# Patient Record
Sex: FEMALE | Race: WHITE | NOT HISPANIC OR LATINO | Employment: OTHER | URBAN - METROPOLITAN AREA
[De-identification: names, ages, dates, MRNs, and addresses within clinical notes are randomized per-mention and may not be internally consistent; named-entity substitution may affect disease eponyms.]

---

## 2017-01-04 ENCOUNTER — ALLSCRIPTS OFFICE VISIT (OUTPATIENT)
Dept: OTHER | Facility: OTHER | Age: 72
End: 2017-01-04

## 2017-01-04 DIAGNOSIS — E78.00 PURE HYPERCHOLESTEROLEMIA: ICD-10-CM

## 2017-02-14 ENCOUNTER — GENERIC CONVERSION - ENCOUNTER (OUTPATIENT)
Dept: OTHER | Facility: OTHER | Age: 72
End: 2017-02-14

## 2017-03-28 ENCOUNTER — TRANSCRIBE ORDERS (OUTPATIENT)
Dept: LAB | Facility: CLINIC | Age: 72
End: 2017-03-28

## 2017-03-28 ENCOUNTER — APPOINTMENT (OUTPATIENT)
Dept: LAB | Facility: CLINIC | Age: 72
End: 2017-03-28
Payer: MEDICARE

## 2017-03-28 DIAGNOSIS — E03.4 IDIOPATHIC ATROPHIC HYPOTHYROIDISM: ICD-10-CM

## 2017-03-28 DIAGNOSIS — E11.9 TYPE 2 DIABETES MELLITUS WITHOUT COMPLICATION, UNSPECIFIED LONG TERM INSULIN USE STATUS: Primary | ICD-10-CM

## 2017-03-28 LAB
ANION GAP SERPL CALCULATED.3IONS-SCNC: 7 MMOL/L (ref 4–13)
BUN SERPL-MCNC: 10 MG/DL (ref 5–25)
CALCIUM SERPL-MCNC: 8.8 MG/DL (ref 8.3–10.1)
CHLORIDE SERPL-SCNC: 102 MMOL/L (ref 100–108)
CO2 SERPL-SCNC: 28 MMOL/L (ref 21–32)
CREAT SERPL-MCNC: 0.61 MG/DL (ref 0.6–1.3)
EST. AVERAGE GLUCOSE BLD GHB EST-MCNC: 171 MG/DL
GFR SERPL CREATININE-BSD FRML MDRD: >60 ML/MIN/1.73SQ M
GLUCOSE P FAST SERPL-MCNC: 166 MG/DL (ref 65–99)
HBA1C MFR BLD: 7.6 % (ref 4.2–6.3)
POTASSIUM SERPL-SCNC: 4.6 MMOL/L (ref 3.5–5.3)
SODIUM SERPL-SCNC: 137 MMOL/L (ref 136–145)
TSH SERPL DL<=0.05 MIU/L-ACNC: 0.7 UIU/ML (ref 0.36–3.74)

## 2017-03-28 PROCEDURE — 83036 HEMOGLOBIN GLYCOSYLATED A1C: CPT

## 2017-03-28 PROCEDURE — 80048 BASIC METABOLIC PNL TOTAL CA: CPT

## 2017-03-28 PROCEDURE — 36415 COLL VENOUS BLD VENIPUNCTURE: CPT

## 2017-03-28 PROCEDURE — 84443 ASSAY THYROID STIM HORMONE: CPT

## 2017-07-07 ENCOUNTER — APPOINTMENT (OUTPATIENT)
Dept: LAB | Facility: CLINIC | Age: 72
End: 2017-07-07
Payer: MEDICARE

## 2017-07-07 ENCOUNTER — TRANSCRIBE ORDERS (OUTPATIENT)
Dept: LAB | Facility: CLINIC | Age: 72
End: 2017-07-07

## 2017-07-07 DIAGNOSIS — E78.00 PURE HYPERCHOLESTEROLEMIA: ICD-10-CM

## 2017-07-07 LAB
ALBUMIN SERPL BCP-MCNC: 3.7 G/DL (ref 3.5–5)
ALP SERPL-CCNC: 43 U/L (ref 46–116)
ALT SERPL W P-5'-P-CCNC: 35 U/L (ref 12–78)
AST SERPL W P-5'-P-CCNC: 15 U/L (ref 5–45)
BILIRUB DIRECT SERPL-MCNC: 0.27 MG/DL (ref 0–0.2)
BILIRUB SERPL-MCNC: 0.91 MG/DL (ref 0.2–1)
CHOLEST SERPL-MCNC: 94 MG/DL (ref 50–200)
CK SERPL-CCNC: 60 U/L (ref 26–192)
HDLC SERPL-MCNC: 44 MG/DL (ref 40–60)
LDLC SERPL CALC-MCNC: 21 MG/DL (ref 0–100)
PROT SERPL-MCNC: 7 G/DL (ref 6.4–8.2)
TRIGL SERPL-MCNC: 143 MG/DL

## 2017-07-07 PROCEDURE — 80061 LIPID PANEL: CPT

## 2017-07-07 PROCEDURE — 36415 COLL VENOUS BLD VENIPUNCTURE: CPT

## 2017-07-07 PROCEDURE — 80076 HEPATIC FUNCTION PANEL: CPT

## 2017-07-07 PROCEDURE — 82550 ASSAY OF CK (CPK): CPT

## 2017-07-12 ENCOUNTER — ALLSCRIPTS OFFICE VISIT (OUTPATIENT)
Dept: OTHER | Facility: OTHER | Age: 72
End: 2017-07-12

## 2017-07-28 ENCOUNTER — TRANSCRIBE ORDERS (OUTPATIENT)
Dept: LAB | Facility: CLINIC | Age: 72
End: 2017-07-28

## 2017-07-28 ENCOUNTER — APPOINTMENT (OUTPATIENT)
Dept: LAB | Facility: CLINIC | Age: 72
End: 2017-07-28
Payer: MEDICARE

## 2017-07-28 DIAGNOSIS — E11.9 DIABETES MELLITUS WITHOUT COMPLICATION (HCC): Primary | ICD-10-CM

## 2017-07-28 LAB
ANION GAP SERPL CALCULATED.3IONS-SCNC: 10 MMOL/L (ref 4–13)
BUN SERPL-MCNC: 14 MG/DL (ref 5–25)
CALCIUM SERPL-MCNC: 9.1 MG/DL (ref 8.3–10.1)
CHLORIDE SERPL-SCNC: 101 MMOL/L (ref 100–108)
CO2 SERPL-SCNC: 23 MMOL/L (ref 21–32)
CREAT SERPL-MCNC: 0.65 MG/DL (ref 0.6–1.3)
EST. AVERAGE GLUCOSE BLD GHB EST-MCNC: 212 MG/DL
GFR SERPL CREATININE-BSD FRML MDRD: 90 ML/MIN/1.73SQ M
GLUCOSE P FAST SERPL-MCNC: 148 MG/DL (ref 65–99)
HBA1C MFR BLD: 9 % (ref 4.2–6.3)
POTASSIUM SERPL-SCNC: 4.4 MMOL/L (ref 3.5–5.3)
SODIUM SERPL-SCNC: 134 MMOL/L (ref 136–145)

## 2017-07-28 PROCEDURE — 80048 BASIC METABOLIC PNL TOTAL CA: CPT

## 2017-07-28 PROCEDURE — 36415 COLL VENOUS BLD VENIPUNCTURE: CPT

## 2017-07-28 PROCEDURE — 83036 HEMOGLOBIN GLYCOSYLATED A1C: CPT

## 2017-11-03 ENCOUNTER — TRANSCRIBE ORDERS (OUTPATIENT)
Dept: ADMINISTRATIVE | Facility: HOSPITAL | Age: 72
End: 2017-11-03

## 2017-11-03 DIAGNOSIS — Z12.31 VISIT FOR SCREENING MAMMOGRAM: Primary | ICD-10-CM

## 2017-11-09 ENCOUNTER — HOSPITAL ENCOUNTER (OUTPATIENT)
Dept: RADIOLOGY | Facility: HOSPITAL | Age: 72
Discharge: HOME | End: 2017-11-09
Payer: MEDICARE

## 2017-11-09 DIAGNOSIS — Z12.31 VISIT FOR SCREENING MAMMOGRAM: ICD-10-CM

## 2017-11-09 PROCEDURE — G0202 SCR MAMMO BI INCL CAD: HCPCS

## 2017-11-09 PROCEDURE — 77063 BREAST TOMOSYNTHESIS BI: CPT

## 2017-12-06 ENCOUNTER — TRANSCRIBE ORDERS (OUTPATIENT)
Dept: LAB | Facility: CLINIC | Age: 72
End: 2017-12-06

## 2017-12-06 ENCOUNTER — APPOINTMENT (OUTPATIENT)
Dept: LAB | Facility: CLINIC | Age: 72
End: 2017-12-06
Payer: MEDICARE

## 2017-12-06 DIAGNOSIS — E03.9 HYPOTHYROIDISM, UNSPECIFIED TYPE: ICD-10-CM

## 2017-12-06 DIAGNOSIS — I10 ESSENTIAL HYPERTENSION, BENIGN: Primary | ICD-10-CM

## 2017-12-06 DIAGNOSIS — E11.9 DIABETES MELLITUS WITHOUT COMPLICATION (HCC): ICD-10-CM

## 2017-12-06 LAB
ANION GAP SERPL CALCULATED.3IONS-SCNC: 4 MMOL/L (ref 4–13)
BUN SERPL-MCNC: 13 MG/DL (ref 5–25)
CALCIUM SERPL-MCNC: 9.5 MG/DL (ref 8.3–10.1)
CHLORIDE SERPL-SCNC: 105 MMOL/L (ref 100–108)
CO2 SERPL-SCNC: 28 MMOL/L (ref 21–32)
CREAT SERPL-MCNC: 0.61 MG/DL (ref 0.6–1.3)
EST. AVERAGE GLUCOSE BLD GHB EST-MCNC: 163 MG/DL
GFR SERPL CREATININE-BSD FRML MDRD: 92 ML/MIN/1.73SQ M
GLUCOSE P FAST SERPL-MCNC: 152 MG/DL (ref 65–99)
HBA1C MFR BLD: 7.3 % (ref 4.2–6.3)
POTASSIUM SERPL-SCNC: 4.4 MMOL/L (ref 3.5–5.3)
SODIUM SERPL-SCNC: 137 MMOL/L (ref 136–145)
T4 FREE SERPL-MCNC: 1.05 NG/DL (ref 0.76–1.46)
TSH SERPL DL<=0.05 MIU/L-ACNC: 0.41 UIU/ML (ref 0.36–3.74)

## 2017-12-06 PROCEDURE — 84443 ASSAY THYROID STIM HORMONE: CPT

## 2017-12-06 PROCEDURE — 80048 BASIC METABOLIC PNL TOTAL CA: CPT

## 2017-12-06 PROCEDURE — 83036 HEMOGLOBIN GLYCOSYLATED A1C: CPT

## 2017-12-06 PROCEDURE — 36415 COLL VENOUS BLD VENIPUNCTURE: CPT

## 2017-12-06 PROCEDURE — 84439 ASSAY OF FREE THYROXINE: CPT

## 2018-01-12 VITALS
HEART RATE: 76 BPM | HEIGHT: 61 IN | BODY MASS INDEX: 40.31 KG/M2 | SYSTOLIC BLOOD PRESSURE: 136 MMHG | WEIGHT: 213.5 LBS | DIASTOLIC BLOOD PRESSURE: 50 MMHG

## 2018-01-14 VITALS
HEART RATE: 68 BPM | HEIGHT: 61 IN | WEIGHT: 205.25 LBS | BODY MASS INDEX: 38.75 KG/M2 | SYSTOLIC BLOOD PRESSURE: 116 MMHG | DIASTOLIC BLOOD PRESSURE: 54 MMHG

## 2018-02-16 RX ORDER — LEVOTHYROXINE SODIUM 175 UG/1
100 TABLET ORAL
COMMUNITY
End: 2020-06-14 | Stop reason: CLARIF

## 2018-02-16 RX ORDER — LISINOPRIL 40 MG/1
20 TABLET ORAL DAILY
COMMUNITY
End: 2018-08-31 | Stop reason: SDUPTHER

## 2018-02-16 RX ORDER — CLOTRIMAZOLE AND BETAMETHASONE DIPROPIONATE 10; .64 MG/G; MG/G
CREAM TOPICAL
Refills: 1 | COMMUNITY
Start: 2017-12-17 | End: 2019-06-15

## 2018-02-16 RX ORDER — ATORVASTATIN CALCIUM 40 MG/1
1 TABLET, FILM COATED ORAL
COMMUNITY
Start: 2017-01-04 | End: 2019-02-22 | Stop reason: SDUPTHER

## 2018-02-16 RX ORDER — ASPIRIN 81 MG/1
2 TABLET ORAL DAILY
COMMUNITY
End: 2019-06-15

## 2018-02-16 RX ORDER — GLIPIZIDE 10 MG/1
2 TABLET, FILM COATED, EXTENDED RELEASE ORAL DAILY
COMMUNITY
End: 2020-07-14 | Stop reason: SDUPTHER

## 2018-02-16 RX ORDER — GABAPENTIN 600 MG/1
1 TABLET ORAL 3 TIMES DAILY
COMMUNITY
End: 2020-06-01 | Stop reason: ALTCHOICE

## 2018-02-22 PROBLEM — Z01.810 PREOP CARDIOVASCULAR EXAM: Status: ACTIVE | Noted: 2018-02-22

## 2018-02-22 NOTE — PROGRESS NOTES
Cardiology Follow up Note    Treasure Blanca 71 y o  female MRN: [id number]    02/23/18          Assessment/Plan:    1  CAD s/p SAMMIE to proximal LAD 12/15  She denies any chest pain, no current shortness of breath  She is on Metoprolol, aspirin 81 mg, and Atorvastatin 40  LV function was normal  She discontinued Plavix 1/17  2  HTN  BP is controlled on Lisinopril and Metoprolol  3  HL  Lipid panel 12/15 showed total cholesterol 140, , HDL 41, LDL 68  She was started on Atorvastatin 40  Lipid panel 2/16 showed total cholesterol 96, HDL 39, LDL 33,   She asked to be switched to Simvastatin 40 as it was cheaper for her in 7/16  Lipid panel 9/16 showed total cholesterol 100, LDL 28, , HDL 38  CK and AST/ALT WNL  I switched her back to Atorvastatin 40 in 1/17 when drug formulary changed  Lipid panel 7/17 showed total cholesterol 94, LDL 21, , HDL 44     4  DM  Hgb A1c 9/16 6 5%  She is on Metformin, Glipizide, and Victoza  Hgb A1c was 7 3% in 12/17  5  Abnormal carotid ultrasound  She reports in the past she was told she had a 40% stenosis in the left carotid  No issues with carotid bruits or symptoms currently  On aspirin, statin  6  Preop CV  She does not need any further cardiac testing prior to the planned procedure  She is at low to moderate risk given her comorbidities  She can stop aspirin for 5-7 days prior to procedure if needed  Continue beta blocker in perioperative period  HPI: 71year old woman with a history of HTN, HL, DM, morbid obesity, who is here for follow up of CAD  She was admitted to [facility] 12/15 with chest pain associated with nausea/diaphoresis  She had been having some exertional chest pain and shortness of breath for several months, but was prompted to come to hospital by an episode of chest pain that woke her from sleep  Troponins were mildly abnormal  CT chest was negative for PE       Cardiac catheterization was done 12/22/15, which showed 90% stenosis of proximal LAD, which was treated with Xience Alpine drug eluting stent  Echo 12/15 showed normal LV size and function, EF 55%, grade I diastolic dysfunction, normal RV size and function, mildly dilated LA, moderate to marked MAC, no significant AV stenosis with Vmax 1 8 m/s  She was discharged home on aspirin, Plavix, statin, beta blocker  She completed cardiac rehab at [place]  She will be having lumbar spinal surgery with fusion (L3-L5 decompression) on 3/30/18 with Dr Noberto Peabody at [facility] (phone [phone], fax [phone])  She is not currently exercising  She is limited by back pain  No dizziness, very mild lightheadedness occasionally when she first gets up out of bed, occasional balance problems, no syncope/presyncope  No orthopnea, uses 1 pillow to sleep, no PND  She sleeps in a Craftmatic bed with her head slightly up  She does get some right ankle swelling occasionally, currently no problems with LE edema  No bruising, no bleeding       Patient Active Problem List   Diagnosis    Coronary artery disease    [facility]    Hypercholesterolemia    Hypertension    Abnormal carotid ultrasound    Preop cardiovascular exam       Allergies   Allergen Reactions    Duloxetine     Sulfa Antibiotics          Current Outpatient Prescriptions:     ascorbic acid (VITAMIN C) 500 mg tablet, Take 500 mg by mouth daily, Disp: , Rfl:     aspirin (ASPIRIN LOW DOSE) 81 mg EC tablet, Take 2 tablets by mouth daily, Disp: , Rfl:     atorvastatin (LIPITOR) 40 mg tablet, Take 1 tablet by mouth, Disp: , Rfl:     b complex vitamins tablet, Take 1 tablet by mouth daily, Disp: , Rfl:     calcium-vitamin D 250-100 MG-UNIT per tablet, Take 1 tablet by mouth 2 (two) times a day, Disp: , Rfl:     clotrimazole-betamethasone (LOTRISONE) 1-0 05 % cream, NIMA EXT TO THE AFFECTED AND SURROUNDING AREAS BID IN THE MORNING AND IN THE ANY FOR 2 WKS, Disp: , Rfl: 1   gabapentin (NEURONTIN) 600 MG tablet, Take 1 tablet by mouth 3 (three) times a day, Disp: , Rfl:     glipiZIDE (GLUCOTROL XL) 10 mg 24 hr tablet, Take 2 tablets by mouth daily, Disp: , Rfl:     levothyroxine 175 mcg tablet, Take 100 mcg by mouth  , Disp: , Rfl:     Liraglutide (VICTOZA) 18 MG/3ML SOPN, Inject under the skin daily, Disp: , Rfl:     lisinopril (ZESTRIL) 40 mg tablet, Take 20 mg by mouth daily  , Disp: , Rfl:     metFORMIN (GLUCOPHAGE) 1000 MG tablet, Take 1 tablet by mouth every 12 (twelve) hours, Disp: , Rfl:     metoprolol tartrate (LOPRESSOR) 25 mg tablet, Take 1 tablet by mouth 2 (two) times a day, Disp: , Rfl:     multivitamin (THERAGRAN) TABS, Take 1 tablet by mouth daily, Disp: , Rfl:     Omega-3 1000 MG CAPS, Take 1,000 mg by mouth, Disp: , Rfl:     ONE TOUCH ULTRA TEST test strip, TEST QD UTD, Disp: , Rfl: 5    vitamin E, tocopherol, 400 units capsule, Take 400 Units by mouth daily, Disp: , Rfl:     Past Medical History:   Diagnosis Date    Arthritis     Coronary artery disease     [facility]     Hypercholesterolemia     Hypertension     Osteoporosis     [facility] ca [facility]     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[facility] )        Family History   Problem Relation Age of Onset    Diabetes Family     Cancer Family     Arthritis Family     Heart disease Family        Past Surgical History:   Procedure Laterality Date    CARPAL TUNNEL RELEASE      CHOLECYSTECTOMY      CORONARY STENT PLACEMENT         Social History     Social History    Marital status: /Civil Union     Spouse name: N/A    Number of children: N/A    Years of education: N/A     Occupational History    Not on file       Social History Main Topics    Smoking status: Never Smoker    Smokeless tobacco: Never Used    Alcohol use Not on file    Drug use: Unknown    Sexual activity: Not on file     Other Topics Concern    Not on file     Social History Narrative    No narrative on file Review of Systems   Constitution: Negative for chills, decreased appetite, diaphoresis, fever, weakness, malaise/fatigue, night sweats, weight gain and weight loss  HENT: Negative for ear pain, hearing loss, hoarse voice, nosebleeds, sore throat and tinnitus  Eyes: Negative for blurred vision and pain  Cardiovascular: Positive for leg swelling  Negative for chest pain, claudication, cyanosis, dyspnea on exertion, irregular heartbeat, near-syncope, orthopnea, palpitations, paroxysmal nocturnal dyspnea and syncope  Respiratory: Negative for cough, hemoptysis, shortness of breath, sleep disturbances due to breathing, snoring, sputum production and wheezing  Hematologic/Lymphatic: Negative for adenopathy and bleeding problem  Does not bruise/bleed easily  Skin: Negative for color change, dry skin, flushing, itching, poor wound healing and rash  Musculoskeletal: Positive for back pain  Negative for arthritis, falls, joint pain, muscle cramps, muscle weakness, myalgias and neck pain  Gastrointestinal: Negative for abdominal pain, constipation, diarrhea, dysphagia, heartburn, hematemesis, hematochezia, melena, nausea and vomiting  Genitourinary: Negative for dysuria, frequency, hematuria, hesitancy, non-menstrual bleeding and urgency  Neurological: Negative for excessive daytime sleepiness, dizziness, focal weakness, headaches, light-headedness, loss of balance, numbness, paresthesias, tremors and vertigo  Psychiatric/Behavioral: Negative for altered mental status, depression and memory loss  The patient does not have insomnia and is not nervous/anxious  Allergic/Immunologic: Negative for environmental allergies and persistent infections  Vitals: /58 (BP Location: Right arm, Patient Position: Sitting, Cuff Size: Large)   Pulse 66   Ht 5' 1" (1 549 m)   Wt 92 9 kg (204 lb 14 4 oz)   BMI 38 72 kg/m²       Physical Exam:     GEN: Alert and oriented x 3, in no acute distress  Well appearing and well nourished  HEENT: Sclera anicteric, conjunctivae pink, mucous membranes moist  Oropharynx clear  NECK: Supple, no carotid bruits, no significant JVD  Trachea midline, no thyromegaly  HEART: Regular rhythm, normal S1 and S2, II/VI systolic murmur, no clicks, gallops or rubs  PMI nondisplaced, no thrills  LUNGS: Clear to auscultation bilaterally; no wheezes, rales, or rhonchi  No increased work of breathing or signs of respiratory distress  ABDOMEN: Obese, soft, nontender, nondistended, normoactive bowel sounds  EXTREMITIES: Skin warm and well perfused, no clubbing, cyanosis, or edema  NEURO: No focal findings  Normal gait  Normal speech  Mood and affect normal    SKIN: Normal without suspicious lesions on exposed skin        Lab Results:       Lab Results   Component Value Date    HGBA1C 7 3 (H) 12/06/2017    HGBA1C 9 0 (H) 07/28/2017    HGBA1C 7 6 (H) 03/28/2017     Lab Results   Component Value Date    CHOL 94 07/07/2017    CHOL 100 09/15/2016    CHOL 99 07/26/2016     Lab Results   Component Value Date    HDL 44 07/07/2017    HDL 38 (L) 09/15/2016    HDL 36 (L) 07/26/2016     Lab Results   Component Value Date    LDLCALC 21 07/07/2017    LDLCALC 28 09/15/2016    LDLCALC 33 07/26/2016     Lab Results   Component Value Date    TRIG 143 07/07/2017    TRIG 172 (H) 09/15/2016    TRIG 149 07/26/2016     No components found for: CHOLHDL

## 2018-02-23 ENCOUNTER — OFFICE VISIT (OUTPATIENT)
Dept: CARDIOLOGY CLINIC | Facility: CLINIC | Age: 73
End: 2018-02-23
Payer: MEDICARE

## 2018-02-23 VITALS
DIASTOLIC BLOOD PRESSURE: 58 MMHG | BODY MASS INDEX: 38.68 KG/M2 | WEIGHT: 204.9 LBS | HEIGHT: 61 IN | HEART RATE: 66 BPM | SYSTOLIC BLOOD PRESSURE: 124 MMHG

## 2018-02-23 DIAGNOSIS — R93.89 ABNORMAL CAROTID ULTRASOUND: ICD-10-CM

## 2018-02-23 DIAGNOSIS — I10 ESSENTIAL HYPERTENSION: ICD-10-CM

## 2018-02-23 DIAGNOSIS — E78.00 HYPERCHOLESTEROLEMIA: ICD-10-CM

## 2018-02-23 DIAGNOSIS — E11.9 TYPE 2 DIABETES MELLITUS WITHOUT COMPLICATION, WITHOUT LONG-TERM CURRENT USE OF INSULIN (HCC): Primary | ICD-10-CM

## 2018-02-23 DIAGNOSIS — Z01.810 PREOP CARDIOVASCULAR EXAM: ICD-10-CM

## 2018-02-23 DIAGNOSIS — I25.10 CORONARY ARTERY DISEASE INVOLVING NATIVE HEART WITHOUT ANGINA PECTORIS, UNSPECIFIED VESSEL OR LESION TYPE: ICD-10-CM

## 2018-02-23 PROCEDURE — 93000 ELECTROCARDIOGRAM COMPLETE: CPT | Performed by: INTERNAL MEDICINE

## 2018-02-23 PROCEDURE — 99214 OFFICE O/P EST MOD 30 MIN: CPT | Performed by: INTERNAL MEDICINE

## 2018-02-23 RX ORDER — DIPHENOXYLATE HYDROCHLORIDE AND ATROPINE SULFATE 2.5; .025 MG/1; MG/1
1 TABLET ORAL DAILY
COMMUNITY
End: 2019-06-15

## 2018-02-23 RX ORDER — VITAMIN E 268 MG
400 CAPSULE ORAL DAILY
COMMUNITY
End: 2019-06-15

## 2018-02-23 RX ORDER — ASCORBIC ACID 500 MG
500 TABLET ORAL DAILY
COMMUNITY
End: 2019-06-15

## 2018-02-23 RX ORDER — CHLORAL HYDRATE 500 MG
1000 CAPSULE ORAL
COMMUNITY
End: 2019-06-15

## 2018-04-10 ENCOUNTER — TRANSCRIBE ORDERS (OUTPATIENT)
Dept: LAB | Facility: CLINIC | Age: 73
End: 2018-04-10

## 2018-04-10 ENCOUNTER — APPOINTMENT (OUTPATIENT)
Dept: LAB | Facility: CLINIC | Age: 73
End: 2018-04-10
Payer: MEDICARE

## 2018-04-10 DIAGNOSIS — E13.8 DIABETES MELLITUS OF OTHER TYPE WITH COMPLICATION, UNSPECIFIED WHETHER LONG TERM INSULIN USE: ICD-10-CM

## 2018-04-10 DIAGNOSIS — I51.9 MYXEDEMA HEART DISEASE: Primary | ICD-10-CM

## 2018-04-10 DIAGNOSIS — E78.00 PURE HYPERCHOLESTEROLEMIA: ICD-10-CM

## 2018-04-10 DIAGNOSIS — E03.9 MYXEDEMA HEART DISEASE: Primary | ICD-10-CM

## 2018-04-10 LAB
ALBUMIN SERPL BCP-MCNC: 3.8 G/DL (ref 3.5–5)
ALP SERPL-CCNC: 45 U/L (ref 46–116)
ALT SERPL W P-5'-P-CCNC: 29 U/L (ref 12–78)
ANION GAP SERPL CALCULATED.3IONS-SCNC: 5 MMOL/L (ref 4–13)
AST SERPL W P-5'-P-CCNC: 19 U/L (ref 5–45)
BILIRUB DIRECT SERPL-MCNC: 0.17 MG/DL (ref 0–0.2)
BILIRUB SERPL-MCNC: 0.81 MG/DL (ref 0.2–1)
BUN SERPL-MCNC: 12 MG/DL (ref 5–25)
CALCIUM SERPL-MCNC: 8.9 MG/DL
CHLORIDE SERPL-SCNC: 103 MMOL/L (ref 100–108)
CHOLEST SERPL-MCNC: 94 MG/DL (ref 50–200)
CO2 SERPL-SCNC: 29 MMOL/L (ref 21–32)
CREAT SERPL-MCNC: 0.62 MG/DL (ref 0.6–1.3)
EST. AVERAGE GLUCOSE BLD GHB EST-MCNC: 169 MG/DL
GFR SERPL CREATININE-BSD FRML MDRD: 90 ML/MIN/1.73SQ M
GLUCOSE P FAST SERPL-MCNC: 152 MG/DL (ref 65–99)
HBA1C MFR BLD: 7.5 % (ref 4.2–6.3)
HDLC SERPL-MCNC: 36 MG/DL (ref 40–60)
LDLC SERPL CALC-MCNC: 19 MG/DL (ref 0–100)
NONHDLC SERPL-MCNC: 58 MG/DL
POTASSIUM SERPL-SCNC: 4.2 MMOL/L (ref 3.5–5.3)
PROT SERPL-MCNC: 7 G/DL (ref 6.4–8.2)
SODIUM SERPL-SCNC: 137 MMOL/L (ref 136–145)
T4 FREE SERPL-MCNC: 0.98 NG/DL (ref 0.76–1.46)
TRIGL SERPL-MCNC: 195 MG/DL
TSH SERPL DL<=0.05 MIU/L-ACNC: 0.66 UIU/ML (ref 0.36–3.74)

## 2018-04-10 PROCEDURE — 84443 ASSAY THYROID STIM HORMONE: CPT

## 2018-04-10 PROCEDURE — 80076 HEPATIC FUNCTION PANEL: CPT

## 2018-04-10 PROCEDURE — 36415 COLL VENOUS BLD VENIPUNCTURE: CPT

## 2018-04-10 PROCEDURE — 84439 ASSAY OF FREE THYROXINE: CPT

## 2018-04-10 PROCEDURE — 80048 BASIC METABOLIC PNL TOTAL CA: CPT

## 2018-04-10 PROCEDURE — 80061 LIPID PANEL: CPT

## 2018-04-10 PROCEDURE — 83036 HEMOGLOBIN GLYCOSYLATED A1C: CPT

## 2018-06-20 ENCOUNTER — TRANSCRIBE ORDERS (OUTPATIENT)
Dept: LAB | Facility: CLINIC | Age: 73
End: 2018-06-20

## 2018-06-20 ENCOUNTER — APPOINTMENT (OUTPATIENT)
Dept: LAB | Facility: CLINIC | Age: 73
End: 2018-06-20
Payer: MEDICARE

## 2018-06-20 DIAGNOSIS — I10 ESSENTIAL HYPERTENSION, MALIGNANT: ICD-10-CM

## 2018-06-20 DIAGNOSIS — I51.9 MYXEDEMA HEART DISEASE: ICD-10-CM

## 2018-06-20 DIAGNOSIS — N39.0 URINARY TRACT INFECTION WITHOUT HEMATURIA, SITE UNSPECIFIED: Primary | ICD-10-CM

## 2018-06-20 DIAGNOSIS — E03.9 MYXEDEMA HEART DISEASE: ICD-10-CM

## 2018-06-20 DIAGNOSIS — E87.1 HYPOSMOLALITY SYNDROME: ICD-10-CM

## 2018-06-20 LAB
ANION GAP SERPL CALCULATED.3IONS-SCNC: 8 MMOL/L (ref 4–13)
BASOPHILS # BLD AUTO: 0.04 THOUSANDS/ΜL (ref 0–0.1)
BASOPHILS NFR BLD AUTO: 1 % (ref 0–1)
BUN SERPL-MCNC: 6 MG/DL (ref 5–25)
CALCIUM SERPL-MCNC: 8.8 MG/DL (ref 8.3–10.1)
CHLORIDE SERPL-SCNC: 102 MMOL/L (ref 100–108)
CO2 SERPL-SCNC: 28 MMOL/L (ref 21–32)
CREAT SERPL-MCNC: 0.52 MG/DL (ref 0.6–1.3)
EOSINOPHIL # BLD AUTO: 0.04 THOUSAND/ΜL (ref 0–0.61)
EOSINOPHIL NFR BLD AUTO: 1 % (ref 0–6)
ERYTHROCYTE [DISTWIDTH] IN BLOOD BY AUTOMATED COUNT: 13 % (ref 11.6–15.1)
GFR SERPL CREATININE-BSD FRML MDRD: 96 ML/MIN/1.73SQ M
GLUCOSE P FAST SERPL-MCNC: 166 MG/DL (ref 65–99)
HCT VFR BLD AUTO: 37.9 % (ref 34.8–46.1)
HGB BLD-MCNC: 12.3 G/DL (ref 11.5–15.4)
IMM GRANULOCYTES # BLD AUTO: 0.03 THOUSAND/UL (ref 0–0.2)
IMM GRANULOCYTES NFR BLD AUTO: 1 % (ref 0–2)
LYMPHOCYTES # BLD AUTO: 1.47 THOUSANDS/ΜL (ref 0.6–4.47)
LYMPHOCYTES NFR BLD AUTO: 26 % (ref 14–44)
MCH RBC QN AUTO: 30.4 PG (ref 26.8–34.3)
MCHC RBC AUTO-ENTMCNC: 32.5 G/DL (ref 31.4–37.4)
MCV RBC AUTO: 94 FL (ref 82–98)
MONOCYTES # BLD AUTO: 0.52 THOUSAND/ΜL (ref 0.17–1.22)
MONOCYTES NFR BLD AUTO: 9 % (ref 4–12)
NEUTROPHILS # BLD AUTO: 3.66 THOUSANDS/ΜL (ref 1.85–7.62)
NEUTS SEG NFR BLD AUTO: 62 % (ref 43–75)
NRBC BLD AUTO-RTO: 0 /100 WBCS
PLATELET # BLD AUTO: 202 THOUSANDS/UL (ref 149–390)
PMV BLD AUTO: 11 FL (ref 8.9–12.7)
POTASSIUM SERPL-SCNC: 3.7 MMOL/L (ref 3.5–5.3)
RBC # BLD AUTO: 4.04 MILLION/UL (ref 3.81–5.12)
SODIUM SERPL-SCNC: 138 MMOL/L (ref 136–145)
T4 FREE SERPL-MCNC: 1.28 NG/DL (ref 0.76–1.46)
TSH SERPL DL<=0.05 MIU/L-ACNC: 0.81 UIU/ML (ref 0.36–3.74)
WBC # BLD AUTO: 5.76 THOUSAND/UL (ref 4.31–10.16)

## 2018-06-20 PROCEDURE — 85025 COMPLETE CBC W/AUTO DIFF WBC: CPT

## 2018-06-20 PROCEDURE — 84439 ASSAY OF FREE THYROXINE: CPT

## 2018-06-20 PROCEDURE — 36415 COLL VENOUS BLD VENIPUNCTURE: CPT

## 2018-06-20 PROCEDURE — 80048 BASIC METABOLIC PNL TOTAL CA: CPT

## 2018-06-20 PROCEDURE — 84443 ASSAY THYROID STIM HORMONE: CPT

## 2018-06-21 ENCOUNTER — APPOINTMENT (OUTPATIENT)
Dept: LAB | Facility: CLINIC | Age: 73
End: 2018-06-21
Payer: MEDICARE

## 2018-06-21 ENCOUNTER — TRANSCRIBE ORDERS (OUTPATIENT)
Dept: LAB | Facility: CLINIC | Age: 73
End: 2018-06-21

## 2018-06-21 LAB
BACTERIA UR QL AUTO: ABNORMAL /HPF
BILIRUB UR QL STRIP: NEGATIVE
CLARITY UR: ABNORMAL
COLOR UR: YELLOW
GLUCOSE UR STRIP-MCNC: NEGATIVE MG/DL
HGB UR QL STRIP.AUTO: NEGATIVE
KETONES UR STRIP-MCNC: NEGATIVE MG/DL
LEUKOCYTE ESTERASE UR QL STRIP: ABNORMAL
NITRITE UR QL STRIP: POSITIVE
NON-SQ EPI CELLS URNS QL MICRO: ABNORMAL /HPF
PH UR STRIP.AUTO: 6.5 [PH] (ref 4.5–8)
PROT UR STRIP-MCNC: NEGATIVE MG/DL
RBC #/AREA URNS AUTO: ABNORMAL /HPF
SP GR UR STRIP.AUTO: 1.01 (ref 1–1.03)
UROBILINOGEN UR QL STRIP.AUTO: 0.2 E.U./DL
WBC #/AREA URNS AUTO: ABNORMAL /HPF

## 2018-06-21 PROCEDURE — 81001 URINALYSIS AUTO W/SCOPE: CPT

## 2018-06-22 ENCOUNTER — APPOINTMENT (EMERGENCY)
Dept: CT IMAGING | Facility: HOSPITAL | Age: 73
End: 2018-06-22
Payer: MEDICARE

## 2018-06-22 ENCOUNTER — HOSPITAL ENCOUNTER (EMERGENCY)
Facility: HOSPITAL | Age: 73
Discharge: HOME | End: 2018-06-22
Payer: MEDICARE

## 2018-06-22 VITALS
OXYGEN SATURATION: 98 % | DIASTOLIC BLOOD PRESSURE: 70 MMHG | HEART RATE: 75 BPM | RESPIRATION RATE: 20 BRPM | BODY MASS INDEX: 36.44 KG/M2 | WEIGHT: 193 LBS | TEMPERATURE: 98.1 F | HEIGHT: 61 IN | SYSTOLIC BLOOD PRESSURE: 165 MMHG

## 2018-06-22 DIAGNOSIS — R10.9 ABDOMINAL PAIN: ICD-10-CM

## 2018-06-22 DIAGNOSIS — R19.7 DIARRHEA: ICD-10-CM

## 2018-06-22 DIAGNOSIS — N39.0 UTI (URINARY TRACT INFECTION): Primary | ICD-10-CM

## 2018-06-22 LAB
ALBUMIN SERPL BCP-MCNC: 3.6 G/DL (ref 3.5–5)
ALP SERPL-CCNC: 74 U/L (ref 46–116)
ALT SERPL W P-5'-P-CCNC: 24 U/L (ref 12–78)
ANION GAP SERPL CALCULATED.3IONS-SCNC: 12 MMOL/L (ref 4–13)
AST SERPL W P-5'-P-CCNC: 16 U/L (ref 5–45)
BACTERIA UR QL AUTO: ABNORMAL /HPF
BASOPHILS # BLD AUTO: 0.02 THOUSANDS/ΜL (ref 0–0.1)
BASOPHILS NFR BLD AUTO: 0 % (ref 0–1)
BILIRUB SERPL-MCNC: 0.6 MG/DL (ref 0.2–1)
BILIRUB UR QL STRIP: NEGATIVE
BUN SERPL-MCNC: 3 MG/DL (ref 5–25)
CALCIUM SERPL-MCNC: 9.4 MG/DL (ref 8.3–10.1)
CHLORIDE SERPL-SCNC: 101 MMOL/L (ref 100–108)
CLARITY UR: CLEAR
CO2 SERPL-SCNC: 26 MMOL/L (ref 21–32)
COLOR UR: YELLOW
CREAT SERPL-MCNC: 0.58 MG/DL (ref 0.6–1.3)
EOSINOPHIL # BLD AUTO: 0.01 THOUSAND/ΜL (ref 0–0.61)
EOSINOPHIL NFR BLD AUTO: 0 % (ref 0–6)
ERYTHROCYTE [DISTWIDTH] IN BLOOD BY AUTOMATED COUNT: 12.7 % (ref 11.6–15.1)
GFR SERPL CREATININE-BSD FRML MDRD: 92 ML/MIN/1.73SQ M
GLUCOSE SERPL-MCNC: 174 MG/DL (ref 65–140)
GLUCOSE UR STRIP-MCNC: NEGATIVE MG/DL
HCT VFR BLD AUTO: 37.5 % (ref 34.8–46.1)
HGB BLD-MCNC: 13.2 G/DL (ref 11.5–15.4)
HGB UR QL STRIP.AUTO: ABNORMAL
KETONES UR STRIP-MCNC: ABNORMAL MG/DL
LEUKOCYTE ESTERASE UR QL STRIP: ABNORMAL
LIPASE SERPL-CCNC: 176 U/L (ref 73–393)
LYMPHOCYTES # BLD AUTO: 1.48 THOUSANDS/ΜL (ref 0.6–4.47)
LYMPHOCYTES NFR BLD AUTO: 24 % (ref 14–44)
MCH RBC QN AUTO: 31.2 PG (ref 26.8–34.3)
MCHC RBC AUTO-ENTMCNC: 35.2 G/DL (ref 31.4–37.4)
MCV RBC AUTO: 89 FL (ref 82–98)
MONOCYTES # BLD AUTO: 0.54 THOUSAND/ΜL (ref 0.17–1.22)
MONOCYTES NFR BLD AUTO: 9 % (ref 4–12)
NEUTROPHILS # BLD AUTO: 4.05 THOUSANDS/ΜL (ref 1.85–7.62)
NEUTS SEG NFR BLD AUTO: 66 % (ref 43–75)
NITRITE UR QL STRIP: POSITIVE
NON-SQ EPI CELLS URNS QL MICRO: ABNORMAL /HPF
PH UR STRIP.AUTO: 8.5 [PH] (ref 4.5–8)
PLATELET # BLD AUTO: 204 THOUSANDS/UL (ref 149–390)
PMV BLD AUTO: 9.8 FL (ref 8.9–12.7)
POTASSIUM SERPL-SCNC: 3.4 MMOL/L (ref 3.5–5.3)
PROT SERPL-MCNC: 7.1 G/DL (ref 6.4–8.2)
PROT UR STRIP-MCNC: NEGATIVE MG/DL
RBC # BLD AUTO: 4.23 MILLION/UL (ref 3.81–5.12)
RBC #/AREA URNS AUTO: ABNORMAL /HPF
SODIUM SERPL-SCNC: 139 MMOL/L (ref 136–145)
SP GR UR STRIP.AUTO: 1.01 (ref 1–1.03)
UROBILINOGEN UR QL STRIP.AUTO: 0.2 E.U./DL
WBC # BLD AUTO: 6.1 THOUSAND/UL (ref 4.31–10.16)
WBC #/AREA URNS AUTO: ABNORMAL /HPF

## 2018-06-22 PROCEDURE — 96361 HYDRATE IV INFUSION ADD-ON: CPT

## 2018-06-22 PROCEDURE — 36415 COLL VENOUS BLD VENIPUNCTURE: CPT | Performed by: PHYSICIAN ASSISTANT

## 2018-06-22 PROCEDURE — 85025 COMPLETE CBC W/AUTO DIFF WBC: CPT | Performed by: PHYSICIAN ASSISTANT

## 2018-06-22 PROCEDURE — 96374 THER/PROPH/DIAG INJ IV PUSH: CPT

## 2018-06-22 PROCEDURE — 96376 TX/PRO/DX INJ SAME DRUG ADON: CPT

## 2018-06-22 PROCEDURE — 96375 TX/PRO/DX INJ NEW DRUG ADDON: CPT

## 2018-06-22 PROCEDURE — 99284 EMERGENCY DEPT VISIT MOD MDM: CPT

## 2018-06-22 PROCEDURE — 83690 ASSAY OF LIPASE: CPT | Performed by: PHYSICIAN ASSISTANT

## 2018-06-22 PROCEDURE — 80053 COMPREHEN METABOLIC PANEL: CPT | Performed by: PHYSICIAN ASSISTANT

## 2018-06-22 PROCEDURE — 81001 URINALYSIS AUTO W/SCOPE: CPT

## 2018-06-22 PROCEDURE — 74177 CT ABD & PELVIS W/CONTRAST: CPT

## 2018-06-22 RX ORDER — MORPHINE SULFATE 2 MG/ML
2 INJECTION, SOLUTION INTRAMUSCULAR; INTRAVENOUS ONCE
Status: COMPLETED | OUTPATIENT
Start: 2018-06-22 | End: 2018-06-22

## 2018-06-22 RX ORDER — ONDANSETRON 4 MG/1
4 TABLET, FILM COATED ORAL EVERY 8 HOURS PRN
Qty: 10 TABLET | Refills: 0 | Status: SHIPPED | OUTPATIENT
Start: 2018-06-22 | End: 2019-06-15

## 2018-06-22 RX ORDER — ONDANSETRON 2 MG/ML
4 INJECTION INTRAMUSCULAR; INTRAVENOUS ONCE
Status: COMPLETED | OUTPATIENT
Start: 2018-06-22 | End: 2018-06-22

## 2018-06-22 RX ORDER — CEPHALEXIN 500 MG/1
500 CAPSULE ORAL 4 TIMES DAILY
Qty: 28 CAPSULE | Refills: 0 | Status: SHIPPED | OUTPATIENT
Start: 2018-06-22 | End: 2018-06-29

## 2018-06-22 RX ADMIN — ONDANSETRON 4 MG: 2 INJECTION INTRAMUSCULAR; INTRAVENOUS at 15:42

## 2018-06-22 RX ADMIN — MORPHINE SULFATE 2 MG: 2 INJECTION, SOLUTION INTRAMUSCULAR; INTRAVENOUS at 12:52

## 2018-06-22 RX ADMIN — MORPHINE SULFATE 2 MG: 2 INJECTION, SOLUTION INTRAMUSCULAR; INTRAVENOUS at 15:42

## 2018-06-22 RX ADMIN — SODIUM CHLORIDE 1000 ML: 0.9 INJECTION, SOLUTION INTRAVENOUS at 12:52

## 2018-06-22 RX ADMIN — IOHEXOL 100 ML: 350 INJECTION, SOLUTION INTRAVENOUS at 14:28

## 2018-06-22 RX ADMIN — ONDANSETRON 4 MG: 2 INJECTION INTRAMUSCULAR; INTRAVENOUS at 12:52

## 2018-06-22 NOTE — DISCHARGE INSTRUCTIONS
Abdominal Pain   WHAT YOU NEED TO KNOW:   Abdominal pain can be dull, achy, or sharp  You may have pain in one area of your abdomen, or in your entire abdomen  Your pain may be caused by a condition such as constipation, food sensitivity or poisoning, infection, or a blockage  Abdominal pain can also be from a hernia, appendicitis, or an ulcer  Liver, gallbladder, or kidney conditions can also cause abdominal pain  The cause of your abdominal pain may be unknown  DISCHARGE INSTRUCTIONS:   Return to the emergency department if:   · You have new chest pain or shortness of breath  · You have pulsing pain in your upper abdomen or lower back that suddenly becomes constant  · Your pain is in the right lower abdominal area and worsens with movement  · You have a fever over 100 4°F (38°C) or shaking chills  · You are vomiting and cannot keep food or liquids down  · Your pain does not improve or gets worse over the next 8 to 12 hours  · You see blood in your vomit or bowel movements, or they look black and tarry  · Your skin or the whites of your eyes turn yellow  · You are a woman and have a large amount of vaginal bleeding that is not your monthly period  Contact your healthcare provider if:   · You have pain in your lower back  · You are a man and have pain in your testicles  · You have pain when you urinate  · You have questions or concerns about your condition or care  Follow up with your healthcare provider within 24 hours or as directed:  Write down your questions so you remember to ask them during your visits  Medicines:   · Medicines  may be given to calm your stomach and prevent vomiting or to decrease pain  Ask how to take pain medicine safely  · Take your medicine as directed  Contact your healthcare provider if you think your medicine is not helping or if you have side effects  Tell him of her if you are allergic to any medicine   Keep a list of the medicines, vitamins, and herbs you take  Include the amounts, and when and why you take them  Bring the list or the pill bottles to follow-up visits  Carry your medicine list with you in case of an emergency  © 2017 [employer] Information is for End User's use only and may not be sold, redistributed or otherwise used for commercial purposes  All illustrations and images included in CareNotes® are the copyrighted property of [employer]  or [employer]  The above information is an  only  It is not intended as medical advice for individual conditions or treatments  Talk to your doctor, nurse or pharmacist before following any medical regimen to see if it is safe and effective for you  Urinary Traction Infection in Older Adults   WHAT YOU NEED TO KNOW:   A urinary tract infection (UTI) is caused by bacteria that get inside your urinary tract  Your urinary tract includes your kidneys, ureters, bladder, and urethra  Urine is made in your kidneys, and it flows from the ureters to the bladder  Urine leaves the bladder through the urethra  A UTI is more common in your lower urinary tract, which includes your bladder and urethra  DISCHARGE INSTRUCTIONS:   Return to the emergency department if:   · You are urinating very little or not at all  · You are vomiting  · You have a high fever with shaking chills  · You have side or back pain that gets worse  Contact your healthcare provider if:   · You have a fever  · You are a woman and you have increased white or yellow discharge from your vagina  · You do not feel better after 2 days of taking antibiotics  · You have questions or concerns about your condition or care  Medicines:   · Medicines  help treat the bacterial infection or decrease pain and burning when you urinate  You may also need medicines to decrease the urge to urinate often   Your healthcare provider may recommend cranberry juice or cranberry supplements to help decrease your symptoms  · Take your medicine as directed  Contact your healthcare provider if you think your medicine is not helping or if you have side effects  Tell him or her if you are allergic to any medicine  Keep a list of the medicines, vitamins, and herbs you take  Include the amounts, and when and why you take them  Bring the list or the pill bottles to follow-up visits  Carry your medicine list with you in case of an emergency  Self-care:   · Urinate when you feel the urge  Do not hold your urine because bacteria can grow in the bladder if urine stays in the bladder too long  It may be helpful to urinate at least every 3 to 4 hours  · Drink liquids as directed  Liquids can help flush bacteria from your urinary tract  Ask how much liquid to drink each day and which liquids are best for you  You may need to drink more liquids than usual to help flush out the bacteria  Do not drink alcohol, caffeine, and citrus juices  These can irritate your bladder and increase your symptoms  · Apply heat  on your abdomen for 20 to 30 minutes every 2 hours for as many days as directed  Heat helps decrease discomfort and pressure in your bladder  Prevent a UTI:   · Women should wipe front to back  after urinating or having a bowel movement  This may prevent germs from getting into the urinary tract  · Urinate after you have sex  to flush away bacteria that can enter your urinary tract during sex  · Wear cotton underwear and clothes that fit loose  Tight pants and nylon underwear can trap moisture and cause bacteria to grow  Follow up with your healthcare provider as directed:  Write down your questions so you remember to ask them during your visits  © 2017 [employer] Information is for End User's use only and may not be sold, redistributed or otherwise used for commercial purposes   All illustrations and images included in CareNotes® are the copyrighted property of [employer]  or [employer]  The above information is an  only  It is not intended as medical advice for individual conditions or treatments  Talk to your doctor, nurse or pharmacist before following any medical regimen to see if it is safe and effective for you

## 2018-06-22 NOTE — ED PROVIDER NOTES
History  Chief Complaint   Patient presents with    Diarrhea     Patient states diarrhea since yesterday  took immodium today x 2  lower abdominal pain  restarted alot of her medications prescribed by pcp on wednesday  This is a 71-year-old female patient who underwent a lumbar fusion any in April then she was sent to rehab and recently got out of rehabilitation of her back  She was restarted on all her medications on Thursday including metformin  Yesterday she started with 3 bouts of copious amounts of watery diarrhea without blood  She took 2 Imodium woke up this morning had 2 bouts of diarrhea that was yellow took 2 more Imodium and now has not gone  She is not eating since yesterday she has some nausea  She has got diffuse abdominal pain lower abdomen she finds it hard to describe  She has chills  She denies any back pain no blood in her stool no numbness or paresthesia or legs no urgency frequency or dysuria  No headache blurred vision double vision no cough congestion sore throat  Positive nausea no vomiting no upper abdominal pain no chest pain or shortness of breath nothing makes it better or worse  Differential diagnosis includes not limited to viral diarrhea, C difficile from being institutionalized, diverticulitis, colitis, obstruction less likely I do not feel that this is lumbar etiology because she is able to walk and had minimal discomfort  Of her lumbar spine        Diarrhea       Prior to Admission Medications   Prescriptions Last Dose Informant Patient Reported? Taking?    Liraglutide (VICTOZA) 18 MG/3ML SOPN  Self Yes No   Sig: Inject under the skin daily   ONE TOUCH ULTRA TEST test strip  Self Yes No   Sig: TEST QD UTD   Omega-3 1000 MG CAPS  Self Yes No   Sig: Take 1,000 mg by mouth   ascorbic acid (VITAMIN C) 500 mg tablet  Self Yes No   Sig: Take 500 mg by mouth daily   aspirin (ASPIRIN LOW DOSE) 81 mg EC tablet  Self Yes No   Sig: Take 2 tablets by mouth daily   atorvastatin (LIPITOR) 40 mg tablet  Self Yes No   Sig: Take 1 tablet by mouth   b complex vitamins tablet  Self Yes No   Sig: Take 1 tablet by mouth daily   calcium-vitamin D 250-100 MG-UNIT per tablet  Self Yes No   Sig: Take 1 tablet by mouth 2 (two) times a day   clotrimazole-betamethasone (LOTRISONE) 1-0 05 % cream  Self Yes No   Sig: NIMA EXT TO THE AFFECTED AND SURROUNDING AREAS BID IN THE MORNING AND IN THE ANY FOR 2 WKS   gabapentin (NEURONTIN) 600 MG tablet  Self Yes No   Sig: Take 1 tablet by mouth 3 (three) times a day   glipiZIDE (GLUCOTROL XL) 10 mg 24 hr tablet  Self Yes No   Sig: Take 2 tablets by mouth daily   levothyroxine 175 mcg tablet  Self Yes No   Sig: Take 100 mcg by mouth     lisinopril (ZESTRIL) 40 mg tablet  Self Yes No   Sig: Take 20 mg by mouth daily     metFORMIN (GLUCOPHAGE) 1000 MG tablet  Self Yes No   Sig: Take 1 tablet by mouth every 12 (twelve) hours   metoprolol tartrate (LOPRESSOR) 25 mg tablet  Self Yes No   Sig: Take 1 tablet by mouth 2 (two) times a day   multivitamin (THERAGRAN) TABS  Self Yes No   Sig: Take 1 tablet by mouth daily   vitamin E, tocopherol, 400 units capsule  Self Yes No   Sig: Take 400 Units by mouth daily      Facility-Administered Medications: None       Past Medical History:   Diagnosis Date    Arthritis     Coronary artery disease     Diabetes mellitus (HCC)     Hypercholesterolemia     Hypertension     Osteoporosis     Ovarian ca (HCC)     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[address] )        Past Surgical History:   Procedure Laterality Date    BACK SURGERY      CARPAL TUNNEL RELEASE      CHOLECYSTECTOMY      CORONARY STENT PLACEMENT         Family History   Problem Relation Age of Onset    Diabetes Family     Cancer Family     Arthritis Family     Heart disease Family      I have reviewed and agree with the history as documented      Social History   Substance Use Topics    Smoking status: Never Smoker    Smokeless tobacco: Never Used    Alcohol use No Review of Systems   Gastrointestinal: Positive for diarrhea  All other systems reviewed and are negative  Physical Exam  Physical Exam   Abdominal: Soft  Bowel sounds are normal        Not distended no pain to palpation of the upper abdomen patient has pain to palpation across her lower abdomen mild guarding no rebound  Musculoskeletal:        Back:        Vital Signs  ED Triage Vitals   Temperature Pulse Respirations Blood Pressure SpO2   06/22/18 1157 06/22/18 1152 06/22/18 1152 06/22/18 1152 06/22/18 1152   98 1 °F (36 7 °C) 95 18 (!) 198/122 99 %      Temp Source Heart Rate Source Patient Position - Orthostatic VS BP Location FiO2 (%)   06/22/18 1157 06/22/18 1152 06/22/18 1152 06/22/18 1152 --   Oral Monitor Sitting Right arm       Pain Score       06/22/18 1152       6           Vitals:    06/22/18 1152 06/22/18 1610   BP: (!) 198/122 165/70   Pulse: 95 75   Patient Position - Orthostatic VS: Sitting Lying       Visual Acuity  Visual Acuity      Most Recent Value   L Pupil Size (mm)  3   R Pupil Size (mm)  3          ED Medications  Medications   sodium chloride 0 9 % bolus 1,000 mL (0 mL Intravenous Stopped 6/22/18 1558)   ondansetron (ZOFRAN) injection 4 mg (4 mg Intravenous Given 6/22/18 1252)   morphine injection 2 mg (2 mg Intravenous Given 6/22/18 1252)   iohexol (OMNIPAQUE) 350 MG/ML injection (MULTI-DOSE) 100 mL (100 mL Intravenous Given 6/22/18 1428)   morphine injection 2 mg (2 mg Intravenous Given 6/22/18 1542)   ondansetron (ZOFRAN) injection 4 mg (4 mg Intravenous Given 6/22/18 1542)       Diagnostic Studies  Results Reviewed     Procedure Component Value Units Date/Time    Urine Microscopic [[id number]] Collected:  06/22/18 1619    Lab Status:   In process Specimen:  Urine from Urine, Clean Catch Updated:  06/22/18 1618    ED Urine Macroscopic [[id number]]  (Abnormal) Collected:  06/22/18 1619    Lab Status:  Final result Specimen:  Urine Updated:  06/22/18 1614     Color, UA Yellow Clarity, UA Clear     pH, UA 8 5 (H)     Leukocytes, UA Trace (A)     Nitrite, UA Positive (A)     Protein, UA Negative mg/dl      Glucose, UA Negative mg/dl      Ketones, UA 15 (1+) (A) mg/dl      Urobilinogen, UA 0 2 E U /dl      Bilirubin, UA Negative     Blood, UA Trace (A)     Specific Flanagan, UA 1 010    Narrative:       CLINITEK RESULT    Comprehensive metabolic panel [[id number]]  (Abnormal) Collected:  06/22/18 1250    Lab Status:  Final result Specimen:  Blood from Arm, Left Updated:  06/22/18 1316     Sodium 139 mmol/L      Potassium 3 4 (L) mmol/L      Chloride 101 mmol/L      CO2 26 mmol/L      Anion Gap 12 mmol/L      BUN 3 (L) mg/dL      Creatinine 0 58 (L) mg/dL      Glucose 174 (H) mg/dL      Calcium 9 4 mg/dL      AST 16 U/L      ALT 24 U/L      Alkaline Phosphatase 74 U/L      Total Protein 7 1 g/dL      Albumin 3 6 g/dL      Total Bilirubin 0 60 mg/dL      eGFR 92 ml/min/1 73sq m     Narrative:         National Kidney Disease Education Program recommendations are as follows:  GFR calculation is accurate only with a steady state creatinine  Chronic Kidney disease less than 60 ml/min/1 73 sq  meters  Kidney failure less than 15 ml/min/1 73 sq  meters      Lipase [[id number]]  (Normal) Collected:  06/22/18 1250    Lab Status:  Final result Specimen:  Blood from Arm, Left Updated:  06/22/18 1316     Lipase 176 u/L     CBC and differential [[id number]]  (Normal) Collected:  06/22/18 1250    Lab Status:  Final result Specimen:  Blood from Arm, Left Updated:  06/22/18 1301     WBC 6 10 Thousand/uL      RBC 4 23 Million/uL      Hemoglobin 13 2 g/dL      Hematocrit 37 5 %      MCV 89 fL      MCH 31 2 pg      MCHC 35 2 g/dL      RDW 12 7 %      MPV 9 8 fL      Platelets 368 Thousands/uL      Neutrophils Relative 66 %      Lymphocytes Relative 24 %      Monocytes Relative 9 %      Eosinophils Relative 0 %      Basophils Relative 0 %      Neutrophils Absolute 4 05 Thousands/µL      Lymphocytes Absolute 1 48 Thousands/µL      Monocytes Absolute 0 54 Thousand/µL      Eosinophils Absolute 0 01 Thousand/µL      Basophils Absolute 0 02 Thousands/µL     Clostridium difficile toxin by PCR [[id number]]     Lab Status:  No result Specimen:  Stool from Rectum                  CT abdomen pelvis with contrast   Final Result by Hipolito Stone MD (06/22 1459)      No acute abdominopelvic pathology identified  Cholecystectomy and hysterectomy  Renal cysts  Workstation performed: [id number]                    Procedures  Procedures       Phone Contacts  ED Phone Contact    ED Course                               MDM  Number of Diagnoses or Management Options  Diagnosis management comments: This is a 71-year-old female patient presents with diarrhea and nausea  She is given fluids Zofran and some analgesia she has no pain whatsoever and nausea is improved he is tolerating food and water CT scan negative blood work basically normal  She does have a nitrite positive UTI however is not septic  She does not want to be admitted to the hospital   She is tolerating fluids she will be given oral antibiotics Zofran and told to return with any worsening symptoms she understands and agrees to treatment plan  CritCare Time    Disposition  Final diagnoses:   UTI (urinary tract infection)   Diarrhea   Abdominal pain     Time reflects when diagnosis was documented in both MDM as applicable and the Disposition within this note     Time User Action Codes Description Comment    6/22/2018  4:29 PM Luis Haley Add [N39 0] UTI (urinary tract infection)     6/22/2018  4:30 PM [address] [R19 7] Diarrhea     6/22/2018  4:30 PM [address] [R10 9] Abdominal pain       ED Disposition     ED Disposition Condition Comment    Discharge  Hieu Villa discharge to home/self care      Condition at discharge: Good        Follow-up Information     Follow up With Specialties Details Why Contact Jonathon Caruso Afia Jay MD Family Medicine Schedule an appointment as soon as possible for a visit  [address]  [place] [address]  [phone]            Patient's Medications   Discharge Prescriptions    CEPHALEXIN (KEFLEX) 500 MG CAPSULE    Take 1 capsule (500 mg total) by mouth 4 (four) times a day for 7 days       Start Date: 6/22/2018 End Date: 6/29/2018       Order Dose: 500 mg       Quantity: 28 capsule    Refills: 0    ONDANSETRON (ZOFRAN) 4 MG TABLET    Take 1 tablet (4 mg total) by mouth every 8 (eight) hours as needed for nausea or vomiting for up to 3 days       Start Date: 6/22/2018 End Date: 6/25/2018       Order Dose: 4 mg       Quantity: 10 tablet    Refills: 0     No discharge procedures on file      ED Provider  Electronically Signed by           Paddy Hogue PA-C  06/22/18 1640

## 2018-08-29 NOTE — PROGRESS NOTES
Cardiology Follow up Note    Elder Smith 68 y o  female MRN: [id number]    08/31/18          Assessment/Plan:    1  CAD s/p SAMMIE to proximal LAD 12/15  She denies any chest pain, no current shortness of breath  She is on Metoprolol, aspirin 81 mg, and Atorvastatin 40  LV function was normal  She discontinued Plavix 1/17  2  HTN  BP is controlled on Lisinopril and Metoprolol  3  HL  Lipid panel 12/15 showed total cholesterol 140, , HDL 41, LDL 68  She was started on Atorvastatin 40  Lipid panel 2/16 showed total cholesterol 96, HDL 39, LDL 33,   She asked to be switched to Simvastatin 40 as it was cheaper for her in 7/16  Lipid panel 9/16 showed total cholesterol 100, LDL 28, , HDL 38  CK and AST/ALT WNL  I switched her back to Atorvastatin 40 in 1/17 when drug formulary changed  Lipid panel 7/17 showed total cholesterol 94, LDL 21, , HDL 44  Lipid panel 4/18 showed total cholesterol 94, , HDL 36, LDL 19      4  DM  Hgb A1c 9/16 6 5%  She is on Metformin, Glipizide, and Victoza  Hgb A1c was 7 3% in 12/17  Hgb A1c 7 5% 4/18      5  Abnormal carotid ultrasound  She reports in the past she was told she had a 40% stenosis in the left carotid  No issues with carotid bruits or symptoms currently  On aspirin, statin  1  Coronary artery disease involving native coronary artery of native heart without angina pectoris     2  Essential hypertension     3  Dyslipidemia     4  Type 2 diabetes mellitus without complication, without long-term current use of insulin ([address] )     5  Abnormal carotid ultrasound         HPI: 68 y o  woman with a history of HTN, HL, DM, morbid obesity, who is here for follow up of CAD  She was admitted to [place] [address] 12/15 with chest pain associated with nausea/diaphoresis  She had been having some exertional chest pain and shortness of breath for several months, but was prompted to come to hospital by an episode of chest pain that woke her from sleep  Troponins were mildly abnormal  CT chest was negative for PE  Cardiac catheterization was done 12/22/15, which showed 90% stenosis of proximal LAD, which was treated with Xience Alpine drug eluting stent  Echo 12/15 showed normal LV size and function, EF 30%, grade I diastolic dysfunction, normal RV size and function, mildly dilated LA, moderate to marked MAC, no significant AV stenosis with Vmax 1 8 m/s  She was discharged home on aspirin, Plavix, statin, beta blocker  She completed cardiac rehab at [facility]  She had lumbar spinal surgery 3/18, she had UTI causing altered MS  Incision became infected and required IV antibiotics through PICC line  She is walking for exercise, no chest pain or SOB  No dizziness or lightheadedness, no syncope/presyncope  No orthopnea, uses 1 pillow to sleep, no PND  She sleeps in a Craftmatic bed with her head slightly up  She does get some right ankle/leg swelling occasionally (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), currently no problems with LE edema  No bruising, no bleeding       Patient Active Problem List   Diagnosis    Coronary artery disease involving native coronary artery of native heart without angina pectoris    Type 2 diabetes mellitus without complication, without long-term current use of insulin ([facility])    Dyslipidemia    Essential hypertension    Abnormal carotid ultrasound       Allergies   Allergen Reactions    Duloxetine      cymbalta    Sulfa Antibiotics          Current Outpatient Prescriptions:     ascorbic acid (VITAMIN C) 500 mg tablet, Take 500 mg by mouth daily, Disp: , Rfl:     aspirin (ASPIRIN LOW DOSE) 81 mg EC tablet, Take 2 tablets by mouth daily, Disp: , Rfl:     atorvastatin (LIPITOR) 40 mg tablet, Take 1 tablet by mouth, Disp: , Rfl:     b complex vitamins tablet, Take 1 tablet by mouth daily, Disp: , Rfl:     calcium-vitamin D 250-100 MG-UNIT per tablet, Take 1 tablet by mouth 2 (two) times a day, Disp: , Rfl:     clotrimazole-betamethasone (LOTRISONE) 1-0 05 % cream, NIMA EXT TO THE AFFECTED AND SURROUNDING AREAS BID IN THE MORNING AND IN THE ANY FOR 2 WKS, Disp: , Rfl: 1    gabapentin (NEURONTIN) 600 MG tablet, Take 1 tablet by mouth 3 (three) times a day, Disp: , Rfl:     glipiZIDE (GLUCOTROL XL) 10 mg 24 hr tablet, Take 2 tablets by mouth daily, Disp: , Rfl:     levothyroxine 175 mcg tablet, Take 100 mcg by mouth  , Disp: , Rfl:     Liraglutide (VICTOZA) 18 MG/3ML SOPN, Inject under the skin daily, Disp: , Rfl:     lisinopril (ZESTRIL) 20 mg tablet, Take 20 mg by mouth daily, Disp: , Rfl: 2    metFORMIN (GLUCOPHAGE) 1000 MG tablet, Take 1 tablet by mouth every 12 (twelve) hours, Disp: , Rfl:     metoprolol tartrate (LOPRESSOR) 25 mg tablet, Take 1 tablet by mouth 2 (two) times a day, Disp: , Rfl:     multivitamin (THERAGRAN) TABS, Take 1 tablet by mouth daily, Disp: , Rfl:     Omega-3 1000 MG CAPS, Take 1,000 mg by mouth, Disp: , Rfl:     ONE TOUCH ULTRA TEST test strip, TEST QD UTD, Disp: , Rfl: 5    vitamin E, tocopherol, 400 units capsule, Take 400 Units by mouth daily, Disp: , Rfl:     ondansetron (ZOFRAN) 4 mg tablet, Take 1 tablet (4 mg total) by mouth every 8 (eight) hours as needed for nausea or vomiting for up to 3 days (Patient not taking: Reported on 8/31/2018 ), Disp: 10 tablet, Rfl: 0    Past Medical History:   Diagnosis Date    Arthritis     Coronary artery disease     [facility] mellitus [facility] )     Hypercholesterolemia     Hypertension     Osteoporosis     Ovarian ca ([facility] )     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[facility] )        Family History   Problem Relation Age of Onset    Diabetes Family     Cancer Family     Arthritis Family     Heart disease Family        Past Surgical History:   Procedure Laterality Date    BACK SURGERY      CARPAL TUNNEL RELEASE      CHOLECYSTECTOMY      CORONARY STENT PLACEMENT         Social History     Social History    Marital status: /Civil [employer]     Spouse name: N/A    Number of children: N/A    Years of education: N/A     Occupational History    Not on file  Social History Main Topics    Smoking status: Never Smoker    Smokeless tobacco: Never Used    Alcohol use No    Drug use: No    Sexual activity: Not on file     Other Topics Concern    Not on file     Social History Narrative    No narrative on file       Review of Systems   Constitution: Positive for weight loss  Negative for chills, decreased appetite, diaphoresis, fever, weakness, malaise/fatigue, night sweats and weight gain  HENT: Negative for ear pain, hearing loss, hoarse voice, nosebleeds, sore throat and tinnitus  Eyes: Negative for blurred vision and pain  Cardiovascular: Positive for leg swelling  Negative for chest pain, claudication, cyanosis, dyspnea on exertion, irregular heartbeat, near-syncope, orthopnea, palpitations, paroxysmal nocturnal dyspnea and syncope  Respiratory: Negative for cough, hemoptysis, shortness of breath, sleep disturbances due to breathing, snoring, sputum production and wheezing  Hematologic/Lymphatic: Negative for adenopathy and bleeding problem  Does not bruise/bleed easily  Skin: Negative for color change, dry skin, flushing, itching, poor wound healing and rash  Musculoskeletal: Positive for back pain  Negative for arthritis, falls, joint pain, muscle cramps, muscle weakness, myalgias and neck pain  Gastrointestinal: Negative for abdominal pain, constipation, diarrhea, dysphagia, heartburn, hematemesis, hematochezia, melena, nausea and vomiting  Genitourinary: Negative for dysuria, frequency, hematuria, hesitancy, non-menstrual bleeding and urgency  Neurological: Negative for excessive daytime sleepiness, dizziness, focal weakness, headaches, light-headedness, loss of balance, numbness, paresthesias, tremors and vertigo     Psychiatric/Behavioral: Negative for altered mental status, depression and memory loss  The patient does not have insomnia and is not nervous/anxious  Allergic/Immunologic: Negative for environmental allergies and persistent infections  Vitals: BP (!) 120/48 (BP Location: Left arm, Patient Position: Sitting, Cuff Size: Adult)   Pulse 82   Ht 5' 1" (1 549 m)   Wt 87 5 kg (193 lb)   SpO2 98%   BMI 36 47 kg/m²       Physical Exam:     GEN: Alert and oriented x 3, in no acute distress  Well appearing and well nourished  HEENT: Sclera anicteric, conjunctivae pink, mucous membranes moist  Oropharynx clear  NECK: Supple, no carotid bruits, no significant JVD  Trachea midline, no thyromegaly  HEART: Regular rhythm, normal S1 and S2, II/VI systolic murmur, no clicks, gallops or rubs  PMI nondisplaced, no thrills  LUNGS: Clear to auscultation bilaterally; no wheezes, rales, or rhonchi  No increased work of breathing or signs of respiratory distress  ABDOMEN: Obese, soft, nontender, nondistended, normoactive bowel sounds  EXTREMITIES: Skin warm and well perfused, no clubbing, cyanosis, or edema  NEURO: No focal findings  Normal gait  Normal speech  Mood and affect normal    SKIN: Normal without suspicious lesions on exposed skin        Lab Results:       Lab Results   Component Value Date    HGBA1C 7 5 (H) 04/10/2018    HGBA1C 7 3 (H) 12/06/2017    HGBA1C 9 0 (H) 07/28/2017     Lab Results   Component Value Date    CHOL 140 12/22/2015    CHOL 178 11/12/2015    CHOL 171 10/13/2015     Lab Results   Component Value Date    HDL 36 (L) 04/10/2018    HDL 44 07/07/2017    HDL 38 (L) 09/15/2016     Lab Results   Component Value Date    LDLCALC 19 04/10/2018    LDLCALC 21 07/07/2017    LDLCALC 28 09/15/2016     Lab Results   Component Value Date    TRIG 195 (H) 04/10/2018    TRIG 143 07/07/2017    TRIG 172 (H) 09/15/2016     No components found for: CHOLHDL

## 2018-08-31 ENCOUNTER — OFFICE VISIT (OUTPATIENT)
Dept: CARDIOLOGY CLINIC | Facility: CLINIC | Age: 73
End: 2018-08-31
Payer: MEDICARE

## 2018-08-31 VITALS
DIASTOLIC BLOOD PRESSURE: 48 MMHG | HEIGHT: 61 IN | OXYGEN SATURATION: 98 % | HEART RATE: 82 BPM | WEIGHT: 193 LBS | BODY MASS INDEX: 36.44 KG/M2 | SYSTOLIC BLOOD PRESSURE: 120 MMHG

## 2018-08-31 DIAGNOSIS — E78.5 DYSLIPIDEMIA: ICD-10-CM

## 2018-08-31 DIAGNOSIS — I25.10 CORONARY ARTERY DISEASE INVOLVING NATIVE CORONARY ARTERY OF NATIVE HEART WITHOUT ANGINA PECTORIS: Primary | ICD-10-CM

## 2018-08-31 DIAGNOSIS — E11.9 TYPE 2 DIABETES MELLITUS WITHOUT COMPLICATION, WITHOUT LONG-TERM CURRENT USE OF INSULIN (HCC): ICD-10-CM

## 2018-08-31 DIAGNOSIS — R93.89 ABNORMAL CAROTID ULTRASOUND: ICD-10-CM

## 2018-08-31 DIAGNOSIS — I10 ESSENTIAL HYPERTENSION: ICD-10-CM

## 2018-08-31 PROCEDURE — 99214 OFFICE O/P EST MOD 30 MIN: CPT | Performed by: INTERNAL MEDICINE

## 2018-08-31 RX ORDER — LISINOPRIL 20 MG/1
20 TABLET ORAL DAILY
Refills: 2 | COMMUNITY
Start: 2018-06-13 | End: 2020-05-13 | Stop reason: SDUPTHER

## 2018-09-27 ENCOUNTER — APPOINTMENT (OUTPATIENT)
Dept: LAB | Facility: CLINIC | Age: 73
End: 2018-09-27
Payer: MEDICARE

## 2018-09-27 ENCOUNTER — TRANSCRIBE ORDERS (OUTPATIENT)
Dept: LAB | Facility: CLINIC | Age: 73
End: 2018-09-27

## 2018-09-27 DIAGNOSIS — C56.9: Primary | ICD-10-CM

## 2018-09-27 LAB — CANCER AG125 SERPL-ACNC: 5.3 U/ML (ref 0–30)

## 2018-09-27 PROCEDURE — 86304 IMMUNOASSAY TUMOR CA 125: CPT

## 2018-09-27 PROCEDURE — 36415 COLL VENOUS BLD VENIPUNCTURE: CPT

## 2018-11-06 ENCOUNTER — TRANSCRIBE ORDERS (OUTPATIENT)
Dept: ADMINISTRATIVE | Facility: HOSPITAL | Age: 73
End: 2018-11-06

## 2018-11-06 DIAGNOSIS — Z12.39 SCREENING BREAST EXAMINATION: Primary | ICD-10-CM

## 2018-12-04 ENCOUNTER — HOSPITAL ENCOUNTER (OUTPATIENT)
Dept: RADIOLOGY | Facility: HOSPITAL | Age: 73
Discharge: HOME | End: 2018-12-04
Payer: MEDICARE

## 2018-12-04 VITALS — BODY MASS INDEX: 36.06 KG/M2 | WEIGHT: 191 LBS | HEIGHT: 61 IN

## 2018-12-04 DIAGNOSIS — Z12.39 SCREENING BREAST EXAMINATION: ICD-10-CM

## 2018-12-04 PROCEDURE — 77067 SCR MAMMO BI INCL CAD: CPT

## 2018-12-05 ENCOUNTER — TELEPHONE (OUTPATIENT)
Dept: RADIOLOGY | Facility: HOSPITAL | Age: 73
End: 2018-12-05

## 2018-12-07 ENCOUNTER — APPOINTMENT (OUTPATIENT)
Dept: LAB | Facility: CLINIC | Age: 73
End: 2018-12-07
Payer: MEDICARE

## 2018-12-07 ENCOUNTER — TRANSCRIBE ORDERS (OUTPATIENT)
Dept: LAB | Facility: CLINIC | Age: 73
End: 2018-12-07

## 2018-12-07 DIAGNOSIS — E13.8 DIABETES MELLITUS OF OTHER TYPE WITH COMPLICATION, UNSPECIFIED WHETHER LONG TERM INSULIN USE: Primary | ICD-10-CM

## 2018-12-07 DIAGNOSIS — E78.00 HYPERCHOLESTEROLEMIA: ICD-10-CM

## 2018-12-07 LAB
ALBUMIN SERPL BCP-MCNC: 3.8 G/DL (ref 3.5–5)
ALP SERPL-CCNC: 53 U/L (ref 46–116)
ALT SERPL W P-5'-P-CCNC: 27 U/L (ref 12–78)
ANION GAP SERPL CALCULATED.3IONS-SCNC: 6 MMOL/L (ref 4–13)
AST SERPL W P-5'-P-CCNC: 15 U/L (ref 5–45)
BASOPHILS # BLD AUTO: 0.06 THOUSANDS/ΜL (ref 0–0.1)
BASOPHILS NFR BLD AUTO: 1 % (ref 0–1)
BILIRUB DIRECT SERPL-MCNC: 0.17 MG/DL (ref 0–0.2)
BILIRUB SERPL-MCNC: 0.57 MG/DL (ref 0.2–1)
BUN SERPL-MCNC: 10 MG/DL (ref 5–25)
CALCIUM SERPL-MCNC: 8.6 MG/DL (ref 8.3–10.1)
CHLORIDE SERPL-SCNC: 103 MMOL/L (ref 100–108)
CHOLEST SERPL-MCNC: 98 MG/DL (ref 50–200)
CK SERPL-CCNC: 101 U/L (ref 26–192)
CO2 SERPL-SCNC: 30 MMOL/L (ref 21–32)
CREAT SERPL-MCNC: 0.61 MG/DL (ref 0.6–1.3)
EOSINOPHIL # BLD AUTO: 0.07 THOUSAND/ΜL (ref 0–0.61)
EOSINOPHIL NFR BLD AUTO: 1 % (ref 0–6)
ERYTHROCYTE [DISTWIDTH] IN BLOOD BY AUTOMATED COUNT: 11.9 % (ref 11.6–15.1)
EST. AVERAGE GLUCOSE BLD GHB EST-MCNC: 146 MG/DL
GFR SERPL CREATININE-BSD FRML MDRD: 91 ML/MIN/1.73SQ M
GLUCOSE P FAST SERPL-MCNC: 122 MG/DL (ref 65–99)
HBA1C MFR BLD: 6.7 % (ref 4.2–6.3)
HCT VFR BLD AUTO: 37.6 % (ref 34.8–46.1)
HDLC SERPL-MCNC: 38 MG/DL (ref 40–60)
HGB BLD-MCNC: 12.6 G/DL (ref 11.5–15.4)
IMM GRANULOCYTES # BLD AUTO: 0.02 THOUSAND/UL (ref 0–0.2)
IMM GRANULOCYTES NFR BLD AUTO: 0 % (ref 0–2)
LDLC SERPL CALC-MCNC: 23 MG/DL (ref 0–100)
LYMPHOCYTES # BLD AUTO: 2.04 THOUSANDS/ΜL (ref 0.6–4.47)
LYMPHOCYTES NFR BLD AUTO: 27 % (ref 14–44)
MCH RBC QN AUTO: 31 PG (ref 26.8–34.3)
MCHC RBC AUTO-ENTMCNC: 33.5 G/DL (ref 31.4–37.4)
MCV RBC AUTO: 93 FL (ref 82–98)
MONOCYTES # BLD AUTO: 0.66 THOUSAND/ΜL (ref 0.17–1.22)
MONOCYTES NFR BLD AUTO: 9 % (ref 4–12)
NEUTROPHILS # BLD AUTO: 4.63 THOUSANDS/ΜL (ref 1.85–7.62)
NEUTS SEG NFR BLD AUTO: 62 % (ref 43–75)
NRBC BLD AUTO-RTO: 0 /100 WBCS
PLATELET # BLD AUTO: 173 THOUSANDS/UL (ref 149–390)
PMV BLD AUTO: 11.3 FL (ref 8.9–12.7)
POTASSIUM SERPL-SCNC: 4.3 MMOL/L (ref 3.5–5.3)
PROT SERPL-MCNC: 7.1 G/DL (ref 6.4–8.2)
RBC # BLD AUTO: 4.06 MILLION/UL (ref 3.81–5.12)
SODIUM SERPL-SCNC: 139 MMOL/L (ref 136–145)
T4 FREE SERPL-MCNC: 1.03 NG/DL (ref 0.76–1.46)
TRIGL SERPL-MCNC: 186 MG/DL
TSH SERPL DL<=0.05 MIU/L-ACNC: 1.81 UIU/ML (ref 0.36–3.74)
WBC # BLD AUTO: 7.48 THOUSAND/UL (ref 4.31–10.16)

## 2018-12-07 PROCEDURE — 85025 COMPLETE CBC W/AUTO DIFF WBC: CPT

## 2018-12-07 PROCEDURE — 80076 HEPATIC FUNCTION PANEL: CPT

## 2018-12-07 PROCEDURE — 83036 HEMOGLOBIN GLYCOSYLATED A1C: CPT

## 2018-12-07 PROCEDURE — 84443 ASSAY THYROID STIM HORMONE: CPT

## 2018-12-07 PROCEDURE — 80061 LIPID PANEL: CPT

## 2018-12-07 PROCEDURE — 84439 ASSAY OF FREE THYROXINE: CPT

## 2018-12-07 PROCEDURE — 80048 BASIC METABOLIC PNL TOTAL CA: CPT

## 2018-12-07 PROCEDURE — 82550 ASSAY OF CK (CPK): CPT

## 2018-12-07 PROCEDURE — 36415 COLL VENOUS BLD VENIPUNCTURE: CPT

## 2019-01-02 ENCOUNTER — HOSPITAL ENCOUNTER (OUTPATIENT)
Dept: RADIOLOGY | Facility: HOSPITAL | Age: 74
Discharge: HOME | End: 2019-01-02
Payer: MEDICARE

## 2019-01-02 DIAGNOSIS — R92.8 ABNORMAL MAMMOGRAM: ICD-10-CM

## 2019-01-02 PROCEDURE — 76642 ULTRASOUND BREAST LIMITED: CPT

## 2019-01-02 PROCEDURE — 77065 DX MAMMO INCL CAD UNI: CPT

## 2019-01-02 PROCEDURE — G0279 TOMOSYNTHESIS, MAMMO: HCPCS

## 2019-02-22 DIAGNOSIS — E78.00 PURE HYPERCHOLESTEROLEMIA: Primary | ICD-10-CM

## 2019-02-22 RX ORDER — ATORVASTATIN CALCIUM 40 MG/1
40 TABLET, FILM COATED ORAL DAILY
Qty: 90 TABLET | Refills: 3 | Status: SHIPPED | OUTPATIENT
Start: 2019-02-22 | End: 2019-07-15

## 2019-06-15 ENCOUNTER — HOSPITAL ENCOUNTER (EMERGENCY)
Facility: HOSPITAL | Age: 74
Discharge: HOME | End: 2019-06-15
Attending: EMERGENCY MEDICINE
Payer: MEDICARE

## 2019-06-15 VITALS
HEIGHT: 61 IN | WEIGHT: 196 LBS | DIASTOLIC BLOOD PRESSURE: 98 MMHG | RESPIRATION RATE: 16 BRPM | TEMPERATURE: 97.5 F | SYSTOLIC BLOOD PRESSURE: 158 MMHG | BODY MASS INDEX: 37 KG/M2 | OXYGEN SATURATION: 99 % | HEART RATE: 82 BPM

## 2019-06-15 DIAGNOSIS — R19.7 DIARRHEA: Primary | ICD-10-CM

## 2019-06-15 LAB
ANION GAP SERPL CALCULATED.3IONS-SCNC: 9 MMOL/L (ref 4–13)
BASOPHILS # BLD AUTO: 0.03 THOUSANDS/ΜL (ref 0–0.1)
BASOPHILS NFR BLD AUTO: 1 % (ref 0–1)
BUN SERPL-MCNC: 12 MG/DL (ref 5–25)
CALCIUM SERPL-MCNC: 8.9 MG/DL (ref 8.3–10.1)
CHLORIDE SERPL-SCNC: 101 MMOL/L (ref 100–108)
CO2 SERPL-SCNC: 26 MMOL/L (ref 21–32)
CREAT SERPL-MCNC: 0.58 MG/DL (ref 0.6–1.3)
EOSINOPHIL # BLD AUTO: 0.06 THOUSAND/ΜL (ref 0–0.61)
EOSINOPHIL NFR BLD AUTO: 1 % (ref 0–6)
ERYTHROCYTE [DISTWIDTH] IN BLOOD BY AUTOMATED COUNT: 12.1 % (ref 11.6–15.1)
GFR SERPL CREATININE-BSD FRML MDRD: 92 ML/MIN/1.73SQ M
GLUCOSE SERPL-MCNC: 137 MG/DL (ref 65–140)
HCT VFR BLD AUTO: 35.2 % (ref 34.8–46.1)
HGB BLD-MCNC: 12.1 G/DL (ref 11.5–15.4)
IMM GRANULOCYTES # BLD AUTO: 0.01 THOUSAND/UL (ref 0–0.2)
IMM GRANULOCYTES NFR BLD AUTO: 0 % (ref 0–2)
LYMPHOCYTES # BLD AUTO: 1.37 THOUSANDS/ΜL (ref 0.6–4.47)
LYMPHOCYTES NFR BLD AUTO: 26 % (ref 14–44)
MAGNESIUM SERPL-MCNC: 1.4 MG/DL (ref 1.6–2.6)
MCH RBC QN AUTO: 31.3 PG (ref 26.8–34.3)
MCHC RBC AUTO-ENTMCNC: 34.4 G/DL (ref 31.4–37.4)
MCV RBC AUTO: 91 FL (ref 82–98)
MONOCYTES # BLD AUTO: 0.54 THOUSAND/ΜL (ref 0.17–1.22)
MONOCYTES NFR BLD AUTO: 10 % (ref 4–12)
NEUTROPHILS # BLD AUTO: 3.32 THOUSANDS/ΜL (ref 1.85–7.62)
NEUTS SEG NFR BLD AUTO: 62 % (ref 43–75)
NRBC BLD AUTO-RTO: 0 /100 WBCS
PLATELET # BLD AUTO: 190 THOUSANDS/UL (ref 149–390)
PMV BLD AUTO: 10 FL (ref 8.9–12.7)
POTASSIUM SERPL-SCNC: 4.3 MMOL/L (ref 3.5–5.3)
RBC # BLD AUTO: 3.87 MILLION/UL (ref 3.81–5.12)
SODIUM SERPL-SCNC: 136 MMOL/L (ref 136–145)
WBC # BLD AUTO: 5.33 THOUSAND/UL (ref 4.31–10.16)

## 2019-06-15 PROCEDURE — 96360 HYDRATION IV INFUSION INIT: CPT

## 2019-06-15 PROCEDURE — 85025 COMPLETE CBC W/AUTO DIFF WBC: CPT | Performed by: PHYSICIAN ASSISTANT

## 2019-06-15 PROCEDURE — 99284 EMERGENCY DEPT VISIT MOD MDM: CPT

## 2019-06-15 PROCEDURE — 36415 COLL VENOUS BLD VENIPUNCTURE: CPT | Performed by: PHYSICIAN ASSISTANT

## 2019-06-15 PROCEDURE — 80048 BASIC METABOLIC PNL TOTAL CA: CPT | Performed by: PHYSICIAN ASSISTANT

## 2019-06-15 PROCEDURE — 83735 ASSAY OF MAGNESIUM: CPT | Performed by: PHYSICIAN ASSISTANT

## 2019-06-15 RX ORDER — DICYCLOMINE HCL 20 MG
20 TABLET ORAL 2 TIMES DAILY
Qty: 8 TABLET | Refills: 0 | Status: SHIPPED | OUTPATIENT
Start: 2019-06-15 | End: 2019-07-15

## 2019-06-15 RX ADMIN — SODIUM CHLORIDE 1000 ML: 0.9 INJECTION, SOLUTION INTRAVENOUS at 10:31

## 2019-06-17 ENCOUNTER — TRANSCRIBE ORDERS (OUTPATIENT)
Dept: ADMINISTRATIVE | Facility: HOSPITAL | Age: 74
End: 2019-06-17

## 2019-06-17 DIAGNOSIS — R92.8 ABNORMAL MAMMOGRAM: Primary | ICD-10-CM

## 2019-06-26 ENCOUNTER — APPOINTMENT (OUTPATIENT)
Dept: LAB | Facility: CLINIC | Age: 74
End: 2019-06-26
Payer: MEDICARE

## 2019-06-26 ENCOUNTER — TRANSCRIBE ORDERS (OUTPATIENT)
Dept: LAB | Facility: CLINIC | Age: 74
End: 2019-06-26

## 2019-06-26 DIAGNOSIS — R19.7 DIARRHEA OF PRESUMED INFECTIOUS ORIGIN: ICD-10-CM

## 2019-06-26 DIAGNOSIS — R19.4 FREQUENT BOWEL MOVEMENTS: Primary | ICD-10-CM

## 2019-06-26 PROCEDURE — 87505 NFCT AGENT DETECTION GI: CPT

## 2019-06-27 LAB
CAMPYLOBACTER DNA SPEC NAA+PROBE: NORMAL
SALMONELLA DNA SPEC QL NAA+PROBE: NORMAL
SHIGA TOXIN STX GENE SPEC NAA+PROBE: NORMAL
SHIGELLA DNA SPEC QL NAA+PROBE: NORMAL

## 2019-06-30 LAB — G LAMBLIA AG STL QL IA: NEGATIVE

## 2019-07-10 ENCOUNTER — HOSPITAL ENCOUNTER (OUTPATIENT)
Dept: RADIOLOGY | Facility: HOSPITAL | Age: 74
Discharge: HOME | End: 2019-07-10
Payer: MEDICARE

## 2019-07-10 VITALS — BODY MASS INDEX: 37 KG/M2 | WEIGHT: 196 LBS | HEIGHT: 61 IN

## 2019-07-10 DIAGNOSIS — R92.8 ABNORMAL MAMMOGRAM: ICD-10-CM

## 2019-07-10 PROCEDURE — 77065 DX MAMMO INCL CAD UNI: CPT

## 2019-07-10 PROCEDURE — 76642 ULTRASOUND BREAST LIMITED: CPT

## 2019-07-10 PROCEDURE — G0279 TOMOSYNTHESIS, MAMMO: HCPCS

## 2019-07-15 ENCOUNTER — OFFICE VISIT (OUTPATIENT)
Dept: OBGYN CLINIC | Facility: MEDICAL CENTER | Age: 74
End: 2019-07-15
Payer: MEDICARE

## 2019-07-15 VITALS
DIASTOLIC BLOOD PRESSURE: 75 MMHG | HEIGHT: 61 IN | SYSTOLIC BLOOD PRESSURE: 129 MMHG | HEART RATE: 73 BPM | BODY MASS INDEX: 37.61 KG/M2 | WEIGHT: 199.2 LBS

## 2019-07-15 DIAGNOSIS — M65.312 TRIGGER FINGER OF LEFT THUMB: Primary | ICD-10-CM

## 2019-07-15 PROCEDURE — 20550 NJX 1 TENDON SHEATH/LIGAMENT: CPT | Performed by: ORTHOPAEDIC SURGERY

## 2019-07-15 PROCEDURE — 99203 OFFICE O/P NEW LOW 30 MIN: CPT | Performed by: ORTHOPAEDIC SURGERY

## 2019-07-15 RX ADMIN — LIDOCAINE HYDROCHLORIDE 0.5 ML: 10 INJECTION, SOLUTION INFILTRATION; PERINEURAL at 14:23

## 2019-07-15 RX ADMIN — TRIAMCINOLONE ACETONIDE 20 MG: 40 INJECTION, SUSPENSION INTRA-ARTICULAR; INTRAMUSCULAR at 14:23

## 2019-07-15 NOTE — PROGRESS NOTES
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       PAST SURGICAL HISTORY:  Past Surgical History:   Procedure Laterality Date    BACK SURGERY      CARPAL TUNNEL RELEASE      CHOLECYSTECTOMY      CORONARY STENT PLACEMENT      HYSTERECTOMY  1989    OOPHORECTOMY Bilateral 1997       FAMILY HISTORY:  Family History   Problem Relation Age of Onset    Diabetes Family     Cancer Family     Arthritis Family     Heart disease Family     Endometrial cancer Sister     Esophageal cancer Father     Stomach cancer Brother     No Known Problems Mother     No Known Problems Daughter     No Known Problems Maternal Grandmother     Prostate cancer Maternal Grandfather     No Known Problems Paternal Grandmother     Prostate cancer Paternal Grandfather     Breast cancer Maternal Aunt     Breast cancer Other     Breast cancer Other        SOCIAL HISTORY:  Social History     Tobacco Use    Smoking status: Never Smoker    Smokeless tobacco: Never Used   Substance Use Topics    Alcohol use: No    Drug use: No       MEDICATIONS:    Current Outpatient Medications:     gabapentin (NEURONTIN) 600 MG tablet, Take 1 tablet by mouth 3 (three) times a day, Disp: , Rfl:     glipiZIDE (GLUCOTROL XL) 10 mg 24 hr tablet, Take 2 tablets by mouth daily, Disp: , Rfl:     levothyroxine 175 mcg tablet, Take 100 mcg by mouth  , Disp: , Rfl:     Liraglutide (VICTOZA) 18 MG/3ML SOPN, Inject under the skin daily, Disp: , Rfl:     lisinopril (ZESTRIL) 20 mg tablet, Take 20 mg by mouth daily, Disp: , Rfl: 2    metFORMIN (GLUCOPHAGE) 1000 MG tablet, Take 1 tablet by mouth every 12 (twelve) hours, Disp: , Rfl:     ALLERGIES:  Allergies   Allergen Reactions    Duloxetine      cymbalta    Sulfa Antibiotics        REVIEW OF SYSTEMS:  Review of Systems    VITALS:  Vitals:    07/15/19 1331   BP: 129/75   Pulse: 73       LABS:  HgA1c:   Lab Results   Component Value Date    HGBA1C 6 7 (H) 12/07/2018     BMP:   Lab Results   Component Value Date    GLUCOSE 129 (H) 12/30/2015    CALCIUM 8 9 06/15/2019     12/30/2015    K 4 3 06/15/2019    CO2 26 06/15/2019     06/15/2019    BUN 12 06/15/2019    CREATININE 0 58 (L) 06/15/2019       _____________________________________________________  PHYSICAL EXAMINATION:  General: {1234:91966::"well developed and well nourished","alert","oriented times 3","appears comfortable"}  Psychiatric: {Psych:60030::"Normal"}  HEENT: Trachea Midline, No torticollis  Pulmonary: No audible wheezing or respiratory distress   Skin: {Skin exam:39397}  Neurovascular: {Nerve Exam:02537::"Sensation Intact to the Median, Ulnar, Radial Nerve","Motor Intact to the Median, Ulnar, Radial Nerve","Pulses Intact"}    MUSCULOSKELETAL EXAMINATION:  ***    ___________________________________________________  STUDIES REVIEWED:  {[facility]udies[facility]:94068::"Images obtained today of the *** *** views demonstrate ***"}      PROCEDURES PERFORMED:  Procedures  {Was Johnathan done:96991::"No Procedures performed today"}    _____________________________________________________  ASSESSMENT/PLAN:    ***  *   *    There are no diagnoses linked to this encounter  Follow Up:  Return in about 2 weeks (around 7/29/2019)        To Do Next Visit:  {To do next visit:19484::"Re-evaluation of current issue"}    General Discussions:  {Jeronimo Non-Operative Discussions:64057}    Operative Discussions:  {Jeronimo Surgical Discussions:49151}    Scribe Attestation    I,:   Sandy Saldana am acting as a scribe while in the presence of the attending physician :        I,:   Perla Coreas MD personally performed the services described in this documentation    as scribed in my presence :

## 2019-07-15 NOTE — PROGRESS NOTES
Angelic TEAGUE  Attending, Orthopaedic Surgery  Hand, Wrist, and Elbow Surgery  [facility]      ORTHOPAEDIC HAND, WRIST, AND ELBOW OFFICE  VISIT       ASSESSMENT/PLAN:      ***    The patient verbalized understanding of exam findings and treatment plan  We engaged in the shared decision-making process and treatment options were discussed at length with the patient  Surgical and conservative management discussed today along with risks and benefits  There are no diagnoses linked to this encounter  Follow Up:  No follow-ups on file      To Do Next Visit:  {To do next visit:98790::"Re-evaluation of current issue"}      General Discussions:  {Jeronimo Non-Operative Discussions:57699}    Operative Discussions:  {Jeronimo Surgical Discussions:74740}      ____________________________________________________________________________________________________________________________________________      CHIEF COMPLAINT:  Chief Complaint   Patient presents with    Left Hand - Pain       SUBJECTIVE:  Tori Kilgore is a 69y o  year old ***HD female who presents ***       Pain/symptom timing:  Worse during the day when active  Pain/symptom context:  Worse with activites and work  Pain/symptom modifying factors:  Rest makes better, activities make worse  Pain/symptom associated signs/symptoms: none    Prior treatment   · NSAIDs{ :22943::"Yes"}   · Injections { :10207::"Yes"}   · Bracing/Orthotics { :85680::"Yes"}    Physical Therapy { :07653::"Yes"}     I have personally reviewed all the relevant PMH, PSH, SH, FH, Medications and allergies      PAST MEDICAL HISTORY:  Past Medical History:   Diagnosis Date    Arthritis     Coronary artery disease     [facility]     Hypercholesterolemia     Hypertension     Osteoporosis     Ovarian ca ([facility] [facility] ) 1997    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 [facility]     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       PAST SURGICAL HISTORY:  Past Surgical History:   Procedure Laterality Date    BACK SURGERY      CARPAL TUNNEL RELEASE      CHOLECYSTECTOMY      CORONARY STENT PLACEMENT      HYSTERECTOMY  1989    OOPHORECTOMY Bilateral 1997       FAMILY HISTORY:  Family History   Problem Relation Age of Onset    Diabetes Family     Cancer Family     Arthritis Family     Heart disease Family     Endometrial cancer Sister     Esophageal cancer Father     Stomach cancer Brother     No Known Problems Mother     No Known Problems Daughter     No Known Problems Maternal Grandmother     Prostate cancer Maternal Grandfather     No Known Problems Paternal Grandmother     Prostate cancer Paternal Grandfather     Breast cancer Maternal Aunt     Breast cancer Other     Breast cancer Other        SOCIAL HISTORY:  Social History     Tobacco Use    Smoking status: Never Smoker    Smokeless tobacco: Never Used   Substance Use Topics    Alcohol use: No    Drug use: No       MEDICATIONS:    Current Outpatient Medications:     gabapentin (NEURONTIN) 600 MG tablet, Take 1 tablet by mouth 3 (three) times a day, Disp: , Rfl:     glipiZIDE (GLUCOTROL XL) 10 mg 24 hr tablet, Take 2 tablets by mouth daily, Disp: , Rfl:     levothyroxine 175 mcg tablet, Take 100 mcg by mouth  , Disp: , Rfl:     Liraglutide (VICTOZA) 18 MG/3ML SOPN, Inject under the skin daily, Disp: , Rfl:     lisinopril (ZESTRIL) 20 mg tablet, Take 20 mg by mouth daily, Disp: , Rfl: 2    metFORMIN (GLUCOPHAGE) 1000 MG tablet, Take 1 tablet by mouth every 12 (twelve) hours, Disp: , Rfl:     ALLERGIES:  Allergies   Allergen Reactions    Duloxetine      cymbalta    Sulfa Antibiotics            REVIEW OF SYSTEMS:  Review of Systems    VITALS:  Vitals:    07/15/19 1331   BP: 129/75   Pulse: 73       LABS:  HgA1c:   Lab Results   Component Value Date    HGBA1C 6 7 (H) 12/07/2018     BMP:   Lab Results   Component Value Date    GLUCOSE 129 (H) 12/30/2015    CALCIUM 8 9 06/15/2019     12/30/2015 K 4 3 06/15/2019    CO2 26 06/15/2019     06/15/2019    BUN 12 06/15/2019    CREATININE 0 58 (L) 06/15/2019       _____________________________________________________  PHYSICAL EXAMINATION:  General: {1234:99612::"well developed and well nourished","alert","oriented times 3","appears comfortable"}  Psychiatric: {Psych:09992::"Normal"}  HEENT: Normocephalic, Atraumatic Trachea Midline, No torticollis  Pulmonary: No audible wheezing or respiratory distress   Cardiovascular: No pitting edema, 2+ radial pulse   Skin: {Skin exam:20836}  Neurovascular: {Nerve Exam:92280::"Sensation Intact to the Median, Ulnar, Radial Nerve","Motor Intact to the Median, Ulnar, Radial Nerve","Pulses Intact"}  Musculoskeletal: Normal, except as noted in detailed exam and in HPI        MUSCULOSKELETAL EXAMINATION:      ___________________________________________________  STUDIES REVIEWED:  I have personally reviewed AP lateral and oblique radiographs of ***   which demonstrate ***           PROCEDURES PERFORMED:  Procedures  {Was Procdoc done:65967::"No Procedures performed today"}    _____________________________________________________      Talha Jointer    I,:    am acting as a scribe while in the presence of the attending physician :        I,:    personally performed the services described in this documentation    as scribed in my presence :

## 2019-07-15 NOTE — PROGRESS NOTES
CHIEF COMPLAINT:  Chief Complaint   Patient presents with    Left Hand - Pain       SUBJECTIVE:  Riley Jorgensen is a 69y o  year old RHD female who presents to the office today for left thumb pain  Carol Ann Coleman states that her left thumb has been clicking, catching and locking for a few months  She denies any injury or trama  She does note that she broke her left thumb a few years ago, that healed on its own  She states that she is experiencing pain over her left thumb A1 pulley  She states that she has to use her right hand to unlock her left thumb  She is not taking anything for pain control  She is a diabetic         PAST MEDICAL HISTORY:  Past Medical History:   Diagnosis Date    Arthritis     Coronary artery disease     [facility]     Hypercholesterolemia     Hypertension     Osteoporosis     Ovarian ca ([facility] [facility] ) 1997    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[facility] [facility]     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       PAST SURGICAL HISTORY:  Past Surgical History:   Procedure Laterality Date    BACK SURGERY      CARPAL TUNNEL RELEASE      CHOLECYSTECTOMY      CORONARY STENT PLACEMENT      HYSTERECTOMY  1989    OOPHORECTOMY Bilateral 1997       FAMILY HISTORY:  Family History   Problem Relation Age of Onset    Diabetes Family     Cancer Family     Arthritis Family     Heart disease Family     Endometrial cancer Sister     Esophageal cancer Father     Stomach cancer Brother     No Known Problems Mother     No Known Problems Daughter     No Known Problems Maternal Grandmother     Prostate cancer Maternal Grandfather     No Known Problems Paternal Grandmother     Prostate cancer Paternal Grandfather     Breast cancer Maternal Aunt     Breast cancer Other     Breast cancer Other        SOCIAL HISTORY:  Social History     Tobacco Use    Smoking status: Never Smoker    Smokeless tobacco: Never Used   Substance Use Topics    Alcohol use: No    Drug use: No       MEDICATIONS:    Current Outpatient Medications:     gabapentin (NEURONTIN) 600 MG tablet, Take 1 tablet by mouth 3 (three) times a day, Disp: , Rfl:     glipiZIDE (GLUCOTROL XL) 10 mg 24 hr tablet, Take 2 tablets by mouth daily, Disp: , Rfl:     levothyroxine 175 mcg tablet, Take 100 mcg by mouth  , Disp: , Rfl:     Liraglutide (VICTOZA) 18 MG/3ML SOPN, Inject under the skin daily, Disp: , Rfl:     lisinopril (ZESTRIL) 20 mg tablet, Take 20 mg by mouth daily, Disp: , Rfl: 2    metFORMIN (GLUCOPHAGE) 1000 MG tablet, Take 1 tablet by mouth every 12 (twelve) hours, Disp: , Rfl:     ALLERGIES:  Allergies   Allergen Reactions    Duloxetine      cymbalta    Sulfa Antibiotics        REVIEW OF SYSTEMS:  Review of Systems   Constitutional: Negative for chills, fever and unexpected weight change  HENT: Negative for hearing loss, nosebleeds and sore throat  Eyes: Negative for pain, redness and visual disturbance  Respiratory: Negative for cough, shortness of breath and wheezing  Cardiovascular: Negative for chest pain, palpitations and leg swelling  Gastrointestinal: Negative for abdominal pain, nausea and vomiting  Endocrine: Negative for polydipsia and polyuria  Genitourinary: Negative for difficulty urinating and hematuria  Musculoskeletal: Negative for arthralgias, joint swelling and myalgias  Skin: Negative for rash and wound  Neurological: Negative for dizziness, numbness and headaches  Psychiatric/Behavioral: Negative for decreased concentration, dysphoric mood and suicidal ideas  The patient is not nervous/anxious          VITALS:  Vitals:    07/15/19 1331   BP: 129/75   Pulse: 73       LABS:  HgA1c:   Lab Results   Component Value Date    HGBA1C 6 7 (H) 12/07/2018     BMP:   Lab Results   Component Value Date    GLUCOSE 129 (H) 12/30/2015    CALCIUM 8 9 06/15/2019     12/30/2015    K 4 3 06/15/2019    CO2 26 06/15/2019     06/15/2019    BUN 12 06/15/2019    CREATININE 0 58 (L) 06/15/2019 _____________________________________________________  PHYSICAL EXAMINATION:  General: well developed and well nourished, alert, oriented times 3 and appears comfortable  Psychiatric: Normal  HEENT: Trachea Midline, No torticollis  Pulmonary: No audible wheezing or respiratory distress   Skin: No masses, erythema, lacerations, fluctation, ulcerations  Neurovascular: Sensation Intact to the Median, Ulnar, Radial Nerve, Motor Intact to the Median, Ulnar, Radial Nerve and Pulses Intact    MUSCULOSKELETAL EXAMINATION:    left thumb:  Positive palpable nodule over the A1 pulley  Positive tenderness to palpation over A1 pulley  Positive catching  Positive clicking       ___________________________________________________  STUDIES REVIEWED:  No studies reviewed         PROCEDURES PERFORMED:  Hand/upper extremity injection: L thumb A1  Date/Time: 7/15/2019 2:23 PM  Consent given by: patient  Site marked: site marked  Timeout: Immediately prior to procedure a time out was called to verify the correct patient, procedure, equipment, support staff and site/side marked as required   Supporting Documentation  Indications: pain   Procedure Details  Condition:trigger finger Location: thumb - L thumb A1   Preparation: Patient was prepped and draped in the usual sterile fashion  Needle size: 25 G  Ultrasound guidance: no  Medications administered: 0 5 mL lidocaine 1 %; 20 mg triamcinolone acetonide 40 mg/mL    Patient tolerance: patient tolerated the procedure well with no immediate complications  Dressing:  Sterile dressing applied            _____________________________________________________  ASSESSMENT/PLAN:    Left thumb trigger finger  * Left thumb trigger finger CSI was offered and accepted today, injection performed in the office without complication   * May use NSAI's or Tylenol as needed for pain control  * Follow up in 2 weeks time, if the CSI was not beneficial a left thumb trigger finger release may be perfromed    Diagnoses and all orders for this visit:    Trigger finger of left thumb  -     Hand/upper extremity injection: L thumb A1        Follow Up:  Return in about 2 weeks (around 7/29/2019)  To Do Next Visit:  Re-evaluation of current issue    General Discussions:  Trigger Finger: The anatomy and physiology of trigger finger was discussed with the patient today in the office  Edema and increased contact pressure within the flexor tendons at the A1 pulley can cause pain, crepitation, and triggering or locking of the digit resulting in limitation of function  Symptoms can occur at anytime but are typically worse in the morning or after a brief rest from repetitive activity  Treatment options include resting/nighttime MP blocking splints to decrease edema, oral anti-inflammatory medications, home or formal therapy exercises, up to 2 steroid injections within the tendon sheath, or surgical release  While majority of patients do respond to conservative treatment, up to 20% may require surgical release         Scribe Attestation    I,:   Gil Coreas am acting as a scribe while in the presence of the attending physician :        I,:   Hafsa Juarez MD personally performed the services described in this documentation    as scribed in my presence :

## 2019-07-16 RX ORDER — LIDOCAINE HYDROCHLORIDE 10 MG/ML
0.5 INJECTION, SOLUTION INFILTRATION; PERINEURAL
Status: COMPLETED | OUTPATIENT
Start: 2019-07-15 | End: 2019-07-15

## 2019-07-16 RX ORDER — TRIAMCINOLONE ACETONIDE 40 MG/ML
20 INJECTION, SUSPENSION INTRA-ARTICULAR; INTRAMUSCULAR
Status: COMPLETED | OUTPATIENT
Start: 2019-07-15 | End: 2019-07-15

## 2019-07-29 ENCOUNTER — OFFICE VISIT (OUTPATIENT)
Dept: OBGYN CLINIC | Facility: MEDICAL CENTER | Age: 74
End: 2019-07-29
Payer: MEDICARE

## 2019-07-29 VITALS
HEIGHT: 61 IN | DIASTOLIC BLOOD PRESSURE: 83 MMHG | HEART RATE: 69 BPM | BODY MASS INDEX: 37.65 KG/M2 | WEIGHT: 199.4 LBS | SYSTOLIC BLOOD PRESSURE: 153 MMHG

## 2019-07-29 DIAGNOSIS — M65.312 TRIGGER FINGER OF LEFT THUMB: Primary | ICD-10-CM

## 2019-07-29 PROCEDURE — 99213 OFFICE O/P EST LOW 20 MIN: CPT | Performed by: ORTHOPAEDIC SURGERY

## 2019-07-29 RX ORDER — METOPROLOL SUCCINATE 25 MG/1
25 TABLET, EXTENDED RELEASE ORAL DAILY
COMMUNITY
End: 2020-09-02 | Stop reason: ALTCHOICE

## 2019-07-29 NOTE — PROGRESS NOTES
ASSESSMENT/PLAN:    Assessment:   Trigger Finger  left  thumb    Plan:   None at this time, watch for return of symptoms    Follow Up:  PRN    General Discussions:     Trigger FInger: The anatomy and physiology of trigger finger was discussed with the patient today in the office  Edema and increased contact pressure within the flexor tendons at the A1 pulley can cause pain, crepitation, and limitation of function  Treatment options include resting MP blocking splints to decrease edema, oral anti-inflammatory medications, home or formal therapy exercises, up to 2 steroid injections within the tendon sheath, or surgical release  While majority of patients do respond to conservative treatment, up to 20% may require surgical release  Operative Discussions:     Trigger Finger Release: The anatomy and physiology of trigger finger was discussed with the patient today in the office  Edema and increased contact pressure within the flexor tendons at the A1 pulley can cause pain, crepitation, and limitation of function  Treatment options include resting MP blocking splints to decrease edema, oral anti-inflammatory medications, home or formal therapy exercises, up to 2 steroid injections or surgical release  While majority of patients do respond to conservative treatment, up to 20% may require surgical release  The patient has elected release of the trigger finger  The patient has elected to undergo a release of the A1 pulley (trigger finger)  A small incision will be made over the palmar aspect of the hand, the tendon sheath holding the flexor tendons will be released  In the postoperative period, light activities are allowed immediately, driving is allowed when narcotic medication has stopped, and the incision may get wet after 2 days  Heavy activities (lifting more than approximately 10 pounds) will be allowed after the follow up appointment in 1-2 weeks    While the pain and discomfort within the wrist typically improves rapidly, some residual discomfort may be present for up to 6 weeks  The nodule that is typically palpable in the palmar aspect of the hand will not be removed, as this would necessitate removal of a portion of the flexor tendon, however the catching, clicking, and locking should resolve  Approximate success rate is 98%  The risks and benefits of the procedure were explained to the patient, which include, but are not limited to: Bleeding, infection, recurrence, pain, scar, damage to tendons, damage to nerves, and damage to blood vessels, need for future surgery and complications related to anesthesia  If bony work is done, risks also include malunion and nonunion  These risks, along with alternative conservative treatment options, and postoperative protocols were voiced back and understood by the patient  All questions were answered to the patient's satisfaction  The patient agrees to comply with a standard postoperative protocol, and is willing to proceed  Education was provided via written and auditory forms  There were no barriers to learning  Written handouts regarding wound care, incision and scar care, and general preoperative information, as well as risks and benefits were provided to the patient  Patient would like to monitor symptoms at this time    _____________________________________________________  CHIEF COMPLAINT:  Chief Complaint   Patient presents with    Left Hand - Follow-up         SUBJECTIVE:  Vincenzo Pelayo is a 69y o  year old female who presents for follow up regarding Trigger Finger  left  thumb  Since last visit, Vincenzo Pelayo has tried steroid injections with only partial relief  Today there is Catching to the left thumb      Radiation: without pain  Associated symptoms: No Complaints    PAST MEDICAL HISTORY:  Past Medical History:   Diagnosis Date    Arthritis     Coronary artery disease     [facility]     Hypercholesterolemia     Hypertension     Osteoporosis     Ovarian ca [facility]) 1997    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[address]     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       PAST SURGICAL HISTORY:  Past Surgical History:   Procedure Laterality Date    BACK SURGERY      CARPAL TUNNEL RELEASE      CHOLECYSTECTOMY      CORONARY STENT PLACEMENT      HYSTERECTOMY  1989    OOPHORECTOMY Bilateral 1997       FAMILY HISTORY:  Family History   Problem Relation Age of Onset    Diabetes Family     Cancer Family     Arthritis Family     Heart disease Family     Endometrial cancer Sister     Esophageal cancer Father     Stomach cancer Brother     No Known Problems Mother     No Known Problems Daughter     No Known Problems Maternal Grandmother     Prostate cancer Maternal Grandfather     No Known Problems Paternal Grandmother     Prostate cancer Paternal Grandfather     Breast cancer Maternal Aunt     Breast cancer Other     Breast cancer Other        SOCIAL HISTORY:  Social History     Tobacco Use    Smoking status: Never Smoker    Smokeless tobacco: Never Used   Substance Use Topics    Alcohol use: No    Drug use: No       MEDICATIONS:    Current Outpatient Medications:     gabapentin (NEURONTIN) 600 MG tablet, Take 1 tablet by mouth 3 (three) times a day, Disp: , Rfl:     glipiZIDE (GLUCOTROL XL) 10 mg 24 hr tablet, Take 2 tablets by mouth daily, Disp: , Rfl:     levothyroxine 175 mcg tablet, Take 100 mcg by mouth  , Disp: , Rfl:     Liraglutide (VICTOZA) 18 MG/3ML SOPN, Inject under the skin daily, Disp: , Rfl:     lisinopril (ZESTRIL) 20 mg tablet, Take 20 mg by mouth daily, Disp: , Rfl: 2    metFORMIN (GLUCOPHAGE) 1000 MG tablet, Take 1 tablet by mouth every 12 (twelve) hours, Disp: , Rfl:     metoprolol succinate (TOPROL-XL) 25 mg 24 hr tablet, Take 25 mg by mouth daily, Disp: , Rfl:     ALLERGIES:  Allergies   Allergen Reactions    Duloxetine      cymbalta    Sulfa Antibiotics        REVIEW OF SYSTEMS:  Pertinent items are noted in HPI  A comprehensive review of systems was negative  LABS:  HgA1c:   Lab Results   Component Value Date    HGBA1C 6 7 (H) 12/07/2018     BMP:   Lab Results   Component Value Date    GLUCOSE 129 (H) 12/30/2015    CALCIUM 8 9 06/15/2019     12/30/2015    K 4 3 06/15/2019    CO2 26 06/15/2019     06/15/2019    BUN 12 06/15/2019    CREATININE 0 58 (L) 06/15/2019           _____________________________________________________  PHYSICAL EXAMINATION:  General: well developed and well nourished, alert, oriented times 3 and appears comfortable  Psychiatric: Normal  HEENT: Trachea Midline, No torticollis  Cardiovascular: No discernable arrhythmia  Pulmonary: No wheezing or stridor  Skin: No masses, erthema, lacerations, fluctation, ulcerations  Neurovascular: Sensation Intact to the Median, Ulnar, Radial Nerve, Motor Intact to the Median, Ulnar, Radial Nerve and Pulses Intact    MUSCULOSKELETAL EXAMINATION:  LEFT SIDE:  Finger:  Triggering  thumb without pain  Minimal tenderness to A1 pulley palpation       _____________________________________________________  STUDIES REVIEWED:  No Studies to review      PROCEDURES PERFORMED:  Procedures  No Procedures performed today

## 2019-07-30 NOTE — PROGRESS NOTES
_____________________________________________________  CHIEF COMPLAINT:  Chief Complaint   Patient presents with    Left Hand - Follow-up         SUBJECTIVE:  Tori Kilgore is a 69y o  year old female who presents for follow up regarding Trigger Finger  left  thumb  Since last visit, Tori Kilgore has tried steroid injections with only partial relief  Today there is Catching to the left thumb      Radiation: without pain  Associated symptoms: No Complaints    PAST MEDICAL HISTORY:  Past Medical History:   Diagnosis Date    Arthritis     Coronary artery disease     [facility] [address] [facility]     Hypercholesterolemia     Hypertension     Osteoporosis     Ovarian ca ([facility] [address] ) 1997    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       PAST SURGICAL HISTORY:  Past Surgical History:   Procedure Laterality Date    BACK SURGERY      CARPAL TUNNEL RELEASE      CHOLECYSTECTOMY      CORONARY STENT PLACEMENT      HYSTERECTOMY  1989    OOPHORECTOMY Bilateral 1997       FAMILY HISTORY:  Family History   Problem Relation Age of Onset    Diabetes Family     Cancer Family     Arthritis Family     Heart disease Family     Endometrial cancer Sister     Esophageal cancer Father     Stomach cancer Brother     No Known Problems Mother     No Known Problems Daughter     No Known Problems Maternal Grandmother     Prostate cancer Maternal Grandfather     No Known Problems Paternal Grandmother     Prostate cancer Paternal Grandfather     Breast cancer Maternal Aunt     Breast cancer Other     Breast cancer Other        SOCIAL HISTORY:  Social History     Tobacco Use    Smoking status: Never Smoker    Smokeless tobacco: Never Used   Substance Use Topics    Alcohol use: No    Drug use: No       MEDICATIONS:    Current Outpatient Medications:     gabapentin (NEURONTIN) 600 MG tablet, Take 1 tablet by mouth 3 (three) times a day, Disp: , Rfl:     glipiZIDE (GLUCOTROL XL) 10 mg 24 hr tablet, Take 2 tablets by mouth daily, Disp: , Rfl:     levothyroxine 175 mcg tablet, Take 100 mcg by mouth  , Disp: , Rfl:     Liraglutide (VICTOZA) 18 MG/3ML SOPN, Inject under the skin daily, Disp: , Rfl:     lisinopril (ZESTRIL) 20 mg tablet, Take 20 mg by mouth daily, Disp: , Rfl: 2    metFORMIN (GLUCOPHAGE) 1000 MG tablet, Take 1 tablet by mouth every 12 (twelve) hours, Disp: , Rfl:     metoprolol succinate (TOPROL-XL) 25 mg 24 hr tablet, Take 25 mg by mouth daily, Disp: , Rfl:     ALLERGIES:  Allergies   Allergen Reactions    Duloxetine      cymbalta    Sulfa Antibiotics        REVIEW OF SYSTEMS:  Pertinent items are noted in HPI  A comprehensive review of systems was negative  LABS:  HgA1c:   Lab Results   Component Value Date    HGBA1C 6 7 (H) 12/07/2018     BMP:   Lab Results   Component Value Date    GLUCOSE 129 (H) 12/30/2015    CALCIUM 8 9 06/15/2019     12/30/2015    K 4 3 06/15/2019    CO2 26 06/15/2019     06/15/2019    BUN 12 06/15/2019    CREATININE 0 58 (L) 06/15/2019           _____________________________________________________  PHYSICAL EXAMINATION:  General: well developed and well nourished, alert, oriented times 3 and appears comfortable  Psychiatric: Normal  HEENT: Trachea Midline, No torticollis  Cardiovascular: No discernable arrhythmia  Pulmonary: No wheezing or stridor  Skin: No masses, erthema, lacerations, fluctation, ulcerations  Neurovascular: Sensation Intact to the Median, Ulnar, Radial Nerve, Motor Intact to the Median, Ulnar, Radial Nerve and Pulses Intact    MUSCULOSKELETAL EXAMINATION:  LEFT SIDE:  Finger:  Triggering  thumb without pain  Minimal tenderness to A1 pulley palpation       _____________________________________________________  STUDIES REVIEWED:  No Studies to review      PROCEDURES PERFORMED:  Procedures  No Procedures performed today    ASSESSMENT/PLAN:    Assessment:   Trigger Finger  left  Thumb  * patient had partial relief of symptoms  She still has some locking but the pain has resolved  *Patient would like to monitor symptoms at this time    Plan:   None at this time, watch for return of symptoms    Follow Up:  PRN    General Discussions:     Trigger FInger: The anatomy and physiology of trigger finger was discussed with the patient today in the office  Edema and increased contact pressure within the flexor tendons at the A1 pulley can cause pain, crepitation, and limitation of function  Treatment options include resting MP blocking splints to decrease edema, oral anti-inflammatory medications, home or formal therapy exercises, up to 2 steroid injections within the tendon sheath, or surgical release  While majority of patients do respond to conservative treatment, up to 20% may require surgical release  Operative Discussions:     Trigger Finger Release: The anatomy and physiology of trigger finger was discussed with the patient today in the office  Edema and increased contact pressure within the flexor tendons at the A1 pulley can cause pain, crepitation, and limitation of function  Treatment options include resting MP blocking splints to decrease edema, oral anti-inflammatory medications, home or formal therapy exercises, up to 2 steroid injections or surgical release  While majority of patients do respond to conservative treatment, up to 20% may require surgical release  The patient has elected release of the trigger finger  The patient has elected to undergo a release of the A1 pulley (trigger finger)  A small incision will be made over the palmar aspect of the hand, the tendon sheath holding the flexor tendons will be released  In the postoperative period, light activities are allowed immediately, driving is allowed when narcotic medication has stopped, and the incision may get wet after 2 days  Heavy activities (lifting more than approximately 10 pounds) will be allowed after the follow up appointment in 1-2 weeks  While the pain and discomfort within the wrist typically improves rapidly, some residual discomfort may be present for up to 6 weeks  The nodule that is typically palpable in the palmar aspect of the hand will not be removed, as this would necessitate removal of a portion of the flexor tendon, however the catching, clicking, and locking should resolve  Approximate success rate is 98%  The risks and benefits of the procedure were explained to the patient, which include, but are not limited to: Bleeding, infection, recurrence, pain, scar, damage to tendons, damage to nerves, and damage to blood vessels, need for future surgery and complications related to anesthesia  If bony work is done, risks also include malunion and nonunion  These risks, along with alternative conservative treatment options, and postoperative protocols were voiced back and understood by the patient  All questions were answered to the patient's satisfaction  The patient agrees to comply with a standard postoperative protocol, and is willing to proceed  Education was provided via written and auditory forms  There were no barriers to learning  Written handouts regarding wound care, incision and scar care, and general preoperative information, as well as risks and benefits were provided to the patient      Scribe Attestation    I,:   Lenny Hernandez PA-C am acting as a scribe while in the presence of the attending physician :        I,:   Neli Orantes MD personally performed the services described in this documentation    as scribed in my presence :

## 2019-08-15 ENCOUNTER — TRANSCRIBE ORDERS (OUTPATIENT)
Dept: LAB | Facility: CLINIC | Age: 74
End: 2019-08-15

## 2019-08-15 ENCOUNTER — APPOINTMENT (OUTPATIENT)
Dept: LAB | Facility: CLINIC | Age: 74
End: 2019-08-15
Payer: MEDICARE

## 2019-08-15 DIAGNOSIS — E13.8 DIABETES MELLITUS OF OTHER TYPE WITH COMPLICATION, UNSPECIFIED WHETHER LONG TERM INSULIN USE: Primary | ICD-10-CM

## 2019-08-15 DIAGNOSIS — E78.00 PURE HYPERCHOLESTEROLEMIA: ICD-10-CM

## 2019-08-15 LAB
ALBUMIN SERPL BCP-MCNC: 3.4 G/DL (ref 3.5–5)
ALP SERPL-CCNC: 46 U/L (ref 46–116)
ALT SERPL W P-5'-P-CCNC: 29 U/L (ref 12–78)
ANION GAP SERPL CALCULATED.3IONS-SCNC: 7 MMOL/L (ref 4–13)
AST SERPL W P-5'-P-CCNC: 17 U/L (ref 5–45)
BILIRUB DIRECT SERPL-MCNC: 0.1 MG/DL (ref 0–0.2)
BILIRUB SERPL-MCNC: 0.53 MG/DL (ref 0.2–1)
BUN SERPL-MCNC: 11 MG/DL (ref 5–25)
CALCIUM SERPL-MCNC: 8.9 MG/DL (ref 8.3–10.1)
CHLORIDE SERPL-SCNC: 104 MMOL/L (ref 100–108)
CHOLEST SERPL-MCNC: 164 MG/DL (ref 50–200)
CO2 SERPL-SCNC: 27 MMOL/L (ref 21–32)
CREAT SERPL-MCNC: 0.59 MG/DL (ref 0.6–1.3)
CREAT UR-MCNC: 94 MG/DL
EST. AVERAGE GLUCOSE BLD GHB EST-MCNC: 117 MG/DL
GFR SERPL CREATININE-BSD FRML MDRD: 91 ML/MIN/1.73SQ M
GLUCOSE P FAST SERPL-MCNC: 117 MG/DL (ref 65–99)
HBA1C MFR BLD: 5.7 % (ref 4.2–6.3)
HDLC SERPL-MCNC: 35 MG/DL (ref 40–60)
LDLC SERPL CALC-MCNC: 68 MG/DL (ref 0–100)
MICROALBUMIN UR-MCNC: 19 MG/L (ref 0–20)
MICROALBUMIN/CREAT 24H UR: 20 MG/G CREATININE (ref 0–30)
NONHDLC SERPL-MCNC: 129 MG/DL
POTASSIUM SERPL-SCNC: 4.2 MMOL/L (ref 3.5–5.3)
PROT SERPL-MCNC: 6.6 G/DL (ref 6.4–8.2)
SODIUM SERPL-SCNC: 138 MMOL/L (ref 136–145)
T4 FREE SERPL-MCNC: 1.23 NG/DL (ref 0.76–1.46)
TRIGL SERPL-MCNC: 305 MG/DL
TSH SERPL DL<=0.05 MIU/L-ACNC: 0.79 UIU/ML (ref 0.36–3.74)

## 2019-08-15 PROCEDURE — 80061 LIPID PANEL: CPT

## 2019-08-15 PROCEDURE — 36415 COLL VENOUS BLD VENIPUNCTURE: CPT

## 2019-08-15 PROCEDURE — 80048 BASIC METABOLIC PNL TOTAL CA: CPT

## 2019-08-15 PROCEDURE — 82570 ASSAY OF URINE CREATININE: CPT

## 2019-08-15 PROCEDURE — 82043 UR ALBUMIN QUANTITATIVE: CPT

## 2019-08-15 PROCEDURE — 84439 ASSAY OF FREE THYROXINE: CPT

## 2019-08-15 PROCEDURE — 80076 HEPATIC FUNCTION PANEL: CPT

## 2019-08-15 PROCEDURE — 84443 ASSAY THYROID STIM HORMONE: CPT

## 2019-08-15 PROCEDURE — 83036 HEMOGLOBIN GLYCOSYLATED A1C: CPT

## 2019-09-03 NOTE — PROGRESS NOTES
Cardiology Follow up Note    Angelique Hanks 69 y o  female MRN: [id number]    09/04/19          Assessment/Plan:    1  CAD s/p SAMMIE to proximal LAD 12/15  She denies any chest pain, no current shortness of breath  She is on Metoprolol, aspirin 81 mg, stopped Atorvastatin 40 around 7/19  LV function was normal  She discontinued Plavix 1/17  2  HTN  BP is controlled on Lisinopril and Metoprolol  3  HL  Lipid panel 12/15 showed total cholesterol 140, , HDL 41, LDL 68  She was started on Atorvastatin 40  Lipid panel 2/16 showed total cholesterol 96, HDL 39, LDL 33,   She asked to be switched to Simvastatin 40 as it was cheaper for her in 7/16  Lipid panel 9/16 showed total cholesterol 100, LDL 28, , HDL 38  CK and AST/ALT WNL  I switched her back to Atorvastatin 40 in 1/17 when drug formulary changed  Lipid panel 7/17 showed total cholesterol 94, LDL 21, , HDL 44  Lipid panel 4/18 showed total cholesterol 94, , HDL 36, LDL 19  She stopped taking Atorvastatin 40 due to reading about it increasing her Hgb A1c around 7/19, and her Hgb A1c did improve from 6 7 to 5 7 off statin  Lipid panel 8/19 showed total cholesterol 164, , HDL 35, LDL 68  Asked her to increase her fish oil to 2 g bid if she does not wish to take a statin  4  DM  Hgb A1c 9/16 6 5%  She is on Metformin, Glipizide, and Victoza  Hgb A1c was 7 3% in 12/17  Hgb A1c 7 5% 4/18  Hgb A1c 5 7% 8/19      5  Abnormal carotid ultrasound  She reports in the past she was told she had a 40% stenosis in the left carotid  No issues with carotid bruits or symptoms currently  On aspirin, statin  1  Type 2 diabetes mellitus without complication, without long-term current use of insulin ([address] )     2  Essential hypertension  POCT ECG   3  Coronary artery disease involving native coronary artery of native heart without angina pectoris  POCT ECG   4  Dyslipidemia  Lipid Panel with Direct LDL reflex   5   Abnormal carotid ultrasound HPI: 69 y o  woman with a history of HTN, HL, DM, morbid obesity, who is here for follow up of CAD  She was admitted to [place] [address] 12/15 with chest pain associated with nausea/diaphoresis  She had been having some exertional chest pain and shortness of breath for several months, but was prompted to come to hospital by an episode of chest pain that woke her from sleep  Troponins were mildly abnormal  CT chest was negative for PE  Cardiac catheterization was done 12/22/15, which showed 90% stenosis of proximal LAD, which was treated with [employer]ience [employer] drug eluting stent  Echo 12/15 showed normal LV size and function, EF 06%, grade I diastolic dysfunction, normal RV size and function, mildly dilated LA, moderate to marked MAC, no significant AV stenosis with Vmax 1 8 m/s  She was discharged home on aspirin, Plavix, statin, beta blocker  She completed cardiac rehab at [facility]  She had lumbar spinal surgery 3/18, she had UTI causing altered MS  Incision became infected and required IV antibiotics through PICC line  She is doing some walking and bowling for exercise, no chest pain or SOB  Occasional dizziness or lightheadedness when she first gets up out of bed in the morning  No orthopnea, uses 1 pillow to sleep, no PND  She sleeps in a [employer] bed with her head slightly up  She does get some right ankle/leg swelling occasionally (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), currently no problems with LE edema  No bruising, no bleeding       Patient Active Problem List   Diagnosis    Coronary artery disease involving native coronary artery of native heart without angina pectoris    Type 2 diabetes mellitus without complication, without long-term current use of insulin (HCC)    Dyslipidemia    Essential hypertension    Abnormal carotid ultrasound       Allergies   Allergen Reactions    Duloxetine      cymbalta    Sulfa Antibiotics          Current Outpatient Medications:     gabapentin (NEURONTIN) 600 MG tablet, Take 1 tablet by mouth 3 (three) times a day, Disp: , Rfl:     glipiZIDE (GLUCOTROL XL) 10 mg 24 hr tablet, Take 2 tablets by mouth daily, Disp: , Rfl:     levothyroxine 175 mcg tablet, Take 100 mcg by mouth  , Disp: , Rfl:     Liraglutide (VICTOZA) 18 MG/3ML SOPN, Inject under the skin daily, Disp: , Rfl:     lisinopril (ZESTRIL) 20 mg tablet, Take 20 mg by mouth daily, Disp: , Rfl: 2    metFORMIN (GLUCOPHAGE) 1000 MG tablet, Take 1 tablet by mouth every 12 (twelve) hours, Disp: , Rfl:     metoprolol succinate (TOPROL-XL) 25 mg 24 hr tablet, Take 25 mg by mouth daily, Disp: , Rfl:     Past Medical History:   Diagnosis Date    Arthritis     Coronary artery disease     [facility] mellitus ([facility] [address] [facility]     Hypercholesterolemia     Hypertension     Osteoporosis     Ovarian ca ([facility] [address] ) 1997    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[facility] [address] )     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       Family History   Problem Relation Age of Onset    Diabetes Family     Cancer Family     Arthritis Family     Heart disease Family     Endometrial cancer Sister     Esophageal cancer Father     Stomach cancer Brother     No Known Problems Mother     No Known Problems Daughter     No Known Problems Maternal Grandmother     Prostate cancer Maternal Grandfather     No Known Problems Paternal Grandmother     Prostate cancer Paternal Grandfather     Breast cancer Maternal Aunt     Breast cancer Other     Breast cancer Other        Past Surgical History:   Procedure Laterality Date    BACK SURGERY      CARPAL TUNNEL RELEASE      CHOLECYSTECTOMY      CORONARY STENT PLACEMENT      HYSTERECTOMY  1989    OOPHORECTOMY Bilateral 1997       Social History     Socioeconomic History    Marital status: /Civil Union     Spouse name: Not on file    Number of children: Not on file    Years of education: Not on file    Highest education level: Not on file   Occupational History    Not on file   Social Needs    Financial resource strain: Not on file    Food insecurity:     Worry: Not on file     Inability: Not on file    Transportation needs:     Medical: Not on file     Non-medical: Not on file   Tobacco Use    Smoking status: Never Smoker    Smokeless tobacco: Never Used   Substance and Sexual Activity    Alcohol use: No    Drug use: No    Sexual activity: Not on file   Lifestyle    Physical activity:     Days per week: Not on file     Minutes per session: Not on file    Stress: Not on file   Relationships    Social connections:     Talks on phone: Not on file     Gets together: Not on file     Attends Holiness service: Not on file     Active member of club or organization: Not on file     Attends meetings of clubs or organizations: Not on file     Relationship status: Not on file    Intimate partner violence:     Fear of current or ex partner: Not on file     Emotionally abused: Not on file     Physically abused: Not on file     Forced sexual activity: Not on file   Other Topics Concern    Not on file   Social History Narrative    Not on file       Review of Systems   Constitution: Negative for chills, decreased appetite, diaphoresis, fever, malaise/fatigue, night sweats, weight gain and weight loss  HENT: Negative for ear pain, hearing loss, hoarse voice, nosebleeds, sore throat and tinnitus  Eyes: Negative for blurred vision and pain  Cardiovascular: Positive for leg swelling  Negative for chest pain, claudication, cyanosis, dyspnea on exertion, irregular heartbeat, near-syncope, orthopnea, palpitations, paroxysmal nocturnal dyspnea and syncope  Respiratory: Negative for cough, hemoptysis, shortness of breath, sleep disturbances due to breathing, snoring, sputum production and wheezing  Hematologic/Lymphatic: Negative for adenopathy and bleeding problem  Does not bruise/bleed easily     Skin: Negative for color change, dry skin, flushing, itching, poor wound healing and rash  Musculoskeletal: Negative for arthritis, back pain, falls, joint pain, muscle cramps, muscle weakness, myalgias and neck pain  Gastrointestinal: Negative for abdominal pain, constipation, diarrhea, dysphagia, heartburn, hematemesis, hematochezia, melena, nausea and vomiting  Genitourinary: Negative for dysuria, frequency, hematuria, hesitancy, non-menstrual bleeding and urgency  Neurological: Negative for excessive daytime sleepiness, dizziness, focal weakness, headaches, light-headedness, loss of balance, numbness, paresthesias, tremors, vertigo and weakness  Psychiatric/Behavioral: Negative for altered mental status, depression and memory loss  The patient does not have insomnia and is not nervous/anxious  Allergic/Immunologic: Negative for environmental allergies and persistent infections  Vitals: /62 (BP Location: Left arm, Patient Position: Sitting, Cuff Size: Large)   Pulse 82   Ht 5' 1" (1 549 m)   Wt 90 4 kg (199 lb 6 4 oz)   SpO2 98%   BMI 37 68 kg/m²       Physical Exam:     GEN: Alert and oriented x 3, in no acute distress  Well appearing and well nourished  HEENT: Sclera anicteric, conjunctivae pink, mucous membranes moist  Oropharynx clear  NECK: Supple, no carotid bruits, no significant JVD  Trachea midline, no thyromegaly  HEART: Regular rhythm, normal S1 and S2, II/VI systolic murmur, no clicks, gallops or rubs  PMI nondisplaced, no thrills  LUNGS: Clear to auscultation bilaterally; no wheezes, rales, or rhonchi  No increased work of breathing or signs of respiratory distress  ABDOMEN: Obese, soft, nontender, nondistended, normoactive bowel sounds  EXTREMITIES: Skin warm and well perfused, no clubbing, cyanosis, or edema  NEURO: No focal findings  Normal gait  Normal speech  Mood and affect normal    SKIN: Normal without suspicious lesions on exposed skin        Lab Results:       Lab Results   Component Value Date    HGBA1C 5 7 08/15/2019    HGBA1C 6 7 (H) 12/07/2018    HGBA1C 7 5 (H) 04/10/2018     Lab Results   Component Value Date    CHOL 140 12/22/2015    CHOL 178 11/12/2015    CHOL 171 10/13/2015     Lab Results   Component Value Date    HDL 35 (L) 08/15/2019    HDL 38 (L) 12/07/2018    HDL 36 (L) 04/10/2018     Lab Results   Component Value Date    LDLCALC 68 08/15/2019    LDLCALC 23 12/07/2018    LDLCALC 19 04/10/2018     Lab Results   Component Value Date    TRIG 305 (H) 08/15/2019    TRIG 186 (H) 12/07/2018    TRIG 195 (H) 04/10/2018     No results found for: CHOLHDL

## 2019-09-04 ENCOUNTER — OFFICE VISIT (OUTPATIENT)
Dept: CARDIOLOGY CLINIC | Facility: CLINIC | Age: 74
End: 2019-09-04
Payer: MEDICARE

## 2019-09-04 VITALS
HEART RATE: 82 BPM | WEIGHT: 199.4 LBS | HEIGHT: 61 IN | BODY MASS INDEX: 37.65 KG/M2 | SYSTOLIC BLOOD PRESSURE: 108 MMHG | DIASTOLIC BLOOD PRESSURE: 62 MMHG | OXYGEN SATURATION: 98 %

## 2019-09-04 DIAGNOSIS — R93.89 ABNORMAL CAROTID ULTRASOUND: ICD-10-CM

## 2019-09-04 DIAGNOSIS — I10 ESSENTIAL HYPERTENSION: ICD-10-CM

## 2019-09-04 DIAGNOSIS — E78.5 DYSLIPIDEMIA: ICD-10-CM

## 2019-09-04 DIAGNOSIS — E11.9 TYPE 2 DIABETES MELLITUS WITHOUT COMPLICATION, WITHOUT LONG-TERM CURRENT USE OF INSULIN (HCC): Primary | ICD-10-CM

## 2019-09-04 DIAGNOSIS — I25.10 CORONARY ARTERY DISEASE INVOLVING NATIVE CORONARY ARTERY OF NATIVE HEART WITHOUT ANGINA PECTORIS: ICD-10-CM

## 2019-09-04 PROCEDURE — 93000 ELECTROCARDIOGRAM COMPLETE: CPT | Performed by: INTERNAL MEDICINE

## 2019-09-04 PROCEDURE — 99214 OFFICE O/P EST MOD 30 MIN: CPT | Performed by: INTERNAL MEDICINE

## 2019-11-01 ENCOUNTER — TRANSCRIBE ORDERS (OUTPATIENT)
Dept: ADMINISTRATIVE | Facility: HOSPITAL | Age: 74
End: 2019-11-01

## 2019-11-01 DIAGNOSIS — Z13.820 SPECIAL SCREENING FOR OSTEOPOROSIS: Primary | ICD-10-CM

## 2019-12-10 ENCOUNTER — HOSPITAL ENCOUNTER (OUTPATIENT)
Dept: RADIOLOGY | Facility: HOSPITAL | Age: 74
Discharge: HOME | End: 2019-12-10
Payer: MEDICARE

## 2019-12-10 DIAGNOSIS — Z13.820 SPECIAL SCREENING FOR OSTEOPOROSIS: ICD-10-CM

## 2019-12-10 PROCEDURE — 77080 DXA BONE DENSITY AXIAL: CPT

## 2019-12-17 ENCOUNTER — LAB (OUTPATIENT)
Dept: LAB | Facility: CLINIC | Age: 74
End: 2019-12-17
Payer: MEDICARE

## 2019-12-17 DIAGNOSIS — E78.5 DYSLIPIDEMIA: ICD-10-CM

## 2019-12-17 LAB
CHOLEST SERPL-MCNC: 161 MG/DL (ref 50–200)
HDLC SERPL-MCNC: 38 MG/DL
LDLC SERPL CALC-MCNC: 74 MG/DL (ref 0–100)
TRIGL SERPL-MCNC: 243 MG/DL

## 2019-12-17 PROCEDURE — 36415 COLL VENOUS BLD VENIPUNCTURE: CPT

## 2019-12-17 PROCEDURE — 80061 LIPID PANEL: CPT

## 2019-12-19 NOTE — RESULT ENCOUNTER NOTE
Reviewed results, TG still high, LDL close to goal  Per last office note, patient had stopped taking statin and did not wish to resume  Advised her to increase fish oil to 2 g bid  Left VM for patient asking to verify if she is taking fish oil 2 g bid, if she is and TG still high could add fenofibrate 150

## 2020-01-16 ENCOUNTER — HOSPITAL ENCOUNTER (OUTPATIENT)
Dept: RADIOLOGY | Facility: HOSPITAL | Age: 75
Discharge: HOME | End: 2020-01-16
Payer: MEDICARE

## 2020-01-16 VITALS — HEIGHT: 61 IN | BODY MASS INDEX: 37 KG/M2 | WEIGHT: 196 LBS

## 2020-01-16 DIAGNOSIS — R92.8 ABNORMAL MAMMOGRAM: ICD-10-CM

## 2020-01-16 PROCEDURE — 77066 DX MAMMO INCL CAD BI: CPT

## 2020-01-16 PROCEDURE — 76642 ULTRASOUND BREAST LIMITED: CPT

## 2020-01-16 PROCEDURE — G0279 TOMOSYNTHESIS, MAMMO: HCPCS

## 2020-01-21 LAB
LEFT EYE DIABETIC RETINOPATHY: NORMAL
RIGHT EYE DIABETIC RETINOPATHY: NORMAL

## 2020-05-07 PROBLEM — Z98.1 S/P LUMBAR FUSION: Status: ACTIVE | Noted: 2018-06-06

## 2020-05-07 PROBLEM — M85.852 OSTEOPENIA OF NECKS OF BOTH FEMURS: Status: ACTIVE | Noted: 2020-05-07

## 2020-05-07 PROBLEM — M85.851 OSTEOPENIA OF NECKS OF BOTH FEMURS: Status: ACTIVE | Noted: 2020-05-07

## 2020-05-07 PROBLEM — M48.061 SPINAL STENOSIS OF LUMBAR REGION: Status: ACTIVE | Noted: 2020-05-07

## 2020-05-07 PROBLEM — E03.9 HYPOTHYROIDISM: Status: ACTIVE | Noted: 2020-05-07

## 2020-05-13 DIAGNOSIS — I10 ESSENTIAL (PRIMARY) HYPERTENSION: Primary | ICD-10-CM

## 2020-05-13 RX ORDER — LISINOPRIL 20 MG/1
20 TABLET ORAL DAILY
COMMUNITY
End: 2020-05-13 | Stop reason: SDUPTHER

## 2020-05-13 RX ORDER — LISINOPRIL 20 MG/1
20 TABLET ORAL DAILY
Qty: 90 TABLET | Refills: 2 | Status: SHIPPED | OUTPATIENT
Start: 2020-05-13 | End: 2021-03-04 | Stop reason: SDUPTHER

## 2020-06-01 ENCOUNTER — OFFICE VISIT (OUTPATIENT)
Dept: FAMILY MEDICINE CLINIC | Facility: CLINIC | Age: 75
End: 2020-06-01
Payer: MEDICARE

## 2020-06-01 VITALS
BODY MASS INDEX: 38.89 KG/M2 | TEMPERATURE: 98.6 F | HEIGHT: 61 IN | WEIGHT: 206 LBS | DIASTOLIC BLOOD PRESSURE: 60 MMHG | SYSTOLIC BLOOD PRESSURE: 120 MMHG | HEART RATE: 60 BPM

## 2020-06-01 DIAGNOSIS — E03.9 ACQUIRED HYPOTHYROIDISM: ICD-10-CM

## 2020-06-01 DIAGNOSIS — E11.9 TYPE 2 DIABETES MELLITUS WITHOUT COMPLICATION, WITHOUT LONG-TERM CURRENT USE OF INSULIN (HCC): Primary | ICD-10-CM

## 2020-06-01 DIAGNOSIS — R60.0 BILATERAL LEG EDEMA: ICD-10-CM

## 2020-06-01 DIAGNOSIS — I10 ESSENTIAL HYPERTENSION: ICD-10-CM

## 2020-06-01 DIAGNOSIS — E78.2 MIXED DYSLIPIDEMIA: ICD-10-CM

## 2020-06-01 DIAGNOSIS — I25.10 CORONARY ARTERY DISEASE INVOLVING NATIVE CORONARY ARTERY OF NATIVE HEART WITHOUT ANGINA PECTORIS: ICD-10-CM

## 2020-06-01 DIAGNOSIS — M85.852 OSTEOPENIA OF NECKS OF BOTH FEMURS: ICD-10-CM

## 2020-06-01 DIAGNOSIS — M85.851 OSTEOPENIA OF NECKS OF BOTH FEMURS: ICD-10-CM

## 2020-06-01 DIAGNOSIS — M48.061 SPINAL STENOSIS OF LUMBAR REGION, UNSPECIFIED WHETHER NEUROGENIC CLAUDICATION PRESENT: ICD-10-CM

## 2020-06-01 PROCEDURE — 1036F TOBACCO NON-USER: CPT | Performed by: FAMILY MEDICINE

## 2020-06-01 PROCEDURE — 3078F DIAST BP <80 MM HG: CPT | Performed by: FAMILY MEDICINE

## 2020-06-01 PROCEDURE — 1160F RVW MEDS BY RX/DR IN RCRD: CPT | Performed by: FAMILY MEDICINE

## 2020-06-01 PROCEDURE — 99214 OFFICE O/P EST MOD 30 MIN: CPT | Performed by: FAMILY MEDICINE

## 2020-06-01 PROCEDURE — 3074F SYST BP LT 130 MM HG: CPT | Performed by: FAMILY MEDICINE

## 2020-06-01 PROCEDURE — 3008F BODY MASS INDEX DOCD: CPT | Performed by: FAMILY MEDICINE

## 2020-06-01 PROCEDURE — 4040F PNEUMOC VAC/ADMIN/RCVD: CPT | Performed by: FAMILY MEDICINE

## 2020-06-01 RX ORDER — PREGABALIN 50 MG/1
50 CAPSULE ORAL 2 TIMES DAILY
COMMUNITY
End: 2020-08-24 | Stop reason: SDUPTHER

## 2020-06-01 RX ORDER — CLOTRIMAZOLE AND BETAMETHASONE DIPROPIONATE 10; .64 MG/G; MG/G
CREAM TOPICAL
COMMUNITY
Start: 2020-03-10 | End: 2021-06-01 | Stop reason: SDUPTHER

## 2020-06-01 RX ORDER — GLIPIZIDE 10 MG/1
TABLET ORAL
COMMUNITY
Start: 2020-04-18 | End: 2020-07-14 | Stop reason: SDUPTHER

## 2020-06-01 RX ORDER — LEVOTHYROXINE SODIUM 0.1 MG/1
100 TABLET ORAL DAILY
COMMUNITY
Start: 2020-03-19 | End: 2020-06-14

## 2020-06-03 ENCOUNTER — APPOINTMENT (OUTPATIENT)
Dept: LAB | Facility: CLINIC | Age: 75
End: 2020-06-03
Payer: MEDICARE

## 2020-06-03 ENCOUNTER — TRANSCRIBE ORDERS (OUTPATIENT)
Dept: LAB | Facility: CLINIC | Age: 75
End: 2020-06-03

## 2020-06-03 DIAGNOSIS — R60.0 BILATERAL LEG EDEMA: ICD-10-CM

## 2020-06-03 DIAGNOSIS — E03.9 ACQUIRED HYPOTHYROIDISM: ICD-10-CM

## 2020-06-03 DIAGNOSIS — E11.9 TYPE 2 DIABETES MELLITUS WITHOUT COMPLICATION, WITHOUT LONG-TERM CURRENT USE OF INSULIN (HCC): ICD-10-CM

## 2020-06-03 LAB
ALBUMIN SERPL BCP-MCNC: 3.5 G/DL (ref 3.5–5)
ALP SERPL-CCNC: 41 U/L (ref 46–116)
ALT SERPL W P-5'-P-CCNC: 31 U/L (ref 12–78)
ANION GAP SERPL CALCULATED.3IONS-SCNC: 7 MMOL/L (ref 4–13)
AST SERPL W P-5'-P-CCNC: 20 U/L (ref 5–45)
BILIRUB SERPL-MCNC: 0.5 MG/DL (ref 0.2–1)
BUN SERPL-MCNC: 8 MG/DL (ref 5–25)
CALCIUM SERPL-MCNC: 9.6 MG/DL (ref 8.3–10.1)
CHLORIDE SERPL-SCNC: 103 MMOL/L (ref 100–108)
CO2 SERPL-SCNC: 26 MMOL/L (ref 21–32)
CREAT SERPL-MCNC: 0.65 MG/DL (ref 0.6–1.3)
EST. AVERAGE GLUCOSE BLD GHB EST-MCNC: 120 MG/DL
GFR SERPL CREATININE-BSD FRML MDRD: 88 ML/MIN/1.73SQ M
GLUCOSE P FAST SERPL-MCNC: 118 MG/DL (ref 65–99)
HBA1C MFR BLD: 5.8 %
NT-PROBNP SERPL-MCNC: 164 PG/ML
POTASSIUM SERPL-SCNC: 4.4 MMOL/L (ref 3.5–5.3)
PROT SERPL-MCNC: 7 G/DL (ref 6.4–8.2)
SODIUM SERPL-SCNC: 136 MMOL/L (ref 136–145)
T4 FREE SERPL-MCNC: 1.22 NG/DL (ref 0.76–1.46)
TSH SERPL DL<=0.05 MIU/L-ACNC: 2.24 UIU/ML (ref 0.36–3.74)

## 2020-06-03 PROCEDURE — 83880 ASSAY OF NATRIURETIC PEPTIDE: CPT

## 2020-06-03 PROCEDURE — 83036 HEMOGLOBIN GLYCOSYLATED A1C: CPT

## 2020-06-03 PROCEDURE — 80053 COMPREHEN METABOLIC PANEL: CPT

## 2020-06-03 PROCEDURE — 84439 ASSAY OF FREE THYROXINE: CPT

## 2020-06-03 PROCEDURE — 84443 ASSAY THYROID STIM HORMONE: CPT

## 2020-06-03 PROCEDURE — 36415 COLL VENOUS BLD VENIPUNCTURE: CPT

## 2020-06-14 DIAGNOSIS — E03.9 ACQUIRED HYPOTHYROIDISM: Primary | ICD-10-CM

## 2020-06-14 RX ORDER — LEVOTHYROXINE SODIUM 0.1 MG/1
TABLET ORAL
Qty: 90 TABLET | Refills: 0 | Status: SHIPPED | OUTPATIENT
Start: 2020-06-14 | End: 2020-10-06 | Stop reason: SDUPTHER

## 2020-07-14 ENCOUNTER — TELEPHONE (OUTPATIENT)
Dept: FAMILY MEDICINE CLINIC | Facility: CLINIC | Age: 75
End: 2020-07-14

## 2020-07-14 DIAGNOSIS — E11.9 DIABETES MELLITUS WITHOUT COMPLICATION (HCC): Primary | ICD-10-CM

## 2020-07-14 RX ORDER — GLIPIZIDE 10 MG/1
10 TABLET ORAL
Qty: 180 TABLET | Refills: 2 | Status: SHIPPED | OUTPATIENT
Start: 2020-07-14 | End: 2021-01-28 | Stop reason: SDUPTHER

## 2020-08-12 ENCOUNTER — APPOINTMENT (OUTPATIENT)
Dept: RADIOLOGY | Facility: AMBULARY SURGERY CENTER | Age: 75
End: 2020-08-12
Attending: SURGERY
Payer: MEDICARE

## 2020-08-12 ENCOUNTER — OFFICE VISIT (OUTPATIENT)
Dept: OBGYN CLINIC | Facility: CLINIC | Age: 75
End: 2020-08-12
Payer: MEDICARE

## 2020-08-12 VITALS
WEIGHT: 206 LBS | DIASTOLIC BLOOD PRESSURE: 84 MMHG | HEIGHT: 61 IN | BODY MASS INDEX: 38.89 KG/M2 | HEART RATE: 80 BPM | SYSTOLIC BLOOD PRESSURE: 155 MMHG

## 2020-08-12 DIAGNOSIS — M79.641 BILATERAL HAND PAIN: ICD-10-CM

## 2020-08-12 DIAGNOSIS — M18.12 ARTHRITIS OF CARPOMETACARPAL (CMC) JOINT OF LEFT THUMB: Primary | ICD-10-CM

## 2020-08-12 DIAGNOSIS — M25.50 POLYARTHRALGIA: ICD-10-CM

## 2020-08-12 DIAGNOSIS — M79.642 BILATERAL HAND PAIN: ICD-10-CM

## 2020-08-12 DIAGNOSIS — M18.11 ARTHRITIS OF CARPOMETACARPAL (CMC) JOINT OF RIGHT THUMB: ICD-10-CM

## 2020-08-12 DIAGNOSIS — M65.312 TRIGGER FINGER OF LEFT THUMB: ICD-10-CM

## 2020-08-12 PROCEDURE — 20600 DRAIN/INJ JOINT/BURSA W/O US: CPT | Performed by: SURGERY

## 2020-08-12 PROCEDURE — 3077F SYST BP >= 140 MM HG: CPT | Performed by: SURGERY

## 2020-08-12 PROCEDURE — 3044F HG A1C LEVEL LT 7.0%: CPT | Performed by: SURGERY

## 2020-08-12 PROCEDURE — 73130 X-RAY EXAM OF HAND: CPT

## 2020-08-12 PROCEDURE — 1036F TOBACCO NON-USER: CPT | Performed by: SURGERY

## 2020-08-12 PROCEDURE — 99215 OFFICE O/P EST HI 40 MIN: CPT | Performed by: SURGERY

## 2020-08-12 PROCEDURE — 1160F RVW MEDS BY RX/DR IN RCRD: CPT | Performed by: SURGERY

## 2020-08-12 PROCEDURE — 3008F BODY MASS INDEX DOCD: CPT | Performed by: SURGERY

## 2020-08-12 PROCEDURE — 3079F DIAST BP 80-89 MM HG: CPT | Performed by: SURGERY

## 2020-08-12 PROCEDURE — 4040F PNEUMOC VAC/ADMIN/RCVD: CPT | Performed by: SURGERY

## 2020-08-12 RX ORDER — BUPIVACAINE HYDROCHLORIDE 2.5 MG/ML
0.5 INJECTION, SOLUTION INFILTRATION; PERINEURAL
Status: COMPLETED | OUTPATIENT
Start: 2020-08-12 | End: 2020-08-12

## 2020-08-12 RX ORDER — LIDOCAINE HYDROCHLORIDE 10 MG/ML
1 INJECTION, SOLUTION INFILTRATION; PERINEURAL
Status: COMPLETED | OUTPATIENT
Start: 2020-08-12 | End: 2020-08-12

## 2020-08-12 RX ORDER — TRIAMCINOLONE ACETONIDE 40 MG/ML
20 INJECTION, SUSPENSION INTRA-ARTICULAR; INTRAMUSCULAR
Status: COMPLETED | OUTPATIENT
Start: 2020-08-12 | End: 2020-08-12

## 2020-08-12 RX ADMIN — LIDOCAINE HYDROCHLORIDE 1 ML: 10 INJECTION, SOLUTION INFILTRATION; PERINEURAL at 15:58

## 2020-08-12 RX ADMIN — BUPIVACAINE HYDROCHLORIDE 0.5 ML: 2.5 INJECTION, SOLUTION INFILTRATION; PERINEURAL at 15:58

## 2020-08-12 RX ADMIN — TRIAMCINOLONE ACETONIDE 20 MG: 40 INJECTION, SUSPENSION INTRA-ARTICULAR; INTRAMUSCULAR at 15:58

## 2020-08-12 NOTE — PROGRESS NOTES
Leia TEAGUE  Attending, Orthopaedic Surgery  Hand, Wrist, and Elbow Surgery  [facility]      ORTHOPAEDIC HAND, WRIST, AND ELBOW OFFICE  VISIT       ASSESSMENT/PLAN:      Trigger finger left thumb  Bilateral CMC joint arthritis  Polyarthralgia the left hand    Discusses surgical vs  Non-surgical intervention of triggering of left thumb, 2nd injection is an option  However, if she continues to be symptomatic, surgery might be the only permanent treatment  Discussed injection of left thumb CMC, and patient was amenable to this plan  CS injection was performed at time of visit without difficulty  We  will see how she does with that and consider injection of the left thumb CMC  She was also provided bilateral Comfort Cool splints  Lastly, she was encouraged to improve morning motion with moist heat losing shower, or bucket of warm water to promote circulation and buoyancy of the water to assist with motion  The patient verbalized understanding of exam findings and treatment plan  We engaged in the shared decision-making process and treatment options were discussed at length with the patient  Surgical and conservative management discussed today along with risks and benefits  Diagnoses and all orders for this visit:    Arthritis of carpometacarpal [place]) joint of left thumb  -     XR hand 3+ vw left; Future  -     XR hand 3+ vw right; Future  -     Small joint arthrocentesis: L thumb CMC    Trigger finger of left thumb    Arthritis of carpometacarpal (CMC) joint of right thumb    Polyarthralgia of left hand/fingers      Follow Up:  Return in 2 weeks (on 8/26/2020) for Re-evaluation, consider right CMC injection vs left tigger finger injections        ____________________________________________________________________________________________________________________________________________      CHIEF COMPLAINT:  Chief Complaint   Patient presents with    Left Hand - Pain SUBJECTIVE:  Nori Pacheco is a 77y o  year old RHD female who presents with chief complaint of painful catching and locking of the left thumb, and pain and stiffness with flexion of the fingers of the left hand, particularly in the morning  She states that she has been experiencing these symptoms for greater than 1 year  She was previously seen for left trigger thumb by Dr Garlan Pallas in July of 2019  At that time she received a corticosteroid injection which she said provided a few months of relief  She does not currently take any medications for pain  I have personally reviewed all the relevant PMH, PSH, SH, FH, Medications and allergies      PAST MEDICAL HISTORY:  Past Medical History:   Diagnosis Date    Arthritis     Coronary artery disease     [facility] mellitus [facility] [address]     Hypercholesterolemia     Hypertension     Osteoporosis     Ovarian ca ([facility] [address] ) 1997    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[facility] [address] [facility]     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       PAST SURGICAL HISTORY:  Past Surgical History:   Procedure Laterality Date    BACK SURGERY      CARPAL TUNNEL RELEASE      CHOLECYSTECTOMY      COLONOSCOPY      pt see Dr Marilee Fairchild  Up to date with her colonoscopy   CORONARY STENT PLACEMENT      EYE SURGERY      cataract surgery    HYSTERECTOMY  1989    MAMMO (HISTORICAL)      up to date   see gyn    OOPHORECTOMY Bilateral 1997       FAMILY HISTORY:  Family History   Problem Relation Age of Onset    Diabetes Family     Cancer Family     Arthritis Family     Heart disease Family     Endometrial cancer Sister     Esophageal cancer Father     Stomach cancer Brother     Diabetes Mother     Heart disease Mother     No Known Problems Daughter     No Known Problems Maternal Grandmother     Prostate cancer Maternal Grandfather     No Known Problems Paternal Grandmother     Prostate cancer Paternal Grandfather     Breast cancer Maternal Aunt     Breast cancer Other     Breast cancer Other        SOCIAL HISTORY:  Social History     Tobacco Use    Smoking status: Never Smoker    Smokeless tobacco: Never Used   Substance Use Topics    Alcohol use: No    Drug use: No       MEDICATIONS:    Current Outpatient Medications:     Aspirin Buf,CaCarb-MgCarb-MgO, 81 MG TABS, aspirin 81 mg tablet, Disp: , Rfl:     clotrimazole-betamethasone (LOTRISONE) 1-0 05 % cream, , Disp: , Rfl:     glipiZIDE (GLUCOTROL) 10 mg tablet, Take 1 tablet (10 mg total) by mouth 2 (two) times a day before meals, Disp: 180 tablet, Rfl: 2    levothyroxine 100 mcg tablet, Take 1 tablet by mouth once daily, Disp: 90 tablet, Rfl: 0    Liraglutide (VICTOZA) 18 MG/3ML SOPN, Inject under the skin daily, Disp: , Rfl:     lisinopril (ZESTRIL) 20 mg tablet, Take 1 tablet (20 mg total) by mouth daily, Disp: 90 tablet, Rfl: 2    metFORMIN (GLUCOPHAGE) 1000 MG tablet, Take 1 tablet by mouth every 12 (twelve) hours, Disp: , Rfl:     metoprolol succinate (TOPROL-XL) 25 mg 24 hr tablet, Take 25 mg by mouth daily, Disp: , Rfl:     metoprolol tartrate (LOPRESSOR) 25 mg tablet, , Disp: , Rfl:     pregabalin (LYRICA) 50 mg capsule, Take 50 mg by mouth 2 (two) times a day, Disp: , Rfl:     ALLERGIES:  Allergies   Allergen Reactions    Duloxetine      cymbalta    Sulfa Antibiotics            REVIEW OF SYSTEMS:  Review of Systems   Constitutional: Negative for chills, fever and unexpected weight change  HENT: Negative for hearing loss, nosebleeds and sore throat  Eyes: Negative for pain, redness and visual disturbance  Respiratory: Negative for cough, shortness of breath and wheezing  Cardiovascular: Negative for chest pain, palpitations and leg swelling  Gastrointestinal: Negative for abdominal pain, nausea and vomiting  Endocrine: Negative for polydipsia and polyuria  Genitourinary: Negative for dysuria and hematuria  Musculoskeletal:        As noted in HPI   Skin: Negative for rash and wound     Neurological: Negative for dizziness, numbness and headaches  Psychiatric/Behavioral: Negative for decreased concentration and suicidal ideas  The patient is not nervous/anxious  VITALS:  Vitals:    08/12/20 1510   BP: 155/84   Pulse: 80       LABS:  HgA1c:   Lab Results   Component Value Date    HGBA1C 5 8 (H) 06/03/2020     BMP:   Lab Results   Component Value Date    GLUCOSE 129 (H) 12/30/2015    CALCIUM 9 6 06/03/2020     12/30/2015    K 4 4 06/03/2020    CO2 26 06/03/2020     06/03/2020    BUN 8 06/03/2020    CREATININE 0 65 06/03/2020       _____________________________________________________  PHYSICAL EXAMINATION:  General: well developed and well nourished, alert, oriented times 3 and appears comfortable  Psychiatric: Normal  HEENT: Normocephalic, Atraumatic Trachea Midline, No torticollis  Pulmonary: No audible wheezing or respiratory distress   Cardiovascular: No pitting edema, 2+ radial pulse   Skin: No masses, erythema, lacerations, fluctation, ulcerations  Neurovascular: Sensation Intact to the Median, Ulnar, Radial Nerve, Motor Intact to the Median, Ulnar, Radial Nerve and Pulses Intact  Musculoskeletal: Normal, except as noted in detailed exam and in HPI        MUSCULOSKELETAL EXAMINATION:    Left hand -   No obvious anatomical deformity  Skin is warm and dry with no erythema, ecchymosis, or infection  Reproducible triggering of left thumb with palpable nodule local A1 pulley  + 1st CMC grind  Composite finger flexion to 5 mm of distal palmar crease  Brisk capillary refill to the fingers  Sensation light touch intact distally    Right hand -   No obvious anatomical deformity  Skin is warm and dry with no erythema, ecchymosis, or infection  + 1st CMC grind  Able to form full closed fist  This capillary refill to the fingers  Sensation light touch intact distally    ___________________________________________________  STUDIES REVIEWED:  Attending Physician has personally reviewed pertinent imaging and/or reports in PACS, impression is as follows:    Review of radiographic series taken 8/12/2020 of the right hand significant for [facility] joint arthritis of thumb      Review of radiographic series taken 8/12/2020 left hand significant for [facility] arthritis of the thumb along with mild to moderate degenerative findings of multiple joints of the fingers      PROCEDURES PERFORMED:  Small joint arthrocentesis: L thumb CMC  Date/Time: 8/12/2020 3:58 PM  Consent given by: patient  Supporting Documentation  Indications: pain and joint swelling   Procedure Details  Location: thumb - L thumb CMC  Needle size: 25 G  Ultrasound guidance: no  Approach: volar  Medications administered: 1 mL lidocaine 1 %; 20 mg triamcinolone acetonide 40 mg/mL; 0 5 mL bupivacaine 0 25 %             _____________________________________________________      Scribe Attestation    I,:   Sapphire Spangler am acting as a scribe while in the presence of the attending physician :        I,:   Molly Phillips MD personally performed the services described in this documentation    as scribed in my presence :

## 2020-08-24 DIAGNOSIS — M48.061 SPINAL STENOSIS OF LUMBAR REGION, UNSPECIFIED WHETHER NEUROGENIC CLAUDICATION PRESENT: Primary | ICD-10-CM

## 2020-08-24 RX ORDER — PREGABALIN 50 MG/1
50 CAPSULE ORAL 2 TIMES DAILY
Qty: 60 CAPSULE | Refills: 0 | Status: SHIPPED | OUTPATIENT
Start: 2020-08-24 | End: 2020-08-28 | Stop reason: SDUPTHER

## 2020-08-24 NOTE — TELEPHONE ENCOUNTER
Rx management called looking for a new script on  [facility]    QU#[phone]   [facility] [address]    [place]-    Fax [phone]

## 2020-08-26 ENCOUNTER — OFFICE VISIT (OUTPATIENT)
Dept: OBGYN CLINIC | Facility: CLINIC | Age: 75
End: 2020-08-26
Payer: MEDICARE

## 2020-08-26 VITALS
HEIGHT: 61 IN | BODY MASS INDEX: 38.71 KG/M2 | HEART RATE: 88 BPM | DIASTOLIC BLOOD PRESSURE: 85 MMHG | WEIGHT: 205 LBS | SYSTOLIC BLOOD PRESSURE: 135 MMHG

## 2020-08-26 DIAGNOSIS — M18.11 ARTHRITIS OF CARPOMETACARPAL (CMC) JOINT OF RIGHT THUMB: ICD-10-CM

## 2020-08-26 DIAGNOSIS — M18.12 ARTHRITIS OF CARPOMETACARPAL (CMC) JOINT OF LEFT THUMB: Primary | ICD-10-CM

## 2020-08-26 DIAGNOSIS — M65.312 TRIGGER FINGER OF LEFT THUMB: ICD-10-CM

## 2020-08-26 PROCEDURE — 1036F TOBACCO NON-USER: CPT | Performed by: SURGERY

## 2020-08-26 PROCEDURE — 3079F DIAST BP 80-89 MM HG: CPT | Performed by: SURGERY

## 2020-08-26 PROCEDURE — 3044F HG A1C LEVEL LT 7.0%: CPT | Performed by: SURGERY

## 2020-08-26 PROCEDURE — 1160F RVW MEDS BY RX/DR IN RCRD: CPT | Performed by: SURGERY

## 2020-08-26 PROCEDURE — 99213 OFFICE O/P EST LOW 20 MIN: CPT | Performed by: SURGERY

## 2020-08-26 PROCEDURE — 3008F BODY MASS INDEX DOCD: CPT | Performed by: SURGERY

## 2020-08-26 PROCEDURE — 3075F SYST BP GE 130 - 139MM HG: CPT | Performed by: SURGERY

## 2020-08-26 PROCEDURE — 4040F PNEUMOC VAC/ADMIN/RCVD: CPT | Performed by: SURGERY

## 2020-08-26 NOTE — PROGRESS NOTES
ASSESSMENT/PLAN:      29-year-old female who presents to the office today for follow up evaluation bilateral thumb CMC arthritis and left thumb trigger finger  Left thumb CMC injection provided at her last visit which provided her with good relief  She deferred a right thumb CMC injection today  She also deferred left trigger thumb injection  She will continue with comfort cool braces  She may follow up with me as needed  The patient verbalized understanding of exam findings and treatment plan  We engaged in the shared decision-making process and treatment options were discussed at length with the patient  Surgical and conservative management discussed today along with risks and benefits  Diagnoses and all orders for this visit:    Arthritis of carpometacarpal [place]) joint of left thumb    Arthritis of carpometacarpal (CMC) joint of right thumb    Trigger finger of left thumb          Follow Up:  Return if symptoms worsen or fail to improve  To Do Next Visit:  Re-evaluation of current issue    ____________________________________________________________________________________________________________________________________________      CHIEF COMPLAINT:  Chief Complaint   Patient presents with    Left Hand - Follow-up       SUBJECTIVE:  Parth Sanford is a 77y o  year old RHD female who presents to the office today for follow up evaluation bilateral thumb CMC arthritis and left  thumb  Patient underwent a left thumb CMC injection at her last visit which provided her with good relief  She has been using thr comfort cool braces  She notes mild pain to the base of her right thumb but states this isn't very bothersome for her  I have personally reviewed all the relevant PMH, PSH, SH, FH, Medications and allergies       PAST MEDICAL HISTORY:  Past Medical History:   Diagnosis Date    Arthritis     Coronary artery disease     Diabetes mellitus ([address]     Hypercholesterolemia     Hypertension  Osteoporosis     Ovarian ca [facility]) 1997    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[address]     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       PAST SURGICAL HISTORY:  Past Surgical History:   Procedure Laterality Date    BACK SURGERY      CARPAL TUNNEL RELEASE      CHOLECYSTECTOMY      COLONOSCOPY      pt see Dr Meme Verma  Up to date with her colonoscopy   CORONARY STENT PLACEMENT      EYE SURGERY      cataract surgery    HYSTERECTOMY  1989    MAMMO (HISTORICAL)      up to date   see gyn    OOPHORECTOMY Bilateral 1997       FAMILY HISTORY:  Family History   Problem Relation Age of Onset    Diabetes Family     Cancer Family     Arthritis Family     Heart disease Family     Endometrial cancer Sister     Esophageal cancer Father     Stomach cancer Brother     Diabetes Mother     Heart disease Mother     No Known Problems Daughter     No Known Problems Maternal Grandmother     Prostate cancer Maternal Grandfather     No Known Problems Paternal Grandmother     Prostate cancer Paternal Grandfather     Breast cancer Maternal Aunt     Breast cancer Other     Breast cancer Other        SOCIAL HISTORY:  Social History     Tobacco Use    Smoking status: Never Smoker    Smokeless tobacco: Never Used   Substance Use Topics    Alcohol use: No    Drug use: No       MEDICATIONS:    Current Outpatient Medications:     Aspirin Buf,CaCarb-MgCarb-MgO, 81 MG TABS, aspirin 81 mg tablet, Disp: , Rfl:     clotrimazole-betamethasone (LOTRISONE) 1-0 05 % cream, , Disp: , Rfl:     glipiZIDE (GLUCOTROL) 10 mg tablet, Take 1 tablet (10 mg total) by mouth 2 (two) times a day before meals, Disp: 180 tablet, Rfl: 2    levothyroxine 100 mcg tablet, Take 1 tablet by mouth once daily, Disp: 90 tablet, Rfl: 0    Liraglutide (VICTOZA) 18 MG/3ML SOPN, Inject under the skin daily, Disp: , Rfl:     lisinopril (ZESTRIL) 20 mg tablet, Take 1 tablet (20 mg total) by mouth daily, Disp: 90 tablet, Rfl: 2    metFORMIN (GLUCOPHAGE) 1000 MG tablet, Take 1 tablet by mouth every 12 (twelve) hours, Disp: , Rfl:     metoprolol succinate (TOPROL-XL) 25 mg 24 hr tablet, Take 25 mg by mouth daily, Disp: , Rfl:     metoprolol tartrate (LOPRESSOR) 25 mg tablet, , Disp: , Rfl:     pregabalin (LYRICA) 50 mg capsule, Take 1 capsule (50 mg total) by mouth 2 (two) times a day, Disp: 60 capsule, Rfl: 0    ALLERGIES:  Allergies   Allergen Reactions    Duloxetine      cymbalta    Sulfa Antibiotics        REVIEW OF SYSTEMS:  Review of Systems   Constitutional: Negative for chills and fever  HENT: Negative for drooling and sneezing  Eyes: Negative for redness  Respiratory: Negative for cough and wheezing  Gastrointestinal: Negative for nausea and vomiting  Musculoskeletal: Negative for arthralgias, joint swelling and myalgias  Neurological: Negative for weakness and numbness  Psychiatric/Behavioral: Negative for behavioral problems  The patient is not nervous/anxious          VITALS:  Vitals:    08/26/20 1106   BP: 135/85   Pulse: 88       LABS:  HgA1c:   Lab Results   Component Value Date    HGBA1C 5 8 (H) 06/03/2020     BMP:   Lab Results   Component Value Date    GLUCOSE 129 (H) 12/30/2015    CALCIUM 9 6 06/03/2020     12/30/2015    K 4 4 06/03/2020    CO2 26 06/03/2020     06/03/2020    BUN 8 06/03/2020    CREATININE 0 65 06/03/2020       _____________________________________________________  PHYSICAL EXAMINATION:  General: well developed and well nourished, alert, oriented times 3 and appears comfortable  Psychiatric: Normal  HEENT: Normocephalic, Atraumatic Trachea Midline, No torticollis  Pulmonary: No audible wheezing or respiratory distress   Cardiovascular: No pitting edema, 2+ radial pulse   Skin: No masses, erythema, lacerations, fluctation, ulcerations  Neurovascular: Sensation Intact to the Median, Ulnar, Radial Nerve, Motor Intact to the Median, Ulnar, Radial Nerve and Pulses Intact  Musculoskeletal: Normal, except as noted in detailed exam and in HPI        MUSCULOSKELETAL EXAMINATION:  Left thumb    NTTP thumb CMC  Mild triggering  Compartments soft  Brisk capillary refill  S/m intact median, radial, and ulnar nerve     ___________________________________________________  STUDIES REVIEWED:  No imaging reviewed           PROCEDURES PERFORMED:  Procedures  No Procedures performed today    _____________________________________________________      Scribe Attestation    I,:   Amelia Saldana MA am acting as a scribe while in the presence of the attending physician :        I,:   Gemma Calderon MD personally performed the services described in this documentation    as scribed in my presence :

## 2020-08-28 ENCOUNTER — TELEPHONE (OUTPATIENT)
Dept: FAMILY MEDICINE CLINIC | Facility: CLINIC | Age: 75
End: 2020-08-28

## 2020-08-28 DIAGNOSIS — M48.061 SPINAL STENOSIS OF LUMBAR REGION, UNSPECIFIED WHETHER NEUROGENIC CLAUDICATION PRESENT: ICD-10-CM

## 2020-08-28 RX ORDER — PREGABALIN 50 MG/1
50 CAPSULE ORAL 2 TIMES DAILY
Qty: 180 CAPSULE | Refills: 0 | Status: SHIPPED | OUTPATIENT
Start: 2020-08-28 | End: 2020-08-31 | Stop reason: SDUPTHER

## 2020-08-28 NOTE — TELEPHONE ENCOUNTER
[facility] a refill for patient of  (LYRICA) 50 mg capsule takes 2 times daily, fax number is [phone] [facility], with a 90 day supply

## 2020-08-31 ENCOUNTER — TELEPHONE (OUTPATIENT)
Dept: FAMILY MEDICINE CLINIC | Facility: CLINIC | Age: 75
End: 2020-08-31

## 2020-08-31 DIAGNOSIS — M48.061 SPINAL STENOSIS OF LUMBAR REGION, UNSPECIFIED WHETHER NEUROGENIC CLAUDICATION PRESENT: ICD-10-CM

## 2020-08-31 RX ORDER — PREGABALIN 50 MG/1
50 CAPSULE ORAL 2 TIMES DAILY
Qty: 180 CAPSULE | Refills: 0 | Status: SHIPPED | OUTPATIENT
Start: 2020-08-31 | End: 2020-10-29 | Stop reason: SDUPTHER

## 2020-08-31 NOTE — TELEPHONE ENCOUNTER
Patient states received on Friday 08/28/ 2020 medication to the incorrect pharmacy needs to be send to [facility]  fax# [phone] nd

## 2020-09-01 ENCOUNTER — OFFICE VISIT (OUTPATIENT)
Dept: URGENT CARE | Facility: CLINIC | Age: 75
End: 2020-09-01
Payer: MEDICARE

## 2020-09-01 VITALS
OXYGEN SATURATION: 100 % | TEMPERATURE: 98 F | BODY MASS INDEX: 39.04 KG/M2 | DIASTOLIC BLOOD PRESSURE: 78 MMHG | WEIGHT: 206.8 LBS | HEART RATE: 80 BPM | RESPIRATION RATE: 18 BRPM | HEIGHT: 61 IN | SYSTOLIC BLOOD PRESSURE: 144 MMHG

## 2020-09-01 DIAGNOSIS — R39.9 UTI SYMPTOMS: Primary | ICD-10-CM

## 2020-09-01 DIAGNOSIS — B37.3 VAGINAL CANDIDIASIS: ICD-10-CM

## 2020-09-01 LAB
SL AMB  POCT GLUCOSE, UA: NEGATIVE
SL AMB LEUKOCYTE ESTERASE,UA: ABNORMAL
SL AMB POCT BILIRUBIN,UA: NEGATIVE
SL AMB POCT BLOOD,UA: ABNORMAL
SL AMB POCT CLARITY,UA: ABNORMAL
SL AMB POCT COLOR,UA: YELLOW
SL AMB POCT KETONES,UA: ABNORMAL
SL AMB POCT NITRITE,UA: NEGATIVE
SL AMB POCT PH,UA: 8
SL AMB POCT SPECIFIC GRAVITY,UA: 1.01
SL AMB POCT URINE PROTEIN: ABNORMAL
SL AMB POCT UROBILINOGEN: 0.2

## 2020-09-01 PROCEDURE — 87086 URINE CULTURE/COLONY COUNT: CPT | Performed by: FAMILY MEDICINE

## 2020-09-01 PROCEDURE — 99213 OFFICE O/P EST LOW 20 MIN: CPT | Performed by: FAMILY MEDICINE

## 2020-09-01 PROCEDURE — 87186 SC STD MICRODIL/AGAR DIL: CPT | Performed by: FAMILY MEDICINE

## 2020-09-01 PROCEDURE — 81002 URINALYSIS NONAUTO W/O SCOPE: CPT | Performed by: FAMILY MEDICINE

## 2020-09-01 PROCEDURE — 87077 CULTURE AEROBIC IDENTIFY: CPT | Performed by: FAMILY MEDICINE

## 2020-09-01 RX ORDER — FLUCONAZOLE 150 MG/1
150 TABLET ORAL
Qty: 2 TABLET | Refills: 0 | Status: SHIPPED | OUTPATIENT
Start: 2020-09-01 | End: 2020-09-05

## 2020-09-01 RX ORDER — LEVOFLOXACIN 500 MG/1
500 TABLET, FILM COATED ORAL EVERY 24 HOURS
Qty: 5 TABLET | Refills: 0 | Status: SHIPPED | OUTPATIENT
Start: 2020-09-01 | End: 2020-09-06

## 2020-09-01 NOTE — PROGRESS NOTES
[facility]        NAME: Jaz William is a 77 y o  female  : 1945    MRN: [id number]  DATE: September 3, 2020  TIME: 9:01 AM    Assessment and Plan   UTI symptoms [R39 9]  1  UTI symptoms  POCT urine dip    levofloxacin (LEVAQUIN) 500 mg tablet    Urine culture   2  Vaginal candidiasis  fluconazole (DIFLUCAN) 150 mg tablet     CVA tenderness and a urine dip positive for leukocyte esterase raises concern for bilateral pyelonephritis  Macerated vulva, foul odor and past history of intertrigo raises concern for vaginal candidiasis  Will empirically treat with Levaquin x5 days and send for urine culture  QTc from 2016 WNL; unable to check more recent ECGs  Will also provide 2 doses of Diflucan to counteract severe vaginal candidiasis  Takes metformin, glipizide and Liraglutide for DM 2  Fluoroquinolones may increase the hypoglycemic effect of glipizide  Therefore, patient instructed to stop the Liraglutide (insulin secretegogue) for the next 5 days while on Levaquin  Recommend checking blood sugar twice daily for the treatment duration and to call her PCP with blood sugars greater than 160 or less then 80  Patient Instructions     Follow up with PCP in 3-5 days  Proceed to  ER if symptoms worsen  Chief Complaint     Chief Complaint   Patient presents with    Possible UTI     Started Thursday with dysuria, urgency and frequency  Perineum is irritated  Today - low abd  pain - no back/flank pain, fever or N/V  History of Present Illness     80-year-old female presents today with about 5 days of UTI symptoms including dysuria, frequency and urgency  Recently, her symptoms have been complicated with macerated, painful tissue in the vaginal region which burns with urination  Of note, has dealt with tinea corporis/ intertrigo for many years  Denies any obvious fevers, chills, flank pain or abdominal pain        Review of Systems   Review of Systems   Constitutional: Negative for chills and fever  Respiratory: Negative for cough and shortness of breath  Cardiovascular: Negative for chest pain  Gastrointestinal: Positive for nausea  Negative for abdominal pain and vomiting  Genitourinary: Positive for dysuria, frequency, pelvic pain and urgency  Negative for hematuria  Skin: Positive for wound (macerated skin in the groin)  Neurological: Negative for dizziness and headaches           Current Medications       Current Outpatient Medications:     Aspirin Buf,CaCarb-MgCarb-MgO, 81 MG TABS, aspirin 81 mg tablet, Disp: , Rfl:     clotrimazole-betamethasone (LOTRISONE) 1-0 05 % cream, , Disp: , Rfl:     glipiZIDE (GLUCOTROL) 10 mg tablet, Take 1 tablet (10 mg total) by mouth 2 (two) times a day before meals, Disp: 180 tablet, Rfl: 2    levothyroxine 100 mcg tablet, Take 1 tablet by mouth once daily, Disp: 90 tablet, Rfl: 0    Liraglutide (VICTOZA) 18 MG/3ML SOPN, Inject under the skin daily, Disp: , Rfl:     lisinopril (ZESTRIL) 20 mg tablet, Take 1 tablet (20 mg total) by mouth daily, Disp: 90 tablet, Rfl: 2    metFORMIN (GLUCOPHAGE) 1000 MG tablet, Take 1 tablet by mouth every 12 (twelve) hours, Disp: , Rfl:     pregabalin (LYRICA) 50 mg capsule, Take 1 capsule (50 mg total) by mouth 2 (two) times a day, Disp: 180 capsule, Rfl: 0    fluconazole (DIFLUCAN) 150 mg tablet, Take 1 tablet (150 mg total) by mouth every 3 (three) days for 2 doses, Disp: 2 tablet, Rfl: 0    levofloxacin (LEVAQUIN) 500 mg tablet, Take 1 tablet (500 mg total) by mouth every 24 hours for 5 days, Disp: 5 tablet, Rfl: 0    metoprolol tartrate (LOPRESSOR) 25 mg tablet, Take 25 mg by mouth every 12 (twelve) hours, Disp: , Rfl:     Current Allergies     Allergies as of 09/01/2020 - Reviewed 09/01/2020   Allergen Reaction Noted    Duloxetine Other (See Comments) 08/27/2013    Sulfa antibiotics Rash 08/27/2013            The following portions of the patient's history were reviewed and updated as appropriate: allergies, current medications, past family history, past medical history, past social history, past surgical history and problem list      Past Medical History:   Diagnosis Date    Arthritis     Coronary artery disease     [facility] [address] [facility]     Hypercholesterolemia     Hypertension     Osteoporosis     Ovarian ca ([facility] [address] 1997    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[facility] [address] [facility]     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       Past Surgical History:   Procedure Laterality Date    BACK SURGERY      CARPAL TUNNEL RELEASE      CHOLECYSTECTOMY      COLONOSCOPY      pt see Dr Riky White  Up to date with her colonoscopy   CORONARY STENT PLACEMENT      EYE SURGERY      cataract surgery    HYSTERECTOMY  1989    MAMMO (HISTORICAL)      up to date  see gyn    OOPHORECTOMY Bilateral 1997       Family History   Problem Relation Age of Onset    Diabetes Family     Cancer Family     Arthritis Family     Heart disease Family     Endometrial cancer Sister     Esophageal cancer Father     Stomach cancer Brother     Diabetes Mother     Heart disease Mother     No Known Problems Daughter     No Known Problems Maternal Grandmother     Prostate cancer Maternal Grandfather     No Known Problems Paternal Grandmother     Prostate cancer Paternal Grandfather     Breast cancer Maternal Aunt     Breast cancer Other     Breast cancer Other          Medications have been verified  Objective   /78   Pulse 80   Temp 98 °F (36 7 °C)   Resp 18   Ht 5' 1" (1 549 m)   Wt 93 8 kg (206 lb 12 8 oz)   SpO2 100%   BMI 39 07 kg/m²        Physical Exam     Physical Exam  Vitals signs and nursing note reviewed  Constitutional:       General: She is in acute distress  Appearance: Normal appearance  She is obese  She is not ill-appearing, toxic-appearing or diaphoretic  HENT:      Head: Normocephalic  Eyes:      General:         Right eye: No discharge  Left eye: No discharge  Conjunctiva/sclera: Conjunctivae normal    Cardiovascular:      Rate and Rhythm: Normal rate  Pulmonary:      Effort: Pulmonary effort is normal  No respiratory distress  Breath sounds: Normal breath sounds  No stridor  No wheezing or rhonchi  Abdominal:      Palpations: Abdomen is soft  Tenderness: There is no abdominal tenderness  Musculoskeletal:         General: Tenderness (Bilateral CVA tenderness) present  Skin:     General: Skin is warm  Neurological:      General: No focal deficit present  Mental Status: She is alert and oriented to person, place, and time  Psychiatric:         Mood and Affect: Mood normal          Behavior: Behavior normal          Thought Content:  Thought content normal          Judgment: Judgment normal

## 2020-09-02 ENCOUNTER — OFFICE VISIT (OUTPATIENT)
Dept: CARDIOLOGY CLINIC | Facility: CLINIC | Age: 75
End: 2020-09-02
Payer: MEDICARE

## 2020-09-02 VITALS
DIASTOLIC BLOOD PRESSURE: 64 MMHG | SYSTOLIC BLOOD PRESSURE: 148 MMHG | HEART RATE: 83 BPM | WEIGHT: 205 LBS | OXYGEN SATURATION: 97 % | HEIGHT: 61 IN | BODY MASS INDEX: 38.71 KG/M2

## 2020-09-02 DIAGNOSIS — I25.10 CORONARY ARTERY DISEASE INVOLVING NATIVE CORONARY ARTERY OF NATIVE HEART WITHOUT ANGINA PECTORIS: Primary | ICD-10-CM

## 2020-09-02 DIAGNOSIS — E78.2 MIXED DYSLIPIDEMIA: ICD-10-CM

## 2020-09-02 DIAGNOSIS — I10 ESSENTIAL HYPERTENSION: ICD-10-CM

## 2020-09-02 PROCEDURE — 99214 OFFICE O/P EST MOD 30 MIN: CPT | Performed by: INTERNAL MEDICINE

## 2020-09-02 PROCEDURE — 93000 ELECTROCARDIOGRAM COMPLETE: CPT | Performed by: INTERNAL MEDICINE

## 2020-09-02 NOTE — PATIENT INSTRUCTIONS
Coronary Artery Disease   AMBULATORY CARE:   Coronary artery disease (CAD)  is narrowing of the arteries to your heart caused by a buildup of plaque  Plaque is made up of cholesterol and other substances  The narrowing in your arteries decreases the amount of blood that can flow to your heart  This causes your heart to get less oxygen  You may not have any symptoms of CAD  It is important for you to manage CAD even if you feel well  CAD can lead to a heart attack if it is not managed  Common symptoms include the following:   · Chest pain (angina), causing burning, squeezing, or crushing tightness in your chest    · Pain that spreads to your neck, jaw, or shoulder blade    · Nausea, vomiting, sweating, fainting, and hands and feet that are cold to the touch  Call 911 for any of the following:   · You have any of the following signs of a heart attack:      ¨ Squeezing, pressure, or pain in your chest that lasts longer than 5 minutes or returns    ¨ Discomfort or pain in your back, neck, jaw, stomach, or arm     ¨ Trouble breathing    ¨ Nausea or vomiting    ¨ Lightheadedness or a sudden cold sweat, especially with chest pain or trouble breathing    Contact your healthcare provider if:   · You have chest pain that is more frequent, or you have chest pain at rest     · You have questions or concerns about your condition or care  Medicines used to treat CAD:   · Blood pressure medicines  are given to lower your blood pressure  ACE inhibitors help keep your blood vessels relaxed and open to help keep blood flowing into your heart  Beta-blockers keep your heart pumping strongly and regularly so it does not work too hard to get oxygen  · Cholesterol medicines  help lower blood cholesterol levels  · Nitrates , such as nitroglycerin, relax the arteries of your heart so it gets more oxygen  They help to relieve your chest pain  · Antiplatelets , such as aspirin, help prevent blood clots   Take your antiplatelet medicine exactly as directed  These medicines make it more likely for you to bleed or bruise  If you are told to take aspirin, do not take acetaminophen or ibuprofen instead  · Blood thinners  keep clots from forming in your blood  Clots may cause heart attacks, strokes, or death  This medicine makes it more likely for you to bleed or bruise  · Do not take certain medicines without asking your healthcare provider first   These include NSAIDs, herbal or vitamin supplements, or hormones (estrogen or progestin)  Procedures used to treat CAD:   · Angioplasty  may be done to open an artery blocked by plaque  A tube with a balloon on the end is threaded into the blocked artery  Once the tube is in the artery, the balloon is inflated  As the balloon inflates, it presses the plaque against the artery wall to open the artery  A stent may be placed in your artery to keep it open  · Coronary artery bypass graft surgery (CABG)  is open heart surgery  Healthcare providers take arteries or veins from other areas in your body and use them to bypass or go around the blocked arteries of your heart  Cardiac rehabilitation:  Your healthcare provider may recommend that you attend cardiac rehabilitation (rehab)  This is a program run by specialists who will help you safely strengthen your heart and reduce the risk for more heart disease  The plan includes exercise, relaxation, stress management, and heart-healthy nutrition  Healthcare providers will also check to make sure any medicines you are taking are working  Manage CAD to prevent a heart attack:   · Do not smoke  Nicotine and other chemicals in cigarettes and cigars can cause heart and lung damage  Ask your healthcare provider for information if you currently smoke and need help to quit  E-cigarettes or smokeless tobacco still contain nicotine  Talk to your healthcare provider before you use these products  · Exercise regularly    Exercise at least 30 minutes each day, on most days of the week  Exercise helps to lower high cholesterol and high blood pressure  It can also help you maintain a healthy weight  Ask your healthcare provider about the kind of exercise you should do and how to get started  · Maintain a healthy weight  If you are overweight, talk to your healthcare provider about how to lose weight  A weight loss of 10% can improve your heart health  · Eat heart-healthy foods  Include fresh fruits and vegetables in your meal plan  Choose low-fat foods, such as skim or 1% fat milk, low-fat cheese and yogurt, fish, chicken (without skin), and lean meats  Eat two 4-ounce servings of fish high in omega-3 fats each week, such as salmon, fresh tuna, and herring  Do not eat foods that are high in sodium, such as canned foods, potato chips, salty snacks, and cold cuts  Put less table salt on your food  · Limit or do not drink alcohol  A drink of alcohol is 12 ounces of beer, 5 ounces of wine, or 1½ ounces of liquor  · Manage other health conditions  Follow your healthcare provider's advice on how to manage other conditions that can affect your heart health  These include diabetes, high blood pressure, and high cholesterol  You may need to take medicines for these conditions and make other lifestyle changes  · Ask if you should have a flu vaccine  The flu can be dangerous for a person who has CAD  The flu vaccine is available every year in the fall  Follow up with your healthcare provider as directed: You may need to return for other tests  You may also be referred to a cardiac surgeon  Write down your questions so you remember to ask them during your visits  © 2017 [employer]  Information is for End User's use only and may not be sold, redistributed or otherwise used for commercial purposes  All illustrations and images included in CareNotes® are the copyrighted property of [employer]  or Rolly Davalos    The above information is an  only  It is not intended as medical advice for individual conditions or treatments  Talk to your doctor, nurse or pharmacist before following any medical regimen to see if it is safe and effective for you

## 2020-09-02 NOTE — PROGRESS NOTES
Cardiology Follow Up    Sailaja Solorzano  1945  [id number]  [facility] [place]  [address]  [address]  [place] [id number] [phone]  [phone]    1  Coronary artery disease involving native coronary artery of native heart without angina pectoris  POCT ECG   2  Essential hypertension     3  Mixed dyslipidemia  Lipid panel     Chief Complaint   Patient presents with    Follow-up     prior patient of Dr Michael Houser cardiac symptoms     Interval History: Patient feels well, without complaints  No reported chest pain, shortness of breath, palpitations, lightheadedness, syncope, LE edema, orthopnea, PND, or significant weight changes  Patient remains active without any increased fatigue out of the ordinary        Patient Active Problem List   Diagnosis    Coronary artery disease involving native coronary artery of native heart without angina pectoris    Type 2 diabetes mellitus without complication, without long-term current use of insulin (HCC)    Mixed dyslipidemia    Essential hypertension    Abnormal carotid ultrasound    Hypothyroidism    Spinal stenosis of lumbar region    Osteopenia of necks of both femurs    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[address] )    S/P lumbar fusion    Bilateral leg edema     Past Medical History:   Diagnosis Date    Arthritis     Coronary artery disease     Diabetes mellitus ([address] )     Hypercholesterolemia     Hypertension     Osteoporosis     Ovarian ca ([address] ) 1997    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[address] )     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     Social History     Socioeconomic History    Marital status: /Civil Union     Spouse name: Not on file    Number of children: Not on file    Years of education: Not on file    Highest education level: Not on file   Occupational History    Not on file   Social Needs    Financial resource strain: Not on file    Food insecurity     Worry: Not on file     Inability: Not on file    Transportation needs     Medical: Not on file     Non-medical: Not on file   Tobacco Use    Smoking status: Never Smoker    Smokeless tobacco: Never Used   Substance and Sexual Activity    Alcohol use: No    Drug use: No    Sexual activity: Not on file   Lifestyle    Physical activity     Days per week: Not on file     Minutes per session: Not on file    Stress: Not on file   Relationships    Social connections     Talks on phone: Not on file     Gets together: Not on file     Attends Mosque service: Not on file     Active member of club or organization: Not on file     Attends meetings of clubs or organizations: Not on file     Relationship status: Not on file    Intimate partner violence     Fear of current or ex partner: Not on file     Emotionally abused: Not on file     Physically abused: Not on file     Forced sexual activity: Not on file   Other Topics Concern    Not on file   Social History Narrative    · Tobacco smoking status:   Never smoker      This question's title has changed from "Smoking status" to "Tobacco smoking status" with the 19 7 update  Please use this field only for documenting tobacco smoking behavior  To accommodate this change, new fields for documenting smokeless tobacco and e-cigarette/vape usage have been added       · Smoking - how much          None  1 PPW  2 PPW  1/4 PPD  1/2 PPD  1 PPD  1 1/2 PPD  2 PPD  3+ PPD          NOTE     · Smokeless tobacco status          Never used smokeless tobacco  Former smokeless tobacco user  Current snuff user  Currently chews tobacco  Currently uses moist powdered tobacco  ----  Not indicated  Not tolerated  Patient refused          NOTE     · Tobacco-years of use               NOTE     · E-cigarette/vape status          Never used electronic cigarettes  Former user of electronic cigarettes  Current user of electronic cigarettes          NOTE     · Most recent tobacco use screening: 12-      · Alcohol intake:   None         Per laine      Family History   Problem Relation Age of Onset    Diabetes Family     Cancer Family     Arthritis Family     Heart disease Family     Endometrial cancer Sister     Esophageal cancer Father     Stomach cancer Brother     Diabetes Mother     Heart disease Mother     No Known Problems Daughter     No Known Problems Maternal Grandmother     Prostate cancer Maternal Grandfather     No Known Problems Paternal Grandmother     Prostate cancer Paternal Grandfather     Breast cancer Maternal Aunt     Breast cancer Other     Breast cancer Other      Past Surgical History:   Procedure Laterality Date    BACK SURGERY      CARPAL TUNNEL RELEASE      CHOLECYSTECTOMY      COLONOSCOPY      pt see Dr Dominic Dacosta  Up to date with her colonoscopy   CORONARY STENT PLACEMENT      EYE SURGERY      cataract surgery    HYSTERECTOMY  1989    MAMMO (HISTORICAL)      up to date   see gyn    OOPHORECTOMY Bilateral 1997       Current Outpatient Medications:     Aspirin Buf,CaCarb-MgCarb-MgO, 81 MG TABS, aspirin 81 mg tablet, Disp: , Rfl:     clotrimazole-betamethasone (LOTRISONE) 1-0 05 % cream, , Disp: , Rfl:     fluconazole (DIFLUCAN) 150 mg tablet, Take 1 tablet (150 mg total) by mouth every 3 (three) days for 2 doses, Disp: 2 tablet, Rfl: 0    glipiZIDE (GLUCOTROL) 10 mg tablet, Take 1 tablet (10 mg total) by mouth 2 (two) times a day before meals, Disp: 180 tablet, Rfl: 2    levofloxacin (LEVAQUIN) 500 mg tablet, Take 1 tablet (500 mg total) by mouth every 24 hours for 5 days, Disp: 5 tablet, Rfl: 0    levothyroxine 100 mcg tablet, Take 1 tablet by mouth once daily, Disp: 90 tablet, Rfl: 0    Liraglutide (VICTOZA) 18 MG/3ML SOPN, Inject under the skin daily, Disp: , Rfl:     lisinopril (ZESTRIL) 20 mg tablet, Take 1 tablet (20 mg total) by mouth daily, Disp: 90 tablet, Rfl: 2    metFORMIN (GLUCOPHAGE) 1000 MG tablet, Take 1 tablet by mouth every 12 (twelve) hours, Disp: , Rfl:     metoprolol tartrate (LOPRESSOR) 25 mg tablet, Take 25 mg by mouth every 12 (twelve) hours, Disp: , Rfl:     pregabalin (LYRICA) 50 mg capsule, Take 1 capsule (50 mg total) by mouth 2 (two) times a day, Disp: 180 capsule, Rfl: 0  Allergies   Allergen Reactions    Duloxetine Other (See Comments)     Extreme somnolence/lethargy    Sulfa Antibiotics Rash       Labs:  Office Visit on 09/01/2020   Component Date Value    LEUKOCYTE ESTERASE,UA 09/01/2020 Moderate     NITRITE,UA 09/01/2020 negative     SL AMB POCT UROBILINOGEN 09/01/2020 0 2     POCT URINE PROTEIN 09/01/2020 trace      PH,UA 09/01/2020 8 0     BLOOD,UA 09/01/2020 trace     SPECIFIC GRAVITY,UA 09/01/2020 1 010     KETONES,UA 09/01/2020 trace     BILIRUBIN,UA 09/01/2020 negative     GLUCOSE, UA 09/01/2020 negative      COLOR,UA 09/01/2020 yellow     CLARITY,UA 09/01/2020 cloudy    Appointment on 06/03/2020   Component Date Value    Hemoglobin A1C 06/03/2020 5 8*    EAG 06/03/2020 120     Sodium 06/03/2020 136     Potassium 06/03/2020 4 4     Chloride 06/03/2020 103     CO2 06/03/2020 26     ANION GAP 06/03/2020 7     BUN 06/03/2020 8     Creatinine 06/03/2020 0 65     Glucose, Fasting 06/03/2020 118*    Calcium 06/03/2020 9 6     AST 06/03/2020 20     ALT 06/03/2020 31     Alkaline Phosphatase 06/03/2020 41*    Total Protein 06/03/2020 7 0     Albumin 06/03/2020 3 5     Total Bilirubin 06/03/2020 0 50     eGFR 06/03/2020 88     Free T4 06/03/2020 1 22     TSH 3RD GENERATON 06/03/2020 2 240     NT-proBNP 06/03/2020 164*     Lab Results   Component Value Date    CHOL 140 12/22/2015    TRIG 243 (H) 12/17/2019    TRIG 153 12/22/2015    HDL 38 (L) 12/17/2019    HDL 41 12/22/2015     Imaging: Xr Hand 3+ Vw Left    Result Date: 8/16/2020  Narrative: LEFT HAND INDICATION: M79 642: Pain in left hand  M79 641: Pain in right hand   COMPARISON: No prior studies, right hand also on this date VIEWS:  XR HAND 3+ VW LEFT IMAGES:  4 For the purposes of institution wide universal language the following terms will apply: (thumb=1st digit/finger, index finger=2nd digit/finger, long finger=3rd digit/finger, ring=4th digit/finger and small finger=5th digit/finger) FINDINGS: There is no acute fracture or dislocation  Mild to moderate osteoarthritis 1st carpometacarpal joint  No focal erosions  No lytic or blastic osseous lesion  There are atherosclerotic calcifications  Soft tissues are otherwise unremarkable  Impression: No acute osseous abnormality  Degenerative changes as described  Workstation performed: [id number]     Xr Hand 3+ Vw Right    Result Date: 8/16/2020  Narrative: RIGHT HAND INDICATION:  M79 641: Pain in right hand  D23 563: Pain in left hand  COMPARISON:  No old studies, left hand also on this date VIEWS:  XR HAND 3+ VW RIGHT Images: 4 For the purposes of institution wide universal language the following terms will apply: (thumb=1st digit/finger, index finger=2nd digit/finger, long finger=3rd digit/finger, ring=4th digit/finger and small finger=5th digit/finger) FINDINGS: There is no acute fracture or dislocation  Mild osteoarthritis 1st carpometacarpal joint  No focal erosions  No lytic or blastic osseous lesion  There are atherosclerotic calcifications  Soft tissues are otherwise unremarkable  Impression: No acute osseous abnormality  Degenerative changes as described  Workstation performed: [id number]       Review of Systems:  Review of Systems   Constitutional: Negative for activity change, appetite change, chills, diaphoresis, fatigue and unexpected weight change  HENT: Negative for hearing loss, nosebleeds and sore throat  Eyes: Negative for photophobia and visual disturbance  Respiratory: Negative for cough, chest tightness, shortness of breath and wheezing  Cardiovascular: Negative for chest pain, palpitations and leg swelling     Gastrointestinal: Negative for abdominal pain, diarrhea, nausea and vomiting  Endocrine: Negative for polyuria  Genitourinary: Negative for dysuria, frequency and hematuria  Musculoskeletal: Negative for arthralgias, back pain, gait problem and neck pain  Skin: Negative for pallor and rash  Neurological: Negative for dizziness, syncope and headaches  Hematological: Does not bruise/bleed easily  Psychiatric/Behavioral: Negative for behavioral problems and confusion  Physical Exam:  Physical Exam  Vitals signs reviewed  Constitutional:       Appearance: She is well-developed  She is not diaphoretic  HENT:      Head: Normocephalic and atraumatic  Nose: Nose normal    Eyes:      General: No scleral icterus  Pupils: Pupils are equal, round, and reactive to light  Neck:      Musculoskeletal: Normal range of motion and neck supple  Vascular: No JVD  Cardiovascular:      Rate and Rhythm: Normal rate and regular rhythm  Heart sounds: Normal heart sounds  No murmur  No friction rub  No gallop  Pulmonary:      Effort: Pulmonary effort is normal  No respiratory distress  Breath sounds: Normal breath sounds  No wheezing or rales  Abdominal:      General: Bowel sounds are normal  There is no distension  Palpations: Abdomen is soft  Tenderness: There is no abdominal tenderness  Musculoskeletal: Normal range of motion  General: No deformity  Skin:     General: Skin is warm and dry  Findings: No rash  Neurological:      Mental Status: She is alert and oriented to person, place, and time  Cranial Nerves: No cranial nerve deficit  Psychiatric:         Behavior: Behavior normal        Blood pressure 148/64, pulse 83, height 5' 1" (1 549 m), weight 93 kg (205 lb), SpO2 97 %  EKG:  Normal sinus rhythm  Nonspecific ST and T wave abnormality  Abnormal ECG    Discussion/Summary:  1  CAD s/p SAMMIE to proximal LAD 12/15  She denies any chest pain, no current shortness of breath   She is on Metoprolol, aspirin 81 mg, stopped Atorvastatin 40 around 7/19  LV function was normal  She discontinued Plavix 1/17  Feels well and asymptomatic, continue current regimen      2  HTN  BP is relatively well controlled on Lisinopril and Metoprolol  3  HL  Lipid panel 12/15 showed total cholesterol 140, , HDL 41, LDL 68  She was started on Atorvastatin 40  Lipid panel 2/16 showed total cholesterol 96, HDL 39, LDL 33,   She asked to be switched to Simvastatin 40 as it was cheaper for her in 7/16  Lipid panel 9/16 showed total cholesterol 100, LDL 28, , HDL 38  CK and AST/ALT WNL  She was switched back to Atorvastatin 40 in 1/17 when drug formulary changed  Lipid panel 7/17 showed total cholesterol 94, LDL 21, , HDL 44  Lipid panel 4/18 showed total cholesterol 94, , HDL 36, LDL 19  She stopped taking Atorvastatin 40 due to reading about it increasing her Hgb A1c around 7/19, and her Hgb A1c did improve from 6 7 to 5 7 off statin  Lipid panel 8/19 showed total cholesterol 164, , HDL 35, LDL 68  Asked her to increase her fish oil to 2 g bid if she does not wish to take a statin  LDL now 74 in Dec 2019  Repeat now      4  DM  Hgb A1c 9/16 6 5%  She is on Metformin, Glipizide, and Victoza  Hgb A1c was 7 3% in 12/17  Hgb A1c 7 5% 4/18  Hgb A1c 5 7% 8/19   5 8% in June 2020      5  Abnormal carotid ultrasound  She reports in the past she was told she had a 40% stenosis in the left carotid  No issues with carotid bruits or symptoms currently  On aspirin, statin  No work-up recommended at this time

## 2020-09-03 LAB — BACTERIA UR CULT: ABNORMAL

## 2020-09-04 DIAGNOSIS — N10 PYELONEPHRITIS, ACUTE: Primary | ICD-10-CM

## 2020-09-04 RX ORDER — NITROFURANTOIN 25; 75 MG/1; MG/1
100 CAPSULE ORAL 2 TIMES DAILY
Qty: 14 CAPSULE | Refills: 0 | Status: SHIPPED | OUTPATIENT
Start: 2020-09-04 | End: 2020-09-11

## 2020-09-04 NOTE — PROGRESS NOTES
Called patient due to Urine Cx result showing Levaquin resistance  Pt confirmed that medication was ineffective  Instructed to stop Levaquin and start the prescribed Macrobid  May restart Victoza to ensure adequate DM2 control

## 2020-09-24 ENCOUNTER — TELEPHONE (OUTPATIENT)
Dept: FAMILY MEDICINE CLINIC | Facility: CLINIC | Age: 75
End: 2020-09-24

## 2020-09-24 DIAGNOSIS — E11.9 TYPE 2 DIABETES MELLITUS WITHOUT COMPLICATION, WITHOUT LONG-TERM CURRENT USE OF INSULIN (HCC): Primary | ICD-10-CM

## 2020-09-24 RX ORDER — PEN NEEDLE, DIABETIC 31 GX3/16"
NEEDLE, DISPOSABLE MISCELLANEOUS DAILY
Qty: 100 EACH | Refills: 3 | Status: SHIPPED | OUTPATIENT
Start: 2020-09-24

## 2020-09-24 NOTE — TELEPHONE ENCOUNTER
Refill:  Sure Comfort Pen Needle 31 Gauge, 3/16  "Qty, Directions, & refills required"  Per [facility]

## 2020-10-06 ENCOUNTER — OFFICE VISIT (OUTPATIENT)
Dept: FAMILY MEDICINE CLINIC | Facility: CLINIC | Age: 75
End: 2020-10-06
Payer: MEDICARE

## 2020-10-06 VITALS
BODY MASS INDEX: 38.89 KG/M2 | WEIGHT: 206 LBS | HEART RATE: 64 BPM | HEIGHT: 61 IN | SYSTOLIC BLOOD PRESSURE: 128 MMHG | TEMPERATURE: 96.6 F | RESPIRATION RATE: 16 BRPM | DIASTOLIC BLOOD PRESSURE: 78 MMHG

## 2020-10-06 DIAGNOSIS — I25.10 CORONARY ARTERY DISEASE INVOLVING NATIVE CORONARY ARTERY OF NATIVE HEART WITHOUT ANGINA PECTORIS: ICD-10-CM

## 2020-10-06 DIAGNOSIS — I10 ESSENTIAL HYPERTENSION: Primary | ICD-10-CM

## 2020-10-06 DIAGNOSIS — M85.851 OSTEOPENIA OF NECKS OF BOTH FEMURS: ICD-10-CM

## 2020-10-06 DIAGNOSIS — Z23 ENCOUNTER FOR IMMUNIZATION: ICD-10-CM

## 2020-10-06 DIAGNOSIS — E03.9 ACQUIRED HYPOTHYROIDISM: ICD-10-CM

## 2020-10-06 DIAGNOSIS — E78.2 MIXED DYSLIPIDEMIA: ICD-10-CM

## 2020-10-06 DIAGNOSIS — M85.852 OSTEOPENIA OF NECKS OF BOTH FEMURS: ICD-10-CM

## 2020-10-06 DIAGNOSIS — E11.9 TYPE 2 DIABETES MELLITUS WITHOUT COMPLICATION, WITHOUT LONG-TERM CURRENT USE OF INSULIN (HCC): ICD-10-CM

## 2020-10-06 PROCEDURE — 90662 IIV NO PRSV INCREASED AG IM: CPT | Performed by: FAMILY MEDICINE

## 2020-10-06 PROCEDURE — G0008 ADMIN INFLUENZA VIRUS VAC: HCPCS | Performed by: FAMILY MEDICINE

## 2020-10-06 PROCEDURE — 99214 OFFICE O/P EST MOD 30 MIN: CPT | Performed by: FAMILY MEDICINE

## 2020-10-06 RX ORDER — LEVOTHYROXINE SODIUM 0.1 MG/1
100 TABLET ORAL DAILY
Qty: 90 TABLET | Refills: 3 | Status: SHIPPED | OUTPATIENT
Start: 2020-10-06 | End: 2021-10-06 | Stop reason: SDUPTHER

## 2020-10-13 ENCOUNTER — ANNUAL EXAM (OUTPATIENT)
Dept: OBGYN CLINIC | Facility: CLINIC | Age: 75
End: 2020-10-13
Payer: MEDICARE

## 2020-10-13 VITALS
HEIGHT: 61 IN | BODY MASS INDEX: 38.89 KG/M2 | SYSTOLIC BLOOD PRESSURE: 134 MMHG | WEIGHT: 206 LBS | DIASTOLIC BLOOD PRESSURE: 80 MMHG

## 2020-10-13 DIAGNOSIS — Z01.419 ENCOUNTER FOR GYNECOLOGICAL EXAMINATION WITHOUT ABNORMAL FINDING: Primary | ICD-10-CM

## 2020-10-13 DIAGNOSIS — Z12.4 SCREENING FOR MALIGNANT NEOPLASM OF THE CERVIX: ICD-10-CM

## 2020-10-13 PROCEDURE — G0145 SCR C/V CYTO,THINLAYER,RESCR: HCPCS | Performed by: OBSTETRICS & GYNECOLOGY

## 2020-10-13 PROCEDURE — G0101 CA SCREEN;PELVIC/BREAST EXAM: HCPCS | Performed by: OBSTETRICS & GYNECOLOGY

## 2020-10-22 LAB
LAB AP GYN PRIMARY INTERPRETATION: NORMAL
Lab: NORMAL

## 2020-10-28 ENCOUNTER — TELEPHONE (OUTPATIENT)
Dept: FAMILY MEDICINE CLINIC | Facility: CLINIC | Age: 75
End: 2020-10-28

## 2020-10-29 DIAGNOSIS — E11.9 TYPE 2 DIABETES MELLITUS WITHOUT COMPLICATION, WITHOUT LONG-TERM CURRENT USE OF INSULIN (HCC): Primary | ICD-10-CM

## 2020-10-29 DIAGNOSIS — M48.061 SPINAL STENOSIS OF LUMBAR REGION, UNSPECIFIED WHETHER NEUROGENIC CLAUDICATION PRESENT: ICD-10-CM

## 2020-10-29 RX ORDER — PREGABALIN 50 MG/1
50 CAPSULE ORAL 2 TIMES DAILY
Qty: 180 CAPSULE | Refills: 0 | Status: SHIPPED | OUTPATIENT
Start: 2020-10-29 | End: 2021-02-24 | Stop reason: SDUPTHER

## 2020-10-30 RX ORDER — BLOOD SUGAR DIAGNOSTIC
1 STRIP MISCELLANEOUS DAILY
Qty: 100 EACH | Refills: 3 | Status: SHIPPED | OUTPATIENT
Start: 2020-10-30 | End: 2021-02-15 | Stop reason: SDUPTHER

## 2020-10-30 RX ORDER — BLOOD SUGAR DIAGNOSTIC
1 STRIP MISCELLANEOUS DAILY PRN
COMMUNITY
End: 2020-10-30 | Stop reason: SDUPTHER

## 2020-11-10 ENCOUNTER — TELEPHONE (OUTPATIENT)
Dept: FAMILY MEDICINE CLINIC | Facility: CLINIC | Age: 75
End: 2020-11-10

## 2020-12-16 ENCOUNTER — TELEPHONE (OUTPATIENT)
Dept: FAMILY MEDICINE CLINIC | Facility: CLINIC | Age: 75
End: 2020-12-16

## 2021-01-26 LAB
LEFT EYE DIABETIC RETINOPATHY: NORMAL
RIGHT EYE DIABETIC RETINOPATHY: NORMAL

## 2021-01-27 ENCOUNTER — TRANSCRIBE ORDERS (OUTPATIENT)
Dept: ADMINISTRATIVE | Facility: HOSPITAL | Age: 76
End: 2021-01-27

## 2021-01-27 DIAGNOSIS — R92.8 ABNORMAL MAMMOGRAM OF LEFT BREAST: Primary | ICD-10-CM

## 2021-01-28 DIAGNOSIS — E11.9 DIABETES MELLITUS WITHOUT COMPLICATION (HCC): ICD-10-CM

## 2021-01-28 RX ORDER — GLIPIZIDE 10 MG/1
10 TABLET ORAL
Qty: 180 TABLET | Refills: 2 | Status: SHIPPED | OUTPATIENT
Start: 2021-01-28 | End: 2021-07-28

## 2021-01-28 NOTE — TELEPHONE ENCOUNTER
Needs a new script/refills for Metformin 1000mg, 2x's a a day (180)  Glipizide 10mg, 2x's a day    [facility], [place]    Patient ph # [phone]

## 2021-02-02 ENCOUNTER — TELEPHONE (OUTPATIENT)
Dept: FAMILY MEDICINE CLINIC | Facility: CLINIC | Age: 76
End: 2021-02-02

## 2021-02-02 NOTE — TELEPHONE ENCOUNTER
Jahaira from [employer] group requesting forms to be re-faxed they received when faxed last incomplete information, fax did not go through clear on their end fax #   [phone]   ND

## 2021-02-03 ENCOUNTER — TELEPHONE (OUTPATIENT)
Dept: FAMILY MEDICINE CLINIC | Facility: CLINIC | Age: 76
End: 2021-02-03

## 2021-02-03 DIAGNOSIS — E11.9 TYPE 2 DIABETES MELLITUS WITHOUT COMPLICATION, WITHOUT LONG-TERM CURRENT USE OF INSULIN (HCC): Primary | ICD-10-CM

## 2021-02-03 RX ORDER — LANCETS
EACH MISCELLANEOUS DAILY
Qty: 100 EACH | Refills: 3 | Status: SHIPPED | OUTPATIENT
Start: 2021-02-03 | End: 2022-03-31

## 2021-02-03 NOTE — TELEPHONE ENCOUNTER
Patient need a script called in for lancets one touch delica plus 33 gauge  and refills to [facility] in [facility]

## 2021-02-04 ENCOUNTER — TELEPHONE (OUTPATIENT)
Dept: FAMILY MEDICINE CLINIC | Facility: CLINIC | Age: 76
End: 2021-02-04

## 2021-02-04 NOTE — TELEPHONE ENCOUNTER
Jahaira from [employer] the order form for the Victoza was received with information cut off is requesting to refax [phone] number to call [phone] opt 2   ND

## 2021-02-05 ENCOUNTER — LAB (OUTPATIENT)
Dept: LAB | Facility: CLINIC | Age: 76
End: 2021-02-05
Payer: MEDICARE

## 2021-02-05 DIAGNOSIS — E03.9 ACQUIRED HYPOTHYROIDISM: ICD-10-CM

## 2021-02-05 DIAGNOSIS — E78.2 MIXED DYSLIPIDEMIA: ICD-10-CM

## 2021-02-05 DIAGNOSIS — E11.9 TYPE 2 DIABETES MELLITUS WITHOUT COMPLICATION, WITHOUT LONG-TERM CURRENT USE OF INSULIN (HCC): ICD-10-CM

## 2021-02-05 LAB
ALBUMIN SERPL BCP-MCNC: 3.6 G/DL (ref 3.5–5)
ALP SERPL-CCNC: 42 U/L (ref 46–116)
ALT SERPL W P-5'-P-CCNC: 33 U/L (ref 12–78)
ANION GAP SERPL CALCULATED.3IONS-SCNC: 3 MMOL/L (ref 4–13)
AST SERPL W P-5'-P-CCNC: 18 U/L (ref 5–45)
BILIRUB DIRECT SERPL-MCNC: 0.11 MG/DL (ref 0–0.2)
BILIRUB SERPL-MCNC: 0.58 MG/DL (ref 0.2–1)
BUN SERPL-MCNC: 11 MG/DL (ref 5–25)
CALCIUM SERPL-MCNC: 9.3 MG/DL (ref 8.3–10.1)
CHLORIDE SERPL-SCNC: 106 MMOL/L (ref 100–108)
CHOLEST SERPL-MCNC: 167 MG/DL (ref 50–200)
CO2 SERPL-SCNC: 29 MMOL/L (ref 21–32)
CREAT SERPL-MCNC: 0.62 MG/DL (ref 0.6–1.3)
CREAT UR-MCNC: 124 MG/DL
EST. AVERAGE GLUCOSE BLD GHB EST-MCNC: 120 MG/DL
GFR SERPL CREATININE-BSD FRML MDRD: 89 ML/MIN/1.73SQ M
GLUCOSE P FAST SERPL-MCNC: 123 MG/DL (ref 65–99)
HBA1C MFR BLD: 5.8 %
HDLC SERPL-MCNC: 37 MG/DL
LDLC SERPL CALC-MCNC: 74 MG/DL (ref 0–100)
MICROALBUMIN UR-MCNC: 39 MG/L (ref 0–20)
MICROALBUMIN/CREAT 24H UR: 31 MG/G CREATININE (ref 0–30)
NONHDLC SERPL-MCNC: 130 MG/DL
POTASSIUM SERPL-SCNC: 4.4 MMOL/L (ref 3.5–5.3)
PROT SERPL-MCNC: 6.8 G/DL (ref 6.4–8.2)
SODIUM SERPL-SCNC: 138 MMOL/L (ref 136–145)
T4 FREE SERPL-MCNC: 1.23 NG/DL (ref 0.76–1.46)
TRIGL SERPL-MCNC: 278 MG/DL
TSH SERPL DL<=0.05 MIU/L-ACNC: 1.19 UIU/ML (ref 0.36–3.74)

## 2021-02-05 PROCEDURE — 84443 ASSAY THYROID STIM HORMONE: CPT

## 2021-02-05 PROCEDURE — 80061 LIPID PANEL: CPT | Performed by: INTERNAL MEDICINE

## 2021-02-05 PROCEDURE — 83036 HEMOGLOBIN GLYCOSYLATED A1C: CPT

## 2021-02-05 PROCEDURE — 84439 ASSAY OF FREE THYROXINE: CPT

## 2021-02-05 PROCEDURE — 82043 UR ALBUMIN QUANTITATIVE: CPT

## 2021-02-05 PROCEDURE — 36415 COLL VENOUS BLD VENIPUNCTURE: CPT

## 2021-02-05 PROCEDURE — 80048 BASIC METABOLIC PNL TOTAL CA: CPT

## 2021-02-05 PROCEDURE — 80076 HEPATIC FUNCTION PANEL: CPT

## 2021-02-05 PROCEDURE — 82570 ASSAY OF URINE CREATININE: CPT

## 2021-02-09 ENCOUNTER — OFFICE VISIT (OUTPATIENT)
Dept: FAMILY MEDICINE CLINIC | Facility: CLINIC | Age: 76
End: 2021-02-09
Payer: MEDICARE

## 2021-02-09 VITALS
DIASTOLIC BLOOD PRESSURE: 80 MMHG | TEMPERATURE: 97.2 F | RESPIRATION RATE: 16 BRPM | OXYGEN SATURATION: 98 % | WEIGHT: 204 LBS | BODY MASS INDEX: 38.51 KG/M2 | SYSTOLIC BLOOD PRESSURE: 110 MMHG | HEIGHT: 61 IN | HEART RATE: 77 BPM

## 2021-02-09 DIAGNOSIS — I25.10 CORONARY ARTERY DISEASE INVOLVING NATIVE CORONARY ARTERY OF NATIVE HEART WITHOUT ANGINA PECTORIS: ICD-10-CM

## 2021-02-09 DIAGNOSIS — E11.9 TYPE 2 DIABETES MELLITUS WITHOUT COMPLICATION, WITHOUT LONG-TERM CURRENT USE OF INSULIN (HCC): ICD-10-CM

## 2021-02-09 DIAGNOSIS — M85.851 OSTEOPENIA OF NECKS OF BOTH FEMURS: ICD-10-CM

## 2021-02-09 DIAGNOSIS — E78.2 MIXED DYSLIPIDEMIA: ICD-10-CM

## 2021-02-09 DIAGNOSIS — Z00.00 MEDICARE ANNUAL WELLNESS VISIT, SUBSEQUENT: Primary | ICD-10-CM

## 2021-02-09 DIAGNOSIS — E03.9 ACQUIRED HYPOTHYROIDISM: ICD-10-CM

## 2021-02-09 DIAGNOSIS — I10 ESSENTIAL HYPERTENSION: ICD-10-CM

## 2021-02-09 DIAGNOSIS — M85.852 OSTEOPENIA OF NECKS OF BOTH FEMURS: ICD-10-CM

## 2021-02-09 PROCEDURE — G0439 PPPS, SUBSEQ VISIT: HCPCS | Performed by: FAMILY MEDICINE

## 2021-02-09 PROCEDURE — 1123F ACP DISCUSS/DSCN MKR DOCD: CPT | Performed by: FAMILY MEDICINE

## 2021-02-09 PROCEDURE — 99214 OFFICE O/P EST MOD 30 MIN: CPT | Performed by: FAMILY MEDICINE

## 2021-02-09 NOTE — PATIENT INSTRUCTIONS
Medicare Preventive Visit Patient Instructions  Thank you for completing your Welcome to Medicare Visit or Medicare Annual Wellness Visit today  Your next wellness visit will be due in one year (2/9/2022)  The screening/preventive services that you may require over the next 5-10 years are detailed below  Some tests may not apply to you based off risk factors and/or age  Screening tests ordered at today's visit but not completed yet may show as past due  Also, please note that scanned in results may not display below  Preventive Screenings:  Service Recommendations Previous Testing/Comments   Colorectal Cancer Screening  * Colonoscopy    * Fecal Occult Blood Test (FOBT)/Fecal Immunochemical Test (FIT)  * Fecal DNA/Cologuard Test  * Flexible Sigmoidoscopy Age: 54-65 years old   Colonoscopy: every 10 years (may be performed more frequently if at higher risk)  OR  FOBT/FIT: every 1 year  OR  Cologuard: every 3 years  OR  Sigmoidoscopy: every 5 years  Screening may be recommended earlier than age 48 if at higher risk for colorectal cancer  Also, an individualized decision between you and your healthcare provider will decide whether screening between the ages of 74-80 would be appropriate  Colonoscopy: 04/30/2015  FOBT/FIT: Not on file  Cologuard: Not on file  Sigmoidoscopy: Not on file    Screening Current     Breast Cancer Screening Age: 36 years old  Frequency: every 1-2 years  Not required if history of left and right mastectomy Mammogram: 01/16/2020    Screening Current   Cervical Cancer Screening Between the ages of 21-29, pap smear recommended once every 3 years  Between the ages of 33-67, can perform pap smear with HPV co-testing every 5 years     Recommendations may differ for women with a history of total hysterectomy, cervical cancer, or abnormal pap smears in past  Pap Smear: 10/13/2020    Screening Not Indicated   Hepatitis C Screening Once for adults born between 1945 and 1965  More frequently in patients at high risk for Hepatitis C Hep C Antibody: Not on file       Diabetes Screening 1-2 times per year if you're at risk for diabetes or have pre-diabetes Fasting glucose: 123 mg/dL   A1C: 5 8 %    Screening Not Indicated  History Diabetes   Cholesterol Screening Once every 5 years if you don't have a lipid disorder  May order more often based on risk factors  Lipid panel: 02/05/2021    Screening Current     Other Preventive Screenings Covered by Medicare:  1  Abdominal Aortic Aneurysm (AAA) Screening: covered once if your at risk  You're considered to be at risk if you have a family history of AAA  2  Lung Cancer Screening: covers low dose CT scan once per year if you meet all of the following conditions: (1) Age 50-69; (2) No signs or symptoms of lung cancer; (3) Current smoker or have quit smoking within the last 15 years; (4) You have a tobacco smoking history of at least 30 pack years (packs per day multiplied by number of years you smoked); (5) You get a written order from a healthcare provider  3  Glaucoma Screening: covered annually if you're considered high risk: (1) You have diabetes OR (2) Family history of glaucoma OR (3)  aged 48 and older OR (3)  American aged 72 and older  3  Osteoporosis Screening: covered every 2 years if you meet one of the following conditions: (1) You're estrogen deficient and at risk for osteoporosis based off medical history and other findings; (2) Have a vertebral abnormality; (3) On glucocorticoid therapy for more than 3 months; (4) Have primary hyperparathyroidism; (5) On osteoporosis medications and need to assess response to drug therapy  · Last bone density test (DXA Scan): 12/10/2019   5  HIV Screening: covered annually if you're between the age of 15-65  Also covered annually if you are younger than 13 and older than 72 with risk factors for HIV infection   For pregnant patients, it is covered up to 3 times per pregnancy  Immunizations:  Immunization Recommendations   Influenza Vaccine Annual influenza vaccination during flu season is recommended for all persons aged >= 6 months who do not have contraindications   Pneumococcal Vaccine (Prevnar and Pneumovax)  * Prevnar = PCV13  * Pneumovax = PPSV23   Adults 25-60 years old: 1-3 doses may be recommended based on certain risk factors  Adults 72 years old: Prevnar (PCV13) vaccine recommended followed by Pneumovax (PPSV23) vaccine  If already received PPSV23 since turning 65, then PCV13 recommended at least one year after PPSV23 dose  Hepatitis B Vaccine 3 dose series if at intermediate or high risk (ex: diabetes, end stage renal disease, liver disease)   Tetanus (Td) Vaccine - COST NOT COVERED BY MEDICARE PART B Following completion of primary series, a booster dose should be given every 10 years to maintain immunity against tetanus  Td may also be given as tetanus wound prophylaxis  Tdap Vaccine - COST NOT COVERED BY MEDICARE PART B Recommended at least once for all adults  For pregnant patients, recommended with each pregnancy  Shingles Vaccine (Shingrix) - COST NOT COVERED BY MEDICARE PART B  2 shot series recommended in those aged 48 and above     Health Maintenance Due:      Topic Date Due    Hepatitis C Screening  1945    MAMMOGRAM  01/16/2021    Colorectal Cancer Screening  04/30/2025     Immunizations Due:  There are no preventive care reminders to display for this patient  Advance Directives   What are advance directives? Advance directives are legal documents that state your wishes and plans for medical care  These plans are made ahead of time in case you lose your ability to make decisions for yourself  Advance directives can apply to any medical decision, such as the treatments you want, and if you want to donate organs  What are the types of advance directives?   There are many types of advance directives, and each state has rules about how to use them  You may choose a combination of any of the following:  · Living will: This is a written record of the treatment you want  You can also choose which treatments you do not want, which to limit, and which to stop at a certain time  This includes surgery, medicine, IV fluid, and tube feedings  · Durable power of  for healthcare [facility]): This is a written record that states who you want to make healthcare choices for you when you are unable to make them for yourself  This person, called a proxy, is usually a family member or a friend  You may choose more than 1 proxy  · Do not resuscitate (DNR) order:  A DNR order is used in case your heart stops beating or you stop breathing  It is a request not to have certain forms of treatment, such as CPR  A DNR order may be included in other types of advance directives  · Medical directive: This covers the care that you want if you are in a coma, near death, or unable to make decisions for yourself  You can list the treatments you want for each condition  Treatment may include pain medicine, surgery, blood transfusions, dialysis, IV or tube feedings, and a ventilator (breathing machine)  · Values history: This document has questions about your views, beliefs, and how you feel and think about life  This information can help others choose the care that you would choose  Why are advance directives important? An advance directive helps you control your care  Although spoken wishes may be used, it is better to have your wishes written down  Spoken wishes can be misunderstood, or not followed  Treatments may be given even if you do not want them  An advance directive may make it easier for your family to make difficult choices about your care  Urinary Incontinence   Urinary incontinence (UI)  is when you lose control of your bladder  UI develops because your bladder cannot store or empty urine properly   The 3 most common types of UI are stress incontinence, urge incontinence, or both  Medicines:   · May be given to help strengthen your bladder control  Report any side effects of medication to your healthcare provider  Do pelvic muscle exercises often:  Your pelvic muscles help you stop urinating  Squeeze these muscles tight for 5 seconds, then relax for 5 seconds  Gradually work up to squeezing for 10 seconds  Do 3 sets of 15 repetitions a day, or as directed  This will help strengthen your pelvic muscles and improve bladder control  Train your bladder:  Go to the bathroom at set times, such as every 2 hours, even if you do not feel the urge to go  You can also try to hold your urine when you feel the urge to go  For example, hold your urine for 5 minutes when you feel the urge to go  As that becomes easier, hold your urine for 10 minutes  Self-care:   · Keep a UI record  Write down how often you leak urine and how much you leak  Make a note of what you were doing when you leaked urine  · Drink liquids as directed  You may need to limit the amount of liquid you drink to help control your urine leakage  Do not drink any liquid right before you go to bed  Limit or do not have drinks that contain caffeine or alcohol  · Prevent constipation  Eat a variety of high-fiber foods  Good examples are high-fiber cereals, beans, vegetables, and whole-grain breads  Walking is the best way to trigger your intestines to have a bowel movement  · Exercise regularly and maintain a healthy weight  Weight loss and exercise will decrease pressure on your bladder and help you control your leakage  · Use a catheter as directed  to help empty your bladder  A catheter is a tiny, plastic tube that is put into your bladder to drain your urine  · Go to behavior therapy as directed  Behavior therapy may be used to help you learn to control your urge to urinate      Weight Management   Why it is important to manage your weight:  Being overweight increases your risk of health conditions such as heart disease, high blood pressure, type 2 diabetes, and certain types of cancer  It can also increase your risk for osteoarthritis, sleep apnea, and other respiratory problems  Aim for a slow, steady weight loss  Even a small amount of weight loss can lower your risk of health problems  How to lose weight safely:  A safe and healthy way to lose weight is to eat fewer calories and get regular exercise  You can lose up about 1 pound a week by decreasing the number of calories you eat by 500 calories each day  Healthy meal plan for weight management:  A healthy meal plan includes a variety of foods, contains fewer calories, and helps you stay healthy  A healthy meal plan includes the following:  · Eat whole-grain foods more often  A healthy meal plan should contain fiber  Fiber is the part of grains, fruits, and vegetables that is not broken down by your body  Whole-grain foods are healthy and provide extra fiber in your diet  Some examples of whole-grain foods are whole-wheat breads and pastas, oatmeal, brown rice, and bulgur  · Eat a variety of vegetables every day  Include dark, leafy greens such as spinach, kale, shannon greens, and mustard greens  Eat yellow and orange vegetables such as carrots, sweet potatoes, and winter squash  · Eat a variety of fruits every day  Choose fresh or canned fruit (canned in its own juice or light syrup) instead of juice  Fruit juice has very little or no fiber  · Eat low-fat dairy foods  Drink fat-free (skim) milk or 1% milk  Eat fat-free yogurt and low-fat cottage cheese  Try low-fat cheeses such as mozzarella and other reduced-fat cheeses  · Choose meat and other protein foods that are low in fat  Choose beans or other legumes such as split peas or lentils  Choose fish, skinless poultry (chicken or turkey), or lean cuts of red meat (beef or pork)  Before you cook meat or poultry, cut off any visible fat  · Use less fat and oil    Try baking foods instead of frying them  Add less fat, such as margarine, sour cream, regular salad dressing and mayonnaise to foods  Eat fewer high-fat foods  Some examples of high-fat foods include french fries, doughnuts, ice cream, and cakes  · Eat fewer sweets  Limit foods and drinks that are high in sugar  This includes candy, cookies, regular soda, and sweetened drinks  Exercise:  Exercise at least 30 minutes per day on most days of the week  Some examples of exercise include walking, biking, dancing, and swimming  You can also fit in more physical activity by taking the stairs instead of the elevator or parking farther away from stores  Ask your healthcare provider about the best exercise plan for you  © Copyright [employer] 2018 Information is for End User's use only and may not be sold, redistributed or otherwise used for commercial purposes   All illustrations and images included in CareNotes® are the copyrighted property of [employer]  or [address] [place] [address]

## 2021-02-09 NOTE — ASSESSMENT & PLAN NOTE
No cp/sob  Cont meds  Saw Dr Antolin Carter in Sept 2020 and had EKG  Her next appointment is in Mar 2021

## 2021-02-09 NOTE — ASSESSMENT & PLAN NOTE
Wellness exam done  Had flu shot in Oct 2020  Had prevnar 13 and pneumovacc 23  Recommend Tdap, Shingrix, and COVID vaccine  Had colo in 2015 and for mammo on 2/24/21  Last Dexa was in Dec 2019  Lipids and FBS are UTD  Pt told to inc exercise and follow healthy diet  5 Wishes Given  Mood good and no falls

## 2021-02-09 NOTE — PROGRESS NOTES
Assessment and Plan:     Problem List Items Addressed This Visit        Other    Medicare annual wellness visit, subsequent - Primary     Wellness exam done  Had flu shot in Oct 2020  Had prevnar 13 and pneumovacc 23  Recommend Tdap, Shingrix, and COVID vaccine  Had colo in 2015 and for mammo on 2/24/21  Last Dexa was in Dec 2019  Lipids and FBS are UTD  Pt told to inc exercise and follow healthy diet  5 Wishes Given  Mood good and no falls  BMI Counseling: Body mass index is 38 55 kg/m²  The BMI is above normal  Nutrition recommendations include decreasing portion sizes, encouraging healthy choices of fruits and vegetables, consuming healthier snacks and moderation in carbohydrate intake  Exercise recommendations include exercising 3-5 times per week  No pharmacotherapy was ordered  Preventive health issues were discussed with patient, and age appropriate screening tests were ordered as noted in patient's After Visit Summary  Personalized health advice and appropriate referrals for health education or preventive services given if needed, as noted in patient's After Visit Summary       History of Present Illness:     Patient presents for Medicare Annual Wellness visit    Patient Care Team:  Marcy Newman MD as PCP - Dunia Villavicencio MD     Problem List:     Patient Active Problem List   Diagnosis    Coronary artery disease involving native coronary artery of native heart without angina pectoris    Type 2 diabetes mellitus without complication, without long-term current use of insulin ([address] )    Mixed dyslipidemia    Essential hypertension    Abnormal carotid ultrasound    Hypothyroidism    Spinal stenosis of lumbar region    Osteopenia of necks of both femurs    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[address] )    S/P lumbar fusion    Bilateral leg edema    Medicare annual wellness visit, subsequent      Past Medical and Surgical History:     Past Medical History:   Diagnosis Date    Arthritis     Coronary artery disease     Diabetes mellitus [facility]     Hypercholesterolemia     Hypertension     Osteoporosis     Ovarian ca ([facility] ) 1997    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[facility] )     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     Past Surgical History:   Procedure Laterality Date    BACK SURGERY      CARPAL TUNNEL RELEASE      CHOLECYSTECTOMY      COLONOSCOPY      pt see Dr Katy Rodriguez  Up to date with her colonoscopy   CORONARY STENT PLACEMENT      EYE SURGERY      cataract surgery    HYSTERECTOMY  1989    MAMMO (HISTORICAL)      up to date  see gyn    OOPHORECTOMY Bilateral 1997      Family History:     Family History   Problem Relation Age of Onset    Diabetes Family     Cancer Family     Arthritis Family     Heart disease Family     Endometrial cancer Sister     Esophageal cancer Father     Stomach cancer Brother     Diabetes Mother     Heart disease Mother     No Known Problems Daughter     No Known Problems Maternal Grandmother     Prostate cancer Maternal Grandfather     No Known Problems Paternal Grandmother     Prostate cancer Paternal Grandfather     Breast cancer Maternal Aunt     Breast cancer Other     Breast cancer Other       Social History:     E-Cigarette/Vaping    E-Cigarette Use Never User      E-Cigarette/Vaping Substances    Nicotine No     THC No     CBD No     Flavoring No     Other No     Unknown No      Social History     Socioeconomic History    Marital status: /Civil Union     Spouse name: None    Number of children: None    Years of education: None    Highest education level: None   Occupational History    None   Social Needs    Financial resource strain: None    Food insecurity     Worry: None     Inability: None    Transportation needs     Medical: None     Non-medical: None   Tobacco Use    Smoking status: Never Smoker    Smokeless tobacco: Never Used   Substance and Sexual Activity    Alcohol use: No    Drug use:  No  Sexual activity: Not Currently   Lifestyle    Physical activity     Days per week: None     Minutes per session: None    Stress: None   Relationships    Social connections     Talks on phone: None     Gets together: None     Attends Buddhist service: None     Active member of club or organization: None     Attends meetings of clubs or organizations: None     Relationship status: None    Intimate partner violence     Fear of current or ex partner: None     Emotionally abused: None     Physically abused: None     Forced sexual activity: None   Other Topics Concern    None   Social History Narrative    · Tobacco smoking status:   Never smoker      This question's title has changed from "Smoking status" to "Tobacco smoking status" with the  update  Please use this field only for documenting tobacco smoking behavior  To accommodate this change, new fields for documenting smokeless tobacco and e-cigarette/vape usage have been added       · Smoking - how much          None  1 PPW  2 PPW  1/ PPD  1/2 PPD  1 PPD  1 1/2 PPD  2 PPD  3+ PPD          NOTE     · Smokeless tobacco status          Never used smokeless tobacco  Former smokeless tobacco user  Current snuff user  Currently chews tobacco  Currently uses moist powdered tobacco  ----  Not indicated  Not tolerated  Patient refused          NOTE     · Tobacco-years of use               NOTE     · E-cigarette/vape status          Never used electronic cigarettes  Former user of electronic cigarettes  Current user of electronic cigarettes          NOTE     · Most recent tobacco use screenin2018      · Alcohol intake:   None         Per laine      Medications and Allergies:     Current Outpatient Medications   Medication Sig Dispense Refill    Aspirin Buf,CaCarb-MgCarb-MgO, 81 MG TABS aspirin 81 mg tablet      clotrimazole-betamethasone (LOTRISONE) 1-0 05 % cream       glipiZIDE (GLUCOTROL) 10 mg tablet Take 1 tablet (10 mg total) by mouth 2 (two) times a day before meals 180 tablet 2    Insulin Pen Needle (Sure Comfort Pen Needles) 31G X 5 MM MISC by Does not apply route daily 100 each 3    Lancets (onetouch ultrasoft) lancets Use daily L99 6 One touch Delica Plus 339 each 3    levothyroxine 100 mcg tablet Take 1 tablet (100 mcg total) by mouth daily 90 tablet 3    Liraglutide (VICTOZA) 18 MG/3ML SOPN Inject under the skin daily      lisinopril (ZESTRIL) 20 mg tablet Take 1 tablet (20 mg total) by mouth daily 90 tablet 2    metFORMIN (GLUCOPHAGE) 1000 MG tablet Take 1 tablet (1,000 mg total) by mouth every 12 (twelve) hours 180 tablet 1    metoprolol tartrate (LOPRESSOR) 25 mg tablet Take 25 mg by mouth every 12 (twelve) hours      OneTouch Ultra test strip 1 each by Other route daily Use as instructed 100 each 3    pregabalin (LYRICA) 50 mg capsule Take 1 capsule (50 mg total) by mouth 2 (two) times a day 180 capsule 0     No current facility-administered medications for this visit  Allergies   Allergen Reactions    Duloxetine Other (See Comments)     Extreme somnolence/lethargy    Sulfa Antibiotics Rash      Immunizations:     Immunization History   Administered Date(s) Administered    INFLUENZA 12/31/2012    Influenza, high dose seasonal 0 7 mL 10/06/2020    Pneumococcal Conjugate 13-Valent 03/19/2015    Pneumococcal Polysaccharide PPV23 04/18/2019    influenza, trivalent, adjuvanted 10/09/2019      Health Maintenance:         Topic Date Due    Hepatitis C Screening  1945    MAMMOGRAM  01/16/2021    Colorectal Cancer Screening  04/30/2025     There are no preventive care reminders to display for this patient  Medicare Health Risk Assessment:     /80 (BP Location: Left arm, Patient Position: Sitting, Cuff Size: Adult)   Pulse 77   Temp (!) 97 2 °F (36 2 °C)   Resp 16   Ht 5' 1" (1 549 m)   Wt 92 5 kg (204 lb)   SpO2 98%   BMI 38 55 kg/m²      Lindsey Fisher is here for her Subsequent Wellness visit       Health Risk Assessment:   Patient rates overall health as very good  Patient feels that their physical health rating is same  Eyesight was rated as same  Hearing was rated as same  Patient feels that their emotional and mental health rating is same  Pain experienced in the last 7 days has been none  Patient states that she has experienced no weight loss or gain in last 6 months  Depression Screening:   PHQ-2 Score: 0      Fall Risk Screening: In the past year, patient has experienced: no history of falling in past year      Urinary Incontinence Screening:   Patient has leaked urine accidently in the last six months  Mostly at night    Home Safety:  Patient does not have trouble with stairs inside or outside of their home  Patient has working smoke alarms and has working carbon monoxide detector  Home safety hazards include: none  Nutrition:   Current diet is Regular  Medications:   Patient is not currently taking any over-the-counter supplements  Patient is able to manage medications  Activities of Daily Living (ADLs)/Instrumental Activities of Daily Living (IADLs):   Walk and transfer into and out of bed and chair?: Yes  Dress and groom yourself?: Yes    Bathe or shower yourself?: Yes    Feed yourself?  Yes  Do your laundry/housekeeping?: Yes  Manage your money, pay your bills and track your expenses?: Yes  Make your own meals?: Yes    Do your own shopping?: Yes    Previous Hospitalizations:   Any hospitalizations or ED visits within the last 12 months?: No      Advance Care Planning:   Living will: No    Durable POA for healthcare: No    Advanced directive: No    Five wishes given: Yes      Cognitive Screening:   Provider or family/friend/caregiver concerned regarding cognition?: No    PREVENTIVE SCREENINGS      Cardiovascular Screening:    General: Screening Current      Diabetes Screening:     General: Screening Not Indicated and History Diabetes      Colorectal Cancer Screening:     General: Screening Current      Breast Cancer Screening:     General: Screening Current      Cervical Cancer Screening:    General: Screening Not Indicated      Osteoporosis Screening:    General: Screening Current      Abdominal Aortic Aneurysm (AAA) Screening:        General: Screening Not Indicated      Lung Cancer Screening:     General: Screening Not Indicated      Hepatitis C Screening:    General: Patient Declines and Risks and Benefits Discussed    Hep C Screening Accepted: No       Yasmeen Dudley MD

## 2021-02-09 NOTE — ASSESSMENT & PLAN NOTE
A1C 5 8 in Feb 2021  Cont current meds and low carb diet  Foot exam done  Had flu shot in Oct 2020  Had eye exam in Jan 2021 by Dr Jose Roberto Noguera       Lab Results   Component Value Date    HGBA1C 5 8 (H) 02/05/2021

## 2021-02-09 NOTE — PROGRESS NOTES
BMI Counseling: Body mass index is 38 55 kg/m²  The BMI is above normal  Nutrition recommendations include decreasing portion sizes, encouraging healthy choices of fruits and vegetables, consuming healthier snacks and moderation in carbohydrate intake  Exercise recommendations include exercising 3-5 times per week  No pharmacotherapy was ordered  Assessment/Plan:         Problem List Items Addressed This Visit        Endocrine    Type 2 diabetes mellitus without complication, without long-term current use of insulin ([address] )     A1C 5 8 in Feb 2021  Cont current meds and low carb diet  Foot exam done  Had flu shot in Oct 2020  Had eye exam in Jan 2021 by Dr Joshua Matthews  Lab Results   Component Value Date    HGBA1C 5 8 (H) 02/05/2021            Hypothyroidism     TSH 1 19 and Free T4 1 23 in Feb 2021  Cont levothyroxine 100 mcg qd  Cardiovascular and Mediastinum    Essential hypertension     BP good  Cont current meds and low Na diet  Inc exercise and wt loss  Coronary artery disease involving native coronary artery of native heart without angina pectoris     No cp/sob  Cont meds  Saw Dr René Barrios in Sept 2020 and had EKG  Her next appointment is in Mar 2021  Musculoskeletal and Integument    Osteopenia of necks of both femurs     Last Dexa was in Dec 2019  Cont Ca/D 2 pills daily  Inc exercise  Other    Mixed dyslipidemia     LDL 74, HDL 37, and Tgs 278 in Feb 2021  Medicare annual wellness visit, subsequent - Primary     Wellness exam done  Had flu shot in Oct 2020  Had prevnar 13 and pneumovacc 23  Recommend Tdap, Shingrix, and COVID vaccine  Had colo in 2015 and for mammo on 2/24/21  Last Dexa was in Dec 2019  Lipids and FBS are UTD  Pt told to inc exercise and follow healthy diet  5 Wishes Given  Mood good and no falls  Subjective:      Patient ID: Jesenia Hill is a 77 y o  female      Pt here for f/u HTN, HL, DM, CAD, Hypothyroidism, Osteopenia  Pt doing well  No cp/sob  No headaches  No abd pain  No regular exercise  Follows low carb diet  Saw Dr Mirian Guzman last mth  Sugars good at home  Has tingling at times in feet  Energy level good  Pt to see Cards in Mar for f/u  Pt had Pap recently and for mammo, Due for wellness exam  Had flu shot in Oct 2020  No new c/o  The following portions of the patient's history were reviewed and updated as appropriate:   Past Medical History:  She has a past medical history of Arthritis, Coronary artery disease, Diabetes mellitus ([facility] ), Hypercholesterolemia, Hypertension, Osteoporosis, Ovarian ca ([facility] ) (1997), 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[facility] ), and 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)  ,  _______________________________________________________________________  Medical Problems:  does not have any pertinent problems on file ,  _______________________________________________________________________  Past Surgical History:   has a past surgical history that includes Coronary stent placement; Carpal tunnel release; Cholecystectomy; Back surgery; Hysterectomy (1989); Oophorectomy (Bilateral, 1997); Mammo (historical); Eye surgery; and Colonoscopy  ,  _______________________________________________________________________  Family History:  family history includes Arthritis in her family; Breast cancer in her maternal aunt, other, and other; Cancer in her family; Diabetes in her family and mother; Endometrial cancer in her sister; Esophageal cancer in her father; Heart disease in her family and mother; No Known Problems in her daughter, maternal grandmother, and paternal grandmother; Prostate cancer in her maternal grandfather and paternal grandfather; Stomach cancer in her brother ,  _______________________________________________________________________  Social History:   reports that she has never smoked   She has never used smokeless tobacco  She reports that she does not drink alcohol or use drugs ,  _______________________________________________________________________  Allergies:  is allergic to duloxetine and sulfa antibiotics     _______________________________________________________________________  Current Outpatient Medications   Medication Sig Dispense Refill    Aspirin Buf,CaCarb-MgCarb-MgO, 81 MG TABS aspirin 81 mg tablet      clotrimazole-betamethasone (LOTRISONE) 1-0 05 % cream       glipiZIDE (GLUCOTROL) 10 mg tablet Take 1 tablet (10 mg total) by mouth 2 (two) times a day before meals 180 tablet 2    Insulin Pen Needle (Sure Comfort Pen Needles) 31G X 5 MM MISC by Does not apply route daily 100 each 3    Lancets (onetouch ultrasoft) lancets Use daily Y89 7 One touch Delica Plus 519 each 3    levothyroxine 100 mcg tablet Take 1 tablet (100 mcg total) by mouth daily 90 tablet 3    Liraglutide (VICTOZA) 18 MG/3ML SOPN Inject under the skin daily      lisinopril (ZESTRIL) 20 mg tablet Take 1 tablet (20 mg total) by mouth daily 90 tablet 2    metFORMIN (GLUCOPHAGE) 1000 MG tablet Take 1 tablet (1,000 mg total) by mouth every 12 (twelve) hours 180 tablet 1    metoprolol tartrate (LOPRESSOR) 25 mg tablet Take 25 mg by mouth every 12 (twelve) hours      OneTouch Ultra test strip 1 each by Other route daily Use as instructed 100 each 3    pregabalin (LYRICA) 50 mg capsule Take 1 capsule (50 mg total) by mouth 2 (two) times a day 180 capsule 0     No current facility-administered medications for this visit       _______________________________________________________________________  Review of Systems   Constitutional: Negative for fatigue  Eyes: Negative for visual disturbance  Respiratory: Negative for cough and shortness of breath  Cardiovascular: Negative for chest pain  Gastrointestinal: Negative for abdominal pain, constipation, diarrhea, nausea and vomiting  Endocrine: Negative for polydipsia, polyphagia and polyuria  Genitourinary: Negative for difficulty urinating  Musculoskeletal: Negative for arthralgias and gait problem  Skin: Negative for wound  Neurological: Negative for dizziness, numbness and headaches  Objective:  Vitals:    02/09/21 1302   BP: 110/80   BP Location: Left arm   Patient Position: Sitting   Cuff Size: Adult   Pulse: 77   Resp: 16   Temp: (!) 97 2 °F (36 2 °C)   SpO2: 98%   Weight: 92 5 kg (204 lb)   Height: 5' 1" (1 549 m)     Body mass index is 38 55 kg/m²  Physical Exam  Vitals signs and nursing note reviewed  Constitutional:       Appearance: Normal appearance  She is well-developed  She is obese  HENT:      Head: Normocephalic and atraumatic  Neck:      Musculoskeletal: Normal range of motion and neck supple  Cardiovascular:      Rate and Rhythm: Normal rate and regular rhythm  Pulses: no weak pulses     Heart sounds: Normal heart sounds  No murmur  Pulmonary:      Effort: Pulmonary effort is normal  No respiratory distress  Breath sounds: Normal breath sounds  No wheezing  Musculoskeletal:      Right lower leg: No edema  Left lower leg: No edema  Feet:      Right foot:      Skin integrity: No ulcer, skin breakdown, erythema, warmth, callus or dry skin  Left foot:      Skin integrity: No ulcer, skin breakdown, erythema, warmth, callus or dry skin  Lymphadenopathy:      Cervical: No cervical adenopathy  Neurological:      Mental Status: She is alert and oriented to person, place, and time  Psychiatric:         Mood and Affect: Mood normal          Behavior: Behavior normal          Thought Content: Thought content normal          Judgment: Judgment normal        Patient's shoes and socks removed  Right Foot/Ankle   Right Foot Inspection  Skin Exam: skin normal and skin intact no dry skin, no warmth, no callus, no erythema, no maceration, no abnormal color, no pre-ulcer, no ulcer and no callus                            Sensory     Proprioception: intact     Vascular  Capillary refills: < 3 seconds      Left Foot/Ankle  Left Foot Inspection  Skin Exam: skin normal and skin intactno dry skin, no warmth, no erythema, no maceration, normal color, no pre-ulcer, no ulcer and no callus                                         Sensory     Proprioception: intact    Vascular  Capillary refills: < 3 seconds    Assign Risk Category:  No deformity present; No loss of protective sensation;  No weak pulses       Risk: 0

## 2021-02-10 ENCOUNTER — TELEPHONE (OUTPATIENT)
Dept: ADMINISTRATIVE | Facility: OTHER | Age: 76
End: 2021-02-10

## 2021-02-10 NOTE — LETTER
Diabetic Eye Exam Form    Date Requested: 21  Patient: Brandon Doe  Patient : 1945   Referring Provider: Kimberly Duran MD    Dilated Retinal Exam, Optomap-Iris Exam, or Fundus Photography Done         Yes (Rampart one above)         No     Date of Diabetic Eye Exam ______________________________  Left Eye      Exam did show retinopathy    Exam did not show retinopathy         Mild       Moderate       None       Proliferative       Severe     Right Eye     Exam did show retinopathy    Exam did not show retinopathy         Mild       Moderate       None       Proliferative       Severe     Comments __________________________________________________________    Practice Providing Exam ______________________________________________    Exam Performed By (print name) _______________________________________      Provider Signature ___________________________________________________      These reports are needed for  compliance    Please fax this completed form and a copy of the Diabetic Eye Exam report to our office located at [place] [address] as soon as possible to [phone] attention Margarita: Phone [phone]    We thank you for your assistance in treating our mutual patient     (sent to Dr Tianna Diaz)

## 2021-02-10 NOTE — TELEPHONE ENCOUNTER
----- Message from Pari Manning sent at 2/9/2021  3:27 PM EST -----  Regarding: care gap request  02/09/21 3:27 PM    Hello, our patient attached above has had Diabetic Eye Exam completed/performed  Please assist in updating the patient chart by making an External outreach to Anaid Pérez, DO facility located in [place]  The date of service is about 6 months ago?  Address for Dr keny Weaver [address] [place] [address] [address] [facility]    Thank you,  Ena Pierce  [facility]

## 2021-02-11 NOTE — TELEPHONE ENCOUNTER
Upon review of the In Basket request and the patient's chart, initial outreach has been made via fax, please see Contacts section for details       Thank you  Claus Johnson MA

## 2021-02-15 DIAGNOSIS — E11.9 TYPE 2 DIABETES MELLITUS WITHOUT COMPLICATION, WITHOUT LONG-TERM CURRENT USE OF INSULIN (HCC): ICD-10-CM

## 2021-02-15 RX ORDER — BLOOD SUGAR DIAGNOSTIC
1 STRIP MISCELLANEOUS DAILY
Qty: 100 EACH | Refills: 3 | Status: SHIPPED | OUTPATIENT
Start: 2021-02-15 | End: 2022-01-05 | Stop reason: SDUPTHER

## 2021-02-15 NOTE — TELEPHONE ENCOUNTER
Left vm that she needs refills of her one touch urtra test strips sent to [facility] in [facility]   TO

## 2021-02-16 NOTE — TELEPHONE ENCOUNTER
Upon review of the In Basket request we were able to locate, review, and update the patient chart as requested for Diabetic Eye Exam     Any additional questions or concerns should be emailed to the Practice Liaisons via [email or web address] email, please do not reply via In Basket      Thank you  Evelyn Navas MA

## 2021-02-16 NOTE — TELEPHONE ENCOUNTER
As a follow-up, a second attempt has been made for outreach via telephone call, please see Contacts section for details      Thank you  Chris Pruett MA

## 2021-02-17 ENCOUNTER — OFFICE VISIT (OUTPATIENT)
Dept: GASTROENTEROLOGY | Facility: CLINIC | Age: 76
End: 2021-02-17
Payer: MEDICARE

## 2021-02-17 VITALS
DIASTOLIC BLOOD PRESSURE: 124 MMHG | SYSTOLIC BLOOD PRESSURE: 173 MMHG | HEART RATE: 85 BPM | WEIGHT: 205 LBS | HEIGHT: 61 IN | BODY MASS INDEX: 38.71 KG/M2

## 2021-02-17 DIAGNOSIS — K64.2 THIRD DEGREE HEMORRHOIDS: ICD-10-CM

## 2021-02-17 DIAGNOSIS — K62.5 RECTAL BLEEDING: Primary | ICD-10-CM

## 2021-02-17 PROCEDURE — 99213 OFFICE O/P EST LOW 20 MIN: CPT | Performed by: INTERNAL MEDICINE

## 2021-02-17 PROCEDURE — 46221 LIGATION OF HEMORRHOID(S): CPT | Performed by: INTERNAL MEDICINE

## 2021-02-17 NOTE — PROGRESS NOTES
Anal Excision Procedures  Performed by: Sami Holley MD  Authorized by: Sami Holley MD     Universal Protocol:     Risks and benefits: Risks, benefits and alternatives were discussed      Consent given by:  Patient    Patient states understanding of procedure being performed: Yes      Patient's understanding of procedure matches consent: Yes      Procedure consent matches procedure scheduled: Yes      Relevant documents present and verified: Yes      Test results available and properly labeled: Yes      Site marked: Yes      Radiology Images displayed and confirmed  If images not available, report reviewed:  Yes    A time out verifies correct patient, procedure, equipment, support staff and site/side marked as required:   Procedure Type: hemorrhoidectomy    Hemorrhoidectomy Details:   Hemorrhoid type: internal    Internal position:  Ten o'clock  Number of columns/groups removed:  1  Single rubber band ligation    Patient tolerance:  Patient tolerated the procedure well with no immediate complications

## 2021-02-24 ENCOUNTER — HOSPITAL ENCOUNTER (OUTPATIENT)
Dept: RADIOLOGY | Facility: HOSPITAL | Age: 76
Discharge: HOME | End: 2021-02-24
Payer: MEDICARE

## 2021-02-24 VITALS — BODY MASS INDEX: 38.71 KG/M2 | WEIGHT: 205 LBS | HEIGHT: 61 IN

## 2021-02-24 DIAGNOSIS — R92.8 ABNORMAL MAMMOGRAM OF LEFT BREAST: ICD-10-CM

## 2021-02-24 DIAGNOSIS — M48.061 SPINAL STENOSIS OF LUMBAR REGION, UNSPECIFIED WHETHER NEUROGENIC CLAUDICATION PRESENT: ICD-10-CM

## 2021-02-24 PROCEDURE — G0279 TOMOSYNTHESIS, MAMMO: HCPCS

## 2021-02-24 PROCEDURE — 76642 ULTRASOUND BREAST LIMITED: CPT

## 2021-02-24 PROCEDURE — 77066 DX MAMMO INCL CAD BI: CPT

## 2021-02-24 RX ORDER — PREGABALIN 50 MG/1
50 CAPSULE ORAL 2 TIMES DAILY
Qty: 180 CAPSULE | Refills: 0 | Status: SHIPPED | OUTPATIENT
Start: 2021-02-24 | End: 2021-03-24

## 2021-02-24 NOTE — PROGRESS NOTES
Met with patient and Dr Geni Jain regarding recommendation for:     _____ Right  ___x___Left      __x__Ultrasound guided breast biopsy  _____Stereotactic breast biopsy  __x___Verbalized understanding  Blood thinners:  _____ yes ___x___ no   *Patient is not on anti-coagulants, but does take low dose aspirin 81mg BID  I did relay to her that we do not require her to stop this prior to her biopsy, but if she feels more comfortable not taking the aspirin the day of, and the day following her biopsy she may discuss this with her physician  Date stopped: ___N/A____    Biopsy teaching sheet given:  ___x__yes ____no    Tentative biopsy appointment: Monday 03/15 @ 11:30AM [facility]    Patient was also counseled that the [address] requires us to have all of our outpatient breast biopsy patients screened for COVID-19 within 6 days prior to their biopsy date  Patient could have COVID screening swab collected on Tuesday 03/09 or Wednesday 03/10 to allow adequate time for her swab results to be processed prior to her biopsy appointment date  Informed patient that the closest testing facility is the [employer] [facility] located at [address] [place] [address] in [place], [place]  Patient aware of [facility] at [facility] [address] hours of operation: Monday- Friday from 8am to 8pm, and Saturday and Sunday from 8am to 4pm  The [facility] asks that patients please call extension [phone] from the parking lot when they arrive, and a staff member will come directly to their vehicle to collect the screening swab; and that scheduling an appointment is not required per current policy

## 2021-02-24 NOTE — TELEPHONE ENCOUNTER
Patient called would like her Lyrica refilled      [address]    Patient ph # [phone]

## 2021-02-25 DIAGNOSIS — Z01.818 ENCOUNTER FOR PREADMISSION TESTING: Primary | ICD-10-CM

## 2021-03-04 ENCOUNTER — TELEPHONE (OUTPATIENT)
Dept: FAMILY MEDICINE CLINIC | Facility: CLINIC | Age: 76
End: 2021-03-04

## 2021-03-04 DIAGNOSIS — I10 ESSENTIAL (PRIMARY) HYPERTENSION: ICD-10-CM

## 2021-03-04 RX ORDER — LISINOPRIL 20 MG/1
20 TABLET ORAL DAILY
Qty: 90 TABLET | Refills: 2 | Status: SHIPPED | OUTPATIENT
Start: 2021-03-04 | End: 2021-03-08 | Stop reason: SDUPTHER

## 2021-03-08 ENCOUNTER — TELEPHONE (OUTPATIENT)
Dept: FAMILY MEDICINE CLINIC | Facility: CLINIC | Age: 76
End: 2021-03-08

## 2021-03-08 DIAGNOSIS — I10 ESSENTIAL (PRIMARY) HYPERTENSION: ICD-10-CM

## 2021-03-08 RX ORDER — LISINOPRIL 20 MG/1
20 TABLET ORAL DAILY
Qty: 90 TABLET | Refills: 2 | Status: SHIPPED | OUTPATIENT
Start: 2021-03-08 | End: 2021-03-10

## 2021-03-09 ENCOUNTER — TELEPHONE (OUTPATIENT)
Dept: RADIOLOGY | Facility: HOSPITAL | Age: 76
End: 2021-03-09

## 2021-03-09 PROCEDURE — U0005 INFEC AGEN DETEC AMPLI PROBE: HCPCS | Performed by: OBSTETRICS & GYNECOLOGY

## 2021-03-09 PROCEDURE — U0003 INFECTIOUS AGENT DETECTION BY NUCLEIC ACID (DNA OR RNA); SEVERE ACUTE RESPIRATORY SYNDROME CORONAVIRUS 2 (SARS-COV-2) (CORONAVIRUS DISEASE [COVID-19]), AMPLIFIED PROBE TECHNIQUE, MAKING USE OF HIGH THROUGHPUT TECHNOLOGIES AS DESCRIBED BY CMS-2020-01-R: HCPCS | Performed by: OBSTETRICS & GYNECOLOGY

## 2021-03-10 ENCOUNTER — TELEPHONE (OUTPATIENT)
Dept: RADIOLOGY | Facility: HOSPITAL | Age: 76
End: 2021-03-10

## 2021-03-10 ENCOUNTER — TELEPHONE (OUTPATIENT)
Dept: FAMILY MEDICINE CLINIC | Facility: CLINIC | Age: 76
End: 2021-03-10

## 2021-03-10 ENCOUNTER — OFFICE VISIT (OUTPATIENT)
Dept: CARDIOLOGY CLINIC | Facility: CLINIC | Age: 76
End: 2021-03-10
Payer: MEDICARE

## 2021-03-10 VITALS
SYSTOLIC BLOOD PRESSURE: 136 MMHG | BODY MASS INDEX: 39.27 KG/M2 | HEART RATE: 85 BPM | OXYGEN SATURATION: 98 % | HEIGHT: 61 IN | DIASTOLIC BLOOD PRESSURE: 62 MMHG | WEIGHT: 208 LBS

## 2021-03-10 DIAGNOSIS — I10 ESSENTIAL (PRIMARY) HYPERTENSION: ICD-10-CM

## 2021-03-10 DIAGNOSIS — I10 ESSENTIAL HYPERTENSION: Primary | ICD-10-CM

## 2021-03-10 DIAGNOSIS — R60.0 LOCALIZED EDEMA: ICD-10-CM

## 2021-03-10 DIAGNOSIS — E78.2 MIXED DYSLIPIDEMIA: ICD-10-CM

## 2021-03-10 DIAGNOSIS — I25.10 CORONARY ARTERY DISEASE INVOLVING NATIVE CORONARY ARTERY OF NATIVE HEART WITHOUT ANGINA PECTORIS: ICD-10-CM

## 2021-03-10 LAB — SARS-COV-2 RNA RESP QL NAA+PROBE: NEGATIVE

## 2021-03-10 PROCEDURE — 99214 OFFICE O/P EST MOD 30 MIN: CPT | Performed by: INTERNAL MEDICINE

## 2021-03-10 RX ORDER — LISINOPRIL 20 MG/1
20 TABLET ORAL DAILY
Qty: 90 TABLET | Refills: 1 | Status: SHIPPED | OUTPATIENT
Start: 2021-03-10 | End: 2021-10-06 | Stop reason: SDUPTHER

## 2021-03-10 NOTE — TELEPHONE ENCOUNTER
Let pt know that it was sent electronically on 3/8/21 and we have confirmation in our system that they received it on 3/8/21 at 11:58 AM

## 2021-03-10 NOTE — PATIENT INSTRUCTIONS
Coronary Artery Disease   AMBULATORY CARE:   Coronary artery disease (CAD)  is narrowing of the arteries to your heart caused by a buildup of plaque  Plaque is made up of cholesterol and other substances  The narrowing in your arteries decreases the amount of blood that can flow to your heart  This causes your heart to get less oxygen  You may not have any symptoms of CAD  It is important for you to manage CAD even if you feel well  CAD can lead to a heart attack if it is not managed  Common symptoms include the following:   · Chest pain (angina), causing burning, squeezing, or crushing tightness in your chest    · Pain that spreads to your neck, jaw, or shoulder blade    · Nausea, vomiting, sweating, fainting, and hands and feet that are cold to the touch    Call 911 for any of the following:   · You have any of the following signs of a heart attack:      ? Squeezing, pressure, or pain in your chest    ? You may  also have any of the following:     § Discomfort or pain in your back, neck, jaw, stomach, or arm    § Shortness of breath    § Nausea or vomiting    § Lightheadedness or a sudden cold sweat      Contact your healthcare provider if:   · You have chest pain that is more frequent, or you have chest pain at rest     · You have questions or concerns about your condition or care  Medicines used to treat CAD:   · Blood pressure medicines  are given to lower your blood pressure  ACE inhibitors help keep your blood vessels relaxed and open to help keep blood flowing into your heart  Beta-blockers keep your heart pumping strongly and regularly so it does not work too hard to get oxygen  · Cholesterol medicines  help lower blood cholesterol levels  · Nitrates , such as nitroglycerin, relax the arteries of your heart so it gets more oxygen  They help to relieve your chest pain  · Antiplatelets , such as aspirin, help prevent blood clots  Take your antiplatelet medicine exactly as directed   These medicines make it more likely for you to bleed or bruise  If you are told to take aspirin, do not take acetaminophen or ibuprofen instead  · Blood thinners  keep clots from forming in your blood  Clots may cause heart attacks, strokes, or death  This medicine makes it more likely for you to bleed or bruise  · Do not take certain medicines without asking your healthcare provider first   These include NSAIDs, herbal or vitamin supplements, or hormones (estrogen or progestin)  Procedures used to treat CAD:   · Angioplasty  may be done to open an artery blocked by plaque  A tube with a balloon on the end is threaded into the blocked artery  Once the tube is in the artery, the balloon is inflated  As the balloon inflates, it presses the plaque against the artery wall to open the artery  A stent may be placed in your artery to keep it open  · Coronary artery bypass graft surgery (CABG)  is open heart surgery  Healthcare providers take arteries or veins from other areas in your body and use them to bypass or go around the blocked arteries of your heart  Cardiac rehabilitation:  Your healthcare provider may recommend that you attend cardiac rehabilitation (rehab)  This is a program run by specialists who will help you safely strengthen your heart and reduce the risk for more heart disease  The plan includes exercise, relaxation, stress management, and heart-healthy nutrition  Healthcare providers will also check to make sure any medicines you are taking are working  Manage CAD to prevent a heart attack:   · Do not smoke  Nicotine and other chemicals in cigarettes and cigars can cause heart and lung damage  Ask your healthcare provider for information if you currently smoke and need help to quit  E-cigarettes or smokeless tobacco still contain nicotine  Talk to your healthcare provider before you use these products  · Exercise regularly    Exercise at least 30 minutes each day, on most days of the week  Exercise helps to lower high cholesterol and high blood pressure  It can also help you maintain a healthy weight  Ask your healthcare provider about the kind of exercise you should do and how to get started  · Maintain a healthy weight  If you are overweight, talk to your healthcare provider about how to lose weight  A weight loss of 10% can improve your heart health  · Eat heart-healthy foods  Include fresh fruits and vegetables in your meal plan  Choose low-fat foods, such as skim or 1% fat milk, low-fat cheese and yogurt, fish, chicken (without skin), and lean meats  Eat two 4-ounce servings of fish high in omega-3 fats each week, such as salmon, fresh tuna, and herring  Do not eat foods that are high in sodium, such as canned foods, potato chips, salty snacks, and cold cuts  Put less table salt on your food  · Limit or do not drink alcohol  A drink of alcohol is 12 ounces of beer, 5 ounces of wine, or 1½ ounces of liquor  · Manage other health conditions  Follow your healthcare provider's advice on how to manage other conditions that can affect your heart health  These include diabetes, high blood pressure, and high cholesterol  You may need to take medicines for these conditions and make other lifestyle changes  · Ask if you should have a flu vaccine  The flu can be dangerous for a person who has CAD  The flu vaccine is available every year in the fall  Follow up with your healthcare provider as directed: You may need to return for other tests  You may also be referred to a cardiac surgeon  Write down your questions so you remember to ask them during your visits  © Copyright [address] Information is for End User's use only and may not be sold, redistributed or otherwise used for commercial purposes   All illustrations and images included in CareNotes® are the copyrighted property of [employer]  or [facility]   The above information is an educational aid only  It is not intended as medical advice for individual conditions or treatments  Talk to your doctor, nurse or pharmacist before following any medical regimen to see if it is safe and effective for you

## 2021-03-10 NOTE — TELEPHONE ENCOUNTER
RE: [facility] never got request for this & she said they faxed us twice

## 2021-03-10 NOTE — TELEPHONE ENCOUNTER
Patient is going to call [facility] again and given them our 484 fax number   I will be looking for the fax today and  Get this med filled

## 2021-03-10 NOTE — PROGRESS NOTES
Cardiology Follow Up    Kendall Flood  1945  [id number]  [facility] [place]  [address]  [address]  [place] [address] [phone]    1  Essential hypertension     2  Coronary artery disease involving native coronary artery of native heart without angina pectoris     3  Mixed dyslipidemia       Chief Complaint   Patient presents with    Follow-up     6 month     Ankle Swelling     both end of the day      Interval History: Patient feels well, but has noted some increased LE edema bilaterally  This has been ongoing for the past few days, and she notes that it is also there sometimes in the mornings as well  No reported chest pain, shortness of breath, palpitations, lightheadedness, syncope, orthopnea, PND, or significant weight changes  Patient remains active without any increased fatigue out of the ordinary        Patient Active Problem List   Diagnosis    Coronary artery disease involving native coronary artery of native heart without angina pectoris    Type 2 diabetes mellitus without complication, without long-term current use of insulin (HCC)    Mixed dyslipidemia    Essential hypertension    Abnormal carotid ultrasound    Hypothyroidism    Spinal stenosis of lumbar region    Osteopenia of necks of both femurs    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[address] )    S/P lumbar fusion    Bilateral leg edema    Medicare annual wellness visit, subsequent     Past Medical History:   Diagnosis Date    Arthritis     Coronary artery disease     Diabetes mellitus ([address] )     Hypercholesterolemia     Hypertension     Osteoporosis     Ovarian ca ([address] ) 1997    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[address] )     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     Social History     Socioeconomic History    Marital status: /Civil Union     Spouse name: Not on file    Number of children: Not on file    Years of education: Not on file    Highest education level: Not on file   Occupational History    Not on file   Social Needs    Financial resource strain: Not on file    Food insecurity     Worry: Not on file     Inability: Not on file    Transportation needs     Medical: Not on file     Non-medical: Not on file   Tobacco Use    Smoking status: Never Smoker    Smokeless tobacco: Never Used   Substance and Sexual Activity    Alcohol use: No    Drug use: No    Sexual activity: Not Currently   Lifestyle    Physical activity     Days per week: Not on file     Minutes per session: Not on file    Stress: Not on file   Relationships    Social connections     Talks on phone: Not on file     Gets together: Not on file     Attends Jain service: Not on file     Active member of club or organization: Not on file     Attends meetings of clubs or organizations: Not on file     Relationship status: Not on file    Intimate partner violence     Fear of current or ex partner: Not on file     Emotionally abused: Not on file     Physically abused: Not on file     Forced sexual activity: Not on file   Other Topics Concern    Not on file   Social History Narrative    · Tobacco smoking status:   Never smoker      This question's title has changed from "Smoking status" to "Tobacco smoking status" with the 19 7 update  Please use this field only for documenting tobacco smoking behavior  To accommodate this change, new fields for documenting smokeless tobacco and e-cigarette/vape usage have been added       · Smoking - how much          None  1 PPW  2 PPW  1/4 PPD  1/2 PPD  1 PPD  1 1/2 PPD  2 PPD  3+ PPD          NOTE     · Smokeless tobacco status          Never used smokeless tobacco  Former smokeless tobacco user  Current snuff user  Currently chews tobacco  Currently uses moist powdered tobacco  ----  Not indicated  Not tolerated  Patient refused          NOTE     · Tobacco-years of use               NOTE     · E-cigarette/vape status Never used electronic cigarettes  Former user of electronic cigarettes  Current user of electronic cigarettes          NOTE     · Most recent tobacco use screenin2018      · Alcohol intake:   None         Per laine      Family History   Problem Relation Age of Onset    Diabetes Family     Cancer Family     Arthritis Family     Heart disease Family     Endometrial cancer Sister     Esophageal cancer Father     Stomach cancer Brother     Diabetes Mother     Heart disease Mother     No Known Problems Daughter     No Known Problems Maternal Grandmother     Prostate cancer Maternal Grandfather     No Known Problems Paternal Grandmother     Prostate cancer Paternal Grandfather     Breast cancer Maternal Aunt 48    Breast cancer Other 39    Breast cancer Other 39    No Known Problems Sister     No Known Problems Sister     No Known Problems Sister     No Known Problems Paternal Aunt     No Known Problems Paternal Aunt      Past Surgical History:   Procedure Laterality Date    BACK SURGERY      CARPAL TUNNEL RELEASE      CHOLECYSTECTOMY      COLONOSCOPY      pt see Dr Nevaeh Sen  Up to date with her colonoscopy   CORONARY STENT PLACEMENT      EYE SURGERY      cataract surgery    HYSTERECTOMY      MAMMO (HISTORICAL)      up to date   see gyn    OOPHORECTOMY Bilateral        Current Outpatient Medications:     Aspirin Buf,CaCarb-MgCarb-MgO, 81 MG TABS, aspirin 81 mg tablet, Disp: , Rfl:     clotrimazole-betamethasone (LOTRISONE) 1-0 05 % cream, , Disp: , Rfl:     glipiZIDE (GLUCOTROL) 10 mg tablet, Take 1 tablet (10 mg total) by mouth 2 (two) times a day before meals, Disp: 180 tablet, Rfl: 2    Insulin Pen Needle (Sure Comfort Pen Needles) 31G X 5 MM MISC, by Does not apply route daily, Disp: 100 each, Rfl: 3    Lancets (onetouch ultrasoft) lancets, Use daily G34 2 One touch Delica Plus, Disp: 376 each, Rfl: 3    levothyroxine 100 mcg tablet, Take 1 tablet (100 mcg total) by mouth daily, Disp: 90 tablet, Rfl: 3    Liraglutide (VICTOZA) 18 MG/3ML SOPN, Inject under the skin daily, Disp: , Rfl:     lisinopril (ZESTRIL) 20 mg tablet, Take 1 tablet (20 mg total) by mouth daily, Disp: 90 tablet, Rfl: 1    metFORMIN (GLUCOPHAGE) 1000 MG tablet, Take 1 tablet (1,000 mg total) by mouth every 12 (twelve) hours, Disp: 180 tablet, Rfl: 1    metoprolol tartrate (LOPRESSOR) 25 mg tablet, Take 25 mg by mouth every 12 (twelve) hours, Disp: , Rfl:     OneTouch Ultra test strip, Use 1 each daily Use as instructed, Disp: 100 each, Rfl: 3    pregabalin (LYRICA) 50 mg capsule, Take 1 capsule (50 mg total) by mouth 2 (two) times a day, Disp: 180 capsule, Rfl: 0  Allergies   Allergen Reactions    Duloxetine Other (See Comments)     Extreme somnolence/lethargy    Sulfa Antibiotics Rash       Labs:  Orders Only on 02/25/2021   Component Date Value    SARS-CoV-2 03/09/2021 Negative    Telephone on 02/10/2021   Component Date Value    Right Eye Diabetic Retin* 01/26/2021 Mild     Left Eye Diabetic Retino* 01/26/2021 Mild    Lab on 02/05/2021   Component Date Value    Hemoglobin A1C 02/05/2021 5 8*    EAG 02/05/2021 120     Sodium 02/05/2021 138     Potassium 02/05/2021 4 4     Chloride 02/05/2021 106     CO2 02/05/2021 29     ANION GAP 02/05/2021 3*    BUN 02/05/2021 11     Creatinine 02/05/2021 0 62     Glucose, Fasting 02/05/2021 123*    Calcium 02/05/2021 9 3     eGFR 02/05/2021 89     TSH 3RD GENERATON 02/05/2021 1 190     Free T4 02/05/2021 1 23     Total Bilirubin 02/05/2021 0 58     Bilirubin, Direct 02/05/2021 0 11     Alkaline Phosphatase 02/05/2021 42*    AST 02/05/2021 18     ALT 02/05/2021 33     Total Protein 02/05/2021 6 8     Albumin 02/05/2021 3 6     Creatinine, Ur 02/05/2021 124 0     Microalbum  ,U,Random 02/05/2021 39 0*    Microalb Creat Ratio 02/05/2021 31*   Annual Exam on 10/13/2020   Component Date Value    Case Report 10/13/2020 Value:Gynecologic Cytology Report                       Case: [id number]                                  Authorizing Provider:  Thomasina Saint, MD        Collected:           10/13/2020 1539              Ordering Location:     [facility]    Received:            10/13/2020 1539                                     Area                                                                         First Screen:          Estephania Aw, CT                                                       Specimen:    LIQUID-BASED PAP, SCREENING, Cervix                                                        Primary Interpretation 10/13/2020 Negative for intraepithelial lesion or malignancy     Specimen Adequacy 10/13/2020 Satisfactory for evaluation  (See note)     Note 10/13/2020                      Value: This result contains rich text formatting which cannot be displayed here   Additional Information 10/13/2020                      Value: This result contains rich text formatting which cannot be displayed here  Lab Results   Component Value Date    CHOL 140 12/22/2015    TRIG 278 (H) 02/05/2021    TRIG 153 12/22/2015    HDL 37 (L) 02/05/2021    HDL 41 12/22/2015     Imaging: Xr Hand 3+ Vw Left    Result Date: 8/16/2020  Narrative: LEFT HAND INDICATION: M79 642: Pain in left hand  M79 641: Pain in right hand  COMPARISON: No prior studies, right hand also on this date VIEWS:  XR HAND 3+ VW LEFT IMAGES:  4 For the purposes of institution wide universal language the following terms will apply: (thumb=1st digit/finger, index finger=2nd digit/finger, long finger=3rd digit/finger, ring=4th digit/finger and small finger=5th digit/finger) FINDINGS: There is no acute fracture or dislocation  Mild to moderate osteoarthritis 1st carpometacarpal joint  No focal erosions  No lytic or blastic osseous lesion  There are atherosclerotic calcifications  Soft tissues are otherwise unremarkable       Impression: No acute osseous abnormality  Degenerative changes as described  Workstation performed: [id number]     Xr Hand 3+ Vw Right    Result Date: 8/16/2020  Narrative: RIGHT HAND INDICATION:  M79 641: Pain in right hand  Q45 519: Pain in left hand  COMPARISON:  No old studies, left hand also on this date VIEWS:  XR HAND 3+ VW RIGHT Images: 4 For the purposes of institution wide universal language the following terms will apply: (thumb=1st digit/finger, index finger=2nd digit/finger, long finger=3rd digit/finger, ring=4th digit/finger and small finger=5th digit/finger) FINDINGS: There is no acute fracture or dislocation  Mild osteoarthritis 1st carpometacarpal joint  No focal erosions  No lytic or blastic osseous lesion  There are atherosclerotic calcifications  Soft tissues are otherwise unremarkable  Impression: No acute osseous abnormality  Degenerative changes as described  Workstation performed: [id number]       Review of Systems:  Review of Systems   Constitutional: Negative for activity change, appetite change, chills, diaphoresis, fatigue and unexpected weight change  HENT: Negative for hearing loss, nosebleeds and sore throat  Eyes: Negative for photophobia and visual disturbance  Respiratory: Negative for cough, chest tightness, shortness of breath and wheezing  Cardiovascular: Positive for leg swelling  Negative for chest pain and palpitations  Gastrointestinal: Negative for abdominal pain, diarrhea, nausea and vomiting  Endocrine: Negative for polyuria  Genitourinary: Negative for dysuria, frequency and hematuria  Musculoskeletal: Negative for arthralgias, back pain, gait problem and neck pain  Skin: Negative for pallor and rash  Neurological: Negative for dizziness, syncope and headaches  Hematological: Does not bruise/bleed easily  Psychiatric/Behavioral: Negative for behavioral problems and confusion  Physical Exam:  Physical Exam  Vitals signs reviewed     Constitutional: Appearance: She is well-developed  She is not diaphoretic  HENT:      Head: Normocephalic and atraumatic  Nose: Nose normal    Eyes:      General: No scleral icterus  Pupils: Pupils are equal, round, and reactive to light  Neck:      Musculoskeletal: Normal range of motion and neck supple  Vascular: No JVD  Cardiovascular:      Rate and Rhythm: Normal rate and regular rhythm  Heart sounds: Normal heart sounds  No murmur  No friction rub  No gallop  Pulmonary:      Effort: Pulmonary effort is normal  No respiratory distress  Breath sounds: Normal breath sounds  No wheezing or rales  Abdominal:      General: Bowel sounds are normal  There is no distension  Palpations: Abdomen is soft  Tenderness: There is no abdominal tenderness  Musculoskeletal: Normal range of motion  General: No deformity  Skin:     General: Skin is warm and dry  Findings: No rash  Neurological:      Mental Status: She is alert and oriented to person, place, and time  Cranial Nerves: No cranial nerve deficit  Psychiatric:         Behavior: Behavior normal        Blood pressure 136/62, pulse 85, height 5' 1" (1 549 m), weight 94 3 kg (208 lb), SpO2 98 %, currently breastfeeding  EKG:  Normal sinus rhythm  Nonspecific ST and T wave abnormality  Abnormal ECG    Discussion/Summary:  1  CAD s/p SAMMIE to proximal LAD 12/15  She denies any chest pain, no current shortness of breath  She is on Metoprolol, aspirin 81 mg, stopped Atorvastatin 40 around 7/19  LV function was normal  She discontinued Plavix 1/17  Feels well and asymptomatic, continue current regimen  No changes made today      2  HTN  BP is relatively well controlled on Lisinopril and Metoprolol  Continue current regimen  With new LE edema, will check echo to assess cardiac structure  3  HL  Lipid panel 12/15 showed total cholesterol 140, , HDL 41, LDL 68  She was started on Atorvastatin 40   Lipid panel 2/16 showed total cholesterol 96, HDL 39, LDL 33,   She asked to be switched to Simvastatin 40 as it was cheaper for her in 7/16  Lipid panel 9/16 showed total cholesterol 100, LDL 28, , HDL 38  CK and AST/ALT WNL  She was switched back to Atorvastatin 40 in 1/17 when drug formulary changed  Lipid panel 7/17 showed total cholesterol 94, LDL 21, , HDL 44  Lipid panel 4/18 showed total cholesterol 94, , HDL 36, LDL 19  She stopped taking Atorvastatin 40 due to reading about it increasing her Hgb A1c around 7/19, and her Hgb A1c did improve from 6 7 to 5 7 off statin  Lipid panel 8/19 showed total cholesterol 164, , HDL 35, LDL 68  Asked her to increase her fish oil to 2 g bid if she does not wish to take a statin  LDL now 74 in Dec 2019  Repeat revealed LDL of 74 in Feb 2021, well controlled and at goal      4  Abnormal carotid ultrasound  She reports in the past she was told she had a 40% stenosis in the left carotid  No issues with carotid bruits or symptoms currently  On aspirin, statin  No work-up recommended at this time

## 2021-03-11 ENCOUNTER — HOSPITAL ENCOUNTER (OUTPATIENT)
Dept: NON INVASIVE DIAGNOSTICS | Facility: HOSPITAL | Age: 76
Discharge: HOME | End: 2021-03-11
Attending: INTERNAL MEDICINE
Payer: MEDICARE

## 2021-03-11 DIAGNOSIS — R60.0 LOCALIZED EDEMA: ICD-10-CM

## 2021-03-11 DIAGNOSIS — I25.10 CORONARY ARTERY DISEASE INVOLVING NATIVE CORONARY ARTERY OF NATIVE HEART WITHOUT ANGINA PECTORIS: ICD-10-CM

## 2021-03-11 PROCEDURE — 93306 TTE W/DOPPLER COMPLETE: CPT | Performed by: INTERNAL MEDICINE

## 2021-03-11 PROCEDURE — 93306 TTE W/DOPPLER COMPLETE: CPT

## 2021-03-15 ENCOUNTER — HOSPITAL ENCOUNTER (OUTPATIENT)
Dept: RADIOLOGY | Facility: HOSPITAL | Age: 76
Discharge: HOME | End: 2021-03-15
Payer: MEDICARE

## 2021-03-15 VITALS — SYSTOLIC BLOOD PRESSURE: 124 MMHG | DIASTOLIC BLOOD PRESSURE: 64 MMHG

## 2021-03-15 DIAGNOSIS — R92.8 ABNORMAL MAMMOGRAM: ICD-10-CM

## 2021-03-15 PROCEDURE — 88342 IMHCHEM/IMCYTCHM 1ST ANTB: CPT | Performed by: PATHOLOGY

## 2021-03-15 PROCEDURE — 19083 BX BREAST 1ST LESION US IMAG: CPT

## 2021-03-15 PROCEDURE — 88341 IMHCHEM/IMCYTCHM EA ADD ANTB: CPT | Performed by: PATHOLOGY

## 2021-03-15 PROCEDURE — 88305 TISSUE EXAM BY PATHOLOGIST: CPT | Performed by: PATHOLOGY

## 2021-03-15 PROCEDURE — A4648 IMPLANTABLE TISSUE MARKER: HCPCS

## 2021-03-15 PROCEDURE — 88361 TUMOR IMMUNOHISTOCHEM/COMPUT: CPT | Performed by: PATHOLOGY

## 2021-03-15 RX ORDER — LIDOCAINE HYDROCHLORIDE 10 MG/ML
5 INJECTION, SOLUTION EPIDURAL; INFILTRATION; INTRACAUDAL; PERINEURAL ONCE
Status: COMPLETED | OUTPATIENT
Start: 2021-03-15 | End: 2021-03-15

## 2021-03-15 RX ADMIN — LIDOCAINE HYDROCHLORIDE 10 ML: 10 INJECTION, SOLUTION EPIDURAL; INFILTRATION; INTRACAUDAL; PERINEURAL at 12:15

## 2021-03-15 NOTE — PROGRESS NOTES
Ice pack given:    __x___yes _____no    Discharge instructions signed by patient:    __x___yes _____no    Discharge instructions given to patient:    __x___yes _____no    Discharged via:    __x___ambulatory    _____wheelchair    _____stretcher    Stable on discharge:    __x___yes ____no

## 2021-03-15 NOTE — DISCHARGE INSTR - OTHER ORDERS
POST LARGE CORE BREAST BIOPSY PATIENT INFORMATION      1  Place an ice pack inside your bra over the top of the dressing every hour for 20 minutes (20 minutes on, 60 minutes off)  Do this until bedtime  2  Do not shower or bathe until the following morning  3  You may bathe your breast carefully with the steri-strips in place  Be careful    Not to loosen them  The steri-strips will fall off in 3-5 days  4  You may have mild discomfort, and you may have some bruising where the   Needle entered the skin  This should clear within 5-7 days  5  If you need medicine for discomfort, take acetaminophen products such as   Tylenol  You may also take Advil or Motrin products  6  Do not participate in strenuous activities such as-tennis, aerobics, skiing,  Weight lifting, etc  for 24 hours  Refrain from swimming/soaking for 72 hours  7  Wearing a bra for sleeping may be more comfortable for the first 24-48 hours  8  Watch for continued bleeding, pain or fever over 101; please call with any questions or concerns  For procedures done at the [facility] "[facility] call:  [facility] at [phone]                    *After 4 PM call the Interventional Radiology Department                    [phone] and ask to speak with the nurse on call  For procedures done at the [facility] or [address] [place] [address] call:         Daren Mandel RN at [phone]  *After 4 PM call your physician, or go to the Emergency Department  For procedures done at [address] [place] [address] call:  Sharon Simpson RN at [phone]  *After 4 PM call your physician, or go to the Emergency Department  9          The final results of your biopsy are usually available within one week

## 2021-03-15 NOTE — PROGRESS NOTES
Procedure type:    __x___ultrasound guided _____stereotactic    Breast:    __x___Left _____Right    Location: Left Breast 12 o'clock 5cm FN    Needle: 12G josue    # of passes: 3    Clip: Wing clip    Performed by: Lam Bruno MD    Pressure held for 5 minutes by: Hilton White RN    Steri Strips:    ___x__yes _____no    Band aid:    _____yes  ___x__no Gauze and paper tape in place    Tolerated procedure:    __x___yes _____no

## 2021-03-16 ENCOUNTER — TELEPHONE (OUTPATIENT)
Dept: RADIOLOGY | Facility: HOSPITAL | Age: 76
End: 2021-03-16

## 2021-03-16 NOTE — PROGRESS NOTES
Post procedure call completed: Tuesday 03/16/2021    Bleeding: __x___yes _____no     Patient reports one of the steri-strips did come slightly loose yesterday evening 03/15 & she did end up removing the gauze & steri-strips, & replacing them with extras I sent home with her yesterday 03/15 after her biopsy  After they were changed, Efren Mckeon says the spotting stopped  Pain: _____yes ___x___no      Patient reports minimal to no pain  She reports the ice pack did help with pain control yesterday evening 03/15  She has not required taking any additional oral medications for pain control  Redness/Swelling: ______yes ___x___no    Band aid removed: __x___yes _____no      Patient removed and replaced both the gauze bandage and the steri-strips as they ended up coming loose on the breast biopsy site yesterday evening 03/15  Steri-Strips intact: ___x___yes _____no  Per Surekha Ann are now in tact after replacing them yesterday evening 03/15  Patient with no other questions at this time  I advised her that Dr Vanessa Rucker or myself will call when results are available  I advised her to please call with any other questions or concerns  She has my name & p# for contact

## 2021-03-17 ENCOUNTER — OFFICE VISIT (OUTPATIENT)
Dept: GASTROENTEROLOGY | Facility: CLINIC | Age: 76
End: 2021-03-17
Payer: MEDICARE

## 2021-03-17 VITALS
DIASTOLIC BLOOD PRESSURE: 70 MMHG | SYSTOLIC BLOOD PRESSURE: 162 MMHG | HEART RATE: 89 BPM | BODY MASS INDEX: 38.89 KG/M2 | WEIGHT: 206 LBS | HEIGHT: 61 IN

## 2021-03-17 DIAGNOSIS — K62.5 RECTAL BLEEDING: Primary | ICD-10-CM

## 2021-03-17 DIAGNOSIS — R19.4 CHANGE IN BOWEL HABIT: ICD-10-CM

## 2021-03-17 PROCEDURE — 46221 LIGATION OF HEMORRHOID(S): CPT | Performed by: INTERNAL MEDICINE

## 2021-03-17 PROCEDURE — 99214 OFFICE O/P EST MOD 30 MIN: CPT | Performed by: INTERNAL MEDICINE

## 2021-03-17 NOTE — PROGRESS NOTES
Anal Excision Procedures  Performed by: Severo House, MD  Authorized by: Severo House, MD     Universal Protocol:     Risks and benefits: Risks, benefits and alternatives were discussed      Patient states understanding of procedure being performed: Yes      Patient's understanding of procedure matches consent: Yes      Procedure consent matches procedure scheduled: Yes      Relevant documents present and verified: Yes      Test results available and properly labeled: Yes      Site marked: Yes      Radiology Images displayed and confirmed  If images not available, report reviewed:  Yes    A time out verifies correct patient, procedure, equipment, support staff and site/side marked as required:   Procedure Type: hemorrhoidectomy    Hemorrhoidectomy Details:   Hemorrhoid type: internal    Internal position:  Ten o'clock  Single rubber band ligation    Patient tolerance:  Patient tolerated the procedure well with no immediate complications

## 2021-03-18 ENCOUNTER — TELEPHONE (OUTPATIENT)
Dept: RADIOLOGY | Facility: HOSPITAL | Age: 76
End: 2021-03-18

## 2021-03-18 ENCOUNTER — TELEPHONE (OUTPATIENT)
Dept: SURGICAL ONCOLOGY | Facility: CLINIC | Age: 76
End: 2021-03-18

## 2021-03-18 NOTE — TELEPHONE ENCOUNTER
New Patient Breast Form   Patient Details:     Felicity Ramirez     1945     [id number]     Background Information:   [address] starts by opening a telephone encounter and gathering the following information   Who is calling to schedule and relationship? [facility]   Referring Provider [facility]   To which speciality is the referral?  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 BREAST CANCER   Is there a confimed diagnosis from biopsy/tissue reviewed by Pathology? Yes   Date of Tissue Diagnosis (If done outside of [facility] please obtain report and slides) 3/2021   Is patient aware of diagnosis, and who made them aware? [facility]   If no tissue diagnosis, please stop and discuss with Navigator prior to scheduling   When was the diagnosis made? 3/2021   Were outside slides requested  No   If biopsy done externally, obtain reports and slides for internal review   Have you had any imaging or labs done? Yes   If YES, when and  where was the blood work done? [facility]   If outside of [facility] obtain records   Was the patient told to bring a disk? No   Are records in EPIC? Yes   Is there a personal history of cancer? YES   If YES please list type and YR diagnosed 2013 THYROID   If patient has a prior history of breast cancer were old records obtained? Have you ever had genetic testing done for breast cancer? If so, can you please bring a copy to appointment for timely treatment planning No   Is there a family history of cancer? No   If YES please list type    Does the patient smoke or Vape? No   If yes, how many packs or cartridges per day? Scheduling Information:   Preferred [address]   Are there any days the patient cannot be seen? How was New Patient Packet Sent? [employer]   Miscellaneous:   After completing the above information, please route to finance, nurse navigation and clinical trials for review

## 2021-03-24 DIAGNOSIS — M48.061 SPINAL STENOSIS OF LUMBAR REGION, UNSPECIFIED WHETHER NEUROGENIC CLAUDICATION PRESENT: ICD-10-CM

## 2021-03-24 RX ORDER — GABAPENTIN 300 MG/1
600 CAPSULE ORAL 3 TIMES DAILY
Qty: 540 CAPSULE | Refills: 2 | Status: SHIPPED | OUTPATIENT
Start: 2021-03-24 | End: 2021-03-25

## 2021-03-24 NOTE — TELEPHONE ENCOUNTER
Patient called and said she was on Gabapentin 600mg, 3x's a day and has been out of it for a month now and is in a lot of pain  She stated that Dr Forrest Dodge switched her to something new and it did not help??? She would like her gabapentin called into [facility] please for a 90 day supply        Any question call patient at [phone]

## 2021-03-25 ENCOUNTER — TELEPHONE (OUTPATIENT)
Dept: FAMILY MEDICINE CLINIC | Facility: CLINIC | Age: 76
End: 2021-03-25

## 2021-03-25 DIAGNOSIS — M48.061 SPINAL STENOSIS OF LUMBAR REGION, UNSPECIFIED WHETHER NEUROGENIC CLAUDICATION PRESENT: ICD-10-CM

## 2021-03-25 RX ORDER — GABAPENTIN 600 MG/1
600 TABLET ORAL 3 TIMES DAILY
Qty: 270 TABLET | Refills: 2 | Status: SHIPPED | OUTPATIENT
Start: 2021-03-25 | End: 2021-06-30 | Stop reason: ALTCHOICE

## 2021-03-25 RX ORDER — GABAPENTIN 600 MG/1
600 TABLET ORAL 3 TIMES DAILY
COMMUNITY
End: 2021-03-25 | Stop reason: SDUPTHER

## 2021-03-25 NOTE — TELEPHONE ENCOUNTER
Patient is upset  Gabapentin was sent to wrong pharmacy  Does NOT want it going to [facility]  Gabapentin 600mg 3 x day   #90 days (TABLETS, not caps)  [facility]

## 2021-03-29 ENCOUNTER — TELEPHONE (OUTPATIENT)
Dept: FAMILY MEDICINE CLINIC | Facility: CLINIC | Age: 76
End: 2021-03-29

## 2021-03-29 ENCOUNTER — PATIENT OUTREACH (OUTPATIENT)
Dept: SURGICAL ONCOLOGY | Facility: CLINIC | Age: 76
End: 2021-03-29

## 2021-03-29 DIAGNOSIS — I10 ESSENTIAL HYPERTENSION: Primary | ICD-10-CM

## 2021-03-29 NOTE — TELEPHONE ENCOUNTER
[address] [address] for #90 R2
Refill:  Metoprolol Tartrate 25mg 1 daily  [facility]
Sent to provider for approval
no

## 2021-03-30 DIAGNOSIS — Z23 ENCOUNTER FOR IMMUNIZATION: ICD-10-CM

## 2021-04-01 DIAGNOSIS — I10 ESSENTIAL HYPERTENSION: ICD-10-CM

## 2021-04-02 DIAGNOSIS — K62.5 RECTAL BLEEDING: ICD-10-CM

## 2021-04-02 DIAGNOSIS — R19.4 CHANGE IN BOWEL HABIT: ICD-10-CM

## 2021-04-05 RX ORDER — SOD SULF/POT CHLORIDE/MAG SULF 1.479 G
TABLET ORAL
Qty: 24 TABLET | Refills: 0 | Status: SHIPPED | OUTPATIENT
Start: 2021-04-05 | End: 2021-07-06

## 2021-04-12 ENCOUNTER — TELEPHONE (OUTPATIENT)
Dept: FAMILY MEDICINE CLINIC | Facility: CLINIC | Age: 76
End: 2021-04-12

## 2021-04-13 ENCOUNTER — TELEPHONE (OUTPATIENT)
Dept: SURGICAL ONCOLOGY | Facility: CLINIC | Age: 76
End: 2021-04-13

## 2021-04-16 PROBLEM — D05.12 DUCTAL CARCINOMA IN SITU (DCIS) OF LEFT BREAST: Status: ACTIVE | Noted: 2021-04-16

## 2021-04-19 ENCOUNTER — OFFICE VISIT (OUTPATIENT)
Dept: LAB | Facility: CLINIC | Age: 76
End: 2021-04-19
Payer: MEDICARE

## 2021-04-19 ENCOUNTER — APPOINTMENT (OUTPATIENT)
Dept: LAB | Facility: CLINIC | Age: 76
End: 2021-04-19
Payer: MEDICARE

## 2021-04-19 ENCOUNTER — CONSULT (OUTPATIENT)
Dept: SURGICAL ONCOLOGY | Facility: CLINIC | Age: 76
End: 2021-04-19
Payer: MEDICARE

## 2021-04-19 ENCOUNTER — HOSPITAL ENCOUNTER (OUTPATIENT)
Dept: RADIOLOGY | Facility: HOSPITAL | Age: 76
Discharge: HOME | End: 2021-04-19
Attending: SURGERY
Payer: MEDICARE

## 2021-04-19 ENCOUNTER — TRANSCRIBE ORDERS (OUTPATIENT)
Dept: LAB | Facility: CLINIC | Age: 76
End: 2021-04-19

## 2021-04-19 VITALS
TEMPERATURE: 98.7 F | BODY MASS INDEX: 38.33 KG/M2 | RESPIRATION RATE: 16 BRPM | DIASTOLIC BLOOD PRESSURE: 80 MMHG | SYSTOLIC BLOOD PRESSURE: 168 MMHG | HEART RATE: 90 BPM | WEIGHT: 203 LBS | HEIGHT: 61 IN

## 2021-04-19 DIAGNOSIS — D05.12 INTRADUCTAL CARCINOMA IN SITU OF LEFT BREAST: ICD-10-CM

## 2021-04-19 DIAGNOSIS — Z80.3 FAMILY HISTORY OF BREAST CANCER: ICD-10-CM

## 2021-04-19 DIAGNOSIS — D05.12 DUCTAL CARCINOMA IN SITU (DCIS) OF LEFT BREAST: ICD-10-CM

## 2021-04-19 DIAGNOSIS — Z01.818 PREOPERATIVE TESTING: ICD-10-CM

## 2021-04-19 DIAGNOSIS — D05.12 INTRADUCTAL CARCINOMA IN SITU OF LEFT BREAST: Primary | ICD-10-CM

## 2021-04-19 DIAGNOSIS — Z01.818 PREOP EXAMINATION: Primary | ICD-10-CM

## 2021-04-19 LAB
ALBUMIN SERPL BCP-MCNC: 3.6 G/DL (ref 3.5–5)
ALP SERPL-CCNC: 46 U/L (ref 46–116)
ALT SERPL W P-5'-P-CCNC: 28 U/L (ref 12–78)
ANION GAP SERPL CALCULATED.3IONS-SCNC: 9 MMOL/L (ref 4–13)
AST SERPL W P-5'-P-CCNC: 20 U/L (ref 5–45)
ATRIAL RATE: 74 BPM
BASOPHILS # BLD AUTO: 0.06 THOUSANDS/ΜL (ref 0–0.1)
BASOPHILS NFR BLD AUTO: 1 % (ref 0–1)
BILIRUB SERPL-MCNC: 0.42 MG/DL (ref 0.2–1)
BUN SERPL-MCNC: 10 MG/DL (ref 5–25)
CALCIUM SERPL-MCNC: 8.6 MG/DL (ref 8.3–10.1)
CHLORIDE SERPL-SCNC: 101 MMOL/L (ref 100–108)
CO2 SERPL-SCNC: 24 MMOL/L (ref 21–32)
CREAT SERPL-MCNC: 0.63 MG/DL (ref 0.6–1.3)
EOSINOPHIL # BLD AUTO: 0.05 THOUSAND/ΜL (ref 0–0.61)
EOSINOPHIL NFR BLD AUTO: 1 % (ref 0–6)
ERYTHROCYTE [DISTWIDTH] IN BLOOD BY AUTOMATED COUNT: 12.2 % (ref 11.6–15.1)
GFR SERPL CREATININE-BSD FRML MDRD: 88 ML/MIN/1.73SQ M
GLUCOSE SERPL-MCNC: 100 MG/DL (ref 65–140)
HCT VFR BLD AUTO: 35.9 % (ref 34.8–46.1)
HGB BLD-MCNC: 13 G/DL (ref 11.5–15.4)
IMM GRANULOCYTES # BLD AUTO: 0.04 THOUSAND/UL (ref 0–0.2)
IMM GRANULOCYTES NFR BLD AUTO: 1 % (ref 0–2)
LYMPHOCYTES # BLD AUTO: 1.86 THOUSANDS/ΜL (ref 0.6–4.47)
LYMPHOCYTES NFR BLD AUTO: 27 % (ref 14–44)
MCH RBC QN AUTO: 31.9 PG (ref 26.8–34.3)
MCHC RBC AUTO-ENTMCNC: 36.2 G/DL (ref 31.4–37.4)
MCV RBC AUTO: 88 FL (ref 82–98)
MISCELLANEOUS LAB TEST RESULT: NORMAL
MONOCYTES # BLD AUTO: 0.6 THOUSAND/ΜL (ref 0.17–1.22)
MONOCYTES NFR BLD AUTO: 9 % (ref 4–12)
NEUTROPHILS # BLD AUTO: 4.26 THOUSANDS/ΜL (ref 1.85–7.62)
NEUTS SEG NFR BLD AUTO: 61 % (ref 43–75)
NRBC BLD AUTO-RTO: 0 /100 WBCS
P AXIS: 55 DEGREES
PLATELET # BLD AUTO: 201 THOUSANDS/UL (ref 149–390)
PMV BLD AUTO: 9.6 FL (ref 8.9–12.7)
POTASSIUM SERPL-SCNC: 4.5 MMOL/L (ref 3.5–5.3)
PR INTERVAL: 168 MS
PROT SERPL-MCNC: 7 G/DL (ref 6.4–8.2)
QRS AXIS: -1 DEGREES
QRSD INTERVAL: 82 MS
QT INTERVAL: 390 MS
QTC INTERVAL: 432 MS
RBC # BLD AUTO: 4.07 MILLION/UL (ref 3.81–5.12)
SODIUM SERPL-SCNC: 134 MMOL/L (ref 136–145)
T WAVE AXIS: 98 DEGREES
VENTRICULAR RATE: 74 BPM
WBC # BLD AUTO: 6.87 THOUSAND/UL (ref 4.31–10.16)

## 2021-04-19 PROCEDURE — 99205 OFFICE O/P NEW HI 60 MIN: CPT | Performed by: SURGERY

## 2021-04-19 PROCEDURE — 93005 ELECTROCARDIOGRAM TRACING: CPT

## 2021-04-19 PROCEDURE — 71046 X-RAY EXAM CHEST 2 VIEWS: CPT

## 2021-04-19 PROCEDURE — 80053 COMPREHEN METABOLIC PANEL: CPT

## 2021-04-19 PROCEDURE — 36415 COLL VENOUS BLD VENIPUNCTURE: CPT

## 2021-04-19 PROCEDURE — 93010 ELECTROCARDIOGRAM REPORT: CPT | Performed by: INTERNAL MEDICINE

## 2021-04-19 PROCEDURE — 85025 COMPLETE CBC W/AUTO DIFF WBC: CPT

## 2021-04-19 RX ORDER — OXYCODONE HYDROCHLORIDE AND ACETAMINOPHEN 5; 325 MG/1; MG/1
1 TABLET ORAL EVERY 6 HOURS PRN
Qty: 6 TABLET | Refills: 0 | Status: SHIPPED | OUTPATIENT
Start: 2021-04-19 | End: 2021-06-30 | Stop reason: ALTCHOICE

## 2021-04-19 NOTE — PROGRESS NOTES
Call placed to patient regarding recommendation for;    ____ RIGHT ___X___LEFT      __X___SAVI  placement  Procedure explained to patient, additional questions answered at this time    __X___Verbalized understanding        Blood thinners:  __X___yes _____no (ASA 162mg)    Date stopped: ___N/A____    All teaching points discussed during call, pt with no questions at this time, pt adv to arrive at 1330 for 1400 insertion    Pt given name/# for any further questions/needs

## 2021-04-19 NOTE — PROGRESS NOTES
Surgical Oncology Consult Note       [address] [facility]  [facility]  [address]  [facility] [address]    Jaz William  1945  [id number]  [address]  [facility]  [address] [address]      Chief Complaint:   No chief complaint on file  Assessment and Plan:   Assessment/Plan   The patient presents with a new diagnosis of ductal carcinoma in situ  The patient has multiple family members with cancers  The patient informs me that she had ovarian cancer though the reports of possible endometrial cancer  She did have chemotherapy  We will try to get the records for this  She did have genetic testing was BRCA negative but this was over 10 years ago  I have recommended repeat genetic testing  We went through the process of informed consent for left breast ROSLYN  directed lumpectomy for her ductal carcinoma in situ  Oncology History:     Oncology History   Ductal carcinoma in situ (DCIS) of left breast   3/15/2021 Biopsy    US guided left breast biopsy:  12 o'clock 5 cm from the nipple  DCIS with areas of papillary architecture  Grade 2        Concordant  Malignancy appears unifocal  Mass covers an area of 2 6 x 1 2 cm  US left axilla negative  Right breast clear  History of Present Illness: This is a 80-year-old woman who was being followed for microcalcifications in the left breast   This was going to be her last six-month follow-up mammogram however at that time they recommended a biopsy which demonstrated a grade 2 ER positive AK positive ductal carcinoma in situ  The area covers approximately 2 6 cm x 1 2 cm  This is reported to be at the 12 o'clock position 5 cm from the nipple however the clip appears to be much closer to the nipple-areolar complex  She presents now for an opinion regarding further management      Review of Systems: Review of Systems   Constitutional: Negative for activity change, appetite change and fatigue  HENT: Negative  Eyes: Negative  Respiratory: Negative for cough, shortness of breath and wheezing  Cardiovascular: Negative for chest pain and leg swelling  Gastrointestinal: Negative  Endocrine: Negative  Genitourinary: Negative  Musculoskeletal:        No new changes or complaints of bone pain   Skin: Negative  Allergic/Immunologic: Negative  Neurological: Negative  Hematological: Negative  Psychiatric/Behavioral: Negative  Past Medical History:      Patient Active Problem List   Diagnosis    Coronary artery disease involving native coronary artery of native heart without angina pectoris    Type 2 diabetes mellitus without complication, without long-term current use of insulin (HCC)    Mixed dyslipidemia    Essential hypertension    Abnormal carotid ultrasound    Hypothyroidism    Spinal stenosis of lumbar region    Osteopenia of necks of both femurs    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[address] )    S/P lumbar fusion    Bilateral leg edema    Medicare annual wellness visit, subsequent    Ductal carcinoma in situ (DCIS) of left breast        Past Medical History:   Diagnosis Date    Arthritis     Coronary artery disease     Diabetes mellitus ([address] )     Hypercholesterolemia     Hypertension     Osteoporosis     Ovarian ca ([address] ) 1997    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[address] )     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        Past Surgical History:   Procedure Laterality Date    BACK SURGERY      CARPAL TUNNEL RELEASE      CHOLECYSTECTOMY      COLONOSCOPY      pt see Dr Danny Moe  Up to date with her colonoscopy   CORONARY STENT PLACEMENT      EYE SURGERY      cataract surgery    HYSTERECTOMY  1989    MAMMO (HISTORICAL)      up to date   see gyn    OOPHORECTOMY Bilateral 1997    US GUIDED BREAST BIOPSY LEFT COMPLETE Left 3/15/2021        Family History Problem Relation Age of Onset    Diabetes Family     Cancer Family     Arthritis Family     Heart disease Family     Endometrial cancer Sister 76    Esophageal cancer Father 61    Stomach cancer Brother 70    Diabetes Mother     Heart disease Mother     No Known Problems Daughter     No Known Problems Maternal Grandmother     Prostate cancer Maternal Grandfather     No Known Problems Paternal Grandmother     Prostate cancer Paternal Grandfather     Breast cancer Maternal Aunt 48    Breast cancer Other 39    Breast cancer Other 39    No Known Problems Sister     No Known Problems Sister     No Known Problems Sister     No Known Problems Paternal Aunt     No Known Problems Paternal Aunt     Multiple myeloma Brother 67        Social History     Socioeconomic History    Marital status: /Civil Union     Spouse name: Not on file    Number of children: Not on file    Years of education: Not on file    Highest education level: Not on file   Occupational History    Not on file   Social Needs    Financial resource strain: Not on file    Food insecurity     Worry: Not on file     Inability: Not on file    Transportation needs     Medical: Not on file     Non-medical: Not on file   Tobacco Use    Smoking status: Never Smoker    Smokeless tobacco: Never Used   Substance and Sexual Activity    Alcohol use: No    Drug use: No    Sexual activity: Not Currently   Lifestyle    Physical activity     Days per week: Not on file     Minutes per session: Not on file    Stress: Not on file   Relationships    Social connections     Talks on phone: Not on file     Gets together: Not on file     Attends [employer] service: Not on file     Active member of club or organization: Not on file     Attends meetings of clubs or organizations: Not on file     Relationship status: Not on file    Intimate partner violence     Fear of current or ex partner: Not on file     Emotionally abused: Not on file Physically abused: Not on file     Forced sexual activity: Not on file   Other Topics Concern    Not on file   Social History Narrative    · Tobacco smoking status:   Never smoker      This question's title has changed from "Smoking status" to "Tobacco smoking status" with the  update  Please use this field only for documenting tobacco smoking behavior  To accommodate this change, new fields for documenting smokeless tobacco and e-cigarette/vape usage have been added       · Smoking - how much          None  1 PPW  2 PPW  1/4 PPD  1/2 PPD  1 PPD  1 1/2 PPD  2 PPD  3+ PPD          NOTE     · Smokeless tobacco status          Never used smokeless tobacco  Former smokeless tobacco user  Current snuff user  Currently chews tobacco  Currently uses moist powdered tobacco  ----  Not indicated  Not tolerated  Patient refused          NOTE     · Tobacco-years of use               NOTE     · E-cigarette/vape status          Never used electronic cigarettes  Former user of electronic cigarettes  Current user of electronic cigarettes          NOTE     · Most recent tobacco use screenin2018      · Alcohol intake:   None         Per laine        Current Outpatient Medications:     Aspirin Buf,CaCarb-MgCarb-MgO, 81 MG TABS, aspirin 81 mg tablet, Disp: , Rfl:     clotrimazole-betamethasone (LOTRISONE) 1-0 05 % cream, , Disp: , Rfl:     gabapentin (NEURONTIN) 600 MG tablet, Take 1 tablet (600 mg total) by mouth 3 (three) times a day, Disp: 270 tablet, Rfl: 2    glipiZIDE (GLUCOTROL) 10 mg tablet, Take 1 tablet (10 mg total) by mouth 2 (two) times a day before meals, Disp: 180 tablet, Rfl: 2    levothyroxine 100 mcg tablet, Take 1 tablet (100 mcg total) by mouth daily, Disp: 90 tablet, Rfl: 3    Liraglutide (VICTOZA) 18 MG/3ML SOPN, Inject under the skin daily, Disp: , Rfl:     lisinopril (ZESTRIL) 20 mg tablet, Take 1 tablet (20 mg total) by mouth daily, Disp: 90 tablet, Rfl: 1    metFORMIN (GLUCOPHAGE) 1000 MG tablet, Take 1 tablet (1,000 mg total) by mouth every 12 (twelve) hours, Disp: 180 tablet, Rfl: 1    metoprolol tartrate (LOPRESSOR) 25 mg tablet, Take 1 tablet (25 mg total) by mouth every 12 (twelve) hours, Disp: 180 tablet, Rfl: 2    Sutab 9007-718-524 MG TABS, AS DIRECTED, Disp: 24 tablet, Rfl: 0    Insulin Pen Needle (Sure Comfort Pen Needles) 31G X 5 MM MISC, by Does not apply route daily, Disp: 100 each, Rfl: 3    Lancets (onetouch ultrasoft) lancets, Use daily N85 4 One touch Delica Plus, Disp: 536 each, Rfl: 3    OneTouch Ultra test strip, Use 1 each daily Use as instructed, Disp: 100 each, Rfl: 3     Allergies   Allergen Reactions    Duloxetine Other (See Comments)     Extreme somnolence/lethargy    Sulfa Antibiotics Rash       Physical Exam:     Vitals:    04/19/21 0757   BP: 168/80   Pulse: 90   Resp: 16   Temp: 98 7 °F (37 1 °C)     Physical Exam  Vitals signs reviewed  Constitutional:       Appearance: She is well-developed  HENT:      Head: Normocephalic and atraumatic  Eyes:      Pupils: Pupils are equal, round, and reactive to light  Neck:      Musculoskeletal: Normal range of motion and neck supple  Thyroid: No thyromegaly  Vascular: No JVD  Trachea: No tracheal deviation  Cardiovascular:      Rate and Rhythm: Normal rate and regular rhythm  Heart sounds: Normal heart sounds  No murmur  No friction rub  No gallop  Pulmonary:      Effort: Pulmonary effort is normal  No respiratory distress  Breath sounds: Normal breath sounds  No wheezing or rales  Chest:      Comments: The right breast was examined in the sitting and supine position  There are no worrisome skin changes, tenderness, inverted nipple, nipple discharge, swelling, bleeding or evidence of a mass in any quadrant  Adrianna survey demonstrated no evidence of any clinically suspicious axillary, pectoral or paraclavicular lymph nodes      The left breast was examined in the sitting and supine position  There are no worrisome skin changes, tenderness, inverted nipple, nipple discharge, swelling, bleeding or evidence of a mass in any quadrant  Adrianna survey demonstrated no evidence of any clinically suspicious axillary, pectoral or paraclavicular lymph nodes  Abdominal:      General: There is no distension  Palpations: Abdomen is soft  There is no hepatomegaly or mass  Tenderness: There is no abdominal tenderness  There is no guarding or rebound  Musculoskeletal: Normal range of motion  General: No tenderness  Lymphadenopathy:      Cervical: No cervical adenopathy  Skin:     General: Skin is warm and dry  Findings: No erythema or rash  Neurological:      Mental Status: She is alert and oriented to person, place, and time  Cranial Nerves: No cranial nerve deficit  Psychiatric:         Behavior: Behavior normal          Results:   I reviewed her mammograms  The clip appears to be quite close to the nipple-areolar complex  I explained she may need a nipple resection based on her films however her poor suggest that it is further away  I will re-evaluate this when I see her ROSLYN placement  Discussion/Summary:     Patient has ductal carcinoma in situ  We talked about mastectomy versus lumpectomy  She is agreeable to breast conservation therapy  We talked about possibly needing radiation therapy  We reviewed the prelude test as well  Even though the patient was tested for the BRCA gene and is negative this was at least a decade ago I have recommended she be retested we will try to coordinate that for her  We went through the process of informed consent for a left ROSLYN  directed lumpectomy  I would also mention the possibility of removing the nipple areolar complex  The patient and I underwent the process of consent for   Left breast ROSLYN  directed lumpectomy    The complications outlined on the consent including relatively minor problems (wound infection, wound healing problems, hematomas, scarring, chronic discomfort/pain), moderate problems (injury to nerves or blood vessels, allergic reactions) and major complications (extensive blood loss requiring transfusions or possible addtional surgeries, cardiac arrest, stroke and possible death)  We also reviewed specific complications as outlined on the consent form including  Possible need for additional therapies such as radiation therapy or the need to excise additional margins  All questions were answered to the patient's satisfaction  We will coordinate the surgery at our next [place] convience  Advance Care Planning/Advance Directives:  I discussed the disease status, treatment plans and follow-up with the patient

## 2021-04-19 NOTE — PATIENT INSTRUCTIONS
Pre-Surgery Instructions:   Medication Instructions    Aspirin Buf,CaCarb-MgCarb-MgO, 81 MG TABS per anesthesia guidelines     clotrimazole-betamethasone (LOTRISONE) 1-0 05 % cream Stop taking 1 days prior to surgery    gabapentin (NEURONTIN) 600 MG tablet Stop taking 1 days prior to surgery    glipiZIDE (GLUCOTROL) 10 mg tablet Stop taking 1 days prior to surgery    levothyroxine 100 mcg tablet Take morning of surgery    Liraglutide (VICTOZA) 18 MG/3ML SOPN Stop taking 1 days prior to surgery    lisinopril (ZESTRIL) 20 mg tablet Stop taking 1 days prior to surgery    metFORMIN (GLUCOPHAGE) 1000 MG tablet Stop taking 1 days prior to surgery    metoprolol tartrate (LOPRESSOR) 25 mg tablet Take morning of surgery    Sutab 7136-976-506 MG TABS Stop taking 1 days prior to surgery             Breast Lumpectomy   AMBULATORY CARE:   What you need to know about a lumpectomy:  A lumpectomy is surgery to remove a mass in your breast  Breast tissue that surrounds the mass may also be taken  A lumpectomy is also known as breast-conserving surgery, a partial mastectomy, or a segmental mastectomy  How to prepare for a lumpectomy:   · You may need a mammogram or ultrasound before surgery  These tests may be done the same day as your surgery or at an earlier time  Your healthcare provider may use pictures from these tests to alena the location of the mass  The marker will show him where to make your incision  · Your healthcare provider will talk to you about how to prepare for surgery  He may tell you not to eat or drink anything after midnight on the day of your surgery  He will tell you what medicines to take or not take on the day of your surgery  You may need to stop taking blood thinners or aspirin several days before your surgery  Arrange for someone to drive you home and stay with you for 24 hours after surgery  This person can help care for you, and monitor for any problems      What will happen during a lumpectomy:  You will be given general anesthesia to keep you asleep and free from pain during surgery  You may be given an antibiotic through your IV to help prevent a bacterial infection  Your healthcare provider will make an incision in your breast and remove the mass  He may also remove breast tissue or lymph nodes that are close to the mass  A drain may be inserted near your incision to remove extra fluid  This will decrease swelling and help your incision heal  Your healthcare provider will close your incision with stitches or strips of medical tape and cover it with a bandage  He may also wrap a tight-fitting bandage around both of your breasts  This may decrease swelling, bleeding, and pain  What will happen after a lumpectomy:  Healthcare providers will monitor you until you are awake  You may able to go home when you are awake and your pain is controlled  Instead you may need to spend the night in the hospital    Risks of a lumpectomy:  You may bleed more than expected or get an infection  Nerves, blood vessels, and muscles may be damaged during your surgery  You may have swelling in your arm closest to the lumpectomy or where lymph nodes were removed  This swelling is called lymphedema  Lymphedema may cause tingling, numbness, stiffness, and weakness in your arm  This may be permanent  You may get a blood clot in your arm or leg  The blood clot may travel to your heart lungs, or brain  This may become life-threatening  Call 911 for any of the following:   · You feel lightheaded, short of breath, and have chest pain  · You cough up blood  · You have trouble breathing  Seek care immediately if:   · Blood soaks through your bandage  · Your stitches come apart  · Your bruise suddenly gets bigger  · Your leg or arm is larger than normal and painful  Contact your healthcare provider if:   · You have a fever or chills  · Your wound is red, swollen, or draining pus      · You have nausea or are vomiting  · Your skin is itchy, swollen, or you have a rash  · Your pain does not get better after you take pain medicine  · Your drain falls out or stops draining fluid  · Your drain has pus or foul-smelling fluid coming out of it  · You have questions or concerns about your condition or care  Medicines: You may need any of the following:  · Antibiotics  help prevent a bacterial infection  · Prescription pain medicine  may be given  Ask your healthcare provider how to take this medicine safely  Some prescription pain medicines contain acetaminophen  Do not take other medicines that contain acetaminophen without talking to your healthcare provider  Too much acetaminophen may cause liver damage  Prescription pain medicine may cause constipation  Ask your healthcare provider how to prevent or treat constipation  · NSAIDs , such as ibuprofen, help decrease swelling, pain, and fever  NSAIDs can cause stomach bleeding or kidney problems in certain people  If you take blood thinner medicine, always ask your healthcare provider if NSAIDs are safe for you  Always read the medicine label and follow directions  · Take your medicine as directed  Contact your healthcare provider if you think your medicine is not helping or if you have side effects  Tell him or her if you are allergic to any medicine  Keep a list of the medicines, vitamins, and herbs you take  Include the amounts, and when and why you take them  Bring the list or the pill bottles to follow-up visits  Carry your medicine list with you in case of an emergency  Care for your wound as directed: If you have a tight-fitting bandage, you can remove it in 24 to 48 hours, or as directed  Ask your healthcare provider when your incision can get wet  You may need to take a sponge bath until your drain is removed  Carefully wash around the incision with soap and water   It is okay to allow the soap and water to gently run over your incision  Gently pat dry the area and put on new, clean bandages as directed  Change your bandages when they get wet or dirty  Check your incision every day for redness, pus, or swelling  Self-care:   · Apply ice  on your breast for 15 to 20 minutes every hour or as directed  Use an ice pack, or put crushed ice in a plastic bag  Cover it with a towel  Ice helps prevent tissue damage and decreases swelling and pain  · Rest  as directed  Do not lift anything heavy  Do not push or pull with your arms  Take short walks around the house  Gradually walk further as you feel better  Ask your healthcare provider when you can return to your normal activities  · Empty your drain  as directed  You may need to write down how much you empty from your drain each day  Ask your healthcare provider for more information about how to empty your drain  · Wear a supportive bra  as directed  Wait until you remove the tight-fitting bandage to wear a bra  You may be given a surgical bra or told to wear a sports bra  A supportive bra may help hold your bandages in place  It may also help with swelling and pain  Do not  wear bras with lace or underwire  They may rub against your incision and cause discomfort  Arm stretches: Your healthcare provider may show you how to do arm stretches  Arm stretches may prevent stiff arms or shoulders  You may need to wait until after your drains are removed to begin stretching  Do not do arm stretches until your healthcare provider says it is okay  Ask your healthcare provider for more information about arm stretches  Follow up with your healthcare provider as directed:  Write down your questions so you remember to ask them during your visits  © Copyright [address] Information is for End User's use only and may not be sold, redistributed or otherwise used for commercial purposes   All illustrations and images included in CareNotes® are the copyrighted property of [employer]  or [facility]  The above information is an  only  It is not intended as medical advice for individual conditions or treatments  Talk to your doctor, nurse or pharmacist before following any medical regimen to see if it is safe and effective for you

## 2021-04-21 ENCOUNTER — TELEPHONE (OUTPATIENT)
Dept: CARDIOLOGY CLINIC | Facility: CLINIC | Age: 76
End: 2021-04-21

## 2021-04-21 NOTE — TELEPHONE ENCOUNTER
Patient is scheduled for a lumpectomy on 5/14/21 with Dr Andrea Cortes  A cardiac clearance is required, please review and sign letter in chart if cleared thanks

## 2021-04-21 NOTE — LETTER
Cardiology Pre Operative Clearance      PRE OPERATIVE CARDIAC RISK ASSESSMENT    04/21/21    Marnatalia Junes  1945  [id number]    Date of Surgery: 5/14/2021    Type of Surgery: BREAST ROSLYN  DIRECTED LUMPECTOMY (Left Breast)    Surgeon: Dr Maylin Del Valle MD    No Cardiac Contraindication for Planned Surgical Procedures    Anticoagulation: no    Physician Comment: Proceed with planned intermediate cardiac risk surgery without further cardiac work-up  Electronically Signed: Abel Castellanos MD, [facility]

## 2021-04-22 ENCOUNTER — TELEPHONE (OUTPATIENT)
Dept: SURGICAL ONCOLOGY | Facility: CLINIC | Age: 76
End: 2021-04-22

## 2021-04-22 NOTE — TELEPHONE ENCOUNTER
Spoke with patient, she will be scheduled for May 27th, awaiting Dr Yasmin Perkins schedule to be available for the [facility] [place] location on that date  Will call patient and leave a message when appointment scheduled  Patient verbalized understanding and appreciative of the phone call

## 2021-04-22 NOTE — TELEPHONE ENCOUNTER
----- Message from Javi Thomas MA sent at 4/21/2021  1:54 PM EDT -----  Regarding: FW: Appointment    ----- Message -----  From: Alvina Cho  Sent: 4/21/2021   1:38 PM EDT  To: Javi Thomas MA  Subject: Appointment                                      Patient called in because she has a post-op scheduled for 5/26, but is unable to make that because her  is having a stress test done  I don't want to put her too soon or too late from her surgery date, would you look and tell me when is good or have someone call the patient?      Thank you!!

## 2021-04-22 NOTE — TELEPHONE ENCOUNTER
----- Message from Ada Segovia MA sent at 4/21/2021  1:54 PM EDT -----  Regarding: FW: Appointment    ----- Message -----  From: Lauren Mix  Sent: 4/21/2021   1:38 PM EDT  To: Ada Segovia MA  Subject: Appointment                                      Patient called in because she has a post-op scheduled for 5/26, but is unable to make that because her  is having a stress test done  I don't want to put her too soon or too late from her surgery date, would you look and tell me when is good or have someone call the patient?      Thank you!!

## 2021-04-30 ENCOUNTER — TELEPHONE (OUTPATIENT)
Dept: SURGICAL ONCOLOGY | Facility: CLINIC | Age: 76
End: 2021-04-30

## 2021-04-30 ENCOUNTER — CLINICAL SUPPORT (OUTPATIENT)
Dept: GENETICS | Facility: CLINIC | Age: 76
End: 2021-04-30

## 2021-04-30 DIAGNOSIS — C73 PAPILLARY ADENOCARCINOMA OF THYROID (HCC): ICD-10-CM

## 2021-04-30 DIAGNOSIS — D05.12 DUCTAL CARCINOMA IN SITU (DCIS) OF LEFT BREAST: ICD-10-CM

## 2021-04-30 DIAGNOSIS — Z15.09 GENETIC SUSCEPTIBILITY TO OTHER MALIGNANT NEOPLASM: Primary | ICD-10-CM

## 2021-04-30 DIAGNOSIS — Z80.3 FAMILY HISTORY OF BREAST CANCER: ICD-10-CM

## 2021-04-30 NOTE — TELEPHONE ENCOUNTER
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

## 2021-05-02 ENCOUNTER — APPOINTMENT (EMERGENCY)
Dept: RADIOLOGY | Facility: HOSPITAL | Age: 76
End: 2021-05-02
Payer: MEDICARE

## 2021-05-02 ENCOUNTER — HOSPITAL ENCOUNTER (EMERGENCY)
Facility: HOSPITAL | Age: 76
Discharge: HOME | End: 2021-05-02
Attending: EMERGENCY MEDICINE | Admitting: EMERGENCY MEDICINE
Payer: MEDICARE

## 2021-05-02 VITALS
HEIGHT: 61 IN | WEIGHT: 206 LBS | BODY MASS INDEX: 38.89 KG/M2 | HEART RATE: 68 BPM | OXYGEN SATURATION: 98 % | SYSTOLIC BLOOD PRESSURE: 190 MMHG | RESPIRATION RATE: 18 BRPM | TEMPERATURE: 97.5 F | DIASTOLIC BLOOD PRESSURE: 84 MMHG

## 2021-05-02 DIAGNOSIS — K52.9 ENTERITIS: Primary | ICD-10-CM

## 2021-05-02 DIAGNOSIS — R10.9 ABDOMINAL PAIN: ICD-10-CM

## 2021-05-02 LAB
ALBUMIN SERPL BCP-MCNC: 3.6 G/DL (ref 3.5–5)
ALP SERPL-CCNC: 45 U/L (ref 46–116)
ALT SERPL W P-5'-P-CCNC: 32 U/L (ref 12–78)
AMORPH PHOS CRY URNS QL MICRO: ABNORMAL /HPF
ANION GAP SERPL CALCULATED.3IONS-SCNC: 10 MMOL/L (ref 4–13)
AST SERPL W P-5'-P-CCNC: 17 U/L (ref 5–45)
BACTERIA UR QL AUTO: ABNORMAL /HPF
BASOPHILS # BLD AUTO: 0.06 THOUSANDS/ΜL (ref 0–0.1)
BASOPHILS NFR BLD AUTO: 1 % (ref 0–1)
BILIRUB SERPL-MCNC: 0.42 MG/DL (ref 0.2–1)
BILIRUB UR QL STRIP: NEGATIVE
BUN SERPL-MCNC: 15 MG/DL (ref 5–25)
CALCIUM SERPL-MCNC: 8.9 MG/DL (ref 8.3–10.1)
CHLORIDE SERPL-SCNC: 99 MMOL/L (ref 100–108)
CLARITY UR: CLEAR
CO2 SERPL-SCNC: 29 MMOL/L (ref 21–32)
COLOR UR: YELLOW
CREAT SERPL-MCNC: 0.75 MG/DL (ref 0.6–1.3)
EOSINOPHIL # BLD AUTO: 0.08 THOUSAND/ΜL (ref 0–0.61)
EOSINOPHIL NFR BLD AUTO: 1 % (ref 0–6)
ERYTHROCYTE [DISTWIDTH] IN BLOOD BY AUTOMATED COUNT: 12 % (ref 11.6–15.1)
GFR SERPL CREATININE-BSD FRML MDRD: 78 ML/MIN/1.73SQ M
GLUCOSE SERPL-MCNC: 162 MG/DL (ref 65–140)
GLUCOSE UR STRIP-MCNC: NEGATIVE MG/DL
HCT VFR BLD AUTO: 37.2 % (ref 34.8–46.1)
HGB BLD-MCNC: 12.8 G/DL (ref 11.5–15.4)
HGB UR QL STRIP.AUTO: NEGATIVE
IMM GRANULOCYTES # BLD AUTO: 0.04 THOUSAND/UL (ref 0–0.2)
IMM GRANULOCYTES NFR BLD AUTO: 0 % (ref 0–2)
KETONES UR STRIP-MCNC: NEGATIVE MG/DL
LACTATE SERPL-SCNC: 1.7 MMOL/L (ref 0.5–2)
LEUKOCYTE ESTERASE UR QL STRIP: ABNORMAL
LIPASE SERPL-CCNC: 221 U/L (ref 73–393)
LYMPHOCYTES # BLD AUTO: 2.5 THOUSANDS/ΜL (ref 0.6–4.47)
LYMPHOCYTES NFR BLD AUTO: 22 % (ref 14–44)
MAGNESIUM SERPL-MCNC: 1.8 MG/DL (ref 1.6–2.6)
MCH RBC QN AUTO: 31.1 PG (ref 26.8–34.3)
MCHC RBC AUTO-ENTMCNC: 34.4 G/DL (ref 31.4–37.4)
MCV RBC AUTO: 91 FL (ref 82–98)
MONOCYTES # BLD AUTO: 0.89 THOUSAND/ΜL (ref 0.17–1.22)
MONOCYTES NFR BLD AUTO: 8 % (ref 4–12)
NEUTROPHILS # BLD AUTO: 7.63 THOUSANDS/ΜL (ref 1.85–7.62)
NEUTS SEG NFR BLD AUTO: 68 % (ref 43–75)
NITRITE UR QL STRIP: NEGATIVE
NON-SQ EPI CELLS URNS QL MICRO: ABNORMAL /HPF
NRBC BLD AUTO-RTO: 0 /100 WBCS
PH UR STRIP.AUTO: 8.5 [PH]
PLATELET # BLD AUTO: 210 THOUSANDS/UL (ref 149–390)
PMV BLD AUTO: 9.6 FL (ref 8.9–12.7)
POTASSIUM SERPL-SCNC: 4.2 MMOL/L (ref 3.5–5.3)
PROT SERPL-MCNC: 6.8 G/DL (ref 6.4–8.2)
PROT UR STRIP-MCNC: NEGATIVE MG/DL
RBC # BLD AUTO: 4.11 MILLION/UL (ref 3.81–5.12)
RBC #/AREA URNS AUTO: ABNORMAL /HPF
SODIUM SERPL-SCNC: 138 MMOL/L (ref 136–145)
SP GR UR STRIP.AUTO: 1.01 (ref 1–1.03)
UROBILINOGEN UR QL STRIP.AUTO: 0.2 E.U./DL
WBC # BLD AUTO: 11.2 THOUSAND/UL (ref 4.31–10.16)
WBC #/AREA URNS AUTO: ABNORMAL /HPF

## 2021-05-02 PROCEDURE — G1004 CDSM NDSC: HCPCS

## 2021-05-02 PROCEDURE — 99285 EMERGENCY DEPT VISIT HI MDM: CPT | Performed by: EMERGENCY MEDICINE

## 2021-05-02 PROCEDURE — 99285 EMERGENCY DEPT VISIT HI MDM: CPT

## 2021-05-02 PROCEDURE — 83605 ASSAY OF LACTIC ACID: CPT | Performed by: EMERGENCY MEDICINE

## 2021-05-02 PROCEDURE — 81001 URINALYSIS AUTO W/SCOPE: CPT | Performed by: EMERGENCY MEDICINE

## 2021-05-02 PROCEDURE — 93005 ELECTROCARDIOGRAM TRACING: CPT

## 2021-05-02 PROCEDURE — 80053 COMPREHEN METABOLIC PANEL: CPT | Performed by: EMERGENCY MEDICINE

## 2021-05-02 PROCEDURE — 83690 ASSAY OF LIPASE: CPT | Performed by: EMERGENCY MEDICINE

## 2021-05-02 PROCEDURE — 74177 CT ABD & PELVIS W/CONTRAST: CPT

## 2021-05-02 PROCEDURE — 36415 COLL VENOUS BLD VENIPUNCTURE: CPT | Performed by: EMERGENCY MEDICINE

## 2021-05-02 PROCEDURE — 96374 THER/PROPH/DIAG INJ IV PUSH: CPT

## 2021-05-02 PROCEDURE — 85025 COMPLETE CBC W/AUTO DIFF WBC: CPT | Performed by: EMERGENCY MEDICINE

## 2021-05-02 PROCEDURE — 83735 ASSAY OF MAGNESIUM: CPT | Performed by: EMERGENCY MEDICINE

## 2021-05-02 RX ORDER — DICYCLOMINE HCL 20 MG
20 TABLET ORAL 2 TIMES DAILY
Qty: 10 TABLET | Refills: 0 | Status: SHIPPED | OUTPATIENT
Start: 2021-05-02 | End: 2021-07-06

## 2021-05-02 RX ORDER — MORPHINE SULFATE 4 MG/ML
4 INJECTION, SOLUTION INTRAMUSCULAR; INTRAVENOUS ONCE
Status: COMPLETED | OUTPATIENT
Start: 2021-05-02 | End: 2021-05-02

## 2021-05-02 RX ADMIN — IOHEXOL 100 ML: 350 INJECTION, SOLUTION INTRAVENOUS at 21:57

## 2021-05-02 RX ADMIN — MORPHINE SULFATE 4 MG: 4 INJECTION INTRAVENOUS at 21:21

## 2021-05-03 LAB
ATRIAL RATE: 70 BPM
P AXIS: 45 DEGREES
PR INTERVAL: 174 MS
QRS AXIS: 9 DEGREES
QRSD INTERVAL: 84 MS
QT INTERVAL: 414 MS
QTC INTERVAL: 447 MS
T WAVE AXIS: 76 DEGREES
VENTRICULAR RATE: 70 BPM

## 2021-05-03 PROCEDURE — 93010 ELECTROCARDIOGRAM REPORT: CPT | Performed by: INTERNAL MEDICINE

## 2021-05-03 NOTE — ED PROVIDER NOTES
History  Chief Complaint   Patient presents with    Abdominal Pain     Patient states she started having right mid quadrant pain around 4:30pm today,has no gallbladder or appendix,pain is intermittent and is sharp     67-year-old female presents with the right-sided mid to lower abdominal pain that started today acutely sharp continuous currently 8/10 nothing makes it better worse  No associated nausea vomiting fevers chills abdominal pain dysuria urgency frequency no diarrhea constipation no other symptoms  History of cholecystectomy and appendectomy noted  No cough congestion chest pain shortness of breath no other symptoms  History provided by:  Patient   used: No        Prior to Admission Medications   Prescriptions Last Dose Informant Patient Reported? Taking?    Aspirin Buf,CaCarb-MgCarb-MgO, 81 MG TABS  Self Yes Yes   Sig: aspirin 81 mg tablet   Insulin Pen Needle (Sure Comfort Pen Needles) 31G X 5 MM MISC  Self No Yes   Sig: by Does not apply route daily   Lancets (onetouch ultrasoft) lancets  Self No Yes   Sig: Use daily J37 5 One touch Delica Plus   Liraglutide (VICTOZA) 18 MG/3ML SOPN  Self Yes Yes   Sig: Inject under the skin daily   OneTouch Ultra test strip  Self No Yes   Sig: Use 1 each daily Use as instructed   Sutab 3015-053-020 MG TABS   No Yes   Sig: AS DIRECTED   clotrimazole-betamethasone (LOTRISONE) 1-0 05 % cream  Self Yes Yes   gabapentin (NEURONTIN) 600 MG tablet Not Taking at Unknown time  No No   Sig: Take 1 tablet (600 mg total) by mouth 3 (three) times a day   Patient not taking: Reported on 5/2/2021   glipiZIDE (GLUCOTROL) 10 mg tablet  Self No Yes   Sig: Take 1 tablet (10 mg total) by mouth 2 (two) times a day before meals   levothyroxine 100 mcg tablet  Self No Yes   Sig: Take 1 tablet (100 mcg total) by mouth daily   lisinopril (ZESTRIL) 20 mg tablet  Self No Yes   Sig: Take 1 tablet (20 mg total) by mouth daily   metoprolol tartrate (LOPRESSOR) 25 mg tablet   No Yes   Sig: Take 1 tablet (25 mg total) by mouth every 12 (twelve) hours   oxyCODONE-acetaminophen (PERCOCET) 5-325 mg per tablet Not Taking at Unknown time  No No   Sig: Take 1 tablet by mouth every 6 (six) hours as needed for moderate painMax Daily Amount: 4 tablets   Patient not taking: Reported on 5/2/2021      Facility-Administered Medications: None       Past Medical History:   Diagnosis Date    Arthritis     Coronary artery disease     Diabetes mellitus ([facility] [address] )     Hypercholesterolemia     Hypertension     Osteoporosis     Ovarian ca ([facility] [address] ) 1997    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[facility] [address] )     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       Past Surgical History:   Procedure Laterality Date    APPENDECTOMY      BACK SURGERY      CARPAL TUNNEL RELEASE      CHOLECYSTECTOMY      COLONOSCOPY      pt see Dr Veronica Hdez  Up to date with her colonoscopy   CORONARY STENT PLACEMENT      EYE SURGERY      cataract surgery    GALLBLADDER SURGERY      HYSTERECTOMY  1989    MAMMO (HISTORICAL)      up to date   see gyn    OOPHORECTOMY Bilateral 1997    US GUIDED BREAST BIOPSY LEFT COMPLETE Left 3/15/2021       Family History   Problem Relation Age of Onset    Diabetes Family     Cancer Family     Arthritis Family     Heart disease Family     Endometrial cancer Sister 76    Esophageal cancer Father 61    Stomach cancer Brother 70    Diabetes Mother     Heart disease Mother     No Known Problems Daughter     No Known Problems Maternal Grandmother     Prostate cancer Maternal Grandfather     No Known Problems Paternal Grandmother     Prostate cancer Paternal Grandfather     Breast cancer Maternal Aunt 48    Breast cancer Other 39    Breast cancer Other 39    No Known Problems Sister     No Known Problems Sister     No Known Problems Sister     No Known Problems Paternal Aunt     No Known Problems Paternal Aunt     Multiple myeloma Brother 67     I have reviewed and agree with the history as documented  E-Cigarette/Vaping    E-Cigarette Use Never User      E-Cigarette/Vaping Substances    Nicotine No     THC No     CBD No     Flavoring No     Other No     Unknown No      Social History     Tobacco Use    Smoking status: Never Smoker    Smokeless tobacco: Never Used   Substance Use Topics    Alcohol use: No    Drug use: No       Review of Systems   Constitutional: Negative  HENT: Negative  Eyes: Negative  Respiratory: Negative  Cardiovascular: Negative  Gastrointestinal: Positive for abdominal pain  Endocrine: Negative  Genitourinary: Negative  Musculoskeletal: Negative  Skin: Negative  Allergic/Immunologic: Negative  Neurological: Negative  Hematological: Negative  Psychiatric/Behavioral: Negative  All other systems reviewed and are negative  Physical Exam  Physical Exam  Vitals signs and nursing note reviewed  Constitutional:       Appearance: She is well-developed  HENT:      Head: Normocephalic and atraumatic  Eyes:      Pupils: Pupils are equal, round, and reactive to light  Neck:      Musculoskeletal: Normal range of motion and neck supple  Cardiovascular:      Rate and Rhythm: Normal rate and regular rhythm  Pulmonary:      Effort: Pulmonary effort is normal       Breath sounds: Normal breath sounds  Abdominal:      General: Bowel sounds are normal       Palpations: Abdomen is soft  Tenderness: There is abdominal tenderness in the right upper quadrant and right lower quadrant  There is no right CVA tenderness, left CVA tenderness, guarding or rebound  Negative signs include Fofana's sign, Rovsing's sign, McBurney's sign, psoas sign and obturator sign  Musculoskeletal: Normal range of motion  Skin:     General: Skin is warm and dry  Neurological:      Mental Status: She is alert and oriented to person, place, and time           Vital Signs  ED Triage Vitals   Temperature Pulse Respirations Blood Pressure SpO2   05/02/21 2047 05/02/21 2047 05/02/21 2047 05/02/21 2047 05/02/21 2047   97 5 °F (36 4 °C) 80 18 (!) 222/119 98 %      Temp Source Heart Rate Source Patient Position - Orthostatic VS BP Location FiO2 (%)   05/02/21 2047 05/02/21 2047 05/02/21 2047 05/02/21 2047 --   Tympanic Monitor Lying Right arm       Pain Score       05/02/21 2121       7           Vitals:    05/02/21 2230 05/02/21 2300 05/02/21 2315 05/02/21 2330   BP: (!) 190/84      Pulse: 78 72 72 68   Patient Position - Orthostatic VS: Sitting            Visual Acuity      ED Medications  Medications   morphine (PF) 4 mg/mL injection 4 mg (4 mg Intravenous Given 5/2/21 2121)   iohexol (OMNIPAQUE) 350 MG/ML injection (SINGLE-DOSE) 100 mL (100 mL Intravenous Given 5/2/21 2157)       Diagnostic Studies  Results Reviewed     Procedure Component Value Units Date/Time    Urine Microscopic [[id number]]  (Abnormal) Collected: 05/02/21 2221    Lab Status: Final result Specimen: Urine, Clean Catch Updated: 05/02/21 2237     RBC, UA 0-1 /hpf      WBC, UA 4-10 /hpf      Epithelial Cells None Seen /hpf      Bacteria, UA Moderate /hpf      AMORPH PHOSPATES Occasional /hpf     UA (URINE) with reflex to Scope [[id number]]  (Abnormal) Collected: 05/02/21 2221    Lab Status: Final result Specimen: Urine, Clean Catch Updated: 05/02/21 2228     Color, UA Yellow     Clarity, UA Clear     Specific Gravity, UA 1 010     pH, UA 8 5     Leukocytes, UA Trace     Nitrite, UA Negative     Protein, UA Negative mg/dl      Glucose, UA Negative mg/dl      Ketones, UA Negative mg/dl      Urobilinogen, UA 0 2 E U /dl      Bilirubin, UA Negative     Blood, UA Negative    Lactic acid [[id number]]  (Normal) Collected: 05/02/21 2059    Lab Status: Final result Specimen: Blood from Arm, Right Updated: 05/02/21 2127     LACTIC ACID 1 7 mmol/L     Narrative:      Result may be elevated if tourniquet was used during collection      Comprehensive metabolic panel [[id number]]  (Abnormal) Collected: 05/02/21 2059    Lab Status: Final result Specimen: Blood from Arm, Right Updated: 05/02/21 2122     Sodium 138 mmol/L      Potassium 4 2 mmol/L      Chloride 99 mmol/L      CO2 29 mmol/L      ANION GAP 10 mmol/L      BUN 15 mg/dL      Creatinine 0 75 mg/dL      Glucose 162 mg/dL      Calcium 8 9 mg/dL      AST 17 U/L      ALT 32 U/L      Alkaline Phosphatase 45 U/L      Total Protein 6 8 g/dL      Albumin 3 6 g/dL      Total Bilirubin 0 42 mg/dL      eGFR 78 ml/min/1 73sq m     Narrative:      [place] guidelines for Chronic Kidney Disease (CKD):     Stage 1 with normal or high GFR (GFR > 90 mL/min/1 73 square meters)    Stage 2 Mild CKD (GFR = 60-89 mL/min/1 73 square meters)    Stage 3A Moderate CKD (GFR = 45-59 mL/min/1 73 square meters)    Stage 3B Moderate CKD (GFR = 30-44 mL/min/1 73 square meters)    Stage 4 Severe CKD (GFR = 15-29 mL/min/1 73 square meters)    Stage 5 End Stage CKD (GFR <15 mL/min/1 73 square meters)  Note: GFR calculation is accurate only with a steady state creatinine    Magnesium [[id number]]  (Normal) Collected: 05/02/21 2059    Lab Status: Final result Specimen: Blood from Arm, Right Updated: 05/02/21 2122     Magnesium 1 8 mg/dL     Lipase [[id number]]  (Normal) Collected: 05/02/21 2059    Lab Status: Final result Specimen: Blood from Arm, Right Updated: 05/02/21 2122     Lipase 221 u/L     CBC and differential [[id number]]  (Abnormal) Collected: 05/02/21 2059    Lab Status: Final result Specimen: Blood from Arm, Right Updated: 05/02/21 2105     WBC 11 20 Thousand/uL      RBC 4 11 Million/uL      Hemoglobin 12 8 g/dL      Hematocrit 37 2 %      MCV 91 fL      MCH 31 1 pg      MCHC 34 4 g/dL      RDW 12 0 %      MPV 9 6 fL      Platelets 906 Thousands/uL      nRBC 0 /100 WBCs      Neutrophils Relative 68 %      Immat GRANS % 0 %      Lymphocytes Relative 22 %      Monocytes Relative 8 %      Eosinophils Relative 1 %      Basophils Relative 1 % Neutrophils Absolute 7 63 Thousands/µL      Immature Grans Absolute 0 04 Thousand/uL      Lymphocytes Absolute 2 50 Thousands/µL      Monocytes Absolute 0 89 Thousand/µL      Eosinophils Absolute 0 08 Thousand/µL      Basophils Absolute 0 06 Thousands/µL                  CT abdomen pelvis with contrast   Final Result by Khai Bajwa DO (05/02 8259)      Minimal thickening of loops of small bowel in the mid hemiabdomen which may represent enteritis  Thickening versus underdistention of the urinary bladder which may represent cystitis  Workstation performed: [id number]                    Procedures  ECG 12 Lead Documentation Only    Date/Time: 5/2/2021 9:21 PM  Performed by: Juancarlos Wilkins DO  Authorized by: Juancarlos Wilkins DO     ECG reviewed by me, the ED Provider: yes    Patient location:  ED  Previous ECG:     Previous ECG:  Unavailable    Comparison to cardiac monitor: Yes    Interpretation:     Interpretation: non-specific    Rate:     ECG rate assessment: normal    Rhythm:     Rhythm: sinus rhythm    Ectopy:     Ectopy: none    QRS:     QRS axis:  Normal  Conduction:     Conduction: normal    ST segments:     ST segments:  Non-specific  T waves:     T waves: non-specific               ED Course                             SBIRT 20yo+      Most Recent Value   SBIRT (22 yo +)   In order to provide better care to our patients, we are screening all of our patients for alcohol and drug use  Would it be okay to ask you these screening questions?   No Filed at: 05/02/2021 2100                    MDM  Number of Diagnoses or Management Options  Abdominal pain:   Enteritis:      Amount and/or Complexity of Data Reviewed  Clinical lab tests: ordered and reviewed  Tests in the radiology section of CPT®: ordered and reviewed  Tests in the medicine section of CPT®: ordered and reviewed    Patient Progress  Patient progress: stable      Disposition  Final diagnoses:   Enteritis   Abdominal pain     Time reflects when diagnosis was documented in both MDM as applicable and the Disposition within this note     Time User Action Codes Description Comment    5/2/2021 11:12 PM Anepu, Percececilia Elvira Add [K52 9] Enteritis     5/2/2021 11:13 PM Anepu, Percececilia Elvira Add [R10 9] Abdominal pain       ED Disposition     ED Disposition Condition Date/Time Comment    Discharge Stable Sun May 2, 2021 11:12 PM Damianbrooklyn Venkata discharge to home/self care              Follow-up Information     Follow up With Specialties Details Why Contact Info Additional Information    Anjum Brown MD Family Medicine Schedule an appointment as soon as possible for a visit   [address]  [facility] [address] Emergency Department Emergency Medicine  If symptoms worsen [address] [address] Emergency Department, [place], [place], [address]          Discharge Medication List as of 5/2/2021 11:13 PM      START taking these medications    Details   dicyclomine (BENTYL) 20 mg tablet Take 1 tablet (20 mg total) by mouth 2 (two) times a day for 5 days, Starting Sun 5/2/2021, Until Fri 5/7/2021, Normal         CONTINUE these medications which have NOT CHANGED    Details   Aspirin Buf,CaCarb-MgCarb-MgO, 81 MG TABS aspirin 81 mg tablet, Historical Med      clotrimazole-betamethasone (LOTRISONE) 1-0 05 % cream Starting Tue 3/10/2020, Historical Med      glipiZIDE (GLUCOTROL) 10 mg tablet Take 1 tablet (10 mg total) by mouth 2 (two) times a day before meals, Starting Thu 1/28/2021, Normal      Insulin Pen Needle (Sure Comfort Pen Needles) 31G X 5 MM MISC by Does not apply route daily, Starting Thu 9/24/2020, Normal      Lancets (onetouch ultrasoft) lancets Use daily S94 3 One touch Delica Plus, Starting Wed 2/3/2021, Normal      levothyroxine 100 mcg tablet Take 1 tablet (100 mcg total) by mouth daily, Starting Tue 10/6/2020, Normal Liraglutide (VICTOZA) 18 MG/3ML SOPN Inject under the skin daily, Starting Wed 7/12/2017, Historical Med      lisinopril (ZESTRIL) 20 mg tablet Take 1 tablet (20 mg total) by mouth daily, Starting Wed 3/10/2021, Normal      metoprolol tartrate (LOPRESSOR) 25 mg tablet Take 1 tablet (25 mg total) by mouth every 12 (twelve) hours, Starting Thu 4/1/2021, Normal      OneTouch Ultra test strip Use 1 each daily Use as instructed, Starting Mon 2/15/2021, Normal      Sutab 3307-723-812 MG TABS AS DIRECTED, Normal      gabapentin (NEURONTIN) 600 MG tablet Take 1 tablet (600 mg total) by mouth 3 (three) times a day, Starting Thu 3/25/2021, Until Sat 6/26/2021, Normal      oxyCODONE-acetaminophen (PERCOCET) 5-325 mg per tablet Take 1 tablet by mouth every 6 (six) hours as needed for moderate painMax Daily Amount: 4 tablets, Starting Mon 4/19/2021, Normal           No discharge procedures on file      PDMP Review       Value Time User    PDMP Reviewed  Yes 4/19/2021  8:56 AM Candice Wilson MD          ED Provider  Electronically Signed by           Sara Castillo DO  05/03/21 0104

## 2021-05-06 ENCOUNTER — HOSPITAL ENCOUNTER (OUTPATIENT)
Dept: MAMMOGRAPHY | Facility: CLINIC | Age: 76
Discharge: HOME | End: 2021-05-06
Payer: MEDICARE

## 2021-05-06 ENCOUNTER — HOSPITAL ENCOUNTER (OUTPATIENT)
Dept: ULTRASOUND IMAGING | Facility: CLINIC | Age: 76
Discharge: HOME | End: 2021-05-06
Payer: MEDICARE

## 2021-05-06 ENCOUNTER — DOCUMENTATION (OUTPATIENT)
Dept: SURGICAL ONCOLOGY | Facility: CLINIC | Age: 76
End: 2021-05-06

## 2021-05-06 VITALS — HEART RATE: 91 BPM | DIASTOLIC BLOOD PRESSURE: 84 MMHG | SYSTOLIC BLOOD PRESSURE: 135 MMHG

## 2021-05-06 DIAGNOSIS — R92.8 ABNORMAL ULTRASOUND OF BREAST: ICD-10-CM

## 2021-05-06 DIAGNOSIS — R92.8 ABNORMAL MAMMOGRAM: ICD-10-CM

## 2021-05-06 DIAGNOSIS — D05.12 DUCTAL CARCINOMA IN SITU (DCIS) OF LEFT BREAST: ICD-10-CM

## 2021-05-06 PROCEDURE — 88341 IMHCHEM/IMCYTCHM EA ADD ANTB: CPT | Performed by: PATHOLOGY

## 2021-05-06 PROCEDURE — 88305 TISSUE EXAM BY PATHOLOGIST: CPT | Performed by: PATHOLOGY

## 2021-05-06 PROCEDURE — 19083 BX BREAST 1ST LESION US IMAG: CPT

## 2021-05-06 PROCEDURE — 88361 TUMOR IMMUNOHISTOCHEM/COMPUT: CPT | Performed by: PATHOLOGY

## 2021-05-06 PROCEDURE — 88342 IMHCHEM/IMCYTCHM 1ST ANTB: CPT | Performed by: PATHOLOGY

## 2021-05-06 PROCEDURE — 19285 PERQ DEV BREAST 1ST US IMAG: CPT

## 2021-05-06 PROCEDURE — A4648 IMPLANTABLE TISSUE MARKER: HCPCS

## 2021-05-06 PROCEDURE — 19084 BX BREAST ADD LESION US IMAG: CPT

## 2021-05-06 RX ORDER — LIDOCAINE HYDROCHLORIDE 10 MG/ML
5 INJECTION, SOLUTION EPIDURAL; INFILTRATION; INTRACAUDAL; PERINEURAL ONCE
Status: COMPLETED | OUTPATIENT
Start: 2021-05-06 | End: 2021-05-06

## 2021-05-06 RX ADMIN — LIDOCAINE HYDROCHLORIDE 5 ML: 10 INJECTION, SOLUTION EPIDURAL; INFILTRATION; INTRACAUDAL; PERINEURAL at 14:38

## 2021-05-06 RX ADMIN — LIDOCAINE HYDROCHLORIDE 5 ML: 10 INJECTION, SOLUTION EPIDURAL; INFILTRATION; INTRACAUDAL; PERINEURAL at 14:35

## 2021-05-06 RX ADMIN — LIDOCAINE HYDROCHLORIDE 5 ML: 10 INJECTION, SOLUTION EPIDURAL; INFILTRATION; INTRACAUDAL; PERINEURAL at 14:27

## 2021-05-06 NOTE — DISCHARGE INSTR - OTHER ORDERS
POST LARGE CORE BREAST BIOPSY PATIENT INFORMATION      1  Place an ice pack inside your bra over the top of the dressing every hour for 20 minutes (20 minutes on, 60 minutes off)  Do this until bedtime  2  Do not shower or bathe until the following morning  3  You may bathe your breast carefully with the steri-strips in place  Be careful    Not to loosen them  The steri-strips will fall off in 3-5 days  4  You may have mild discomfort, and you may have some bruising where the   Needle entered the skin  This should clear within 5-7 days  5  If you need medicine for discomfort, take acetaminophen products such as   Tylenol  You may also take Advil or Motrin products  6  Do not participate in strenuous activities such as-tennis, aerobics, skiing,  Weight lifting, etc  for 24 hours  Refrain from swimming/soaking for 72 hours  7  Wearing a bra for sleeping may be more comfortable for the first 24-48 hours  8  Watch for continued bleeding, pain or fever over 101; please call with any questions or concerns  For procedures done at the [facility] [address] call:  Alvina Gonsales RN at [facility] RN at [phone]                    *After 4 PM call the Interventional Radiology Department                    [phone] and ask to speak with the nurse on call  For procedures done at [facility] call:         Corinne Briggs RN at   *After 4 PM call the Interventional Radiology Department   [phone] and ask to speak with the nurse on call  For procedures done at [address] [place] [address] call: The Radiology Nurse at [phone]  *After 4 PM call your physician, or go to the Emergency Department  9          The final results of your biopsy are usually available within one week

## 2021-05-06 NOTE — PROGRESS NOTES
Ice pack given:    __x___yes _____no    Discharge instructions signed by patient:    __x___yes _____no    Discharge instructions given to patient:    __x__yes _____no    Discharged via:    __x___ambulatory    _____wheelchair    _____stretcher    Stable on discharge:    ___x__yes ____no

## 2021-05-06 NOTE — PROGRESS NOTES
Procedure type:    __x___ultrasound guided _____stereotactic    Breast:    ___x__Left _____Right    Location: 1 oclock 4 cmfn    Needle: 12g Marquee    # of passes: 2    Clip: Ultraclip Ribbon    Performed by: Dr Angela Menchaca held for 5 minutes by: Ty Bee    Steri Strips:    ___x__yes _____no    Band aid:    ___x__yes_____no    Tape and guaze:    _____yes ___x__no    Tolerated procedure:    ___x__yes _____no          Procedure type:    __x___ultrasound guided rudi  placement_____stereotactic    Breast:    __x___Left _____Right    Location: 12 oclock 5 cmfn    Needle: 16g    # of passes: 1    Clip: 7 5 cm [employer]    Performed by: Dr Angela Menchaca held for 5 minutes by: Ty Bee    Steri Strips:    __x___yes _____no    Prentis Eglin aid:    __x___yes_____no    Tape and guaze:    _____yes __x___no    Tolerated procedure:    ___x__yes _____no                Procedure type:    __x___ultrasound guided _____stereotactic    Breast:    ___x__Left _____Right    Location: 11 oclock 9 cmfn    Needle: 12g Marquee    # of passes: 4    Clip: [address]  [employer]    Performed by: Dr Angela Menchaca held for 5 minutes by: Ty Bee    Steri Strips:    __x___yes _____no    Band aid:    __x___yes_____no    Tape and guaze:    _____yes __x___no    Tolerated procedure:    __x___yes _____no

## 2021-05-07 NOTE — PROGRESS NOTES
Post procedure call completed 5/7/21 at 1140    Bleeding: _____yes __X___no    Pain: _____yes ___X___no    Redness/Swelling: ______yes ___X___no    Band aid removed: __X___yes _____no    Steri-Strips intact: ___X___yes _____no (discussed with patient to remove steri strips on day 5 if they have not come off on their own)    Pt with no questions at this time, adv Dr Shanice Evans will call when results available, adv to call with any questions or concerns, has name/# for contact

## 2021-05-11 ENCOUNTER — TELEPHONE (OUTPATIENT)
Dept: SURGICAL ONCOLOGY | Facility: CLINIC | Age: 76
End: 2021-05-11

## 2021-05-11 DIAGNOSIS — D05.12 DUCTAL CARCINOMA IN SITU (DCIS) OF LEFT BREAST: Primary | ICD-10-CM

## 2021-05-11 NOTE — TELEPHONE ENCOUNTER
I called the patient and explained that her 2 biopsies  Both came back as cancer  This makes that least 3 spots in the breast with cancer  Two of them were and opposite quadrants  One of them was invasive cancer  I explained this was more extensive than a lumpectomy could encompass and I have recommended a mastectomy  The patient is interested in plastic reconstruction  We will try to coordinate appointment with a plastic surgeon for thorough review of the pros and cons of reconstructive surgery  We will contact her to coordinate a follow-up appointment after Dr Colletta Morgans is office

## 2021-05-14 ENCOUNTER — TELEPHONE (OUTPATIENT)
Dept: OTHER | Facility: HOSPITAL | Age: 76
End: 2021-05-14

## 2021-05-17 NOTE — TELEPHONE ENCOUNTER
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

## 2021-05-17 NOTE — PROGRESS NOTES
Post-Test Genetic Counseling Consult Note    Patient Name: Sara Fontana   /Age: /56 y o  Referring Provider: Lonnie Carbajal MD, PhD, FACS    Date of Service: 2021  Genetic Counselor: Lawyer Chester MS, [facility]  Interpretation Services: None  Location: Telephone consult   Length of Visit: 60 minutes      Lenard Michael was referred to the [address] [place] [address] and Genetic Assessment Program to discuss her genetic test result  Genetic Testing History:     Report Date: 2021     Ordering Provider: Dr Lonnie Carbajal      Test: [facility] Cancers Panel (23 genes): NEGIN, BARD1, BRCA1, BRCA2, BRIP1, CDH1, CHEK2, DICER1, EPCAM, MLH1, MSH2, MSH6, NBN, NF1, PALB2, PMS2, PTEN, RAD50, RAD51C, RAD51D, SMARCA4, STK11, TP53     Result: Positive: One Pathogenic (Low Penetrance) variant identified in CHEK2     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); heterozygous; Pathogenic (Low Penetrance)        Cancer History and Treatment:   Personal History: Personal history of ovarian cancer at age 47, thyroid cancer at age 80, a meningioma and left DCIS at age 77  Screening Hx:     Colon:  Colonoscopy: Most recent 2015 and has an active order for a c-scope     Gynecologic:  Ovaries/Uterus TALAT/BSO in [address] and Surgical History  Pertinent surgical history:   Past Surgical History:   Procedure Laterality Date    APPENDECTOMY      BACK SURGERY      CARPAL TUNNEL RELEASE      CHOLECYSTECTOMY      COLONOSCOPY      pt see Dr Bette Nielson  Up to date with her colonoscopy   CORONARY STENT PLACEMENT      EYE SURGERY      cataract surgery    GALLBLADDER SURGERY      HYSTERECTOMY      MAMMO (HISTORICAL)      up to date   see gyn    OOPHORECTOMY Bilateral     US BREAST NEEDLE LOC LEFT Left 2021    US GUIDANCE BREAST BIOPSY LEFT EACH ADDITIONAL Left 2021    US GUIDED BREAST BIOPSY LEFT COMPLETE Left 3/15/2021    US GUIDED BREAST BIOPSY LEFT COMPLETE Left 2021      Pertinent medical history:  Past Medical History:   Diagnosis Date    Arthritis     Coronary artery disease     Diabetes mellitus ([facility] [address] )     Hypercholesterolemia     Hypertension     Osteoporosis     Ovarian ca ([facility] [address] )     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[facility] [address] )     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       Other History:  Height:   Ht Readings from Last 1 Encounters:   21 5' 1" (1 549 m)     Weight:   Wt Readings from Last 1 Encounters:   21 93 4 kg (206 lb)     Relevant Family History   Patient reports no Ashkenazi Manda[place] ancestry  - Sister: Uterine cancer diagnosed 63's  - Brother: Multiple myeloma  - Brother: Stomach cancer   - Niece 1: Breast cancer  - Niece 2: Breast cancer  - Maternal Grandfather: Prostate cancer  - Maternal Half-Aunt (paternal half-sister to mother): Breast    - Father:  throat cancer; history of tobacco use    Please refer to the scanned pedigree in the Media Tab for a complete family history     *All history is reported as provided by the patient; records are not available for review, except where indicated  Assessment:     The c 470T>C (p [id number]) variant in the CHEK2 gene is a  allele present in the [facility] population  It is classified as a pathogenic variant with low penetrance in its association with CHEK2-associated cancers  This variant is described as pathogenic with low penetrance because it does not confer the same level of cancer risk as other CHEK2 pathogenic variants  The lifetime risk of breast cancer in females heterozygous for all pathogenic CHEK2 variants ranges between 25-39% (PMID: 65351923, 08398745)  Individuals who carry the [id number] variant have a slightly increased risk of breast cancer (OR=1 48-1 58) (PMID: 53402366, 83073596)  The lifetime risk for breast cancer is estimated to be 19 5% compared to the population lifetime risk of 12 8%   The risk was found to be more pronounced for lobular type breast tumors (OR=4 17) (PMID: 13451031)  There is also a slightly increased risk for colorectal cancer (OR=1 48-1 67) (PMID: 07310962, 62548725)  Compared to the general population risk to develop colorectal cancer of 4 2%, an individual with this mutation has approximately a 6% lifetime risk  In addition, smaller case-control studies suggest this variant may also lead to increased risk of additional cancers, including kidney, prostate, thyroid, and gastric cancer (PMID: 65781279, 76014800, 72200119)  Risks for Family Members: This test result may help clarify the risk for other family members to develop cancer  All first-degree relatives (parents, siblings and children) have up to a 50% chance of having the pathogenic variant  Other relatives such as aunts, uncles and cousins may also be at risk  Since it is not known with one-hundred percent certainty which side of the family this CHEK2 pathogenic variant came from, we recommend that Pilar Ramirez share this test results with extended family members from both sides of her family  [facility] free [facility] testing for any of Delfina's blood relatives up to 120 days following a positive genetic test result  If any of Delfina's family members have any questions regarding genetic testing would like to pursue genetic testing to understand if they carry the same CHEK2 mutation as Pilar Ramirez they can reach out to the main Genetics number at [phone] for additional information  Additional Information:  A healthy lifestyle will improve overall health and reduce risk for illness  Eating a healthy diet and exercising for 4 hours per week is recommended  Both diet and exercise have been shown to help maintain a healthy weight  Moderate to heavy alcohol use can increase the risk for some cancers  Smoking cigarettes can also increase risk for breast, lung, prostate, pancreatic and other cancers        Management:  Management guidelines for individuals with pathogenic and likely pathogenic CHEK2 variants have been developed by the [employer]  Please refer to the current NCCN guidelines for the most up to date guidelines  The recommendations listed below are specific to Manju Jarrell and are based on recommendations in the V1 2020 The Genetic/Familial High-Risk Assessment: Breast, Ovarian and Pancreatic Guideline and V1 2020 Genetic/Familial High-Risk Assessment: Colorectal Guideline  These recommendations are subject to change over time and the newest guidelines should be referenced for the most up to date recommendations  Women  Breast Cancer Screening:  NCCN Guidelines Version 2 2021 Genetic/Familial High-Risk Assessment: Breast, Ovarian and Pancreatic Cancer Risk Management Based on Genetic Test Results  -Annual mammogram with consideration of tomosynthesis and consider breast MRI with contrast starting at age 36   -There is insufficient evidence to recommend a risk-reducing mastectomy and individuals should be managed based on family history    We recommend that Manju Jarrell follow the medical management and screening recommendations made by her treating health care providers  Colorectal Cancer Screening (Males & Females):   NCCN Guidelines Version 1 2021 for Genetic/Familial High-Risk Assessment Colorectal  Table 4: Recommended Management for Patients with Pathogenic Variants in Genes that May Confer a Risk for Colorectal Cancer    For individuals unaffected by colorectal cancer and no first-degree relatives with colorectal cancer  -Colonoscopy screening every 5 years, beginning at age 36    Other Screening: There are currently no specific recommendations for thyroid, prostate, or kidney cancer screening for individuals who carry a CHEK2 pathogenic variant  Individuals may consider having an annual thyroid exam and urinalysis by their primary care physician      Gynecological Cancer Screening  Manju Chrystal had a TALAT/BSO     Summary:   Positive Result: Manju Jarrell was strongly encouraged to follow up on with our office on an annual basis to review the most up to date guidelines as recommendations are subject to change over time

## 2021-05-25 ENCOUNTER — TELEPHONE (OUTPATIENT)
Dept: GENETICS | Facility: CLINIC | Age: 76
End: 2021-05-25

## 2021-06-01 ENCOUNTER — OFFICE VISIT (OUTPATIENT)
Dept: FAMILY MEDICINE CLINIC | Facility: CLINIC | Age: 76
End: 2021-06-01
Payer: MEDICARE

## 2021-06-01 VITALS
SYSTOLIC BLOOD PRESSURE: 120 MMHG | HEIGHT: 61 IN | HEART RATE: 76 BPM | RESPIRATION RATE: 16 BRPM | WEIGHT: 202 LBS | DIASTOLIC BLOOD PRESSURE: 80 MMHG | OXYGEN SATURATION: 98 % | BODY MASS INDEX: 38.14 KG/M2 | TEMPERATURE: 97.3 F

## 2021-06-01 DIAGNOSIS — E78.2 MIXED DYSLIPIDEMIA: ICD-10-CM

## 2021-06-01 DIAGNOSIS — E11.9 TYPE 2 DIABETES MELLITUS WITHOUT COMPLICATION, WITHOUT LONG-TERM CURRENT USE OF INSULIN (HCC): ICD-10-CM

## 2021-06-01 DIAGNOSIS — D05.12 DUCTAL CARCINOMA IN SITU (DCIS) OF LEFT BREAST: ICD-10-CM

## 2021-06-01 DIAGNOSIS — E03.9 ACQUIRED HYPOTHYROIDISM: ICD-10-CM

## 2021-06-01 DIAGNOSIS — E66.01 OBESITY, MORBID (HCC): ICD-10-CM

## 2021-06-01 DIAGNOSIS — M85.852 OSTEOPENIA OF NECKS OF BOTH FEMURS: ICD-10-CM

## 2021-06-01 DIAGNOSIS — I25.10 CORONARY ARTERY DISEASE INVOLVING NATIVE CORONARY ARTERY OF NATIVE HEART WITHOUT ANGINA PECTORIS: ICD-10-CM

## 2021-06-01 DIAGNOSIS — M85.851 OSTEOPENIA OF NECKS OF BOTH FEMURS: ICD-10-CM

## 2021-06-01 DIAGNOSIS — Z76.0 ENCOUNTER FOR MEDICATION REFILL: ICD-10-CM

## 2021-06-01 DIAGNOSIS — I10 ESSENTIAL HYPERTENSION: Primary | ICD-10-CM

## 2021-06-01 PROCEDURE — 99214 OFFICE O/P EST MOD 30 MIN: CPT | Performed by: FAMILY MEDICINE

## 2021-06-01 RX ORDER — CLOTRIMAZOLE AND BETAMETHASONE DIPROPIONATE 10; .64 MG/G; MG/G
CREAM TOPICAL 2 TIMES DAILY
Qty: 45 G | Refills: 3 | Status: SHIPPED | OUTPATIENT
Start: 2021-06-01

## 2021-06-01 NOTE — ASSESSMENT & PLAN NOTE
BP good  Continue lisinopril 20 mg qd and metoprolol 25 mg bid  Pt advised to continue low Na diet and to exercise on a regular basis

## 2021-06-01 NOTE — ASSESSMENT & PLAN NOTE
Last dexascan was in Dec 2019  Pt advised to take calcium 1200 mg qd and Vit D 1000 U qd  Pt also advised to perform weight-bearing exercise on a regular basis  Recheck dexascan in Dec 2021

## 2021-06-01 NOTE — ASSESSMENT & PLAN NOTE
LDL 74, HDL 37, and Tgs 278 in Feb 2021  Advised pt to follow a low cholesterol diet and to exercise on a regular basis  Recheck lipids and LFTs with next labs

## 2021-06-01 NOTE — ASSESSMENT & PLAN NOTE
A1C was 5 8 in Feb 2021  Continue glipizide 10 mg bid and victoza  Advised pt to follow a low carb diet and to exercise on a regular basis  Foot exam done in Feb 2021  Had eye exam in Jan 2021 by Dr Real Pennington  Had flu shot in Oct 2020 and had COVID vaccines       Lab Results   Component Value Date    HGBA1C 5 8 (H) 02/05/2021

## 2021-06-01 NOTE — ASSESSMENT & PLAN NOTE
No chest pain or sob  Continue current meds  Saw Dr Corey Mariscal in March 2021  She had an EKG in March 2021 and it was stable

## 2021-06-01 NOTE — PROGRESS NOTES
Assessment/Plan:         Problem List Items Addressed This Visit        Endocrine    Type 2 diabetes mellitus without complication, without long-term current use of insulin ([facility] [address] )     A1C was 5 8 in Feb 2021  Continue glipizide 10 mg bid and victoza  Advised pt to follow a low carb diet and to exercise on a regular basis  Foot exam done in Feb 2021  Had eye exam in Jan 2021 by Dr Howie Segundo  Had flu shot in Oct 2020 and had COVID vaccines  Lab Results   Component Value Date    HGBA1C 5 8 (H) 02/05/2021            Hypothyroidism     TSH 1 19 and Free T4 1 23 in Feb 2021  Cont levothyroxine 100 mcg qd  Recheck with next labs  Cardiovascular and Mediastinum    Essential hypertension - Primary     BP good  Continue lisinopril 20 mg qd and metoprolol 25 mg bid  Pt advised to continue low Na diet and to exercise on a regular basis  Coronary artery disease involving native coronary artery of native heart without angina pectoris     No chest pain or sob  Continue current meds  Saw Dr Keysha Esquivel in March 2021  She had an EKG in March 2021 and it was stable  Musculoskeletal and Integument    Osteopenia of necks of both femurs     Last dexascan was in Dec 2019  Pt advised to take calcium 1200 mg qd and Vit D 1000 U qd  Pt also advised to perform weight-bearing exercise on a regular basis  Recheck dexascan in Dec 2021  Other    Obesity, morbid ([facility] [address] )     Discussed smaller portions, healthy snacks, and regular exercise  Mixed dyslipidemia     LDL 74, HDL 37, and Tgs 278 in Feb 2021  Advised pt to follow a low cholesterol diet and to exercise on a regular basis  Recheck lipids and LFTs with next labs  Ductal carcinoma in situ (DCIS) of left breast     Pt recently diagnosed and going to have L mastectomy done by Dr Genesis Webster in July 2021              Other Visit Diagnoses     Encounter for medication refill        Relevant Medications    clotrimazole-betamethasone (LOTRISONE) 1-0 05 % cream            Subjective:      Patient ID: Amanda Almazan is a 77 y o  female  Pt here for f/u HTN, HL, CAD, DM, Hypothyroidism, Osteopenia  Recently found out that she has breast CA and going to have L mastectomy by Dr Raiford Shone on 7/13/21  No cp/sob  No headaches  No abd pain  BP good  Saw Cardiology Dr Neo Lozoya in March and no change in meds  Pt had COVID vaccines  Sugars have been good  No other c/o  The following portions of the patient's history were reviewed and updated as appropriate:   Past Medical History:  She has a past medical history of Arthritis, Cancer ([facility] [address] ), Coronary artery disease, Diabetes mellitus ([facility] [address] ), Hypercholesterolemia, Hypertension, Obesity, Osteoporosis, Otitis media, Ovarian ca ([facility] [address] ) (1997), 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[facility] [address] ), Shingles, 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), and Urinary tract infection  ,  _______________________________________________________________________  Medical Problems:  does not have any pertinent problems on file ,  _______________________________________________________________________  Past Surgical History:   has a past surgical history that includes Coronary stent placement; Carpal tunnel release; Cholecystectomy; Back surgery; Hysterectomy (1989); Oophorectomy (Bilateral, 1997); Mammo (historical); Eye surgery; Colonoscopy; US guided breast biopsy left complete (Left, 3/15/2021); Appendectomy; Gallbladder surgery; US breast needle loc left (Left, 5/6/2021); US guided breast biopsy left complete (Left, 5/6/2021); US guidance breast biopsy left each additional (Left, 5/6/2021); Brain surgery; and Spine surgery  ,  _______________________________________________________________________  Family History:  family history includes Arthritis in her family; Asthma in her daughter, sister, and son; Breast cancer (age of onset: 39) in her other and other; Breast cancer (age of onset: 48) in her maternal aunt;  Cancer in her brother, brother, family, father, and sister; Dementia in her mother; Depression in her son; Diabetes in her family and mother; Endometrial cancer (age of onset: 76) in her sister; Esophageal cancer (age of onset: 61) in her father; Heart disease in her family and mother; Multiple myeloma (age of onset: 67) in her brother; No Known Problems in her maternal grandmother, paternal aunt, paternal aunt, paternal grandmother, sister, sister, and sister; Prostate cancer in her maternal grandfather and paternal grandfather; Stomach cancer (age of onset: 70) in her brother ,  _______________________________________________________________________  Social History:   reports that she has never smoked  She has never used smokeless tobacco  She reports that she does not drink alcohol or use drugs  ,  _______________________________________________________________________  Allergies:  is allergic to duloxetine and sulfa antibiotics     _______________________________________________________________________  Current Outpatient Medications   Medication Sig Dispense Refill    Aspirin Buf,CaCarb-MgCarb-MgO, 81 MG TABS aspirin 81 mg tablet      clotrimazole-betamethasone (LOTRISONE) 1-0 05 % cream Apply topically 2 (two) times a day 45 g 3    glipiZIDE (GLUCOTROL) 10 mg tablet Take 1 tablet (10 mg total) by mouth 2 (two) times a day before meals 180 tablet 2    Insulin Pen Needle (Sure Comfort Pen Needles) 31G X 5 MM MISC by Does not apply route daily 100 each 3    Lancets (onetouch ultrasoft) lancets Use daily D23 9 One touch Delica Plus 753 each 3    levothyroxine 100 mcg tablet Take 1 tablet (100 mcg total) by mouth daily 90 tablet 3    Liraglutide (VICTOZA) 18 MG/3ML SOPN Inject under the skin daily      lisinopril (ZESTRIL) 20 mg tablet Take 1 tablet (20 mg total) by mouth daily 90 tablet 1    metoprolol tartrate (LOPRESSOR) 25 mg tablet Take 1 tablet (25 mg total) by mouth every 12 (twelve) hours 180 tablet 2    OneTouch Ultra test strip Use 1 each daily Use as instructed 100 each 3    oxyCODONE-acetaminophen (PERCOCET) 5-325 mg per tablet Take 1 tablet by mouth every 6 (six) hours as needed for moderate painMax Daily Amount: 4 tablets 6 tablet 0    dicyclomine (BENTYL) 20 mg tablet Take 1 tablet (20 mg total) by mouth 2 (two) times a day for 5 days 10 tablet 0    gabapentin (NEURONTIN) 600 MG tablet Take 1 tablet (600 mg total) by mouth 3 (three) times a day (Patient not taking: Reported on 5/2/2021) 270 tablet 2    Sutab 3206-442-090 MG TABS AS DIRECTED (Patient not taking: Reported on 6/1/2021) 24 tablet 0     No current facility-administered medications for this visit       _______________________________________________________________________  Review of Systems   Constitutional: Negative for activity change, appetite change, fatigue and unexpected weight change  Eyes: Negative for visual disturbance  Respiratory: Negative for cough, chest tightness and shortness of breath  Cardiovascular: Negative for chest pain and palpitations  Gastrointestinal: Negative for abdominal pain, constipation, diarrhea, nausea and vomiting  Endocrine: Negative for polydipsia, polyphagia and polyuria  Genitourinary: Negative for difficulty urinating  Musculoskeletal: Negative for arthralgias and gait problem  Skin: Negative for wound  Neurological: Negative for dizziness, numbness and headaches  Psychiatric/Behavioral: Negative for decreased concentration, dysphoric mood, sleep disturbance and suicidal ideas  The patient is nervous/anxious  Objective:  Vitals:    06/01/21 1310   BP: 120/80   BP Location: Left arm   Patient Position: Sitting   Cuff Size: Adult   Pulse: 76   Resp: 16   Temp: (!) 97 3 °F (36 3 °C)   TempSrc: Temporal   SpO2: 98%   Weight: 91 6 kg (202 lb)   Height: 5' 1" (1 549 m)     Body mass index is 38 17 kg/m²  Physical Exam  Vitals signs and nursing note reviewed     Constitutional: Appearance: Normal appearance  She is well-developed  She is obese  HENT:      Head: Normocephalic and atraumatic  Neck:      Musculoskeletal: Normal range of motion and neck supple  Cardiovascular:      Rate and Rhythm: Normal rate and regular rhythm  Heart sounds: Normal heart sounds  No murmur  Pulmonary:      Effort: Pulmonary effort is normal  No respiratory distress  Breath sounds: Normal breath sounds  No wheezing  Musculoskeletal:      Right lower leg: No edema  Left lower leg: No edema  Lymphadenopathy:      Cervical: No cervical adenopathy  Neurological:      Mental Status: She is alert and oriented to person, place, and time  Psychiatric:         Mood and Affect: Mood normal          Behavior: Behavior normal          Thought Content:  Thought content normal          Judgment: Judgment normal

## 2021-06-11 PROBLEM — Z00.00 MEDICARE ANNUAL WELLNESS VISIT, SUBSEQUENT: Status: RESOLVED | Noted: 2021-02-09 | Resolved: 2021-06-11

## 2021-06-11 PROBLEM — C50.812 MALIGNANT NEOPLASM OF OVERLAPPING SITES OF LEFT BREAST IN FEMALE, ESTROGEN RECEPTOR POSITIVE (HCC): Status: ACTIVE | Noted: 2021-04-16

## 2021-06-11 PROBLEM — Z15.01 MONOALLELIC MUTATION OF CHEK2 GENE IN FEMALE PATIENT: Status: ACTIVE | Noted: 2021-06-11

## 2021-06-11 PROBLEM — Z17.0 MALIGNANT NEOPLASM OF OVERLAPPING SITES OF LEFT BREAST IN FEMALE, ESTROGEN RECEPTOR POSITIVE (HCC): Status: ACTIVE | Noted: 2021-04-16

## 2021-06-11 PROBLEM — Z15.02 MONOALLELIC MUTATION OF CHEK2 GENE IN FEMALE PATIENT: Status: ACTIVE | Noted: 2021-06-11

## 2021-06-11 PROBLEM — Z15.89 MONOALLELIC MUTATION OF CHEK2 GENE IN FEMALE PATIENT: Status: ACTIVE | Noted: 2021-06-11

## 2021-06-11 PROBLEM — Z15.09 MONOALLELIC MUTATION OF CHEK2 GENE IN FEMALE PATIENT: Status: ACTIVE | Noted: 2021-06-11

## 2021-06-16 ENCOUNTER — APPOINTMENT (OUTPATIENT)
Dept: LAB | Facility: CLINIC | Age: 76
End: 2021-06-16
Payer: MEDICARE

## 2021-06-16 ENCOUNTER — OFFICE VISIT (OUTPATIENT)
Dept: SURGICAL ONCOLOGY | Facility: CLINIC | Age: 76
End: 2021-06-16
Payer: MEDICARE

## 2021-06-16 VITALS
TEMPERATURE: 98 F | HEART RATE: 86 BPM | RESPIRATION RATE: 16 BRPM | SYSTOLIC BLOOD PRESSURE: 144 MMHG | WEIGHT: 203 LBS | DIASTOLIC BLOOD PRESSURE: 78 MMHG | HEIGHT: 61 IN | BODY MASS INDEX: 38.33 KG/M2 | OXYGEN SATURATION: 98 %

## 2021-06-16 DIAGNOSIS — Z15.89 MONOALLELIC MUTATION OF CHEK2 GENE IN FEMALE PATIENT: ICD-10-CM

## 2021-06-16 DIAGNOSIS — Z15.01 MONOALLELIC MUTATION OF CHEK2 GENE IN FEMALE PATIENT: ICD-10-CM

## 2021-06-16 DIAGNOSIS — Z15.02 MONOALLELIC MUTATION OF CHEK2 GENE IN FEMALE PATIENT: ICD-10-CM

## 2021-06-16 DIAGNOSIS — E11.9 TYPE 2 DIABETES MELLITUS WITHOUT COMPLICATION, WITHOUT LONG-TERM CURRENT USE OF INSULIN (HCC): ICD-10-CM

## 2021-06-16 DIAGNOSIS — Z01.818 PREOPERATIVE TESTING: ICD-10-CM

## 2021-06-16 DIAGNOSIS — C50.812 MALIGNANT NEOPLASM OF OVERLAPPING SITES OF LEFT BREAST IN FEMALE, ESTROGEN RECEPTOR POSITIVE (HCC): ICD-10-CM

## 2021-06-16 DIAGNOSIS — Z17.0 MALIGNANT NEOPLASM OF OVERLAPPING SITES OF LEFT BREAST IN FEMALE, ESTROGEN RECEPTOR POSITIVE (HCC): ICD-10-CM

## 2021-06-16 DIAGNOSIS — Z15.09 MONOALLELIC MUTATION OF CHEK2 GENE IN FEMALE PATIENT: ICD-10-CM

## 2021-06-16 DIAGNOSIS — Z01.818 PREOP EXAMINATION: Primary | ICD-10-CM

## 2021-06-16 LAB
ALBUMIN SERPL BCP-MCNC: 3.5 G/DL (ref 3.5–5)
ALP SERPL-CCNC: 43 U/L (ref 46–116)
ALT SERPL W P-5'-P-CCNC: 32 U/L (ref 12–78)
ANION GAP SERPL CALCULATED.3IONS-SCNC: 8 MMOL/L (ref 4–13)
AST SERPL W P-5'-P-CCNC: 20 U/L (ref 5–45)
BASOPHILS # BLD AUTO: 0.04 THOUSANDS/ΜL (ref 0–0.1)
BASOPHILS NFR BLD AUTO: 1 % (ref 0–1)
BILIRUB SERPL-MCNC: 0.48 MG/DL (ref 0.2–1)
BUN SERPL-MCNC: 10 MG/DL (ref 5–25)
CALCIUM SERPL-MCNC: 9.1 MG/DL (ref 8.3–10.1)
CHLORIDE SERPL-SCNC: 102 MMOL/L (ref 100–108)
CO2 SERPL-SCNC: 28 MMOL/L (ref 21–32)
CREAT SERPL-MCNC: 0.57 MG/DL (ref 0.6–1.3)
EOSINOPHIL # BLD AUTO: 0.04 THOUSAND/ΜL (ref 0–0.61)
EOSINOPHIL NFR BLD AUTO: 1 % (ref 0–6)
ERYTHROCYTE [DISTWIDTH] IN BLOOD BY AUTOMATED COUNT: 12.1 % (ref 11.6–15.1)
EST. AVERAGE GLUCOSE BLD GHB EST-MCNC: 117 MG/DL
GFR SERPL CREATININE-BSD FRML MDRD: 91 ML/MIN/1.73SQ M
GLUCOSE SERPL-MCNC: 77 MG/DL (ref 65–140)
HBA1C MFR BLD: 5.7 %
HCT VFR BLD AUTO: 35.7 % (ref 34.8–46.1)
HGB BLD-MCNC: 12.6 G/DL (ref 11.5–15.4)
IMM GRANULOCYTES # BLD AUTO: 0.05 THOUSAND/UL (ref 0–0.2)
IMM GRANULOCYTES NFR BLD AUTO: 1 % (ref 0–2)
LYMPHOCYTES # BLD AUTO: 2.24 THOUSANDS/ΜL (ref 0.6–4.47)
LYMPHOCYTES NFR BLD AUTO: 31 % (ref 14–44)
MCH RBC QN AUTO: 31.3 PG (ref 26.8–34.3)
MCHC RBC AUTO-ENTMCNC: 35.3 G/DL (ref 31.4–37.4)
MCV RBC AUTO: 89 FL (ref 82–98)
MONOCYTES # BLD AUTO: 0.59 THOUSAND/ΜL (ref 0.17–1.22)
MONOCYTES NFR BLD AUTO: 8 % (ref 4–12)
NEUTROPHILS # BLD AUTO: 4.35 THOUSANDS/ΜL (ref 1.85–7.62)
NEUTS SEG NFR BLD AUTO: 58 % (ref 43–75)
NRBC BLD AUTO-RTO: 0 /100 WBCS
PLATELET # BLD AUTO: 189 THOUSANDS/UL (ref 149–390)
PMV BLD AUTO: 9.7 FL (ref 8.9–12.7)
POTASSIUM SERPL-SCNC: 4.4 MMOL/L (ref 3.5–5.3)
PROT SERPL-MCNC: 6.4 G/DL (ref 6.4–8.2)
RBC # BLD AUTO: 4.03 MILLION/UL (ref 3.81–5.12)
SODIUM SERPL-SCNC: 138 MMOL/L (ref 136–145)
WBC # BLD AUTO: 7.31 THOUSAND/UL (ref 4.31–10.16)

## 2021-06-16 PROCEDURE — 83036 HEMOGLOBIN GLYCOSYLATED A1C: CPT

## 2021-06-16 PROCEDURE — 36415 COLL VENOUS BLD VENIPUNCTURE: CPT

## 2021-06-16 PROCEDURE — 99214 OFFICE O/P EST MOD 30 MIN: CPT | Performed by: SURGERY

## 2021-06-16 PROCEDURE — 85025 COMPLETE CBC W/AUTO DIFF WBC: CPT

## 2021-06-16 PROCEDURE — 80053 COMPREHEN METABOLIC PANEL: CPT

## 2021-06-16 NOTE — PROGRESS NOTES
Surgical Oncology Follow Up       [address]  [facility] [place]  [address] [facility] [address]  [place] [address]    Lendia Breeding  1945  [id number]  [address] [facility]  [facility] [place] [address] [id number]    Chief Complaint   Patient presents with    Pre-op Exam          Assessment & Plan:     Patient presents the undergo the process of informed consent  She had previously been scheduled for lumpectomy however multifocal disease was encountered at the time of her ROSLYN  placement in the left breast   We went through that process  She has seen Lubna Jones   She prefers not to have reconstruction  Her surgery is currently scheduled for July 13th  Cancer History:     Oncology History   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[facility] [address] )   8/27/2013 Initial Diagnosis    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[facility] [address] )     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, estrogen receptor positive ([facility] [address] )   3/15/2021 Biopsy    Left breast ultrasound-guided biopsy  12 o'clock, 5 cm from nipple  Ductal carcinoma in situ  Grade 2        Concordant  Malignancy appears unifocal; masses cover 2 6 cm  US left axilla negative  Right breast clear  4/19/2021 Genetic Testing    One pathogenic (low penetrance) variant identified in CHEK2  Total of 23 genes evaluated  [employer]     5/6/2021 Observation    Imaging was reviewed prior to ROSLYN clip insertion  Additional findings in the left breast on ultrasound; 2 additional biopsies were recommended     5/6/2021 Biopsy    Left breast ultrasound-guided biopsy  A  1 o'clock, 4 cm from nipple  Ductal carcinoma in situ  Grade 2  ,     B  11 o'clock, 9 cm from nipple  Invasive carcinoma of no special type (ductal)  Grade 1  ER 90, DC 90, HER2 1+  Lymphovascular invasion not identified    Concordant   Malignancy is multifocal and spans at least 8 5 cm in 2 directions  ROSLYN  clip placed at 12:00, 5cmfn biopsy site  Interval History:     See above, patient has no complaints referable to her breast   She did state that she might consider having Dr Jonh Villareal revise her lateral mastectomy skin but she did not want to change her surgery date  Consequently we elected to have her see Dr Jonh Villareal post surgery if she had issue with this area  Review of Systems:   Review of Systems   All other systems reviewed and are negative  Past Medical History     Patient Active Problem List   Diagnosis    Coronary artery disease involving native coronary artery of native heart without angina pectoris    Type 2 diabetes mellitus without complication, without long-term current use of insulin (HCC)    Mixed dyslipidemia    Essential hypertension    Abnormal carotid ultrasound    Hypothyroidism    Spinal stenosis of lumbar region    Osteopenia of necks of both femurs    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[address]    S/P lumbar fusion    Bilateral leg edema    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, estrogen receptor positive ([address]    Obesity, morbid ([address]    Monoallelic mutation of CHEK2 gene in female patient     Past Medical History:   Diagnosis Date    Arthritis     Cancer ([address]     Coronary artery disease     Diabetes mellitus ([address]     Hypercholesterolemia     Hypertension     Obesity     Osteoporosis     Otitis media     Ovarian ca ([address] ) 1997    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[address]     Shingles     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    Urinary tract infection      Past Surgical History:   Procedure Laterality Date    APPENDECTOMY      BACK SURGERY      BRAIN SURGERY      CARPAL TUNNEL RELEASE      CHOLECYSTECTOMY      COLONOSCOPY      pt see Dr Aureliano Sims  Up to date with her colonoscopy      CORONARY STENT PLACEMENT      EYE SURGERY      cataract surgery   Timo    MAMMO (HISTORICAL)      up to date   see gyn    OOPHORECTOMY Bilateral 1997    SPINE SURGERY      US BREAST NEEDLE LOC LEFT Left 5/6/2021    US GUIDANCE BREAST BIOPSY LEFT EACH ADDITIONAL Left 5/6/2021    US GUIDED BREAST BIOPSY LEFT COMPLETE Left 3/15/2021    US GUIDED BREAST BIOPSY LEFT COMPLETE Left 5/6/2021     Family History   Problem Relation Age of Onset    Diabetes Family     Cancer Family     Arthritis Family     Heart disease Family     Endometrial cancer Sister 76    Asthma Sister     Cancer Sister     Esophageal cancer Father 61    Cancer Father     Stomach cancer Brother 70    Cancer Brother     Diabetes Mother     Heart disease Mother     Dementia Mother     Asthma Daughter     No Known Problems Maternal Grandmother     Prostate cancer Maternal Grandfather     No Known Problems Paternal Grandmother     Prostate cancer Paternal Grandfather     Breast cancer Maternal Aunt 48    Breast cancer Other 39    Breast cancer Other 39    No Known Problems Sister     No Known Problems Sister     No Known Problems Sister     No Known Problems Paternal Aunt     No Known Problems Paternal Aunt     Multiple myeloma Brother 67    Asthma Son     Cancer Brother     Depression Son      Social History     Socioeconomic History    Marital status: /Civil Union     Spouse name: Not on file    Number of children: Not on file    Years of education: Not on file    Highest education level: Not on file   Occupational History    Not on file   Tobacco Use    Smoking status: Never Smoker    Smokeless tobacco: Never Used   Vaping Use    Vaping Use: Never used   Substance and Sexual Activity    Alcohol use: No    Drug use: Never    Sexual activity: Not Currently     Partners: Male     Birth control/protection: Post-menopausal, None   Other Topics Concern    Not on file   Social History Narrative    · Tobacco smoking status:   Never smoker      This question's title has changed from "Smoking status" to "Tobacco smoking status" with the  update  Please use this field only for documenting tobacco smoking behavior  To accommodate this change, new fields for documenting smokeless tobacco and e-cigarette/vape usage have been added  · Smoking - how much          None  1 PPW  2 PPW  1/4 PPD  1/2 PPD  1 PPD  1 1/2 PPD  2 PPD  3+ PPD          NOTE     · Smokeless tobacco status          Never used smokeless tobacco  Former smokeless tobacco user  Current snuff user  Currently chews tobacco  Currently uses moist powdered tobacco  ----  Not indicated  Not tolerated  Patient refused          NOTE     · Tobacco-years of use               NOTE     · E-cigarette/vape status          Never used electronic cigarettes  Former user of electronic cigarettes  Current user of electronic cigarettes          NOTE     · Most recent tobacco use screenin2018      · Alcohol intake:   None         Per laine     Social Determinants of Health     Financial Resource Strain:     Difficulty of Paying Living Expenses:    Food Insecurity:     Worried About Running Out of Food in the Last Year:     Ran Out of Food in the Last Year:    Transportation Needs:     Lack of Transportation (Medical):      Lack of Transportation (Non-Medical):    Physical Activity:     Days of Exercise per Week:     Minutes of Exercise per Session:    Stress:     Feeling of Stress :    Social Connections:     Frequency of Communication with Friends and Family:     Frequency of Social Gatherings with Friends and Family:     Attends [employer] Services:     Active Member of Clubs or Organizations:     Attends Club or Organization Meetings:     Marital Status:    Intimate Partner Violence:     Fear of Current or Ex-Partner:     Emotionally Abused:     Physically Abused:     Sexually Abused:        Current Outpatient Medications:     Aspirin Buf,CaCarb-MgCarb-MgO, 81 MG TABS, aspirin 81 mg tablet, Disp: , Rfl:    clotrimazole-betamethasone (LOTRISONE) 1-0 05 % cream, Apply topically 2 (two) times a day, Disp: 45 g, Rfl: 3    gabapentin (NEURONTIN) 600 MG tablet, Take 1 tablet (600 mg total) by mouth 3 (three) times a day, Disp: 270 tablet, Rfl: 2    glipiZIDE (GLUCOTROL) 10 mg tablet, Take 1 tablet (10 mg total) by mouth 2 (two) times a day before meals, Disp: 180 tablet, Rfl: 2    Insulin Pen Needle (Sure Comfort Pen Needles) 31G X 5 MM MISC, by Does not apply route daily, Disp: 100 each, Rfl: 3    Lancets (onetouch ultrasoft) lancets, Use daily P95 2 One touch Delica Plus, Disp: 217 each, Rfl: 3    levothyroxine 100 mcg tablet, Take 1 tablet (100 mcg total) by mouth daily, Disp: 90 tablet, Rfl: 3    Liraglutide (VICTOZA) 18 MG/3ML SOPN, Inject under the skin daily, Disp: , Rfl:     lisinopril (ZESTRIL) 20 mg tablet, Take 1 tablet (20 mg total) by mouth daily, Disp: 90 tablet, Rfl: 1    metoprolol tartrate (LOPRESSOR) 25 mg tablet, Take 1 tablet (25 mg total) by mouth every 12 (twelve) hours, Disp: 180 tablet, Rfl: 2    OneTouch Ultra test strip, Use 1 each daily Use as instructed, Disp: 100 each, Rfl: 3    Sutab 2170-440-516 MG TABS, AS DIRECTED, Disp: 24 tablet, Rfl: 0    dicyclomine (BENTYL) 20 mg tablet, Take 1 tablet (20 mg total) by mouth 2 (two) times a day for 5 days, Disp: 10 tablet, Rfl: 0    oxyCODONE-acetaminophen (PERCOCET) 5-325 mg per tablet, Take 1 tablet by mouth every 6 (six) hours as needed for moderate painMax Daily Amount: 4 tablets (Patient not taking: Reported on 6/16/2021), Disp: 6 tablet, Rfl: 0  Allergies   Allergen Reactions    Duloxetine Other (See Comments)     Extreme somnolence/lethargy    Sulfa Antibiotics Rash       Physical Exam:     Vitals:    06/16/21 1120   BP: 144/78   Pulse: 86   Resp: 16   Temp: 98 °F (36 7 °C)   SpO2: 98%     Physical Exam  Vitals reviewed  Constitutional:       Appearance: She is well-developed  HENT:      Head: Normocephalic and atraumatic  Eyes:      Pupils: Pupils are equal, round, and reactive to light  Neck:      Thyroid: No thyromegaly  Vascular: No JVD  Trachea: No tracheal deviation  Cardiovascular:      Rate and Rhythm: Normal rate and regular rhythm  Heart sounds: Normal heart sounds  No murmur heard  No friction rub  No gallop  Pulmonary:      Effort: Pulmonary effort is normal  No respiratory distress  Breath sounds: Normal breath sounds  No wheezing or rales  Chest:      Comments: Both breasts were examined in the sitting and supine position  There are no worrisome skin lesions, no nipple retraction and no nipple discharge  There are no dominant masses, axillary adenopathy or supraclavicular adenopathy on either side  Abdominal:      General: There is no distension  Palpations: Abdomen is soft  There is no hepatomegaly or mass  Tenderness: There is no abdominal tenderness  There is no guarding or rebound  Musculoskeletal:         General: No tenderness  Normal range of motion  Cervical back: Normal range of motion and neck supple  Lymphadenopathy:      Cervical: No cervical adenopathy  Skin:     General: Skin is warm and dry  Findings: No erythema or rash  Neurological:      Mental Status: She is alert and oriented to person, place, and time  Cranial Nerves: No cranial nerve deficit  Psychiatric:         Behavior: Behavior normal            Results & Discussion:   The patient has a small invasive cancer in addition to multifocal DCIS  We have recommended a mastectomy  Her tumors grade 1 ER 90% NV 90% and HER2 negative  We have previously discussed the requirement for mastectomy given her multifocal disease covering approximately 8 x 5 cm of breast tissue  Patient does not want reconstruction        The patient and I underwent the process of consent for   Left breast ROSLYN  directed mastectomy ( ROSLYN clip is present underneath the nipple when we are planning for lumpectomy)  , left lymphatic mapping with blue and radioactive dye in sentinel lymph node biopsy possible axillary dissection  The complications outlined on the consent including relatively minor problems (wound infection, wound healing problems, hematomas, scarring, chronic discomfort/pain), moderate problems (injury to nerves or blood vessels, allergic reactions) and major complications (extensive blood loss requiring transfusions or possible addtional surgeries, cardiac arrest, stroke and possible death)  We also reviewed specific complications as outlined on the consent form including  Possible cancer recurrence, arm swelling, additional /adjuvant treatments  All questions were answered to the patient's satisfaction  We will coordinate the surgery at our next mutual convience  Advance Care Planning/Advance Directives:  I discussed the disease status, treatment plans and follow-up with the patient

## 2021-06-16 NOTE — H&P (VIEW-ONLY)
Surgical Oncology Follow Up       [address]  [facility] [place]  [address] [facility] [address]  [place] [address]    Dilma Members  1945  [id number]  [address] [facility]  [facility] [place]  [address] [address]    Chief Complaint   Patient presents with    Pre-op Exam          Assessment & Plan:     Patient presents the undergo the process of informed consent  She had previously been scheduled for lumpectomy however multifocal disease was encountered at the time of her ROSLYN  placement in the left breast   We went through that process  She has seen William Jones   She prefers not to have reconstruction  Her surgery is currently scheduled for July 13th  Cancer History:     Oncology History   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[facility] [address] )   8/27/2013 Initial Diagnosis    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[address] )     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, estrogen receptor positive ([facility] [address] )   3/15/2021 Biopsy    Left breast ultrasound-guided biopsy  12 o'clock, 5 cm from nipple  Ductal carcinoma in situ  Grade 2        Concordant  Malignancy appears unifocal; masses cover 2 6 cm  US left axilla negative  Right breast clear  4/19/2021 Genetic Testing    One pathogenic (low penetrance) variant identified in CHEK2  Total of 23 genes evaluated  [employer]     5/6/2021 Observation    Imaging was reviewed prior to ROSLYN clip insertion  Additional findings in the left breast on ultrasound; 2 additional biopsies were recommended     5/6/2021 Biopsy    Left breast ultrasound-guided biopsy  A  1 o'clock, 4 cm from nipple  Ductal carcinoma in situ  Grade 2  ,     B  11 o'clock, 9 cm from nipple  Invasive carcinoma of no special type (ductal)  Grade 1  ER 90, ND 90, HER2 1+  Lymphovascular invasion not identified    Concordant   Malignancy is multifocal and spans at least 8 5 cm in 2 directions  ROSLYN  clip placed at 12:00, 5cmfn biopsy site  Interval History:     See above, patient has no complaints referable to her breast   She did state that she might consider having Dr Rhonda Galan revise her lateral mastectomy skin but she did not want to change her surgery date  Consequently we elected to have her see Dr Rhonda Galan post surgery if she had issue with this area  Review of Systems:   Review of Systems   All other systems reviewed and are negative  Past Medical History     Patient Active Problem List   Diagnosis    Coronary artery disease involving native coronary artery of native heart without angina pectoris    Type 2 diabetes mellitus without complication, without long-term current use of insulin (HCC)    Mixed dyslipidemia    Essential hypertension    Abnormal carotid ultrasound    Hypothyroidism    Spinal stenosis of lumbar region    Osteopenia of necks of both femurs    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[address]    S/P lumbar fusion    Bilateral leg edema    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, estrogen receptor positive ([address]    Obesity, morbid ([address] )    Monoallelic mutation of CHEK2 gene in female patient     Past Medical History:   Diagnosis Date    Arthritis     Cancer ([address]     Coronary artery disease     Diabetes mellitus ([address]     Hypercholesterolemia     Hypertension     Obesity     Osteoporosis     Otitis media     Ovarian ca ([address] ) 1997    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[address]     Shingles     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    Urinary tract infection      Past Surgical History:   Procedure Laterality Date    APPENDECTOMY      BACK SURGERY      BRAIN SURGERY      CARPAL TUNNEL RELEASE      CHOLECYSTECTOMY      COLONOSCOPY      pt see Dr Claudell Lima  Up to date with her colonoscopy      CORONARY STENT PLACEMENT      EYE SURGERY      cataract surgery   [facility] [place]    MAMMO (HISTORICAL)      up to date   see gyn    OOPHORECTOMY Bilateral 1997    SPINE SURGERY      US BREAST NEEDLE LOC LEFT Left 5/6/2021    US GUIDANCE BREAST BIOPSY LEFT EACH ADDITIONAL Left 5/6/2021    US GUIDED BREAST BIOPSY LEFT COMPLETE Left 3/15/2021    US GUIDED BREAST BIOPSY LEFT COMPLETE Left 5/6/2021     Family History   Problem Relation Age of Onset    Diabetes Family     Cancer Family     Arthritis Family     Heart disease Family     Endometrial cancer Sister 76    Asthma Sister     Cancer Sister     Esophageal cancer Father 61    Cancer Father     Stomach cancer Brother 70    Cancer Brother     Diabetes Mother     Heart disease Mother     Dementia Mother     Asthma Daughter     No Known Problems Maternal Grandmother     Prostate cancer Maternal Grandfather     No Known Problems Paternal Grandmother     Prostate cancer Paternal Grandfather     Breast cancer Maternal Aunt 48    Breast cancer Other 39    Breast cancer Other 39    No Known Problems Sister     No Known Problems Sister     No Known Problems Sister     No Known Problems Paternal Aunt     No Known Problems Paternal Aunt     Multiple myeloma Brother 67    Asthma Son     Cancer Brother     Depression Son      Social History     Socioeconomic History    Marital status: /Civil Union     Spouse name: Not on file    Number of children: Not on file    Years of education: Not on file    Highest education level: Not on file   Occupational History    Not on file   Tobacco Use    Smoking status: Never Smoker    Smokeless tobacco: Never Used   Vaping Use    Vaping Use: Never used   Substance and Sexual Activity    Alcohol use: No    Drug use: Never    Sexual activity: Not Currently     Partners: Male     Birth control/protection: Post-menopausal, None   Other Topics Concern    Not on file   Social History Narrative    · Tobacco smoking status:   Never smoker      This question's title has changed from "Smoking status" to "Tobacco smoking status" with the  update  Please use this field only for documenting tobacco smoking behavior  To accommodate this change, new fields for documenting smokeless tobacco and e-cigarette/vape usage have been added  · Smoking - how much          None  1 PPW  2 PPW  1/4 PPD  1/2 PPD  1 PPD  1 1/2 PPD  2 PPD  3+ PPD          NOTE     · Smokeless tobacco status          Never used smokeless tobacco  Former smokeless tobacco user  Current snuff user  Currently chews tobacco  Currently uses moist powdered tobacco  ----  Not indicated  Not tolerated  Patient refused          NOTE     · Tobacco-years of use               NOTE     · E-cigarette/vape status          Never used electronic cigarettes  Former user of electronic cigarettes  Current user of electronic cigarettes          NOTE     · Most recent tobacco use screenin2018      · Alcohol intake:   None         Per laine     Social Determinants of Health     Financial Resource Strain:     Difficulty of Paying Living Expenses:    Food Insecurity:     Worried About Running Out of Food in the Last Year:     Ran Out of Food in the Last Year:    Transportation Needs:     Lack of Transportation (Medical):      Lack of Transportation (Non-Medical):    Physical Activity:     Days of Exercise per Week:     Minutes of Exercise per Session:    Stress:     Feeling of Stress :    Social Connections:     Frequency of Communication with Friends and Family:     Frequency of Social Gatherings with Friends and Family:     Attends Jewish Services:     Active Member of Clubs or Organizations:     Attends Club or Organization Meetings:     Marital Status:    Intimate Partner Violence:     Fear of Current or Ex-Partner:     Emotionally Abused:     Physically Abused:     Sexually Abused:        Current Outpatient Medications:     Aspirin Buf,CaCarb-MgCarb-MgO, 81 MG TABS, aspirin 81 mg tablet, Disp: , Rfl:    clotrimazole-betamethasone (LOTRISONE) 1-0 05 % cream, Apply topically 2 (two) times a day, Disp: 45 g, Rfl: 3    gabapentin (NEURONTIN) 600 MG tablet, Take 1 tablet (600 mg total) by mouth 3 (three) times a day, Disp: 270 tablet, Rfl: 2    glipiZIDE (GLUCOTROL) 10 mg tablet, Take 1 tablet (10 mg total) by mouth 2 (two) times a day before meals, Disp: 180 tablet, Rfl: 2    Insulin Pen Needle (Sure Comfort Pen Needles) 31G X 5 MM MISC, by Does not apply route daily, Disp: 100 each, Rfl: 3    Lancets (onetouch ultrasoft) lancets, Use daily F05 7 One touch Delica Plus, Disp: 337 each, Rfl: 3    levothyroxine 100 mcg tablet, Take 1 tablet (100 mcg total) by mouth daily, Disp: 90 tablet, Rfl: 3    Liraglutide (VICTOZA) 18 MG/3ML SOPN, Inject under the skin daily, Disp: , Rfl:     lisinopril (ZESTRIL) 20 mg tablet, Take 1 tablet (20 mg total) by mouth daily, Disp: 90 tablet, Rfl: 1    metoprolol tartrate (LOPRESSOR) 25 mg tablet, Take 1 tablet (25 mg total) by mouth every 12 (twelve) hours, Disp: 180 tablet, Rfl: 2    OneTouch Ultra test strip, Use 1 each daily Use as instructed, Disp: 100 each, Rfl: 3    Sutab 1052-323-475 MG TABS, AS DIRECTED, Disp: 24 tablet, Rfl: 0    dicyclomine (BENTYL) 20 mg tablet, Take 1 tablet (20 mg total) by mouth 2 (two) times a day for 5 days, Disp: 10 tablet, Rfl: 0    oxyCODONE-acetaminophen (PERCOCET) 5-325 mg per tablet, Take 1 tablet by mouth every 6 (six) hours as needed for moderate painMax Daily Amount: 4 tablets (Patient not taking: Reported on 6/16/2021), Disp: 6 tablet, Rfl: 0  Allergies   Allergen Reactions    Duloxetine Other (See Comments)     Extreme somnolence/lethargy    Sulfa Antibiotics Rash       Physical Exam:     Vitals:    06/16/21 1120   BP: 144/78   Pulse: 86   Resp: 16   Temp: 98 °F (36 7 °C)   SpO2: 98%     Physical Exam  Vitals reviewed  Constitutional:       Appearance: She is well-developed  HENT:      Head: Normocephalic and atraumatic  Eyes:      Pupils: Pupils are equal, round, and reactive to light  Neck:      Thyroid: No thyromegaly  Vascular: No JVD  Trachea: No tracheal deviation  Cardiovascular:      Rate and Rhythm: Normal rate and regular rhythm  Heart sounds: Normal heart sounds  No murmur heard  No friction rub  No gallop  Pulmonary:      Effort: Pulmonary effort is normal  No respiratory distress  Breath sounds: Normal breath sounds  No wheezing or rales  Chest:      Comments: Both breasts were examined in the sitting and supine position  There are no worrisome skin lesions, no nipple retraction and no nipple discharge  There are no dominant masses, axillary adenopathy or supraclavicular adenopathy on either side  Abdominal:      General: There is no distension  Palpations: Abdomen is soft  There is no hepatomegaly or mass  Tenderness: There is no abdominal tenderness  There is no guarding or rebound  Musculoskeletal:         General: No tenderness  Normal range of motion  Cervical back: Normal range of motion and neck supple  Lymphadenopathy:      Cervical: No cervical adenopathy  Skin:     General: Skin is warm and dry  Findings: No erythema or rash  Neurological:      Mental Status: She is alert and oriented to person, place, and time  Cranial Nerves: No cranial nerve deficit  Psychiatric:         Behavior: Behavior normal            Results & Discussion:   The patient has a small invasive cancer in addition to multifocal DCIS  We have recommended a mastectomy  Her tumors grade 1 ER 90% RI 90% and HER2 negative  We have previously discussed the requirement for mastectomy given her multifocal disease covering approximately 8 x 5 cm of breast tissue  Patient does not want reconstruction        The patient and I underwent the process of consent for   Left breast ROSLYN  directed mastectomy ( ROSLYN clip is present underneath the nipple when we are planning for lumpectomy)  , left lymphatic mapping with blue and radioactive dye in sentinel lymph node biopsy possible axillary dissection  The complications outlined on the consent including relatively minor problems (wound infection, wound healing problems, hematomas, scarring, chronic discomfort/pain), moderate problems (injury to nerves or blood vessels, allergic reactions) and major complications (extensive blood loss requiring transfusions or possible addtional surgeries, cardiac arrest, stroke and possible death)  We also reviewed specific complications as outlined on the consent form including  Possible cancer recurrence, arm swelling, additional /adjuvant treatments  All questions were answered to the patient's satisfaction  We will coordinate the surgery at our next mutual convience  Advance Care Planning/Advance Directives:  I discussed the disease status, treatment plans and follow-up with the patient

## 2021-06-17 ENCOUNTER — TELEPHONE (OUTPATIENT)
Dept: FAMILY MEDICINE CLINIC | Facility: CLINIC | Age: 76
End: 2021-06-17

## 2021-06-17 NOTE — TELEPHONE ENCOUNTER
Patient does need medical clearance for her breast surgery- they didn't give her any type of forms to be filled out  Surgeon is asking when she should stop the aspirin?

## 2021-06-21 ENCOUNTER — TELEPHONE (OUTPATIENT)
Dept: CARDIAC SURGERY | Facility: CLINIC | Age: 76
End: 2021-06-21

## 2021-06-21 NOTE — TELEPHONE ENCOUNTER
Home health called in stating they do not service this patients area and recommended [address] or [facility] in [address]

## 2021-06-22 NOTE — TELEPHONE ENCOUNTER
[address] [facility] and spoke with [address], the   They are able to see the patient following her surgery  All requested information faxed to the number provided

## 2021-06-24 ENCOUNTER — TELEPHONE (OUTPATIENT)
Dept: FAMILY MEDICINE CLINIC | Facility: CLINIC | Age: 76
End: 2021-06-24

## 2021-06-29 ENCOUNTER — TELEPHONE (OUTPATIENT)
Dept: FAMILY MEDICINE CLINIC | Facility: CLINIC | Age: 76
End: 2021-06-29

## 2021-06-30 ENCOUNTER — TELEPHONE (OUTPATIENT)
Dept: FAMILY MEDICINE CLINIC | Facility: CLINIC | Age: 76
End: 2021-06-30

## 2021-06-30 ENCOUNTER — ANESTHESIA EVENT (OUTPATIENT)
Dept: PERIOP | Facility: HOSPITAL | Age: 76
End: 2021-06-30
Payer: MEDICARE

## 2021-06-30 RX ORDER — ACETAMINOPHEN AND CODEINE PHOSPHATE 300; 30 MG/1; MG/1
TABLET ORAL
COMMUNITY
End: 2022-06-07 | Stop reason: ALTCHOICE

## 2021-06-30 NOTE — TELEPHONE ENCOUNTER
Spoke to patient and she is taking Lyrica    We had form to be faxed back and that was completed and faxed

## 2021-07-06 ENCOUNTER — TELEPHONE (OUTPATIENT)
Dept: FAMILY MEDICINE CLINIC | Facility: CLINIC | Age: 76
End: 2021-07-06

## 2021-07-06 RX ORDER — ASPIRIN 81 MG/1
162 TABLET ORAL DAILY
COMMUNITY

## 2021-07-06 NOTE — PRE-PROCEDURE INSTRUCTIONS
Pre-Surgery Instructions:   Medication Instructions    aspirin (ECOTRIN LOW STRENGTH) 81 mg EC tablet Patient was instructed by Physician and understands   clotrimazole-betamethasone (LOTRISONE) 1-0 05 % cream Instructed patient per Anesthesia Guidelines   glipiZIDE (GLUCOTROL) 10 mg tablet Patient was instructed by Physician and understands   levothyroxine 100 mcg tablet Instructed patient per Anesthesia Guidelines   Liraglutide (VICTOZA) 18 MG/3ML SOPN Patient was instructed by Physician and understands   lisinopril (ZESTRIL) 20 mg tablet Instructed patient per Anesthesia Guidelines   Lyrica 50 MG capsule Instructed patient per Anesthesia Guidelines   metFORMIN (GLUCOPHAGE) 1000 MG tablet Patient was instructed by Physician and understands   metoprolol tartrate (LOPRESSOR) 25 mg tablet Instructed patient per Anesthesia Guidelines  Pre op and bathing instructions reviewed  Pt has hibiclens  Pt  Verbalized understanding of current visitor restrictions  Pt  Verbalized an understanding of all instructions reviewed and offers no concerns at this time  Instructed to avoid all NSAIDs and OTC Vit/Supp from now until after surgery   Tylenol ok prn  Instructed to take per normal schedule except DOS  Last dose Lisinopril 7-11-21  ASA on hold 7-8-21 as per MD  DOS instructed to take Levothyroxine,Metoprolol,Lyrica with a few sips of H2O

## 2021-07-06 NOTE — TELEPHONE ENCOUNTER
Dusty Alex with pre admission testing is looking for a clearance form for patient who is having surgery on 7/13  FAX:  [phone]

## 2021-07-13 ENCOUNTER — HOSPITAL ENCOUNTER (OUTPATIENT)
Facility: HOSPITAL | Age: 76
Setting detail: OUTPATIENT SURGERY
Discharge: HOME | End: 2021-07-14
Attending: SURGERY | Admitting: SURGERY
Payer: MEDICARE

## 2021-07-13 ENCOUNTER — APPOINTMENT (OUTPATIENT)
Dept: RADIOLOGY | Facility: HOSPITAL | Age: 76
End: 2021-07-13
Payer: MEDICARE

## 2021-07-13 ENCOUNTER — APPOINTMENT (OUTPATIENT)
Dept: MAMMOGRAPHY | Facility: HOSPITAL | Age: 76
End: 2021-07-13
Attending: SURGERY
Payer: MEDICARE

## 2021-07-13 ENCOUNTER — HOSPITAL ENCOUNTER (OUTPATIENT)
Dept: NUCLEAR MEDICINE | Facility: HOSPITAL | Age: 76
Discharge: HOME | End: 2021-07-13
Attending: SURGERY
Payer: MEDICARE

## 2021-07-13 ENCOUNTER — ANESTHESIA (OUTPATIENT)
Dept: PERIOP | Facility: HOSPITAL | Age: 76
End: 2021-07-13
Payer: MEDICARE

## 2021-07-13 DIAGNOSIS — Z17.0 MALIGNANT NEOPLASM OF OVERLAPPING SITES OF LEFT BREAST IN FEMALE, ESTROGEN RECEPTOR POSITIVE (HCC): ICD-10-CM

## 2021-07-13 DIAGNOSIS — C50.812 MALIGNANT NEOPLASM OF OVERLAPPING SITES OF LEFT BREAST IN FEMALE, ESTROGEN RECEPTOR POSITIVE (HCC): ICD-10-CM

## 2021-07-13 LAB
GLUCOSE SERPL-MCNC: 104 MG/DL (ref 65–140)
GLUCOSE SERPL-MCNC: 124 MG/DL (ref 65–140)
GLUCOSE SERPL-MCNC: 196 MG/DL (ref 65–140)

## 2021-07-13 PROCEDURE — 88333 PATH CONSLTJ SURG CYTO XM 1: CPT | Performed by: PATHOLOGY

## 2021-07-13 PROCEDURE — 88341 IMHCHEM/IMCYTCHM EA ADD ANTB: CPT | Performed by: PATHOLOGY

## 2021-07-13 PROCEDURE — 38900 IO MAP OF SENT LYMPH NODE: CPT | Performed by: SURGERY

## 2021-07-13 PROCEDURE — 88342 IMHCHEM/IMCYTCHM 1ST ANTB: CPT | Performed by: PATHOLOGY

## 2021-07-13 PROCEDURE — 88307 TISSUE EXAM BY PATHOLOGIST: CPT | Performed by: PATHOLOGY

## 2021-07-13 PROCEDURE — 19307 MAST MOD RAD: CPT | Performed by: SURGERY

## 2021-07-13 PROCEDURE — 38792 RA TRACER ID OF SENTINL NODE: CPT | Performed by: SURGERY

## 2021-07-13 PROCEDURE — 82948 REAGENT STRIP/BLOOD GLUCOSE: CPT

## 2021-07-13 PROCEDURE — A9541 TC99M SULFUR COLLOID: HCPCS

## 2021-07-13 PROCEDURE — 38792 RA TRACER ID OF SENTINL NODE: CPT

## 2021-07-13 PROCEDURE — G9197 ORDER FOR CEPH: HCPCS | Performed by: SURGERY

## 2021-07-13 RX ORDER — OXYCODONE HYDROCHLORIDE 5 MG/1
5 TABLET ORAL EVERY 4 HOURS PRN
Status: DISCONTINUED | OUTPATIENT
Start: 2021-07-13 | End: 2021-07-14 | Stop reason: HOSPADM

## 2021-07-13 RX ORDER — HYDROMORPHONE HCL/PF 1 MG/ML
0.5 SYRINGE (ML) INJECTION EVERY 4 HOURS PRN
Status: DISCONTINUED | OUTPATIENT
Start: 2021-07-13 | End: 2021-07-14 | Stop reason: HOSPADM

## 2021-07-13 RX ORDER — OXYCODONE HYDROCHLORIDE 5 MG/1
2.5 TABLET ORAL EVERY 4 HOURS PRN
Status: DISCONTINUED | OUTPATIENT
Start: 2021-07-13 | End: 2021-07-14 | Stop reason: HOSPADM

## 2021-07-13 RX ORDER — ONDANSETRON 2 MG/ML
4 INJECTION INTRAMUSCULAR; INTRAVENOUS ONCE AS NEEDED
Status: DISCONTINUED | OUTPATIENT
Start: 2021-07-13 | End: 2021-07-13 | Stop reason: HOSPADM

## 2021-07-13 RX ORDER — FENTANYL CITRATE/PF 50 MCG/ML
25 SYRINGE (ML) INJECTION
Status: DISCONTINUED | OUTPATIENT
Start: 2021-07-13 | End: 2021-07-13 | Stop reason: HOSPADM

## 2021-07-13 RX ORDER — ONDANSETRON 2 MG/ML
4 INJECTION INTRAMUSCULAR; INTRAVENOUS EVERY 6 HOURS PRN
Status: DISCONTINUED | OUTPATIENT
Start: 2021-07-13 | End: 2021-07-14 | Stop reason: HOSPADM

## 2021-07-13 RX ORDER — SUCCINYLCHOLINE/SOD CL,ISO/PF 100 MG/5ML
SYRINGE (ML) INTRAVENOUS AS NEEDED
Status: DISCONTINUED | OUTPATIENT
Start: 2021-07-13 | End: 2021-07-13

## 2021-07-13 RX ORDER — DEXAMETHASONE SODIUM PHOSPHATE 10 MG/ML
INJECTION, SOLUTION INTRAMUSCULAR; INTRAVENOUS AS NEEDED
Status: DISCONTINUED | OUTPATIENT
Start: 2021-07-13 | End: 2021-07-13

## 2021-07-13 RX ORDER — DOCUSATE SODIUM 100 MG/1
100 CAPSULE, LIQUID FILLED ORAL 2 TIMES DAILY
Status: DISCONTINUED | OUTPATIENT
Start: 2021-07-13 | End: 2021-07-14 | Stop reason: HOSPADM

## 2021-07-13 RX ORDER — LIDOCAINE HYDROCHLORIDE 10 MG/ML
INJECTION, SOLUTION EPIDURAL; INFILTRATION; INTRACAUDAL; PERINEURAL AS NEEDED
Status: DISCONTINUED | OUTPATIENT
Start: 2021-07-13 | End: 2021-07-13

## 2021-07-13 RX ORDER — LEVOTHYROXINE SODIUM 0.1 MG/1
100 TABLET ORAL
Status: DISCONTINUED | OUTPATIENT
Start: 2021-07-14 | End: 2021-07-14 | Stop reason: HOSPADM

## 2021-07-13 RX ORDER — LABETALOL 20 MG/4 ML (5 MG/ML) INTRAVENOUS SYRINGE
5
Status: DISCONTINUED | OUTPATIENT
Start: 2021-07-13 | End: 2021-07-13 | Stop reason: HOSPADM

## 2021-07-13 RX ORDER — BUPIVACAINE HYDROCHLORIDE 2.5 MG/ML
INJECTION, SOLUTION EPIDURAL; INFILTRATION; INTRACAUDAL AS NEEDED
Status: DISCONTINUED | OUTPATIENT
Start: 2021-07-13 | End: 2021-07-13 | Stop reason: HOSPADM

## 2021-07-13 RX ORDER — SODIUM CHLORIDE, SODIUM LACTATE, POTASSIUM CHLORIDE, CALCIUM CHLORIDE 600; 310; 30; 20 MG/100ML; MG/100ML; MG/100ML; MG/100ML
125 INJECTION, SOLUTION INTRAVENOUS CONTINUOUS
Status: DISCONTINUED | OUTPATIENT
Start: 2021-07-13 | End: 2021-07-14 | Stop reason: HOSPADM

## 2021-07-13 RX ORDER — MAGNESIUM HYDROXIDE 1200 MG/15ML
LIQUID ORAL AS NEEDED
Status: DISCONTINUED | OUTPATIENT
Start: 2021-07-13 | End: 2021-07-13 | Stop reason: HOSPADM

## 2021-07-13 RX ORDER — BUPIVACAINE HYDROCHLORIDE 2.5 MG/ML
INJECTION, SOLUTION EPIDURAL; INFILTRATION; INTRACAUDAL
Status: DISCONTINUED | OUTPATIENT
Start: 2021-07-13 | End: 2021-07-13

## 2021-07-13 RX ORDER — HYDROMORPHONE HCL IN WATER/PF 6 MG/30 ML
0.2 PATIENT CONTROLLED ANALGESIA SYRINGE INTRAVENOUS
Status: DISCONTINUED | OUTPATIENT
Start: 2021-07-13 | End: 2021-07-13 | Stop reason: HOSPADM

## 2021-07-13 RX ORDER — PROPOFOL 10 MG/ML
INJECTION, EMULSION INTRAVENOUS AS NEEDED
Status: DISCONTINUED | OUTPATIENT
Start: 2021-07-13 | End: 2021-07-13

## 2021-07-13 RX ORDER — ONDANSETRON 2 MG/ML
INJECTION INTRAMUSCULAR; INTRAVENOUS AS NEEDED
Status: DISCONTINUED | OUTPATIENT
Start: 2021-07-13 | End: 2021-07-13

## 2021-07-13 RX ORDER — HYDROMORPHONE HCL/PF 1 MG/ML
SYRINGE (ML) INJECTION AS NEEDED
Status: DISCONTINUED | OUTPATIENT
Start: 2021-07-13 | End: 2021-07-13

## 2021-07-13 RX ORDER — LIDOCAINE HYDROCHLORIDE 10 MG/ML
0.5 INJECTION, SOLUTION EPIDURAL; INFILTRATION; INTRACAUDAL; PERINEURAL ONCE AS NEEDED
Status: DISCONTINUED | OUTPATIENT
Start: 2021-07-13 | End: 2021-07-14 | Stop reason: HOSPADM

## 2021-07-13 RX ORDER — CEFAZOLIN SODIUM 2 G/50ML
2000 SOLUTION INTRAVENOUS ONCE
Status: COMPLETED | OUTPATIENT
Start: 2021-07-13 | End: 2021-07-13

## 2021-07-13 RX ORDER — FENTANYL CITRATE 50 UG/ML
INJECTION, SOLUTION INTRAMUSCULAR; INTRAVENOUS AS NEEDED
Status: DISCONTINUED | OUTPATIENT
Start: 2021-07-13 | End: 2021-07-13

## 2021-07-13 RX ORDER — ACETAMINOPHEN 325 MG/1
650 TABLET ORAL EVERY 6 HOURS PRN
Status: DISCONTINUED | OUTPATIENT
Start: 2021-07-13 | End: 2021-07-14 | Stop reason: HOSPADM

## 2021-07-13 RX ADMIN — BUPIVACAINE HYDROCHLORIDE 30 ML: 2.5 INJECTION, SOLUTION EPIDURAL; INFILTRATION; INTRACAUDAL at 16:17

## 2021-07-13 RX ADMIN — LABETALOL 20 MG/4 ML (5 MG/ML) INTRAVENOUS SYRINGE 5 MG: at 18:39

## 2021-07-13 RX ADMIN — ONDANSETRON 4 MG: 2 INJECTION INTRAMUSCULAR; INTRAVENOUS at 17:31

## 2021-07-13 RX ADMIN — HYDROMORPHONE HYDROCHLORIDE 0.5 MG: 1 INJECTION, SOLUTION INTRAMUSCULAR; INTRAVENOUS; SUBCUTANEOUS at 17:36

## 2021-07-13 RX ADMIN — Medication 100 MG: at 16:10

## 2021-07-13 RX ADMIN — INSULIN LISPRO 1 UNITS: 100 INJECTION, SOLUTION INTRAVENOUS; SUBCUTANEOUS at 20:00

## 2021-07-13 RX ADMIN — FENTANYL CITRATE 50 MCG: 50 INJECTION, SOLUTION INTRAMUSCULAR; INTRAVENOUS at 16:36

## 2021-07-13 RX ADMIN — LIDOCAINE HYDROCHLORIDE 50 MG: 10 INJECTION, SOLUTION EPIDURAL; INFILTRATION; INTRACAUDAL at 16:10

## 2021-07-13 RX ADMIN — LABETALOL 20 MG/4 ML (5 MG/ML) INTRAVENOUS SYRINGE 5 MG: at 18:52

## 2021-07-13 RX ADMIN — SODIUM CHLORIDE, SODIUM LACTATE, POTASSIUM CHLORIDE, AND CALCIUM CHLORIDE: .6; .31; .03; .02 INJECTION, SOLUTION INTRAVENOUS at 16:02

## 2021-07-13 RX ADMIN — DEXAMETHASONE SODIUM PHOSPHATE 4 MG: 10 INJECTION, SOLUTION INTRAMUSCULAR; INTRAVENOUS at 16:20

## 2021-07-13 RX ADMIN — METOPROLOL TARTRATE 25 MG: 25 TABLET, FILM COATED ORAL at 22:03

## 2021-07-13 RX ADMIN — PROPOFOL 150 MG: 10 INJECTION, EMULSION INTRAVENOUS at 16:10

## 2021-07-13 RX ADMIN — FENTANYL CITRATE 50 MCG: 50 INJECTION, SOLUTION INTRAMUSCULAR; INTRAVENOUS at 16:10

## 2021-07-13 RX ADMIN — CEFAZOLIN SODIUM 2000 MG: 2 SOLUTION INTRAVENOUS at 16:05

## 2021-07-13 NOTE — ANESTHESIA PREPROCEDURE EVALUATION
Procedure:  BREAST ROSLYN  DIRECTED MASTECTOMY, SENTINEL LYMPH NODE BIOPSY, LYMPHATIC MAPPING WITH BLUE DYE AND RADIOACTIVE DYE (INJECT AT 1500 BY DR ANDREWS IN THE OR) (Left Breast)    Relevant Problems   CARDIO   (+) Coronary artery disease involving native coronary artery of native heart without angina pectoris   (+) Essential hypertension      ENDO   (+) Hypothyroidism   (+) Type 2 diabetes mellitus without complication, without long-term current use of insulin (HCC)      GYN   (+) 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, estrogen receptor positive (HCC)      Morbid obesity (BMI 38 with >3 obesity related comorbid conditions)    Lab Results   Component Value Date    WBC 7 31 06/16/2021    HGB 12 6 06/16/2021    HCT 35 7 06/16/2021    MCV 89 06/16/2021     06/16/2021     Lab Results   Component Value Date     12/30/2015    K 4 4 06/16/2021    CO2 28 06/16/2021     06/16/2021    BUN 10 06/16/2021    CREATININE 0 57 (L) 06/16/2021     Lab Results   Component Value Date    INR 1 08 12/21/2015    INR 1 00 10/13/2015    PROTIME 13 3 12/21/2015    PROTIME 10 4 10/13/2015     Lab Results   Component Value Date    PTT 28 12/21/2015     Lab Results   Component Value Date    HGBA1C 5 7 (H) 06/16/2021     Physical Exam    Airway    Mallampati score: III  TM Distance: >3 FB  Neck ROM: full     Dental   upper dentures and lower dentures,     Cardiovascular      Pulmonary      Other Findings        Anesthesia Plan  ASA Score- 3     Anesthesia Type- general and regional with ASA Monitors  Additional Monitors:   Airway Plan: ETT  Comment: Discussed PECSII nerve block for postoperative pain control with R/B/A  Plan Factors-Exercise tolerance (METS): >4 METS  Chart reviewed  Existing labs reviewed  Patient summary reviewed  Induction- intravenous  Postoperative Plan- Plan for postoperative opioid use   Planned trial extubation    Informed Consent- Anesthetic plan and risks discussed with patient  I personally reviewed this patient with the CRNA  Discussed and agreed on the Anesthesia Plan with the CRNA  Tana Kent

## 2021-07-13 NOTE — ANESTHESIA POSTPROCEDURE EVALUATION
Post-Op Assessment Note    CV Status:  Stable  Pain Score: 0    Pain management: adequate     Mental Status:  Alert and awake   Hydration Status:  Euvolemic   PONV Controlled:  Controlled   Airway Patency:  Patent      Post Op Vitals Reviewed: Yes      Staff: CRNA   Comments: arousable, following commands and verbally responsive  Denies any discomfort at this time  RRR, Nonlabored, VSS  No complications documented      BP  177/78   Temp      Pulse  87   Resp   16   SpO2   98

## 2021-07-13 NOTE — OP NOTE
OPERATIVE REPORT  PATIENT NAME: Mauri Hancock    :  1945  MRN: [id number]  Pt Location: AN OR ROOM 03    SURGERY DATE: 2021    Surgeon(s) and Role:     * James Araya MD - Primary     * Jovana Lora DO - Assisting    Preop Diagnosis:  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, estrogen receptor positive ([facility] [address] ) [C50 812, Z17 0]    Post-Op Diagnosis Codes:     * 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, estrogen receptor positive ([facility] [address] ) [C50 812, Z17 0]    Procedure(s) (LRB):  BREAST ROSLYN  DIRECTED MASTECTOMY, SENTINEL LYMPH NODE BIOPSY, LYMPHATIC MAPPING WITH BLUE DYE AND RADIOACTIVE DYE (INJECT AT 1500 BY DR ANDREWS IN THE OR) (Left)    Specimen(s):  ID Type Source Tests Collected by Time Destination   1 : LEFT BREAST MASTECTOMY  SHORT SUTURE BROWN SUPERIOR  MEDIUM SUTURE BROWN MEDIAL  LONG SUTURE MARKS LATERAL  Tissue Breast, Left TISSUE EXAM James Araya MD 2021 1638    2 : LEFT AXILLARY SENTINEL LYMPH NODE Tissue Lymph Node, [place] TISSUE EXAM James Araya MD 2021 1639    3 : LEFT BREAST, UPPER OUTER QUADRANT MARGIN Tissue Breast, Left TISSUE EXAM James Araya MD 2021 1651        Estimated Blood Loss:   Minimal    Drains:  Closed/Suction Drain Inferior; Left Breast Bulb 19 Fr  (Active)   Number of days: 0       Closed/Suction Drain Left;Superior Breast Bulb 19 Fr   (Active)   Number of days: 0       Anesthesia Type:   General    Operative Indications:  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, estrogen receptor positive ([facility] [address] ) [C50 812, Z17 0]      Operative Findings:  Good specimen radiograph, sln blue, hot and negative on touch prep, grossly normal    Complications:   None    Procedure and Technique:  The patient was brought to the operation room and placed under general anesthesia   Attention was paid to ensure appropriate padding and correct positioning   The left breast was injected intradermally with 0 3cc of methylene blue and technetium and then prepped and draped in a sterile fashion   I initiated a time-out, identifying the patient, the correct side and the above procedure   All parties agreed with the time out      The planned incision was then injected with 0 25% Marcaine and epinephrine for local anesthesia   The incision was sharply incised   Thin flaps were then elevated using electrocautery starting superiorly and then continuing medially, inferiorly and finally laterally   The tail of Truman Paget was then dissected away from the axilla proper leaving the breast tethered to the underlying musculature       The sentinel lymph node was identified and removed  The node was blue, hot and grossly normal   There were no other radioactive, blue or enlarged lymph nodes in the axilla  The sentinel lymph node was sent to pathology and there was no microscopic evidence of metastasis  The breast was then removed from the muscles in a sub-facial plane   The breast was oriented with sutures (short=superior; medium=medial; long=lateral)  Specimen radiograph demonstrated all clips well within the specimen  There was an additional area of tissue that was removed to make the flaps even, this was sent as a separate upper outer quadrant margin  The breast was sent to pathology in formalin for permanent pathologic evaluation  Meticulous hemostasis was maintained with electrocautery   The wound was extensively irrigated and two 19mm, slotted, round JARED drains were placed and brought out through separate incisions and secured with nylon sutures   The wound was then closed in two layers - an inner layer of 2-0 vicryl and a subcuticular closure with 4-0 Monocryl  Steri-strips were applied  The counts were correct x2       I was present for the entire procedure    Patient Disposition:  PACU     SIGNATURE: Shanel Silva MD  DATE: July 13, 2021  TIME: 5:30 PM

## 2021-07-13 NOTE — INTERVAL H&P NOTE
H&P reviewed  After examining the patient I find no changes in the patients condition since the H&P had been written      Vitals:    07/13/21 1421   BP: (!) 234/99   Pulse: 86   Resp: 20   Temp: 97 6 °F (36 4 °C)   SpO2: 98%

## 2021-07-13 NOTE — QUICK NOTE
Postoperative evaluation:    Pt is 78 yo female with Left breast cancer, now s/p L  Jeni  directed mastectomy and SLBx  Doing well postoperatively, pain is controlled  Denies n/v  Incisions appears, dry and intact with some serosanguinous output on the dressing  JARED x2 draining serosanguinous output      Plan:  Diet as tolerated  Pain control and antiemetics  Am cbc  Dc likely tomorrow    Buffy Justin MD

## 2021-07-14 VITALS
HEART RATE: 75 BPM | DIASTOLIC BLOOD PRESSURE: 55 MMHG | SYSTOLIC BLOOD PRESSURE: 132 MMHG | TEMPERATURE: 98 F | WEIGHT: 203 LBS | BODY MASS INDEX: 38.33 KG/M2 | RESPIRATION RATE: 16 BRPM | HEIGHT: 61 IN | OXYGEN SATURATION: 98 %

## 2021-07-14 LAB
ERYTHROCYTE [DISTWIDTH] IN BLOOD BY AUTOMATED COUNT: 12 % (ref 11.6–15.1)
GLUCOSE SERPL-MCNC: 136 MG/DL (ref 65–140)
GLUCOSE SERPL-MCNC: 146 MG/DL (ref 65–140)
HCT VFR BLD AUTO: 35.8 % (ref 34.8–46.1)
HGB BLD-MCNC: 12.4 G/DL (ref 11.5–15.4)
MCH RBC QN AUTO: 30.8 PG (ref 26.8–34.3)
MCHC RBC AUTO-ENTMCNC: 34.6 G/DL (ref 31.4–37.4)
MCV RBC AUTO: 89 FL (ref 82–98)
PLATELET # BLD AUTO: 186 THOUSANDS/UL (ref 149–390)
PMV BLD AUTO: 9.5 FL (ref 8.9–12.7)
RBC # BLD AUTO: 4.03 MILLION/UL (ref 3.81–5.12)
WBC # BLD AUTO: 10.16 THOUSAND/UL (ref 4.31–10.16)

## 2021-07-14 PROCEDURE — NC001 PR NO CHARGE: Performed by: SURGERY

## 2021-07-14 PROCEDURE — 85027 COMPLETE CBC AUTOMATED: CPT | Performed by: SURGERY

## 2021-07-14 PROCEDURE — 82948 REAGENT STRIP/BLOOD GLUCOSE: CPT

## 2021-07-14 PROCEDURE — 99024 POSTOP FOLLOW-UP VISIT: CPT | Performed by: SURGERY

## 2021-07-14 RX ADMIN — SODIUM CHLORIDE, SODIUM LACTATE, POTASSIUM CHLORIDE, AND CALCIUM CHLORIDE 125 ML/HR: .6; .31; .03; .02 INJECTION, SOLUTION INTRAVENOUS at 00:16

## 2021-07-14 RX ADMIN — METOPROLOL TARTRATE 25 MG: 25 TABLET, FILM COATED ORAL at 08:46

## 2021-07-14 RX ADMIN — LEVOTHYROXINE SODIUM 100 MCG: 100 TABLET ORAL at 05:08

## 2021-07-14 RX ADMIN — ACETAMINOPHEN 650 MG: 325 TABLET ORAL at 09:35

## 2021-07-14 RX ADMIN — SODIUM CHLORIDE, SODIUM LACTATE, POTASSIUM CHLORIDE, AND CALCIUM CHLORIDE 125 ML/HR: .6; .31; .03; .02 INJECTION, SOLUTION INTRAVENOUS at 08:43

## 2021-07-14 NOTE — PLAN OF CARE
Problem: NEUROSENSORY - ADULT  Goal: Achieves stable or improved neurological status  Description: INTERVENTIONS  - Monitor and report changes in neurological status  - Monitor vital signs such as temperature, blood pressure, glucose, and any other labs ordered   - Initiate measures to prevent increased intracranial pressure  - Monitor for seizure activity and implement precautions if appropriate      Outcome: Progressing

## 2021-07-14 NOTE — CASE MANAGEMENT
Cm met with pt to review DCP and introduce self/role  Pt has had [facility] arranged as out pt prior to her surgery with [facility]  She states she has already received a call from them and they will be coming out to her home tomorrow  Pt has ride home with her  at NH

## 2021-07-14 NOTE — PLAN OF CARE
Problem: NEUROSENSORY - ADULT  Goal: Achieves stable or improved neurological status  Description: INTERVENTIONS  - Monitor and report changes in neurological status  - Monitor vital signs such as temperature, blood pressure, glucose, and any other labs ordered   - Initiate measures to prevent increased intracranial pressure  - Monitor for seizure activity and implement precautions if appropriate      Outcome: Progressing     Problem: GENITOURINARY - ADULT  Goal: Maintains or returns to baseline urinary function  Description: INTERVENTIONS:  - Assess urinary function  - Encourage oral fluids to ensure adequate hydration if ordered  - Administer IV fluids as ordered to ensure adequate hydration  - Administer ordered medications as needed  - Offer frequent toileting  - Follow urinary retention protocol if ordered  Outcome: Progressing     Problem: METABOLIC, FLUID AND ELECTROLYTES - ADULT  Goal: Electrolytes maintained within normal limits  Description: INTERVENTIONS:  - Monitor labs and assess patient for signs and symptoms of electrolyte imbalances  - Administer electrolyte replacement as ordered  - Monitor response to electrolyte replacements, including repeat lab results as appropriate  - Instruct patient on fluid and nutrition as appropriate  Outcome: Progressing     Problem: SKIN/TISSUE INTEGRITY - ADULT  Goal: Skin Integrity remains intact(Skin Breakdown Prevention)  Description: Assess:  -Perform Bashir assessment   -Clean and moisturize skin   -Inspect skin when repositioning, toileting, and assisting with ADLS  -Assess under medical devices   -Assess extremities for adequate circulation and sensation     Bed Management:  -Have minimal linens on bed & keep smooth, unwrinkled  -Change linens as needed when moist or perspiring  -Avoid sitting or lying in one position     Toileting:  -Offer bedside commode  -Assess for incontinence   -Use incontinent care products after each incontinent episode Activity:  -Mobilize patient   -Encourage activity and walks on unit  -Encourage or provide ROM exercises   -Turn and reposition patient   -Use appropriate equipment to lift or move patient in bed  -Instruct/ Assist with weight shifting   -Consider limitation of chair time     Skin Care:  -Avoid use of baby powder, tape, friction and shearing, hot water or constrictive clothing  -Relieve pressure over bony prominences   -Do not massage red bony areas    Next Steps:  -Teach patient strategies to minimize risks  -Consider consults to  interdisciplinary teams  Outcome: Progressing  Goal: Incision(s), wounds(s) or drain site(s) healing without S/S of infection  Description: INTERVENTIONS  - Assess and document dressing, incision, wound bed, drain sites and surrounding tissue  - Provide patient and family education  - Perform skin care/dressing changes   Outcome: Progressing     Problem: MUSCULOSKELETAL - ADULT  Goal: Maintain or return mobility to safest level of function  Description: INTERVENTIONS:  - Assess patient's ability to carry out ADLs; assess patient's baseline for ADL function and identify physical deficits which impact ability to perform ADLs (bathing, care of mouth/teeth, toileting, grooming, dressing, etc )  - Assess/evaluate cause of self-care deficits   - Assess range of motion  - Assess patient's mobility  - Assess patient's need for assistive devices and provide as appropriate  - Encourage maximum independence but intervene and supervise when necessary  - Involve family in performance of ADLs  - Assess for home care needs following discharge   - Consider OT consult to assist with ADL evaluation and planning for discharge  - Provide patient education as appropriate  Outcome: Progressing     Problem: PAIN - ADULT  Goal: Verbalizes/displays adequate comfort level or baseline comfort level  Description: Interventions:  - Encourage patient to monitor pain and request assistance  - Assess pain using appropriate pain scale  - Administer analgesics based on type and severity of pain and evaluate response  - Implement non-pharmacological measures as appropriate and evaluate response  - Consider cultural and social influences on pain and pain management  - Notify physician/advanced practitioner if interventions unsuccessful or patient reports new pain  Outcome: Progressing     Problem: INFECTION - ADULT  Goal: Absence or prevention of progression during hospitalization  Description: INTERVENTIONS:  - Assess and monitor for signs and symptoms of infection  - Monitor lab/diagnostic results  - Monitor all insertion sites, i e  indwelling lines, tubes, and drains  - Monitor endotracheal if appropriate and nasal secretions for changes in amount and color  - Pace appropriate cooling/warming therapies per order  - Administer medications as ordered  - Instruct and encourage patient and family to use good hand hygiene technique  - Identify and instruct in appropriate isolation precautions for identified infection/condition  Outcome: Progressing     Problem: SAFETY ADULT  Goal: Patient will remain free of falls  Description: INTERVENTIONS:  - Educate patient/family on patient safety including physical limitations  - Instruct patient to call for assistance with activity   - Consult OT/PT to assist with strengthening/mobility   - Keep Call bell within reach  - Keep bed low and locked with side rails adjusted as appropriate  - Keep care items and personal belongings within reach  - Initiate and maintain comfort rounds  - Make Fall Risk Sign visible to staff  - Offer Toileting   - Obtain necessary fall risk management equipment  - Apply yellow socks and bracelet for high fall risk patients  - Consider moving patient to room near nurses station  Outcome: Progressing

## 2021-07-14 NOTE — DISCHARGE INSTRUCTIONS
POST-OPERATIVE BREAST CARE INSTRUCTIONS    Wound Closure/Dressing: Your wound is closed with:   Steri strips-white pieces of tape that hold your incision together along with surgical glue           Dissolvable sutures-underneath the skin    Wound care:     If you have gauze over your wound you may remove it the day after surgery  Leave the Steri-strips on, they will fall off on their own in 1-2 weeks   You may shower using soap and water to clean your wound  Gently pat dry  Drain Care: If you do not have a drain, disregard this section   Visiting Nursing should have been arranged upon discharge from hospital   A nurse will call your home to schedule an initial visit  Please call the number below if you have not received a call within 48 hours of hospital discharge   You may shower with the JARED drains  Wash area around the drains with soap and water and pat dry   Record drain outputs on chart given to you at pre op visit and bring that chart to office at post op appt   JARED drains can be removed in the office by a nurse either at post op visit OR when drain output is 30 ccs or less in a 24 hour period for three days in a row   If you had plastic surgery or reconstructive surgery, the plastic surgeon will manage the drains  Other:       You can begin wearing your own bra when it feels comfortable   You may resume using deodorant the day after surgery                 Post-op visit:  your post-op visit is scheduled for:  2021 at 9:30 AM    Call our office at [phone] if you have any  of the followin  Redness, swelling, heat, unusual drainage or heavy bleeding from wound  2  Fever greater than 101 °  3  Pain not relieved by medication

## 2021-07-14 NOTE — PROGRESS NOTES
Progress Note - Oncologic Surgery   Jonh Lagunas 77 y o  female MRN: [id number]  Unit/Bed#: S -01 Encounter: [id number]    Assessment:  Pt is 78 yo female with Left breast cancer, now s/p L  Jeni  directed mastectomy and SLBx  POD1  Afebrile, VSS  JP1/inf: 60  JP2/superior: 100 both serosang    Hb 4 stable from 12 6    Plan:  Diet as tolerated  Pain control  CM for VNA  DC today    Subjective/Objective     Subjective:   Events as noted above  Objective:    Blood pressure 142/72, pulse 72, temperature 98 2 °F (36 8 °C), temperature source Oral, resp  rate 16, height 5' 1" (1 549 m), weight 92 1 kg (203 lb), SpO2 95 %, currently breastfeeding  ,Body mass index is 38 36 kg/m²  Intake/Output Summary (Last 24 hours) at 2021 0107  Last data filed at 2021 0016  Gross per 24 hour   Intake 2000 ml   Output 330 ml   Net 1670 ml       Invasive Devices     Peripheral Intravenous Line            Peripheral IV 21 Dorsal (posterior); Right Wrist <1 day          Drain            Closed/Suction Drain Inferior; Left Breast Bulb 19 Fr  <1 day    Closed/Suction Drain Left;Superior Breast Bulb 19 Fr  <1 day                Physical Exam  Vitals reviewed  Constitutional:       General: She is not in acute distress  Appearance: Normal appearance  She is not ill-appearing or diaphoretic  HENT:      Head: Normocephalic and atraumatic  Mouth/Throat:      Mouth: Mucous membranes are moist    Eyes:      Extraocular Movements: Extraocular movements intact  Cardiovascular:      Rate and Rhythm: Normal rate  Pulmonary:      Effort: Pulmonary effort is normal  No respiratory distress  Chest:       Abdominal:      Palpations: Abdomen is soft  Skin:     General: Skin is warm and dry  Neurological:      Mental Status: She is alert and oriented to person, place, and time     Psychiatric:         Mood and Affect: Mood normal          Behavior: Behavior normal                        Invalid input(s): RIA

## 2021-07-14 NOTE — ANESTHESIA PROCEDURE NOTES
Peripheral Block    Patient location during procedure: OR  Start time: 2021 4:17 PM  Reason for block: at surgeon's request and post-op pain management  Staffing  Performed: anesthesiologist   Anesthesiologist: Sissy Muñoz MD  Preanesthetic Checklist  Completed: patient identified, IV checked, risks and benefits discussed, surgical consent, monitors and equipment checked, pre-op evaluation and timeout performed  Peripheral Block  Patient position: supine  Prep: ChloraPrep  Patient monitoring: continuous pulse ox, frequent blood pressure checks and heart rate  Anesthesia block type: PECSII  Laterality: left  Injection technique: single-shot  Procedures: ultrasound guided, Ultrasound guidance required for the procedure to increase accuracy and safety of medication placement and decrease risk of complications  Ultrasound permanent image savedbupivacaine (MARCAINE) 0 25 % perineural infiltration, 30 mL  Needle  Needle type: StimupSentiOne   Needle gauge: 20g  Needle length: 4in    Needle localization: ultrasound guidance  Test dose: negative  Assessment  Injection assessment: incremental injection, no paresthesia on injection and negative aspiration for heme  Paresthesia pain: none  Heart rate change: no  Slow fractionated injection: yes  Post-procedure:  site cleaned  patient tolerated the procedure well with no immediate complications  Additional Notes  10 mL to PECS1 plane, 20 mL to PECSII plane

## 2021-07-14 NOTE — DISCHARGE SUMMARY
Discharge Summary - Surgical Oncology   Yuli García 77 y o  female MRN: [id number]  Unit/Bed#: S -01 Encounter: [id number]    Admission Date: 7/13/2021     Admitting Diagnosis: 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, estrogen receptor positive ([address] ) [C50 812, Z17 0]    Procedures Performed: No orders of the defined types were placed in this encounter  Hospital Course: Patient [facility] to [facility] on 07/13[facility]21 for a scheduled left mastectomy, sentinel lymph node biopsy with Dr Jorje Cortez  Patient did well postoperatively and was kept overnight for observation of drains and surgical site  CBC this AM was within normal limits  Superior drain with 100cc out, inferior drain with 60cc out, both with serosanguinous fluid  Patients pain is well controlled, she is tolerating diet without nausea or vomiting, and has adequate urine output  She is stable for discharge to home with VNA for drain care and wound management  She has no questions or complaints at this time  She will follow up as an outpatient with Dr Jorje Cortez on 07/28  She was given instructions on how to care for her wounds  No questions or concerns at this time  Significant Findings, Care, Treatment and Services Provided:   07/13/21 rudi  directed left mastectomy, sentinel lymph node biopsy - Dr Jorje Cortez     Lab Results: I have personally reviewed pertinent lab results:   Results from last 7 days   Lab Units 07/14/21  0500   WBC Thousand/uL 10 16   HEMOGLOBIN g/dL 12 4   HEMATOCRIT % 45 5     Complications: none    Discharge Diagnosis: 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, estrogen receptor positive s/p mastectomy, sentinel lymph node biopsy     Discharge Date:07/14/21    Condition at Discharge: good     Discharge instructions/Information to patient and family:   See after visit summary for information provided to patient and family        Provisions for Follow-Up Care:  See after visit summary for information related to follow-up care and any pertinent home health orders  Disposition: home with VNA for wound/ drain care    Planned Readmission: none    Discharge Statement   I spent 20 minutes discharging the patient  This time was spent on the day of discharge  A portion of this time was spent arranging for VNA upon discharge  Discharge Medications:  See after visit summary for reconciled discharge medications provided to patient and family        Nicho Armando PA-C  07/14/2021

## 2021-07-23 ENCOUNTER — TELEPHONE (OUTPATIENT)
Dept: SURGICAL ONCOLOGY | Facility: CLINIC | Age: 76
End: 2021-07-23

## 2021-07-23 NOTE — TELEPHONE ENCOUNTER
Called patient back and requested that she send a photo of the area through her MyChart account  Patient stated that she would try but if she could not, she would call the office back

## 2021-07-23 NOTE — TELEPHONE ENCOUNTER
Spoke with patient regarding her concerns and symptoms  She states she has soreness at the drain site and it is "pink"  She states her  said it looks infected, he noticed some white drainage around the drain site  She denies any swelling or fever  Drain output is between 30-40 cc's in drain #1 and drain output os between 40-45 cc's in drain #2 and drain output is bright red in color  I advised patient that her symptoms and concerns will be reviewed with Dr Ally Colón and someone from the office will call her back  Patient verbalized understanding  Also confirmed upcoming post-op appointment with the patient

## 2021-07-23 NOTE — TELEPHONE ENCOUNTER
Val Gamez called because she is having discomfort with her drain, it is very sore and she believes that it is infected   [facility] is not in until 10:30 may one of our nurses please contact Val Gamez as soon as you have time

## 2021-07-26 ENCOUNTER — OFFICE VISIT (OUTPATIENT)
Dept: SURGICAL ONCOLOGY | Facility: CLINIC | Age: 76
End: 2021-07-26

## 2021-07-26 ENCOUNTER — TELEPHONE (OUTPATIENT)
Dept: SURGICAL ONCOLOGY | Facility: CLINIC | Age: 76
End: 2021-07-26

## 2021-07-26 ENCOUNTER — TELEPHONE (OUTPATIENT)
Dept: HEMATOLOGY ONCOLOGY | Facility: CLINIC | Age: 76
End: 2021-07-26

## 2021-07-26 VITALS
DIASTOLIC BLOOD PRESSURE: 84 MMHG | HEART RATE: 77 BPM | BODY MASS INDEX: 37.19 KG/M2 | WEIGHT: 197 LBS | HEIGHT: 61 IN | RESPIRATION RATE: 17 BRPM | SYSTOLIC BLOOD PRESSURE: 128 MMHG | TEMPERATURE: 98 F | OXYGEN SATURATION: 97 %

## 2021-07-26 DIAGNOSIS — Z17.0 MALIGNANT NEOPLASM OF OVERLAPPING SITES OF LEFT BREAST IN FEMALE, ESTROGEN RECEPTOR POSITIVE (HCC): ICD-10-CM

## 2021-07-26 DIAGNOSIS — Z71.89 OTHER SPECIFIED COUNSELING: Primary | ICD-10-CM

## 2021-07-26 DIAGNOSIS — Z15.01 MONOALLELIC MUTATION OF CHEK2 GENE IN FEMALE PATIENT: ICD-10-CM

## 2021-07-26 DIAGNOSIS — C50.812 MALIGNANT NEOPLASM OF OVERLAPPING SITES OF LEFT BREAST IN FEMALE, ESTROGEN RECEPTOR POSITIVE (HCC): ICD-10-CM

## 2021-07-26 DIAGNOSIS — Z15.02 MONOALLELIC MUTATION OF CHEK2 GENE IN FEMALE PATIENT: ICD-10-CM

## 2021-07-26 DIAGNOSIS — Z15.89 MONOALLELIC MUTATION OF CHEK2 GENE IN FEMALE PATIENT: ICD-10-CM

## 2021-07-26 DIAGNOSIS — Z15.09 MONOALLELIC MUTATION OF CHEK2 GENE IN FEMALE PATIENT: ICD-10-CM

## 2021-07-26 DIAGNOSIS — Z90.12 STATUS POST LEFT MASTECTOMY: ICD-10-CM

## 2021-07-26 PROCEDURE — 99024 POSTOP FOLLOW-UP VISIT: CPT | Performed by: SURGERY

## 2021-07-26 NOTE — TELEPHONE ENCOUNTER
New Patient Request   Patient Details:     Shira Flick      1945      [id number]      Reason for Appointment   Who is calling to schedule? Physician Office    If not Patient, what is their name? Sandra Tsang   What is the diagnosis? 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, estrogen receptor positive   Who is the referring doctor? Brenna Browne    Scheduling Information   Which department are you scheduling with ? [address] Number to call back on? If calling from the [facility], use the Nurse number   [phone]   Miscellaneous Information: Jocelyn Campbell     Please advise the patient, a new patient  will be calling them back within 1 business day

## 2021-07-26 NOTE — PROGRESS NOTES
Surgical Oncology Breast Post-Op       [address] [facility]  [facility]GICAL ONCOLOGY [place]  [address]  [facility] [address]    Ellene Shoulder  1945  [id number]  [address]  [facility] SURGICAL ONCOLOGY [place]  [address] [facility] [address]    Chief Complaint:   Migue Dutton is seen for a post-operative visit of her recent breast surgery  Oncology History:     Oncology History   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[facility] [address] )   8/27/2013 Initial Diagnosis    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[facility] [address] )     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, estrogen receptor positive ([facility] [address] )   3/15/2021 Biopsy    Left breast ultrasound-guided biopsy  12 o'clock, 5 cm from nipple  Ductal carcinoma in situ  Grade 2        Concordant  Malignancy appears unifocal; masses cover 2 6 cm  US left axilla negative  Right breast clear  4/19/2021 Genetic Testing    One pathogenic (low penetrance) variant identified in CHEK2  Total of 23 genes evaluated  [employer]     5/6/2021 Observation    Imaging was reviewed prior to ROSLYN clip insertion  Additional findings in the left breast on ultrasound; 2 additional biopsies were recommended     5/6/2021 Biopsy    Left breast ultrasound-guided biopsy  A  1 o'clock, 4 cm from nipple  Ductal carcinoma in situ  Grade 2  ,     B  11 o'clock, 9 cm from nipple  Invasive carcinoma of no special type (ductal)  Grade 1  ER 90, CT 90, HER2 1+  Lymphovascular invasion not identified    Concordant  Malignancy is multifocal and spans at least 8 5 cm in 2 directions  ROSLYN  clip placed at 12:00, 5cmfn biopsy site       7/13/2021 Surgery    Left breast ROSLYN  directed mastectomy with sentinel lymph node biopsy  Invasive mammary carcinoma of no special type (ductal)  Grade 1  8 mm  Extensive DCIS (size cannot be determined, throughout all quadrants)  Margins negative  0/1 Lymph node  Anatomic/Prognostic Stage IA         Assessment & Plan:   Assessment/Plan      Patient presents for postoperative visit  Her drains were putting out 20 cc in 1  And less than 10 cc and 2  They were removed without difficulty  I reviewed her pathology with her and I have recommended she see Dr Irasema Isaac for opinion regarding adjuvant therapies  I did not think she needed radiation therapy  I will see her back in 6 months  History of Present Illness:   See Oncology History    Interval History:   See Assessment & Plan    Review of Systems:   Review of Systems   All other systems reviewed and are negative        Past Medical History:     Patient Active Problem List   Diagnosis    Coronary artery disease involving native coronary artery of native heart without angina pectoris    Type 2 diabetes mellitus without complication, without long-term current use of insulin (HCC)    Mixed dyslipidemia    Essential hypertension    Abnormal carotid ultrasound    Hypothyroidism    Spinal stenosis of lumbar region    Osteopenia of necks of both femurs    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[address]    S/P lumbar fusion    Bilateral leg edema    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, estrogen receptor positive ([address]    Obesity, morbid ([address] )    Monoallelic mutation of CHEK2 gene in female patient     Past Medical History:   Diagnosis Date    Angina pectoris ([address]     Arthritis     Cancer ([address]     Coronary artery disease     Diabetes mellitus ([address]     Disease of thyroid gland     Hypercholesterolemia     Hypertension     Obesity     Osteoporosis     Otitis media     Ovarian ca ([address] ) 1997    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[address] )     Shingles     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    Urinary tract infection      Past Surgical History:   Procedure Laterality Date    APPENDECTOMY      BACK SURGERY      BRAIN SURGERY  1993    meningioma    CARPAL TUNNEL RELEASE      CHOLECYSTECTOMY      COLONOSCOPY      pt see Dr Tsaha Bilyl  Up to date with her colonoscopy   CORONARY STENT PLACEMENT      Dec 2015    EYE SURGERY      cataract surgery    GALLBLADDER SURGERY      HYSTERECTOMY  1989    MAMMO (HISTORICAL)      up to date  see gyn    MASTECTOMY W/ SENTINEL NODE BIOPSY Left 7/13/2021    Procedure: BREAST ROSLYN  DIRECTED MASTECTOMY, SENTINEL LYMPH NODE BIOPSY, LYMPHATIC MAPPING WITH BLUE DYE AND RADIOACTIVE DYE (INJECT AT 1500 BY DR ANDREWS IN THE OR);   Surgeon: Derrick Bella MD;  [facility]: [facility];  Service: Surgical Oncology    OOPHORECTOMY Bilateral 1997    SPINE SURGERY      THYROIDECTOMY      US BREAST NEEDLE LOC LEFT Left 5/6/2021    US GUIDANCE BREAST BIOPSY LEFT EACH ADDITIONAL Left 5/6/2021    US GUIDED BREAST BIOPSY LEFT COMPLETE Left 3/15/2021    US GUIDED BREAST BIOPSY LEFT COMPLETE Left 5/6/2021     Family History   Problem Relation Age of Onset    Diabetes Family     Cancer Family     Arthritis Family     Heart disease Family     Endometrial cancer Sister 76    Asthma Sister     Cancer Sister     Esophageal cancer Father 61    Cancer Father     Stomach cancer Brother 70    Cancer Brother     Diabetes Mother     Heart disease Mother     Dementia Mother     Asthma Daughter     No Known Problems Maternal Grandmother     Prostate cancer Maternal Grandfather     No Known Problems Paternal Grandmother     Prostate cancer Paternal Grandfather     Breast cancer Maternal Aunt 48    Breast cancer Other 39    Breast cancer Other 39    No Known Problems Sister     No Known Problems Sister     No Known Problems Sister     No Known Problems Paternal Aunt     No Known Problems Paternal Aunt     Multiple myeloma Brother 67    Asthma Son     Cancer Brother     Depression Son      Social History     Socioeconomic History    Marital status: /Civil Union     Spouse name: Not on file    Number of children: Not on file    Years of education: Not on file    Highest education level: Not on file   Occupational History    Not on file   Tobacco Use    Smoking status: Never Smoker    Smokeless tobacco: Never Used   Vaping Use    Vaping Use: Never used   Substance and Sexual Activity    Alcohol use: No    Drug use: Never    Sexual activity: Not Currently     Partners: Male     Birth control/protection: Post-menopausal, None   Other Topics Concern    Not on file   Social History Narrative    · Tobacco smoking status:   Never smoker      This question's title has changed from "Smoking status" to "Tobacco smoking status" with the  update  Please use this field only for documenting tobacco smoking behavior  To accommodate this change, new fields for documenting smokeless tobacco and e-cigarette/vape usage have been added  · Smoking - how much          None  1 PPW  2 PPW  1/ PPD  1/2 PPD  1 PPD  1 1/2 PPD  2 PPD  3+ PPD          NOTE     · Smokeless tobacco status          Never used smokeless tobacco  Former smokeless tobacco user  Current snuff user  Currently chews tobacco  Currently uses moist powdered tobacco  ----  Not indicated  Not tolerated  Patient refused          NOTE     · Tobacco-years of use               NOTE     · E-cigarette/vape status          Never used electronic cigarettes  Former user of electronic cigarettes  Current user of electronic cigarettes          NOTE     · Most recent tobacco use screenin2018      · Alcohol intake:   None         Per laine     Social Determinants of Health     Financial Resource Strain:     Difficulty of Paying Living Expenses:    Food Insecurity:     Worried About Running Out of Food in the Last Year:     Ran Out of Food in the Last Year:    Transportation Needs:     Lack of Transportation (Medical):      Lack of Transportation (Non-Medical):    Physical Activity:     Days of Exercise per Week:     Minutes of Exercise per Session:    Stress:     Feeling of Stress : Social Connections:     Frequency of Communication with Friends and Family:     Frequency of Social Gatherings with Friends and Family:     Attends Yarsanism Services:     Active Member of Clubs or Organizations:     Attends Club or Organization Meetings:     Marital Status:    Intimate Partner Violence:     Fear of Current or Ex-Partner:     Emotionally Abused:     Physically Abused:     Sexually Abused:        Current Outpatient Medications:     acetaminophen-codeine (TYLENOL with CODEINE #3) 300-30 MG per tablet, acetaminophen 300 mg-codeine 30 mg tablet, Disp: , Rfl:     aspirin (ECOTRIN LOW STRENGTH) 81 mg EC tablet, Take 162 mg by mouth daily, Disp: , Rfl:     clotrimazole-betamethasone (LOTRISONE) 1-0 05 % cream, Apply topically 2 (two) times a day, Disp: 45 g, Rfl: 3    glipiZIDE (GLUCOTROL) 10 mg tablet, Take 1 tablet (10 mg total) by mouth 2 (two) times a day before meals, Disp: 180 tablet, Rfl: 2    Insulin Pen Needle (Sure Comfort Pen Needles) 31G X 5 MM MISC, by Does not apply route daily, Disp: 100 each, Rfl: 3    Lancets (onetouch ultrasoft) lancets, Use daily Y48 4 One touch Delica Plus, Disp: 389 each, Rfl: 3    levothyroxine 100 mcg tablet, Take 1 tablet (100 mcg total) by mouth daily, Disp: 90 tablet, Rfl: 3    Liraglutide (VICTOZA) 18 MG/3ML SOPN, Inject 1 2 mg under the skin daily at bedtime , Disp: , Rfl:     lisinopril (ZESTRIL) 20 mg tablet, Take 1 tablet (20 mg total) by mouth daily (Patient taking differently: Take 20 mg by mouth daily at bedtime ), Disp: 90 tablet, Rfl: 1    Lyrica 50 MG capsule, Take 50 mg by mouth 2 (two) times a day, Disp: , Rfl:     metFORMIN (GLUCOPHAGE) 1000 MG tablet, Take 1,000 mg by mouth 2 (two) times a day with meals, Disp: , Rfl:     metoprolol tartrate (LOPRESSOR) 25 mg tablet, Take 1 tablet (25 mg total) by mouth every 12 (twelve) hours, Disp: 180 tablet, Rfl: 2    OneTouch Ultra test strip, Use 1 each daily Use as instructed, Disp: 100 each, Rfl: 3  Allergies   Allergen Reactions    Duloxetine Other (See Comments)     Extreme somnolence/lethargy    Sulfa Antibiotics Rash       Physical Exams:     Vitals:    07/26/21 0848   BP: 128/84   Pulse: 77   Resp: 17   Temp: 98 °F (36 7 °C)   SpO2: 97%     Physical Exam  Chest:      Comments:   Examination left mastectomy site demonstrates some erythema/ rash in the inframammary fold which she has had previously  I do not think this is an active infection  She also has some increased tissue in the middle aspect of her incision which may be a small hematoma or possibly early fat necrosis  We will re-evaluate this at our next visit  Results & Discussion:     I reviewed her pathology and provided her a copy of her report  I have recommended she see Medical Oncology will coordinate that appointment  I have reviewed exercises for physical therapy  I have put in an appointment for physical therapy as a consult however she has got full range of motion in the next week or 2 she may cancel that consult  We will see her back in 6 months  I reviewed radiation oncology with her  I did not think she needed it given her mastectomy negative margins

## 2021-07-28 DIAGNOSIS — E11.9 TYPE 2 DIABETES MELLITUS WITHOUT COMPLICATION, WITHOUT LONG-TERM CURRENT USE OF INSULIN (HCC): Primary | ICD-10-CM

## 2021-07-28 DIAGNOSIS — E11.9 DIABETES MELLITUS WITHOUT COMPLICATION (HCC): ICD-10-CM

## 2021-07-28 RX ORDER — GLIPIZIDE 10 MG/1
TABLET ORAL
Qty: 180 TABLET | Refills: 1 | Status: SHIPPED | OUTPATIENT
Start: 2021-07-28 | End: 2021-11-08 | Stop reason: SDUPTHER

## 2021-08-09 ENCOUNTER — EVALUATION (OUTPATIENT)
Dept: PHYSICAL THERAPY | Facility: CLINIC | Age: 76
End: 2021-08-09
Payer: MEDICARE

## 2021-08-09 DIAGNOSIS — Z90.12 STATUS POST LEFT MASTECTOMY: ICD-10-CM

## 2021-08-09 PROCEDURE — 97161 PT EVAL LOW COMPLEX 20 MIN: CPT | Performed by: PHYSICAL THERAPIST

## 2021-08-09 PROCEDURE — 97110 THERAPEUTIC EXERCISES: CPT | Performed by: PHYSICAL THERAPIST

## 2021-08-09 NOTE — PROGRESS NOTES
PT Evaluation  and PT Discharge    Today's date: 2021  Patient name: Chayo Salas  : 1945  MRN: [id number]  Referring provider: Padma Guerra MD  Dx:   Encounter Diagnosis     ICD-10-CM    1  Status post left mastectomy  Z90 12 Ambulatory referral to Physical Therapy                  Assessment  Assessment details: Chayo Salas is a 77 y o  female with Status post left mastectomy  She presents with  decreased ROM  and postural  dysfunction  Due to these impairments, Patient has difficulty performing a/iadls, such as reaching and lifting    Patient's clinical presentation is consistent with their referring diagnosis  Patient would benefit from home exercise program and preventive measurements for lymphedema  She has been educated in lymphedema prevention and given an hep  Thank you for your referral     Impairments: lacks appropriate home exercise program    Symptom irritability: lowUnderstanding of Dx/Px/POC: excellent  Goals  Patient to be independent with hep  Patient to return as needed for re-assessment of UE girth measurements    Plan  Planned therapy interventions: patient education, postural training and home exercise program  Frequency: 1x week  Duration in visits: 5  Duration in weeks: 5  Plan of Care beginning date: 2021  Plan of Care expiration date: 2021  Treatment plan discussed with: patient        Subjective Evaluation    History of Present Illness  Mechanism of injury: Patient reports that she had a R mastectomy 2021  She is not having chemotherapy or radiation therapy      CC:  Patient reports that she has some tenderness of the axilla                                                                                                                                                                                                                                                                                                                    Recurrent probem    Quality of life: excellent          Objective     Active Range of Motion   Left Shoulder   Flexion: 115 degrees   Abduction: 98 degrees   External rotation BTH: WFL  Internal rotation BTB: WFL    Right Shoulder   Flexion: 107 degrees   Abduction: 117 degrees   External rotation BTH: WFL  Internal rotation BTB: WFL    Swelling   Left shoulder swelling details: Lymphedema MMTs                                                 LEFT    palm                             18  wrist                             18  Wrist + 10 cm                  23  Wrist + 16 cm                  24 5  Elbow                               25  Wrist + 32                             36   Wrist + 40                  39 5      Right shoulder swelling details: Lymphedema MMTs                                       RIGHT    palm              18 8  wrist              16  Wrist + 10 cm              22 5  Wrist + 16 cm   25  Elbow               25 5  Wrist + 32              37   Wrist + 40   42                   Precautions: BCA      Manuals 8/9                                                                Neuro Re-Ed                                                                                                        Ther Ex                          Table slides flex/abd 5 x :20            Table slides ER 5 x :20                                                                             Ther Activity                                       Gait Training                                       Modalities

## 2021-08-11 ENCOUNTER — CONSULT (OUTPATIENT)
Dept: HEMATOLOGY ONCOLOGY | Facility: CLINIC | Age: 76
End: 2021-08-11
Payer: MEDICARE

## 2021-08-11 VITALS
RESPIRATION RATE: 18 BRPM | OXYGEN SATURATION: 98 % | HEART RATE: 76 BPM | SYSTOLIC BLOOD PRESSURE: 132 MMHG | HEIGHT: 61 IN | WEIGHT: 197.5 LBS | BODY MASS INDEX: 37.29 KG/M2 | DIASTOLIC BLOOD PRESSURE: 80 MMHG | TEMPERATURE: 98.8 F

## 2021-08-11 DIAGNOSIS — Z17.0 MALIGNANT NEOPLASM OF OVERLAPPING SITES OF LEFT BREAST IN FEMALE, ESTROGEN RECEPTOR POSITIVE (HCC): ICD-10-CM

## 2021-08-11 DIAGNOSIS — C50.812 MALIGNANT NEOPLASM OF OVERLAPPING SITES OF LEFT BREAST IN FEMALE, ESTROGEN RECEPTOR POSITIVE (HCC): ICD-10-CM

## 2021-08-11 PROCEDURE — 99205 OFFICE O/P NEW HI 60 MIN: CPT | Performed by: INTERNAL MEDICINE

## 2021-08-11 RX ORDER — ANASTROZOLE 1 MG/1
1 TABLET ORAL DAILY
Qty: 90 TABLET | Refills: 1 | Status: SHIPPED | OUTPATIENT
Start: 2021-08-11 | End: 2021-11-01 | Stop reason: SDUPTHER

## 2021-08-11 NOTE — PROGRESS NOTES
Hematology / Oncology Outpatient Consult Note    Storm Dey 77 y o  female [id number]         Date:  8/11/2021    Assessment / Plan:    A 28-year-old postmenopausal woman who has CHEK2 mutation  She has history of stage III ovarian cancer, diagnosed 0 which was treated with oophorectomy followed by adjuvant chemotherapy with carboplatin and paclitaxel, resulting in cure  She has newly diagnosed stage I A left breast cancer, grade 1, ER 90% positive, AL 90% positive, HER2 negative disease  She underwent mastectomy and sentinel lymph node biopsy, resulting in PIERRE  She presents today with her  to discuss adjuvant treatment options  We had extensive discussion regarding the diagnosis, staging information, tumor phenotype, good prognosis and treatment options  Based on her age, low-grade disease and phenotype, adjuvant chemotherapy is not indicated  I recommended her to have adjuvant hormonal therapy with anastrozole for 5 years  Side effects of anastrozole was thoroughly discussed, including but not limited to hot flashes, musculoskeletal symptom and bone mineral density loss  I recommended her to continue with calcium vitamin-D on a regular basis  She is in agreement with my recommendation  I will see her again in 6 months for routine follow-up  Subjective:     HPI:    A 28-year-old postmenopausal woman who was found to have abnormality in her left breast based on a screening mammography  Therefore, she underwent left breast biopsy in May 6, 2021 which showed invasive ductal carcinoma, grade 1, ER 90% positive, AL 90% positive, HER2 negative disease  She also had lesion, characterized as a 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, % positive, % positive  Because of the multifocal disease, she underwent mastectomy and sentinel lymph node biopsy by Dr Sancho Sanford in July 13, 2021  She had 8 mm of invasive ductal carcinoma, grade 1   There was no evidence of lymphovascular invasion  1 sentinel lymph node was negative for metastatic disease  She did not have reconstruction  She presents today with her  to discuss adjuvant treatment options  She has history of stage III ovarian cancer, diagnosed in 1997  She underwent surgical resection followed by adjuvant chemotherapy with carboplatin and paclitaxel, resulting in cure  She has heterozygote CHEK2 mutation  She feels well with no complaint of pain  Her weight is stable  She has no respiratory symptoms  She has diabetes as well as coronary artery disease for which she underwent coronary artery stent placement  She has hypertension  She is a lifetime never smoker  Her performance status is normal         Interval History:          Objective:     Primary Diagnosis:      1  Left breast cancer, stage I A ( pT1b, pN0, M0 ) grade 1, ER 90% positive, MO 90% positive, HER2 negative disease  Diagnosed in July 2021     2    History of stage III ovarian cancer, diagnosed in 1997     3   Check 2 mutation  Cancer Staging:  Cancer Staging  No matching staging information was found for the patient  Previous Hematologic/ Oncologic Treatment:       Adjuvant chemotherapy with carboplatin and paclitaxel for ovarian cancer in 1997  Current Hematologic/ Oncologic Treatment:       adjuvant hormonal therapy with anastrozole to be started in August 2021  Disease Status:       PIERRE status post mastectomy and sentinel lymph node biopsy  Test Results:    Pathology:      8 mm of invasive ductal carcinoma, grade 1  No evidence of lymphovascular invasion  1 sentinel lymph node was negative for metastatic disease  ER 90% positive, MO 90% positive, HER2 negative disease  Stage I A ( pT1b, pN0, M0)  Radiology:      Chest x-ray was negative for pulmonary disease  Laboratory:      See below      Physical Exam:      General Appearance:    Alert, oriented        Eyes:    PERRL   Ears:    Normal external ear canals, both ears   Nose:   Nares normal, septum midline   Throat:   Mucosa moist  Pharynx without injection  Neck:   Supple       Lungs:     Clear to auscultation bilaterally   Chest Wall:    No tenderness or deformity    Heart:    Regular rate and rhythm       Abdomen:     Soft, non-tender, bowel sounds +, no organomegaly           Extremities:   Extremities no cyanosis or edema       Skin:   no rash or icterus  Lymph nodes:   Cervical, supraclavicular, and axillary nodes normal   Neurologic:   CNII-XII intact, normal strength, sensation and reflexes     Throughout          Breast exam:    Status post left mastectomy without reconstruction  No palpable abnormality in her left chest wall  Right breast exam is negative  ROS: Review of Systems   All other systems reviewed and are negative  Imaging: XR follow up    Result Date: 7/20/2021  Narrative: Single breast specimen radiograph obtained demonstrate rudi  reflector within the specimen  There appear to be 2 or possibly 3 additional small radiopaque marker devices in the breast tissue  Workstation performed: [id number]     NM sentinal node inj    Result Date: 7/13/2021  Narrative: SENTINEL NODE INJECTION INDICATION:  C50 812: 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 female breast Z17 0: Estrogen receptor positive status (ER+)  FINDINGS: 0 38 mCi Tc-99m sulfur colloid (0 6 cc volume) was administered intradermally into the left breast by PANFILO ANDREWS in the OR  No imaging was performed  Impression: Injection of  0 38 mCi Tc-99m sulfur colloid into the left breast in the OR   Workstation performed: [id number]         Labs:   Lab Results   Component Value Date    WBC 10 16 07/14/2021    HGB 12 4 07/14/2021    HCT 35 8 07/14/2021    MCV 89 07/14/2021     07/14/2021     Lab Results   Component Value Date     12/30/2015    K 4 4 06/16/2021     06/16/2021    CO2 28 06/16/2021    ANIONGAP 12 4 12/30/2015    BUN 10 06/16/2021    CREATININE 0 57 (L) 06/16/2021    GLUCOSE 129 (H) 12/30/2015    GLUF 123 (H) 02/05/2021    CALCIUM 9 1 06/16/2021    AST 20 06/16/2021    ALT 32 06/16/2021    ALKPHOS 43 (L) 06/16/2021    PROT 7 1 12/21/2015    BILITOT 0 42 12/21/2015    EGFR 91 06/16/2021         Lab Results   Component Value Date     5 3 09/27/2018           Vital Sign:    Body surface area is 1 88 meters squared      Wt Readings from Last 3 Encounters:   08/11/21 89 6 kg (197 lb 8 oz)   07/26/21 89 4 kg (197 lb)   07/06/21 92 1 kg (203 lb)        Temp Readings from Last 3 Encounters:   08/11/21 98 8 °F (37 1 °C) (Tympanic Core)   07/26/21 98 °F (36 7 °C) (Temporal)   07/14/21 98 °F (36 7 °C) (Oral)        BP Readings from Last 3 Encounters:   08/11/21 132/80   07/26/21 128/84   07/14/21 132/55         Pulse Readings from Last 3 Encounters:   08/11/21 76   07/26/21 77   07/14/21 75     @LASTSAO2(3)@    Active Problems:   Patient Active Problem List   Diagnosis    Coronary artery disease involving native coronary artery of native heart without angina pectoris    Type 2 diabetes mellitus without complication, without long-term current use of insulin (HCC)    Mixed dyslipidemia    Essential hypertension    Abnormal carotid ultrasound    Hypothyroidism    Spinal stenosis of lumbar region    Osteopenia of necks of both femurs    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[address]    S/P lumbar fusion    Bilateral leg edema    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, estrogen receptor positive ([address] )    Obesity, morbid ([address] )    Monoallelic mutation of CHEK2 gene in female patient       Past Medical History:   Past Medical History:   Diagnosis Date    Angina pectoris ([address] )     Arthritis     Cancer ([address]     Coronary artery disease     Diabetes mellitus ([address] )     Disease of thyroid gland     Hypercholesterolemia     Hypertension     Obesity     Osteoporosis     Otitis media     Ovarian ca ([address] ) 1997    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     Shingles     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    Urinary tract infection        Surgical History:   Past Surgical History:   Procedure Laterality Date    APPENDECTOMY      BACK SURGERY      BRAIN SURGERY  1993    meningioma    CARPAL TUNNEL RELEASE      CHOLECYSTECTOMY      COLONOSCOPY      pt see Dr Mary Grace Wild  Up to date with her colonoscopy   CORONARY STENT PLACEMENT      Dec 2015    EYE SURGERY      cataract surgery    GALLBLADDER SURGERY      HYSTERECTOMY  1989    MAMMO (HISTORICAL)      up to date  see gyn    MASTECTOMY W/ SENTINEL NODE BIOPSY Left 7/13/2021    Procedure: BREAST ROSLYN  DIRECTED MASTECTOMY, SENTINEL LYMPH NODE BIOPSY, LYMPHATIC MAPPING WITH BLUE DYE AND RADIOACTIVE DYE (INJECT AT 1500 BY DR ANDREWS IN THE OR);   Surgeon: Iman Ramires MD;  Location: [facility];  Service: Surgical Oncology    OOPHORECTOMY Bilateral 1997    SPINE SURGERY      THYROIDECTOMY      US BREAST NEEDLE LOC LEFT Left 5/6/2021    US GUIDANCE BREAST BIOPSY LEFT EACH ADDITIONAL Left 5/6/2021    US GUIDED BREAST BIOPSY LEFT COMPLETE Left 3/15/2021    US GUIDED BREAST BIOPSY LEFT COMPLETE Left 5/6/2021       Family History:    Family History   Problem Relation Age of Onset    Diabetes Family     Cancer Family     Arthritis Family     Heart disease Family     Endometrial cancer Sister 76    Asthma Sister     Cancer Sister     Esophageal cancer Father 61    Cancer Father     Stomach cancer Brother 70    Cancer Brother     Diabetes Mother     Heart disease Mother     Dementia Mother     Asthma Daughter     No Known Problems Maternal Grandmother     Prostate cancer Maternal Grandfather     No Known Problems Paternal Grandmother     Prostate cancer Paternal Grandfather     Breast cancer Maternal Aunt 48    Breast cancer Other 39    Breast cancer Other 39    No Known Problems Sister     No Known Problems Sister     No Known Problems Sister     No Known Problems Paternal Aunt     No Known Problems Paternal Aunt     Multiple myeloma Brother 67    Asthma Son     Cancer Brother     Depression Son        Cancer-related family history includes Breast cancer (age of onset: 39) in her other and other; Breast cancer (age of onset: 48) in her maternal aunt; Cancer in her brother, brother, family, father, and sister; Endometrial cancer (age of onset: 76) in her sister; Esophageal cancer (age of onset: 61) in her father; Prostate cancer in her maternal grandfather and paternal grandfather; Stomach cancer (age of onset: 70) in her brother  Social History:   Social History     Socioeconomic History    Marital status: /Civil Union     Spouse name: Not on file    Number of children: Not on file    Years of education: Not on file    Highest education level: Not on file   Occupational History    Not on file   Tobacco Use    Smoking status: Never Smoker    Smokeless tobacco: Never Used   Vaping Use    Vaping Use: Never used   Substance and Sexual Activity    Alcohol use: No    Drug use: Never    Sexual activity: Not Currently     Partners: Male     Birth control/protection: Post-menopausal, None   Other Topics Concern    Not on file   Social History Narrative    · Tobacco smoking status:   Never smoker      This question's title has changed from "Smoking status" to "Tobacco smoking status" with the 19 7 update  Please use this field only for documenting tobacco smoking behavior  To accommodate this change, new fields for documenting smokeless tobacco and e-cigarette/vape usage have been added       · Smoking - how much          None  1 PPW  2 PPW  1/4 PPD  1/2 PPD  1 PPD  1 1/2 PPD  2 PPD  3+ PPD          NOTE     · Smokeless tobacco status          Never used smokeless tobacco  Former smokeless tobacco user  Current snuff user  Currently chews tobacco  Currently uses moist powdered tobacco  ----  Not indicated  Not tolerated  Patient refused          NOTE     · Tobacco-years of use               NOTE     · E-cigarette/vape status          Never used electronic cigarettes  Former user of electronic cigarettes  Current user of electronic cigarettes          NOTE     · Most recent tobacco use screenin2018      · Alcohol intake:   None         Per laine     Social Determinants of Health     Financial Resource Strain:     Difficulty of Paying Living Expenses:    Food Insecurity:     Worried About Running Out of Food in the Last Year:     Ran Out of Food in the Last Year:    Transportation Needs:     Lack of Transportation (Medical):      Lack of Transportation (Non-Medical):    Physical Activity:     Days of Exercise per Week:     Minutes of Exercise per Session:    Stress:     Feeling of Stress :    Social Connections:     Frequency of Communication with Friends and Family:     Frequency of Social Gatherings with Friends and Family:     Attends Restoration Services:     Active Member of Clubs or Organizations:     Attends Club or Organization Meetings:     Marital Status:    Intimate Partner Violence:     Fear of Current or Ex-Partner:     Emotionally Abused:     Physically Abused:     Sexually Abused:        Current Medications:   Current Outpatient Medications   Medication Sig Dispense Refill    acetaminophen-codeine (TYLENOL with CODEINE #3) 300-30 MG per tablet acetaminophen 300 mg-codeine 30 mg tablet      aspirin (ECOTRIN LOW STRENGTH) 81 mg EC tablet Take 162 mg by mouth daily      clotrimazole-betamethasone (LOTRISONE) 1-0 05 % cream Apply topically 2 (two) times a day 45 g 3    glipiZIDE (GLUCOTROL) 10 mg tablet TAKE ONE TABLET BY MOUTH TWICE A DAY BEFORE MEALS 180 tablet 1    Insulin Pen Needle (Sure Comfort Pen Needles) 31G X 5 MM MISC by Does not apply route daily 100 each 3    Lancets (onetouch ultrasoft) lancets Use daily L38 6 One touch Delica Plus 178 each 3    levothyroxine 100 mcg tablet Take 1 tablet (100 mcg total) by mouth daily 90 tablet 3    Liraglutide (VICTOZA) 18 MG/3ML SOPN Inject 1 2 mg under the skin daily at bedtime       lisinopril (ZESTRIL) 20 mg tablet Take 1 tablet (20 mg total) by mouth daily (Patient taking differently: Take 20 mg by mouth daily at bedtime ) 90 tablet 1    Lyrica 50 MG capsule Take 50 mg by mouth 2 (two) times a day      metFORMIN (GLUCOPHAGE) 1000 MG tablet Take 1 tablet (1,000 mg total) by mouth 2 (two) times a day with meals 180 tablet 3    metoprolol tartrate (LOPRESSOR) 25 mg tablet Take 1 tablet (25 mg total) by mouth every 12 (twelve) hours 180 tablet 2    OneTouch Ultra test strip Use 1 each daily Use as instructed 100 each 3     No current facility-administered medications for this visit  Allergies:    Allergies   Allergen Reactions    Duloxetine Other (See Comments)     Extreme somnolence/lethargy    Sulfa Antibiotics Rash

## 2021-08-11 NOTE — LETTER
August 11, 2021     Arabella Levine [address] [address]    Patient: Yuli García   YOB: 1945   Date of Visit: 8/11/2021       Dear Dr Bc Crow:    Thank you for referring Mannie Orourke to me for evaluation  Below are my notes for this consultation  If you have questions, please do not hesitate to call me  I look forward to following your patient along with you  Sincerely,        Imtiaz Buckley MD        CC: MD Imtiaz Hankins MD  8/11/2021  3:09 PM  Sign when Signing Visit  Hematology / Oncology Outpatient Consult Note    Yuli García 77 y o  female [id number]         Date:  8/11/2021    Assessment / Plan:    A 78-year-old postmenopausal woman who has CHEK2 mutation  She has history of stage III ovarian cancer, diagnosed 0 which was treated with oophorectomy followed by adjuvant chemotherapy with carboplatin and paclitaxel, resulting in cure  She has newly diagnosed stage I A left breast cancer, grade 1, ER 90% positive, ND 90% positive, HER2 negative disease  She underwent mastectomy and sentinel lymph node biopsy, resulting in PIERRE  She presents today with her  to discuss adjuvant treatment options  We had extensive discussion regarding the diagnosis, staging information, tumor phenotype, good prognosis and treatment options  Based on her age, low-grade disease and phenotype, adjuvant chemotherapy is not indicated  I recommended her to have adjuvant hormonal therapy with anastrozole for 5 years  Side effects of anastrozole was thoroughly discussed, including but not limited to hot flashes, musculoskeletal symptom and bone mineral density loss  I recommended her to continue with calcium vitamin-D on a regular basis  She is in agreement with my recommendation  I will see her again in 6 months for routine follow-up            Subjective:     HPI:    A 78-year-old postmenopausal woman who was found to have abnormality in her left breast based on a screening mammography  Therefore, she underwent left breast biopsy in May 6, 2021 which showed invasive ductal carcinoma, grade 1, ER 90% positive, AR 90% positive, HER2 negative disease  She also had lesion, characterized as a 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, % positive, % positive  Because of the multifocal disease, she underwent mastectomy and sentinel lymph node biopsy by Dr Margarita Ocasio in July 13, 2021  She had 8 mm of invasive ductal carcinoma, grade 1  There was no evidence of lymphovascular invasion  1 sentinel lymph node was negative for metastatic disease  She did not have reconstruction  She presents today with her  to discuss adjuvant treatment options  She has history of stage III ovarian cancer, diagnosed in 1997  She underwent surgical resection followed by adjuvant chemotherapy with carboplatin and paclitaxel, resulting in cure  She has heterozygote CHEK2 mutation  She feels well with no complaint of pain  Her weight is stable  She has no respiratory symptoms  She has diabetes as well as coronary artery disease for which she underwent coronary artery stent placement  She has hypertension  She is a lifetime never smoker  Her performance status is normal         Interval History:          Objective:     Primary Diagnosis:      1  Left breast cancer, stage I A ( pT1b, pN0, M0 ) grade 1, ER 90% positive, AR 90% positive, HER2 negative disease  Diagnosed in July 2021     2    History of stage III ovarian cancer, diagnosed in 1997     3   Check 2 mutation  Cancer Staging:  Cancer Staging  No matching staging information was found for the patient  Previous Hematologic/ Oncologic Treatment:       Adjuvant chemotherapy with carboplatin and paclitaxel for ovarian cancer in 1997      Current Hematologic/ Oncologic Treatment:       adjuvant hormonal therapy with anastrozole to be started in August 2021     Disease Status:       PIERRE status post mastectomy and sentinel lymph node biopsy  Test Results:    Pathology:      8 mm of invasive ductal carcinoma, grade 1  No evidence of lymphovascular invasion  1 sentinel lymph node was negative for metastatic disease  ER 90% positive, KY 90% positive, HER2 negative disease  Stage I A ( pT1b, pN0, M0)  Radiology:      Chest x-ray was negative for pulmonary disease  Laboratory:      See below  Physical Exam:      General Appearance:    Alert, oriented        Eyes:    PERRL   Ears:    Normal external ear canals, both ears   Nose:   Nares normal, septum midline   Throat:   Mucosa moist  Pharynx without injection  Neck:   Supple       Lungs:     Clear to auscultation bilaterally   Chest Wall:    No tenderness or deformity    Heart:    Regular rate and rhythm       Abdomen:     Soft, non-tender, bowel sounds +, no organomegaly           Extremities:   Extremities no cyanosis or edema       Skin:   no rash or icterus  Lymph nodes:   Cervical, supraclavicular, and axillary nodes normal   Neurologic:   CNII-XII intact, normal strength, sensation and reflexes     Throughout          Breast exam:    Status post left mastectomy without reconstruction  No palpable abnormality in her left chest wall  Right breast exam is negative  ROS: Review of Systems   All other systems reviewed and are negative  Imaging: XR follow up    Result Date: 7/20/2021  Narrative: Single breast specimen radiograph obtained demonstrate rudi  reflector within the specimen  There appear to be 2 or possibly 3 additional small radiopaque marker devices in the breast tissue   Workstation performed: [id number]     NM sentinal node inj    Result Date: 7/13/2021  Narrative: SENTINEL NODE INJECTION INDICATION:  C50 812: 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 female breast Z17 0: Estrogen receptor positive status (ER+)  FINDINGS: 0 38 mCi Tc-99m sulfur colloid (0 6 cc volume) was administered intradermally into the left breast by PANFILO ANDREWS in the OR  No imaging was performed  Impression: Injection of  0 38 mCi Tc-99m sulfur colloid into the left breast in the OR  Workstation performed: [id number]         Labs:   Lab Results   Component Value Date    WBC 10 16 07/14/2021    HGB 12 4 07/14/2021    HCT 35 8 07/14/2021    MCV 89 07/14/2021     07/14/2021     Lab Results   Component Value Date     12/30/2015    K 4 4 06/16/2021     06/16/2021    CO2 28 06/16/2021    ANIONGAP 12 4 12/30/2015    BUN 10 06/16/2021    CREATININE 0 57 (L) 06/16/2021    GLUCOSE 129 (H) 12/30/2015    GLUF 123 (H) 02/05/2021    CALCIUM 9 1 06/16/2021    AST 20 06/16/2021    ALT 32 06/16/2021    ALKPHOS 43 (L) 06/16/2021    PROT 7 1 12/21/2015    BILITOT 0 42 12/21/2015    EGFR 91 06/16/2021         Lab Results   Component Value Date     5 3 09/27/2018           Vital Sign:    Body surface area is 1 88 meters squared      Wt Readings from Last 3 Encounters:   08/11/21 89 6 kg (197 lb 8 oz)   07/26/21 89 4 kg (197 lb)   07/06/21 92 1 kg (203 lb)        Temp Readings from Last 3 Encounters:   08/11/21 98 8 °F (37 1 °C) (Tympanic Core)   07/26/21 98 °F (36 7 °C) (Temporal)   07/14/21 98 °F (36 7 °C) (Oral)        BP Readings from Last 3 Encounters:   08/11/21 132/80   07/26/21 128/84   07/14/21 132/55         Pulse Readings from Last 3 Encounters:   08/11/21 76   07/26/21 77   07/14/21 75     @LASTSAO2(3)@    Active Problems:   Patient Active Problem List   Diagnosis    Coronary artery disease involving native coronary artery of native heart without angina pectoris    Type 2 diabetes mellitus without complication, without long-term current use of insulin (HCC)    Mixed dyslipidemia    Essential hypertension    Abnormal carotid ultrasound    Hypothyroidism    Spinal stenosis of lumbar region    Osteopenia of necks of both femurs    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    S/P lumbar fusion    Bilateral leg edema    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, estrogen receptor positive ([facility]    Obesity, morbid ([facility]    Monoallelic mutation of CHEK2 gene in female patient       Past Medical History:   Past Medical History:   Diagnosis Date    Angina pectoris ([facility]     Arthritis     Cancer ([facility]     Coronary artery disease     [facility]     Disease of thyroid gland     Hypercholesterolemia     Hypertension     Obesity     Osteoporosis     Otitis media     Ovarian ca ([facility] ) 1997    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[facility]     Shingles     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    Urinary tract infection        Surgical History:   Past Surgical History:   Procedure Laterality Date    APPENDECTOMY      BACK SURGERY      BRAIN SURGERY  1993    meningioma    CARPAL TUNNEL RELEASE      CHOLECYSTECTOMY      COLONOSCOPY      pt see Dr Courtney Carlos  Up to date with her colonoscopy   CORONARY STENT PLACEMENT      Dec 2015    EYE SURGERY      cataract surgery    GALLBLADDER SURGERY      HYSTERECTOMY  1989    MAMMO (HISTORICAL)      up to date  see gyn    MASTECTOMY W/ SENTINEL NODE BIOPSY Left 7/13/2021    Procedure: BREAST ROSLYN  DIRECTED MASTECTOMY, SENTINEL LYMPH NODE BIOPSY, LYMPHATIC MAPPING WITH BLUE DYE AND RADIOACTIVE DYE (INJECT AT 1500 BY DR ANDREWS IN THE OR);   Surgeon: Aaliyah Bautista MD;  [facility];  Service: Surgical Oncology    OOPHORECTOMY Bilateral 1997    SPINE SURGERY      THYROIDECTOMY      US BREAST NEEDLE LOC LEFT Left 5/6/2021    US GUIDANCE BREAST BIOPSY LEFT EACH ADDITIONAL Left 5/6/2021    US GUIDED BREAST BIOPSY LEFT COMPLETE Left 3/15/2021    US GUIDED BREAST BIOPSY LEFT COMPLETE Left 5/6/2021       Family History:    Family History   Problem Relation Age of Onset    Diabetes Family     Cancer Family     Arthritis Family     Heart disease Family     Endometrial cancer Sister 76  Asthma Sister     Cancer Sister     Esophageal cancer Father 61    Cancer Father     Stomach cancer Brother 70    Cancer Brother     Diabetes Mother     Heart disease Mother     Dementia Mother     Asthma Daughter     No Known Problems Maternal Grandmother     Prostate cancer Maternal Grandfather     No Known Problems Paternal Grandmother     Prostate cancer Paternal Grandfather     Breast cancer Maternal Aunt 48    Breast cancer Other 39    Breast cancer Other 39    No Known Problems Sister     No Known Problems Sister     No Known Problems Sister     No Known Problems Paternal Aunt     No Known Problems Paternal Aunt     Multiple myeloma Brother 67    Asthma Son    Mayme [place] Cancer Brother     Depression Son        Cancer-related family history includes Breast cancer (age of onset: 39) in her other and other; Breast cancer (age of onset: 48) in her maternal aunt; Cancer in her brother, brother, family, father, and sister; Endometrial cancer (age of onset: 76) in her sister; Esophageal cancer (age of onset: 61) in her father; Prostate cancer in her maternal grandfather and paternal grandfather; Stomach cancer (age of onset: 70) in her brother      Social History:   Social History     Socioeconomic History    Marital status: /Civil Union     Spouse name: Not on file    Number of children: Not on file    Years of education: Not on file    Highest education level: Not on file   Occupational History    Not on file   Tobacco Use    Smoking status: Never Smoker    Smokeless tobacco: Never Used   Vaping Use    Vaping Use: Never used   Substance and Sexual Activity    Alcohol use: No    Drug use: Never    Sexual activity: Not Currently     Partners: Male     Birth control/protection: Post-menopausal, None   Other Topics Concern    Not on file   Social History Narrative    · Tobacco smoking status:   Never smoker      This question's title has changed from "Smoking status" to "Tobacco smoking status" with the  update  Please use this field only for documenting tobacco smoking behavior  To accommodate this change, new fields for documenting smokeless tobacco and e-cigarette/vape usage have been added  · Smoking - how much          None  1 PPW  2 PPW  1/4 PPD  1/2 PPD  1 PPD  1 1/2 PPD  2 PPD  3+ PPD          NOTE     · Smokeless tobacco status          Never used smokeless tobacco  Former smokeless tobacco user  Current snuff user  Currently chews tobacco  Currently uses moist powdered tobacco  ----  Not indicated  Not tolerated  Patient refused          NOTE     · Tobacco-years of use               NOTE     · E-cigarette/vape status          Never used electronic cigarettes  Former user of electronic cigarettes  Current user of electronic cigarettes          NOTE     · Most recent tobacco use screenin2018      · Alcohol intake:   None         Per laine     Social Determinants of Health     Financial Resource Strain:     Difficulty of Paying Living Expenses:    Food Insecurity:     Worried About Running Out of Food in the Last Year:     Ran Out of Food in the Last Year:    Transportation Needs:     Lack of Transportation (Medical):      Lack of Transportation (Non-Medical):    Physical Activity:     Days of Exercise per Week:     Minutes of Exercise per Session:    Stress:     Feeling of Stress :    Social Connections:     Frequency of Communication with Friends and Family:     Frequency of Social Gatherings with Friends and Family:     Attends Jain Services:     Active Member of Clubs or Organizations:     Attends Club or Organization Meetings:     Marital Status:    Intimate Partner Violence:     Fear of Current or Ex-Partner:     Emotionally Abused:     Physically Abused:     Sexually Abused:        Current Medications:   Current Outpatient Medications   Medication Sig Dispense Refill    acetaminophen-codeine (TYLENOL with CODEINE #3) 300-30 MG per tablet acetaminophen 300 mg-codeine 30 mg tablet      aspirin (ECOTRIN LOW STRENGTH) 81 mg EC tablet Take 162 mg by mouth daily      clotrimazole-betamethasone (LOTRISONE) 1-0 05 % cream Apply topically 2 (two) times a day 45 g 3    glipiZIDE (GLUCOTROL) 10 mg tablet TAKE ONE TABLET BY MOUTH TWICE A DAY BEFORE MEALS 180 tablet 1    Insulin Pen Needle (Sure Comfort Pen Needles) 31G X 5 MM MISC by Does not apply route daily 100 each 3    Lancets (onetouch ultrasoft) lancets Use daily E61 7 One touch Delica Plus 037 each 3    levothyroxine 100 mcg tablet Take 1 tablet (100 mcg total) by mouth daily 90 tablet 3    Liraglutide (VICTOZA) 18 MG/3ML SOPN Inject 1 2 mg under the skin daily at bedtime       lisinopril (ZESTRIL) 20 mg tablet Take 1 tablet (20 mg total) by mouth daily (Patient taking differently: Take 20 mg by mouth daily at bedtime ) 90 tablet 1    Lyrica 50 MG capsule Take 50 mg by mouth 2 (two) times a day      metFORMIN (GLUCOPHAGE) 1000 MG tablet Take 1 tablet (1,000 mg total) by mouth 2 (two) times a day with meals 180 tablet 3    metoprolol tartrate (LOPRESSOR) 25 mg tablet Take 1 tablet (25 mg total) by mouth every 12 (twelve) hours 180 tablet 2    OneTouch Ultra test strip Use 1 each daily Use as instructed 100 each 3     No current facility-administered medications for this visit  Allergies:    Allergies   Allergen Reactions    Duloxetine Other (See Comments)     Extreme somnolence/lethargy    Sulfa Antibiotics Rash

## 2021-10-06 DIAGNOSIS — I10 ESSENTIAL (PRIMARY) HYPERTENSION: ICD-10-CM

## 2021-10-06 DIAGNOSIS — E03.9 ACQUIRED HYPOTHYROIDISM: ICD-10-CM

## 2021-10-06 RX ORDER — LEVOTHYROXINE SODIUM 0.1 MG/1
100 TABLET ORAL DAILY
Qty: 90 TABLET | Refills: 0 | Status: SHIPPED | OUTPATIENT
Start: 2021-10-06 | End: 2022-01-03 | Stop reason: SDUPTHER

## 2021-10-06 RX ORDER — LISINOPRIL 20 MG/1
20 TABLET ORAL
Qty: 90 TABLET | Refills: 0 | Status: SHIPPED | OUTPATIENT
Start: 2021-10-06 | End: 2022-01-03 | Stop reason: SDUPTHER

## 2021-10-08 ENCOUNTER — TELEPHONE (OUTPATIENT)
Dept: FAMILY MEDICINE CLINIC | Facility: CLINIC | Age: 76
End: 2021-10-08

## 2021-10-19 ENCOUNTER — ANNUAL EXAM (OUTPATIENT)
Dept: OBGYN CLINIC | Facility: CLINIC | Age: 76
End: 2021-10-19
Payer: MEDICARE

## 2021-10-19 VITALS
HEIGHT: 61 IN | WEIGHT: 198 LBS | SYSTOLIC BLOOD PRESSURE: 122 MMHG | DIASTOLIC BLOOD PRESSURE: 80 MMHG | BODY MASS INDEX: 37.38 KG/M2

## 2021-10-19 DIAGNOSIS — Z12.4 SCREENING FOR MALIGNANT NEOPLASM OF THE CERVIX: Primary | ICD-10-CM

## 2021-10-19 PROCEDURE — G0101 CA SCREEN;PELVIC/BREAST EXAM: HCPCS | Performed by: OBSTETRICS & GYNECOLOGY

## 2021-10-22 ENCOUNTER — OFFICE VISIT (OUTPATIENT)
Dept: FAMILY MEDICINE CLINIC | Facility: CLINIC | Age: 76
End: 2021-10-22
Payer: MEDICARE

## 2021-10-22 VITALS
OXYGEN SATURATION: 96 % | WEIGHT: 197 LBS | HEART RATE: 72 BPM | HEIGHT: 61 IN | SYSTOLIC BLOOD PRESSURE: 124 MMHG | BODY MASS INDEX: 37.19 KG/M2 | RESPIRATION RATE: 18 BRPM | TEMPERATURE: 97.2 F | DIASTOLIC BLOOD PRESSURE: 72 MMHG

## 2021-10-22 DIAGNOSIS — E78.2 MIXED DYSLIPIDEMIA: ICD-10-CM

## 2021-10-22 DIAGNOSIS — I25.10 CORONARY ARTERY DISEASE INVOLVING NATIVE CORONARY ARTERY OF NATIVE HEART WITHOUT ANGINA PECTORIS: ICD-10-CM

## 2021-10-22 DIAGNOSIS — E11.9 TYPE 2 DIABETES MELLITUS WITHOUT COMPLICATION, WITHOUT LONG-TERM CURRENT USE OF INSULIN (HCC): ICD-10-CM

## 2021-10-22 DIAGNOSIS — Z23 NEED FOR VACCINATION: ICD-10-CM

## 2021-10-22 DIAGNOSIS — M85.851 OSTEOPENIA OF NECKS OF BOTH FEMURS: ICD-10-CM

## 2021-10-22 DIAGNOSIS — C50.812 MALIGNANT NEOPLASM OF OVERLAPPING SITES OF LEFT BREAST IN FEMALE, ESTROGEN RECEPTOR POSITIVE (HCC): ICD-10-CM

## 2021-10-22 DIAGNOSIS — M85.852 OSTEOPENIA OF NECKS OF BOTH FEMURS: ICD-10-CM

## 2021-10-22 DIAGNOSIS — E03.9 ACQUIRED HYPOTHYROIDISM: ICD-10-CM

## 2021-10-22 DIAGNOSIS — I10 ESSENTIAL HYPERTENSION: Primary | ICD-10-CM

## 2021-10-22 DIAGNOSIS — Z17.0 MALIGNANT NEOPLASM OF OVERLAPPING SITES OF LEFT BREAST IN FEMALE, ESTROGEN RECEPTOR POSITIVE (HCC): ICD-10-CM

## 2021-10-22 PROCEDURE — 99214 OFFICE O/P EST MOD 30 MIN: CPT | Performed by: FAMILY MEDICINE

## 2021-10-22 PROCEDURE — 90662 IIV NO PRSV INCREASED AG IM: CPT | Performed by: FAMILY MEDICINE

## 2021-10-22 PROCEDURE — G0008 ADMIN INFLUENZA VIRUS VAC: HCPCS | Performed by: FAMILY MEDICINE

## 2021-11-01 DIAGNOSIS — Z17.0 MALIGNANT NEOPLASM OF OVERLAPPING SITES OF LEFT BREAST IN FEMALE, ESTROGEN RECEPTOR POSITIVE (HCC): ICD-10-CM

## 2021-11-01 DIAGNOSIS — C50.812 MALIGNANT NEOPLASM OF OVERLAPPING SITES OF LEFT BREAST IN FEMALE, ESTROGEN RECEPTOR POSITIVE (HCC): ICD-10-CM

## 2021-11-01 RX ORDER — ANASTROZOLE 1 MG/1
1 TABLET ORAL DAILY
Qty: 90 TABLET | Refills: 0 | Status: SHIPPED | OUTPATIENT
Start: 2021-11-01 | End: 2022-01-31 | Stop reason: SDUPTHER

## 2021-11-02 ENCOUNTER — OFFICE VISIT (OUTPATIENT)
Dept: CARDIOLOGY CLINIC | Facility: CLINIC | Age: 76
End: 2021-11-02
Payer: MEDICARE

## 2021-11-02 VITALS
HEART RATE: 78 BPM | WEIGHT: 199 LBS | OXYGEN SATURATION: 97 % | DIASTOLIC BLOOD PRESSURE: 60 MMHG | BODY MASS INDEX: 37.57 KG/M2 | HEIGHT: 61 IN | SYSTOLIC BLOOD PRESSURE: 152 MMHG

## 2021-11-02 DIAGNOSIS — I10 ESSENTIAL HYPERTENSION: Primary | ICD-10-CM

## 2021-11-02 DIAGNOSIS — R93.89 ABNORMAL CAROTID ULTRASOUND: ICD-10-CM

## 2021-11-02 DIAGNOSIS — I25.10 CORONARY ARTERY DISEASE INVOLVING NATIVE CORONARY ARTERY OF NATIVE HEART WITHOUT ANGINA PECTORIS: ICD-10-CM

## 2021-11-02 DIAGNOSIS — E78.2 MIXED DYSLIPIDEMIA: ICD-10-CM

## 2021-11-02 PROCEDURE — 99214 OFFICE O/P EST MOD 30 MIN: CPT | Performed by: INTERNAL MEDICINE

## 2021-11-08 ENCOUNTER — TELEPHONE (OUTPATIENT)
Dept: FAMILY MEDICINE CLINIC | Facility: CLINIC | Age: 76
End: 2021-11-08

## 2021-11-08 DIAGNOSIS — E11.9 TYPE 2 DIABETES MELLITUS WITHOUT COMPLICATION, WITHOUT LONG-TERM CURRENT USE OF INSULIN (HCC): ICD-10-CM

## 2021-11-08 DIAGNOSIS — E11.9 DIABETES MELLITUS WITHOUT COMPLICATION (HCC): ICD-10-CM

## 2021-11-08 RX ORDER — GLIPIZIDE 10 MG/1
10 TABLET ORAL
Qty: 180 TABLET | Refills: 3 | Status: SHIPPED | OUTPATIENT
Start: 2021-11-08

## 2021-11-12 ENCOUNTER — OFFICE VISIT (OUTPATIENT)
Dept: GASTROENTEROLOGY | Facility: CLINIC | Age: 76
End: 2021-11-12
Payer: MEDICARE

## 2021-11-12 VITALS
SYSTOLIC BLOOD PRESSURE: 145 MMHG | HEART RATE: 74 BPM | BODY MASS INDEX: 37.76 KG/M2 | HEIGHT: 61 IN | WEIGHT: 200 LBS | DIASTOLIC BLOOD PRESSURE: 52 MMHG

## 2021-11-12 DIAGNOSIS — K62.5 RECTAL BLEEDING: Primary | ICD-10-CM

## 2021-11-12 PROCEDURE — 99213 OFFICE O/P EST LOW 20 MIN: CPT | Performed by: INTERNAL MEDICINE

## 2021-11-12 PROCEDURE — 46221 LIGATION OF HEMORRHOID(S): CPT | Performed by: INTERNAL MEDICINE

## 2021-12-07 ENCOUNTER — TELEPHONE (OUTPATIENT)
Dept: FAMILY MEDICINE CLINIC | Facility: CLINIC | Age: 76
End: 2021-12-07

## 2021-12-07 DIAGNOSIS — Z11.9 ENCOUNTER FOR SCREENING FOR INFECTIOUS AND PARASITIC DISEASES, UNSPECIFIED: Primary | ICD-10-CM

## 2021-12-07 PROCEDURE — U0003 INFECTIOUS AGENT DETECTION BY NUCLEIC ACID (DNA OR RNA); SEVERE ACUTE RESPIRATORY SYNDROME CORONAVIRUS 2 (SARS-COV-2) (CORONAVIRUS DISEASE [COVID-19]), AMPLIFIED PROBE TECHNIQUE, MAKING USE OF HIGH THROUGHPUT TECHNOLOGIES AS DESCRIBED BY CMS-2020-01-R: HCPCS | Performed by: FAMILY MEDICINE

## 2021-12-07 PROCEDURE — U0005 INFEC AGEN DETEC AMPLI PROBE: HCPCS | Performed by: FAMILY MEDICINE

## 2021-12-09 ENCOUNTER — OFFICE VISIT (OUTPATIENT)
Dept: FAMILY MEDICINE CLINIC | Facility: CLINIC | Age: 76
End: 2021-12-09
Payer: MEDICARE

## 2021-12-09 VITALS
HEART RATE: 82 BPM | HEIGHT: 61 IN | TEMPERATURE: 97.4 F | DIASTOLIC BLOOD PRESSURE: 84 MMHG | OXYGEN SATURATION: 98 % | RESPIRATION RATE: 16 BRPM | SYSTOLIC BLOOD PRESSURE: 138 MMHG | BODY MASS INDEX: 37.57 KG/M2 | WEIGHT: 199 LBS

## 2021-12-09 DIAGNOSIS — J20.9 ACUTE BRONCHITIS, UNSPECIFIED ORGANISM: Primary | ICD-10-CM

## 2021-12-09 PROCEDURE — 99213 OFFICE O/P EST LOW 20 MIN: CPT | Performed by: FAMILY MEDICINE

## 2021-12-09 RX ORDER — BENZONATATE 200 MG/1
200 CAPSULE ORAL 3 TIMES DAILY PRN
Qty: 30 CAPSULE | Refills: 0 | Status: SHIPPED | OUTPATIENT
Start: 2021-12-09 | End: 2022-06-07

## 2021-12-09 RX ORDER — AZITHROMYCIN 250 MG/1
TABLET, FILM COATED ORAL
Qty: 6 TABLET | Refills: 0 | Status: SHIPPED | OUTPATIENT
Start: 2021-12-09 | End: 2021-12-14

## 2021-12-15 ENCOUNTER — TELEPHONE (OUTPATIENT)
Dept: OBGYN CLINIC | Facility: CLINIC | Age: 76
End: 2021-12-15

## 2022-01-03 ENCOUNTER — TELEPHONE (OUTPATIENT)
Dept: FAMILY MEDICINE CLINIC | Facility: CLINIC | Age: 77
End: 2022-01-03

## 2022-01-03 DIAGNOSIS — E03.9 ACQUIRED HYPOTHYROIDISM: ICD-10-CM

## 2022-01-03 DIAGNOSIS — I10 ESSENTIAL (PRIMARY) HYPERTENSION: ICD-10-CM

## 2022-01-03 RX ORDER — LISINOPRIL 20 MG/1
20 TABLET ORAL
Qty: 90 TABLET | Refills: 1 | Status: SHIPPED | OUTPATIENT
Start: 2022-01-03 | End: 2022-06-27

## 2022-01-03 RX ORDER — LEVOTHYROXINE SODIUM 0.1 MG/1
100 TABLET ORAL DAILY
Qty: 90 TABLET | Refills: 1 | Status: SHIPPED | OUTPATIENT
Start: 2022-01-03 | End: 2022-02-24 | Stop reason: SDUPTHER

## 2022-01-05 DIAGNOSIS — M48.061 SPINAL STENOSIS OF LUMBAR REGION, UNSPECIFIED WHETHER NEUROGENIC CLAUDICATION PRESENT: Primary | ICD-10-CM

## 2022-01-05 DIAGNOSIS — E11.9 TYPE 2 DIABETES MELLITUS WITHOUT COMPLICATION, WITHOUT LONG-TERM CURRENT USE OF INSULIN (HCC): ICD-10-CM

## 2022-01-05 RX ORDER — BLOOD SUGAR DIAGNOSTIC
1 STRIP MISCELLANEOUS DAILY
Qty: 100 EACH | Refills: 3 | Status: SHIPPED | OUTPATIENT
Start: 2022-01-05 | End: 2022-04-11 | Stop reason: SDUPTHER

## 2022-01-05 NOTE — TELEPHONE ENCOUNTER
Patient can no longer get lyrica  They suggested her to go on the generic (preglabin?) will need this med as well    [facility] on [address]    Refill on one touch ultra test strips  To [address]

## 2022-01-11 DIAGNOSIS — M48.061 SPINAL STENOSIS OF LUMBAR REGION, UNSPECIFIED WHETHER NEUROGENIC CLAUDICATION PRESENT: ICD-10-CM

## 2022-01-11 DIAGNOSIS — E11.9 TYPE 2 DIABETES MELLITUS WITHOUT COMPLICATION, WITHOUT LONG-TERM CURRENT USE OF INSULIN (HCC): ICD-10-CM

## 2022-01-11 PROBLEM — J20.9 ACUTE BRONCHITIS: Status: RESOLVED | Noted: 2021-12-09 | Resolved: 2022-01-11

## 2022-01-11 RX ORDER — PREGABALIN 50 MG/1
50 CAPSULE ORAL 2 TIMES DAILY
Qty: 60 CAPSULE | Refills: 3 | Status: SHIPPED | OUTPATIENT
Start: 2022-01-11 | End: 2022-03-17 | Stop reason: SDUPTHER

## 2022-01-11 RX ORDER — PREGABALIN 50 MG/1
50 CAPSULE ORAL 2 TIMES DAILY
Qty: 60 CAPSULE | Refills: 3 | Status: SHIPPED | OUTPATIENT
Start: 2022-01-11 | End: 2022-01-11 | Stop reason: SDUPTHER

## 2022-01-21 PROBLEM — Z79.811 USE OF ANASTROZOLE (ARIMIDEX): Status: ACTIVE | Noted: 2022-01-21

## 2022-01-26 ENCOUNTER — OFFICE VISIT (OUTPATIENT)
Dept: SURGICAL ONCOLOGY | Facility: CLINIC | Age: 77
End: 2022-01-26
Payer: MEDICARE

## 2022-01-26 VITALS
WEIGHT: 195 LBS | OXYGEN SATURATION: 98 % | BODY MASS INDEX: 36.82 KG/M2 | HEIGHT: 61 IN | HEART RATE: 74 BPM | RESPIRATION RATE: 16 BRPM | DIASTOLIC BLOOD PRESSURE: 76 MMHG | SYSTOLIC BLOOD PRESSURE: 134 MMHG | TEMPERATURE: 97 F

## 2022-01-26 DIAGNOSIS — Z17.0 MALIGNANT NEOPLASM OF OVERLAPPING SITES OF LEFT BREAST IN FEMALE, ESTROGEN RECEPTOR POSITIVE (HCC): Primary | ICD-10-CM

## 2022-01-26 DIAGNOSIS — Z15.89 MONOALLELIC MUTATION OF CHEK2 GENE IN FEMALE PATIENT: ICD-10-CM

## 2022-01-26 DIAGNOSIS — Z15.09 MONOALLELIC MUTATION OF CHEK2 GENE IN FEMALE PATIENT: ICD-10-CM

## 2022-01-26 DIAGNOSIS — C50.812 MALIGNANT NEOPLASM OF OVERLAPPING SITES OF LEFT BREAST IN FEMALE, ESTROGEN RECEPTOR POSITIVE (HCC): Primary | ICD-10-CM

## 2022-01-26 DIAGNOSIS — Z15.01 MONOALLELIC MUTATION OF CHEK2 GENE IN FEMALE PATIENT: ICD-10-CM

## 2022-01-26 DIAGNOSIS — Z79.811 USE OF ANASTROZOLE (ARIMIDEX): ICD-10-CM

## 2022-01-26 DIAGNOSIS — Z15.02 MONOALLELIC MUTATION OF CHEK2 GENE IN FEMALE PATIENT: ICD-10-CM

## 2022-01-26 PROCEDURE — 99214 OFFICE O/P EST MOD 30 MIN: CPT | Performed by: SURGERY

## 2022-01-26 NOTE — PROGRESS NOTES
Surgical Oncology Follow Up       [address]  [facility] [place]  [address]  [place] [address]    Donna Casas  1945  [id number]  [address]  [facility] [place]  [address] [address]    Chief Complaint   Patient presents with    Follow-up          Assessment & Plan:   Patient presents today for six-month follow-up for her left invasive ductal carcinoma diagnosed in July of last year  She also has a CHEK2 mutation  She remains on anastrozole  She is due for mammogram in February will coordinate that for her  She has no evidence of local regional distant recurrence disease on clinical exam     Cancer History:     Oncology History   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[facility] [address] )   8/27/2013 Initial Diagnosis    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[facility] [address] )     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, estrogen receptor positive ([facility] [address] )   3/15/2021 Biopsy    Left breast ultrasound-guided biopsy  12 o'clock, 5 cm from nipple  Ductal carcinoma in situ  Grade 2        Concordant  Malignancy appears unifocal; masses cover 2 6 cm  US left axilla negative  Right breast clear  4/19/2021 Genetic Testing    One pathogenic (low penetrance) variant identified in CHEK2  Total of 23 genes evaluated  [employer]     5/6/2021 Observation    Imaging was reviewed prior to ROSLYN clip insertion  Additional findings in the left breast on ultrasound; 2 additional biopsies were recommended     5/6/2021 Biopsy    Left breast ultrasound-guided biopsy  A  1 o'clock, 4 cm from nipple  Ductal carcinoma in situ  Grade 2  ,     B  11 o'clock, 9 cm from nipple  Invasive carcinoma of no special type (ductal)  Grade 1  ER 90, OR 90, HER2 1+  Lymphovascular invasion not identified    Concordant  Malignancy is multifocal and spans at least 8 5 cm in 2 directions   ROSLYN  clip placed at 12:00, 5cmfn biopsy site  7/13/2021 Surgery    Left breast ROSLYN  directed mastectomy with sentinel lymph node biopsy  Invasive mammary carcinoma of no special type (ductal)  Grade 1  8 mm  Extensive DCIS (size cannot be determined, throughout all quadrants)  Margins negative  0/1 Lymph node  Anatomic/Prognostic Stage IA     8/11/2021 -  Hormone Therapy    Anastrozole 1 mg daily  Dr Valentin Pedroza           Interval History:   Patient has no complaints referable to her breast   Her 's recently had cardiac issues and she is stressed about that  She remains on her anastrozole without difficulty  Review of Systems:   Review of Systems   Constitutional: Negative for activity change, appetite change and fatigue  HENT: Negative  Eyes: Negative  Respiratory: Negative for cough, shortness of breath and wheezing  Cardiovascular: Negative for chest pain and leg swelling  Gastrointestinal: Negative  Endocrine: Negative  Genitourinary: Negative  Musculoskeletal:        No new changes or complaints of bone pain   Skin: Negative  Allergic/Immunologic: Negative  Neurological: Negative  Hematological: Negative  Psychiatric/Behavioral: Negative          Past Medical History     Patient Active Problem List   Diagnosis    Coronary artery disease involving native coronary artery of native heart without angina pectoris    Type 2 diabetes mellitus without complication, without long-term current use of insulin (HCC)    Mixed dyslipidemia    Essential hypertension    Abnormal carotid ultrasound    Hypothyroidism    Spinal stenosis of lumbar region    Osteopenia of necks of both femurs    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[address]    S/P lumbar fusion    Bilateral leg edema    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, estrogen receptor positive ([address]    Obesity, morbid ([address]    Monoallelic mutation of CHEK2 gene in female patient    Use of anastrozole (Arimidex)     Past Medical History:   Diagnosis Date    Angina pectoris ([address]     Arthritis     Breast cancer ([address]     Cancer ([address]     Coronary artery disease     Diabetes mellitus ([address]     Disease of thyroid gland     Hypercholesterolemia     Hypertension     Obesity     Osteoporosis     Otitis media     Ovarian ca ([address] ) 1997    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[address]     Shingles     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    Urinary tract infection      Past Surgical History:   Procedure Laterality Date    APPENDECTOMY      BACK SURGERY      BRAIN SURGERY  1993    meningioma    CARPAL TUNNEL RELEASE      CHOLECYSTECTOMY      COLONOSCOPY      pt see Dr Adama Stroud  Up to date with her colonoscopy   CORONARY STENT PLACEMENT      Dec 2015    EYE SURGERY      cataract surgery    GALLBLADDER SURGERY      HYSTERECTOMY  1989    MAMMO (HISTORICAL)      up to date  see gyn    MASTECTOMY W/ SENTINEL NODE BIOPSY Left 7/13/2021    Procedure: BREAST ROSLYN  DIRECTED MASTECTOMY, SENTINEL LYMPH NODE BIOPSY, LYMPHATIC MAPPING WITH BLUE DYE AND RADIOACTIVE DYE (INJECT AT 1500 BY DR ANDREWS IN THE OR);   Surgeon: Miguelina Hampton MD;  Location: [facility];  Service: Surgical Oncology    OOPHORECTOMY Bilateral 1997    SPINE SURGERY      THYROIDECTOMY      US BREAST NEEDLE LOC LEFT Left 5/6/2021    US GUIDANCE BREAST BIOPSY LEFT EACH ADDITIONAL Left 5/6/2021    US GUIDED BREAST BIOPSY LEFT COMPLETE Left 3/15/2021    US GUIDED BREAST BIOPSY LEFT COMPLETE Left 5/6/2021     Family History   Problem Relation Age of Onset    Diabetes Family     Cancer Family     Arthritis Family     Heart disease Family     Endometrial cancer Sister 76    Asthma Sister     Cancer Sister     Esophageal cancer Father 61    Cancer Father     Stomach cancer Brother 70    Cancer Brother     Diabetes Mother     Heart disease Mother     Dementia Mother     Asthma Daughter     No Known Problems Maternal Grandmother  Prostate cancer Maternal Grandfather     No Known Problems Paternal Grandmother     Prostate cancer Paternal Grandfather     Breast cancer Maternal Aunt 48    Breast cancer Other 39    Breast cancer Other 39    No Known Problems Sister     No Known Problems Sister     No Known Problems Sister     No Known Problems Paternal Aunt     No Known Problems Paternal Aunt     Multiple myeloma Brother 67    Asthma Son     Cancer Brother     Depression Son      Social History     Socioeconomic History    Marital status: /Civil Union     Spouse name: Not on file    Number of children: Not on file    Years of education: Not on file    Highest education level: Not on file   Occupational History    Not on file   Tobacco Use    Smoking status: Never Smoker    Smokeless tobacco: Never Used   Vaping Use    Vaping Use: Never used   Substance and Sexual Activity    Alcohol use: No    Drug use: Never    Sexual activity: Not Currently     Partners: Male     Birth control/protection: Post-menopausal, None   Other Topics Concern    Not on file   Social History Narrative    · Tobacco smoking status:   Never smoker      This question's title has changed from "Smoking status" to "Tobacco smoking status" with the 19 7 update  Please use this field only for documenting tobacco smoking behavior  To accommodate this change, new fields for documenting smokeless tobacco and e-cigarette/vape usage have been added       · Smoking - how much          None  1 PPW  2 PPW  1/4 PPD  1/2 PPD  1 PPD  1 1/2 PPD  2 PPD  3+ PPD          NOTE     · Smokeless tobacco status          Never used smokeless tobacco  Former smokeless tobacco user  Current snuff user  Currently chews tobacco  Currently uses moist powdered tobacco  ----  Not indicated  Not tolerated  Patient refused          NOTE     · Tobacco-years of use               NOTE     · E-cigarette/vape status          Never used electronic cigarettes  Former user of electronic cigarettes  Current user of electronic cigarettes          NOTE     · Most recent tobacco use screenin2018      · Alcohol intake:   None         Per laine     Social Determinants of Health     Financial Resource Strain: Not on file   Food Insecurity: Not on file   Transportation Needs: Not on file   Physical Activity: Not on file   Stress: Not on file   Social Connections: Not on file   Intimate Partner Violence: Not on file   Housing Stability: Not on file       Current Outpatient Medications:     acetaminophen-codeine (TYLENOL with CODEINE #3) 300-30 MG per tablet, acetaminophen 300 mg-codeine 30 mg tablet, Disp: , Rfl:     anastrozole (ARIMIDEX) 1 mg tablet, Take 1 tablet (1 mg total) by mouth daily, Disp: 90 tablet, Rfl: 0    aspirin (ECOTRIN LOW STRENGTH) 81 mg EC tablet, Take 162 mg by mouth daily, Disp: , Rfl:     benzonatate (TESSALON) 200 MG capsule, Take 1 capsule (200 mg total) by mouth 3 (three) times a day as needed for cough, Disp: 30 capsule, Rfl: 0    clotrimazole-betamethasone (LOTRISONE) 1-0 05 % cream, Apply topically 2 (two) times a day, Disp: 45 g, Rfl: 3    glipiZIDE (GLUCOTROL) 10 mg tablet, Take 1 tablet (10 mg total) by mouth 2 (two) times a day before meals, Disp: 180 tablet, Rfl: 3    Insulin Pen Needle (Sure Comfort Pen Needles) 31G X 5 MM MISC, by Does not apply route daily, Disp: 100 each, Rfl: 3    Lancets (onetouch ultrasoft) lancets, Use daily U88 3 One touch Delica Plus, Disp: 096 each, Rfl: 3    levothyroxine 100 mcg tablet, Take 1 tablet (100 mcg total) by mouth daily, Disp: 90 tablet, Rfl: 1    Liraglutide (VICTOZA) 18 MG/3ML SOPN, Inject 1 2 mg under the skin daily at bedtime , Disp: , Rfl:     lisinopril (ZESTRIL) 20 mg tablet, Take 1 tablet (20 mg total) by mouth daily at bedtime, Disp: 90 tablet, Rfl: 1    Lyrica 50 MG capsule, Take 50 mg by mouth 2 (two) times a day, Disp: , Rfl:     metFORMIN (GLUCOPHAGE) 1000 MG tablet, Take 1 tablet (1,000 mg total) by mouth 2 (two) times a day with meals, Disp: 180 tablet, Rfl: 3    metoprolol tartrate (LOPRESSOR) 25 mg tablet, Take 1 tablet (25 mg total) by mouth every 12 (twelve) hours, Disp: 180 tablet, Rfl: 2    OneTouch Ultra test strip, Use 1 each daily Use as instructed, Disp: 100 each, Rfl: 3    pregabalin (LYRICA) 50 mg capsule, Take 1 capsule (50 mg total) by mouth 2 (two) times a day, Disp: 60 capsule, Rfl: 3  Allergies   Allergen Reactions    Duloxetine Other (See Comments)     Extreme somnolence/lethargy    Sulfa Antibiotics Rash       Physical Exam:     Vitals:    01/26/22 1532   BP: 134/76   Pulse: 74   Resp: 16   Temp: (!) 97 °F (36 1 °C)   SpO2: 98%     Physical Exam  Vitals reviewed  Constitutional:       Appearance: She is well-developed  HENT:      Head: Normocephalic and atraumatic  Eyes:      Pupils: Pupils are equal, round, and reactive to light  Neck:      Thyroid: No thyromegaly  Vascular: No JVD  Trachea: No tracheal deviation  Cardiovascular:      Rate and Rhythm: Normal rate and regular rhythm  Heart sounds: Normal heart sounds  No murmur heard  No friction rub  No gallop  Pulmonary:      Effort: Pulmonary effort is normal  No respiratory distress  Breath sounds: Normal breath sounds  No wheezing or rales  Chest:      Comments: The right breast was examined in the sitting and supine position  There are no worrisome skin changes, tenderness, inverted nipple, nipple discharge, swelling, bleeding or evidence of a mass in any quadrant  Adrianna survey demonstrated no evidence of any clinically suspicious axillary, pectoral or paraclavicular lymph nodes  Examination left mastectomy site demonstrates no worrisome skin findings dominant masses or axillary adenopathy  Abdominal:      General: There is no distension  Palpations: Abdomen is soft  There is no hepatomegaly or mass  Tenderness: There is no abdominal tenderness   There is no guarding or rebound  Musculoskeletal:         General: No tenderness  Normal range of motion  Cervical back: Normal range of motion and neck supple  Lymphadenopathy:      Cervical: No cervical adenopathy  Skin:     General: Skin is warm and dry  Findings: No erythema or rash  Neurological:      Mental Status: She is alert and oriented to person, place, and time  Cranial Nerves: No cranial nerve deficit  Psychiatric:         Behavior: Behavior normal            Results & Discussion: We will coordinate her right mammogram for the patient next month  We will see her back in 6 months  She is free of any evidence of local regional or distant recurrence disease  All questions were answered the patient's satisfaction  I did  her to contact us should she appreciate any changes in her breast prior to her next visit  She is agreeable see our nurse practitioner at the next visit  Advance Care Planning/Advance Directives:  I discussed the disease status, treatment plans and follow-up with the patient

## 2022-01-31 DIAGNOSIS — C50.812 MALIGNANT NEOPLASM OF OVERLAPPING SITES OF LEFT BREAST IN FEMALE, ESTROGEN RECEPTOR POSITIVE (HCC): ICD-10-CM

## 2022-01-31 DIAGNOSIS — Z17.0 MALIGNANT NEOPLASM OF OVERLAPPING SITES OF LEFT BREAST IN FEMALE, ESTROGEN RECEPTOR POSITIVE (HCC): ICD-10-CM

## 2022-01-31 RX ORDER — ANASTROZOLE 1 MG/1
1 TABLET ORAL DAILY
Qty: 90 TABLET | Refills: 0 | Status: SHIPPED | OUTPATIENT
Start: 2022-01-31 | End: 2022-02-22 | Stop reason: SDUPTHER

## 2022-02-03 DIAGNOSIS — I10 ESSENTIAL HYPERTENSION: ICD-10-CM

## 2022-02-15 ENCOUNTER — OFFICE VISIT (OUTPATIENT)
Dept: FAMILY MEDICINE CLINIC | Facility: CLINIC | Age: 77
End: 2022-02-15
Payer: MEDICARE

## 2022-02-15 VITALS
HEART RATE: 70 BPM | TEMPERATURE: 97.5 F | BODY MASS INDEX: 36.25 KG/M2 | SYSTOLIC BLOOD PRESSURE: 130 MMHG | WEIGHT: 192 LBS | DIASTOLIC BLOOD PRESSURE: 78 MMHG | RESPIRATION RATE: 16 BRPM | HEIGHT: 61 IN | OXYGEN SATURATION: 98 %

## 2022-02-15 DIAGNOSIS — M85.852 OSTEOPENIA OF NECKS OF BOTH FEMURS: ICD-10-CM

## 2022-02-15 DIAGNOSIS — E78.2 MIXED DYSLIPIDEMIA: ICD-10-CM

## 2022-02-15 DIAGNOSIS — E11.9 TYPE 2 DIABETES MELLITUS WITHOUT COMPLICATION, WITHOUT LONG-TERM CURRENT USE OF INSULIN (HCC): ICD-10-CM

## 2022-02-15 DIAGNOSIS — Z17.0 MALIGNANT NEOPLASM OF OVERLAPPING SITES OF LEFT BREAST IN FEMALE, ESTROGEN RECEPTOR POSITIVE (HCC): ICD-10-CM

## 2022-02-15 DIAGNOSIS — Z78.0 ENCOUNTER FOR OSTEOPOROSIS SCREENING IN ASYMPTOMATIC POSTMENOPAUSAL PATIENT: ICD-10-CM

## 2022-02-15 DIAGNOSIS — Z13.820 ENCOUNTER FOR OSTEOPOROSIS SCREENING IN ASYMPTOMATIC POSTMENOPAUSAL PATIENT: ICD-10-CM

## 2022-02-15 DIAGNOSIS — M85.851 OSTEOPENIA OF NECKS OF BOTH FEMURS: ICD-10-CM

## 2022-02-15 DIAGNOSIS — E66.01 OBESITY, MORBID (HCC): ICD-10-CM

## 2022-02-15 DIAGNOSIS — E03.9 ACQUIRED HYPOTHYROIDISM: ICD-10-CM

## 2022-02-15 DIAGNOSIS — I10 ESSENTIAL HYPERTENSION: ICD-10-CM

## 2022-02-15 DIAGNOSIS — I25.10 CORONARY ARTERY DISEASE INVOLVING NATIVE CORONARY ARTERY OF NATIVE HEART WITHOUT ANGINA PECTORIS: ICD-10-CM

## 2022-02-15 DIAGNOSIS — Z00.00 MEDICARE ANNUAL WELLNESS VISIT, SUBSEQUENT: Primary | ICD-10-CM

## 2022-02-15 DIAGNOSIS — C50.812 MALIGNANT NEOPLASM OF OVERLAPPING SITES OF LEFT BREAST IN FEMALE, ESTROGEN RECEPTOR POSITIVE (HCC): ICD-10-CM

## 2022-02-15 PROCEDURE — 1123F ACP DISCUSS/DSCN MKR DOCD: CPT | Performed by: FAMILY MEDICINE

## 2022-02-15 PROCEDURE — G0439 PPPS, SUBSEQ VISIT: HCPCS | Performed by: FAMILY MEDICINE

## 2022-02-15 PROCEDURE — 99214 OFFICE O/P EST MOD 30 MIN: CPT | Performed by: FAMILY MEDICINE

## 2022-02-15 NOTE — ASSESSMENT & PLAN NOTE
Recheck A1C  Sugars good at home  Continue glipizide 10 mg bid and victoza  Advised pt to follow a low carb diet and to exercise on a regular basis  Foot exam done today  Had eye exam in Jan 2021 by Dr Jolanta Horner  Had COVID booster and flu shot      Lab Results   Component Value Date    HGBA1C 5 7 (H) 06/16/2021

## 2022-02-15 NOTE — ASSESSMENT & PLAN NOTE
Last dexascan was in Dec 2019  Pt advised to take calcium 1200 mg qd and Vit D 1000 U qd  Pt also advised to perform weight-bearing exercise on a regular basis  Recheck dexascan

## 2022-02-15 NOTE — ASSESSMENT & PLAN NOTE
Wellness exam done  Had flu shot in Oct 2021 and COVID booster in Nov 2021  Had prevnar 13 and pneumovacc 23  Recommend Tdap and Shingrix  Had colonoscopy in 2015 and no polyps  Mammogram is UTD  Last Dexa was in Dec 2019  Lipids and FBS are UTD  No recent falls  Mood ok

## 2022-02-15 NOTE — PROGRESS NOTES
Assessment and Plan:     Problem List Items Addressed This Visit        Endocrine    Type 2 diabetes mellitus without complication, without long-term current use of insulin (HCC)     Recheck A1C  Sugars good at home  Continue glipizide 10 mg bid and victoza  Advised pt to follow a low carb diet and to exercise on a regular basis  Foot exam done today  Had eye exam in Jan 2021 by Dr Mouna Spence  Had COVID booster and flu shot  Lab Results   Component Value Date    HGBA1C 5 7 (H) 06/16/2021            Hypothyroidism     Recheck TSH and Free T4  Continue levothyroxine 100 mcg qd  Cardiovascular and Mediastinum    Essential hypertension     BP good  Continue lisinopril 20 mg qd and metoprolol 25 mg bid  Pt advised to continue low Na diet and to exercise on a regular basis  Coronary artery disease involving native coronary artery of native heart without angina pectoris     No recent chest pain or sob  Continue current meds  Saw Cardiology Dr Memo Awad for follow-up in Nov 2021  She had an EKG in March 2021 and it was stable  Musculoskeletal and Integument    Osteopenia of necks of both femurs     Last dexascan was in Dec 2019  Pt advised to take calcium 1200 mg qd and Vit D 1000 U qd  Pt also advised to perform weight-bearing exercise on a regular basis  Recheck dexascan  Other    Mixed dyslipidemia     Advised pt to follow a low cholesterol diet and to exercise on a regular basis  Recheck lipids and LFTs  Medicare annual wellness visit, subsequent - Primary     Wellness exam done  Had flu shot in Oct 2021 and COVID booster in Nov 2021  Had prevnar 13 and pneumovacc 23  Recommend Tdap and Shingrix  Had colonoscopy in 2015 and no polyps  Mammogram is UTD  Last Dexa was in Dec 2019  Lipids and FBS are UTD  No recent falls  Mood ok              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, estrogen receptor positive ([facility] [address] )     Pt had L mastectomy and sees Hematology Dr Romulo Fletcher  Continue anastrazole 1 mg qd  Other Visit Diagnoses     Encounter for osteoporosis screening in asymptomatic postmenopausal patient            BMI Counseling: Body mass index is 36 28 kg/m²  The BMI is above normal  Nutrition recommendations include decreasing portion sizes, encouraging healthy choices of fruits and vegetables, consuming healthier snacks and moderation in carbohydrate intake  Exercise recommendations include exercising 3-5 times per week  No pharmacotherapy was ordered  Rationale for BMI follow-up plan is due to patient being overweight or obese  Depression Screening and Follow-up Plan: Patient was screened for depression during today's encounter  They screened negative with a PHQ-2 score of 0  Preventive health issues were discussed with patient, and age appropriate screening tests were ordered as noted in patient's After Visit Summary  Personalized health advice and appropriate referrals for health education or preventive services given if needed, as noted in patient's After Visit Summary       History of Present Illness:     Patient presents for Medicare Annual Wellness visit    Patient Care Team:  Anjum Brown MD as PCP - MD Irma Hall MD as Surgeon (Surgical Oncology)  Jannis Soulier, MD (Obstetrics and Gynecology)  Herminia Rodriguez MD as Medical Oncologist (Hematology)     Problem List:     Patient Active Problem List   Diagnosis    Coronary artery disease involving native coronary artery of native heart without angina pectoris    Type 2 diabetes mellitus without complication, without long-term current use of insulin ([address] )    Mixed dyslipidemia    Essential hypertension    Abnormal carotid ultrasound    Hypothyroidism    Spinal stenosis of lumbar region    Osteopenia of necks of both femurs    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[address] )    S/P lumbar fusion    Bilateral leg edema    Medicare annual wellness visit, subsequent    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, estrogen receptor positive ([address] )    Obesity, morbid ([facility] [address] )    Monoallelic mutation of CHEK2 gene in female patient    Use of anastrozole (Arimidex)      Past Medical and Surgical History:     Past Medical History:   Diagnosis Date    Angina pectoris ([facility] [address] )     Arthritis     Breast cancer ([facility] [address] )     Cancer ([facility] [address] )     Coronary artery disease     Diabetes mellitus ([facility] [address] )     Disease of thyroid gland     Hypercholesterolemia     Hypertension     Obesity     Osteoporosis     Otitis media     Ovarian ca ([facility] [address] ) 1997    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[facility] [address] )     Shingles     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    Urinary tract infection      Past Surgical History:   Procedure Laterality Date    APPENDECTOMY      BACK SURGERY      BRAIN SURGERY  1993    meningioma    CARPAL TUNNEL RELEASE      CHOLECYSTECTOMY      COLONOSCOPY      pt see Dr Rosana Gee  Up to date with her colonoscopy   CORONARY STENT PLACEMENT      Dec 2015    EYE SURGERY      cataract surgery    GALLBLADDER SURGERY      HYSTERECTOMY  1989    MAMMO (HISTORICAL)      up to date  see gyn    MASTECTOMY W/ SENTINEL NODE BIOPSY Left 7/13/2021    Procedure: BREAST ROSLYN  DIRECTED MASTECTOMY, SENTINEL LYMPH NODE BIOPSY, LYMPHATIC MAPPING WITH BLUE DYE AND RADIOACTIVE DYE (INJECT AT 1500 BY DR ANDREWS IN THE OR);   Surgeon: Deven Daniel MD;  [facility];  Service: Surgical Oncology    OOPHORECTOMY Bilateral 1997    SPINE SURGERY      THYROIDECTOMY      US BREAST NEEDLE LOC LEFT Left 5/6/2021    US GUIDANCE BREAST BIOPSY LEFT EACH ADDITIONAL Left 5/6/2021    US GUIDED BREAST BIOPSY LEFT COMPLETE Left 3/15/2021    US GUIDED BREAST BIOPSY LEFT COMPLETE Left 5/6/2021      Family History:     Family History   Problem Relation Age of Onset    Diabetes Family     Cancer Family     Arthritis Family     Heart disease Family     Endometrial cancer Sister 76    Asthma Sister     Cancer Sister     Esophageal cancer Father 61    Cancer Father     Stomach cancer Brother 70    Cancer Brother     Diabetes Mother     Heart disease Mother     Dementia Mother     Asthma Daughter     No Known Problems Maternal Grandmother     Prostate cancer Maternal Grandfather     No Known Problems Paternal Grandmother     Prostate cancer Paternal Grandfather     Breast cancer Maternal Aunt 48    Breast cancer Other 39    Breast cancer Other 39    No Known Problems Sister     No Known Problems Sister     No Known Problems Sister     No Known Problems Paternal Aunt     No Known Problems Paternal Aunt     Multiple myeloma Brother 67    Asthma Son    [facility] Cancer Brother     Depression Son       Social History:     Social History     Socioeconomic History    Marital status: /Civil Union     Spouse name: None    Number of children: None    Years of education: None    Highest education level: None   Occupational History    None   Tobacco Use    Smoking status: Never Smoker    Smokeless tobacco: Never Used   Vaping Use    Vaping Use: Never used   Substance and Sexual Activity    Alcohol use: No    Drug use: Never    Sexual activity: Not Currently     Partners: Male     Birth control/protection: Post-menopausal, None   Other Topics Concern    None   Social History Narrative    · Tobacco smoking status:   Never smoker      This question's title has changed from "Smoking status" to "Tobacco smoking status" with the 19 7 update  Please use this field only for documenting tobacco smoking behavior  To accommodate this change, new fields for documenting smokeless tobacco and e-cigarette/vape usage have been added       · Smoking - how much          None  1 PPW  2 PPW  1/4 PPD  1/2 PPD  1 PPD  1 1/2 PPD  2 PPD  3+ PPD          NOTE     · Smokeless tobacco status          Never used smokeless tobacco  Former smokeless tobacco user  Current snuff user Currently chews tobacco  Currently uses moist powdered tobacco  ----  Not indicated  Not tolerated  Patient refused          NOTE     · Tobacco-years of use               NOTE     · E-cigarette/vape status          Never used electronic cigarettes  Former user of electronic cigarettes  Current user of electronic cigarettes          NOTE     · Most recent tobacco use screenin2018      · Alcohol intake:   None         Per laine     Social Determinants of Health     Financial Resource Strain: Not on file   Food Insecurity: Not on file   Transportation Needs: Not on file   Physical Activity: Not on file   Stress: Not on file   Social Connections: Not on file   Intimate Partner Violence: Not on file   Housing Stability: Not on file      Medications and Allergies:     Current Outpatient Medications   Medication Sig Dispense Refill    anastrozole (ARIMIDEX) 1 mg tablet Take 1 tablet (1 mg total) by mouth daily 90 tablet 0    aspirin (ECOTRIN LOW STRENGTH) 81 mg EC tablet Take 162 mg by mouth daily      clotrimazole-betamethasone (LOTRISONE) 1-0 05 % cream Apply topically 2 (two) times a day 45 g 3    glipiZIDE (GLUCOTROL) 10 mg tablet Take 1 tablet (10 mg total) by mouth 2 (two) times a day before meals 180 tablet 3    Insulin Pen Needle (Sure Comfort Pen Needles) 31G X 5 MM MISC by Does not apply route daily 100 each 3    Lancets (onetouch ultrasoft) lancets Use daily I20 8 One touch Delica Plus 917 each 3    levothyroxine 100 mcg tablet Take 1 tablet (100 mcg total) by mouth daily 90 tablet 1    Liraglutide (VICTOZA) 18 MG/3ML SOPN Inject 1 2 mg under the skin daily at bedtime       lisinopril (ZESTRIL) 20 mg tablet Take 1 tablet (20 mg total) by mouth daily at bedtime 90 tablet 1    metFORMIN (GLUCOPHAGE) 1000 MG tablet Take 1 tablet (1,000 mg total) by mouth 2 (two) times a day with meals 180 tablet 3    metoprolol tartrate (LOPRESSOR) 25 mg tablet TAKE ONE TABLET BY MOUTH EVERY 12 HOURS 180 tablet 1    OneTouch Ultra test strip Use 1 each daily Use as instructed 100 each 3    pregabalin (LYRICA) 50 mg capsule Take 1 capsule (50 mg total) by mouth 2 (two) times a day 60 capsule 3    acetaminophen-codeine (TYLENOL with CODEINE #3) 300-30 MG per tablet acetaminophen 300 mg-codeine 30 mg tablet (Patient not taking: Reported on 2/15/2022)      benzonatate (TESSALON) 200 MG capsule Take 1 capsule (200 mg total) by mouth 3 (three) times a day as needed for cough (Patient not taking: Reported on 2/15/2022 ) 30 capsule 0    Lyrica 50 MG capsule Take 50 mg by mouth 2 (two) times a day       No current facility-administered medications for this visit  Allergies   Allergen Reactions    Duloxetine Other (See Comments)     Extreme somnolence/lethargy    Sulfa Antibiotics Rash      Immunizations:     Immunization History   Administered Date(s) Administered    COVID-19 PFIZER VACCINE 0 3 ML IM 04/02/2021, 04/23/2021, 11/23/2021    INFLUENZA 12/31/2012    Influenza, high dose seasonal 0 7 mL 10/06/2020, 10/22/2021    Pneumococcal Conjugate 13-Valent 03/19/2015    Pneumococcal Polysaccharide PPV23 04/18/2019    influenza, trivalent, adjuvanted 10/09/2019      Health Maintenance:         Topic Date Due    Hepatitis C Screening  Never done    Colorectal Cancer Screening  04/30/2017    Breast Cancer Screening: Mammogram  02/24/2022     There are no preventive care reminders to display for this patient  Medicare Health Risk Assessment:     /78 (BP Location: Left arm, Patient Position: Sitting, Cuff Size: Standard)   Pulse 70   Temp 97 5 °F (36 4 °C) (Temporal)   Resp 16   Ht 5' 1" (1 549 m)   Wt 87 1 kg (192 lb)   SpO2 98%   BMI 36 28 kg/m²      Rossi Oneil is here for her Subsequent Wellness visit  Health Risk Assessment:   Patient rates overall health as very good  Patient feels that their physical health rating is same  Patient is very satisfied with their life   Eyesight was rated as same  Hearing was rated as same  Patient feels that their emotional and mental health rating is same  Patients states they are sometimes angry  Patient states they are sometimes unusually tired/fatigued  Pain experienced in the last 7 days has been none  Patient states that she has experienced no weight loss or gain in last 6 months  Depression Screening:   PHQ-2 Score: 0      Fall Risk Screening: In the past year, patient has experienced: no history of falling in past year      Urinary Incontinence Screening:   Patient has leaked urine accidently in the last six months  Home Safety:  Patient does not have trouble with stairs inside or outside of their home  Patient has working smoke alarms and has working carbon monoxide detector  Home safety hazards include: none  Nutrition:   Current diet is Regular  Medications:   Patient is not currently taking any over-the-counter supplements  Patient is able to manage medications  Activities of Daily Living (ADLs)/Instrumental Activities of Daily Living (IADLs):   Walk and transfer into and out of bed and chair?: Yes  Dress and groom yourself?: Yes    Bathe or shower yourself?: Yes    Feed yourself?  Yes  Do your laundry/housekeeping?: Yes  Manage your money, pay your bills and track your expenses?: Yes  Make your own meals?: Yes    Do your own shopping?: Yes    Previous Hospitalizations:   Any hospitalizations or ED visits within the last 12 months?: Yes    How many hospitalizations have you had in the last year?: 1-2    Advance Care Planning:   Living will: Yes    Advanced directive: Yes      Cognitive Screening:   Provider or family/friend/caregiver concerned regarding cognition?: No    PREVENTIVE SCREENINGS      Cardiovascular Screening:    General: Screening Current      Diabetes Screening:     General: Screening Not Indicated, History Diabetes and Screening Current      Colorectal Cancer Screening:     General: Screening Current      Breast Cancer Screening:     General: History Breast Cancer      Cervical Cancer Screening:    General: Screening Not Indicated      Osteoporosis Screening:      Due for: DXA Axial      Abdominal Aortic Aneurysm (AAA) Screening:        General: Screening Not Indicated      Lung Cancer Screening:     General: Screening Not Indicated      Hepatitis C Screening:    General: Risks and Benefits Discussed    Hep C Screening Accepted: No     Screening, Brief Intervention, and Referral to Treatment (SBIRT)    Screening  Typical number of drinks in a day: 0  Typical number of drinks in a week: 0  Interpretation: Low risk drinking behavior  AUDIT-C Screenin) How often did you have a drink containing alcohol in the past year? never  2) How many drinks did you have on a typical day when you were drinking in the past year? 0  3) How often did you have 6 or more drinks on one occasion in the past year? never    AUDIT-C Score: 0  Interpretation: Score 0-2 (female): Negative screen for alcohol misuse    Single Item Drug Screening:  How often have you used an illegal drug (including marijuana) or a prescription medication for non-medical reasons in the past year? never    Single Item Drug Screen Score: 0  Interpretation: Negative screen for possible drug use disorder    Brief Intervention  Alcohol & drug use screenings were reviewed  No concerns regarding substance use disorder identified  Assessment/Plan:         Problem List Items Addressed This Visit        Endocrine    Type 2 diabetes mellitus without complication, without long-term current use of insulin (HCC)     Recheck A1C  Sugars good at home  Continue glipizide 10 mg bid and victoza  Advised pt to follow a low carb diet and to exercise on a regular basis  Foot exam done today  Had eye exam in 2021 by Dr Real Pennington  Had COVID booster and flu shot  Lab Results   Component Value Date    HGBA1C 5 7 (H) 2021            Hypothyroidism     Recheck TSH and Free T4  Continue levothyroxine 100 mcg qd  Cardiovascular and Mediastinum    Essential hypertension     BP good  Continue lisinopril 20 mg qd and metoprolol 25 mg bid  Pt advised to continue low Na diet and to exercise on a regular basis  Coronary artery disease involving native coronary artery of native heart without angina pectoris     No recent chest pain or sob  Continue current meds  Saw Cardiology Dr Corey Mariscal for follow-up in Nov 2021  She had an EKG in March 2021 and it was stable  Musculoskeletal and Integument    Osteopenia of necks of both femurs     Last dexascan was in Dec 2019  Pt advised to take calcium 1200 mg qd and Vit D 1000 U qd  Pt also advised to perform weight-bearing exercise on a regular basis  Recheck dexascan  Other    Mixed dyslipidemia     Advised pt to follow a low cholesterol diet and to exercise on a regular basis  Recheck lipids and LFTs  Medicare annual wellness visit, subsequent - Primary     Wellness exam done  Had flu shot in Oct 2021 and COVID booster in Nov 2021  Had prevnar 13 and pneumovacc 23  Recommend Tdap and Shingrix  Had colonoscopy in 2015 and no polyps  Mammogram is UTD  Last Dexa was in Dec 2019  Lipids and FBS are UTD  No recent falls  Mood ok  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, estrogen receptor positive ([address] )     Pt had L mastectomy and sees Hematology Dr Bassem Polanco  Continue anastrazole 1 mg qd  Other Visit Diagnoses     Encounter for osteoporosis screening in asymptomatic postmenopausal patient                Subjective:      Patient ID: Roseann Smiley is a 77 y o  female  Pt here for f/u HTN, HL, DM, CAD, Hypothyroidism, Breast CA, Osteopenia  Pt also due for wellness exam  Doing well  No cp/sob  No headaches  No abd pain  BP has been good  Had flu shot and COVID booster  Saw Cardiology in Nov 2021  Sugars usually in 120s in AM  For mammogram in March 2022   Sees Oncology on 2/22/22 and takes arumidex 1 mg qd  No new c/o  Next eye exam is in April 2022 and sees Dr Niecy Medina  Gets tingling in feet at night  The following portions of the patient's history were reviewed and updated as appropriate:   Past Medical History:  She has a past medical history of Angina pectoris ([facility] [address] ), Arthritis, [facility] ), Cancer ([facility] [address] ), Coronary artery disease, Diabetes mellitus ([facility] ), Disease of thyroid gland, Hypercholesterolemia, Hypertension, Obesity, Osteoporosis, Otitis media, Ovarian ca ([facility] [address] ) (1997), 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), and Urinary tract infection  ,  _______________________________________________________________________  Medical Problems:  does not have any pertinent problems on file ,  _______________________________________________________________________  Past Surgical History:   has a past surgical history that includes Coronary stent placement; Carpal tunnel release; Cholecystectomy; Back surgery; Hysterectomy (1989); Oophorectomy (Bilateral, 1997); Mammo (historical); Eye surgery; Colonoscopy; US guided breast biopsy left complete (Left, 3/15/2021); Appendectomy; Gallbladder surgery; US breast needle loc left (Left, 5/6/2021); US guided breast biopsy left complete (Left, 5/6/2021); US guidance breast biopsy left each additional (Left, 5/6/2021); Spine surgery; Thyroidectomy; Brain surgery (1993); and Mastectomy w/ sentinel node biopsy (Left, 7/13/2021)  ,  _______________________________________________________________________  Family History:  family history includes Arthritis in her family; Asthma in her daughter, sister, and son; Breast cancer (age of onset: 39) in her other and other; Breast cancer (age of onset: 48) in her maternal aunt;  Cancer in her brother, brother, family, father, and sister; Dementia in her mother; Depression in her son; Diabetes in her family and mother; Endometrial cancer (age of onset: 76) in her sister; Esophageal cancer (age of onset: 61) in her father; Heart disease in her family and mother; Multiple myeloma (age of onset: 67) in her brother; No Known Problems in her maternal grandmother, paternal aunt, paternal aunt, paternal grandmother, sister, sister, and sister; Prostate cancer in her maternal grandfather and paternal grandfather; Stomach cancer (age of onset: 70) in her brother ,  _______________________________________________________________________  Social History:   reports that she has never smoked  She has never used smokeless tobacco  She reports that she does not drink alcohol and does not use drugs  ,  _______________________________________________________________________  Allergies:  is allergic to duloxetine and sulfa antibiotics     _______________________________________________________________________  Current Outpatient Medications   Medication Sig Dispense Refill    anastrozole (ARIMIDEX) 1 mg tablet Take 1 tablet (1 mg total) by mouth daily 90 tablet 0    aspirin (ECOTRIN LOW STRENGTH) 81 mg EC tablet Take 162 mg by mouth daily      clotrimazole-betamethasone (LOTRISONE) 1-0 05 % cream Apply topically 2 (two) times a day 45 g 3    glipiZIDE (GLUCOTROL) 10 mg tablet Take 1 tablet (10 mg total) by mouth 2 (two) times a day before meals 180 tablet 3    Insulin Pen Needle (Sure Comfort Pen Needles) 31G X 5 MM MISC by Does not apply route daily 100 each 3    Lancets (onetouch ultrasoft) lancets Use daily I67 0 One touch Delica Plus 801 each 3    levothyroxine 100 mcg tablet Take 1 tablet (100 mcg total) by mouth daily 90 tablet 1    Liraglutide (VICTOZA) 18 MG/3ML SOPN Inject 1 2 mg under the skin daily at bedtime       lisinopril (ZESTRIL) 20 mg tablet Take 1 tablet (20 mg total) by mouth daily at bedtime 90 tablet 1    metFORMIN (GLUCOPHAGE) 1000 MG tablet Take 1 tablet (1,000 mg total) by mouth 2 (two) times a day with meals 180 tablet 3    metoprolol tartrate (LOPRESSOR) 25 mg tablet TAKE ONE TABLET BY MOUTH EVERY 12 HOURS 180 tablet 1    OneTouch Ultra test strip Use 1 each daily Use as instructed 100 each 3    pregabalin (LYRICA) 50 mg capsule Take 1 capsule (50 mg total) by mouth 2 (two) times a day 60 capsule 3    acetaminophen-codeine (TYLENOL with CODEINE #3) 300-30 MG per tablet acetaminophen 300 mg-codeine 30 mg tablet (Patient not taking: Reported on 2/15/2022)      benzonatate (TESSALON) 200 MG capsule Take 1 capsule (200 mg total) by mouth 3 (three) times a day as needed for cough (Patient not taking: Reported on 2/15/2022 ) 30 capsule 0    Lyrica 50 MG capsule Take 50 mg by mouth 2 (two) times a day       No current facility-administered medications for this visit      _______________________________________________________________________  Review of Systems   Constitutional: Negative for fatigue  Eyes: Negative for visual disturbance  Respiratory: Negative for cough and shortness of breath  Cardiovascular: Negative for chest pain  Gastrointestinal: Negative for abdominal pain, constipation, diarrhea, nausea and vomiting  Endocrine: Negative for polydipsia, polyphagia and polyuria  Genitourinary: Negative for difficulty urinating  Musculoskeletal: Negative for arthralgias and gait problem  Skin: Negative for wound  Neurological: Negative for dizziness, numbness and headaches  Objective:  Vitals:    02/15/22 1256   BP: 130/78   BP Location: Left arm   Patient Position: Sitting   Cuff Size: Standard   Pulse: 70   Resp: 16   Temp: 97 5 °F (36 4 °C)   TempSrc: Temporal   SpO2: 98%   Weight: 87 1 kg (192 lb)   Height: 5' 1" (1 549 m)     Body mass index is 36 28 kg/m²  Physical Exam  Vitals and nursing note reviewed  Constitutional:       Appearance: Normal appearance  She is well-developed  She is obese  HENT:      Head: Normocephalic and atraumatic     Cardiovascular:      Rate and Rhythm: Normal rate and regular rhythm  Pulses: no weak pulses     Heart sounds: Normal heart sounds  No murmur heard  Pulmonary:      Effort: Pulmonary effort is normal  No respiratory distress  Breath sounds: Normal breath sounds  No wheezing  Musculoskeletal:      Cervical back: Normal range of motion and neck supple  Right lower leg: No edema  Left lower leg: No edema  Feet:      Right foot:      Skin integrity: No ulcer, skin breakdown, erythema, warmth, callus or dry skin  Left foot:      Skin integrity: No ulcer, skin breakdown, erythema, warmth, callus or dry skin  Lymphadenopathy:      Cervical: No cervical adenopathy  Neurological:      Mental Status: She is alert and oriented to person, place, and time  Psychiatric:         Mood and Affect: Mood normal          Behavior: Behavior normal          Thought Content: Thought content normal          Judgment: Judgment normal        Patient's shoes and socks removed  Right Foot/Ankle   Right Foot Inspection  Skin Exam: skin normal and skin intact  No dry skin, no warmth, no callus, no erythema, no maceration, no abnormal color, no pre-ulcer, no ulcer and no callus  Sensory   Proprioception: intact      Vascular  Capillary refills: < 3 seconds          Left Foot/Ankle  Left Foot Inspection  Skin Exam: skin normal and skin intact  No dry skin, no warmth, no erythema, no maceration, normal color, no pre-ulcer, no ulcer and no callus       Sensory   Proprioception: intact      Vascular  Capillary refills: < 3 seconds            Assign Risk Category  No deformity present  No loss of protective sensation  No weak pulses  Risk: 0        Christine Bonilla MD

## 2022-02-15 NOTE — PATIENT INSTRUCTIONS
Medicare Preventive Visit Patient Instructions  Thank you for completing your Welcome to Medicare Visit or Medicare Annual Wellness Visit today  Your next wellness visit will be due in one year (2/16/2023)  The screening/preventive services that you may require over the next 5-10 years are detailed below  Some tests may not apply to you based off risk factors and/or age  Screening tests ordered at today's visit but not completed yet may show as past due  Also, please note that scanned in results may not display below  Preventive Screenings:  Service Recommendations Previous Testing/Comments   Colorectal Cancer Screening  * Colonoscopy    * Fecal Occult Blood Test (FOBT)/Fecal Immunochemical Test (FIT)  * Fecal DNA/Cologuard Test  * Flexible Sigmoidoscopy Age: 54-65 years old   Colonoscopy: every 10 years (may be performed more frequently if at higher risk)  OR  FOBT/FIT: every 1 year  OR  Cologuard: every 3 years  OR  Sigmoidoscopy: every 5 years  Screening may be recommended earlier than age 48 if at higher risk for colorectal cancer  Also, an individualized decision between you and your healthcare provider will decide whether screening between the ages of 74-80 would be appropriate  Colonoscopy: 04/30/2015  FOBT/FIT: Not on file  Cologuard: Not on file  Sigmoidoscopy: Not on file          Breast Cancer Screening Age: 36 years old  Frequency: every 1-2 years  Not required if history of left and right mastectomy Mammogram: 02/24/2021    History Breast Cancer   Cervical Cancer Screening Between the ages of 21-29, pap smear recommended once every 3 years  Between the ages of 33-67, can perform pap smear with HPV co-testing every 5 years     Recommendations may differ for women with a history of total hysterectomy, cervical cancer, or abnormal pap smears in past  Pap Smear: 10/19/2021    Screening Not Indicated   Hepatitis C Screening Once for adults born between 1945 and 1965  More frequently in patients at high risk for Hepatitis C Hep C Antibody: Not on file        Diabetes Screening 1-2 times per year if you're at risk for diabetes or have pre-diabetes Fasting glucose: 123 mg/dL   A1C: 5 7 %    Screening Not Indicated  History Diabetes   Cholesterol Screening Once every 5 years if you don't have a lipid disorder  May order more often based on risk factors  Lipid panel: 02/05/2021    Screening Current     Other Preventive Screenings Covered by Medicare:  1  Abdominal Aortic Aneurysm (AAA) Screening: covered once if your at risk  You're considered to be at risk if you have a family history of AAA  2  Lung Cancer Screening: covers low dose CT scan once per year if you meet all of the following conditions: (1) Age 50-69; (2) No signs or symptoms of lung cancer; (3) Current smoker or have quit smoking within the last 15 years; (4) You have a tobacco smoking history of at least 30 pack years (packs per day multiplied by number of years you smoked); (5) You get a written order from a healthcare provider  3  Glaucoma Screening: covered annually if you're considered high risk: (1) You have diabetes OR (2) Family history of glaucoma OR (3)  aged 48 and older OR (3)  American aged 72 and older  3  Osteoporosis Screening: covered every 2 years if you meet one of the following conditions: (1) You're estrogen deficient and at risk for osteoporosis based off medical history and other findings; (2) Have a vertebral abnormality; (3) On glucocorticoid therapy for more than 3 months; (4) Have primary hyperparathyroidism; (5) On osteoporosis medications and need to assess response to drug therapy  · Last bone density test (DXA Scan): 12/10/2019   5  HIV Screening: covered annually if you're between the age of 15-65  Also covered annually if you are younger than 13 and older than 72 with risk factors for HIV infection   For pregnant patients, it is covered up to 3 times per pregnancy  Immunizations:  Immunization Recommendations   Influenza Vaccine Annual influenza vaccination during flu season is recommended for all persons aged >= 6 months who do not have contraindications   Pneumococcal Vaccine (Prevnar and Pneumovax)  * Prevnar = PCV13  * Pneumovax = PPSV23   Adults 25-60 years old: 1-3 doses may be recommended based on certain risk factors  Adults 72 years old: Prevnar (PCV13) vaccine recommended followed by Pneumovax (PPSV23) vaccine  If already received PPSV23 since turning 65, then PCV13 recommended at least one year after PPSV23 dose  Hepatitis B Vaccine 3 dose series if at intermediate or high risk (ex: diabetes, end stage renal disease, liver disease)   Tetanus (Td) Vaccine - COST NOT COVERED BY MEDICARE PART B Following completion of primary series, a booster dose should be given every 10 years to maintain immunity against tetanus  Td may also be given as tetanus wound prophylaxis  Tdap Vaccine - COST NOT COVERED BY MEDICARE PART B Recommended at least once for all adults  For pregnant patients, recommended with each pregnancy  Shingles Vaccine (Shingrix) - COST NOT COVERED BY MEDICARE PART B  2 shot series recommended in those aged 48 and above     Health Maintenance Due:      Topic Date Due    Hepatitis C Screening  Never done    Colorectal Cancer Screening  04/30/2017    Breast Cancer Screening: Mammogram  02/24/2022     Immunizations Due:  There are no preventive care reminders to display for this patient  Advance Directives   What are advance directives? Advance directives are legal documents that state your wishes and plans for medical care  These plans are made ahead of time in case you lose your ability to make decisions for yourself  Advance directives can apply to any medical decision, such as the treatments you want, and if you want to donate organs  What are the types of advance directives?   There are many types of advance directives, and each state has rules about how to use them  You may choose a combination of any of the following:  · Living will: This is a written record of the treatment you want  You can also choose which treatments you do not want, which to limit, and which to stop at a certain time  This includes surgery, medicine, IV fluid, and tube feedings  · Durable power of  for healthcare [facility]): This is a written record that states who you want to make healthcare choices for you when you are unable to make them for yourself  This person, called a proxy, is usually a family member or a friend  You may choose more than 1 proxy  · Do not resuscitate (DNR) order:  A DNR order is used in case your heart stops beating or you stop breathing  It is a request not to have certain forms of treatment, such as CPR  A DNR order may be included in other types of advance directives  · Medical directive: This covers the care that you want if you are in a coma, near death, or unable to make decisions for yourself  You can list the treatments you want for each condition  Treatment may include pain medicine, surgery, blood transfusions, dialysis, IV or tube feedings, and a ventilator (breathing machine)  · Values history: This document has questions about your views, beliefs, and how you feel and think about life  This information can help others choose the care that you would choose  Why are advance directives important? An advance directive helps you control your care  Although spoken wishes may be used, it is better to have your wishes written down  Spoken wishes can be misunderstood, or not followed  Treatments may be given even if you do not want them  An advance directive may make it easier for your family to make difficult choices about your care  Urinary Incontinence   Urinary incontinence (UI)  is when you lose control of your bladder  UI develops because your bladder cannot store or empty urine properly   The 3 most common types of UI are stress incontinence, urge incontinence, or both  Medicines:   · May be given to help strengthen your bladder control  Report any side effects of medication to your healthcare provider  Do pelvic muscle exercises often:  Your pelvic muscles help you stop urinating  Squeeze these muscles tight for 5 seconds, then relax for 5 seconds  Gradually work up to squeezing for 10 seconds  Do 3 sets of 15 repetitions a day, or as directed  This will help strengthen your pelvic muscles and improve bladder control  Train your bladder:  Go to the bathroom at set times, such as every 2 hours, even if you do not feel the urge to go  You can also try to hold your urine when you feel the urge to go  For example, hold your urine for 5 minutes when you feel the urge to go  As that becomes easier, hold your urine for 10 minutes  Self-care:   · Keep a UI record  Write down how often you leak urine and how much you leak  Make a note of what you were doing when you leaked urine  · Drink liquids as directed  You may need to limit the amount of liquid you drink to help control your urine leakage  Do not drink any liquid right before you go to bed  Limit or do not have drinks that contain caffeine or alcohol  · Prevent constipation  Eat a variety of high-fiber foods  Good examples are high-fiber cereals, beans, vegetables, and whole-grain breads  Walking is the best way to trigger your intestines to have a bowel movement  · Exercise regularly and maintain a healthy weight  Weight loss and exercise will decrease pressure on your bladder and help you control your leakage  · Use a catheter as directed  to help empty your bladder  A catheter is a tiny, plastic tube that is put into your bladder to drain your urine  · Go to behavior therapy as directed  Behavior therapy may be used to help you learn to control your urge to urinate      Weight Management   Why it is important to manage your weight:  Being overweight increases your risk of health conditions such as heart disease, high blood pressure, type 2 diabetes, and certain types of cancer  It can also increase your risk for osteoarthritis, sleep apnea, and other respiratory problems  Aim for a slow, steady weight loss  Even a small amount of weight loss can lower your risk of health problems  How to lose weight safely:  A safe and healthy way to lose weight is to eat fewer calories and get regular exercise  You can lose up about 1 pound a week by decreasing the number of calories you eat by 500 calories each day  Healthy meal plan for weight management:  A healthy meal plan includes a variety of foods, contains fewer calories, and helps you stay healthy  A healthy meal plan includes the following:  · Eat whole-grain foods more often  A healthy meal plan should contain fiber  Fiber is the part of grains, fruits, and vegetables that is not broken down by your body  Whole-grain foods are healthy and provide extra fiber in your diet  Some examples of whole-grain foods are whole-wheat breads and pastas, oatmeal, brown rice, and bulgur  · Eat a variety of vegetables every day  Include dark, leafy greens such as spinach, kale, shannon greens, and mustard greens  Eat yellow and orange vegetables such as carrots, sweet potatoes, and winter squash  · Eat a variety of fruits every day  Choose fresh or canned fruit (canned in its own juice or light syrup) instead of juice  Fruit juice has very little or no fiber  · Eat low-fat dairy foods  Drink fat-free (skim) milk or 1% milk  Eat fat-free yogurt and low-fat cottage cheese  Try low-fat cheeses such as mozzarella and other reduced-fat cheeses  · Choose meat and other protein foods that are low in fat  Choose beans or other legumes such as split peas or lentils  Choose fish, skinless poultry (chicken or turkey), or lean cuts of red meat (beef or pork)  Before you cook meat or poultry, cut off any visible fat     · Use less fat and oil  Try baking foods instead of frying them  Add less fat, such as margarine, sour cream, regular salad dressing and mayonnaise to foods  Eat fewer high-fat foods  Some examples of high-fat foods include french fries, doughnuts, ice cream, and cakes  · Eat fewer sweets  Limit foods and drinks that are high in sugar  This includes candy, cookies, regular soda, and sweetened drinks  Exercise:  Exercise at least 30 minutes per day on most days of the week  Some examples of exercise include walking, biking, dancing, and swimming  You can also fit in more physical activity by taking the stairs instead of the elevator or parking farther away from stores  Ask your healthcare provider about the best exercise plan for you  © Copyright [employer] 2018 Information is for End User's use only and may not be sold, redistributed or otherwise used for commercial purposes   All illustrations and images included in CareNotes® are the copyrighted property of [employer]  or [address] [place] [address]

## 2022-02-15 NOTE — ASSESSMENT & PLAN NOTE
Advised pt to follow a low cholesterol diet and to exercise on a regular basis  Recheck lipids and LFTs

## 2022-02-15 NOTE — ASSESSMENT & PLAN NOTE
No recent chest pain or sob  Continue current meds  Saw Cardiology Dr Felicita Coleman for follow-up in Nov 2021  She had an EKG in March 2021 and it was stable

## 2022-02-22 ENCOUNTER — OFFICE VISIT (OUTPATIENT)
Dept: HEMATOLOGY ONCOLOGY | Facility: CLINIC | Age: 77
End: 2022-02-22
Payer: MEDICARE

## 2022-02-22 VITALS
WEIGHT: 193 LBS | DIASTOLIC BLOOD PRESSURE: 78 MMHG | OXYGEN SATURATION: 98 % | HEART RATE: 82 BPM | SYSTOLIC BLOOD PRESSURE: 136 MMHG | RESPIRATION RATE: 18 BRPM | BODY MASS INDEX: 36.44 KG/M2 | HEIGHT: 61 IN | TEMPERATURE: 97.9 F

## 2022-02-22 DIAGNOSIS — C50.812 MALIGNANT NEOPLASM OF OVERLAPPING SITES OF LEFT BREAST IN FEMALE, ESTROGEN RECEPTOR POSITIVE (HCC): Primary | ICD-10-CM

## 2022-02-22 DIAGNOSIS — Z17.0 MALIGNANT NEOPLASM OF OVERLAPPING SITES OF LEFT BREAST IN FEMALE, ESTROGEN RECEPTOR POSITIVE (HCC): Primary | ICD-10-CM

## 2022-02-22 PROCEDURE — 99214 OFFICE O/P EST MOD 30 MIN: CPT | Performed by: INTERNAL MEDICINE

## 2022-02-22 RX ORDER — ANASTROZOLE 1 MG/1
1 TABLET ORAL DAILY
Qty: 90 TABLET | Refills: 3 | Status: SHIPPED | OUTPATIENT
Start: 2022-02-22 | End: 2022-04-30 | Stop reason: SDUPTHER

## 2022-02-22 NOTE — PROGRESS NOTES
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  She has history of stage III ovarian cancer, diagnosed 0 which was treated with oophorectomy followed by adjuvant chemotherapy with carboplatin and paclitaxel, resulting in cure  She was diagnosed with stage I A left breast cancer, grade 1, ER 90% positive, MS 90% positive, HER2 negative disease in 2021  She underwent mastectomy and sentinel lymph node biopsy, resulting in PIERRE   She is currently on adjuvant hormonal therapy with anastrozole with excellent tolerance  Clinically, she has no evidence recurrent disease  I recommended her to continue anastrozole 1 mg once a day  She is in agreement with my recommendation  I will see her again in a year for routine follow-up           Subjective:      HPI:    A 52-year-old postmenopausal woman who was found to have abnormality in her left breast based on a screening mammography  Therefore, she underwent left breast biopsy in May 6, 2021 which showed invasive ductal carcinoma, grade 1, ER 90% positive, MS 90% positive, HER2 negative disease  She also had lesion, characterized as a 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, % positive, % positive  Because of the multifocal disease, she underwent mastectomy and sentinel lymph node biopsy by Dr Kimberlee Vance in 2021  She had 8 mm of invasive ductal carcinoma, grade 1  There was no evidence of lymphovascular invasion  1 sentinel lymph node was negative for metastatic disease  She did not have reconstruction  She presents today with her  to discuss adjuvant treatment options  She has history of stage III ovarian cancer, diagnosed in    She underwent surgical resection followed by adjuvant chemotherapy with carboplatin and paclitaxel, resulting in cure   She has heterozygote CHEK2 mutation  She feels well with no complaint of pain  Her weight is stable  She has no respiratory symptoms  She has diabetes as well as coronary artery disease for which she underwent coronary artery stent placement  She has hypertension  She is a lifetime never smoker  Her performance status is normal            Interval History:  A 81-year-old postmenopausal woman who has CHEK2 mutation  She has history of stage III ovarian cancer, diagnosed 0 which was treated with oophorectomy followed by adjuvant chemotherapy with carboplatin and paclitaxel, resulting in cure  She was diagnosed with stage I A left breast cancer, grade 1, ER 90% positive, ME 90% positive, HER2 negative disease in July 2021  She underwent mastectomy and sentinel lymph node biopsy, resulting in PIERRE   Since August 2021, she has been on adjuvant hormonal therapy with anastrozole  She presents today for routine follow-up  She is tolerating anastrozole very well  She has very minimal musculoskeletal symptoms  She has no hot flashes  She denied any bone pain  Her weight is stable  She has no respiratory symptoms  Her performance status is normal              Objective:      Primary Diagnosis:       1  Left breast cancer, stage I A ( pT1b, pN0, M0 ) grade 1, ER 90% positive, ME 90% positive, HER2 negative disease  Diagnosed in July 2021      2  History of stage III ovarian cancer, diagnosed in 0      3    CHEK 2 mutation      Cancer Staging:  Cancer Staging  No matching staging information was found for the patient         Previous Hematologic/ Oncologic Treatment:        Adjuvant chemotherapy with carboplatin and paclitaxel for ovarian cancer in 1997      Current Hematologic/ Oncologic Treatment:        adjuvant hormonal therapy with anastrozole since August 2021      Disease Status:        PIERRE status post mastectomy and sentinel lymph node biopsy      Test Results:     Pathology:       8 mm of invasive ductal carcinoma, grade 1  No evidence of lymphovascular invasion  1 sentinel lymph node was negative for metastatic disease  ER 90% positive, AL 90% positive, HER2 negative disease  Stage I A ( pT1b, pN0, M0)      Radiology:       Chest x-ray was negative for pulmonary disease      Laboratory:       See below      Physical Exam:        General Appearance:    Alert, oriented          Eyes:    PERRL   Ears:    Normal external ear canals, both ears   Nose:   Nares normal, septum midline   Throat:   Mucosa moist  Pharynx without injection  Neck:   Supple         Lungs:     Clear to auscultation bilaterally   Chest Wall:    No tenderness or deformity    Heart:    Regular rate and rhythm         Abdomen:     Soft, non-tender, bowel sounds +, no organomegaly               Extremities:   Extremities no cyanosis or edema         Skin:   no rash or icterus  Lymph nodes:   Cervical, supraclavicular, and axillary nodes normal   Neurologic:   CNII-XII intact, normal strength, sensation and reflexes     Throughout             Breast exam:    Status post left mastectomy without reconstruction  No palpable abnormality in her left chest wall  Right breast exam is negative  ROS: Review of Systems   All other systems reviewed and are negative  Imaging: No results found        Labs:   Lab Results   Component Value Date    WBC 10 16 07/14/2021    HGB 12 4 07/14/2021    HCT 35 8 07/14/2021    MCV 89 07/14/2021     07/14/2021     Lab Results   Component Value Date     12/30/2015    K 4 4 06/16/2021     06/16/2021    CO2 28 06/16/2021    ANIONGAP 12 4 12/30/2015    BUN 10 06/16/2021    CREATININE 0 57 (L) 06/16/2021    GLUCOSE 129 (H) 12/30/2015    GLUF 123 (H) 02/05/2021    CALCIUM 9 1 06/16/2021    AST 20 06/16/2021    ALT 32 06/16/2021    ALKPHOS 43 (L) 06/16/2021    PROT 7 1 12/21/2015    BILITOT 0 42 12/21/2015    EGFR 91 06/16/2021         Lab Results   Component Value Date  5 3 09/27/2018         Current Medications: Reviewed  Allergies: Reviewed  PMH/FH/SH:  Reviewed      Vital Sign:    Body surface area is 1 86 meters squared      Wt Readings from Last 3 Encounters:   02/22/22 87 5 kg (193 lb)   02/15/22 87 1 kg (192 lb)   01/26/22 88 5 kg (195 lb)        Temp Readings from Last 3 Encounters:   02/22/22 97 9 °F (36 6 °C) (Tympanic Core)   02/15/22 97 5 °F (36 4 °C) (Temporal)   01/26/22 (!) 97 °F (36 1 °C)        BP Readings from Last 3 Encounters:   02/22/22 136/78   02/15/22 130/78   01/26/22 134/76         Pulse Readings from Last 3 Encounters:   02/22/22 82   02/15/22 70   01/26/22 74     @LASTSAO2(3)@

## 2022-02-23 ENCOUNTER — APPOINTMENT (OUTPATIENT)
Dept: LAB | Facility: CLINIC | Age: 77
End: 2022-02-23
Payer: MEDICARE

## 2022-02-23 DIAGNOSIS — E78.2 MIXED DYSLIPIDEMIA: ICD-10-CM

## 2022-02-23 DIAGNOSIS — E03.9 ACQUIRED HYPOTHYROIDISM: ICD-10-CM

## 2022-02-23 DIAGNOSIS — I10 ESSENTIAL HYPERTENSION: ICD-10-CM

## 2022-02-23 DIAGNOSIS — E11.9 TYPE 2 DIABETES MELLITUS WITHOUT COMPLICATION, WITHOUT LONG-TERM CURRENT USE OF INSULIN (HCC): ICD-10-CM

## 2022-02-23 LAB
ALBUMIN SERPL BCP-MCNC: 3.6 G/DL (ref 3.5–5)
ALP SERPL-CCNC: 41 U/L (ref 46–116)
ALT SERPL W P-5'-P-CCNC: 31 U/L (ref 12–78)
ANION GAP SERPL CALCULATED.3IONS-SCNC: 7 MMOL/L (ref 4–13)
AST SERPL W P-5'-P-CCNC: 21 U/L (ref 5–45)
BASOPHILS # BLD AUTO: 0.05 THOUSANDS/ΜL (ref 0–0.1)
BASOPHILS NFR BLD AUTO: 1 % (ref 0–1)
BILIRUB SERPL-MCNC: 0.59 MG/DL (ref 0.2–1)
BUN SERPL-MCNC: 13 MG/DL (ref 5–25)
CALCIUM SERPL-MCNC: 9.8 MG/DL (ref 8.3–10.1)
CHLORIDE SERPL-SCNC: 99 MMOL/L (ref 100–108)
CHOLEST SERPL-MCNC: 147 MG/DL
CO2 SERPL-SCNC: 27 MMOL/L (ref 21–32)
CREAT SERPL-MCNC: 0.59 MG/DL (ref 0.6–1.3)
EOSINOPHIL # BLD AUTO: 0.05 THOUSAND/ΜL (ref 0–0.61)
EOSINOPHIL NFR BLD AUTO: 1 % (ref 0–6)
ERYTHROCYTE [DISTWIDTH] IN BLOOD BY AUTOMATED COUNT: 12.3 % (ref 11.6–15.1)
EST. AVERAGE GLUCOSE BLD GHB EST-MCNC: 117 MG/DL
GFR SERPL CREATININE-BSD FRML MDRD: 89 ML/MIN/1.73SQ M
GLUCOSE P FAST SERPL-MCNC: 112 MG/DL (ref 65–99)
HBA1C MFR BLD: 5.7 %
HCT VFR BLD AUTO: 36.4 % (ref 34.8–46.1)
HDLC SERPL-MCNC: 34 MG/DL
HGB BLD-MCNC: 12.8 G/DL (ref 11.5–15.4)
IMM GRANULOCYTES # BLD AUTO: 0.02 THOUSAND/UL (ref 0–0.2)
IMM GRANULOCYTES NFR BLD AUTO: 0 % (ref 0–2)
LDLC SERPL CALC-MCNC: 69 MG/DL (ref 0–100)
LYMPHOCYTES # BLD AUTO: 1.9 THOUSANDS/ΜL (ref 0.6–4.47)
LYMPHOCYTES NFR BLD AUTO: 29 % (ref 14–44)
MCH RBC QN AUTO: 31.1 PG (ref 26.8–34.3)
MCHC RBC AUTO-ENTMCNC: 35.2 G/DL (ref 31.4–37.4)
MCV RBC AUTO: 88 FL (ref 82–98)
MONOCYTES # BLD AUTO: 0.63 THOUSAND/ΜL (ref 0.17–1.22)
MONOCYTES NFR BLD AUTO: 9 % (ref 4–12)
NEUTROPHILS # BLD AUTO: 4.02 THOUSANDS/ΜL (ref 1.85–7.62)
NEUTS SEG NFR BLD AUTO: 60 % (ref 43–75)
NONHDLC SERPL-MCNC: 113 MG/DL
NRBC BLD AUTO-RTO: 0 /100 WBCS
PLATELET # BLD AUTO: 204 THOUSANDS/UL (ref 149–390)
PMV BLD AUTO: 9.9 FL (ref 8.9–12.7)
POTASSIUM SERPL-SCNC: 4.4 MMOL/L (ref 3.5–5.3)
PROT SERPL-MCNC: 6.7 G/DL (ref 6.4–8.2)
RBC # BLD AUTO: 4.12 MILLION/UL (ref 3.81–5.12)
SODIUM SERPL-SCNC: 133 MMOL/L (ref 136–145)
T4 FREE SERPL-MCNC: 1.45 NG/DL (ref 0.76–1.46)
TRIGL SERPL-MCNC: 219 MG/DL
TSH SERPL DL<=0.05 MIU/L-ACNC: 0.25 UIU/ML (ref 0.36–3.74)
WBC # BLD AUTO: 6.67 THOUSAND/UL (ref 4.31–10.16)

## 2022-02-23 PROCEDURE — 80061 LIPID PANEL: CPT

## 2022-02-23 PROCEDURE — 83036 HEMOGLOBIN GLYCOSYLATED A1C: CPT

## 2022-02-23 PROCEDURE — 84443 ASSAY THYROID STIM HORMONE: CPT

## 2022-02-23 PROCEDURE — 80053 COMPREHEN METABOLIC PANEL: CPT

## 2022-02-23 PROCEDURE — 84439 ASSAY OF FREE THYROXINE: CPT

## 2022-02-23 PROCEDURE — 85025 COMPLETE CBC W/AUTO DIFF WBC: CPT

## 2022-02-23 PROCEDURE — 36415 COLL VENOUS BLD VENIPUNCTURE: CPT

## 2022-02-24 DIAGNOSIS — E03.9 ACQUIRED HYPOTHYROIDISM: Primary | ICD-10-CM

## 2022-02-24 DIAGNOSIS — E03.9 ACQUIRED HYPOTHYROIDISM: ICD-10-CM

## 2022-02-24 RX ORDER — LEVOTHYROXINE SODIUM 88 UG/1
88 TABLET ORAL DAILY
Qty: 90 TABLET | Refills: 2 | Status: SHIPPED | OUTPATIENT
Start: 2022-02-24

## 2022-03-02 ENCOUNTER — HOSPITAL ENCOUNTER (OUTPATIENT)
Dept: RADIOLOGY | Facility: HOSPITAL | Age: 77
Discharge: HOME | End: 2022-03-02
Attending: SURGERY
Payer: MEDICARE

## 2022-03-02 DIAGNOSIS — Z17.0 MALIGNANT NEOPLASM OF OVERLAPPING SITES OF LEFT BREAST IN FEMALE, ESTROGEN RECEPTOR POSITIVE (HCC): ICD-10-CM

## 2022-03-02 DIAGNOSIS — C50.812 MALIGNANT NEOPLASM OF OVERLAPPING SITES OF LEFT BREAST IN FEMALE, ESTROGEN RECEPTOR POSITIVE (HCC): ICD-10-CM

## 2022-03-02 PROCEDURE — G0279 TOMOSYNTHESIS, MAMMO: HCPCS

## 2022-03-02 PROCEDURE — 77065 DX MAMMO INCL CAD UNI: CPT

## 2022-03-17 ENCOUNTER — TELEPHONE (OUTPATIENT)
Dept: FAMILY MEDICINE CLINIC | Facility: CLINIC | Age: 77
End: 2022-03-17

## 2022-03-17 DIAGNOSIS — E11.9 TYPE 2 DIABETES MELLITUS WITHOUT COMPLICATION, WITHOUT LONG-TERM CURRENT USE OF INSULIN (HCC): ICD-10-CM

## 2022-03-17 DIAGNOSIS — M48.061 SPINAL STENOSIS OF LUMBAR REGION, UNSPECIFIED WHETHER NEUROGENIC CLAUDICATION PRESENT: ICD-10-CM

## 2022-03-17 RX ORDER — PREGABALIN 50 MG/1
50 CAPSULE ORAL 2 TIMES DAILY
Qty: 180 CAPSULE | Refills: 3 | Status: SHIPPED | OUTPATIENT
Start: 2022-03-17 | End: 2022-06-07

## 2022-03-17 NOTE — TELEPHONE ENCOUNTER
PT needs a 3 month supply for Pregabalin  Called into the pharmacy  She states its cheaper then a 30 day supply

## 2022-03-31 ENCOUNTER — OFFICE VISIT (OUTPATIENT)
Dept: CARDIOLOGY CLINIC | Facility: CLINIC | Age: 77
End: 2022-03-31
Payer: MEDICARE

## 2022-03-31 ENCOUNTER — HOSPITAL ENCOUNTER (OUTPATIENT)
Dept: RADIOLOGY | Facility: HOSPITAL | Age: 77
Discharge: HOME | End: 2022-03-31
Payer: MEDICARE

## 2022-03-31 VITALS
BODY MASS INDEX: 37.19 KG/M2 | HEART RATE: 81 BPM | OXYGEN SATURATION: 98 % | SYSTOLIC BLOOD PRESSURE: 120 MMHG | WEIGHT: 197 LBS | TEMPERATURE: 98.6 F | DIASTOLIC BLOOD PRESSURE: 60 MMHG | HEIGHT: 61 IN

## 2022-03-31 DIAGNOSIS — Z78.0 ENCOUNTER FOR OSTEOPOROSIS SCREENING IN ASYMPTOMATIC POSTMENOPAUSAL PATIENT: ICD-10-CM

## 2022-03-31 DIAGNOSIS — E78.2 MIXED DYSLIPIDEMIA: ICD-10-CM

## 2022-03-31 DIAGNOSIS — Z13.820 ENCOUNTER FOR OSTEOPOROSIS SCREENING IN ASYMPTOMATIC POSTMENOPAUSAL PATIENT: ICD-10-CM

## 2022-03-31 DIAGNOSIS — I25.10 CORONARY ARTERY DISEASE INVOLVING NATIVE CORONARY ARTERY OF NATIVE HEART WITHOUT ANGINA PECTORIS: Primary | ICD-10-CM

## 2022-03-31 DIAGNOSIS — I10 ESSENTIAL HYPERTENSION: ICD-10-CM

## 2022-03-31 DIAGNOSIS — E11.9 TYPE 2 DIABETES MELLITUS WITHOUT COMPLICATION, WITHOUT LONG-TERM CURRENT USE OF INSULIN (HCC): ICD-10-CM

## 2022-03-31 PROCEDURE — 93000 ELECTROCARDIOGRAM COMPLETE: CPT | Performed by: INTERNAL MEDICINE

## 2022-03-31 PROCEDURE — 99214 OFFICE O/P EST MOD 30 MIN: CPT | Performed by: INTERNAL MEDICINE

## 2022-03-31 PROCEDURE — 77080 DXA BONE DENSITY AXIAL: CPT

## 2022-03-31 RX ORDER — LANCETS 33 GAUGE
EACH MISCELLANEOUS
Qty: 100 EACH | Refills: 3 | Status: SHIPPED | OUTPATIENT
Start: 2022-03-31

## 2022-03-31 RX ORDER — ATORVASTATIN CALCIUM 10 MG/1
10 TABLET, FILM COATED ORAL DAILY
Qty: 30 TABLET | Refills: 1 | Status: SHIPPED | OUTPATIENT
Start: 2022-03-31 | End: 2022-04-26

## 2022-03-31 NOTE — PROGRESS NOTES
Cardiology   Live Oliver DO, Raffy Ayala MD, Anton Irving MD, Kaden Mayen MD, [facility] - [place]  -------------------------------------------------------------------  [facility]  [address], [facility], [address] [place] [address], [facility] [address]  [phone]    Cardiology Follow Up  Lev Bojorquez  1945  [id number]          Assessment/Plan:    1  Coronary artery disease involving native coronary artery of native heart without angina pectoris    2  Essential hypertension    3  Mixed dyslipidemia      - Discussed risk factor reduction including refraining from smoking, eating a diet high in fruits and vegetables, maintaining a healthy weight, limiting screen time along with controlling BP and cholesterol  Encouraged to exercise 150 minutes a week at a moderate level such as a fast walk or 75 minutes of high intensity  - patient with high residual risk for cardiovascular disease  She does have a history of LAD PCI  Although her LDL cholesterol is 70, she has elevated triglycerides and low HDL  Currently, she is using over-the-counter fish oil 2 g b i d  She will check with her current medication plan to see if Sable Ouch is covered  If it is, she will call and Vascepa will be substituted for her supplement  - Will restart statin at a lower dose of atorvastatin 10 mg daily  If she cannot tolerate it, will use pravastatin  - Lipid panel in 3 months  - BP is at goal   - Follow up in 6 months  Interval History:     Lev Bojorquez is 69 y o  female here for followup of CAD  She was previously following in [facility] but is switching to closer to home  She has a history of CAD with SAMMIE to proximal LAD in 2015  Prior to stent placement, she was feeling episode of left arm pain with exertion  She has not had any further episodes since stent was placed  She denies any exertional dyspnea, LE edema, orthopnea      Patient was previously on atorvastatin 40 mg daily but stopped it due to myalgias  She has been on 2 g of fish oil twice a day  Most recent blood work showed LDL of 69, HDL of 34 and triglycerides of 219  She uses metoprolol 25 mg and lisinopril 20 mg daily  She had an echocardiogram in March 2021 which showed normal ejection fraction with mild valve disease      The following portions of the patient's history were reviewed and updated as appropriate: allergies, current medications, past family history, past medical history, past social history, past surgical history, and problem list        Current Outpatient Medications:     anastrozole (ARIMIDEX) 1 mg tablet, Take 1 tablet (1 mg total) by mouth daily, Disp: 90 tablet, Rfl: 3    aspirin (ECOTRIN LOW STRENGTH) 81 mg EC tablet, Take 162 mg by mouth daily, Disp: , Rfl:     clotrimazole-betamethasone (LOTRISONE) 1-0 05 % cream, Apply topically 2 (two) times a day, Disp: 45 g, Rfl: 3    glipiZIDE (GLUCOTROL) 10 mg tablet, Take 1 tablet (10 mg total) by mouth 2 (two) times a day before meals, Disp: 180 tablet, Rfl: 3    Insulin Pen Needle (Sure Comfort Pen Needles) 31G X 5 MM MISC, by Does not apply route daily, Disp: 100 each, Rfl: 3    Lancets (onetouch ultrasoft) lancets, Use daily H02 7 One touch Delica Plus, Disp: 716 each, Rfl: 3    levothyroxine 88 mcg tablet, Take 1 tablet (88 mcg total) by mouth daily, Disp: 90 tablet, Rfl: 2    Liraglutide (VICTOZA) 18 MG/3ML SOPN, Inject 1 2 mg under the skin daily at bedtime , Disp: , Rfl:     lisinopril (ZESTRIL) 20 mg tablet, Take 1 tablet (20 mg total) by mouth daily at bedtime, Disp: 90 tablet, Rfl: 1    metFORMIN (GLUCOPHAGE) 1000 MG tablet, Take 1 tablet (1,000 mg total) by mouth 2 (two) times a day with meals, Disp: 180 tablet, Rfl: 3    metoprolol tartrate (LOPRESSOR) 25 mg tablet, TAKE ONE TABLET BY MOUTH EVERY 12 HOURS, Disp: 180 tablet, Rfl: 1    OneTouch Ultra test strip, Use 1 each daily Use as instructed, Disp: 100 each, Rfl: 3    pregabalin (LYRICA) 50 mg capsule, Take 1 capsule (50 mg total) by mouth 2 (two) times a day, Disp: 180 capsule, Rfl: 3    acetaminophen-codeine (TYLENOL with CODEINE #3) 300-30 MG per tablet, acetaminophen 300 mg-codeine 30 mg tablet (Patient not taking: Reported on 3/31/2022), Disp: , Rfl:     benzonatate (TESSALON) 200 MG capsule, Take 1 capsule (200 mg total) by mouth 3 (three) times a day as needed for cough (Patient not taking: Reported on 3/31/2022 ), Disp: 30 capsule, Rfl: 0    Lyrica 50 MG capsule, Take 50 mg by mouth 2 (two) times a day, Disp: , Rfl:         Review of Systems:  Review of Systems   Respiratory: Negative for shortness of breath  Cardiovascular: Negative for chest pain, palpitations and leg swelling  Musculoskeletal: Positive for arthralgias, back pain and myalgias  All other systems reviewed and are negative  Physical Exam:  Vitals:  Vitals:    03/31/22 0902   BP: 120/60   BP Location: Right arm   Patient Position: Sitting   Cuff Size: Large   Pulse: 81   Temp: 98 6 °F (37 °C)   SpO2: 98%   Weight: 89 4 kg (197 lb)   Height: 5' 1" (1 549 m)     Physical Exam   Constitutional: She appears healthy  No distress  Eyes: Pupils are equal, round, and reactive to light  Conjunctivae are normal    Neck: No JVD present  Cardiovascular: Normal rate, regular rhythm and normal heart sounds  Exam reveals no gallop and no friction rub  No murmur heard  Pulmonary/Chest: Effort normal and breath sounds normal  She has no wheezes  She has no rales  Musculoskeletal:         General: No tenderness, deformity or edema  Cervical back: Normal range of motion and neck supple  Neurological: She is alert and oriented to person, place, and time  Skin: Skin is warm and dry  Cardiographics:  EKG: Personally reviewed normal sinus rhythm with rate 80 beats per minute  Last known EF:  60-65%    This note was completed in part utilizing [employer] Software  Grammatical errors, random word insertions, spelling mistakes, and incomplete sentences can be an occasional consequence of this system secondary to software limitations, ambient noise, and hardware issues  If you have any questions or concerns about the content, text, or information contained within the body of this dictation, please contact the provider for clarification

## 2022-04-11 ENCOUNTER — TELEPHONE (OUTPATIENT)
Dept: FAMILY MEDICINE CLINIC | Facility: CLINIC | Age: 77
End: 2022-04-11

## 2022-04-11 DIAGNOSIS — E11.9 TYPE 2 DIABETES MELLITUS WITHOUT COMPLICATION, WITHOUT LONG-TERM CURRENT USE OF INSULIN (HCC): ICD-10-CM

## 2022-04-11 RX ORDER — BLOOD SUGAR DIAGNOSTIC
1 STRIP MISCELLANEOUS DAILY
Qty: 100 EACH | Refills: 3 | Status: SHIPPED | OUTPATIENT
Start: 2022-04-11

## 2022-04-11 NOTE — TELEPHONE ENCOUNTER
Needs a new script w/refills for One Touch Ultra Bule test strips  (90 day)    [address]    Patient ph # [phone]

## 2022-04-14 ENCOUNTER — TELEPHONE (OUTPATIENT)
Dept: CARDIOLOGY CLINIC | Facility: CLINIC | Age: 77
End: 2022-04-14

## 2022-04-14 NOTE — TELEPHONE ENCOUNTER
Since starting atorvastatin she complains of fatigue, and aches all over  Read side effects and states that this is the only change since starting     Patient is going to stop - will update if symptoms improve

## 2022-04-25 NOTE — TELEPHONE ENCOUNTER
Pt called back and left voicemail on our machine to let us know since stopping atorvastatin she is feeling fine and back to normal

## 2022-04-26 DIAGNOSIS — E78.2 MIXED DYSLIPIDEMIA: ICD-10-CM

## 2022-04-26 RX ORDER — PRAVASTATIN SODIUM 10 MG
TABLET ORAL
Qty: 90 TABLET | Refills: 3 | Status: SHIPPED | OUTPATIENT
Start: 2022-04-26 | End: 2022-04-28 | Stop reason: SDUPTHER

## 2022-04-26 NOTE — TELEPHONE ENCOUNTER
Called pt back, she is agreeable to trying pravastatin  pls send to [facility] pharmacy on file   Thank you

## 2022-04-28 RX ORDER — PRAVASTATIN SODIUM 10 MG
10 TABLET ORAL DAILY
Qty: 30 TABLET | Refills: 5 | Status: SHIPPED | OUTPATIENT
Start: 2022-04-28 | End: 2022-06-07

## 2022-04-30 DIAGNOSIS — Z17.0 MALIGNANT NEOPLASM OF OVERLAPPING SITES OF LEFT BREAST IN FEMALE, ESTROGEN RECEPTOR POSITIVE (HCC): ICD-10-CM

## 2022-04-30 DIAGNOSIS — C50.812 MALIGNANT NEOPLASM OF OVERLAPPING SITES OF LEFT BREAST IN FEMALE, ESTROGEN RECEPTOR POSITIVE (HCC): ICD-10-CM

## 2022-05-02 RX ORDER — ANASTROZOLE 1 MG/1
1 TABLET ORAL DAILY
Qty: 90 TABLET | Refills: 0 | Status: SHIPPED | OUTPATIENT
Start: 2022-05-02 | End: 2022-07-26 | Stop reason: SDUPTHER

## 2022-05-16 ENCOUNTER — TELEPHONE (OUTPATIENT)
Dept: CARDIOLOGY CLINIC | Facility: CLINIC | Age: 77
End: 2022-05-16

## 2022-05-16 NOTE — TELEPHONE ENCOUNTER
Pt switched from Atorvastatin to Pravastatin 4/25 and experiencing the same s/s of fatigue and myalgia and stopped medication  She stated she is up for suggestions, but doesn't want to continue paying for medications she cannot tolerate

## 2022-05-24 DIAGNOSIS — I25.10 CORONARY ARTERY DISEASE INVOLVING NATIVE CORONARY ARTERY OF NATIVE HEART WITHOUT ANGINA PECTORIS: Primary | ICD-10-CM

## 2022-05-24 DIAGNOSIS — E78.2 MIXED DYSLIPIDEMIA: ICD-10-CM

## 2022-05-24 RX ORDER — ATORVASTATIN CALCIUM 10 MG/1
TABLET, FILM COATED ORAL
Qty: 30 TABLET | Refills: 5 | Status: SHIPPED | OUTPATIENT
Start: 2022-05-24 | End: 2022-06-07

## 2022-06-02 ENCOUNTER — APPOINTMENT (OUTPATIENT)
Dept: LAB | Facility: CLINIC | Age: 77
End: 2022-06-02
Payer: MEDICARE

## 2022-06-02 DIAGNOSIS — E78.2 MIXED DYSLIPIDEMIA: ICD-10-CM

## 2022-06-02 DIAGNOSIS — I25.10 CORONARY ARTERY DISEASE INVOLVING NATIVE CORONARY ARTERY OF NATIVE HEART WITHOUT ANGINA PECTORIS: ICD-10-CM

## 2022-06-02 DIAGNOSIS — E03.9 ACQUIRED HYPOTHYROIDISM: ICD-10-CM

## 2022-06-02 LAB
ALBUMIN SERPL BCP-MCNC: 3.4 G/DL (ref 3.5–5)
ALP SERPL-CCNC: 40 U/L (ref 46–116)
ALT SERPL W P-5'-P-CCNC: 30 U/L (ref 12–78)
ANION GAP SERPL CALCULATED.3IONS-SCNC: 5 MMOL/L (ref 4–13)
AST SERPL W P-5'-P-CCNC: 23 U/L (ref 5–45)
BILIRUB SERPL-MCNC: 0.69 MG/DL (ref 0.2–1)
BUN SERPL-MCNC: 9 MG/DL (ref 5–25)
CALCIUM ALBUM COR SERPL-MCNC: 9.8 MG/DL (ref 8.3–10.1)
CALCIUM SERPL-MCNC: 9.3 MG/DL (ref 8.3–10.1)
CHLORIDE SERPL-SCNC: 105 MMOL/L (ref 100–108)
CHOLEST SERPL-MCNC: 157 MG/DL
CO2 SERPL-SCNC: 28 MMOL/L (ref 21–32)
CREAT SERPL-MCNC: 0.63 MG/DL (ref 0.6–1.3)
GFR SERPL CREATININE-BSD FRML MDRD: 87 ML/MIN/1.73SQ M
GLUCOSE P FAST SERPL-MCNC: 136 MG/DL (ref 65–99)
HDLC SERPL-MCNC: 35 MG/DL
LDLC SERPL CALC-MCNC: 77 MG/DL (ref 0–100)
NONHDLC SERPL-MCNC: 122 MG/DL
POTASSIUM SERPL-SCNC: 4.7 MMOL/L (ref 3.5–5.3)
PROT SERPL-MCNC: 6.6 G/DL (ref 6.4–8.2)
SODIUM SERPL-SCNC: 138 MMOL/L (ref 136–145)
T4 FREE SERPL-MCNC: 1.08 NG/DL (ref 0.76–1.46)
TRIGL SERPL-MCNC: 224 MG/DL
TSH SERPL DL<=0.05 MIU/L-ACNC: 2.1 UIU/ML (ref 0.45–4.5)

## 2022-06-02 PROCEDURE — 36415 COLL VENOUS BLD VENIPUNCTURE: CPT

## 2022-06-02 PROCEDURE — 80061 LIPID PANEL: CPT

## 2022-06-02 PROCEDURE — 84443 ASSAY THYROID STIM HORMONE: CPT

## 2022-06-02 PROCEDURE — 84439 ASSAY OF FREE THYROXINE: CPT

## 2022-06-02 PROCEDURE — 80053 COMPREHEN METABOLIC PANEL: CPT

## 2022-06-07 ENCOUNTER — OFFICE VISIT (OUTPATIENT)
Dept: FAMILY MEDICINE CLINIC | Facility: CLINIC | Age: 77
End: 2022-06-07
Payer: MEDICARE

## 2022-06-07 VITALS
DIASTOLIC BLOOD PRESSURE: 46 MMHG | SYSTOLIC BLOOD PRESSURE: 120 MMHG | HEIGHT: 61 IN | TEMPERATURE: 97.5 F | BODY MASS INDEX: 37 KG/M2 | RESPIRATION RATE: 16 BRPM | OXYGEN SATURATION: 97 % | HEART RATE: 85 BPM | WEIGHT: 196 LBS

## 2022-06-07 DIAGNOSIS — I10 ESSENTIAL HYPERTENSION: Primary | ICD-10-CM

## 2022-06-07 DIAGNOSIS — E11.9 TYPE 2 DIABETES MELLITUS WITHOUT COMPLICATION, WITHOUT LONG-TERM CURRENT USE OF INSULIN (HCC): ICD-10-CM

## 2022-06-07 DIAGNOSIS — E03.9 ACQUIRED HYPOTHYROIDISM: ICD-10-CM

## 2022-06-07 DIAGNOSIS — I25.10 CORONARY ARTERY DISEASE INVOLVING NATIVE CORONARY ARTERY OF NATIVE HEART WITHOUT ANGINA PECTORIS: ICD-10-CM

## 2022-06-07 DIAGNOSIS — M85.851 OSTEOPENIA OF NECKS OF BOTH FEMURS: ICD-10-CM

## 2022-06-07 DIAGNOSIS — M85.852 OSTEOPENIA OF NECKS OF BOTH FEMURS: ICD-10-CM

## 2022-06-07 DIAGNOSIS — E78.2 MIXED DYSLIPIDEMIA: ICD-10-CM

## 2022-06-07 DIAGNOSIS — G62.9 NEUROPATHY: ICD-10-CM

## 2022-06-07 PROCEDURE — 99214 OFFICE O/P EST MOD 30 MIN: CPT | Performed by: FAMILY MEDICINE

## 2022-06-07 RX ORDER — PREGABALIN 75 MG/1
75 CAPSULE ORAL 2 TIMES DAILY
Qty: 60 CAPSULE | Refills: 3 | Status: SHIPPED | OUTPATIENT
Start: 2022-06-07

## 2022-06-07 NOTE — ASSESSMENT & PLAN NOTE
A1C 5 7 in Feb 2022  Will decrease glipizide to 10 mg in AM and 5 mg in PM  Continue victoza 1 2 mg qd and metformin 1000 mg bid    Advised pt to follow a low carb diet and to exercise on a regular basis  Foot exam done in Feb 2022  Had eye exam in April 2022 by Dr Osbaldo Salazar  Had COVID booster and flu shot      Lab Results   Component Value Date    HGBA1C 5 7 (H) 02/23/2022

## 2022-06-07 NOTE — PROGRESS NOTES
Depression Screening and Follow-up Plan: Patient was screened for depression during today's encounter  They screened negative with a PHQ-2 score of 0  Assessment/Plan:         Problem List Items Addressed This Visit        Endocrine    Type 2 diabetes mellitus without complication, without long-term current use of insulin (HCC)     A1C 5 7 in Feb 2022  Will decrease glipizide to 10 mg in AM and 5 mg in PM  Continue victoza 1 2 mg qd and metformin 1000 mg bid    Advised pt to follow a low carb diet and to exercise on a regular basis  Foot exam done in Feb 2022  Had eye exam in April 2022 by Dr Villela Craig  Had COVID booster and flu shot  Lab Results   Component Value Date    HGBA1C 5 7 (H) 02/23/2022              Relevant Orders    Hemoglobin A1C    Hypothyroidism     TSH 2 10 and Free T4 1 08 in June 2022  Continue levothyroxine 88 mcg qd  Relevant Orders    TSH, 3rd generation    T4, free       Cardiovascular and Mediastinum    Essential hypertension - Primary     BP has been good  Continue lisinopril 20 mg qd and metoprolol 25 mg bid  Pt advised to continue low Na diet and to exercise on a regular basis  Coronary artery disease involving native coronary artery of native heart without angina pectoris     No recent chest pain or sob  Continue current meds  Saw Cardiology in March 2022  Nervous and Auditory    Neuropathy     Pt has had increased pain in legs/feet  Will increase pregabalin to 75 mg bid  Relevant Medications    pregabalin (LYRICA) 75 mg capsule       Musculoskeletal and Integument    Osteopenia of necks of both femurs     Last dexascan was in March 2022 and had slight decrease in density  Pt advised to take calcium 1200 mg qd and Vit D 1000 U qd  Pt also advised to perform weight-bearing exercise on a regular basis  Other    Mixed dyslipidemia     LDL 77 and LFTs normal in June 2022  Pt takes fish oil 3600 mg qd    Advised pt to follow a low cholesterol diet and to exercise on a regular basis  Relevant Orders    Comprehensive metabolic panel    Lipid panel            Subjective:      Patient ID: Rosa Leyva is a 69 y o  female  Pt here for f/u HTN, HL, DM, Hypothyroidism, CAD, Osteopenia  Doing ok  No cp/sob  No headaches  No abd pain  Walking more  Sugars have been in low 100s  BP has been good  Saw Cardiology in March 2022  Pt had COVID booster  Has had increased neuropathy pain in legs  The following portions of the patient's history were reviewed and updated as appropriate:   Past Medical History:  She has a past medical history of Angina pectoris ([facility] [address] ), Arthritis, Breast cancer ([facility] [address] ), Cancer ([facility] [address] ), Coronary artery disease, Diabetes mellitus ([facility] [address] ), Disease of thyroid gland, Hypercholesterolemia, Hypertension, Obesity, Osteoporosis, Otitis media, Ovarian ca ([facility] [address] ) (1997), 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[facility] [address] ), Shingles, 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), and Urinary tract infection  ,  _______________________________________________________________________  Medical Problems:  does not have any pertinent problems on file ,  _______________________________________________________________________  Past Surgical History:   has a past surgical history that includes Coronary stent placement; Carpal tunnel release; Cholecystectomy; Back surgery; Hysterectomy (1989); Oophorectomy (Bilateral, 1997); Mammo (historical); Eye surgery; Colonoscopy; US guided breast biopsy left complete (Left, 3/15/2021); Appendectomy; Gallbladder surgery; US breast needle loc left (Left, 5/6/2021); US guided breast biopsy left complete (Left, 5/6/2021); US guidance breast biopsy left each additional (Left, 5/6/2021); Spine surgery; Thyroidectomy; Brain surgery (1993); Mastectomy w/ sentinel node biopsy (Left, 7/13/2021); Mastectomy (Left, 07/13/2021); and Breast biopsy  ,  _______________________________________________________________________  [facility] History:  family history includes Arthritis in her family; Asthma in her daughter, sister, and son; Breast cancer (age of onset: 39) in her other and other; Breast cancer (age of onset: 48) in her maternal aunt; Cancer in her brother, brother, family, father, and sister; Dementia in her mother; Depression in her son; Diabetes in her family and mother; Endometrial cancer (age of onset: 76) in her sister; Esophageal cancer (age of onset: 61) in her father; Heart disease in her family and mother; Multiple myeloma (age of onset: 67) in her brother; No Known Problems in her maternal grandmother, paternal aunt, paternal aunt, paternal grandmother, sister, sister, and sister; Prostate cancer in her maternal grandfather and paternal grandfather; Stomach cancer (age of onset: 70) in her brother ,  _______________________________________________________________________  Social History:   reports that she has never smoked  She has never used smokeless tobacco  She reports that she does not drink alcohol and does not use drugs  ,  _______________________________________________________________________  Allergies:  is allergic to duloxetine and sulfa antibiotics     _______________________________________________________________________  Current Outpatient Medications   Medication Sig Dispense Refill    anastrozole (ARIMIDEX) 1 mg tablet Take 1 tablet (1 mg total) by mouth daily 90 tablet 0    aspirin (ECOTRIN LOW STRENGTH) 81 mg EC tablet Take 162 mg by mouth daily      clotrimazole-betamethasone (LOTRISONE) 1-0 05 % cream Apply topically 2 (two) times a day 45 g 3    glipiZIDE (GLUCOTROL) 10 mg tablet Take 1 tablet (10 mg total) by mouth 2 (two) times a day before meals 180 tablet 3    Insulin Pen Needle (Sure Comfort Pen Needles) 31G X 5 MM MISC by Does not apply route daily 100 each 3    Lancets (OneTouch Delica Plus [id number]) MISC USE DAILY 100 each 3    levothyroxine 88 mcg tablet Take 1 tablet (88 mcg total) by mouth daily 90 tablet 2    liraglutide (VICTOZA) injection Inject 1 2 mg under the skin daily at bedtime       lisinopril (ZESTRIL) 20 mg tablet Take 1 tablet (20 mg total) by mouth daily at bedtime 90 tablet 1    metFORMIN (GLUCOPHAGE) 1000 MG tablet Take 1 tablet (1,000 mg total) by mouth 2 (two) times a day with meals 180 tablet 3    metoprolol tartrate (LOPRESSOR) 25 mg tablet TAKE ONE TABLET BY MOUTH EVERY 12 HOURS 180 tablet 1    OneTouch Ultra test strip Use 1 each daily Use as instructed 100 each 3    pregabalin (LYRICA) 75 mg capsule Take 1 capsule (75 mg total) by mouth 2 (two) times a day 60 capsule 3     No current facility-administered medications for this visit      _______________________________________________________________________  Review of Systems   Constitutional: Negative for fatigue  Eyes: Negative for visual disturbance  Respiratory: Negative for cough and shortness of breath  Cardiovascular: Negative for chest pain  Gastrointestinal: Negative for abdominal pain, constipation, diarrhea, nausea and vomiting  Endocrine: Negative for polydipsia, polyphagia and polyuria  Genitourinary: Negative for difficulty urinating  Musculoskeletal: Negative for arthralgias and gait problem  Leg pain   Skin: Negative for wound  Neurological: Negative for dizziness, numbness and headaches  Objective:  Vitals:    06/07/22 1402   BP: (!) 120/46   BP Location: Left arm   Patient Position: Sitting   Cuff Size: Large   Pulse: 85   Resp: 16   Temp: 97 5 °F (36 4 °C)   TempSrc: Temporal   SpO2: 97%   Weight: 88 9 kg (196 lb)   Height: 5' 1" (1 549 m)     Body mass index is 37 03 kg/m²  Physical Exam  Vitals and nursing note reviewed  Constitutional:       Appearance: Normal appearance  She is well-developed  She is obese  HENT:      Head: Normocephalic and atraumatic  Cardiovascular:      Rate and Rhythm: Normal rate and regular rhythm        Heart sounds: Normal heart sounds  No murmur heard  Pulmonary:      Effort: Pulmonary effort is normal  No respiratory distress  Breath sounds: Normal breath sounds  No wheezing  Musculoskeletal:      Cervical back: Normal range of motion and neck supple  Right lower leg: No edema  Left lower leg: No edema  Lymphadenopathy:      Cervical: No cervical adenopathy  Neurological:      Mental Status: She is alert and oriented to person, place, and time  Psychiatric:         Mood and Affect: Mood normal          Behavior: Behavior normal          Thought Content:  Thought content normal          Judgment: Judgment normal

## 2022-06-07 NOTE — ASSESSMENT & PLAN NOTE
BP has been good  Continue lisinopril 20 mg qd and metoprolol 25 mg bid  Pt advised to continue low Na diet and to exercise on a regular basis

## 2022-06-07 NOTE — ASSESSMENT & PLAN NOTE
LDL 77 and LFTs normal in June 2022  Pt takes fish oil 3600 mg qd  Advised pt to follow a low cholesterol diet and to exercise on a regular basis

## 2022-06-07 NOTE — ASSESSMENT & PLAN NOTE
Last dexascan was in March 2022 and had slight decrease in density  Pt advised to take calcium 1200 mg qd and Vit D 1000 U qd  Pt also advised to perform weight-bearing exercise on a regular basis

## 2022-06-26 DIAGNOSIS — I10 ESSENTIAL (PRIMARY) HYPERTENSION: ICD-10-CM

## 2022-06-27 RX ORDER — LISINOPRIL 20 MG/1
TABLET ORAL
Qty: 90 TABLET | Refills: 1 | Status: SHIPPED | OUTPATIENT
Start: 2022-06-27

## 2022-07-13 ENCOUNTER — TELEPHONE (OUTPATIENT)
Dept: GASTROENTEROLOGY | Facility: CLINIC | Age: 77
End: 2022-07-13

## 2022-07-13 ENCOUNTER — OFFICE VISIT (OUTPATIENT)
Dept: GASTROENTEROLOGY | Facility: CLINIC | Age: 77
End: 2022-07-13
Payer: MEDICARE

## 2022-07-13 VITALS
DIASTOLIC BLOOD PRESSURE: 61 MMHG | BODY MASS INDEX: 37.03 KG/M2 | HEART RATE: 80 BPM | HEIGHT: 61 IN | SYSTOLIC BLOOD PRESSURE: 131 MMHG

## 2022-07-13 DIAGNOSIS — K64.1 GRADE II HEMORRHOIDS: ICD-10-CM

## 2022-07-13 DIAGNOSIS — R10.32 LEFT LOWER QUADRANT ABDOMINAL PAIN: Primary | ICD-10-CM

## 2022-07-13 DIAGNOSIS — R19.4 CHANGE IN BOWEL HABIT: ICD-10-CM

## 2022-07-13 PROCEDURE — 99214 OFFICE O/P EST MOD 30 MIN: CPT | Performed by: INTERNAL MEDICINE

## 2022-07-13 PROCEDURE — 46221 LIGATION OF HEMORRHOID(S): CPT | Performed by: INTERNAL MEDICINE

## 2022-07-13 NOTE — TELEPHONE ENCOUNTER
Procedure Clearance faxed to Dr Jeanmarie Fitzgerald at [phone]  Pt to have a colonoscopy at [facility] on 7/21/22 w/ Dr Earlene Brizuela  Awaiting Response

## 2022-07-13 NOTE — PROGRESS NOTES
Anal Excision Procedures  Performed by: Myrna Lauren MD  Authorized by: Myrna Lauren MD     Universal Protocol:     Risks and benefits: Risks, benefits and alternatives were discussed      Patient states understanding of procedure being performed: Yes      Patient's understanding of procedure matches consent: Yes      Procedure consent matches procedure scheduled: Yes      Relevant documents present and verified: Yes      Test results available and properly labeled: Yes      Site marked: Yes      Radiology Images displayed and confirmed  If images not available, report reviewed: Yes      Time out: Immediately prior to the procedure a time out was called    A time out verifies correct patient, procedure, equipment, support staff and site/side marked as required:   Procedure Type: hemorrhoidectomy    Hemorrhoidectomy Details:   Hemorrhoid type: internal    Internal position: Three o'clock  Number of columns/groups removed:  1  Single rubber band ligation    Patient tolerance:  Patient tolerated the procedure well with no immediate complications  Additiional Procedure Intervention Details:  [de-identified] year female now come for follow-up, she is complaining left lower quadrant abdominal pain which change in bowel habit with alternating diarrhea and constipation, she consume good amount of fiber in the diet, she also has a long history of symptomatic hemorrhoid and she noticed something is popping out from her rectum, she had a hemorrhoidal banding in the past multiple time, she was doing good for a while and now symptoms recur  She denies any nausea, no vomiting, no heartburn or reflux symptoms, her last colonoscopy was more than 7 years ago, on physical examination, there is mild left upper and lower quadrant tenderness, no guarding or peritoneal sign    Digital rectal examination was done, diagnostic anoscopy examination was done which confirm grade 2 internal hemorrhoid at right posterior location, with the use of hemorrhoidal banding device, 1 band was applied, patient tolerated procedure very well    Plan-will order CT scan of abdomen and pelvis to evaluate for left lower quadrant abdominal pain, will also schedule for colonoscopy  Advised to continue with high-fiber diet, Metamucil 1 tsp q h s

## 2022-07-21 ENCOUNTER — ANESTHESIA (OUTPATIENT)
Dept: GASTROENTEROLOGY | Facility: AMBULATORY SURGERY CENTER | Age: 77
End: 2022-07-21

## 2022-07-21 ENCOUNTER — HOSPITAL ENCOUNTER (OUTPATIENT)
Dept: GASTROENTEROLOGY | Facility: AMBULATORY SURGERY CENTER | Age: 77
Discharge: HOME | End: 2022-07-21
Payer: MEDICARE

## 2022-07-21 ENCOUNTER — ANESTHESIA EVENT (OUTPATIENT)
Dept: GASTROENTEROLOGY | Facility: AMBULATORY SURGERY CENTER | Age: 77
End: 2022-07-21

## 2022-07-21 VITALS
DIASTOLIC BLOOD PRESSURE: 66 MMHG | OXYGEN SATURATION: 100 % | HEIGHT: 61 IN | SYSTOLIC BLOOD PRESSURE: 164 MMHG | HEART RATE: 75 BPM | WEIGHT: 190 LBS | BODY MASS INDEX: 35.87 KG/M2 | TEMPERATURE: 97.9 F | RESPIRATION RATE: 18 BRPM

## 2022-07-21 DIAGNOSIS — R19.4 CHANGE IN BOWEL HABIT: ICD-10-CM

## 2022-07-21 DIAGNOSIS — R10.32 LEFT LOWER QUADRANT ABDOMINAL PAIN: ICD-10-CM

## 2022-07-21 PROBLEM — E66.9 CLASS 2 OBESITY IN ADULT: Status: ACTIVE | Noted: 2021-06-01

## 2022-07-21 PROBLEM — Z98.61 HISTORY OF PTCA: Status: ACTIVE | Noted: 2022-07-21

## 2022-07-21 PROBLEM — K63.5 COLON POLYP: Status: ACTIVE | Noted: 2022-07-21

## 2022-07-21 PROBLEM — Z95.5 H/O HEART ARTERY STENT: Status: ACTIVE | Noted: 2022-07-21

## 2022-07-21 PROBLEM — I21.4 NON-ST ELEVATION MYOCARDIAL INFARCTION (NSTEMI) (HCC): Status: ACTIVE | Noted: 2022-07-21

## 2022-07-21 PROBLEM — E66.812 CLASS 2 OBESITY IN ADULT: Status: ACTIVE | Noted: 2021-06-01

## 2022-07-21 PROCEDURE — 45380 COLONOSCOPY AND BIOPSY: CPT | Performed by: INTERNAL MEDICINE

## 2022-07-21 PROCEDURE — 45385 COLONOSCOPY W/LESION REMOVAL: CPT | Performed by: INTERNAL MEDICINE

## 2022-07-21 PROCEDURE — 88305 TISSUE EXAM BY PATHOLOGIST: CPT | Performed by: PATHOLOGY

## 2022-07-21 RX ORDER — LIDOCAINE HYDROCHLORIDE 10 MG/ML
0.5 INJECTION, SOLUTION EPIDURAL; INFILTRATION; INTRACAUDAL; PERINEURAL ONCE AS NEEDED
Status: DISCONTINUED | OUTPATIENT
Start: 2022-07-21 | End: 2022-07-25 | Stop reason: HOSPADM

## 2022-07-21 RX ORDER — SODIUM CHLORIDE, SODIUM LACTATE, POTASSIUM CHLORIDE, CALCIUM CHLORIDE 600; 310; 30; 20 MG/100ML; MG/100ML; MG/100ML; MG/100ML
125 INJECTION, SOLUTION INTRAVENOUS CONTINUOUS
Status: DISCONTINUED | OUTPATIENT
Start: 2022-07-21 | End: 2022-07-21

## 2022-07-21 RX ORDER — SODIUM CHLORIDE, SODIUM LACTATE, POTASSIUM CHLORIDE, CALCIUM CHLORIDE 600; 310; 30; 20 MG/100ML; MG/100ML; MG/100ML; MG/100ML
20 INJECTION, SOLUTION INTRAVENOUS CONTINUOUS
Status: DISCONTINUED | OUTPATIENT
Start: 2022-07-21 | End: 2022-07-25 | Stop reason: HOSPADM

## 2022-07-21 RX ORDER — PROPOFOL 10 MG/ML
INJECTION, EMULSION INTRAVENOUS AS NEEDED
Status: DISCONTINUED | OUTPATIENT
Start: 2022-07-21 | End: 2022-07-21

## 2022-07-21 RX ADMIN — PROPOFOL 50 MG: 10 INJECTION, EMULSION INTRAVENOUS at 08:44

## 2022-07-21 RX ADMIN — PROPOFOL 100 MG: 10 INJECTION, EMULSION INTRAVENOUS at 08:37

## 2022-07-21 RX ADMIN — PROPOFOL 50 MG: 10 INJECTION, EMULSION INTRAVENOUS at 08:48

## 2022-07-21 RX ADMIN — SODIUM CHLORIDE, SODIUM LACTATE, POTASSIUM CHLORIDE, CALCIUM CHLORIDE: 600; 310; 30; 20 INJECTION, SOLUTION INTRAVENOUS at 08:23

## 2022-07-21 RX ADMIN — PROPOFOL 100 MG: 10 INJECTION, EMULSION INTRAVENOUS at 08:31

## 2022-07-21 RX ADMIN — PROPOFOL 100 MG: 10 INJECTION, EMULSION INTRAVENOUS at 08:25

## 2022-07-21 NOTE — ANESTHESIA POSTPROCEDURE EVALUATION
Post-Op Assessment Note    CV Status:  Stable    Pain management: adequate     Mental Status:  Alert and awake   Hydration Status:  Euvolemic   PONV Controlled:  Controlled   Airway Patency:  Patent      Post Op Vitals Reviewed: Yes      Staff: CRNA         No complications documented      BP   109/51   Temp   98 0   Pulse  72   Resp   18   SpO2   100

## 2022-07-21 NOTE — ANESTHESIA PREPROCEDURE EVALUATION
Procedure:  COLONOSCOPY    Relevant Problems   ANESTHESIA (within normal limits)      CARDIO  Echo: good LV fn and valves   (+) Coronary artery disease involving native coronary artery of native heart without angina pectoris   (+) Essential hypertension   (+) History of PTCA   (+) Non-ST elevation myocardial infarction (NSTEMI) (HCC)      ENDO   (+) Hypothyroidism   (+) Type 2 diabetes mellitus without complication, without long-term current use of insulin (HCC)      GYN   (+) 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, estrogen receptor positive (HCC)      Endocrine   (+) 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HCC)      Other   (+) Mixed dyslipidemia   (+) S/P lumbar fusion   (+) Spinal stenosis of lumbar region        Physical Exam    Airway    Mallampati score: II  TM Distance: >3 FB  Neck ROM: full     Dental   upper dentures and lower dentures,     Cardiovascular      Pulmonary      Other Findings        Anesthesia Plan  ASA Score- 2     Anesthesia Type- IV sedation with anesthesia with ASA Monitors  Additional Monitors:   Airway Plan:           Plan Factors-Exercise tolerance (METS): >4 METS  Chart reviewed  EKG reviewed  Existing labs reviewed  Patient summary reviewed  Patient is not a current smoker  Induction-     Postoperative Plan-     Informed Consent- Anesthetic plan and risks discussed with patient  I personally reviewed this patient with the CRNA  Discussed and agreed on the Anesthesia Plan with the CRNA  Alysa Freeman

## 2022-07-21 NOTE — H&P
History and Physical - [facility]  Parth Sanford 69 y o  female MRN: [id number]                  HPI: Parth Sanford is a 69y o  year old female who presents for colonoscopy for left lower quadrant abdominal pain and change in bowel      REVIEW OF SYSTEMS: Per the HPI, and otherwise unremarkable  Historical Information   Past Medical History:   Diagnosis Date    Abdominal pain     LLQ on occasion    Angina pectoris ([address] )     Arthritis     [facility]     Cancer ([address] )     breast left    Colon polyp     Constipation     at times    Coronary artery disease     Diabetes mellitus ([address] )     Diarrhea     at times    Disease of thyroid gland     Hemorrhoids     Hypercholesterolemia     Hypertension     Neuropathy     both legs and feet    Obesity     Osteoporosis     Otitis media     Ovarian ca ([address] ) 1997    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[facility]     Shingles     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    Thyroid cancer ([facility]     Urinary tract infection      Past Surgical History:   Procedure Laterality Date    ANGIOPLASTY      stent x 1    APPENDECTOMY      BACK SURGERY      BAND HEMORRHOIDECTOMY      BRAIN SURGERY  1993    meningioma    BREAST BIOPSY      CARPAL TUNNEL RELEASE      CERVICAL FUSION      CHOLECYSTECTOMY      COLONOSCOPY      pt see Dr Marissa Rodriguez  Up to date with her colonoscopy   CORONARY STENT PLACEMENT      Dec 2015    EYE SURGERY      cataract surgery    GALLBLADDER SURGERY      HYSTERECTOMY  1989    MAMMO (HISTORICAL)      up to date  see gyn    MASTECTOMY Left 07/13/2021    MASTECTOMY W/ SENTINEL NODE BIOPSY Left 07/13/2021    Procedure: BREAST ROSLYN  DIRECTED MASTECTOMY, SENTINEL LYMPH NODE BIOPSY, LYMPHATIC MAPPING WITH BLUE DYE AND RADIOACTIVE DYE (INJECT AT 1500 BY DR ANDREWS IN THE OR);   Surgeon: Ebony Calderon MD;  [facility];  Service: Surgical Oncology    OOPHORECTOMY Bilateral [address] THYROIDECTOMY      US BREAST NEEDLE LOC LEFT Left 05/06/2021    US GUIDANCE BREAST BIOPSY LEFT EACH ADDITIONAL Left 05/06/2021    US GUIDED BREAST BIOPSY LEFT COMPLETE Left 03/15/2021    US GUIDED BREAST BIOPSY LEFT COMPLETE Left 05/06/2021     Social History   Social History     Substance and Sexual Activity   Alcohol Use No     Social History     Substance and Sexual Activity   Drug Use Never     Social History     Tobacco Use   Smoking Status Never Smoker   Smokeless Tobacco Never Used     Family History   Problem Relation Age of Onset    Diabetes Mother     Heart disease Mother     Dementia Mother     Cancer Father     Esophageal cancer Father 61    Endometrial cancer Sister 76    Asthma Sister     Cancer Sister     No Known Problems Sister     No Known Problems Sister     No Known Problems Sister     Stomach cancer Brother 70    Cancer Brother     Multiple myeloma Brother 67    Cancer Brother     Asthma Daughter     Asthma Son     Depression Son     No Known Problems Maternal Grandmother     Prostate cancer Maternal Grandfather     No Known Problems Paternal Grandmother     Prostate cancer Paternal Grandfather     Breast cancer Maternal Aunt 48    No Known Problems Paternal Aunt     No Known Problems Paternal Aunt     Breast cancer Other 39    Breast cancer Other 39    Diabetes Family     Cancer Family     Arthritis Family     Heart disease Family        Meds/Allergies     (Not in a hospital admission)      Allergies   Allergen Reactions    Duloxetine Other (See Comments)     Extreme somnolence/lethargy    Sulfa Antibiotics Rash       Objective     Pulse 84   Temp 97 9 °F (36 6 °C)   Resp 18   Ht 5' 1" (1 549 m)   Wt 86 2 kg (190 lb)   SpO2 97%   BMI 35 90 kg/m²       PHYSICAL EXAM    Gen: NAD  CV: RRR  CHEST: Clear  ABD: soft, NT/ND  EXT: no edema      ASSESSMENT/PLAN:  This is a 69y o  year old female here for colonoscopy, and she is stable and optimized for her procedure

## 2022-07-26 ENCOUNTER — OFFICE VISIT (OUTPATIENT)
Dept: SURGICAL ONCOLOGY | Facility: CLINIC | Age: 77
End: 2022-07-26
Payer: MEDICARE

## 2022-07-26 VITALS
TEMPERATURE: 97.2 F | WEIGHT: 198 LBS | SYSTOLIC BLOOD PRESSURE: 132 MMHG | BODY MASS INDEX: 37.38 KG/M2 | HEIGHT: 61 IN | OXYGEN SATURATION: 98 % | DIASTOLIC BLOOD PRESSURE: 60 MMHG | HEART RATE: 80 BPM | RESPIRATION RATE: 16 BRPM

## 2022-07-26 DIAGNOSIS — Z15.02 MONOALLELIC MUTATION OF CHEK2 GENE IN FEMALE PATIENT: ICD-10-CM

## 2022-07-26 DIAGNOSIS — Z17.0 MALIGNANT NEOPLASM OF OVERLAPPING SITES OF LEFT BREAST IN FEMALE, ESTROGEN RECEPTOR POSITIVE (HCC): Primary | ICD-10-CM

## 2022-07-26 DIAGNOSIS — C50.812 MALIGNANT NEOPLASM OF OVERLAPPING SITES OF LEFT BREAST IN FEMALE, ESTROGEN RECEPTOR POSITIVE (HCC): ICD-10-CM

## 2022-07-26 DIAGNOSIS — Z15.89 MONOALLELIC MUTATION OF CHEK2 GENE IN FEMALE PATIENT: ICD-10-CM

## 2022-07-26 DIAGNOSIS — R59.9 ENLARGED LYMPH NODE: ICD-10-CM

## 2022-07-26 DIAGNOSIS — Z79.811 USE OF ANASTROZOLE (ARIMIDEX): ICD-10-CM

## 2022-07-26 DIAGNOSIS — Z17.0 MALIGNANT NEOPLASM OF OVERLAPPING SITES OF LEFT BREAST IN FEMALE, ESTROGEN RECEPTOR POSITIVE (HCC): ICD-10-CM

## 2022-07-26 DIAGNOSIS — C50.812 MALIGNANT NEOPLASM OF OVERLAPPING SITES OF LEFT BREAST IN FEMALE, ESTROGEN RECEPTOR POSITIVE (HCC): Primary | ICD-10-CM

## 2022-07-26 DIAGNOSIS — Z15.01 MONOALLELIC MUTATION OF CHEK2 GENE IN FEMALE PATIENT: ICD-10-CM

## 2022-07-26 DIAGNOSIS — Z15.09 MONOALLELIC MUTATION OF CHEK2 GENE IN FEMALE PATIENT: ICD-10-CM

## 2022-07-26 PROCEDURE — 99214 OFFICE O/P EST MOD 30 MIN: CPT

## 2022-07-26 NOTE — PROGRESS NOTES
Surgical Oncology Follow Up       [address]  [facility]  [address]  [facility] [address]    Leonor Cr  1945  [id number]  [id number] [facility]  [facility]  [address] [address]    Chief Complaint   Patient presents with    Breast Cancer       Assessment/Plan:  1  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, estrogen receptor positive ([facility] )  - 6 month follow up    2  Use of anastrozole (Arimidex)  - continue use per medical oncology    3  Monoallelic mutation of CHEK2 gene in female patient    4  Enlarged lymph node  - US Breast Axilla Left; Future    Discussion/Summary:  Patient is a 64-year-old female presenting today for six-month follow-up for left breast cancer diagnosed in March 2021  Pathology revealed multifocal DCIS ER/%  She had genetic testing which revealed a CHEK2 mutation  She underwent a left breast ROSLYN  directed mastectomy with sentinel node biopsy with Dr Escobar Every  She is currently on anastrozole  She had a diagnostic mammogram of the right breast on 03/02/2022 which was BI-RADS 2 category 1 density  On her clinical breast exam I have noted some left axillary lymph node enlargement  I have ordered a diagnostic ultrasound at this time to further assess this area  There were no other concerns on her exam  I told the patient I will call her if the ultrasound results are abnormal otherwise I will see her back in 6 months for clinical exam  She was instructed to call with any questions or concerns prior to this time  All questions were answered today        History of Present Illness:     Oncology History   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[facility] )   8/27/2013 Initial Diagnosis    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[facility] )     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, estrogen receptor positive ([facility] [facility] )   3/15/2021 Biopsy    Left breast ultrasound-guided biopsy  12 o'clock, 5 cm from nipple  Ductal carcinoma in situ  Grade 2        Concordant  Malignancy appears unifocal; masses cover 2 6 cm  US left axilla negative  Right breast clear  4/19/2021 Genetic Testing    One pathogenic (low penetrance) variant identified in CHEK2  Total of 23 genes evaluated  [employer]     5/6/2021 Observation    Imaging was reviewed prior to ROSLYN clip insertion  Additional findings in the left breast on ultrasound; 2 additional biopsies were recommended     5/6/2021 Biopsy    Left breast ultrasound-guided biopsy  A  1 o'clock, 4 cm from nipple  Ductal carcinoma in situ  Grade 2  ,     B  11 o'clock, 9 cm from nipple  Invasive carcinoma of no special type (ductal)  Grade 1  ER 90, IL 90, HER2 1+  Lymphovascular invasion not identified    Concordant  Malignancy is multifocal and spans at least 8 5 cm in 2 directions  ROSLYN  clip placed at 12:00, 5cmfn biopsy site  7/13/2021 Surgery    Left breast ROSLYN  directed mastectomy with sentinel lymph node biopsy  Invasive mammary carcinoma of no special type (ductal)  Grade 1  8 mm  Extensive DCIS (size cannot be determined, throughout all quadrants)  Margins negative  0/1 Lymph node  Anatomic/Prognostic Stage IA     8/11/2021 -  Hormone Therapy    Anastrozole 1 mg daily  Dr Pepe Pham          -Interval History: Patient is a 61-year-old female presenting today for six-month follow-up for left breast cancer diagnosed in March 2021  She is currently on anastrozole  She had a diagnostic mammogram of the right breast on 03/02/2022 which was BI-RADS 2 category 1 density  Patient denies changes on her breast exam  She denies persistent headaches, bone pain, back pain, shortness of breath, or abdominal pain  Review of Systems:  Review of Systems   Constitutional: Negative for activity change, appetite change, fatigue and unexpected weight change     Respiratory: Negative for cough and shortness of breath  Cardiovascular: Negative for chest pain  Gastrointestinal: Negative for abdominal pain, diarrhea, nausea and vomiting  Endocrine: Negative for heat intolerance  Musculoskeletal: Negative for arthralgias, back pain and myalgias  Skin: Negative for rash  Neurological: Negative for weakness and headaches  Hematological: Positive for adenopathy         Patient Active Problem List   Diagnosis    Coronary artery disease involving native coronary artery of native heart without angina pectoris    Type 2 diabetes mellitus without complication, without long-term current use of insulin (HCC)    Mixed dyslipidemia    Essential hypertension    Abnormal carotid ultrasound    Hypothyroidism    Spinal stenosis of lumbar region    Osteopenia of necks of both femurs    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[facility] )    S/P lumbar fusion    Bilateral leg edema    Medicare annual wellness visit, subsequent    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, estrogen receptor positive ([facility]    Class 2 obesity in adult    Monoallelic mutation of CHEK2 gene in female patient    Use of anastrozole (Arimidex)    Neuropathy    Non-ST elevation myocardial infarction (NSTEMI) ([facility] )    History of PTCA    H/O heart artery stent    Colon polyp     Past Medical History:   Diagnosis Date    Abdominal pain     LLQ on occasion    Angina pectoris ([facility] )     Arthritis     Breast cancer ([facility] )     Cancer ([facility]     breast left    Colon polyp     Constipation     at times    Coronary artery disease     Diabetes mellitus ([facility]     Diarrhea     at times    Disease of thyroid gland     Hemorrhoids     Hypercholesterolemia     Hypertension     Neuropathy     both legs and feet    Obesity     Osteoporosis     Otitis media     Ovarian ca ([facility] ) 1997    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HCC)     Shingles     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    Thyroid cancer ([address]     Urinary tract infection      Past Surgical History:   Procedure Laterality Date    ANGIOPLASTY      stent x 1    APPENDECTOMY      BACK SURGERY      BAND HEMORRHOIDECTOMY      BRAIN SURGERY  1993    meningioma    BREAST BIOPSY      CARPAL TUNNEL RELEASE      CERVICAL FUSION      CHOLECYSTECTOMY      COLONOSCOPY      pt see Dr Marissa Rodriguez  Up to date with her colonoscopy   CORONARY STENT PLACEMENT      Dec 2015    EYE SURGERY      cataract surgery    GALLBLADDER SURGERY      HYSTERECTOMY  1989    MAMMO (HISTORICAL)      up to date  see gyn    MASTECTOMY Left 07/13/2021    MASTECTOMY W/ SENTINEL NODE BIOPSY Left 07/13/2021    Procedure: BREAST ROSLYN  DIRECTED MASTECTOMY, SENTINEL LYMPH NODE BIOPSY, LYMPHATIC MAPPING WITH BLUE DYE AND RADIOACTIVE DYE (INJECT AT 1500 BY DR ANDREWS IN THE OR);   Surgeon: Ebony Calderon MD;  [facility];  Service: Surgical Oncology    OOPHORECTOMY Bilateral 1997    SPINE SURGERY      THYROIDECTOMY      US BREAST NEEDLE LOC LEFT Left 05/06/2021    US GUIDANCE BREAST BIOPSY LEFT EACH ADDITIONAL Left 05/06/2021    US GUIDED BREAST BIOPSY LEFT COMPLETE Left 03/15/2021    US GUIDED BREAST BIOPSY LEFT COMPLETE Left 05/06/2021     Family History   Problem Relation Age of Onset    Diabetes Mother     Heart disease Mother     Dementia Mother     Cancer Father     Esophageal cancer Father 61    Endometrial cancer Sister 76    Asthma Sister     Cancer Sister     No Known Problems Sister     No Known Problems Sister     No Known Problems Sister     Stomach cancer Brother 70    Cancer Brother     Multiple myeloma Brother 67    Cancer Brother     Asthma Daughter     Asthma Son     Depression Son     No Known Problems Maternal Grandmother     Prostate cancer Maternal Grandfather     No Known Problems Paternal Grandmother     Prostate cancer Paternal Grandfather     Breast cancer Maternal Aunt 48    No Known Problems Paternal Aunt     No Known Problems Paternal Aunt     Breast cancer Other 39    Breast cancer Other 39    Diabetes Family     Cancer Family     Arthritis Family     Heart disease Family      Social History     Socioeconomic History    Marital status: /Civil Union     Spouse name: Not on file    Number of children: Not on file    Years of education: Not on file    Highest education level: Not on file   Occupational History    Not on file   Tobacco Use    Smoking status: Never Smoker    Smokeless tobacco: Never Used   Vaping Use    Vaping Use: Never used   Substance and Sexual Activity    Alcohol use: No    Drug use: Never    Sexual activity: Not Currently     Partners: Male     Birth control/protection: Post-menopausal, None   Other Topics Concern    Not on file   Social History Narrative    · Tobacco smoking status:   Never smoker      This question's title has changed from "Smoking status" to "Tobacco smoking status" with the  update  Please use this field only for documenting tobacco smoking behavior  To accommodate this change, new fields for documenting smokeless tobacco and e-cigarette/vape usage have been added       · Smoking - how much          None  1 PPW  2 PPW  1/4 PPD  1/2 PPD  1 PPD  1 1/2 PPD  2 PPD  3+ PPD          NOTE     · Smokeless tobacco status          Never used smokeless tobacco  Former smokeless tobacco user  Current snuff user  Currently chews tobacco  Currently uses moist powdered tobacco  ----  Not indicated  Not tolerated  Patient refused          NOTE     · Tobacco-years of use               NOTE     · E-cigarette/vape status          Never used electronic cigarettes  Former user of electronic cigarettes  Current user of electronic cigarettes          NOTE     · Most recent tobacco use screenin2018      · Alcohol intake:   None         Per laine     Social Determinants of Health     Financial Resource Strain: Not on file   Food Insecurity: Not on file   Transportation Needs: Not on file   Physical Activity: Not on file   Stress: Not on file   Social Connections: Not on file   Intimate Partner Violence: Not on file   Housing Stability: Not on file       Current Outpatient Medications:     anastrozole (ARIMIDEX) 1 mg tablet, Take 1 tablet (1 mg total) by mouth daily, Disp: 90 tablet, Rfl: 0    aspirin (ECOTRIN LOW STRENGTH) 81 mg EC tablet, Take 162 mg by mouth daily, Disp: , Rfl:     clotrimazole-betamethasone (LOTRISONE) 1-0 05 % cream, Apply topically 2 (two) times a day, Disp: 45 g, Rfl: 3    glipiZIDE (GLUCOTROL) 10 mg tablet, Take 1 tablet (10 mg total) by mouth 2 (two) times a day before meals, Disp: 180 tablet, Rfl: 3    Insulin Pen Needle (Sure Comfort Pen Needles) 31G X 5 MM MISC, by Does not apply route daily, Disp: 100 each, Rfl: 3    Lancets (OneTouch Delica Plus [id number]) MISC, USE DAILY, Disp: 100 each, Rfl: 3    levothyroxine 88 mcg tablet, Take 1 tablet (88 mcg total) by mouth daily, Disp: 90 tablet, Rfl: 2    liraglutide (VICTOZA) injection, Inject 1 2 mg under the skin daily at bedtime , Disp: , Rfl:     lisinopril (ZESTRIL) 20 mg tablet, TAKE 1 TABLET(20 MG) BY MOUTH DAILY AT BEDTIME, Disp: 90 tablet, Rfl: 1    metFORMIN (GLUCOPHAGE) 1000 MG tablet, Take 1 tablet (1,000 mg total) by mouth 2 (two) times a day with meals, Disp: 180 tablet, Rfl: 3    metoprolol tartrate (LOPRESSOR) 25 mg tablet, TAKE ONE TABLET BY MOUTH EVERY 12 HOURS, Disp: 180 tablet, Rfl: 1    OneTouch Ultra test strip, Use 1 each daily Use as instructed, Disp: 100 each, Rfl: 3    pregabalin (LYRICA) 75 mg capsule, Take 1 capsule (75 mg total) by mouth 2 (two) times a day, Disp: 60 capsule, Rfl: 3  Allergies   Allergen Reactions    Duloxetine Other (See Comments)     Extreme somnolence/lethargy    Sulfa Antibiotics Rash     Vitals:    07/26/22 1351   BP: 132/60   Pulse: 80   Resp: 16   Temp: (!) 97 2 °F (36 2 °C)   SpO2: 98%       Physical Exam  Constitutional:       General: She is not in acute distress  Appearance: Normal appearance  Cardiovascular:      Rate and Rhythm: Normal rate and regular rhythm  Pulses: Normal pulses  Heart sounds: Normal heart sounds  Pulmonary:      Effort: Pulmonary effort is normal       Breath sounds: Normal breath sounds  Chest:      Chest wall: No mass  Breasts:      Right: No swelling, bleeding, inverted nipple, mass, nipple discharge, skin change, tenderness, axillary adenopathy or supraclavicular adenopathy  Left: Axillary adenopathy present  No swelling, bleeding, inverted nipple, mass, nipple discharge, skin change, tenderness or supraclavicular adenopathy  Comments: Left breast mastectomy scar  Left axillary enlarged lymph node  No masses, nodularity, skin changes, nipple changes or discharge appreciated on physical exam      Abdominal:      General: Abdomen is flat  Palpations: Abdomen is soft  Lymphadenopathy:      Upper Body:      Right upper body: No supraclavicular, axillary or pectoral adenopathy  Left upper body: Axillary adenopathy present  No supraclavicular or pectoral adenopathy  Skin:     General: Skin is warm  Neurological:      General: No focal deficit present  Mental Status: She is alert and oriented to person, place, and time  Psychiatric:         Mood and Affect: Mood normal          Behavior: Behavior normal            Results:    Imaging  Colonoscopy    Result Date: 7/21/2022  [facility] [id number] [phone] DATE OF SERVICE: 7/21/22 PHYSICIAN(S): Attending: Trip Morris MD Fellow: No Staff Documented Procedure :  Colonoscopy with cold snare polypectomy, cold biopsy and Endoclip placement INDICATION: Change in bowel habit, Left lower quadrant abdominal pain POST-OP DIAGNOSIS: See the impression below  HISTORY: Prior colonoscopy: 7 years ago   BOWEL PREPARATION: Miralax/Magnesium Citrate PREPROCEDURE: Informed consent was obtained for the procedure, including sedation  Risks including but not limited to bleeding, infection, perforation, adverse drug reaction and aspiration were explained in detail  Also explained about less than 100% sensitivity with the exam and other alternatives  The patient was placed in the left lateral decubitus position  DETAILS OF PROCEDURE: Patient was taken to the procedure room where a time out was performed to confirm correct patient and correct procedure  The patient underwent monitored anesthesia care, which was administered by an anesthesia professional  The patient's blood pressure, heart rate, level of consciousness, oxygen and respirations were monitored throughout the procedure  A digital rectal exam was performed  The scope was introduced through the anus and advanced to the cecum  Retroflexion was performed in the rectum  The quality of bowel preparation was evaluated using the Henrik Bowel Preparation Scale with scores of: right colon = 2, transverse colon = 2, left colon = 2  The total BBPS score was 6  Bowel prep was adequate  The patient experienced no blood loss  The procedure was not difficult  The patient tolerated the procedure well  There were no apparent complications  ANESTHESIA INFORMATION: ASA: II Anesthesia Type: IV Sedation with Anesthesia MEDICATIONS: No administrations occurring from 0823 to 0859 on 07/21/22 FINDINGS: One 15 mm sessile, adenomatous-appearing polyp in the proximal ascending colon; completely removed en bloc by cold snare and retrieved specimen; placed 2 clips successfully (clips are MRI conditional); hemostasis achieved Mild, localized erythematous mucosa in the descending colon and sigmoid colon; performed cold forceps biopsy to rule out colitis Single small, superficial, round, benign-appearing ulcer in the distal rectum   Prior hemorrhoidal banding site ulceration in the distal rectum EVENTS: Procedure Events Event Event Time ENDO CECUM REACHED 7/21/2022  8:37 AM ENDO SCOPE OUT TIME 7/21/2022  8:58 AM SPECIMENS: ID Type Source Tests Collected by Time Destination 1 : cold snare ascending polyp Tissue Large Intestine, Right/Ascending Colon TISSUE EXAM Trip Morris MD 7/21/2022  8:43 AM  2 : cold bx left colon erythema eval for colitis Tissue Colon TISSUE EXAM Trip Morris MD 7/21/2022  8:58 AM  EQUIPMENT: Colonoscope -[id number]     Impression: 1  Large ascending colon polyp removed 2  Mild erythema in the left colon 3  History of hemorrhoidal banding, post hemorrhoidal banding site ulceration in the distal rectum RECOMMENDATION: Repeat colonoscopy in 3 years Await pathology results due to a personal history of colon polyps  Dawson Saxena MD       I reviewed the above imaging data  Advance Care Planning/Advance Directives:  Discussed disease status, cancer treatment plans and/or cancer treatment goals with the patient

## 2022-07-27 ENCOUNTER — OFFICE VISIT (OUTPATIENT)
Dept: URGENT CARE | Facility: CLINIC | Age: 77
End: 2022-07-27
Payer: MEDICARE

## 2022-07-27 ENCOUNTER — HOSPITAL ENCOUNTER (OUTPATIENT)
Dept: RADIOLOGY | Facility: HOSPITAL | Age: 77
Discharge: HOME | End: 2022-07-27
Attending: INTERNAL MEDICINE
Payer: MEDICARE

## 2022-07-27 VITALS — OXYGEN SATURATION: 98 % | TEMPERATURE: 98.8 F | HEART RATE: 110 BPM | RESPIRATION RATE: 16 BRPM

## 2022-07-27 DIAGNOSIS — R39.9 UTI SYMPTOMS: Primary | ICD-10-CM

## 2022-07-27 DIAGNOSIS — C50.812 MALIGNANT NEOPLASM OF OVERLAPPING SITES OF LEFT BREAST IN FEMALE, ESTROGEN RECEPTOR POSITIVE (HCC): ICD-10-CM

## 2022-07-27 DIAGNOSIS — R10.32 LEFT LOWER QUADRANT ABDOMINAL PAIN: ICD-10-CM

## 2022-07-27 DIAGNOSIS — Z17.0 MALIGNANT NEOPLASM OF OVERLAPPING SITES OF LEFT BREAST IN FEMALE, ESTROGEN RECEPTOR POSITIVE (HCC): ICD-10-CM

## 2022-07-27 LAB
SL AMB  POCT GLUCOSE, UA: ABNORMAL
SL AMB LEUKOCYTE ESTERASE,UA: ABNORMAL
SL AMB POCT BILIRUBIN,UA: ABNORMAL
SL AMB POCT BLOOD,UA: ABNORMAL
SL AMB POCT CLARITY,UA: ABNORMAL
SL AMB POCT COLOR,UA: YELLOW
SL AMB POCT KETONES,UA: ABNORMAL
SL AMB POCT NITRITE,UA: ABNORMAL
SL AMB POCT PH,UA: 6.5
SL AMB POCT SPECIFIC GRAVITY,UA: 1.01
SL AMB POCT URINE PROTEIN: ABNORMAL
SL AMB POCT UROBILINOGEN: 0.2

## 2022-07-27 PROCEDURE — 87186 SC STD MICRODIL/AGAR DIL: CPT | Performed by: STUDENT IN AN ORGANIZED HEALTH CARE EDUCATION/TRAINING PROGRAM

## 2022-07-27 PROCEDURE — G1004 CDSM NDSC: HCPCS

## 2022-07-27 PROCEDURE — 99213 OFFICE O/P EST LOW 20 MIN: CPT | Performed by: STUDENT IN AN ORGANIZED HEALTH CARE EDUCATION/TRAINING PROGRAM

## 2022-07-27 PROCEDURE — 87086 URINE CULTURE/COLONY COUNT: CPT | Performed by: STUDENT IN AN ORGANIZED HEALTH CARE EDUCATION/TRAINING PROGRAM

## 2022-07-27 PROCEDURE — 74177 CT ABD & PELVIS W/CONTRAST: CPT

## 2022-07-27 PROCEDURE — 81002 URINALYSIS NONAUTO W/O SCOPE: CPT | Performed by: STUDENT IN AN ORGANIZED HEALTH CARE EDUCATION/TRAINING PROGRAM

## 2022-07-27 PROCEDURE — 87077 CULTURE AEROBIC IDENTIFY: CPT | Performed by: STUDENT IN AN ORGANIZED HEALTH CARE EDUCATION/TRAINING PROGRAM

## 2022-07-27 RX ORDER — ANASTROZOLE 1 MG/1
1 TABLET ORAL DAILY
Qty: 90 TABLET | Refills: 0 | Status: SHIPPED | OUTPATIENT
Start: 2022-07-27 | End: 2022-10-22 | Stop reason: SDUPTHER

## 2022-07-27 RX ORDER — ANASTROZOLE 1 MG/1
1 TABLET ORAL DAILY
Qty: 90 TABLET | Refills: 0 | Status: SHIPPED | OUTPATIENT
Start: 2022-07-27 | End: 2022-07-27 | Stop reason: SDUPTHER

## 2022-07-27 RX ORDER — NITROFURANTOIN 25; 75 MG/1; MG/1
100 CAPSULE ORAL 2 TIMES DAILY
Qty: 10 CAPSULE | Refills: 0 | Status: SHIPPED | OUTPATIENT
Start: 2022-07-27 | End: 2022-08-01

## 2022-07-27 RX ADMIN — IOHEXOL 65 ML: 350 INJECTION, SOLUTION INTRAVENOUS at 11:20

## 2022-07-27 NOTE — PROGRESS NOTES
[facility]        NAME: Addy Valdez is a 69 y o  female  : 1945    MRN: [id number]  DATE: 2022  TIME: 9:25 AM    Assessment and Plan   UTI symptoms [R39 9]  1  UTI symptoms  POCT urine dip    Urine culture    nitrofurantoin (MACROBID) 100 mg capsule         Patient Instructions       Follow up with PCP in 3-5 days  Proceed to  ER if symptoms worsen  Chief Complaint     Chief Complaint   Patient presents with    Possible UTI     Pt presents with urinary burning sensation, right sided low back pain; started 3-4 days ago; Hx of UTI's         History of Present Illness       HPI  Patient presents complaining of UTI symptoms including burning and discomfort on urination ongoing for the past few days, she has had UTIs in the past this feels like 1  Patient does not have any fevers or chills, but she does have some right-sided back pain  She was once hospitalized for urinary retention    Review of Systems   Review of Systems  Per hpi     Current Medications       Current Outpatient Medications:     anastrozole (ARIMIDEX) 1 mg tablet, Take 1 tablet (1 mg total) by mouth daily, Disp: 90 tablet, Rfl: 0    aspirin (ECOTRIN LOW STRENGTH) 81 mg EC tablet, Take 162 mg by mouth daily, Disp: , Rfl:     clotrimazole-betamethasone (LOTRISONE) 1-0 05 % cream, Apply topically 2 (two) times a day, Disp: 45 g, Rfl: 3    glipiZIDE (GLUCOTROL) 10 mg tablet, Take 1 tablet (10 mg total) by mouth 2 (two) times a day before meals, Disp: 180 tablet, Rfl: 3    Insulin Pen Needle (Sure Comfort Pen Needles) 31G X 5 MM MISC, by Does not apply route daily, Disp: 100 each, Rfl: 3    Lancets (OneTouch Delica Plus [id number]) MISC, USE DAILY, Disp: 100 each, Rfl: 3    levothyroxine 88 mcg tablet, Take 1 tablet (88 mcg total) by mouth daily, Disp: 90 tablet, Rfl: 2    liraglutide (VICTOZA) injection, Inject 1 2 mg under the skin daily at bedtime , Disp: , Rfl:     lisinopril (ZESTRIL) 20 mg tablet, TAKE 1 TABLET(20 MG) BY MOUTH DAILY AT BEDTIME, Disp: 90 tablet, Rfl: 1    metFORMIN (GLUCOPHAGE) 1000 MG tablet, Take 1 tablet (1,000 mg total) by mouth 2 (two) times a day with meals, Disp: 180 tablet, Rfl: 3    metoprolol tartrate (LOPRESSOR) 25 mg tablet, TAKE ONE TABLET BY MOUTH EVERY 12 HOURS, Disp: 180 tablet, Rfl: 1    nitrofurantoin (MACROBID) 100 mg capsule, Take 1 capsule (100 mg total) by mouth 2 (two) times a day for 5 days, Disp: 10 capsule, Rfl: 0    OneTouch Ultra test strip, Use 1 each daily Use as instructed, Disp: 100 each, Rfl: 3    pregabalin (LYRICA) 75 mg capsule, Take 1 capsule (75 mg total) by mouth 2 (two) times a day, Disp: 60 capsule, Rfl: 3    Current Allergies     Allergies as of 07/27/2022 - Reviewed 07/27/2022   Allergen Reaction Noted    Duloxetine Other (See Comments) 08/27/2013    Sulfa antibiotics Rash 08/27/2013            The following portions of the patient's history were reviewed and updated as appropriate: allergies, current medications, past family history, past medical history, past social history, past surgical history and problem list      Past Medical History:   Diagnosis Date    Abdominal pain     LLQ on occasion    Angina pectoris (HCC)     Arthritis     Breast cancer ([address] )     Cancer ([address] )     breast left    Colon polyp     Constipation     at times    Coronary artery disease     Diabetes mellitus ([address] )     Diarrhea     at times    Disease of thyroid gland     Hemorrhoids     Hypercholesterolemia     Hypertension     Neuropathy     both legs and feet    Obesity     Osteoporosis     Otitis media     Ovarian ca ([address] ) 1997    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[address] )     Shingles     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    Thyroid cancer ([address] )     Urinary tract infection        Past Surgical History:   Procedure Laterality Date    ANGIOPLASTY      stent x 1    APPENDECTOMY      BACK SURGERY      [address] meningioma    BREAST BIOPSY      CARPAL TUNNEL RELEASE      CERVICAL FUSION      CHOLECYSTECTOMY      COLONOSCOPY      pt see Dr Mone Adhikari  Up to date with her colonoscopy   CORONARY STENT PLACEMENT      Dec 2015    EYE SURGERY      cataract surgery    GALLBLADDER SURGERY      HYSTERECTOMY  1989    MAMMO (HISTORICAL)      up to date  see gyn    MASTECTOMY Left 07/13/2021    MASTECTOMY W/ SENTINEL NODE BIOPSY Left 07/13/2021    Procedure: BREAST ROSLYN  DIRECTED MASTECTOMY, SENTINEL LYMPH NODE BIOPSY, LYMPHATIC MAPPING WITH BLUE DYE AND RADIOACTIVE DYE (INJECT AT 1500 BY DR ANDREWS IN THE OR);   Surgeon: Akosua Dodson MD;  [facility];  Service: Surgical Oncology    OOPHORECTOMY Bilateral 1997    SPINE SURGERY      THYROIDECTOMY      US BREAST NEEDLE LOC LEFT Left 05/06/2021    US GUIDANCE BREAST BIOPSY LEFT EACH ADDITIONAL Left 05/06/2021    US GUIDED BREAST BIOPSY LEFT COMPLETE Left 03/15/2021    US GUIDED BREAST BIOPSY LEFT COMPLETE Left 05/06/2021       Family History   Problem Relation Age of Onset    Diabetes Mother     Heart disease Mother     Dementia Mother     Cancer Father     Esophageal cancer Father 61    Endometrial cancer Sister 76    Asthma Sister     Cancer Sister     No Known Problems Sister     No Known Problems Sister     No Known Problems Sister     Stomach cancer Brother 70    Cancer Brother     Multiple myeloma Brother 67    Cancer Brother     Asthma Daughter     Asthma Son     Depression Son     No Known Problems Maternal Grandmother     Prostate cancer Maternal Grandfather     No Known Problems Paternal Grandmother     Prostate cancer Paternal Grandfather     Breast cancer Maternal Aunt 48    No Known Problems Paternal Aunt     No Known Problems Paternal Aunt     Breast cancer Other 39    Breast cancer Other 39    Diabetes Family     Cancer Family     Arthritis Family     Heart disease Family          Medications have been verified  Objective   Pulse (!) 110   Temp 98 8 °F (37 1 °C)   Resp 16   SpO2 98%   No LMP recorded  Patient has had a hysterectomy  Physical Exam     Physical Exam  Constitutional:       General: She is not in acute distress  Appearance: Normal appearance  HENT:      Head: Normocephalic  Nose: No congestion or rhinorrhea  Mouth/Throat:      Mouth: Mucous membranes are moist       Pharynx: No oropharyngeal exudate or posterior oropharyngeal erythema  Eyes:      General:         Right eye: No discharge  Left eye: No discharge  Conjunctiva/sclera: Conjunctivae normal    Cardiovascular:      Rate and Rhythm: Normal rate and regular rhythm  Pulses: Normal pulses  Pulmonary:      Effort: Pulmonary effort is normal  No respiratory distress  Abdominal:      General: Abdomen is flat  There is no distension  Palpations: Abdomen is soft  Tenderness: There is no abdominal tenderness  There is right CVA tenderness  There is no left CVA tenderness  Skin:     General: Skin is warm  Capillary Refill: Capillary refill takes less than 2 seconds  Neurological:      Mental Status: She is alert and oriented to person, place, and time

## 2022-07-29 LAB — BACTERIA UR CULT: ABNORMAL

## 2022-08-12 DIAGNOSIS — E11.9 DIABETES MELLITUS WITHOUT COMPLICATION (HCC): ICD-10-CM

## 2022-08-12 DIAGNOSIS — I10 ESSENTIAL HYPERTENSION: ICD-10-CM

## 2022-08-12 RX ORDER — GLIPIZIDE 10 MG/1
TABLET ORAL
Qty: 180 TABLET | Refills: 1 | Status: SHIPPED | OUTPATIENT
Start: 2022-08-12 | End: 2022-09-11 | Stop reason: SDUPTHER

## 2022-08-15 ENCOUNTER — HOSPITAL ENCOUNTER (OUTPATIENT)
Dept: RADIOLOGY | Facility: HOSPITAL | Age: 77
Discharge: HOME | End: 2022-08-15
Payer: MEDICARE

## 2022-08-15 DIAGNOSIS — R59.9 ENLARGED LYMPH NODE: ICD-10-CM

## 2022-08-15 PROCEDURE — 76642 ULTRASOUND BREAST LIMITED: CPT

## 2022-08-17 ENCOUNTER — HOSPITAL ENCOUNTER (OUTPATIENT)
Dept: RADIOLOGY | Facility: HOSPITAL | Age: 77
Discharge: HOME | End: 2022-08-17

## 2022-09-01 DIAGNOSIS — I10 ESSENTIAL HYPERTENSION: ICD-10-CM

## 2022-09-11 DIAGNOSIS — E11.9 DIABETES MELLITUS WITHOUT COMPLICATION (HCC): ICD-10-CM

## 2022-09-12 ENCOUNTER — OFFICE VISIT (OUTPATIENT)
Dept: CARDIOLOGY CLINIC | Facility: CLINIC | Age: 77
End: 2022-09-12
Payer: MEDICARE

## 2022-09-12 VITALS
HEART RATE: 72 BPM | SYSTOLIC BLOOD PRESSURE: 115 MMHG | DIASTOLIC BLOOD PRESSURE: 70 MMHG | BODY MASS INDEX: 38.02 KG/M2 | WEIGHT: 201.2 LBS

## 2022-09-12 DIAGNOSIS — E78.2 MIXED DYSLIPIDEMIA: ICD-10-CM

## 2022-09-12 DIAGNOSIS — I25.10 CORONARY ARTERY DISEASE INVOLVING NATIVE CORONARY ARTERY OF NATIVE HEART WITHOUT ANGINA PECTORIS: ICD-10-CM

## 2022-09-12 DIAGNOSIS — I10 ESSENTIAL HYPERTENSION: Primary | ICD-10-CM

## 2022-09-12 PROCEDURE — 93000 ELECTROCARDIOGRAM COMPLETE: CPT | Performed by: INTERNAL MEDICINE

## 2022-09-12 PROCEDURE — 99214 OFFICE O/P EST MOD 30 MIN: CPT | Performed by: INTERNAL MEDICINE

## 2022-09-12 RX ORDER — GLIPIZIDE 10 MG/1
10 TABLET ORAL
Qty: 180 TABLET | Refills: 0 | Status: SHIPPED | OUTPATIENT
Start: 2022-09-12

## 2022-09-12 NOTE — PROGRESS NOTES
Cardiology   Kristie Arana DO, Jac Pruett MD, Vivian Alcaraz MD, Isi Rain MD, [facility] [place]  -------------------------------------------------------------------  On license of [facility]  [address], [address] [facility],[address], [address] [place] [address], [address] [place] [address] Avenue  [phone]    Cardiology Follow Up  Marlin Sepulveda  1945  [id number]          Assessment/Plan:    1  Essential hypertension    2  Coronary artery disease involving native coronary artery of native heart without angina pectoris    3  Mixed dyslipidemia      - Discussed risk factor reduction including refraining from smoking, eating a diet high in fruits and vegetables, maintaining a healthy weight, limiting screen time along with controlling BP and cholesterol  Encouraged to exercise 150 minutes a week at a moderate level such as a fast walk or 75 minutes of high intensity  - patient with high residual risk for cardiovascular disease  She does have a history of LAD PCI  Although her LDL cholesterol is near 70, she has elevated triglycerides and low HDL  Currently, she is using over-the-counter fish oil 2 g b i d  Vascepa was not covered by [employer]  She could not tolerate atorvastatin  - Will review next lipid panel  Consider Nexlitol if not at goal    - BP is at goal   - Follow up in 6 months  Interval History:     Marlin Sepulveda is 69 y o  female here for followup of CAD  Since her last visit, she has been feeling well   she denies any palpitations, chest pain, shortness of breath, LE edema, orthopnea or PND  Diet is overall unchanged  There has not been a significant change in weight  She could not tolerate atorvastatin due to myalgias  She has been on 2 g of fish oil twice a day  Most recent blood work showed LDL of 77, HDL of 35 and triglycerides of 224  She has a history of CAD with SAMMIE to proximal LAD in 2015    Prior to stent placement, she was feeling episode of left arm pain with exertion  She has not had any further episodes since stent was placed  She denies any exertional dyspnea, LE edema, orthopnea  Patient was previously on atorvastatin 40 mg daily but stopped it due to myalgias  She uses metoprolol 25 mg and lisinopril 20 mg daily  She had an echocardiogram in March 2021 which showed normal ejection fraction with mild valve disease      The following portions of the patient's history were reviewed and updated as appropriate: allergies, current medications, past family history, past medical history, past social history, past surgical history, and problem list        Current Outpatient Medications:     anastrozole (ARIMIDEX) 1 mg tablet, Take 1 tablet (1 mg total) by mouth daily, Disp: 90 tablet, Rfl: 0    aspirin (ECOTRIN LOW STRENGTH) 81 mg EC tablet, Take 162 mg by mouth daily, Disp: , Rfl:     glipiZIDE (GLUCOTROL) 10 mg tablet, Take 1 tablet (10 mg total) by mouth 2 (two) times a day before meals, Disp: 180 tablet, Rfl: 0    Insulin Pen Needle (Sure Comfort Pen Needles) 31G X 5 MM MISC, by Does not apply route daily, Disp: 100 each, Rfl: 3    Lancets (OneTouch Delica Plus [id number]) MISC, USE DAILY, Disp: 100 each, Rfl: 3    levothyroxine 88 mcg tablet, Take 1 tablet (88 mcg total) by mouth daily, Disp: 90 tablet, Rfl: 2    liraglutide (VICTOZA) injection, Inject 1 2 mg under the skin daily at bedtime , Disp: , Rfl:     lisinopril (ZESTRIL) 20 mg tablet, TAKE 1 TABLET(20 MG) BY MOUTH DAILY AT BEDTIME, Disp: 90 tablet, Rfl: 1    metFORMIN (GLUCOPHAGE) 1000 MG tablet, Take 1 tablet (1,000 mg total) by mouth 2 (two) times a day with meals, Disp: 180 tablet, Rfl: 3    metoprolol tartrate (LOPRESSOR) 25 mg tablet, Take 1 tablet (25 mg total) by mouth every 12 (twelve) hours, Disp: 180 tablet, Rfl: 0    OneTouch Ultra test strip, Use 1 each daily Use as instructed, Disp: 100 each, Rfl: 3    pregabalin (LYRICA) 75 mg capsule, Take 1 capsule (75 mg total) by mouth 2 (two) times a day, Disp: 60 capsule, Rfl: 3    clotrimazole-betamethasone (LOTRISONE) 1-0 05 % cream, Apply topically 2 (two) times a day (Patient not taking: Reported on 9/12/2022), Disp: 45 g, Rfl: 3        Review of Systems:  Review of Systems   Respiratory: Negative for shortness of breath  Cardiovascular: Negative for chest pain, palpitations and leg swelling  Musculoskeletal: Positive for arthralgias and myalgias  All other systems reviewed and are negative  Physical Exam:  Vitals:  Vitals:    09/12/22 1138   BP: 115/70   BP Location: Right arm   Patient Position: Sitting   Cuff Size: Standard   Pulse: 72   Weight: 91 3 kg (201 lb 3 2 oz)     Physical Exam   Constitutional: She appears healthy  No distress  Eyes: Pupils are equal, round, and reactive to light  Conjunctivae are normal    Neck: No JVD present  Cardiovascular: Normal rate, regular rhythm and normal heart sounds  Exam reveals no gallop and no friction rub  No murmur heard  Pulmonary/Chest: Effort normal and breath sounds normal  She has no wheezes  She has no rales  Musculoskeletal:         General: No tenderness, deformity or edema  Cervical back: Normal range of motion and neck supple  Neurological: She is alert and oriented to person, place, and time  Skin: Skin is warm and dry  Cardiographics:  EKG: Personally reviewed normal sinus rhythm with rate 75 beats per minute  Last known EF:  60-65%    This note was completed in part utilizing [employer] Direct Software  Grammatical errors, random word insertions, spelling mistakes, and incomplete sentences can be an occasional consequence of this system secondary to software limitations, ambient noise, and hardware issues  If you have any questions or concerns about the content, text, or information contained within the body of this dictation, please contact the provider for clarification

## 2022-09-16 ENCOUNTER — OFFICE VISIT (OUTPATIENT)
Dept: GASTROENTEROLOGY | Facility: CLINIC | Age: 77
End: 2022-09-16
Payer: MEDICARE

## 2022-09-16 VITALS
WEIGHT: 200 LBS | HEART RATE: 72 BPM | SYSTOLIC BLOOD PRESSURE: 114 MMHG | DIASTOLIC BLOOD PRESSURE: 66 MMHG | HEIGHT: 61 IN | BODY MASS INDEX: 37.76 KG/M2

## 2022-09-16 DIAGNOSIS — K64.1 GRADE II HEMORRHOIDS: ICD-10-CM

## 2022-09-16 DIAGNOSIS — K51.40 PSEUDOPOLYPOSIS OF COLON WITHOUT COMPLICATION, UNSPECIFIED PART OF COLON (HCC): ICD-10-CM

## 2022-09-16 DIAGNOSIS — R10.30 LOWER ABDOMINAL PAIN: Primary | ICD-10-CM

## 2022-09-16 PROCEDURE — 46221 LIGATION OF HEMORRHOID(S): CPT | Performed by: INTERNAL MEDICINE

## 2022-09-16 PROCEDURE — 99213 OFFICE O/P EST LOW 20 MIN: CPT | Performed by: INTERNAL MEDICINE

## 2022-09-16 NOTE — PROGRESS NOTES
Name: Maya Braga      : 1945      MRN: [id number]  Encounter Provider: Sachi Rico MD  Encounter Date: 2022   Encounter department: [facility] GASTROENTEROLOGY [facility] [address]    1  Lower abdominal pain    2  Grade II hemorrhoids    3  Pseudopolyposis of colon without complication, unspecified part of colon ([address] )        [de-identified] year female now come for follow-up after CT scan and colonoscopy, last office visit she was complaining lower abdominal pain, CT scan was done which came back okay, subsequently colonoscopy was done which shows colon polyp with ulceration which was removed, biopsy suggests hyperplastic polyp  Pain could be related to IBS with gas pain, advised her to change her diet and eating more soluble fiber, she is eating lot of insoluble fiber which can cause gas pain  Most of time are pain resolved with a bowel movement  She is here for her last hemorrhoidal banding, she has a long history of symptomatic hemorrhoid, status post multiple hemorrhoidal banding in the past, she did not want to go for hemorrhoidal surgery  With the use of hemorrhoidal banding device, 1 band was placed at left lateral location, patient tolerated procedure very well    I discuss about colonoscopy and polypectomy result, I also discuss about CT scan report and will follow up her in 3-6 months, if symptoms do not improve then schedule for EGD       Objective    /66   Pulse 72   Ht 5' 1" (1 549 m)   Wt 90 7 kg (200 lb)   BMI 37 79 kg/m²      Anal Excision Procedures  Performed by: Sachi Rico MD  Authorized by: Sachi Rico MD     Universal Protocol:     Verbal consent obtained?: Yes      Risks and benefits: Risks, benefits and alternatives were discussed      Consent given by:  Patient    Patient states understanding of procedure being performed: Yes      Patient's understanding of procedure matches consent:  Yes Procedure consent matches procedure scheduled: Yes      Relevant documents present and verified: Yes      Test results available and properly labeled: Yes      Site marked: Yes      Radiology Images displayed and confirmed   If images not available, report reviewed: Yes      Time out: Immediately prior to the procedure a time out was called    A time out verifies correct patient, procedure, equipment, support staff and site/side marked as required:   Procedure Type: hemorrhoidectomy    Hemorrhoidectomy Details:   Hemorrhoid type: internal    Internal position:  Six o'clock  Number of columns/groups removed:  1  Single rubber band ligation    Patient tolerance:  Patient tolerated the procedure well with no immediate complications    Unknown MD [place]

## 2022-10-06 ENCOUNTER — RA CDI HCC (OUTPATIENT)
Dept: OTHER | Facility: HOSPITAL | Age: 77
End: 2022-10-06

## 2022-10-06 NOTE — PROGRESS NOTES
[facility]  coding opportunities          Chart Reviewed number of suggestions sent to Provider: 2     Patients Insurance     Medicare Insurance: Medicare        [id number]  [id number]

## 2022-10-10 ENCOUNTER — OFFICE VISIT (OUTPATIENT)
Dept: FAMILY MEDICINE CLINIC | Facility: CLINIC | Age: 77
End: 2022-10-10
Payer: MEDICARE

## 2022-10-10 VITALS
TEMPERATURE: 97.7 F | OXYGEN SATURATION: 98 % | HEIGHT: 61 IN | WEIGHT: 198 LBS | SYSTOLIC BLOOD PRESSURE: 110 MMHG | HEART RATE: 66 BPM | BODY MASS INDEX: 37.38 KG/M2 | RESPIRATION RATE: 16 BRPM | DIASTOLIC BLOOD PRESSURE: 56 MMHG

## 2022-10-10 DIAGNOSIS — M85.852 OSTEOPENIA OF NECKS OF BOTH FEMURS: ICD-10-CM

## 2022-10-10 DIAGNOSIS — R19.7 DIARRHEA, UNSPECIFIED TYPE: ICD-10-CM

## 2022-10-10 DIAGNOSIS — E11.9 TYPE 2 DIABETES MELLITUS WITHOUT COMPLICATION, WITHOUT LONG-TERM CURRENT USE OF INSULIN (HCC): ICD-10-CM

## 2022-10-10 DIAGNOSIS — M85.851 OSTEOPENIA OF NECKS OF BOTH FEMURS: ICD-10-CM

## 2022-10-10 DIAGNOSIS — I25.10 CORONARY ARTERY DISEASE INVOLVING NATIVE CORONARY ARTERY OF NATIVE HEART WITHOUT ANGINA PECTORIS: ICD-10-CM

## 2022-10-10 DIAGNOSIS — E03.9 ACQUIRED HYPOTHYROIDISM: ICD-10-CM

## 2022-10-10 DIAGNOSIS — E78.2 MIXED DYSLIPIDEMIA: ICD-10-CM

## 2022-10-10 DIAGNOSIS — G62.9 NEUROPATHY: ICD-10-CM

## 2022-10-10 DIAGNOSIS — Z23 ENCOUNTER FOR IMMUNIZATION: ICD-10-CM

## 2022-10-10 DIAGNOSIS — I10 ESSENTIAL HYPERTENSION: Primary | ICD-10-CM

## 2022-10-10 PROCEDURE — 90662 IIV NO PRSV INCREASED AG IM: CPT | Performed by: FAMILY MEDICINE

## 2022-10-10 PROCEDURE — 99214 OFFICE O/P EST MOD 30 MIN: CPT | Performed by: FAMILY MEDICINE

## 2022-10-10 PROCEDURE — G0008 ADMIN INFLUENZA VIRUS VAC: HCPCS | Performed by: FAMILY MEDICINE

## 2022-10-10 RX ORDER — PREGABALIN 75 MG/1
75 CAPSULE ORAL 2 TIMES DAILY
Qty: 180 CAPSULE | Refills: 1 | Status: SHIPPED | OUTPATIENT
Start: 2022-10-10

## 2022-10-10 NOTE — PROGRESS NOTES
Depression Screening and Follow-up Plan: Patient was screened for depression during today's encounter  They screened negative with a PHQ-2 score of 0  Assessment/Plan:         Problem List Items Addressed This Visit        Endocrine    Type 2 diabetes mellitus without complication, without long-term current use of insulin (HCC)     Recheck A1C  Continue glipizide 10 mg in AM and 5 mg in PM  Continue victoza 1 2 mg qd and metformin 1000 mg bid    Advised pt to follow a low carb diet and to exercise on a regular basis  Foot exam done in Feb 2022  Had eye exam in April 2022 by Dr Nelsy Arreguin  Lab Results   Component Value Date    HGBA1C 5 7 (H) 02/23/2022     Lab Results   Component Value Date    HGBA1C 5 7 (H) 02/23/2022            Hypothyroidism     TSH was 2 10 and Free T4 was 1 08 in June 2022  Continue levothyroxine 88 mcg qd  Cardiovascular and Mediastinum    Essential hypertension - Primary     BP has good  Continue lisinopril 20 mg qd and metoprolol 25 mg bid  Pt advised to continue low Na diet and to exercise on a regular basis  Coronary artery disease involving native coronary artery of native heart without angina pectoris     No recent chest pain or sob  Continue current meds  Saw Cardiology in Sept 2022  Nervous and Auditory    Neuropathy     Stable  Continue pregabalin 75 mg bid  Relevant Medications    pregabalin (LYRICA) 75 mg capsule       Musculoskeletal and Integument    Osteopenia of necks of both femurs     Last dexascan was in March 2022 and had slight decrease in density  Pt advised to take calcium 1200 mg qd and Vit D 1000 U qd  Pt also advised to perform weight-bearing exercise on a regular basis  Recheck dexascan in March 2024  Other    Mixed dyslipidemia     LDL was 77 and LFTs were normal in June 2022  Pt takes fish oil 3600 mg qd  Advised pt to follow a low cholesterol diet and to exercise on a regular basis           Diarrhea     Pt had colo and was ok except for 1 large polyp  Still gets diarrhea in AM  Will hold PM metformin for next few weeks to see if it helps  Other Visit Diagnoses     Encounter for immunization        Relevant Orders    influenza vaccine, high-dose, PF 0 7 mL (FLUZONE HIGH-DOSE)            Subjective:      Patient ID: Yesenia Lopez is a 69 y o  female  Pt here for f/u HTN, HL, DM, Hypothyroidism, CAD, Neuropathy  Doing well  No cp/sob  No HA  No abd pain  Gets diarrhea in the AM  Had colo and was ok  Saw Cardiology in Sept and no change in med  BP good  Needs to get labs done  No regular exercise  Neuropathy improved with higher dose of Lyrica 75 bid  Wants flu shot  Diabetes  Pertinent negatives for hypoglycemia include no dizziness or headaches  Pertinent negatives for diabetes include no chest pain and no fatigue  Hypertension  Pertinent negatives include no chest pain, headaches, palpitations or shortness of breath  Coronary Artery Disease  Pertinent negatives include no chest pain, chest tightness, dizziness, palpitations or shortness of breath  The following portions of the patient's history were reviewed and updated as appropriate:   Past Medical History:  She has a past medical history of Abdominal pain, Angina pectoris ([address] ), Arthritis, Breast cancer ([address] ), Cancer ([facility] ), Colon polyp, Constipation, Coronary artery disease, Diabetes mellitus ([facility] ), Diarrhea, Disease of thyroid gland, Hemorrhoids, Hypercholesterolemia, Hypertension, Neuropathy, Obesity, Osteoporosis, Otitis media, Ovarian ca ([address] ) (1997), 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[facility] [address] ), Shingles, 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), Thyroid cancer ([address] ), and Urinary tract infection  ,  _______________________________________________________________________  Medical Problems:  does not have any pertinent problems on file ,  _______________________________________________________________________  Past Surgical History:   has a past surgical history that includes Coronary stent placement; Carpal tunnel release; Cholecystectomy; Back surgery; Hysterectomy (1989); Oophorectomy (Bilateral, 1997); Mammo (historical); Eye surgery; Colonoscopy; US guided breast biopsy left complete (Left, 03/15/2021); Appendectomy; Gallbladder surgery; US breast needle loc left (Left, 05/06/2021); US guided breast biopsy left complete (Left, 05/06/2021); US guidance breast biopsy left each additional (Left, 05/06/2021); Spine surgery; Thyroidectomy; Brain surgery (1993); Mastectomy w/ sentinel node biopsy (Left, 07/13/2021); Mastectomy (Left, 07/13/2021); Breast biopsy; Cervical fusion; Band hemorrhoidectomy; Angioplasty; Colonoscopy; and Upper gastrointestinal endoscopy  ,  _______________________________________________________________________  Family History:  family history includes Arthritis in her family; Asthma in her daughter, sister, and son; Breast cancer (age of onset: 39) in her other and other; Breast cancer (age of onset: 48) in her maternal aunt; Cancer in her brother, family, and sister; Dementia in her mother; Depression in her son; Diabetes in her family and mother; Endometrial cancer (age of onset: 76) in her sister; Esophageal cancer (age of onset: 61) in her father; Heart disease in her family and mother; Multiple myeloma (age of onset: 67) in her brother; No Known Problems in her maternal grandmother, paternal aunt, paternal aunt, paternal grandmother, sister, sister, and sister; Prostate cancer in her maternal grandfather and paternal grandfather; Stomach cancer (age of onset: 70) in her brother ,  _______________________________________________________________________  Social History:   reports that she has never smoked  She has never used smokeless tobacco  She reports that she does not drink alcohol and does not use drugs  ,  _______________________________________________________________________  Allergies:  is allergic to duloxetine and sulfa antibiotics     _______________________________________________________________________  Current Outpatient Medications   Medication Sig Dispense Refill   • anastrozole (ARIMIDEX) 1 mg tablet Take 1 tablet (1 mg total) by mouth daily 90 tablet 0   • aspirin (ECOTRIN LOW STRENGTH) 81 mg EC tablet Take 162 mg by mouth daily     • clotrimazole-betamethasone (LOTRISONE) 1-0 05 % cream Apply topically 2 (two) times a day 45 g 3   • glipiZIDE (GLUCOTROL) 10 mg tablet Take 1 tablet (10 mg total) by mouth 2 (two) times a day before meals 180 tablet 0   • Insulin Pen Needle (Sure Comfort Pen Needles) 31G X 5 MM MISC by Does not apply route daily 100 each 3   • Lancets (OneTouch Delica Plus [id number]) MISC USE DAILY 100 each 3   • levothyroxine 88 mcg tablet Take 1 tablet (88 mcg total) by mouth daily 90 tablet 2   • liraglutide (VICTOZA) injection Inject 1 2 mg under the skin daily at bedtime      • lisinopril (ZESTRIL) 20 mg tablet TAKE 1 TABLET(20 MG) BY MOUTH DAILY AT BEDTIME 90 tablet 1   • metFORMIN (GLUCOPHAGE) 1000 MG tablet Take 1 tablet (1,000 mg total) by mouth 2 (two) times a day with meals 180 tablet 3   • metoprolol tartrate (LOPRESSOR) 25 mg tablet Take 1 tablet (25 mg total) by mouth every 12 (twelve) hours 180 tablet 0   • OneTouch Ultra test strip Use 1 each daily Use as instructed 100 each 3   • pregabalin (LYRICA) 75 mg capsule Take 1 capsule (75 mg total) by mouth 2 (two) times a day 180 capsule 1     No current facility-administered medications for this visit      _______________________________________________________________________  Review of Systems   Constitutional: Negative for fatigue and unexpected weight change  Respiratory: Negative for cough, chest tightness and shortness of breath  Cardiovascular: Negative for chest pain and palpitations  Gastrointestinal: Negative for abdominal pain, constipation, diarrhea, nausea and vomiting  Genitourinary: Negative for difficulty urinating  Musculoskeletal: Positive for back pain  Negative for arthralgias  Skin: Negative for rash  Neurological: Negative for dizziness and headaches  Objective:  Vitals:    10/10/22 1328   BP: 110/56   BP Location: Right arm   Patient Position: Sitting   Cuff Size: Large   Pulse: 66   Resp: 16   Temp: 97 7 °F (36 5 °C)   TempSrc: Temporal   SpO2: 98%   Weight: 89 8 kg (198 lb)   Height: 5' 1" (1 549 m)     Body mass index is 37 41 kg/m²  Physical Exam  Vitals and nursing note reviewed  Constitutional:       Appearance: Normal appearance  She is well-developed  She is obese  HENT:      Head: Normocephalic and atraumatic  Cardiovascular:      Rate and Rhythm: Normal rate and regular rhythm  Heart sounds: Normal heart sounds  No murmur heard  Pulmonary:      Effort: Pulmonary effort is normal  No respiratory distress  Breath sounds: Normal breath sounds  No wheezing  Musculoskeletal:      Cervical back: Normal range of motion and neck supple  Right lower leg: No edema  Left lower leg: No edema  Lymphadenopathy:      Cervical: No cervical adenopathy  Neurological:      Mental Status: She is alert and oriented to person, place, and time  Psychiatric:         Mood and Affect: Mood normal          Behavior: Behavior normal          Thought Content:  Thought content normal          Judgment: Judgment normal

## 2022-10-10 NOTE — ASSESSMENT & PLAN NOTE
BP has good  Continue lisinopril 20 mg qd and metoprolol 25 mg bid  Pt advised to continue low Na diet and to exercise on a regular basis 
LDL was 77 and LFTs were normal in June 2022  Pt takes fish oil 3600 mg qd  Advised pt to follow a low cholesterol diet and to exercise on a regular basis 
Last dexascan was in March 2022 and had slight decrease in density  Pt advised to take calcium 1200 mg qd and Vit D 1000 U qd  Pt also advised to perform weight-bearing exercise on a regular basis  Recheck dexascan in March 2024 
No recent chest pain or sob  Continue current meds  Saw Cardiology in Sept 2022 
Pt had colo and was ok except for 1 large polyp  Still gets diarrhea in AM  Will hold PM metformin for next few weeks to see if it helps 
Recheck A1C  Continue glipizide 10 mg in AM and 5 mg in PM  Continue victoza 1 2 mg qd and metformin 1000 mg bid    Advised pt to follow a low carb diet and to exercise on a regular basis  Foot exam done in Feb 2022  Had eye exam in April 2022 by Dr Parisa Jacobson      Lab Results   Component Value Date    HGBA1C 5 7 (H) 02/23/2022     Lab Results   Component Value Date    HGBA1C 5 7 (H) 02/23/2022
Stable  Continue pregabalin 75 mg bid 
TSH was 2 10 and Free T4 was 1 08 in June 2022  Continue levothyroxine 88 mcg qd 
No, Declined

## 2022-10-11 PROBLEM — Z00.00 MEDICARE ANNUAL WELLNESS VISIT, SUBSEQUENT: Status: RESOLVED | Noted: 2021-02-09 | Resolved: 2022-10-11

## 2022-10-18 ENCOUNTER — APPOINTMENT (OUTPATIENT)
Dept: LAB | Facility: CLINIC | Age: 77
End: 2022-10-18
Payer: MEDICARE

## 2022-10-18 DIAGNOSIS — E78.2 MIXED DYSLIPIDEMIA: ICD-10-CM

## 2022-10-18 DIAGNOSIS — E03.9 ACQUIRED HYPOTHYROIDISM: ICD-10-CM

## 2022-10-18 DIAGNOSIS — E11.9 TYPE 2 DIABETES MELLITUS WITHOUT COMPLICATION, WITHOUT LONG-TERM CURRENT USE OF INSULIN (HCC): ICD-10-CM

## 2022-10-18 LAB
ALBUMIN SERPL BCP-MCNC: 3.6 G/DL (ref 3.5–5)
ALP SERPL-CCNC: 42 U/L (ref 46–116)
ALT SERPL W P-5'-P-CCNC: 27 U/L (ref 12–78)
ANION GAP SERPL CALCULATED.3IONS-SCNC: 4 MMOL/L (ref 4–13)
AST SERPL W P-5'-P-CCNC: 13 U/L (ref 5–45)
BILIRUB SERPL-MCNC: 0.56 MG/DL (ref 0.2–1)
BUN SERPL-MCNC: 11 MG/DL (ref 5–25)
CALCIUM SERPL-MCNC: 9.7 MG/DL (ref 8.3–10.1)
CHLORIDE SERPL-SCNC: 104 MMOL/L (ref 96–108)
CHOLEST SERPL-MCNC: 155 MG/DL
CO2 SERPL-SCNC: 28 MMOL/L (ref 21–32)
CREAT SERPL-MCNC: 0.67 MG/DL (ref 0.6–1.3)
EST. AVERAGE GLUCOSE BLD GHB EST-MCNC: 120 MG/DL
GFR SERPL CREATININE-BSD FRML MDRD: 85 ML/MIN/1.73SQ M
GLUCOSE P FAST SERPL-MCNC: 107 MG/DL (ref 65–99)
HBA1C MFR BLD: 5.8 %
HDLC SERPL-MCNC: 35 MG/DL
LDLC SERPL CALC-MCNC: 83 MG/DL (ref 0–100)
NONHDLC SERPL-MCNC: 120 MG/DL
POTASSIUM SERPL-SCNC: 4.5 MMOL/L (ref 3.5–5.3)
PROT SERPL-MCNC: 6.9 G/DL (ref 6.4–8.4)
SODIUM SERPL-SCNC: 136 MMOL/L (ref 135–147)
T4 FREE SERPL-MCNC: 1.17 NG/DL (ref 0.76–1.46)
TRIGL SERPL-MCNC: 183 MG/DL
TSH SERPL DL<=0.05 MIU/L-ACNC: 2.03 UIU/ML (ref 0.45–4.5)

## 2022-10-18 PROCEDURE — 83036 HEMOGLOBIN GLYCOSYLATED A1C: CPT

## 2022-10-18 PROCEDURE — 80053 COMPREHEN METABOLIC PANEL: CPT

## 2022-10-18 PROCEDURE — 84443 ASSAY THYROID STIM HORMONE: CPT

## 2022-10-18 PROCEDURE — 36415 COLL VENOUS BLD VENIPUNCTURE: CPT

## 2022-10-18 PROCEDURE — 84439 ASSAY OF FREE THYROXINE: CPT

## 2022-10-18 PROCEDURE — 80061 LIPID PANEL: CPT

## 2022-10-22 DIAGNOSIS — Z17.0 MALIGNANT NEOPLASM OF OVERLAPPING SITES OF LEFT BREAST IN FEMALE, ESTROGEN RECEPTOR POSITIVE (HCC): ICD-10-CM

## 2022-10-22 DIAGNOSIS — C50.812 MALIGNANT NEOPLASM OF OVERLAPPING SITES OF LEFT BREAST IN FEMALE, ESTROGEN RECEPTOR POSITIVE (HCC): ICD-10-CM

## 2022-10-24 RX ORDER — ANASTROZOLE 1 MG/1
1 TABLET ORAL DAILY
Qty: 90 TABLET | Refills: 0 | Status: SHIPPED | OUTPATIENT
Start: 2022-10-24

## 2022-11-05 DIAGNOSIS — E11.9 TYPE 2 DIABETES MELLITUS WITHOUT COMPLICATION, WITHOUT LONG-TERM CURRENT USE OF INSULIN (HCC): ICD-10-CM

## 2022-11-05 DIAGNOSIS — E03.9 ACQUIRED HYPOTHYROIDISM: ICD-10-CM

## 2022-11-07 RX ORDER — LEVOTHYROXINE SODIUM 88 UG/1
88 TABLET ORAL DAILY
Qty: 90 TABLET | Refills: 0 | Status: SHIPPED | OUTPATIENT
Start: 2022-11-07

## 2022-11-09 ENCOUNTER — ANNUAL EXAM (OUTPATIENT)
Dept: OBGYN CLINIC | Facility: CLINIC | Age: 77
End: 2022-11-09

## 2022-11-09 VITALS
WEIGHT: 200 LBS | BODY MASS INDEX: 37.76 KG/M2 | SYSTOLIC BLOOD PRESSURE: 112 MMHG | DIASTOLIC BLOOD PRESSURE: 64 MMHG | HEIGHT: 61 IN

## 2022-11-09 DIAGNOSIS — Z01.419 ENCOUNTER FOR GYNECOLOGICAL EXAMINATION WITHOUT ABNORMAL FINDING: Primary | ICD-10-CM

## 2022-11-09 NOTE — PROGRESS NOTES
Assessment/Plan:         Diagnoses and all orders for this visit:    Encounter for gynecological examination without abnormal finding          Subjective:      Patient ID: Tony Hyde is a 69 y o  female  The patient is a 72-year-old  2 para  who presents for annual exam   She has a history of both breast and ovarian cancer  She had a left mastectomy and a TAHBSO  Today she has no complaints related to OBGYN  Her examination is within normal limits  We performed a Pap smear of the cuff  She will schedule a mammogram following her anniversary date  We will see her back in 1 year or as needed  The following portions of the patient's history were reviewed and updated as appropriate: allergies, current medications, past family history, past medical history, past social history, past surgical history and problem list     Review of Systems   Constitutional: Negative for chills, diaphoresis, fatigue, fever and unexpected weight change  HENT: Negative for congestion, sinus pressure, sinus pain, tinnitus and trouble swallowing  Eyes: Negative for visual disturbance  Respiratory: Negative for cough, chest tightness and shortness of breath  Cardiovascular: Negative for chest pain, palpitations and leg swelling  Gastrointestinal: Negative for abdominal distention, abdominal pain, anal bleeding, constipation, diarrhea, nausea, rectal pain and vomiting  Endocrine: Negative for heat intolerance  Genitourinary: Negative for difficulty urinating, dysuria, flank pain, frequency, genital sores, hematuria and urgency  Musculoskeletal: Negative for arthralgias, back pain and joint swelling  Skin: Negative for rash  Allergic/Immunologic: Negative for environmental allergies and food allergies  Neurological: Negative for headaches  Hematological: Negative for adenopathy  Does not bruise/bleed easily  Psychiatric/Behavioral: Negative for decreased concentration and dysphoric mood   The patient is not nervous/anxious  Objective:      /64 (BP Location: Left arm)   Ht 5' 1" (1 549 m)   Wt 90 7 kg (200 lb)   BMI 37 79 kg/m²          Physical Exam  Vitals and nursing note reviewed  Exam conducted with a chaperone present  Constitutional:       General: She is not in acute distress  Appearance: Normal appearance  She is not ill-appearing  HENT:      Head: Normocephalic and atraumatic  Nose: Nose normal       Mouth/Throat:      Mouth: Mucous membranes are moist       Pharynx: Oropharynx is clear  Eyes:      Extraocular Movements: Extraocular movements intact  Cardiovascular:      Rate and Rhythm: Normal rate and regular rhythm  Pulses: Normal pulses  Heart sounds: Normal heart sounds  No murmur heard  No friction rub  Pulmonary:      Effort: Pulmonary effort is normal       Breath sounds: Normal breath sounds  Chest:   Breasts:      Right: Normal  No mass, tenderness or axillary adenopathy  Left: Normal  No mass, tenderness or axillary adenopathy  Abdominal:      General: Abdomen is flat  Bowel sounds are normal  There is no distension  Palpations: Abdomen is soft  There is no mass  Tenderness: There is no abdominal tenderness  There is no guarding  Hernia: No hernia is present  Genitourinary:     General: Normal vulva  Exam position: Lithotomy position  Ney stage (genital): 5  Labia:         Right: No rash, tenderness or lesion  Left: No rash, tenderness or lesion  Vagina: Normal       Uterus: Absent  Adnexa: Right adnexa normal and left adnexa normal         Right: No mass or tenderness  Left: No mass  Musculoskeletal:      Cervical back: Normal range of motion and neck supple  Lymphadenopathy:      Upper Body:      Right upper body: No axillary adenopathy  Left upper body: No axillary adenopathy  Skin:     General: Skin is warm and dry     Neurological: General: No focal deficit present  Mental Status: She is alert and oriented to person, place, and time  Psychiatric:         Mood and Affect: Mood normal          Behavior: Behavior normal          Thought Content:  Thought content normal          Judgment: Judgment normal

## 2022-11-13 LAB
LAB AP GYN PRIMARY INTERPRETATION: NORMAL
Lab: NORMAL

## 2022-12-02 ENCOUNTER — TELEPHONE (OUTPATIENT)
Dept: CARDIOLOGY CLINIC | Facility: CLINIC | Age: 77
End: 2022-12-02

## 2022-12-06 ENCOUNTER — HOSPITAL ENCOUNTER (INPATIENT)
Facility: HOSPITAL | Age: 77
LOS: 1 days | Discharge: HOME | End: 2022-12-07
Attending: EMERGENCY MEDICINE | Admitting: INTERNAL MEDICINE

## 2022-12-06 ENCOUNTER — APPOINTMENT (EMERGENCY)
Dept: NON INVASIVE DIAGNOSTICS | Facility: HOSPITAL | Age: 77
End: 2022-12-06

## 2022-12-06 ENCOUNTER — APPOINTMENT (EMERGENCY)
Dept: RADIOLOGY | Facility: HOSPITAL | Age: 77
End: 2022-12-06

## 2022-12-06 DIAGNOSIS — R94.31 EKG ABNORMALITY: Primary | ICD-10-CM

## 2022-12-06 DIAGNOSIS — R94.31 ABNORMAL ECG: ICD-10-CM

## 2022-12-06 DIAGNOSIS — R06.00 DYSPNEA, UNSPECIFIED TYPE: ICD-10-CM

## 2022-12-06 DIAGNOSIS — I25.10 CORONARY ARTERY DISEASE INVOLVING NATIVE CORONARY ARTERY OF NATIVE HEART WITHOUT ANGINA PECTORIS: ICD-10-CM

## 2022-12-06 PROBLEM — R06.09 DYSPNEA ON EXERTION: Status: ACTIVE | Noted: 2022-12-06

## 2022-12-06 LAB
2HR DELTA HS TROPONIN: 1 NG/L
4HR DELTA HS TROPONIN: 1 NG/L
ANION GAP SERPL CALCULATED.3IONS-SCNC: 9 MMOL/L (ref 4–13)
AORTIC VALVE MEAN VELOCITY: 9.8 M/S
APICAL FOUR CHAMBER EJECTION FRACTION: 48 %
AV LVOT PEAK GRADIENT: 5 MMHG
AV MEAN GRADIENT: 4 MMHG
AV PEAK GRADIENT: 8 MMHG
BASOPHILS # BLD AUTO: 0.05 THOUSANDS/ÂΜL (ref 0–0.1)
BASOPHILS NFR BLD AUTO: 1 % (ref 0–1)
BUN SERPL-MCNC: 15 MG/DL (ref 5–25)
CALCIUM SERPL-MCNC: 9 MG/DL (ref 8.3–10.1)
CARDIAC TROPONIN I PNL SERPL HS: 4 NG/L
CARDIAC TROPONIN I PNL SERPL HS: 5 NG/L
CARDIAC TROPONIN I PNL SERPL HS: 5 NG/L
CHLORIDE SERPL-SCNC: 100 MMOL/L (ref 96–108)
CO2 SERPL-SCNC: 26 MMOL/L (ref 21–32)
CREAT SERPL-MCNC: 0.75 MG/DL (ref 0.6–1.3)
DOP CALC AO PEAK VEL: 1.43 M/S
DOP CALC AO VTI: 31.28 CM
DOP CALC LVOT AREA: 2.83 CM2
DOP CALC LVOT DIAMETER: 1.9 CM
DOP CALC MV VTI: 39.76 CM
E WAVE DECELERATION TIME: 188 MS
EOSINOPHIL # BLD AUTO: 0.04 THOUSAND/ÂΜL (ref 0–0.61)
EOSINOPHIL NFR BLD AUTO: 1 % (ref 0–6)
ERYTHROCYTE [DISTWIDTH] IN BLOOD BY AUTOMATED COUNT: 12.3 % (ref 11.6–15.1)
GFR SERPL CREATININE-BSD FRML MDRD: 77 ML/MIN/1.73SQ M
GLUCOSE SERPL-MCNC: 165 MG/DL (ref 65–140)
GLUCOSE SERPL-MCNC: 181 MG/DL (ref 65–140)
GLUCOSE SERPL-MCNC: 216 MG/DL (ref 65–140)
HCT VFR BLD AUTO: 35.6 % (ref 34.8–46.1)
HGB BLD-MCNC: 12.3 G/DL (ref 11.5–15.4)
IMM GRANULOCYTES # BLD AUTO: 0.02 THOUSAND/UL (ref 0–0.2)
IMM GRANULOCYTES NFR BLD AUTO: 0 % (ref 0–2)
LEFT ATRIUM AREA SYSTOLE SINGLE PLANE A4C: 22.4 CM2
LYMPHOCYTES # BLD AUTO: 1.8 THOUSANDS/ÂΜL (ref 0.6–4.47)
LYMPHOCYTES NFR BLD AUTO: 28 % (ref 14–44)
MAGNESIUM SERPL-MCNC: 1.7 MG/DL (ref 1.6–2.6)
MCH RBC QN AUTO: 30.9 PG (ref 26.8–34.3)
MCHC RBC AUTO-ENTMCNC: 34.6 G/DL (ref 31.4–37.4)
MCV RBC AUTO: 89 FL (ref 82–98)
MONOCYTES # BLD AUTO: 0.56 THOUSAND/ÂΜL (ref 0.17–1.22)
MONOCYTES NFR BLD AUTO: 9 % (ref 4–12)
MV E'TISSUE VEL-LAT: 8 CM/S
MV E'TISSUE VEL-SEP: 9 CM/S
MV MEAN GRADIENT: 3 MMHG
MV PEAK A VEL: 1.2 M/S
MV PEAK E VEL: 118 CM/S
MV PEAK GRADIENT: 5 MMHG
MV STENOSIS PRESSURE HALF TIME: 55 MS
MV VALVE AREA P 1/2 METHOD: 4
NEUTROPHILS # BLD AUTO: 3.86 THOUSANDS/ÂΜL (ref 1.85–7.62)
NEUTS SEG NFR BLD AUTO: 61 % (ref 43–75)
NRBC BLD AUTO-RTO: 0 /100 WBCS
NT-PROBNP SERPL-MCNC: 315 PG/ML
PHOSPHATE SERPL-MCNC: 4.3 MG/DL (ref 2.3–4.1)
PLATELET # BLD AUTO: 191 THOUSANDS/UL (ref 149–390)
PMV BLD AUTO: 9.7 FL (ref 8.9–12.7)
POTASSIUM SERPL-SCNC: 4.3 MMOL/L (ref 3.5–5.3)
RBC # BLD AUTO: 3.98 MILLION/UL (ref 3.81–5.12)
RIGHT ATRIUM AREA SYSTOLE A4C: 15.4 CM2
RIGHT VENTRICLE ID DIMENSION: 2.8 CM
SL CV LV EF: 60
SODIUM SERPL-SCNC: 135 MMOL/L (ref 135–147)
WBC # BLD AUTO: 6.33 THOUSAND/UL (ref 4.31–10.16)

## 2022-12-06 RX ORDER — ASPIRIN 81 MG/1
162 TABLET ORAL DAILY
Status: DISCONTINUED | OUTPATIENT
Start: 2022-12-06 | End: 2022-12-08 | Stop reason: HOSPADM

## 2022-12-06 RX ORDER — ANASTROZOLE 1 MG/1
1 TABLET ORAL DAILY
Status: DISCONTINUED | OUTPATIENT
Start: 2022-12-06 | End: 2022-12-08 | Stop reason: HOSPADM

## 2022-12-06 RX ORDER — ACETAMINOPHEN 325 MG/1
650 TABLET ORAL EVERY 6 HOURS PRN
Status: DISCONTINUED | OUTPATIENT
Start: 2022-12-06 | End: 2022-12-08 | Stop reason: HOSPADM

## 2022-12-06 RX ORDER — POLYETHYLENE GLYCOL 3350 17 G/17G
17 POWDER, FOR SOLUTION ORAL DAILY PRN
Status: DISCONTINUED | OUTPATIENT
Start: 2022-12-06 | End: 2022-12-08 | Stop reason: HOSPADM

## 2022-12-06 RX ORDER — INSULIN GLARGINE 100 [IU]/ML
5 INJECTION, SOLUTION SUBCUTANEOUS
Status: DISCONTINUED | OUTPATIENT
Start: 2022-12-06 | End: 2022-12-08 | Stop reason: HOSPADM

## 2022-12-06 RX ORDER — ENOXAPARIN SODIUM 100 MG/ML
40 INJECTION SUBCUTANEOUS DAILY
Status: DISCONTINUED | OUTPATIENT
Start: 2022-12-06 | End: 2022-12-08 | Stop reason: HOSPADM

## 2022-12-06 RX ORDER — LISINOPRIL 20 MG/1
20 TABLET ORAL
Status: DISCONTINUED | OUTPATIENT
Start: 2022-12-06 | End: 2022-12-08 | Stop reason: HOSPADM

## 2022-12-06 RX ORDER — INSULIN LISPRO 100 [IU]/ML
1-6 INJECTION, SOLUTION INTRAVENOUS; SUBCUTANEOUS
Status: DISCONTINUED | OUTPATIENT
Start: 2022-12-06 | End: 2022-12-08 | Stop reason: HOSPADM

## 2022-12-06 RX ORDER — NITROGLYCERIN 0.4 MG/1
0.4 TABLET SUBLINGUAL
Status: DISCONTINUED | OUTPATIENT
Start: 2022-12-06 | End: 2022-12-08 | Stop reason: HOSPADM

## 2022-12-06 RX ORDER — ONDANSETRON 2 MG/ML
4 INJECTION INTRAMUSCULAR; INTRAVENOUS EVERY 6 HOURS PRN
Status: DISCONTINUED | OUTPATIENT
Start: 2022-12-06 | End: 2022-12-08 | Stop reason: HOSPADM

## 2022-12-06 RX ORDER — CALCIUM CARBONATE 200(500)MG
1000 TABLET,CHEWABLE ORAL DAILY PRN
Status: DISCONTINUED | OUTPATIENT
Start: 2022-12-06 | End: 2022-12-08 | Stop reason: HOSPADM

## 2022-12-06 RX ORDER — PREGABALIN 75 MG/1
75 CAPSULE ORAL 2 TIMES DAILY
Status: DISCONTINUED | OUTPATIENT
Start: 2022-12-06 | End: 2022-12-08 | Stop reason: HOSPADM

## 2022-12-06 RX ORDER — LEVOTHYROXINE SODIUM 88 UG/1
88 TABLET ORAL DAILY
Status: DISCONTINUED | OUTPATIENT
Start: 2022-12-06 | End: 2022-12-08 | Stop reason: HOSPADM

## 2022-12-06 RX ADMIN — ASPIRIN 162 MG: 81 TABLET, COATED ORAL at 17:33

## 2022-12-06 RX ADMIN — INSULIN LISPRO 1 UNITS: 100 INJECTION, SOLUTION INTRAVENOUS; SUBCUTANEOUS at 18:32

## 2022-12-06 RX ADMIN — PREGABALIN 75 MG: 75 CAPSULE ORAL at 17:34

## 2022-12-06 RX ADMIN — LEVOTHYROXINE SODIUM 88 MCG: 88 TABLET ORAL at 14:18

## 2022-12-06 RX ADMIN — INSULIN GLARGINE 5 UNITS: 100 INJECTION, SOLUTION SUBCUTANEOUS at 21:13

## 2022-12-06 RX ADMIN — ENOXAPARIN SODIUM 40 MG: 40 INJECTION SUBCUTANEOUS at 17:33

## 2022-12-06 RX ADMIN — ANASTROZOLE 1 MG: 1 TABLET, COATED ORAL at 18:40

## 2022-12-06 RX ADMIN — METOPROLOL TARTRATE 25 MG: 25 TABLET, FILM COATED ORAL at 17:34

## 2022-12-06 RX ADMIN — INSULIN LISPRO 1 UNITS: 100 INJECTION, SOLUTION INTRAVENOUS; SUBCUTANEOUS at 21:12

## 2022-12-06 NOTE — H&P
[address] [address]  H&P- Tatiana Shabazz 1945, 69 y o  female MRN: [id number]  Unit/Bed#: [address] [place] [address] Encounter: [id number]  Primary Care Provider: Castro Dumont MD   Date and time admitted to hospital: 12/6/2022 10:16 AM    Dyspnea on exertion  Assessment & Plan  Reports shortness of breath with minimal exertion for 1 month with hst of CAD s/p stenting of LAD in 2015  Reports some left-sided chest pain that radiates to her left shoulder/neck   BRENT score of 5  · Troponins negative x3  · EKG with T wave inversion in lateral leads  · Consulted cardiology  · Planning for cardiac catheterization tomorrow  · Check ECHO  · Not on statin due to myalgias  · Update A1c and lipid panel  · Continue BB, ASA, ACEi  · Nitro PRN     T wave inversion in EKG  Assessment & Plan  Noted in lateral leads  New compared to AKG from May 2022  · Appreciate cardiology input  · Plan as above     Coronary artery disease involving native coronary artery of native heart without angina pectoris  Assessment & Plan  CAD with stenting to LAD in 2015  · Continue BB, ASA  · Not on statin due to myalgias    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, estrogen receptor positive ([facility] [address] )  Assessment & Plan  · Continue Arimidex     Hypothyroidism  Assessment & Plan  · Continue synthroid     Essential hypertension  Assessment & Plan  BP stable  · Continue Lisinopril and Lopressor     Mixed dyslipidemia  Assessment & Plan  · Not on statin due to myalgia  · Update lipid panel     Type 2 diabetes mellitus without complication, without long-term current use of insulin (HCC)  Assessment & Plan  Lab Results   Component Value Date    HGBA1C 5 8 (H) 10/18/2022       No results for input(s): POCGLU in the last 72 hours      Blood Sugar Average: Last 72 hrs:  · Hold home regimen, Glipizide and Metformin  · Monitor ACCU checks AC+HS with lispro insulin sliding scale coverage   · Add Lantus 5 units HS     VTE Pharmacologic Prophylaxis: VTE Score: 3 Moderate Risk (Score 3-4) - Pharmacological DVT Prophylaxis Ordered: enoxaparin (Lovenox)  Code Status: Level 1 - Full Code full code, level 1  Discussion with family: Updated  () at bedside  Anticipated Length of Stay: Patient will be admitted on an inpatient basis with an anticipated length of stay of greater than 2 midnights secondary to SOB and EKG changes  Total Time for Visit, including Counseling / Coordination of Care: 45 minutes Greater than 50% of this total time spent on direct patient counseling and coordination of care  Chief Complaint: dyspnea on exertion, chest tightness/discomfort     History of Present Illness:  Yesenia Lopez is a 69 y o  female with a PMH of CAD s/p PCI, HTN, HLD, hypothyroidism, breast cancer who presents with dyspnea on exertion for about 1 month  Patient reports shortness of breath with minimal exertion; unable to walk 1 flight of stairs  Reports sometimes having difficulty ambulating to the bathroom  Her  urged her to seek medical attention in the ED due to persistent symptoms stating this is how she presenting when she had her stent placed  Workup in the ED showed a normal troponin but noted T wave inversions in lateral leads  She reported left-sided chest pain that radiated around her left neck/shoulder  Patient underwent a STAT echocardiogram and was seen by cardiology  She will be admitted for cardiac catheterization tomorrow  Review of Systems:  Review of Systems   Constitutional: Negative for chills and fever  HENT: Negative for ear pain and sore throat  Eyes: Negative for pain and visual disturbance  Respiratory: Positive for shortness of breath  Negative for cough  Cardiovascular: Positive for chest pain  Negative for palpitations  Gastrointestinal: Negative for abdominal pain and vomiting  Genitourinary: Negative for dysuria and hematuria     Musculoskeletal: Negative for arthralgias and back pain  Skin: Negative for color change and rash  Neurological: Negative for dizziness, seizures, syncope and weakness  Psychiatric/Behavioral: The patient is not nervous/anxious  All other systems reviewed and are negative  Past Medical and Surgical History:   Past Medical History:   Diagnosis Date   • Abdominal pain     LLQ on occasion   • Angina pectoris ([facility] [address] )    • Arthritis    • Breast cancer ([facility] )    • Cancer ([facility] )     breast left   • Colon polyp    • Constipation     at times   • Coronary artery disease    • Diabetes mellitus ([facility] )    • Diarrhea     at times   • Disease of thyroid gland    • Hemorrhoids    • Hypercholesterolemia    • Hypertension    • Neuropathy     both legs and feet   • Obesity    • Osteoporosis    • Otitis media    • Ovarian ca [facility]) 1997   • 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[facility] [address] )    • Shingles    • 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   • Thyroid cancer ([facility] )    • Urinary tract infection        Past Surgical History:   Procedure Laterality Date   • ANGIOPLASTY      stent x 1   • APPENDECTOMY     • BACK SURGERY     • BAND HEMORRHOIDECTOMY     • BRAIN SURGERY  1993    meningioma   • BREAST BIOPSY     • CARPAL TUNNEL RELEASE     • CERVICAL FUSION     • CHOLECYSTECTOMY     • COLONOSCOPY      pt see Dr Adama Stroud  Up to date with her colonoscopy  • COLONOSCOPY     • CORONARY STENT PLACEMENT      Dec 2015   • EYE SURGERY      cataract surgery   • GALLBLADDER SURGERY     • HYSTERECTOMY  1989   • MAMMO (HISTORICAL)      up to date  see gyn   • MASTECTOMY Left 07/13/2021   • MASTECTOMY W/ SENTINEL NODE BIOPSY Left 07/13/2021    Procedure: BREAST ROSLYN  DIRECTED MASTECTOMY, SENTINEL LYMPH NODE BIOPSY, LYMPHATIC MAPPING WITH BLUE DYE AND RADIOACTIVE DYE (INJECT AT 1500 BY DR ANDREWS IN THE OR);   Surgeon: Miguelina Hampton MD;  Location: [facility];  Service: Surgical Oncology   • OOPHORECTOMY Bilateral 1997   • SPINE SURGERY     • THYROIDECTOMY     • UPPER GASTROINTESTINAL ENDOSCOPY     • US BREAST NEEDLE LOC LEFT Left 05/06/2021   • US GUIDANCE BREAST BIOPSY LEFT EACH ADDITIONAL Left 05/06/2021   • US GUIDED BREAST BIOPSY LEFT COMPLETE Left 03/15/2021   • US GUIDED BREAST BIOPSY LEFT COMPLETE Left 05/06/2021       Meds/Allergies:  Prior to Admission medications    Medication Sig Start Date End Date Taking? Authorizing Provider   anastrozole (ARIMIDEX) 1 mg tablet Take 1 tablet (1 mg total) by mouth daily 10/24/22   Isa Mcnamara MD   aspirin (ECOTRIN LOW STRENGTH) 81 mg EC tablet Take 162 mg by mouth daily    Historical Provider, MD   clotrimazole-betamethasone (LOTRISONE) 1-0 05 % cream Apply topically 2 (two) times a day 6/1/21   Eran Hayes MD   glipiZIDE (GLUCOTROL) 10 mg tablet Take 1 tablet (10 mg total) by mouth 2 (two) times a day before meals 9/12/22   Eran Hayes MD   Insulin Pen Needle (Sure Comfort Pen Needles) 31G X 5 MM MISC by Does not apply route daily 9/24/20   Eran Hayes MD   Lancets (OneTouch Delica Plus [id number]) [facility] USE DAILY 3/31/22   Eran Hayes MD   levothyroxine 88 mcg tablet Take 1 tablet (88 mcg total) by mouth daily 11/7/22   Eran Hayes MD   liraglutide (VICTOZA) injection Inject 1 2 mg under the skin daily at bedtime  7/12/17   Historical Provider, MD   lisinopril (ZESTRIL) 20 mg tablet TAKE 1 TABLET(20 MG) BY MOUTH DAILY AT BEDTIME 6/27/22   Eran Hayes MD   metFORMIN (GLUCOPHAGE) 1000 MG tablet Take 1 tablet (1,000 mg total) by mouth 2 (two) times a day with meals 11/7/22   Eran Hayes MD   metoprolol tartrate (LOPRESSOR) 25 mg tablet Take 1 tablet (25 mg total) by mouth every 12 (twelve) hours 9/1/22   Eran Hayes MD   OneTouch Ultra test strip Use 1 each daily Use as instructed 4/11/22   Eran Hayes MD   pregabalin (LYRICA) 75 mg capsule Take 1 capsule (75 mg total) by mouth 2 (two) times a day 10/10/22   Eran Hayes MD     I have reviewed home medications with patient personally  Allergies:    Allergies   Allergen Reactions   • Duloxetine Other (See Comments)     Extreme somnolence/lethargy   • Sulfa Antibiotics Rash       Social History:  Marital Status: /Civil Union   Occupation: retired   Patient Pre-hospital Living Situation: Home  Patient Pre-hospital Level of Mobility: walks with cane  Patient Pre-hospital Diet Restrictions: diabetic   Substance Use History:   Social History     Substance and Sexual Activity   Alcohol Use Never     Social History     Tobacco Use   Smoking Status Never   Smokeless Tobacco Never     Social History     Substance and Sexual Activity   Drug Use Never       Family History:  Family History   Problem Relation Age of Onset   • Diabetes Mother    • Heart disease Mother    • Dementia Mother    • Esophageal cancer Father 61   • Endometrial cancer Sister 76   • Asthma Sister    • Cancer Sister    • No Known Problems Sister    • No Known Problems Sister    • No Known Problems Sister    • Stomach cancer Brother 70   • Multiple myeloma Brother 67   • Cancer Brother    • No Known Problems Maternal Grandmother    • Prostate cancer Maternal Grandfather    • No Known Problems Paternal Grandmother    • Prostate cancer Paternal Grandfather    • Breast cancer Maternal Aunt 46   • No Known Problems Paternal Aunt    • No Known Problems Paternal Aunt    • Asthma Daughter    • Asthma Son    • Depression Son    • Breast cancer Other 39   • Breast cancer Other 39   • Diabetes Family    • Cancer Family    • Arthritis Family    • Heart disease Family        Physical Exam:     Vitals:   Blood Pressure: 158/82 (12/06/22 1630)  Pulse: 80 (12/06/22 1630)  Temperature: 98 6 °F (37 °C) (12/06/22 1019)  Respirations: 20 (12/06/22 1415)  Height: 5' 1" (154 9 cm) (12/06/22 1346)  Weight - Scale: 90 7 kg (200 lb) (12/06/22 1346)  SpO2: 99 % (12/06/22 1630)    Physical Exam  Vitals and nursing note reviewed  Constitutional:       General: She is not in acute distress  Appearance: She is not toxic-appearing or diaphoretic     HENT:      Head: Normocephalic  Mouth/Throat:      Mouth: Mucous membranes are moist    Eyes:      Conjunctiva/sclera: Conjunctivae normal    Cardiovascular:      Rate and Rhythm: Normal rate  Pulmonary:      Effort: Pulmonary effort is normal       Breath sounds: Normal breath sounds  No wheezing, rhonchi or rales  Abdominal:      General: Bowel sounds are normal       Palpations: Abdomen is soft  Musculoskeletal:         General: Normal range of motion  Cervical back: Normal range of motion  Right lower leg: No edema  Left lower leg: No edema  Skin:     General: Skin is warm and dry  Capillary Refill: Capillary refill takes less than 2 seconds  Neurological:      Mental Status: She is alert and oriented to person, place, and time  Mental status is at baseline  Psychiatric:         Mood and Affect: Mood normal          Behavior: Behavior normal          Thought Content: Thought content normal           Additional Data:     Lab Results:  Results from last 7 days   Lab Units 12/06/22  1034   WBC Thousand/uL 6 33   HEMOGLOBIN g/dL 12 3   HEMATOCRIT % 35 6   PLATELETS Thousands/uL 191   NEUTROS PCT % 61   LYMPHS PCT % 28   MONOS PCT % 9   EOS PCT % 1     Results from last 7 days   Lab Units 12/06/22  1034   SODIUM mmol/L 135   POTASSIUM mmol/L 4 3   CHLORIDE mmol/L 100   CO2 mmol/L 26   BUN mg/dL 15   CREATININE mg/dL 0 75   ANION GAP mmol/L 9   CALCIUM mg/dL 9 0   GLUCOSE RANDOM mg/dL 216*                       Lines/Drains:  Invasive Devices     Peripheral Intravenous Line  Duration           Peripheral IV 12/06/22 Right Antecubital <1 day          Drain  Duration           Closed/Suction Drain Inferior; Left Breast Bulb 19 Fr  511 days    Closed/Suction Drain Left;Superior Breast Bulb 19 Fr  511 days                    Imaging: Reviewed radiology reports from this admission including: chest xray  XR chest 1 view portable   Final Result by Maria L Mckinney MD (12/06 1221)      No acute cardiopulmonary disease  Workstation performed: [id number]             EKG and Other Studies Reviewed on Admission:   · EKG: NSR  HR 70     ** Please Note: This note has been constructed using a voice recognition system   **

## 2022-12-06 NOTE — ASSESSMENT & PLAN NOTE
Lab Results   Component Value Date    HGBA1C 5 8 (H) 10/18/2022       No results for input(s): POCGLU in the last 72 hours      Blood Sugar Average: Last 72 hrs:  · Hold home regimen, Glipizide and Metformin  · Monitor ACCU checks AC+HS with lispro insulin sliding scale coverage   · Add Lantus 5 units HS

## 2022-12-06 NOTE — ASSESSMENT & PLAN NOTE
Noted in lateral leads  New compared to AKG from May 2022     · Appreciate cardiology input  · Plan as above

## 2022-12-06 NOTE — ASSESSMENT & PLAN NOTE
Reports shortness of breath with minimal exertion for 1 month with hst of CAD s/p stenting of LAD in 2015  Reports some left-sided chest pain that radiates to her left shoulder/neck   BRENT score of 5  · Troponins negative x3  · EKG with T wave inversion in lateral leads  · Consulted cardiology  · Planning for cardiac catheterization tomorrow  · Check ECHO  · Not on statin due to myalgias  · Update A1c and lipid panel  · Continue BB, ASA, ACEi  · Nitro PRN

## 2022-12-06 NOTE — CONSULTS
Consultation - Cardiology   Christin Vizcaino 69 y o  female MRN: [id number]  Unit/Bed#: ED 05 Encounter: [id number]    Assessment/Plan     Assessment:  1  Shortness of breath and activity change  2  Coronary artery disease with history of proximal LAD stent in 2015  3  Dyslipidemia  4  Hypertension      Plan:  Patient has been admitted to the hospitalist service  1  Long discussion with patient and family, she will be n p o  after midnight to undergo diagnostic left heart catheterization  2   Patient not therapy as it has caused myalgias    3  Continue aspirin, beta-blocker and Zestril    4  Will obtain 2D echocardiogram to evaluate cardiac function, structure and wall motion  5   Further orders as patient condition and testing warrant      History of Present Illness   Physician Requesting Consult: Alanis Multani MD  Reason for Consult / Principal Problem: Shortness of breath with change in ability to perform activity and patient with known coronary artery disease and previous LAD stent      HPI: Christin Vizcaino is a 69y o  year old female who presented to the emergency room with complaints of shortness of breath with exertion, change in ability to perform activity causing more shortness of breath, intermittent chest heaviness with activity and lower extremity edema over the past 3 weeks  Her family became more concerned and encouraged her to come to the emergency room for evaluation  Twelve-lead EKG demonstrated ST depression seen in lateral leads  High-sensitivity troponins are 4 and 5 with a NT proBNP of 315  Patient does note difficulty performing activities, and her  also remarked that she comes short of breath climbing a flight of steps and needs to stop which is unusual for her  Patient follows with Dr Cherise Mittal for history of coronary artery disease and dyslipidemia  In 2015 patient underwent cardiac catheterization and stenting of her proximal LAD    She states since that time she been doing well, but is concerned that her current symptoms were similar symptoms she had in 2015  Inpatient consult to Cardiology  Consult performed by: BERNADETTE Phipps  Consult ordered by: Leandra Sever, CRNP          Review of Systems   Constitutional: Positive for activity change and fatigue  Negative for appetite change and fever  HENT: Negative  Negative for congestion, facial swelling, rhinorrhea and sore throat  Eyes: Negative  Negative for photophobia and visual disturbance  Respiratory: Positive for chest tightness and shortness of breath  Cardiovascular: Negative  Negative for chest pain, palpitations and leg swelling  Gastrointestinal: Negative for abdominal distention, constipation, diarrhea, nausea and vomiting  Endocrine: Negative  Negative for polydipsia, polyphagia and polyuria  Genitourinary: Negative  Negative for difficulty urinating  Musculoskeletal: Negative  Skin: Negative  Neurological: Negative  Negative for dizziness, syncope, weakness and light-headedness  Hematological: Negative  Psychiatric/Behavioral: Negative          Historical Information   Past Medical History:   Diagnosis Date   • Abdominal pain     LLQ on occasion   • Angina pectoris ([facility] )    • Arthritis    • Breast cancer ([facility] )    • Cancer ([facility] )     breast left   • Colon polyp    • Constipation     at times   • Coronary artery disease    • Diabetes mellitus ([facility] )    • Diarrhea     at times   • Disease of thyroid gland    • Hemorrhoids    • Hypercholesterolemia    • Hypertension    • Neuropathy     both legs and feet   • Obesity    • Osteoporosis    • Otitis media    • Ovarian ca [facility]) 1997   • 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[facility] )    • Shingles    • 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   • [facility]    • Urinary tract infection      Past Surgical History:   Procedure Laterality Date   • ANGIOPLASTY      stent x 1   • APPENDECTOMY     • BACK SURGERY     • BAND HEMORRHOIDECTOMY     • BRAIN SURGERY  1993    meningioma   • BREAST BIOPSY     • CARPAL TUNNEL RELEASE     • CERVICAL FUSION     • CHOLECYSTECTOMY     • COLONOSCOPY      pt see Dr Zayra Hernandez  Up to date with her colonoscopy  • COLONOSCOPY     • CORONARY STENT PLACEMENT      Dec 2015   • EYE SURGERY      cataract surgery   • GALLBLADDER SURGERY     • HYSTERECTOMY  1989   • MAMMO (HISTORICAL)      up to date  see gyn   • MASTECTOMY Left 07/13/2021   • MASTECTOMY W/ SENTINEL NODE BIOPSY Left 07/13/2021    Procedure: BREAST ROSLYN  DIRECTED MASTECTOMY, SENTINEL LYMPH NODE BIOPSY, LYMPHATIC MAPPING WITH BLUE DYE AND RADIOACTIVE DYE (INJECT AT 1500 BY DR ANDREWS IN THE OR);   Surgeon: Sukhdeep Young MD;  Location: [facility];  Service: Surgical Oncology   • OOPHORECTOMY Bilateral 1997   • SPINE SURGERY     • THYROIDECTOMY     • UPPER GASTROINTESTINAL ENDOSCOPY     • US BREAST NEEDLE LOC LEFT Left 05/06/2021   • US GUIDANCE BREAST BIOPSY LEFT EACH ADDITIONAL Left 05/06/2021   • US GUIDED BREAST BIOPSY LEFT COMPLETE Left 03/15/2021   • US GUIDED BREAST BIOPSY LEFT COMPLETE Left 05/06/2021     Social History     Substance and Sexual Activity   Alcohol Use No     Social History     Substance and Sexual Activity   Drug Use Never     E-Cigarette/Vaping   • E-Cigarette Use Never User      E-Cigarette/Vaping Substances   • Nicotine No    • THC No    • CBD No    • Flavoring No    • Other No    • Unknown No      Social History     Tobacco Use   Smoking Status Never   Smokeless Tobacco Never     Family History:   Family History   Problem Relation Age of Onset   • Diabetes Mother    • Heart disease Mother    • Dementia Mother    • Esophageal cancer Father 61   • Endometrial cancer Sister 76   • Asthma Sister    • Cancer Sister    • No Known Problems Sister    • No Known Problems Sister    • No Known Problems Sister    • Stomach cancer Brother 70   • Multiple myeloma Brother 67   • Cancer Brother    • No Known Problems Maternal Grandmother    • Prostate cancer Maternal Grandfather    • No Known Problems Paternal Grandmother    • Prostate cancer Paternal Grandfather    • Breast cancer Maternal Aunt 46   • No Known Problems Paternal Aunt    • No Known Problems Paternal Aunt    • Asthma Daughter    • Asthma Son    • Depression Son    • Breast cancer Other 39   • Breast cancer Other 39   • Diabetes Family    • Cancer Family    • Arthritis Family    • Heart disease Family        Meds/Allergies   all current active meds have been reviewed, current meds:   Current Facility-Administered Medications   Medication Dose Route Frequency   • acetaminophen (TYLENOL) tablet 650 mg  650 mg Oral Q6H PRN   • anastrozole (ARIMIDEX) tablet 1 mg  1 mg Oral Daily   • aspirin (ECOTRIN LOW STRENGTH) EC tablet 162 mg  162 mg Oral Daily   • calcium carbonate (TUMS) chewable tablet 1,000 mg  1,000 mg Oral Daily PRN   • enoxaparin (LOVENOX) subcutaneous injection 40 mg  40 mg Subcutaneous Daily   • levothyroxine tablet 88 mcg  88 mcg Oral Daily   • lisinopril (ZESTRIL) tablet 20 mg  20 mg Oral HS   • metoprolol tartrate (LOPRESSOR) tablet 25 mg  25 mg Oral Q12H   • nitroglycerin (NITROSTAT) SL tablet 0 4 mg  0 4 mg Sublingual Q5 Min PRN   • ondansetron (ZOFRAN) injection 4 mg  4 mg Intravenous Q6H PRN   • polyethylene glycol (MIRALAX) packet 17 g  17 g Oral Daily PRN   • pregabalin (LYRICA) capsule 75 mg  75 mg Oral BID    and PTA meds:   Prior to Admission Medications   Prescriptions Last Dose Informant Patient Reported? Taking?    Insulin Pen Needle (Sure Comfort Pen Needles) 31G X 5 MM MISC   No No   Sig: by Does not apply route daily   Lancets (OneTouch Delica Plus [id number]) MISC   No No   Sig: USE DAILY   OneTouch Ultra test strip   No No   Sig: Use 1 each daily Use as instructed   anastrozole (ARIMIDEX) 1 mg tablet   No No   Sig: Take 1 tablet (1 mg total) by mouth daily   aspirin (ECOTRIN LOW STRENGTH) 81 mg EC tablet   Yes No   Sig: Take 162 mg by mouth daily clotrimazole-betamethasone (LOTRISONE) 1-0 05 % cream   No No   Sig: Apply topically 2 (two) times a day   glipiZIDE (GLUCOTROL) 10 mg tablet   No No   Sig: Take 1 tablet (10 mg total) by mouth 2 (two) times a day before meals   levothyroxine 88 mcg tablet   No No   Sig: Take 1 tablet (88 mcg total) by mouth daily   liraglutide (VICTOZA) injection   Yes No   Sig: Inject 1 2 mg under the skin daily at bedtime    lisinopril (ZESTRIL) 20 mg tablet   No No   Sig: TAKE 1 TABLET(20 MG) BY MOUTH DAILY AT BEDTIME   metFORMIN (GLUCOPHAGE) 1000 MG tablet   No No   Sig: Take 1 tablet (1,000 mg total) by mouth 2 (two) times a day with meals   metoprolol tartrate (LOPRESSOR) 25 mg tablet   No No   Sig: Take 1 tablet (25 mg total) by mouth every 12 (twelve) hours   pregabalin (LYRICA) 75 mg capsule   No No   Sig: Take 1 capsule (75 mg total) by mouth 2 (two) times a day      Facility-Administered Medications: None     Allergies   Allergen Reactions   • Duloxetine Other (See Comments)     Extreme somnolence/lethargy   • Sulfa Antibiotics Rash       Objective   Vitals: Blood pressure 168/82, pulse 70, temperature 98 6 °F (37 °C), resp  rate 20, height 5' 1" (1 549 m), weight 90 7 kg (200 lb), SpO2 99 %, currently breastfeeding  Orthostatic Blood Pressures    Flowsheet Row Most Recent Value   Blood Pressure 168/82 filed at 12/06/2022 1346          No intake or output data in the 24 hours ending 12/06/22 1440    Invasive Devices     Peripheral Intravenous Line  Duration           Peripheral IV 12/06/22 Right Antecubital <1 day          Drain  Duration           Closed/Suction Drain Inferior; Left Breast Bulb 19 Fr  510 days    Closed/Suction Drain Left;Superior Breast Bulb 19 Fr  510 days                Physical Exam  Vitals and nursing note reviewed  Constitutional:       General: She is not in acute distress  Appearance: Normal appearance  She is morbidly obese     HENT:      Right Ear: External ear normal       Left Ear: External ear normal       Nose: Nose normal    Eyes:      General: No scleral icterus  Right eye: No discharge  Left eye: No discharge  Cardiovascular:      Rate and Rhythm: Normal rate and regular rhythm  Pulses: Normal pulses  Heart sounds: Normal heart sounds  Pulmonary:      Effort: Pulmonary effort is normal  No accessory muscle usage or respiratory distress  Breath sounds: Examination of the right-lower field reveals decreased breath sounds  Examination of the left-lower field reveals decreased breath sounds  Decreased breath sounds present  Abdominal:      General: Bowel sounds are normal  There is no distension  Palpations: Abdomen is soft  Musculoskeletal:      Right lower leg: No edema  Left lower leg: No edema  Skin:     General: Skin is warm and dry  Capillary Refill: Capillary refill takes less than 2 seconds  Neurological:      General: No focal deficit present  Mental Status: She is alert and oriented to person, place, and time  Mental status is at baseline  Psychiatric:         Mood and Affect: Mood normal          Behavior: Behavior is cooperative  Lab Results:   I have personally reviewed pertinent lab results      CBC with diff:   Results from last 7 days   Lab Units 12/06/22  1034   WBC Thousand/uL 6 33   RBC Million/uL 3 98   HEMOGLOBIN g/dL 12 3   HEMATOCRIT % 35 6   MCV fL 89   MCH pg 30 9   MCHC g/dL 34 6   RDW % 12 3   MPV fL 9 7   PLATELETS Thousands/uL 191     CMP:   Results from last 7 days   Lab Units 12/06/22  1034   SODIUM mmol/L 135   CHLORIDE mmol/L 100   CO2 mmol/L 26   BUN mg/dL 15   CREATININE mg/dL 0 75   CALCIUM mg/dL 9 0   EGFR ml/min/1 73sq m 77     HS Troponin:   0   Lab Value Date/Time    HSTNI0 4 12/06/2022 1034    HSTNI2 5 12/06/2022 1237     BNP:   Results from last 7 days   Lab Units 12/06/22  1034   POTASSIUM mmol/L 4 3   CHLORIDE mmol/L 100   CO2 mmol/L 26   BUN mg/dL 15   CREATININE mg/dL 0 75 CALCIUM mg/dL 9 0   EGFR ml/min/1 73sq m 77     Coags:     TSH:     Magnesium:   Results from last 7 days   Lab Units 12/06/22  1034   MAGNESIUM mg/dL 1 7     Lipid Profile:     Imaging: I have personally reviewed pertinent reports      EKG: Sinus rhythm with ST depression seen in lateral leads  VTE Prophylaxis: Sequential compression device David Jarod)     Code Status: Level 1 - Full Code  Advance Directive and Living Will:      Power of :    POLST:      Juan Francisco  Cardiology

## 2022-12-06 NOTE — ED PROVIDER NOTES
History  Chief Complaint   Patient presents with   • Shortness of Breath     States sob with exertion for about 3 weeks and has noticed some edema in legs     60-year-old female, presents with shortness of breath  Patient states for the past few weeks she has been having worsening shortness of breath with exertion  Reports moderate dyspnea that is worse with exertion, improved with rest   Has associated mild burning discomfort in left chest as well as lightheadedness  Denies any fevers  History provided by:  Patient   used: No    Shortness of Breath  Associated symptoms: chest pain    Associated symptoms: no fever        Prior to Admission Medications   Prescriptions Last Dose Informant Patient Reported? Taking?    Insulin Pen Needle (Sure Comfort Pen Needles) 31G X 5 MM MISC   No No   Sig: by Does not apply route daily   Lancets (OneTouch Delica Plus [id number]) MISC   No No   Sig: USE DAILY   OneTouch Ultra test strip   No No   Sig: Use 1 each daily Use as instructed   anastrozole (ARIMIDEX) 1 mg tablet   No No   Sig: Take 1 tablet (1 mg total) by mouth daily   aspirin (ECOTRIN LOW STRENGTH) 81 mg EC tablet   Yes No   Sig: Take 162 mg by mouth daily   clotrimazole-betamethasone (LOTRISONE) 1-0 05 % cream   No No   Sig: Apply topically 2 (two) times a day   glipiZIDE (GLUCOTROL) 10 mg tablet   No No   Sig: Take 1 tablet (10 mg total) by mouth 2 (two) times a day before meals   levothyroxine 88 mcg tablet   No No   Sig: Take 1 tablet (88 mcg total) by mouth daily   liraglutide (VICTOZA) injection   Yes No   Sig: Inject 1 2 mg under the skin daily at bedtime    lisinopril (ZESTRIL) 20 mg tablet   No No   Sig: TAKE 1 TABLET(20 MG) BY MOUTH DAILY AT BEDTIME   metFORMIN (GLUCOPHAGE) 1000 MG tablet   No No   Sig: Take 1 tablet (1,000 mg total) by mouth 2 (two) times a day with meals   metoprolol tartrate (LOPRESSOR) 25 mg tablet   No No   Sig: Take 1 tablet (25 mg total) by mouth every 12 (twelve) hours   pregabalin (LYRICA) 75 mg capsule   No No   Sig: Take 1 capsule (75 mg total) by mouth 2 (two) times a day      Facility-Administered Medications: None       Past Medical History:   Diagnosis Date   • Abdominal pain     LLQ on occasion   • Angina pectoris (HCC)    • Arthritis    • Breast cancer (HCC)    • Cancer (HCC)     breast left   • Colon polyp    • Constipation     at times   • Coronary artery disease    • [facility]    • Diarrhea     at times   • Disease of thyroid gland    • Hemorrhoids    • Hypercholesterolemia    • Hypertension    • Neuropathy     both legs and feet   • Obesity    • Osteoporosis    • Otitis media    • Ovarian ca [facility]) 1997   • 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[facility] )    • Shingles    • 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   • Thyroid cancer ([facility] )    • Urinary tract infection        Past Surgical History:   Procedure Laterality Date   • ANGIOPLASTY      stent x 1   • APPENDECTOMY     • BACK SURGERY     • BAND HEMORRHOIDECTOMY     • BRAIN SURGERY  1993    meningioma   • BREAST BIOPSY     • CARPAL TUNNEL RELEASE     • CERVICAL FUSION     • CHOLECYSTECTOMY     • COLONOSCOPY      pt see Dr Veronica Hdez  Up to date with her colonoscopy  • COLONOSCOPY     • CORONARY STENT PLACEMENT      Dec 2015   • EYE SURGERY      cataract surgery   • GALLBLADDER SURGERY     • HYSTERECTOMY  1989   • MAMMO (HISTORICAL)      up to date  see gyn   • MASTECTOMY Left 07/13/2021   • MASTECTOMY W/ SENTINEL NODE BIOPSY Left 07/13/2021    Procedure: BREAST ROSLYN  DIRECTED MASTECTOMY, SENTINEL LYMPH NODE BIOPSY, LYMPHATIC MAPPING WITH BLUE DYE AND RADIOACTIVE DYE (INJECT AT 1500 BY DR ANDREWS IN THE OR);   Surgeon: Kiran Kwong MD;  Location: [facility];  Service: Surgical Oncology   • OOPHORECTOMY Bilateral 1997   • SPINE SURGERY     • THYROIDECTOMY     • UPPER GASTROINTESTINAL ENDOSCOPY     • US BREAST NEEDLE LOC LEFT Left 05/06/2021   • US GUIDANCE BREAST BIOPSY LEFT EACH ADDITIONAL Left 05/06/2021   • US GUIDED BREAST BIOPSY LEFT COMPLETE Left 03/15/2021   • US GUIDED BREAST BIOPSY LEFT COMPLETE Left 05/06/2021       Family History   Problem Relation Age of Onset   • Diabetes Mother    • Heart disease Mother    • Dementia Mother    • Esophageal cancer Father 61   • Endometrial cancer Sister 76   • Asthma Sister    • Cancer Sister    • No Known Problems Sister    • No Known Problems Sister    • No Known Problems Sister    • Stomach cancer Brother 70   • Multiple myeloma Brother 67   • Cancer Brother    • No Known Problems Maternal Grandmother    • Prostate cancer Maternal Grandfather    • No Known Problems Paternal Grandmother    • Prostate cancer Paternal Grandfather    • Breast cancer Maternal Aunt 46   • No Known Problems Paternal Aunt    • No Known Problems Paternal [de-identified]    • Asthma Daughter    • Asthma Son    • Depression Son    • Breast cancer Other 39   • Breast cancer Other 39   • Diabetes Family    • Cancer Family    • Arthritis Family    • Heart disease Family      I have reviewed and agree with the history as documented  E-Cigarette/Vaping   • E-Cigarette Use Never User      E-Cigarette/Vaping Substances   • Nicotine No    • THC No    • CBD No    • Flavoring No    • Other No    • Unknown No      Social History     Tobacco Use   • Smoking status: Never   • Smokeless tobacco: Never   Vaping Use   • Vaping Use: Never used   Substance Use Topics   • Alcohol use: No   • Drug use: Never       Review of Systems   Constitutional: Negative  Negative for fever  HENT: Negative  Eyes: Negative  Respiratory: Positive for shortness of breath  Cardiovascular: Positive for chest pain  Negative for palpitations  Gastrointestinal: Negative  Genitourinary: Negative  Musculoskeletal: Negative  Skin: Negative  Hematological: Negative  Physical Exam  Physical Exam  Vitals and nursing note reviewed  Constitutional:       General: She is not in acute distress    HENT: Head: Normocephalic and atraumatic  Eyes:      Extraocular Movements: Extraocular movements intact  Pupils: Pupils are equal, round, and reactive to light  Cardiovascular:      Rate and Rhythm: Normal rate and regular rhythm  Pulmonary:      Effort: Pulmonary effort is normal       Breath sounds: Normal breath sounds  No wheezing or rales  Chest:      Chest wall: No deformity or tenderness  Abdominal:      Palpations: Abdomen is soft  Tenderness: There is no abdominal tenderness  Musculoskeletal:         General: Normal range of motion  Skin:     General: Skin is warm and dry  Neurological:      General: No focal deficit present  Mental Status: She is alert and oriented to person, place, and time           Vital Signs  ED Triage Vitals [12/06/22 1019]   Temperature Pulse Respirations Blood Pressure SpO2   98 6 °F (37 °C) 82 20 111/81 96 %      Temp src Heart Rate Source Patient Position - Orthostatic VS BP Location FiO2 (%)   -- -- -- -- --      Pain Score       No Pain           Vitals:    12/06/22 1019 12/06/22 1238   BP: 111/81 168/82   Pulse: 82 64         Visual Acuity      ED Medications  Medications - No data to display    Diagnostic Studies  Results Reviewed     Procedure Component Value Units Date/Time    HS Troponin I 2hr [[id number]]  (Normal) Collected: 12/06/22 1237    Lab Status: Final result Specimen: Blood from Arm, Right Updated: 12/06/22 1312     hs TnI 2hr 5 ng/L      Delta 2hr hsTnI 1 ng/L     HS Troponin I 4hr [[id number]]     Lab Status: No result Specimen: Blood     HS Troponin 0hr (reflex protocol) [[id number]]  (Normal) Collected: 12/06/22 1034    Lab Status: Final result Specimen: Blood from Arm, Right Updated: 12/06/22 1110     hs TnI 0hr 4 ng/L     Basic metabolic panel [[id number]]  (Abnormal) Collected: 12/06/22 1034    Lab Status: Final result Specimen: Blood from Arm, Right Updated: 12/06/22 1102     Sodium 135 mmol/L      Potassium 4 3 mmol/L      Chloride 100 mmol/L      CO2 26 mmol/L      ANION GAP 9 mmol/L      BUN 15 mg/dL      Creatinine 0 75 mg/dL      Glucose 216 mg/dL      Calcium 9 0 mg/dL      eGFR 77 ml/min/1 73sq m     Narrative:      [place] guidelines for Chronic Kidney Disease (CKD):   •  Stage 1 with normal or high GFR (GFR > 90 mL/min/1 73 square meters)  •  Stage 2 Mild CKD (GFR = 60-89 mL/min/1 73 square meters)  •  Stage 3A Moderate CKD (GFR = 45-59 mL/min/1 73 square meters)  •  Stage 3B Moderate CKD (GFR = 30-44 mL/min/1 73 square meters)  •  Stage 4 Severe CKD (GFR = 15-29 mL/min/1 73 square meters)  •  Stage 5 End Stage CKD (GFR <15 mL/min/1 73 square meters)  Note: GFR calculation is accurate only with a steady state creatinine    NT-BNP PRO [[id number]]  (Normal) Collected: 12/06/22 1034    Lab Status: Final result Specimen: Blood from Arm, Right Updated: 12/06/22 1102     NT-proBNP 315 pg/mL     CBC and differential [[id number]] Collected: 12/06/22 1034    Lab Status: Final result Specimen: Blood from Arm, Right Updated: 12/06/22 1039     WBC 6 33 Thousand/uL      RBC 3 98 Million/uL      Hemoglobin 12 3 g/dL      Hematocrit 35 6 %      MCV 89 fL      MCH 30 9 pg      MCHC 34 6 g/dL      RDW 12 3 %      MPV 9 7 fL      Platelets 449 Thousands/uL      nRBC 0 /100 WBCs      Neutrophils Relative 61 %      Immat GRANS % 0 %      Lymphocytes Relative 28 %      Monocytes Relative 9 %      Eosinophils Relative 1 %      Basophils Relative 1 %      Neutrophils Absolute 3 86 Thousands/µL      Immature Grans Absolute 0 02 Thousand/uL      Lymphocytes Absolute 1 80 Thousands/µL      Monocytes Absolute 0 56 Thousand/µL      Eosinophils Absolute 0 04 Thousand/µL      Basophils Absolute 0 05 Thousands/µL                  XR chest 1 view portable   Final Result by Gaurang Sims MD (12/06 1221)      No acute cardiopulmonary disease                    Workstation performed: [id number]                    Procedures  ECG 12 Lead Documentation Only    Date/Time: 12/6/2022 10:45 AM  Performed by: Tricia Brody MD  Authorized by: Tricia Brody MD     ECG reviewed by me, the ED Provider: yes    Patient location:  ED  Previous ECG:     Previous ECG:  Compared to current  Rate:     ECG rate assessment: normal    Rhythm:     Rhythm: sinus rhythm    Ectopy:     Ectopy: none    QRS:     QRS axis:  Normal    QRS intervals:  Normal  Conduction:     Conduction: normal    Comments:      Sinus rhythm at 74, lateral T wave depressions             ED Course  ED Course as of 12/06/22 1348   Tue Dec 06, 2022   1345 Patient with risk factors for ACS including known coronary artery disease, with concerning symptoms of dyspnea and chest discomfort on exertion  EKG with new changes, discussed with cardiology, will admit for further monitoring and evaluation  SBIRT 20yo+    Flowsheet Row Most Recent Value   SBIRT (23 yo +)    In order to provide better care to our patients, we are screening all of our patients for alcohol and drug use  Would it be okay to ask you these screening questions? No Filed at: 12/06/2022 1023                    MDM  Number of Diagnoses or Management Options  Abnormal ECG  Dyspnea, unspecified type  Diagnosis management comments: 81-year-old female, presenting with dyspnea  Differential diagnosis includes ACS, acute heart failure, pneumonia among other diagnoses  EKG, chest x-ray, labs ordered  Will continue to monitor in ED and reevaluate         Amount and/or Complexity of Data Reviewed  Clinical lab tests: ordered and reviewed  Tests in the radiology section of CPT®: ordered and reviewed  Tests in the medicine section of CPT®: ordered and reviewed  Review and summarize past medical records: yes  Discuss the patient with other providers: yes  Independent visualization of images, tracings, or specimens: yes        Disposition  Final diagnoses:   Dyspnea, unspecified type   Abnormal ECG     Time reflects when diagnosis was documented in both MDM as applicable and the Disposition within this note     Time User Action Codes Description Comment    12/6/2022  1:32 PM Tanner Gibbs Add [R94 31] EKG abnormality     12/6/2022  1:45 PM Marly Reid Add [R06 00] Dyspnea, unspecified type     12/6/2022  1:45 PM Marly Reid Add [R94 31] Abnormal ECG       ED Disposition     ED Disposition   Admit    Condition   Stable    Date/Time   Tue Dec 6, 2022  1:45 PM    Comment   Case was discussed with Jeovany and the patient's admission status was agreed to be Admission Status: inpatient status to the service of Dr Miles English   Follow-up Information    None         Patient's Medications   Discharge Prescriptions    No medications on file       No discharge procedures on file      PDMP Review       Value Time User    PDMP Reviewed  Yes 1/11/2022  4:08 PM Betsy Bourgeois MD          ED Provider  Electronically Signed by           Kristi Malave MD  12/06/22 [phone]

## 2022-12-06 NOTE — Clinical Note
Right radial arterial site stable  14mL air instilled into TR Band  Right hand warm to touch  Brisk capillary refill noted  Pt education completed

## 2022-12-06 NOTE — PLAN OF CARE
Problem: PAIN - ADULT  Goal: Verbalizes/displays adequate comfort level or baseline comfort level  Description: Interventions:  - Encourage patient to monitor pain and request assistance  - Assess pain using appropriate pain scale  - Administer analgesics based on type and severity of pain and evaluate response  - Implement non-pharmacological measures as appropriate and evaluate response  - Consider cultural and social influences on pain and pain management  - Notify physician/advanced practitioner if interventions unsuccessful or patient reports new pain  Outcome: Progressing     Problem: INFECTION - ADULT  Goal: Absence or prevention of progression during hospitalization  Description: INTERVENTIONS:  - Assess and monitor for signs and symptoms of infection  - Monitor lab/diagnostic results  - Monitor all insertion sites, i e  indwelling lines, tubes, and drains  - Monitor endotracheal if appropriate and nasal secretions for changes in amount and color  - [place] appropriate cooling/warming therapies per order  - Administer medications as ordered  - Instruct and encourage patient and family to use good hand hygiene technique  - Identify and instruct in appropriate isolation precautions for identified infection/condition  Outcome: Progressing     Problem: SAFETY ADULT  Goal: Patient will remain free of falls  Description: INTERVENTIONS:  - Educate patient/family on patient safety including physical limitations  - Instruct patient to call for assistance with activity   - Consult OT/PT to assist with strengthening/mobility   - Keep Call bell within reach  - Keep bed low and locked with side rails adjusted as appropriate  - Keep care items and personal belongings within reach  - Initiate and maintain comfort rounds  - Make Fall Risk Sign visible to staff  - Offer Toileting every 2 Hours, in advance of need  - Initiate/Maintain bed/chair alarm  - Obtain necessary fall risk management equipment: bed/chair alarm  - Apply yellow socks and bracelet for high fall risk patients  - Consider moving patient to room near nurses station  Outcome: Progressing  Goal: Maintain or return to baseline ADL function  Description: INTERVENTIONS:  -  Assess patient's ability to carry out ADLs; assess patient's baseline for ADL function and identify physical deficits which impact ability to perform ADLs (bathing, care of mouth/teeth, toileting, grooming, dressing, etc )  - Assess/evaluate cause of self-care deficits   - Assess range of motion  - Assess patient's mobility; develop plan if impaired  - Assess patient's need for assistive devices and provide as appropriate  - Encourage maximum independence but intervene and supervise when necessary  - Involve family in performance of ADLs  - Assess for home care needs following discharge   - Consider OT consult to assist with ADL evaluation and planning for discharge  - Provide patient education as appropriate  Outcome: Progressing  Goal: Maintains/Returns to pre admission functional level  Description: INTERVENTIONS:  - Perform BMAT or MOVE assessment daily    - Set and communicate daily mobility goal to care team and patient/family/caregiver  - Collaborate with rehabilitation services on mobility goals if consulted  - Perform Range of Motion 3 times a day  - Reposition patient every 3 hours    - Dangle patient 3 times a day  - Stand patient 3 times a day  - Ambulate patient 3 times a day  - Out of bed to chair 3 times a day   - Out of bed for meals 3 times a day  - Out of bed for toileting  - Record patient progress and toleration of activity level   Outcome: Progressing     Problem: DISCHARGE PLANNING  Goal: Discharge to home or other facility with appropriate resources  Description: INTERVENTIONS:  - Identify barriers to discharge w/patient and caregiver  - Arrange for needed discharge resources and transportation as appropriate  - Identify discharge learning needs (meds, wound care, etc )  - Arrange for interpretive services to assist at discharge as needed  - Refer to Case Management Department for coordinating discharge planning if the patient needs post-hospital services based on physician/advanced practitioner order or complex needs related to functional status, cognitive ability, or social support system  Outcome: Progressing

## 2022-12-07 ENCOUNTER — APPOINTMENT (INPATIENT)
Dept: RADIOLOGY | Facility: HOSPITAL | Age: 77
End: 2022-12-07

## 2022-12-07 ENCOUNTER — TELEPHONE (OUTPATIENT)
Dept: CARDIAC SURGERY | Facility: CLINIC | Age: 77
End: 2022-12-07

## 2022-12-07 VITALS
OXYGEN SATURATION: 99 % | DIASTOLIC BLOOD PRESSURE: 73 MMHG | HEART RATE: 71 BPM | RESPIRATION RATE: 18 BRPM | SYSTOLIC BLOOD PRESSURE: 143 MMHG | TEMPERATURE: 98.1 F | BODY MASS INDEX: 35.95 KG/M2 | HEIGHT: 61 IN | WEIGHT: 190.4 LBS

## 2022-12-07 LAB
ALBUMIN SERPL BCP-MCNC: 3.2 G/DL (ref 3.5–5)
ALP SERPL-CCNC: 35 U/L (ref 46–116)
ALT SERPL W P-5'-P-CCNC: 27 U/L (ref 12–78)
ANION GAP SERPL CALCULATED.3IONS-SCNC: 7 MMOL/L (ref 4–13)
AST SERPL W P-5'-P-CCNC: 21 U/L (ref 5–45)
BASOPHILS # BLD AUTO: 0.04 THOUSANDS/ÂΜL (ref 0–0.1)
BASOPHILS NFR BLD AUTO: 1 % (ref 0–1)
BILIRUB SERPL-MCNC: 0.58 MG/DL (ref 0.2–1)
BUN SERPL-MCNC: 13 MG/DL (ref 5–25)
CALCIUM ALBUM COR SERPL-MCNC: 9.2 MG/DL (ref 8.3–10.1)
CALCIUM SERPL-MCNC: 8.6 MG/DL (ref 8.3–10.1)
CHLORIDE SERPL-SCNC: 104 MMOL/L (ref 96–108)
CO2 SERPL-SCNC: 27 MMOL/L (ref 21–32)
CREAT SERPL-MCNC: 0.66 MG/DL (ref 0.6–1.3)
EOSINOPHIL # BLD AUTO: 0.07 THOUSAND/ÂΜL (ref 0–0.61)
EOSINOPHIL NFR BLD AUTO: 1 % (ref 0–6)
ERYTHROCYTE [DISTWIDTH] IN BLOOD BY AUTOMATED COUNT: 12.3 % (ref 11.6–15.1)
GFR SERPL CREATININE-BSD FRML MDRD: 86 ML/MIN/1.73SQ M
GLUCOSE SERPL-MCNC: 133 MG/DL (ref 65–140)
GLUCOSE SERPL-MCNC: 140 MG/DL (ref 65–140)
GLUCOSE SERPL-MCNC: 145 MG/DL (ref 65–140)
GLUCOSE SERPL-MCNC: 151 MG/DL (ref 65–140)
HCT VFR BLD AUTO: 34.1 % (ref 34.8–46.1)
HGB BLD-MCNC: 11.8 G/DL (ref 11.5–15.4)
IMM GRANULOCYTES # BLD AUTO: 0.01 THOUSAND/UL (ref 0–0.2)
IMM GRANULOCYTES NFR BLD AUTO: 0 % (ref 0–2)
LYMPHOCYTES # BLD AUTO: 2.08 THOUSANDS/ÂΜL (ref 0.6–4.47)
LYMPHOCYTES NFR BLD AUTO: 36 % (ref 14–44)
MAGNESIUM SERPL-MCNC: 1.8 MG/DL (ref 1.6–2.6)
MCH RBC QN AUTO: 31.1 PG (ref 26.8–34.3)
MCHC RBC AUTO-ENTMCNC: 34.6 G/DL (ref 31.4–37.4)
MCV RBC AUTO: 90 FL (ref 82–98)
MONOCYTES # BLD AUTO: 0.63 THOUSAND/ÂΜL (ref 0.17–1.22)
MONOCYTES NFR BLD AUTO: 11 % (ref 4–12)
NEUTROPHILS # BLD AUTO: 2.92 THOUSANDS/ÂΜL (ref 1.85–7.62)
NEUTS SEG NFR BLD AUTO: 51 % (ref 43–75)
NRBC BLD AUTO-RTO: 0 /100 WBCS
PHOSPHATE SERPL-MCNC: 4.2 MG/DL (ref 2.3–4.1)
PLATELET # BLD AUTO: 177 THOUSANDS/UL (ref 149–390)
PMV BLD AUTO: 9.8 FL (ref 8.9–12.7)
POTASSIUM SERPL-SCNC: 4.1 MMOL/L (ref 3.5–5.3)
PROT SERPL-MCNC: 6.3 G/DL (ref 6.4–8.4)
RBC # BLD AUTO: 3.8 MILLION/UL (ref 3.81–5.12)
SODIUM SERPL-SCNC: 138 MMOL/L (ref 135–147)
WBC # BLD AUTO: 5.75 THOUSAND/UL (ref 4.31–10.16)

## 2022-12-07 PROCEDURE — 4A023N8 MEASUREMENT OF CARDIAC SAMPLING AND PRESSURE, BILATERAL, PERCUTANEOUS APPROACH: ICD-10-PCS | Performed by: INTERNAL MEDICINE

## 2022-12-07 PROCEDURE — B2111ZZ FLUOROSCOPY OF MULTIPLE CORONARY ARTERIES USING LOW OSMOLAR CONTRAST: ICD-10-PCS | Performed by: INTERNAL MEDICINE

## 2022-12-07 RX ORDER — NITROGLYCERIN 20 MG/100ML
INJECTION INTRAVENOUS CODE/TRAUMA/SEDATION MEDICATION
Status: DISCONTINUED | OUTPATIENT
Start: 2022-12-07 | End: 2022-12-07 | Stop reason: HOSPADM

## 2022-12-07 RX ORDER — MIDAZOLAM HYDROCHLORIDE 2 MG/2ML
INJECTION, SOLUTION INTRAMUSCULAR; INTRAVENOUS CODE/TRAUMA/SEDATION MEDICATION
Status: DISCONTINUED | OUTPATIENT
Start: 2022-12-07 | End: 2022-12-07 | Stop reason: HOSPADM

## 2022-12-07 RX ORDER — HEPARIN SODIUM 1000 [USP'U]/ML
INJECTION, SOLUTION INTRAVENOUS; SUBCUTANEOUS CODE/TRAUMA/SEDATION MEDICATION
Status: DISCONTINUED | OUTPATIENT
Start: 2022-12-07 | End: 2022-12-07 | Stop reason: HOSPADM

## 2022-12-07 RX ORDER — LIDOCAINE HYDROCHLORIDE 10 MG/ML
INJECTION, SOLUTION EPIDURAL; INFILTRATION; INTRACAUDAL; PERINEURAL CODE/TRAUMA/SEDATION MEDICATION
Status: DISCONTINUED | OUTPATIENT
Start: 2022-12-07 | End: 2022-12-07 | Stop reason: HOSPADM

## 2022-12-07 RX ORDER — FENTANYL CITRATE 50 UG/ML
INJECTION, SOLUTION INTRAMUSCULAR; INTRAVENOUS CODE/TRAUMA/SEDATION MEDICATION
Status: DISCONTINUED | OUTPATIENT
Start: 2022-12-07 | End: 2022-12-07 | Stop reason: HOSPADM

## 2022-12-07 RX ORDER — VERAPAMIL HYDROCHLORIDE 2.5 MG/ML
INJECTION, SOLUTION INTRAVENOUS CODE/TRAUMA/SEDATION MEDICATION
Status: DISCONTINUED | OUTPATIENT
Start: 2022-12-07 | End: 2022-12-07 | Stop reason: HOSPADM

## 2022-12-07 RX ORDER — LABETALOL HYDROCHLORIDE 5 MG/ML
INJECTION, SOLUTION INTRAVENOUS CODE/TRAUMA/SEDATION MEDICATION
Status: DISCONTINUED | OUTPATIENT
Start: 2022-12-07 | End: 2022-12-07 | Stop reason: HOSPADM

## 2022-12-07 RX ORDER — NITROGLYCERIN 0.4 MG/1
0.4 TABLET SUBLINGUAL
Qty: 30 TABLET | Refills: 0 | Status: SHIPPED | OUTPATIENT
Start: 2022-12-07

## 2022-12-07 RX ADMIN — METOPROLOL TARTRATE 25 MG: 25 TABLET, FILM COATED ORAL at 06:03

## 2022-12-07 RX ADMIN — INSULIN LISPRO 1 UNITS: 100 INJECTION, SOLUTION INTRAVENOUS; SUBCUTANEOUS at 17:12

## 2022-12-07 RX ADMIN — ENOXAPARIN SODIUM 40 MG: 40 INJECTION SUBCUTANEOUS at 10:27

## 2022-12-07 RX ADMIN — ANASTROZOLE 1 MG: 1 TABLET, COATED ORAL at 11:15

## 2022-12-07 RX ADMIN — PREGABALIN 75 MG: 75 CAPSULE ORAL at 17:10

## 2022-12-07 RX ADMIN — LEVOTHYROXINE SODIUM 88 MCG: 88 TABLET ORAL at 10:27

## 2022-12-07 RX ADMIN — METOPROLOL TARTRATE 25 MG: 25 TABLET, FILM COATED ORAL at 17:10

## 2022-12-07 RX ADMIN — ASPIRIN 162 MG: 81 TABLET, COATED ORAL at 10:27

## 2022-12-07 RX ADMIN — IOHEXOL 85 ML: 350 INJECTION, SOLUTION INTRAVENOUS at 14:12

## 2022-12-07 RX ADMIN — PREGABALIN 75 MG: 75 CAPSULE ORAL at 10:27

## 2022-12-07 NOTE — PLAN OF CARE
Problem: PAIN - ADULT  Goal: Verbalizes/displays adequate comfort level or baseline comfort level  Description: Interventions:  - Encourage patient to monitor pain and request assistance  - Assess pain using appropriate pain scale  - Administer analgesics based on type and severity of pain and evaluate response  - Implement non-pharmacological measures as appropriate and evaluate response  - Consider cultural and social influences on pain and pain management  - Notify physician/advanced practitioner if interventions unsuccessful or patient reports new pain  Outcome: Progressing     Problem: INFECTION - ADULT  Goal: Absence or prevention of progression during hospitalization  Description: INTERVENTIONS:  - Assess and monitor for signs and symptoms of infection  - Monitor lab/diagnostic results  - Monitor all insertion sites, i e  indwelling lines, tubes, and drains  - Monitor endotracheal if appropriate and nasal secretions for changes in amount and color  - [place] appropriate cooling/warming therapies per order  - Administer medications as ordered  - Instruct and encourage patient and family to use good hand hygiene technique  - Identify and instruct in appropriate isolation precautions for identified infection/condition  Outcome: Progressing     Problem: SAFETY ADULT  Goal: Patient will remain free of falls  Description: INTERVENTIONS:  - Educate patient/family on patient safety including physical limitations  - Instruct patient to call for assistance with activity   - Consult OT/PT to assist with strengthening/mobility   - Keep Call bell within reach  - Keep bed low and locked with side rails adjusted as appropriate  - Keep care items and personal belongings within reach  - Initiate and maintain comfort rounds  - Make Fall Risk Sign visible to staff  - Offer Toileting every 2 Hours, in advance of need  - Initiate/Maintain bed/chair alarm  - Obtain necessary fall risk management equipment: bed/chair alarm  - Apply yellow socks and bracelet for high fall risk patients  - Consider moving patient to room near nurses station  Outcome: Progressing  Goal: Maintain or return to baseline ADL function  Description: INTERVENTIONS:  -  Assess patient's ability to carry out ADLs; assess patient's baseline for ADL function and identify physical deficits which impact ability to perform ADLs (bathing, care of mouth/teeth, toileting, grooming, dressing, etc )  - Assess/evaluate cause of self-care deficits   - Assess range of motion  - Assess patient's mobility; develop plan if impaired  - Assess patient's need for assistive devices and provide as appropriate  - Encourage maximum independence but intervene and supervise when necessary  - Involve family in performance of ADLs  - Assess for home care needs following discharge   - Consider OT consult to assist with ADL evaluation and planning for discharge  - Provide patient education as appropriate  Outcome: Progressing  Goal: Maintains/Returns to pre admission functional level  Description: INTERVENTIONS:  - Perform BMAT or MOVE assessment daily    - Set and communicate daily mobility goal to care team and patient/family/caregiver  - Collaborate with rehabilitation services on mobility goals if consulted  - Perform Range of Motion 3 times a day  - Reposition patient every 3 hours    - Dangle patient 3 times a day  - Stand patient 3 times a day  - Ambulate patient 3 times a day  - Out of bed to chair 3 times a day   - Out of bed for meals 3 times a day  - Out of bed for toileting  - Record patient progress and toleration of activity level   Outcome: Progressing     Problem: DISCHARGE PLANNING  Goal: Discharge to home or other facility with appropriate resources  Description: INTERVENTIONS:  - Identify barriers to discharge w/patient and caregiver  - Arrange for needed discharge resources and transportation as appropriate  - Identify discharge learning needs (meds, wound care, etc )  - Arrange for interpretive services to assist at discharge as needed  - Refer to Case Management Department for coordinating discharge planning if the patient needs post-hospital services based on physician/advanced practitioner order or complex needs related to functional status, cognitive ability, or social support system  Outcome: Progressing     Problem: Knowledge Deficit  Goal: Patient/family/caregiver demonstrates understanding of disease process, treatment plan, medications, and discharge instructions  Description: Complete learning assessment and assess knowledge base  Interventions:  - Provide teaching at level of understanding  - Provide teaching via preferred learning methods  Outcome: Progressing     Problem: MOBILITY - ADULT  Goal: Maintain or return to baseline ADL function  Description: INTERVENTIONS:  -  Assess patient's ability to carry out ADLs; assess patient's baseline for ADL function and identify physical deficits which impact ability to perform ADLs (bathing, care of mouth/teeth, toileting, grooming, dressing, etc )  - Assess/evaluate cause of self-care deficits   - Assess range of motion  - Assess patient's mobility; develop plan if impaired  - Assess patient's need for assistive devices and provide as appropriate  - Encourage maximum independence but intervene and supervise when necessary  - Involve family in performance of ADLs  - Assess for home care needs following discharge   - Consider OT consult to assist with ADL evaluation and planning for discharge  - Provide patient education as appropriate  Outcome: Progressing  Goal: Maintains/Returns to pre admission functional level  Description: INTERVENTIONS:  - Perform BMAT or MOVE assessment daily    - Set and communicate daily mobility goal to care team and patient/family/caregiver  - Collaborate with rehabilitation services on mobility goals if consulted  - Perform Range of Motion 3 times a day  - Reposition patient every 3 hours    - Dangle patient 3 times a day  - Stand patient 3 times a day  - Ambulate patient 3 times a day  - Out of bed to chair 3 times a day   - Out of bed for meals 3 times a day  - Out of bed for toileting  - Record patient progress and toleration of activity level   Outcome: Progressing

## 2022-12-07 NOTE — PHYSICAL THERAPY NOTE
PT cancellation   12/07/22 1422   PT Last Visit   PT Visit Date 12/07/22   Note Type   Note type Cancelled Session   Cancel Reasons Patient off floor/test   Additional Comments Attempted to see pt x 2: in AM pt had cardiac cath and needed 2 - 3 hours of bedrest; returned and pt is off unit for a CT scan  Will follow tomorrow  Licensure   [place] License Number  Theo Singleton Shira PT  [id number]

## 2022-12-07 NOTE — DISCHARGE SUMMARY
Candida U  67   Discharge- [facility] 1945, 69 y o  female MRN: [id number]  Unit/Bed#: [address] [place] [address] Encounter: [id number]  Primary Care Provider: Castro Dumont MD   Date and time admitted to hospital: 12/6/2022 10:16 AM    T wave inversion in EKG  Assessment & Plan  Noted in lateral leads  New compared to AKG from May 2022  · Appreciate cardiology input  · Plan as above     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, estrogen receptor positive ([facility] [address] )  Assessment & Plan  · Continue Arimidex     Hypothyroidism  Assessment & Plan  · Continue synthroid     Essential hypertension  Assessment & Plan  BP stable  · Continue Lisinopril and Lopressor     Mixed dyslipidemia  Assessment & Plan  · Not on statin due to myalgia  · Update lipid panel     Type 2 diabetes mellitus without complication, without long-term current use of insulin (HCC)  Assessment & Plan  Lab Results   Component Value Date    HGBA1C 5 8 (H) 10/18/2022       No results for input(s): POCGLU in the last 72 hours      Blood Sugar Average: Last 72 hrs:  · Hold home regimen, Glipizide and Metformin  · Monitor ACCU checks AC+HS with lispro insulin sliding scale coverage   · Add Lantus 5 units HS     Coronary artery disease involving native coronary artery of native heart without angina pectoris  Assessment & Plan  CAD with stenting to LAD in 2015  · Continue BB, ASA  · Not on statin due to myalgias    * Dyspnea on exertion  Assessment & Plan  Reports shortness of breath with minimal exertion for 1 month with hst of CAD s/p stenting of LAD in 2015  Reports some left-sided chest pain that radiates to her left shoulder/neck   BRENT score of 5  · Troponins negative x3  · EKG with T wave inversion in lateral leads  · Consulted cardiology  · Planning for cardiac catheterization tomorrow  · Check ECHO  · Not on statin due to myalgias  · Update A1c and lipid panel  · Continue BB, ASA, ACEi  · Nitro PRN Discharging Physician / Practitioner: Fausto Ro, [address]  PCP: Kun Bryan MD  Admission Date: 12/6/2022  Discharge Date: 12/07/22     Reason for Admission: Shortness of Breath (States sob with exertion for about 3 weeks and has noticed some edema in legs)               Medical Problems      Resolved Problems  Date Reviewed: 12/7/2022   None            Consultations During Hospital Stay:  [address]           Results from last 7 days   Lab Units 12/07/22  0604 12/06/22  1034   WBC Thousand/uL 5 75 6 33   HEMOGLOBIN g/dL 11 8 12 3   PLATELETS Thousands/uL 177 191            Results from last 7 days   Lab Units 12/07/22  0604 12/06/22  1034   SODIUM mmol/L 138 135   POTASSIUM mmol/L 4 1 4 3   CHLORIDE mmol/L 104 100   CO2 mmol/L 27 26   BUN mg/dL 13 15   CREATININE mg/dL 0 66 0 75   CALCIUM mg/dL 8 6 9 0   TOTAL BILIRUBIN mg/dL 0 58  --    ALK PHOS U/L 35*  --    ALT U/L 27  --    AST U/L 21  --                     Lab Results   Component Value Date/Time     HGBA1C 5 8 (H) 10/18/2022 10:53 AM     HGBA1C 7 3 (H) 04/27/2018 07:02 PM              Results from last 7 days   Lab Units 12/07/22  1137 12/07/22  0721 12/06/22 2009 12/06/22  1731   POC GLUCOSE mg/dl 140 145* 181* 165*          Blood Culture: No results found for: BLOODCX  Urine Culture:         Lab Results   Component Value Date     URINECX >100,000 cfu/ml Escherichia coli (A) 07/27/2022     URINECX >100,000 cfu/ml Escherichia coli (A) 09/01/2020      Sputum Culture: No components found for: SPUTUMCX  Wound Culture: No results found for: WOUNDCULT      XR chest 1 view portable   Final Result by Viri Reynolds MD (12/06 1221)       No acute cardiopulmonary disease                        Workstation performed: [id number]           VAS carotid complete study    (Results Pending)   VAS lower limb vein mapping bypass graft    (Results Pending)   CTA chest wo w contrast    (Results Pending)            •       Outpatient Tests Requested:  • Vein mapping  • CT abdomen and pelvis  • Carotids     Complications:  none     Reason for Admission:        Chief Complaint   Patient presents with   • Shortness of Breath       States sob with exertion for about 3 weeks and has noticed some edema in legs         Hospital Course:      Per HPI: Linda Reyes is a 69 y o  female patient with a PMH of CAD post stent in 2017, diabetes, hypothyroidism, hyperlipidemia, hypertension who originally presented to the hospital on 12/6/2022 due to shortness of breath  Patient endorses a 3-week history of exertional shortness of breath  In the ER she was found to have T wave inversions in her lateral leads  She was admitted to the hospital and had a cardiac catheterization on 12 7 which revealed triple-vessel disease with left main stenosis  Case was reviewed with CT surgery by cardiology and plan is for OHS next week  Cardiology cleared patient for discharge home  She was educated to take nitro should she develop chest pain or shortness of breath and to call [address] Course:    Please see above list of diagnoses and related plan for additional information       Condition at Discharge: good         Discharge Day Visit / Exam:      Subjective:  Patient resting in bed post cath with no complaints, feels well with no chest pain or shortness of breath  Vitals: Blood Pressure: (!) 104/43 (12/07/22 1339)  Pulse: 70 (12/07/22 1339)  Temperature: (!) 97 3 °F (36 3 °C) (12/07/22 1339)  Respirations: 18 (12/07/22 1339)  Height: 5' 1" (154 9 cm) (12/06/22 1346)  Weight - Scale: 86 4 kg (190 lb 6 4 oz) (12/07/22 0600)  SpO2: 96 % (12/07/22 1339)  Exam:   Physical Exam  Vitals and nursing note reviewed  Constitutional:       Appearance: Normal appearance  HENT:      Head: Normocephalic  Nose: Nose normal    Eyes:      Extraocular Movements: Extraocular movements intact  Cardiovascular:      Rate and Rhythm: Normal rate and regular rhythm  Pulmonary:      Effort: Pulmonary effort is normal       Breath sounds: Normal breath sounds  Abdominal:      General: Abdomen is flat  Bowel sounds are normal  There is no distension  Palpations: Abdomen is soft  Musculoskeletal:         General: No swelling  Normal range of motion  Skin:     General: Skin is warm and dry  Neurological:      General: No focal deficit present  Mental Status: She is alert and oriented to person, place, and time  Psychiatric:         Mood and Affect: Mood normal                Discharge instructions/Information to patient and family:   See after visit summary for information provided to patient and family        Provisions for Follow-Up Care:  See after visit summary for information related to follow-up care and any pertinent home health orders        Disposition:      Home     Planned Readmission: admit for CT surgery next week      Discharge Statement:  I spent 15 minutes discharging the patient  This time was spent on the day of discharge  I had direct contact with the patient on the day of discharge  Greater than 50% of the total time was spent examining patient, answering all patient questions, arranging and discussing plan of care with patient as well as directly providing post-discharge instructions    Additional time then spent on discharge activities      Discharge Medications:  See after visit summary for reconciled discharge medications provided to patient and family        ** Please Note: This note has been constructed using a voice recognition system **

## 2022-12-07 NOTE — DISCHARGE INSTR - AVS FIRST PAGE
If you develop chest pain or shortness of breath, take your nitro sublingal and call 911  Follow up with CT surgery tomorrow

## 2022-12-07 NOTE — OCCUPATIONAL THERAPY NOTE
OT EVALUATION       12/07/22 1501   Note Type   Note type Cancelled Session   Cancel Reasons Patient off floor/test   Additional Comments Attempted x 2 today    Patient off floor at cardiac cath lab in am and CT scan in pm    Licensure   [place] License Number  Shauna Santamaria [place] [address] OTR/L [id number]

## 2022-12-07 NOTE — PROGRESS NOTES
Progress Note - Cardiology   [facility]     Víctor Renee 69 y o  female MRN: [id number]  : 1945  Unit/Bed#: [address] [place] [address] Encounter: [id number]    Assessment and Plan:   1  Coronary artery disease with history of proximal LAD stent in : High-sensitivity troponins negative    -  ST depression seen in lateral leads on admission EKG    -  Cardiac catheterization performed on 2022 demonstrated significant stenosis of left main and left circumflex    -  Continue beta-blocker and aspirin therapy    -  Previous lipid panel performed in February demonstrates total cholesterol 147, triglycerides of 219, HDL of 34 and LDL of 69  Patient intolerant to statin therapy    -  Consider using PCSK9 inhibitor or Leqvio    2  Dyslipidemia:  Not on statin therapy as she is intolerant, causes myalgias    3  Hypertension: Blood pressures currently stable    -  Continue Zestril 20 mg once a day    -  Continue Lopressor 25 mg every 12 hours    4  Breast cancer: Left side, continue Arimidex    Subjective / Objective:   Patient seen and examined postcardiac catheterization  Patient found to have significant disease of the left circumflex and left main  Treatment options currently being reviewed, but patient most likely will need coronary artery bypass grafting  Vitals: Blood pressure 126/53, pulse 72, temperature 97 6 °F (36 4 °C), resp  rate 20, height 5' 1" (1 549 m), weight 86 4 kg (190 lb 6 4 oz), SpO2 94 %, currently breastfeeding  Vitals:    22 1346 22 0600   Weight: 90 7 kg (200 lb) 86 4 kg (190 lb 6 4 oz)     Body mass index is 35 98 kg/m²    BP Readings from Last 3 Encounters:   22 126/53   22 112/64   10/10/22 110/56     Orthostatic Blood Pressures    Flowsheet Row Most Recent Value   Blood Pressure 126/53 filed at 2022 1005        I/O        0701      Blood   5    Total Output   5    Net -5               Invasive Devices     Peripheral Intravenous Line  Duration           Peripheral IV 12/06/22 Right Antecubital <1 day          Drain  Duration           Closed/Suction Drain Inferior; Left Breast Bulb 19 Fr  511 days    Closed/Suction Drain Left;Superior Breast Bulb 19 Fr  511 days                  Intake/Output Summary (Last 24 hours) at 12/7/2022 1017  Last data filed at 12/7/2022 0920  Gross per 24 hour   Intake --   Output 5 ml   Net -5 ml         Physical Exam:   Physical Exam  Vitals and nursing note reviewed  Constitutional:       General: She is not in acute distress  Appearance: Normal appearance  She is obese  HENT:      Right Ear: External ear normal       Left Ear: External ear normal       Nose: Nose normal    Eyes:      General: No scleral icterus  Right eye: No discharge  Left eye: No discharge  Cardiovascular:      Rate and Rhythm: Normal rate and regular rhythm  Pulses: Normal pulses  Heart sounds: Normal heart sounds  Pulmonary:      Effort: Pulmonary effort is normal  No respiratory distress  Breath sounds: Normal breath sounds  No wheezing, rhonchi or rales  Abdominal:      General: Bowel sounds are normal  There is no distension  Palpations: Abdomen is soft  Musculoskeletal:      Right lower leg: No edema  Left lower leg: No edema  Skin:     General: Skin is warm and dry  Capillary Refill: Capillary refill takes less than 2 seconds  Neurological:      General: No focal deficit present  Mental Status: She is alert and oriented to person, place, and time  Mental status is at baseline     Psychiatric:         Mood and Affect: Mood normal             Medications/ Allergies:     Current Facility-Administered Medications   Medication Dose Route Frequency Provider Last Rate   • acetaminophen  650 mg Oral Q6H PRN BERNADETTE Norris     • anastrozole  1 mg Oral Daily BERNADETTE Norris     • aspirin 162 mg Oral Daily BERNADETTE Sullivan     • calcium carbonate  1,000 mg Oral Daily PRN BERNADETTE Sullivan     • enoxaparin  40 mg Subcutaneous Daily BERNADETTE Sullivan     • insulin glargine  5 Units Subcutaneous HS BERNADETTE Sullivan     • insulin lispro  1-6 Units Subcutaneous TID AC BERNADETTE Sullivan     • insulin lispro  1-6 Units Subcutaneous HS BERNADETTE Sullivan     • levothyroxine  88 mcg Oral Daily BERNADETTE Sullivan     • lisinopril  20 mg Oral HS BERNADETTE Sullivan     • metoprolol tartrate  25 mg Oral Q12H BERNADETTE Sullivan     • nitroglycerin  0 4 mg Sublingual Q5 Min PRN BERNADETTE Sullivan     • ondansetron  4 mg Intravenous Q6H PRN BERNADETTE Sullivan     • polyethylene glycol  17 g Oral Daily PRN BERNADETTE Sullivan     • pregabalin  75 mg Oral BID BERNADETTE Sullivan       acetaminophen, 650 mg, Q6H PRN  calcium carbonate, 1,000 mg, Daily PRN  nitroglycerin, 0 4 mg, Q5 Min PRN  ondansetron, 4 mg, Q6H PRN  polyethylene glycol, 17 g, Daily PRN      Allergies   Allergen Reactions   • Duloxetine Other (See Comments)     Extreme somnolence/lethargy   • Sulfa Antibiotics Rash       VTE Pharmacologic Prophylaxis:   Sequential compression device (Venodyne)     Labs:   Troponins:  Results from last 7 days   Lab Units 12/06/22  1452 12/06/22  1237   HSTNI D2 ng/L  --  1   HSTNI D4 ng/L 1  --      CBC with diff:  Results from last 7 days   Lab Units 12/07/22  0604 12/06/22  1034   WBC Thousand/uL 5 75 6 33   HEMOGLOBIN g/dL 11 8 12 3   HEMATOCRIT % 34 1* 35 6   MCV fL 90 89   PLATELETS Thousands/uL 177 191   MCH pg 31 1 30 9   MCHC g/dL 34 6 34 6   RDW % 12 3 12 3   MPV fL 9 8 9 7   NRBC AUTO /100 WBCs 0 0     CMP:  Results from last 7 days   Lab Units 12/07/22  0604 12/06/22  1034   SODIUM mmol/L 138 135   POTASSIUM mmol/L 4 1 4 3   CHLORIDE mmol/L 104 100   CO2 mmol/L 27 26   ANION GAP mmol/L 7 9   BUN mg/dL 13 15   CREATININE mg/dL 0 66 0 75   CALCIUM mg/dL 8 6 9 0   AST U/L 21  --    ALT U/L 27  --    ALK PHOS U/L 35*  --    TOTAL PROTEIN g/dL 6 3*  --    ALBUMIN g/dL 3 2*  --    TOTAL BILIRUBIN mg/dL 0 58  --    EGFR ml/min/1 73sq m 86 77     Magnesium:  Results from last 7 days   Lab Units 12/07/22  0604 12/06/22  1034   MAGNESIUM mg/dL 1 8 1 7     NT-proBNP:   Recent Labs     12/06/22  1034   NTBNP 315        Imaging & Testing   I have personally reviewed pertinent reports  XR chest 1 view portable    Result Date: 12/6/2022  Narrative: CHEST INDICATION:   dyspnea  COMPARISON:  4/19/2021 EXAM PERFORMED/VIEWS:  XR CHEST PORTABLE FINDINGS: Cardiomediastinal silhouette appears unremarkable  The lungs are clear  No pneumothorax or pleural effusion  Lower cervical ACDF  Impression: No acute cardiopulmonary disease  Workstation performed: [id number]     Echo complete w/ contrast if indicated    Result Date: 12/6/2022  Narrative: •  Left Ventricle: Left ventricular cavity size is normal  Wall thickness is mildly increased  There is mild concentric hypertrophy  The left ventricular ejection fraction is 60% by visual estimation  Systolic function is normal  Although no diagnostic regional wall motion abnormality was identified, this possibility cannot be completely excluded on the basis of this study  •  Left Atrium: The atrium is mildly dilated  •  Mitral Valve: There is mild diffuse thickening of the anterior leaflet and posterior leaflet  Some thickening of subdural pruritus noted  There is moderate to severe annular calcification  There is mild regurgitation  There is very mild stenosis  Mean gradient 3 to 4 mmHg  EKG / Monitor: Personally reviewed  Sinus rhythm      [facility] [place]  Cardiology      "This note was completed in part utilizing m-OPENLANE fluency direct voice recognition software     Grammatical errors, random word insertion, spelling mistakes, and incomplete sentences may be an occasional consequence of the system secondary to software limitations, ambient noise and hardware issues  Please read the chart carefully and recognize, using context, where substitutions have occurred    If you have any questions or concerns about the context, text or information contained within the body of this dictation, please contact myself, the provider, for further clarification "

## 2022-12-07 NOTE — TELEPHONE ENCOUNTER
Called and left message for patient  She is currently in [facility] having pre-op testing completed   Holding appointment time w/ Dr Gorge Sidhu of Thursday 12/8 @ 2:30pm

## 2022-12-07 NOTE — CASE MANAGEMENT
Case Management Assessment    Patient name Lev Bojorquez  Location [facility] 252-* MRN [id number]  : 1945 Date 2022       Current Admission Date: 2022  Current Admission Diagnosis:Dyspnea on exertion   Patient Active Problem List    Diagnosis Date Noted   • Dyspnea on exertion 2022   • T wave inversion in EKG 2022   • Diarrhea 10/10/2022   • Non-ST elevation myocardial infarction (NSTEMI) ([facility] ) 2022   • History of PTCA 2022   • H/O heart artery stent 2022   • Colon polyp 2022   • Neuropathy 2022   • Use of anastrozole (Arimidex)    • Monoallelic mutation of CHEK2 gene in female patient 2021   • Class 2 obesity in adult 2021   • 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, estrogen receptor positive ([place] [address] ) 2021   • Bilateral leg edema 2020   • Hypothyroidism 2020   • Spinal stenosis of lumbar region 2020   • Osteopenia of necks of both femurs 2020   • S/P lumbar fusion 2018   • Coronary artery disease involving native coronary artery of native heart without angina pectoris 2016   • Abnormal carotid ultrasound 2016   • 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[facility] ) 2013   • Type 2 diabetes mellitus without complication, without long-term current use of insulin ([place] [address] ) 2013   • Mixed dyslipidemia 2013   • Essential hypertension 2013      LOS (days): 1  Geometric Mean LOS (GMLOS) (days): 2 10  Days to GMLOS:1 2     OBJECTIVE:    Risk of Unplanned Readmission Score: 12 82       Current admission status: Inpatient  Referral Reason: Other (Discharge planning)    Preferred Pharmacy:   [facility] [place], [place] - [address] [place] [address],  [address] [id number]  Phone: [phone] Fax: [phone]    [address] [facility] [place] [address]  Phone: [phone] Fax: [phone]    [facility] [address], [address] [facility] [address]  Phone: [phone] Fax: [phone]    Primary Care Provider: Toshia Davis MD    Primary Insurance: MEDICARE  Secondary Insurance: AARP    ASSESSMENT:  Nemo Goodson Proxies     [facility] Representative - Spouse   Primary Phone: [phone] (Mobile)  Home Phone: [phone]                Readmission Root Cause  30 Day Readmission: No    Patient Information  Admitted from[de-identified] Home  Mental Status: Alert  During Assessment patient was accompanied by: Not accompanied during assessment  Assessment information provided by[de-identified] Patient  Primary Caregiver: Self  Support Systems: Spouse/significant other, Daughter, Family members  [place] of Residence: [address] [place] [address] do you live in?: [address] [place] [address] Road entry access options   Select all that apply : Stairs  Number of steps to enter home : 2  Type of Current Residence: [address] [place] home  Upon entering residence, is there a bedroom on the main floor (no further steps)?: No  A bedroom is located on the following floor levels of residence (select all that apply):: 2nd Floor  Upon entering residence, is there a bathroom on the main floor (no further steps)?: No  Indicate which floors of current residence have a bathroom (select all the apply):: 2nd Floor  Number of steps to 2nd floor from main floor: One Flight  In the last 12 months, was there a time when you were not able to pay the mortgage or rent on time?: No  In the last 12 months, how many places have you lived?: 1  In the last 12 months, was there a time when you did not have a steady place to sleep or slept in a shelter (including now)?: No  Homeless/housing insecurity resource given?: N/A  Living Arrangements: Lives w/ Spouse/significant other    Activities of Daily Living Prior to Admission  Functional Status: Independent  Completes ADLs independently?: Yes  Ambulates independently?: Yes  Does patient use assisted devices?: Yes  Assisted Devices (DME) used: Straight Cane, Shower Chair (grab bars in shower)  Does patient currently own DME?: Yes  What DME does the patient currently own?: Straight Cane, Shower Chair (grab bars in shower)  Does patient have a history of Outpatient Therapy (PT/OT)?: Yes  Does the patient have a history of Short-Term Rehab?: No  Does patient have a history of HHC?: Yes ([place] [facility])  Does patient currently have VisiQuate?: No    Patient Information Continued  Income Source: Pension/shelter  Does patient have prescription coverage?: Yes  Within the past 12 months, you worried that your food would run out before you got the money to buy more : Never true  Within the past 12 months, the food you bought just didn't last and you didn't have money to get more : Never true  Food insecurity resource given?: N/A  Does patient receive dialysis treatments?: No  Does patient have a history of substance abuse?: No  Does patient have a history of Mental Health Diagnosis?: No    Means of Transportation  Means of Transport to Appts[de-identified] Drives Self ( does most of the driving)  In the past 12 months, has lack of transportation kept you from medical appointments or from getting medications?: No  In the past 12 months, has lack of transportation kept you from meetings, work, or from getting things needed for daily living?: No  Was application for public transport provided?: N/A    SW spoke with patient at bedside to introduce role of CM and conduct assessment  Patient lives with her  in a 2-story home with BR and BA on the 2nd floor  There are 2-3 AISHA  Patient does drive but shared that her  does most of the driving  She is independent at baseline and uses a cane to ambulate when outside of the house  At this time cardiology is following and recommendation is pending    [facility] will continue to follow for discharge planning needs

## 2022-12-07 NOTE — PROGRESS NOTES
[address]  Progress Note - Jaz William 1945, 69 y o  female MRN: [id number]  Unit/Bed#: [address] [address] Encounter: [id number]  Primary Care Provider: Marilee Sánchez MD   Date and time admitted to hospital: 12/6/2022 10:16 AM    T wave inversion in EKG  Assessment & Plan  Noted in lateral leads  New compared to AKG from May 2022  · Appreciate cardiology input  · Plan as above     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, estrogen receptor positive ([facility] [address] )  Assessment & Plan  · Continue Arimidex     Hypothyroidism  Assessment & Plan  · Continue synthroid     Essential hypertension  Assessment & Plan  BP stable  · Continue Lisinopril and Lopressor     Mixed dyslipidemia  Assessment & Plan  · Not on statin due to myalgia  · Update lipid panel     Type 2 diabetes mellitus without complication, without long-term current use of insulin (HCC)  Assessment & Plan  Lab Results   Component Value Date    HGBA1C 5 8 (H) 10/18/2022       No results for input(s): POCGLU in the last 72 hours      Blood Sugar Average: Last 72 hrs:  · Hold home regimen, Glipizide and Metformin  · Monitor ACCU checks AC+HS with lispro insulin sliding scale coverage   · Add Lantus 5 units HS     Coronary artery disease involving native coronary artery of native heart without angina pectoris  Assessment & Plan  CAD with stenting to LAD in 2015  · Continue BB, ASA  · Not on statin due to myalgias    * Dyspnea on exertion  Assessment & Plan  Reports shortness of breath with minimal exertion for 1 month with hst of CAD s/p stenting of LAD in 2015  Reports some left-sided chest pain that radiates to her left shoulder/neck   BRENT score of 5  · Troponins negative x3  · EKG with T wave inversion in lateral leads  · Consulted cardiology  · Planning for cardiac catheterization tomorrow  · Check ECHO  · Not on statin due to myalgias  · Update A1c and lipid panel  · Continue BB, ASA, ACEi  · Nitro PRN Discharging Physician / Practitioner: Colt Membreno, [address]  PCP: Toshia Davis MD  Admission Date: 12/6/2022  Discharge Date: 12/07/22    Reason for Admission: Shortness of Breath (States sob with exertion for about 3 weeks and has noticed some edema in legs)        Medical Problems     Resolved Problems  Date Reviewed: 12/7/2022   None         Consultations During Hospital Stay:  Kalyani Gutierrez    Results from last 7 days   Lab Units 12/07/22  0604 12/06/22  1034   WBC Thousand/uL 5 75 6 33   HEMOGLOBIN g/dL 11 8 12 3   PLATELETS Thousands/uL 177 191     Results from last 7 days   Lab Units 12/07/22  0604 12/06/22  1034   SODIUM mmol/L 138 135   POTASSIUM mmol/L 4 1 4 3   CHLORIDE mmol/L 104 100   CO2 mmol/L 27 26   BUN mg/dL 13 15   CREATININE mg/dL 0 66 0 75   CALCIUM mg/dL 8 6 9 0   TOTAL BILIRUBIN mg/dL 0 58  --    ALK PHOS U/L 35*  --    ALT U/L 27  --    AST U/L 21  --              Lab Results   Component Value Date/Time    HGBA1C 5 8 (H) 10/18/2022 10:53 AM    HGBA1C 7 3 (H) 04/27/2018 07:02 PM     Results from last 7 days   Lab Units 12/07/22  1137 12/07/22  0721 12/06/22 2009 12/06/22  1731   POC GLUCOSE mg/dl 140 145* 181* 165*         Blood Culture: No results found for: BLOODCX  Urine Culture:   Lab Results   Component Value Date    URINECX >100,000 cfu/ml Escherichia coli (A) 07/27/2022    URINECX >100,000 cfu/ml Escherichia coli (A) 09/01/2020     Sputum Culture: No components found for: SPUTUMCX  Wound Culture: No results found for: WOUNDCULT     XR chest 1 view portable   Final Result by Mitzi Hernandez MD (12/06 1221)      No acute cardiopulmonary disease                    Workstation performed: [id number]         VAS carotid complete study    (Results Pending)   VAS lower limb vein mapping bypass graft    (Results Pending)   CTA chest wo w contrast    (Results Pending)          ·      Outpatient Tests Requested:  · Vein mapping  · CT abdomen and pelvis  · Carotids    Complications:  none    Reason for Admission:   Chief Complaint   Patient presents with   • Shortness of Breath     States sob with exertion for about 3 weeks and has noticed some edema in legs       Hospital Course:     Per HPI: Addy Valdez is a 69 y o  female patient with a PMH of CAD post stent in 2017, diabetes, hypothyroidism, hyperlipidemia, hypertension who originally presented to the hospital on 12/6/2022 due to shortness of breath  Patient endorses a 3-week history of exertional shortness of breath  In the ER she was found to have T wave inversions in her lateral leads  She was admitted to the hospital and had a cardiac catheterization on 12 7 which revealed triple-vessel disease with left main stenosis  Case was reviewed with CT surgery by cardiology and plan is for OHS next week  Cardiology cleared patient for discharge home  She was educated to take nitro should she develop chest pain or shortness of breath and to call 911  Hospital Course:    Please see above list of diagnoses and related plan for additional information  Condition at Discharge: good       Discharge Day Visit / Exam:     Subjective:  Patient resting in bed post cath with no complaints, feels well with no chest pain or shortness of breath  Vitals: Blood Pressure: (!) 104/43 (12/07/22 1339)  Pulse: 70 (12/07/22 1339)  Temperature: (!) 97 3 °F (36 3 °C) (12/07/22 1339)  Respirations: 18 (12/07/22 1339)  Height: 5' 1" (154 9 cm) (12/06/22 1346)  Weight - Scale: 86 4 kg (190 lb 6 4 oz) (12/07/22 0600)  SpO2: 96 % (12/07/22 1339)  Exam:   Physical Exam  Vitals and nursing note reviewed  Constitutional:       Appearance: Normal appearance  HENT:      Head: Normocephalic  Nose: Nose normal    Eyes:      Extraocular Movements: Extraocular movements intact  Cardiovascular:      Rate and Rhythm: Normal rate and regular rhythm     Pulmonary:      Effort: Pulmonary effort is normal       Breath sounds: Normal breath sounds  Abdominal:      General: Abdomen is flat  Bowel sounds are normal  There is no distension  Palpations: Abdomen is soft  Musculoskeletal:         General: No swelling  Normal range of motion  Skin:     General: Skin is warm and dry  Neurological:      General: No focal deficit present  Mental Status: She is alert and oriented to person, place, and time  Psychiatric:         Mood and Affect: Mood normal            Discharge instructions/Information to patient and family:   See after visit summary for information provided to patient and family  Provisions for Follow-Up Care:  See after visit summary for information related to follow-up care and any pertinent home health orders  Disposition:     Home    Planned Readmission: admit for CT surgery next week      Discharge Statement:  I spent 15 minutes discharging the patient  This time was spent on the day of discharge  I had direct contact with the patient on the day of discharge  Greater than 50% of the total time was spent examining patient, answering all patient questions, arranging and discussing plan of care with patient as well as directly providing post-discharge instructions  Additional time then spent on discharge activities  Discharge Medications:  See after visit summary for reconciled discharge medications provided to patient and family        ** Please Note: This note has been constructed using a voice recognition system **

## 2022-12-08 ENCOUNTER — OFFICE VISIT (OUTPATIENT)
Dept: CARDIAC SURGERY | Facility: CLINIC | Age: 77
End: 2022-12-08

## 2022-12-08 VITALS
WEIGHT: 194 LBS | SYSTOLIC BLOOD PRESSURE: 157 MMHG | OXYGEN SATURATION: 98 % | TEMPERATURE: 98.2 F | HEIGHT: 61 IN | DIASTOLIC BLOOD PRESSURE: 69 MMHG | HEART RATE: 77 BPM | BODY MASS INDEX: 36.63 KG/M2

## 2022-12-08 DIAGNOSIS — I25.118 CORONARY ARTERY DISEASE OF NATIVE ARTERY OF NATIVE HEART WITH STABLE ANGINA PECTORIS (HCC): Primary | ICD-10-CM

## 2022-12-08 DIAGNOSIS — Z01.818 PREOP TESTING: ICD-10-CM

## 2022-12-08 DIAGNOSIS — I10 ESSENTIAL HYPERTENSION: ICD-10-CM

## 2022-12-08 RX ORDER — CEFAZOLIN SODIUM 2 G/50ML
2000 SOLUTION INTRAVENOUS ONCE
OUTPATIENT
Start: 2022-12-08 | End: 2022-12-08

## 2022-12-08 RX ORDER — CHLORHEXIDINE GLUCONATE 0.12 MG/ML
15 RINSE ORAL ONCE
OUTPATIENT
Start: 2022-12-08 | End: 2022-12-08

## 2022-12-08 NOTE — PROGRESS NOTES
Consultation - Cardiothoracic Surgery   Lev Bojorquez 69 y o  female MRN: [id number]    Physician Requesting Consult: Dr Cathryn Anthony    Reason for Consult / Principal Problem: Coronary artery disease    History of Present Illness: Lev Bojorquez is a 69y o  year old female with PMH of CAD s/p PCI/stent (LAD 12/2015), HTN, HLD (allergy to statin- myalgias), NIDDM2 (A1c 5 8), MO (BMI 35), stage 1 L breast cancer s/p mastectomy (L, no radiation/chemo required), stage III ovarian cancer (1997 s/p oophorectomy and chemo), thyroid cancer s/p throidectomy, neuropathy, osteopenia who presented to [facility] on 12/6 with worsening SOB/HAMM and exertional chest heaviness with radiation to left shoulder and back x 1 month  Due to symptoms worsening and inability to see her cardiologist (Dr Mainor Nuñez) she went to ED  In the ED troponins neg  X 3, however, EKG with T wave inversions  Cardiac cath performed revealing significant 2V disease  Patient states she can no longer ambulate a flight of stairs without taking a break  Reports conversational dyspnea as well as productive cough, lightheadedness/dizziness, and worsening LE edema for several months  She states her chest heaviness last for a few seconds but no other associated symptoms  Denies CP at rest, palpitations, orthopnea, PND, or syncope  Patient lives with , independent of all ADLs, sedentary lifestyle  She uses a cane to ambulate  Denies FH of blood disorders  Denies alcohol, tobacco, or drug use       Past Medical History:  Past Medical History:   Diagnosis Date   • Abdominal pain     LLQ on occasion   • Angina pectoris ([facility] [address] )    • Arthritis    • Breast cancer ([facility] [address] )    • Cancer ([facility] [address] )     breast left   • Colon polyp    • Constipation     at times   • Coronary artery disease    • Diabetes mellitus (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   • Thyroid cancer ([facility] [address] )    • Urinary tract infection          Past Surgical History:   Past Surgical History:   Procedure Laterality Date   • ANGIOPLASTY      stent x 1   • APPENDECTOMY     • BACK SURGERY     • BAND HEMORRHOIDECTOMY     • BRAIN SURGERY  1993    meningioma   • BREAST BIOPSY     • CARPAL TUNNEL RELEASE     • CERVICAL FUSION     • CHOLECYSTECTOMY     • COLONOSCOPY      pt see Dr Riky White  Up to date with her colonoscopy  • COLONOSCOPY     • CORONARY STENT PLACEMENT      Dec 2015   • EYE SURGERY      cataract surgery   • GALLBLADDER SURGERY     • HYSTERECTOMY  1989   • MAMMO (HISTORICAL)      up to date  see gyn   • MASTECTOMY Left 07/13/2021   • MASTECTOMY W/ SENTINEL NODE BIOPSY Left 07/13/2021    Procedure: BREAST ROSLYN  DIRECTED MASTECTOMY, SENTINEL LYMPH NODE BIOPSY, LYMPHATIC MAPPING WITH BLUE DYE AND RADIOACTIVE DYE (INJECT AT 1500 BY DR ANDREWS IN THE OR);   Surgeon: Cal Bautista MD;  Location: [facility];  Service: Surgical Oncology   • OOPHORECTOMY Bilateral 1997   • SPINE SURGERY     • THYROIDECTOMY     • UPPER GASTROINTESTINAL ENDOSCOPY     • US BREAST NEEDLE LOC LEFT Left 05/06/2021   • US GUIDANCE BREAST BIOPSY LEFT EACH ADDITIONAL Left 05/06/2021   • US GUIDED BREAST BIOPSY LEFT COMPLETE Left 03/15/2021   • US GUIDED BREAST BIOPSY LEFT COMPLETE Left 05/06/2021         Family History:  Family History   Problem Relation Age of Onset   • Diabetes Mother    • Heart disease Mother    • Dementia Mother    • Esophageal cancer Father 61   • Endometrial cancer Sister 76   • Asthma Sister    • Cancer Sister    • No Known Problems Sister    • No Known Problems Sister    • No Known Problems Sister    • Stomach cancer Brother 70   • Multiple myeloma Brother 67   • Cancer Brother    • No Known Problems Maternal Grandmother    • Prostate cancer Maternal Grandfather    • No Known Problems Paternal Grandmother    • Prostate cancer Paternal Grandfather    • Breast cancer Maternal Aunt 46   • No Known Problems Paternal Aunt    • No Known Problems Paternal Aunt    • Asthma Daughter    • Asthma Son    • Depression Son    • Breast cancer Other 39   • Breast cancer Other 39   • Diabetes Family    • Cancer Family    • Arthritis Family    • Heart disease Family          Social History:      Social History     Substance and Sexual Activity   Alcohol Use Never     Social History     Substance and Sexual Activity   Drug Use Never     Social History     Tobacco Use   Smoking Status Never   Smokeless Tobacco Never       Home Medications:   Prior to Admission medications    Medication Sig Start Date End Date Taking?  Authorizing Provider   anastrozole (ARIMIDEX) 1 mg tablet Take 1 tablet (1 mg total) by mouth daily 10/24/22  Yes Eloy Rocha MD   aspirin (ECOTRIN LOW STRENGTH) 81 mg EC tablet Take 162 mg by mouth daily   Yes Historical Provider, MD   glipiZIDE (GLUCOTROL) 10 mg tablet Take 1 tablet (10 mg total) by mouth 2 (two) times a day before meals 9/12/22  Yes Jacy Sanz MD   Lancets (OneTouch Delica Plus [id number]) [facility] USE DAILY 3/31/22  Yes Jacy Sanz MD   levothyroxine 88 mcg tablet Take 1 tablet (88 mcg total) by mouth daily 11/7/22  Yes Jacy Sanz MD   liraglutide (VICTOZA) injection Inject 1 2 mg under the skin daily at bedtime  7/12/17  Yes Historical Provider, MD   lisinopril (ZESTRIL) 20 mg tablet TAKE 1 TABLET(20 MG) BY MOUTH DAILY AT BEDTIME 6/27/22  Yes Jacy Sanz MD   metFORMIN (GLUCOPHAGE) 1000 MG tablet Take 1 tablet (1,000 mg total) by mouth 2 (two) times a day with meals 11/7/22  Yes Jacy Sanz MD   metoprolol tartrate (LOPRESSOR) 25 mg tablet Take 1 tablet (25 mg total) by mouth 3 (three) times a day 12/7/22  Yes BERNADETTE Cabrera   mupirocin (BACTROBAN) 2 % nasal ointment into each nostril 2 (two) times a day Apply 2 times daily for 5 days prior to procedure 12/8/22  Yes Shahrzad Colon PA-C   nitroglycerin (NITROSTAT) 0 4 mg SL tablet Place 1 tablet (0 4 mg total) under the tongue every 5 (five) minutes as needed for chest pain 12/7/22  Yes BERNADETTE Vicente   OneTouch Ultra test strip Use 1 each daily Use as instructed 4/11/22  Yes Soco Alvarez MD   pregabalin (LYRICA) 75 mg capsule Take 1 capsule (75 mg total) by mouth 2 (two) times a day 10/10/22  Yes Soco Alvarez MD   Insulin Pen Needle (Sure Comfort Pen Needles) 31G X 5 MM MISC by Does not apply route daily  Patient not taking: Reported on 12/8/2022 9/24/20   Soco Alvarez MD       Allergies: Allergies   Allergen Reactions   • Duloxetine Other (See Comments)     Extreme somnolence/lethargy   • Sulfa Antibiotics Rash       Review of Systems:     Review of Systems   Constitutional: Positive for activity change  Negative for chills, diaphoresis and fatigue  HENT: Negative for dental problem, nosebleeds and rhinorrhea  Eyes: Negative for pain and visual disturbance  Respiratory: Positive for chest tightness and shortness of breath  Negative for cough  Cardiovascular: Positive for leg swelling  Negative for chest pain and palpitations  Gastrointestinal: Negative for blood in stool, nausea and vomiting  Genitourinary: Negative for dysuria, flank pain and hematuria  Musculoskeletal: Positive for gait problem  Negative for arthralgias and joint swelling  Skin: Negative for color change, pallor and rash  Neurological: Positive for dizziness and light-headedness  Negative for seizures, syncope, weakness and numbness  Psychiatric/Behavioral: Negative for confusion and hallucinations  The patient is not nervous/anxious          Vital Signs:     Vitals:    12/08/22 1415   BP: 157/69   BP Location: Right arm   Patient Position: Sitting   Cuff Size: Standard   Pulse: 77   Temp: 98 2 °F (36 8 °C)   TempSrc: Tympanic   SpO2: 98%   Weight: 88 kg (194 lb)   Height: 5' 1" (1 549 m)       Physical Exam:   Physical Exam  Constitutional:       General: She is not in acute distress  Appearance: Normal appearance  She is not diaphoretic  HENT:      Head: Normocephalic and atraumatic  Right Ear: External ear normal       Left Ear: External ear normal       Nose: Nose normal    Eyes:      General:         Right eye: No discharge  Left eye: No discharge  Neck:      Vascular: No carotid bruit  Cardiovascular:      Rate and Rhythm: Normal rate and regular rhythm  Heart sounds: No murmur heard  No friction rub  Pulmonary:      Effort: Pulmonary effort is normal       Breath sounds: Normal breath sounds  No wheezing, rhonchi or rales  Abdominal:      General: Abdomen is flat  There is no distension  Palpations: Abdomen is soft  Tenderness: There is no abdominal tenderness  Musculoskeletal:         General: No deformity or signs of injury  Cervical back: No rigidity  No muscular tenderness  Right lower leg: Edema present  Left lower leg: Edema present  Skin:     General: Skin is warm and dry  Capillary Refill: Capillary refill takes less than 2 seconds  Findings: No rash  Neurological:      General: No focal deficit present  Mental Status: She is alert and oriented to person, place, and time  Psychiatric:         Mood and Affect: Mood normal          Behavior: Behavior normal          Thought Content:  Thought content normal          Judgment: Judgment normal          Lab Results:     Results from last 7 days   Lab Units 12/07/22  0604 12/06/22  1034   WBC Thousand/uL 5 75 6 33   HEMOGLOBIN g/dL 11 8 12 3   HEMATOCRIT % 34 1* 35 6   PLATELETS Thousands/uL 177 191     Results from last 7 days   Lab Units 12/07/22  0604 12/06/22  1034   POTASSIUM mmol/L 4 1 4 3   CHLORIDE mmol/L 104 100   CO2 mmol/L 27 26   BUN mg/dL 13 15   CREATININE mg/dL 0 66 0 75   CALCIUM mg/dL 8 6 9 0         Lab Results   Component Value Date HGBA1C 5 8 (H) 10/18/2022     Lab Results   Component Value Date    CKTOTAL 101 12/07/2018    TROPONINI 0 03 12/23/2015       Imaging Studies:     Cardiac Catheterization:  • Ost LAD to Prox LAD lesion is 20% stenosed  • Mid LM to Dist LM lesion is 70% stenosed  • Prox Cx lesion is 95% stenosed  • Ost RCA lesion is 40% stenosed  • Mid RCA lesion is 45% stenosed  • The left ventricular systolic function is normal  EF is around 55 to 60% no gradient across aortic valve  • Patient has significant stenosis of distal left main involving ostial LAD as well as proximal circumflex with a nonobstructive disease involving right coronary artery with preserved LV systolic function  • Lateral annulus calcification and calcification of aorta was noted      Echocardiogram:   •  Left Ventricle: Left ventricular cavity size is normal  Wall thickness is mildly increased  There is mild concentric hypertrophy  The left ventricular ejection fraction is 60% by visual estimation  Systolic function is normal  Although no diagnostic regional wall motion abnormality was identified, this possibility cannot be completely excluded on the basis of this study  •  Left Atrium: The atrium is mildly dilated  •  Mitral Valve: There is mild diffuse thickening of the anterior leaflet and posterior leaflet  Some thickening of subdural pruritus noted  There is moderate to severe annular calcification  There is mild regurgitation  There is very mild stenosis  Mean gradient 3 to 4 mmHg  VM:  RIGHT LOWER LIMB:  The great saphenous vein is patent  from the groin to the ankle  The vein measures >2mm in caliber from the groin to the ankle  LEFT LOWER LIMB:  The great saphenous vein is patent  from the groin to the ankle  The vein measures >2mm in caliber from the groin to the knee  Carotids:  RIGHT:  There is <50% stenosis noted in the internal carotid artery  Plaque is  heterogenous and smooth  Vertebral artery flow is antegrade  There is no significant subclavian artery  disease  LEFT:  There is <50% stenosis noted in the internal carotid artery  Plaque is  heterogenous and smooth  Vertebral artery flow is antegrade  There is no significant subclavian artery  Disease  CTA chest:  1  No evidence of acute pulmonary embolus, thoracic aortic aneurysm or dissection  No acute cardiopulmonary process  2   Groundglass 1 3 cm lesion in the left upper lobe, stable since 2015 and therefore likely benign    I have personally reviewed pertinent films in PACS    Assessment:  Patient Active Problem List    Diagnosis Date Noted   • Dyspnea on exertion 12/06/2022   • T wave inversion in EKG 12/06/2022   • Diarrhea 10/10/2022   • Non-ST elevation myocardial infarction (NSTEMI) ([facility] ) 07/21/2022   • History of PTCA 07/21/2022   • H/O heart artery stent 07/21/2022   • Colon polyp 07/21/2022   • Neuropathy 06/07/2022   • Use of anastrozole (Arimidex) 92/94/9424   • Monoallelic mutation of CHEK2 gene in female patient 06/11/2021   • Class 2 obesity in adult 06/01/2021   • 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, estrogen receptor positive ([facility] ) 04/16/2021   • Bilateral leg edema 06/01/2020   • Hypothyroidism 05/07/2020   • Spinal stenosis of lumbar region 05/07/2020   • Osteopenia of necks of both femurs 05/07/2020   • S/P lumbar fusion 06/06/2018   • Coronary artery disease involving native coronary artery of native heart without angina pectoris 01/18/2016   • Abnormal carotid ultrasound 01/18/2016   • 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[facility] ) 08/27/2013   • Type 2 diabetes mellitus without complication, without long-term current use of insulin ([facility] ) 02/27/2013   • Mixed dyslipidemia 02/27/2013   • Essential hypertension 02/27/2013     Severe coronary artery disease; Ongoing CABG workup    Plan:  Risks and benefits of coronary artery bypass grafting were discussed in detail today with the patient    They understand and wish to proceed with further workup and ultimately surgical intervention  We have ordered routine preoperative laboratory and vascular studies  Pending the results of these tests, they will be scheduled for surgery 12/19 with LUCIO Dias was comfortable with our recommendations, and their questions were answered to their satisfaction  Thank you for allowing us to participate in the care of this patient  SIGNATURE: Claudeen Southerly, PA-C  DATE: December 8, 2022  TIME: 2:43 PM    * This note was completed in part utilizing [employer] direct voice recognition software  Grammatical errors, random word insertion, spelling mistakes, and incomplete sentences may be an occasional consequence of the system secondary to software limitations, ambient noise and hardware issues  At the time of dictation, efforts were made to edit, clarify and /or correct errors  Please read the chart carefully and recognize, using context, where substitutions have occurred  If you have any questions or concerns about the context, text or information contained within the body of this dictation, please contact myself, the provider, for further clarification

## 2022-12-08 NOTE — LETTER
December 8, 2022     Giovana Rosas MD  [address]    Patient: Bridget Olson   YOB: 1945   Date of Visit: 12/8/2022       Dear Dr Maite Dale: Thank you for referring Jessi Alston to me for evaluation  Below are my notes for this consultation  If you have questions, please do not hesitate to call me  I look forward to following your patient along with you  Sincerely,        Mian Lowe MD        CC: MD Devora Wisdom PA-C  12/8/2022  3:01 PM  Attested  Consultation - Cardiothoracic Surgery   Bridget Olson 69 y o  female MRN: [id number]    Physician Requesting Consult: Dr Maite Dale    Reason for Consult / Principal Problem: Coronary artery disease    History of Present Illness: Bridget Olson is a 69y o  year old female with PMH of CAD s/p PCI/stent (LAD 12/2015), HTN, HLD (allergy to statin- myalgias), NIDDM2 (A1c 5 8), MO (BMI 35), stage 1 L breast cancer s/p mastectomy (L, no radiation/chemo required), stage III ovarian cancer (1997 s/p oophorectomy and chemo), thyroid cancer s/p throidectomy, neuropathy, osteopenia who [facility] with worsening SOB/HAMM and exertional chest heaviness with radiation to left shoulder and back x 1 month  Due to symptoms worsening and inability to see her cardiologist (Dr Joe Campbell) she went to ED  In the ED troponins neg  X 3, however, EKG with T wave inversions  Cardiac cath performed revealing significant 2V disease  Patient states she can no longer ambulate a flight of stairs without taking a break  Reports conversational dyspnea as well as productive cough, lightheadedness/dizziness, and worsening LE edema for several months  She states her chest heaviness last for a few seconds but no other associated symptoms  Denies CP at rest, palpitations, orthopnea, PND, or syncope  Patient lives with , independent of all ADLs, sedentary lifestyle   She uses a cane to ambulate  Denies FH of blood disorders  Denies alcohol, tobacco, or drug use  Past Medical History:  Past Medical History:   Diagnosis Date   • Abdominal pain     LLQ on occasion   • Angina pectoris ([facility] )    • Arthritis    • Breast cancer ([facility] )    • Cancer ([facility] )     breast left   • Colon polyp    • Constipation     at times   • Coronary artery disease    • Diabetes mellitus ([facility] )    • Diarrhea     at times   • Disease of thyroid gland    • Hemorrhoids    • Hypercholesterolemia    • Hypertension    • Neuropathy     both legs and feet   • Obesity    • Osteoporosis    • Otitis media    • Ovarian ca [facility]) 1997   • 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[facility] )    • Shingles    • 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   • Thyroid cancer ([facility] )    • Urinary tract infection          Past Surgical History:   Past Surgical History:   Procedure Laterality Date   • ANGIOPLASTY      stent x 1   • APPENDECTOMY     • BACK SURGERY     • BAND HEMORRHOIDECTOMY     • BRAIN SURGERY  1993    meningioma   • BREAST BIOPSY     • CARPAL TUNNEL RELEASE     • CERVICAL FUSION     • CHOLECYSTECTOMY     • COLONOSCOPY      pt see Dr Danny Moe  Up to date with her colonoscopy  • COLONOSCOPY     • CORONARY STENT PLACEMENT      Dec 2015   • EYE SURGERY      cataract surgery   • GALLBLADDER SURGERY     • HYSTERECTOMY  1989   • MAMMO (HISTORICAL)      up to date  see gyn   • MASTECTOMY Left 07/13/2021   • MASTECTOMY W/ SENTINEL NODE BIOPSY Left 07/13/2021    Procedure: BREAST ROSLYN  DIRECTED MASTECTOMY, SENTINEL LYMPH NODE BIOPSY, LYMPHATIC MAPPING WITH BLUE DYE AND RADIOACTIVE DYE (INJECT AT 1500 BY DR ANDREWS IN THE OR);   Surgeon: Rod Foster MD;  Location: [facility];  Service: Surgical Oncology   • OOPHORECTOMY Bilateral 1997   • SPINE SURGERY     • THYROIDECTOMY     • UPPER GASTROINTESTINAL ENDOSCOPY     • US BREAST NEEDLE LOC LEFT Left 05/06/2021   • US GUIDANCE BREAST BIOPSY LEFT EACH ADDITIONAL Left 05/06/2021   • US GUIDED BREAST BIOPSY LEFT COMPLETE Left 03/15/2021   • US GUIDED BREAST BIOPSY LEFT COMPLETE Left 05/06/2021         Family History:  Family History   Problem Relation Age of Onset   • Diabetes Mother    • Heart disease Mother    • Dementia Mother    • Esophageal cancer Father 61   • Endometrial cancer Sister 76   • Asthma Sister    • Cancer Sister    • No Known Problems Sister    • No Known Problems Sister    • No Known Problems Sister    • Stomach cancer Brother 70   • Multiple myeloma Brother 67   • Cancer Brother    • No Known Problems Maternal Grandmother    • Prostate cancer Maternal Grandfather    • No Known Problems Paternal Grandmother    • Prostate cancer Paternal Grandfather    • Breast cancer Maternal Aunt 46   • No Known Problems Paternal Aunt    • No Known Problems Paternal Aunt    • Asthma Daughter    • Asthma Son    • Depression Son    • Breast cancer Other 39   • Breast cancer Other 39   • Diabetes Family    • Cancer Family    • Arthritis Family    • Heart disease Family          Social History:      Social History     Substance and Sexual Activity   Alcohol Use Never     Social History     Substance and Sexual Activity   Drug Use Never     Social History     Tobacco Use   Smoking Status Never   Smokeless Tobacco Never       Home Medications:   Prior to Admission medications    Medication Sig Start Date End Date Taking?  Authorizing Provider   anastrozole (ARIMIDEX) 1 mg tablet Take 1 tablet (1 mg total) by mouth daily 10/24/22  Yes Wes Talavera MD   aspirin (ECOTRIN LOW STRENGTH) 81 mg EC tablet Take 162 mg by mouth daily   Yes Historical Provider, MD   glipiZIDE (GLUCOTROL) 10 mg tablet Take 1 tablet (10 mg total) by mouth 2 (two) times a day before meals 9/12/22  Yes Toshia Davis MD   Lancets (OneTouch Delica Plus [id number]) [address] USE DAILY 3/31/22  Yes Toshia Davis MD   levothyroxine 88 mcg tablet Take 1 tablet (88 mcg total) by mouth daily 11/7/22  Yes Toshia Davis MD   liraglutide (VICTOZA) injection Inject 1 2 mg under the skin daily at bedtime  7/12/17  Yes Historical Provider, MD   lisinopril (ZESTRIL) 20 mg tablet TAKE 1 TABLET(20 MG) BY MOUTH DAILY AT BEDTIME 6/27/22  Yes Maggie Sweeney MD   metFORMIN (GLUCOPHAGE) 1000 MG tablet Take 1 tablet (1,000 mg total) by mouth 2 (two) times a day with meals 11/7/22  Yes Maggie Sweeney MD   metoprolol tartrate (LOPRESSOR) 25 mg tablet Take 1 tablet (25 mg total) by mouth 3 (three) times a day 12/7/22  Yes BERNADETTE Pineda   mupirocin (BACTROBAN) 2 % nasal ointment into each nostril 2 (two) times a day Apply 2 times daily for 5 days prior to procedure 12/8/22  Yes Mike Melchor PA-C   nitroglycerin (NITROSTAT) 0 4 mg SL tablet Place 1 tablet (0 4 mg total) under the tongue every 5 (five) minutes as needed for chest pain 12/7/22  Yes BERNADETTE Pineda   OneTouch Ultra test strip Use 1 each daily Use as instructed 4/11/22  Yes Maggie Sweeney MD   pregabalin (LYRICA) 75 mg capsule Take 1 capsule (75 mg total) by mouth 2 (two) times a day 10/10/22  Yes Maggie Sweeney MD   Insulin Pen Needle (Sure Comfort Pen Needles) 31G X 5 MM MISC by Does not apply route daily  Patient not taking: Reported on 12/8/2022 9/24/20   Maggie Sweeney MD       Allergies: Allergies   Allergen Reactions   • Duloxetine Other (See Comments)     Extreme somnolence/lethargy   • Sulfa Antibiotics Rash       Review of Systems:     Review of Systems   Constitutional: Positive for activity change  Negative for chills, diaphoresis and fatigue  HENT: Negative for dental problem, nosebleeds and rhinorrhea  Eyes: Negative for pain and visual disturbance  Respiratory: Positive for chest tightness and shortness of breath  Negative for cough  Cardiovascular: Positive for leg swelling  Negative for chest pain and palpitations  Gastrointestinal: Negative for blood in stool, nausea and vomiting  Genitourinary: Negative for dysuria, flank pain and hematuria  Musculoskeletal: Positive for gait problem   Negative for arthralgias and joint swelling  Skin: Negative for color change, pallor and rash  Neurological: Positive for dizziness and light-headedness  Negative for seizures, syncope, weakness and numbness  Psychiatric/Behavioral: Negative for confusion and hallucinations  The patient is not nervous/anxious  Vital Signs:     Vitals:    12/08/22 1415   BP: 157/69   BP Location: Right arm   Patient Position: Sitting   Cuff Size: Standard   Pulse: 77   Temp: 98 2 °F (36 8 °C)   TempSrc: Tympanic   SpO2: 98%   Weight: 88 kg (194 lb)   Height: 5' 1" (1 549 m)       Physical Exam:   Physical Exam  Constitutional:       General: She is not in acute distress  Appearance: Normal appearance  She is not diaphoretic  HENT:      Head: Normocephalic and atraumatic  Right Ear: External ear normal       Left Ear: External ear normal       Nose: Nose normal    Eyes:      General:         Right eye: No discharge  Left eye: No discharge  Neck:      Vascular: No carotid bruit  Cardiovascular:      Rate and Rhythm: Normal rate and regular rhythm  Heart sounds: No murmur heard  No friction rub  Pulmonary:      Effort: Pulmonary effort is normal       Breath sounds: Normal breath sounds  No wheezing, rhonchi or rales  Abdominal:      General: Abdomen is flat  There is no distension  Palpations: Abdomen is soft  Tenderness: There is no abdominal tenderness  Musculoskeletal:         General: No deformity or signs of injury  Cervical back: No rigidity  No muscular tenderness  Right lower leg: Edema present  Left lower leg: Edema present  Skin:     General: Skin is warm and dry  Capillary Refill: Capillary refill takes less than 2 seconds  Findings: No rash  Neurological:      General: No focal deficit present  Mental Status: She is alert and oriented to person, place, and time     Psychiatric:         Mood and Affect: Mood normal          Behavior: Behavior normal          Thought Content: Thought content normal          Judgment: Judgment normal          Lab Results:     Results from last 7 days   Lab Units 12/07/22  0604 12/06/22  1034   WBC Thousand/uL 5 75 6 33   HEMOGLOBIN g/dL 11 8 12 3   HEMATOCRIT % 34 1* 35 6   PLATELETS Thousands/uL 177 191     Results from last 7 days   Lab Units 12/07/22  0604 12/06/22  1034   POTASSIUM mmol/L 4 1 4 3   CHLORIDE mmol/L 104 100   CO2 mmol/L 27 26   BUN mg/dL 13 15   CREATININE mg/dL 0 66 0 75   CALCIUM mg/dL 8 6 9 0         Lab Results   Component Value Date    HGBA1C 5 8 (H) 10/18/2022     Lab Results   Component Value Date    CKTOTAL 101 12/07/2018    TROPONINI 0 03 12/23/2015       Imaging Studies:     Cardiac Catheterization:  • Ost LAD to Prox LAD lesion is 20% stenosed  • Mid LM to Dist LM lesion is 70% stenosed  • Prox Cx lesion is 95% stenosed  • Ost RCA lesion is 40% stenosed  • Mid RCA lesion is 45% stenosed  • The left ventricular systolic function is normal  EF is around 55 to 60% no gradient across aortic valve  • Patient has significant stenosis of distal left main involving ostial LAD as well as proximal circumflex with a nonobstructive disease involving right coronary artery with preserved LV systolic function  • Lateral annulus calcification and calcification of aorta was noted      Echocardiogram:   •  Left Ventricle: Left ventricular cavity size is normal  Wall thickness is mildly increased  There is mild concentric hypertrophy  The left ventricular ejection fraction is 60% by visual estimation  Systolic function is normal  Although no diagnostic regional wall motion abnormality was identified, this possibility cannot be completely excluded on the basis of this study  •  Left Atrium: The atrium is mildly dilated  •  Mitral Valve: There is mild diffuse thickening of the anterior leaflet and posterior leaflet  Some thickening of subdural pruritus noted   There is moderate to severe annular calcification  There is mild regurgitation  There is very mild stenosis  Mean gradient 3 to 4 mmHg  VM:  RIGHT LOWER LIMB:  The great saphenous vein is patent  from the groin to the ankle  The vein measures >2mm in caliber from the groin to the ankle  LEFT LOWER LIMB:  The great saphenous vein is patent  from the groin to the ankle  The vein measures >2mm in caliber from the groin to the knee  Carotids:  RIGHT:  There is <50% stenosis noted in the internal carotid artery  Plaque is  heterogenous and smooth  Vertebral artery flow is antegrade  There is no significant subclavian artery  disease  LEFT:  There is <50% stenosis noted in the internal carotid artery  Plaque is  heterogenous and smooth  Vertebral artery flow is antegrade  There is no significant subclavian artery  Disease  CTA chest:  1  No evidence of acute pulmonary embolus, thoracic aortic aneurysm or dissection  No acute cardiopulmonary process    2   Groundglass 1 3 cm lesion in the left upper lobe, stable since 2015 and therefore likely benign    I have personally reviewed pertinent films in PACS    Assessment:  Patient Active Problem List    Diagnosis Date Noted   • Dyspnea on exertion 12/06/2022   • T wave inversion in EKG 12/06/2022   • Diarrhea 10/10/2022   • Non-ST elevation myocardial infarction (NSTEMI) ([facility] ) 07/21/2022   • History of PTCA 07/21/2022   • H/O heart artery stent 07/21/2022   • Colon polyp 07/21/2022   • Neuropathy 06/07/2022   • Use of anastrozole (Arimidex) 76/77/7461   • Monoallelic mutation of CHEK2 gene in female patient 06/11/2021   • Class 2 obesity in adult 06/01/2021   • 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, estrogen receptor positive ([facility] ) 04/16/2021   • Bilateral leg edema 06/01/2020   • Hypothyroidism 05/07/2020   • Spinal stenosis of lumbar region 05/07/2020   • Osteopenia of necks of both femurs 05/07/2020   • S/P lumbar fusion 06/06/2018   • Coronary artery disease involving native coronary artery of native heart without angina pectoris 01/18/2016   • Abnormal carotid ultrasound 01/18/2016   • 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   • Type 2 diabetes mellitus without complication, without long-term current use of insulin [facility] ) 02/27/2013   • Mixed dyslipidemia 02/27/2013   • Essential hypertension 02/27/2013     Severe coronary artery disease; Ongoing CABG workup    Plan:  Risks and benefits of coronary artery bypass grafting were discussed in detail today with the patient  They understand and wish to proceed with further workup and ultimately surgical intervention  We have ordered routine preoperative laboratory and vascular studies  Pending the results of these tests, they will be scheduled for surgery 12/19 with LUCIO Morel  Erica Villa was comfortable with our recommendations, and their questions were answered to their satisfaction  Thank you for allowing us to participate in the care of this patient  SIGNATURE: Dru Sharp PA-C  DATE: December 8, 2022  TIME: 2:43 PM    * This note was completed in part utilizing [employer] direct voice recognition software  Grammatical errors, random word insertion, spelling mistakes, and incomplete sentences may be an occasional consequence of the system secondary to software limitations, ambient noise and hardware issues  At the time of dictation, efforts were made to edit, clarify and /or correct errors  Please read the chart carefully and recognize, using context, where substitutions have occurred  If you have any questions or concerns about the context, text or information contained within the body of this dictation, please contact myself, the provider, for further clarification  Attestation signed by Cherelle Desai MD at 12/8/2022  4:35 PM:  Patient seen and evaluated with Zoya Person PA-C  I agree with the above assessment and plan with the following additions      The patient is a 78-year-old female with symptomatic severe coronary artery disease, associated diabetes  Coronary angiography shows left main 70%, circumflex 95%  TTE shows normal EF, mild MR, mild MS  I explained the diagnosis to the patient and the treatment options including medications, PCI and surgery  I recommend coronary artery bypass surgery based on her coronary anatomy and associated diabetes  I explained the procedure, benefits, risk and possible complications  She understands and agrees to proceed with surgery  This note was completed in part utilizing [employer] flu[employer] direct voice recognition software  Grammatical errors, random word insertion, spelling mistakes, and incomplete sentences may be an occasional consequence of the system secondary to software limitations, ambient noise and hardware issues  At the time of dictation, efforts were made to edit, clarify and /or correct errors  Please read the chart carefully and recognize, using context, where substitutions have occurred  If you have any questions or concerns about the context, text or information contained within the body of this dictation, please contact myself, the provider, for further clarification

## 2022-12-08 NOTE — H&P
H&P - Cardiothoracic Surgery   Nori Pacheco 69 y o  female MRN: [id number]    Physician Requesting Consult: Dr Ligia Pollard    Reason for Consult / Principal Problem: Coronary artery disease    History of Present Illness: Nori Pacheco is a 69y o  year old female with PMH of CAD s/p PCI/stent (LAD 12/2015), HTN, HLD (allergy to statin- myalgias), NIDDM2 (A1c 5 8), MO (BMI 35), stage 1 L breast cancer s/p mastectomy (L, no radiation/chemo required), stage III ovarian cancer (1997 s/p oophorectomy and chemo), thyroid cancer s/p throidectomy, neuropathy, osteopenia who presented to [facility] on 12/6 with worsening SOB/HAMM and exertional chest heaviness with radiation to left shoulder and back x 1 month  Due to symptoms worsening and inability to see her cardiologist (Dr Rach Rodriguez) she went to ED  In the ED troponins neg  X 3, however, EKG with T wave inversions  Cardiac cath performed revealing significant 2V disease  Patient states she can no longer ambulate a flight of stairs without taking a break  Reports conversational dyspnea as well as productive cough, lightheadedness/dizziness, and worsening LE edema for several months  She states her chest heaviness last for a few seconds but no other associated symptoms  Denies CP at rest, palpitations, orthopnea, PND, or syncope  Patient lives with , independent of all ADLs, sedentary lifestyle  She uses a cane to ambulate  Denies FH of blood disorders  Denies alcohol, tobacco, or drug use       Past Medical History:  Past Medical History:   Diagnosis Date   • Abdominal pain     LLQ on occasion   • Angina pectoris ([facility] )    • Arthritis    • Breast cancer ([facility] )    • Cancer ([facility] )     breast left   • Colon polyp    • Constipation     at times   • Coronary artery disease    • Diabetes mellitus ([facility] )    • Diarrhea     at times   • Disease of thyroid gland    • Hemorrhoids    • Hypercholesterolemia    • Hypertension    • Neuropathy     both legs and feet   • Obesity    • Osteoporosis • Otitis media    • Ovarian ca [facility]) 1997   • 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[facility] )    • Shingles    • 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   • Thyroid cancer ([facility] )    • Urinary tract infection          Past Surgical History:   Past Surgical History:   Procedure Laterality Date   • ANGIOPLASTY      stent x 1   • APPENDECTOMY     • BACK SURGERY     • BAND HEMORRHOIDECTOMY     • BRAIN SURGERY  1993    meningioma   • BREAST BIOPSY     • CARPAL TUNNEL RELEASE     • CERVICAL FUSION     • CHOLECYSTECTOMY     • COLONOSCOPY      pt see Dr Marilee Fairchild  Up to date with her colonoscopy  • COLONOSCOPY     • CORONARY STENT PLACEMENT      Dec 2015   • EYE SURGERY      cataract surgery   • GALLBLADDER SURGERY     • HYSTERECTOMY  1989   • MAMMO (HISTORICAL)      up to date  see gyn   • MASTECTOMY Left 07/13/2021   • MASTECTOMY W/ SENTINEL NODE BIOPSY Left 07/13/2021    Procedure: BREAST ROSLYN  DIRECTED MASTECTOMY, SENTINEL LYMPH NODE BIOPSY, LYMPHATIC MAPPING WITH BLUE DYE AND RADIOACTIVE DYE (INJECT AT 1500 BY DR ANDREWS IN THE OR);   Surgeon: Duncan Julien MD;  Location: [facility];  Service: Surgical Oncology   • OOPHORECTOMY Bilateral 1997   • SPINE SURGERY     • THYROIDECTOMY     • UPPER GASTROINTESTINAL ENDOSCOPY     • US BREAST NEEDLE LOC LEFT Left 05/06/2021   • US GUIDANCE BREAST BIOPSY LEFT EACH ADDITIONAL Left 05/06/2021   • US GUIDED BREAST BIOPSY LEFT COMPLETE Left 03/15/2021   • US GUIDED BREAST BIOPSY LEFT COMPLETE Left 05/06/2021         Family History:  Family History   Problem Relation Age of Onset   • Diabetes Mother    • Heart disease Mother    • Dementia Mother    • Esophageal cancer Father 61   • Endometrial cancer Sister 76   • Asthma Sister    • Cancer Sister    • No Known Problems Sister    • No Known Problems Sister    • No Known Problems Sister    • Stomach cancer Brother 70   • Multiple myeloma Brother 67   • Cancer Brother    • No Known Problems Maternal Grandmother    • Prostate cancer Maternal Grandfather    • No Known Problems Paternal Grandmother    • Prostate cancer Paternal Grandfather    • Breast cancer Maternal Aunt 46   • No Known Problems Paternal Aunt    • No Known Problems Paternal Aunt    • Asthma Daughter    • Asthma Son    • Depression Son    • Breast cancer Other 39   • Breast cancer Other 39   • Diabetes Family    • Cancer Family    • Arthritis Family    • Heart disease Family          Social History:      Social History     Substance and Sexual Activity   Alcohol Use Never     Social History     Substance and Sexual Activity   Drug Use Never     Social History     Tobacco Use   Smoking Status Never   Smokeless Tobacco Never       Home Medications:   Prior to Admission medications    Medication Sig Start Date End Date Taking?  Authorizing Provider   anastrozole (ARIMIDEX) 1 mg tablet Take 1 tablet (1 mg total) by mouth daily 10/24/22  Yes Serene Briceno MD   aspirin (ECOTRIN LOW STRENGTH) 81 mg EC tablet Take 162 mg by mouth daily   Yes Historical Provider, MD   glipiZIDE (GLUCOTROL) 10 mg tablet Take 1 tablet (10 mg total) by mouth 2 (two) times a day before meals 9/12/22  Yes Devin Norman MD   Lancets (OneTouch Delica Plus [id number]) [address] USE DAILY 3/31/22  Yes Devin Norman MD   levothyroxine 88 mcg tablet Take 1 tablet (88 mcg total) by mouth daily 11/7/22  Yes Devin Norman MD   liraglutide (VICTOZA) injection Inject 1 2 mg under the skin daily at bedtime  7/12/17  Yes Historical Provider, MD   lisinopril (ZESTRIL) 20 mg tablet TAKE 1 TABLET(20 MG) BY MOUTH DAILY AT BEDTIME 6/27/22  Yes Devin Norman MD   metFORMIN (GLUCOPHAGE) 1000 MG tablet Take 1 tablet (1,000 mg total) by mouth 2 (two) times a day with meals 11/7/22  Yes Devin Norman MD   metoprolol tartrate (LOPRESSOR) 25 mg tablet Take 1 tablet (25 mg total) by mouth 3 (three) times a day 12/7/22  Yes BERNADETTE Yuen   mupirocin (BACTROBAN) 2 % nasal ointment into each nostril 2 (two) times a day Apply 2 times daily for 5 days prior to procedure 12/8/22  Yes Carlos Trent PA-C   nitroglycerin (NITROSTAT) 0 4 mg SL tablet Place 1 tablet (0 4 mg total) under the tongue every 5 (five) minutes as needed for chest pain 12/7/22  Yes BERNADETTE Montoya   OneTouch Ultra test strip Use 1 each daily Use as instructed 4/11/22  Yes Arun Hernández MD   pregabalin (LYRICA) 75 mg capsule Take 1 capsule (75 mg total) by mouth 2 (two) times a day 10/10/22  Yes Arun Hernández MD   Insulin Pen Needle (Sure Comfort Pen Needles) 31G X 5 MM MISC by Does not apply route daily  Patient not taking: Reported on 12/8/2022 9/24/20   Arun Hernández MD       Allergies: Allergies   Allergen Reactions   • Duloxetine Other (See Comments)     Extreme somnolence/lethargy   • Sulfa Antibiotics Rash       Review of Systems:     Review of Systems   Constitutional: Positive for activity change  Negative for chills, diaphoresis and fatigue  HENT: Negative for dental problem, nosebleeds and rhinorrhea  Eyes: Negative for pain and visual disturbance  Respiratory: Positive for chest tightness and shortness of breath  Negative for cough  Cardiovascular: Positive for leg swelling  Negative for chest pain and palpitations  Gastrointestinal: Negative for blood in stool, nausea and vomiting  Genitourinary: Negative for dysuria, flank pain and hematuria  Musculoskeletal: Positive for gait problem  Negative for arthralgias and joint swelling  Skin: Negative for color change, pallor and rash  Neurological: Positive for dizziness and light-headedness  Negative for seizures, syncope, weakness and numbness  Psychiatric/Behavioral: Negative for confusion and hallucinations  The patient is not nervous/anxious          Vital Signs:     Vitals:    12/08/22 1415   BP: 157/69   BP Location: Right arm   Patient Position: Sitting   Cuff Size: Standard   Pulse: 77   Temp: 98 2 °F (36 8 °C)   TempSrc: Tympanic   SpO2: 98%   Weight: 88 kg (194 lb)   Height: 5' 1" (1 549 m) Physical Exam:   Physical Exam  Constitutional:       General: She is not in acute distress  Appearance: Normal appearance  She is not diaphoretic  HENT:      Head: Normocephalic and atraumatic  Right Ear: External ear normal       Left Ear: External ear normal       Nose: Nose normal    Eyes:      General:         Right eye: No discharge  Left eye: No discharge  Neck:      Vascular: No carotid bruit  Cardiovascular:      Rate and Rhythm: Normal rate and regular rhythm  Heart sounds: No murmur heard  No friction rub  Pulmonary:      Effort: Pulmonary effort is normal       Breath sounds: Normal breath sounds  No wheezing, rhonchi or rales  Abdominal:      General: Abdomen is flat  There is no distension  Palpations: Abdomen is soft  Tenderness: There is no abdominal tenderness  Musculoskeletal:         General: No deformity or signs of injury  Cervical back: No rigidity  No muscular tenderness  Right lower leg: Edema present  Left lower leg: Edema present  Skin:     General: Skin is warm and dry  Capillary Refill: Capillary refill takes less than 2 seconds  Findings: No rash  Neurological:      General: No focal deficit present  Mental Status: She is alert and oriented to person, place, and time  Psychiatric:         Mood and Affect: Mood normal          Behavior: Behavior normal          Thought Content:  Thought content normal          Judgment: Judgment normal          Lab Results:     Results from last 7 days   Lab Units 12/07/22  0604 12/06/22  1034   WBC Thousand/uL 5 75 6 33   HEMOGLOBIN g/dL 11 8 12 3   HEMATOCRIT % 34 1* 35 6   PLATELETS Thousands/uL 177 191     Results from last 7 days   Lab Units 12/07/22  0604 12/06/22  1034   POTASSIUM mmol/L 4 1 4 3   CHLORIDE mmol/L 104 100   CO2 mmol/L 27 26   BUN mg/dL 13 15   CREATININE mg/dL 0 66 0 75   CALCIUM mg/dL 8 6 9 0         Lab Results   Component Value Date    HGBA1C 5 8 (H) 10/18/2022     Lab Results   Component Value Date    CKTOTAL 101 12/07/2018    TROPONINI 0 03 12/23/2015       Imaging Studies:     Cardiac Catheterization:  • Ost LAD to Prox LAD lesion is 20% stenosed  • Mid LM to Dist LM lesion is 70% stenosed  • Prox Cx lesion is 95% stenosed  • Ost RCA lesion is 40% stenosed  • Mid RCA lesion is 45% stenosed  • The left ventricular systolic function is normal  EF is around 55 to 60% no gradient across aortic valve  • Patient has significant stenosis of distal left main involving ostial LAD as well as proximal circumflex with a nonobstructive disease involving right coronary artery with preserved LV systolic function  • Lateral annulus calcification and calcification of aorta was noted      Echocardiogram:   •  Left Ventricle: Left ventricular cavity size is normal  Wall thickness is mildly increased  There is mild concentric hypertrophy  The left ventricular ejection fraction is 60% by visual estimation  Systolic function is normal  Although no diagnostic regional wall motion abnormality was identified, this possibility cannot be completely excluded on the basis of this study  •  Left Atrium: The atrium is mildly dilated  •  Mitral Valve: There is mild diffuse thickening of the anterior leaflet and posterior leaflet  Some thickening of subdural pruritus noted  There is moderate to severe annular calcification  There is mild regurgitation  There is very mild stenosis  Mean gradient 3 to 4 mmHg  VM:  RIGHT LOWER LIMB:  The great saphenous vein is patent  from the groin to the ankle  The vein measures >2mm in caliber from the groin to the ankle  LEFT LOWER LIMB:  The great saphenous vein is patent  from the groin to the ankle  The vein measures >2mm in caliber from the groin to the knee  Carotids:  RIGHT:  There is <50% stenosis noted in the internal carotid artery  Plaque is  heterogenous and smooth  Vertebral artery flow is antegrade   There is no significant subclavian artery  disease  LEFT:  There is <50% stenosis noted in the internal carotid artery  Plaque is  heterogenous and smooth  Vertebral artery flow is antegrade  There is no significant subclavian artery  Disease  CTA chest:  1  No evidence of acute pulmonary embolus, thoracic aortic aneurysm or dissection  No acute cardiopulmonary process  2   Groundglass 1 3 cm lesion in the left upper lobe, stable since 2015 and therefore likely benign    I have personally reviewed pertinent films in PACS    Assessment:  Patient Active Problem List    Diagnosis Date Noted   • Dyspnea on exertion 12/06/2022   • T wave inversion in EKG 12/06/2022   • Diarrhea 10/10/2022   • Non-ST elevation myocardial infarction (NSTEMI) ([facility] ) 07/21/2022   • History of PTCA 07/21/2022   • H/O heart artery stent 07/21/2022   • Colon polyp 07/21/2022   • Neuropathy 06/07/2022   • Use of anastrozole (Arimidex) 17/63/7012   • Monoallelic mutation of CHEK2 gene in female patient 06/11/2021   • Class 2 obesity in adult 06/01/2021   • 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, estrogen receptor positive ([facility] ) 04/16/2021   • Bilateral leg edema 06/01/2020   • Hypothyroidism 05/07/2020   • Spinal stenosis of lumbar region 05/07/2020   • Osteopenia of necks of both femurs 05/07/2020   • S/P lumbar fusion 06/06/2018   • Coronary artery disease involving native coronary artery of native heart without angina pectoris 01/18/2016   • Abnormal carotid ultrasound 01/18/2016   • 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/2013   • Type 2 diabetes mellitus without complication, without long-term current use of insulin ([facility] ) 02/27/2013   • Mixed dyslipidemia 02/27/2013   • Essential hypertension 02/27/2013     Severe coronary artery disease; Ongoing CABG workup    Plan:  Risks and benefits of coronary artery bypass grafting were discussed in detail today with the patient    They understand and wish to proceed with further workup and ultimately surgical intervention  We have ordered routine preoperative laboratory and vascular studies  Pending the results of these tests, they will be scheduled for surgery 12/19 with LUCIO Dover was comfortable with our recommendations, and their questions were answered to their satisfaction  Thank you for allowing us to participate in the care of this patient  SIGNATURE: Lynn Stiles PA-C  DATE: December 8, 2022  TIME: 2:43 PM    * This note was completed in part utilizing [employer] direct voice recognition software  Grammatical errors, random word insertion, spelling mistakes, and incomplete sentences may be an occasional consequence of the system secondary to software limitations, ambient noise and hardware issues  At the time of dictation, efforts were made to edit, clarify and /or correct errors  Please read the chart carefully and recognize, using context, where substitutions have occurred  If you have any questions or concerns about the context, text or information contained within the body of this dictation, please contact myself, the provider, for further clarification

## 2022-12-09 ENCOUNTER — TRANSITIONAL CARE MANAGEMENT (OUTPATIENT)
Dept: FAMILY MEDICINE CLINIC | Facility: CLINIC | Age: 77
End: 2022-12-09

## 2022-12-09 LAB
ATRIAL RATE: 74 BPM
P AXIS: 59 DEGREES
PR INTERVAL: 180 MS
QRS AXIS: 2 DEGREES
QRSD INTERVAL: 76 MS
QT INTERVAL: 390 MS
QTC INTERVAL: 432 MS
T WAVE AXIS: 130 DEGREES
VENTRICULAR RATE: 74 BPM

## 2022-12-13 ENCOUNTER — TELEPHONE (OUTPATIENT)
Dept: FAMILY MEDICINE CLINIC | Facility: CLINIC | Age: 77
End: 2022-12-13

## 2022-12-13 ENCOUNTER — APPOINTMENT (OUTPATIENT)
Dept: LAB | Facility: HOSPITAL | Age: 77
End: 2022-12-13

## 2022-12-13 DIAGNOSIS — I25.118 CORONARY ARTERY DISEASE OF NATIVE ARTERY OF NATIVE HEART WITH STABLE ANGINA PECTORIS (HCC): ICD-10-CM

## 2022-12-13 DIAGNOSIS — I10 ESSENTIAL HYPERTENSION: ICD-10-CM

## 2022-12-13 DIAGNOSIS — Z01.818 PRE-OP TESTING: ICD-10-CM

## 2022-12-13 DIAGNOSIS — Z01.818 PREOP TESTING: ICD-10-CM

## 2022-12-13 LAB
INR PPP: 0.99 (ref 0.84–1.19)
PROTHROMBIN TIME: 13.2 SECONDS (ref 11.6–14.5)

## 2022-12-13 NOTE — TELEPHONE ENCOUNTER
Fani Marin just wanted you to know that she is having open heart double bypass heart surgery on Monday 12/19/22

## 2022-12-14 ENCOUNTER — LAB REQUISITION (OUTPATIENT)
Dept: LAB | Facility: HOSPITAL | Age: 77
End: 2022-12-14

## 2022-12-14 ENCOUNTER — APPOINTMENT (EMERGENCY)
Dept: RADIOLOGY | Facility: HOSPITAL | Age: 77
End: 2022-12-14

## 2022-12-14 ENCOUNTER — HOSPITAL ENCOUNTER (EMERGENCY)
Facility: HOSPITAL | Age: 77
Discharge: HOME | End: 2022-12-14
Attending: EMERGENCY MEDICINE

## 2022-12-14 VITALS
OXYGEN SATURATION: 96 % | HEIGHT: 61 IN | RESPIRATION RATE: 14 BRPM | WEIGHT: 194 LBS | SYSTOLIC BLOOD PRESSURE: 146 MMHG | BODY MASS INDEX: 36.63 KG/M2 | TEMPERATURE: 98.4 F | HEART RATE: 88 BPM | DIASTOLIC BLOOD PRESSURE: 61 MMHG

## 2022-12-14 DIAGNOSIS — U07.1 COVID-19: Primary | ICD-10-CM

## 2022-12-14 DIAGNOSIS — Z01.818 ENCOUNTER FOR OTHER PREPROCEDURAL EXAMINATION: ICD-10-CM

## 2022-12-14 LAB
2HR DELTA HS TROPONIN: -1 NG/L
ABO GROUP BLD: NORMAL
ABO GROUP BLD: NORMAL
ALBUMIN SERPL BCP-MCNC: 3.4 G/DL (ref 3.5–5)
ALP SERPL-CCNC: 40 U/L (ref 46–116)
ALT SERPL W P-5'-P-CCNC: 28 U/L (ref 12–78)
ANION GAP SERPL CALCULATED.3IONS-SCNC: 12 MMOL/L (ref 4–13)
APTT PPP: 26 SECONDS (ref 23–37)
AST SERPL W P-5'-P-CCNC: 21 U/L (ref 5–45)
BASOPHILS # BLD AUTO: 0.05 THOUSANDS/ÂΜL (ref 0–0.1)
BASOPHILS NFR BLD AUTO: 1 % (ref 0–1)
BILIRUB SERPL-MCNC: 0.73 MG/DL (ref 0.2–1)
BLD GP AB SCN SERPL QL: NEGATIVE
BUN SERPL-MCNC: 11 MG/DL (ref 5–25)
CALCIUM ALBUM COR SERPL-MCNC: 9.2 MG/DL (ref 8.3–10.1)
CALCIUM SERPL-MCNC: 8.7 MG/DL (ref 8.3–10.1)
CARDIAC TROPONIN I PNL SERPL HS: 7 NG/L
CARDIAC TROPONIN I PNL SERPL HS: 8 NG/L
CHLORIDE SERPL-SCNC: 101 MMOL/L (ref 96–108)
CO2 SERPL-SCNC: 24 MMOL/L (ref 21–32)
CREAT SERPL-MCNC: 0.65 MG/DL (ref 0.6–1.3)
EOSINOPHIL # BLD AUTO: 0.05 THOUSAND/ÂΜL (ref 0–0.61)
EOSINOPHIL NFR BLD AUTO: 1 % (ref 0–6)
ERYTHROCYTE [DISTWIDTH] IN BLOOD BY AUTOMATED COUNT: 12.2 % (ref 11.6–15.1)
FLUAV RNA RESP QL NAA+PROBE: NEGATIVE
FLUBV RNA RESP QL NAA+PROBE: NEGATIVE
GFR SERPL CREATININE-BSD FRML MDRD: 86 ML/MIN/1.73SQ M
GLUCOSE SERPL-MCNC: 147 MG/DL (ref 65–140)
HCT VFR BLD AUTO: 34.7 % (ref 34.8–46.1)
HGB BLD-MCNC: 12.3 G/DL (ref 11.5–15.4)
IMM GRANULOCYTES # BLD AUTO: 0.02 THOUSAND/UL (ref 0–0.2)
IMM GRANULOCYTES NFR BLD AUTO: 0 % (ref 0–2)
INR PPP: 1.09 (ref 0.84–1.19)
LYMPHOCYTES # BLD AUTO: 0.76 THOUSANDS/ÂΜL (ref 0.6–4.47)
LYMPHOCYTES NFR BLD AUTO: 13 % (ref 14–44)
MAGNESIUM SERPL-MCNC: 1.7 MG/DL (ref 1.6–2.6)
MCH RBC QN AUTO: 31.1 PG (ref 26.8–34.3)
MCHC RBC AUTO-ENTMCNC: 35.4 G/DL (ref 31.4–37.4)
MCV RBC AUTO: 88 FL (ref 82–98)
MONOCYTES # BLD AUTO: 0.7 THOUSAND/ÂΜL (ref 0.17–1.22)
MONOCYTES NFR BLD AUTO: 12 % (ref 4–12)
MRSA NOSE QL CULT: NORMAL
NEUTROPHILS # BLD AUTO: 4.47 THOUSANDS/ÂΜL (ref 1.85–7.62)
NEUTS SEG NFR BLD AUTO: 73 % (ref 43–75)
NRBC BLD AUTO-RTO: 0 /100 WBCS
NT-PROBNP SERPL-MCNC: 331 PG/ML
PLATELET # BLD AUTO: 173 THOUSANDS/UL (ref 149–390)
PMV BLD AUTO: 10.1 FL (ref 8.9–12.7)
POTASSIUM SERPL-SCNC: 4.2 MMOL/L (ref 3.5–5.3)
PROT SERPL-MCNC: 6.8 G/DL (ref 6.4–8.4)
PROTHROMBIN TIME: 14.2 SECONDS (ref 11.6–14.5)
RBC # BLD AUTO: 3.95 MILLION/UL (ref 3.81–5.12)
RH BLD: POSITIVE
RH BLD: POSITIVE
RSV RNA RESP QL NAA+PROBE: NEGATIVE
SARS-COV-2 RNA RESP QL NAA+PROBE: POSITIVE
SODIUM SERPL-SCNC: 137 MMOL/L (ref 135–147)
SPECIMEN EXPIRATION DATE: NORMAL
WBC # BLD AUTO: 6.05 THOUSAND/UL (ref 4.31–10.16)

## 2022-12-14 RX ORDER — GUAIFENESIN/DEXTROMETHORPHAN 100-10MG/5
10 SYRUP ORAL ONCE
Status: COMPLETED | OUTPATIENT
Start: 2022-12-14 | End: 2022-12-14

## 2022-12-14 RX ORDER — METHYLPREDNISOLONE SODIUM SUCCINATE 125 MG/2ML
125 INJECTION, POWDER, LYOPHILIZED, FOR SOLUTION INTRAMUSCULAR; INTRAVENOUS ONCE
Status: COMPLETED | OUTPATIENT
Start: 2022-12-14 | End: 2022-12-14

## 2022-12-14 RX ORDER — NIRMATRELVIR AND RITONAVIR 300-100 MG
3 KIT ORAL 2 TIMES DAILY
Qty: 30 TABLET | Refills: 0 | Status: SHIPPED | OUTPATIENT
Start: 2022-12-14 | End: 2022-12-19

## 2022-12-14 RX ORDER — ALBUTEROL SULFATE 90 UG/1
2 AEROSOL, METERED RESPIRATORY (INHALATION) EVERY 6 HOURS PRN
Qty: 8.5 G | Refills: 0 | Status: SHIPPED | OUTPATIENT
Start: 2022-12-14

## 2022-12-14 RX ORDER — BENZONATATE 100 MG/1
100 CAPSULE ORAL ONCE
Status: COMPLETED | OUTPATIENT
Start: 2022-12-14 | End: 2022-12-14

## 2022-12-14 RX ORDER — IPRATROPIUM BROMIDE AND ALBUTEROL SULFATE 2.5; .5 MG/3ML; MG/3ML
3 SOLUTION RESPIRATORY (INHALATION) ONCE
Status: COMPLETED | OUTPATIENT
Start: 2022-12-14 | End: 2022-12-14

## 2022-12-14 RX ORDER — GUAIFENESIN 100 MG/5ML
200 SYRUP ORAL 3 TIMES DAILY PRN
Qty: 120 ML | Refills: 0 | Status: SHIPPED | OUTPATIENT
Start: 2022-12-14 | End: 2022-12-21

## 2022-12-14 RX ADMIN — IPRATROPIUM BROMIDE AND ALBUTEROL SULFATE 3 ML: 2.5; .5 SOLUTION RESPIRATORY (INHALATION) at 08:28

## 2022-12-14 RX ADMIN — BENZONATATE 100 MG: 100 CAPSULE ORAL at 08:16

## 2022-12-14 RX ADMIN — METHYLPREDNISOLONE SODIUM SUCCINATE 125 MG: 125 INJECTION, POWDER, FOR SOLUTION INTRAMUSCULAR; INTRAVENOUS at 08:25

## 2022-12-14 RX ADMIN — GUAIFENESIN AND DEXTROMETHORPHAN 10 ML: 100; 10 SYRUP ORAL at 08:16

## 2022-12-14 NOTE — ED PROVIDER NOTES
History  Chief Complaint   Patient presents with   • Breathing Difficulty     Pt c/o of cough and mucus since 12/13/22 evening  Pt reports being scheduled for double coronary bypass this coming monday     81-year-old female presents with cough congestion and generalized weakness for the past couple of days  Patient denies any nausea vomiting abdominal pain diarrhea neck pain neck stiffness rash chest pain or shortness of breath  She is scheduled for a CABG on Monday  Phonic lower leg swelling no dyspnea on exertion or orthopnea  History provided by:  Patient   used: No        Prior to Admission Medications   Prescriptions Last Dose Informant Patient Reported? Taking?    Insulin Pen Needle (Sure Comfort Pen Needles) 31G X 5 MM MISC   No No   Sig: by Does not apply route daily   Patient not taking: Reported on 12/8/2022   Lancets (OneTouch Delica Plus [id number]) MISC 12/13/2022  No Yes   Sig: USE DAILY   OneTouch Ultra test strip 12/13/2022  No Yes   Sig: Use 1 each daily Use as instructed   anastrozole (ARIMIDEX) 1 mg tablet 12/13/2022  No Yes   Sig: Take 1 tablet (1 mg total) by mouth daily   aspirin (ECOTRIN LOW STRENGTH) 81 mg EC tablet 12/13/2022  Yes Yes   Sig: Take 162 mg by mouth daily   glipiZIDE (GLUCOTROL) 10 mg tablet 12/13/2022  No Yes   Sig: Take 1 tablet (10 mg total) by mouth 2 (two) times a day before meals   levothyroxine 88 mcg tablet 12/14/2022  No Yes   Sig: Take 1 tablet (88 mcg total) by mouth daily   liraglutide (VICTOZA) injection Past Week  Yes Yes   Sig: Inject 1 2 mg under the skin daily at bedtime    lisinopril (ZESTRIL) 20 mg tablet 12/13/2022  No Yes   Sig: TAKE 1 TABLET(20 MG) BY MOUTH DAILY AT BEDTIME   metFORMIN (GLUCOPHAGE) 1000 MG tablet 12/13/2022  No Yes   Sig: Take 1 tablet (1,000 mg total) by mouth 2 (two) times a day with meals   metoprolol tartrate (LOPRESSOR) 25 mg tablet 12/13/2022  No Yes   Sig: Take 1 tablet (25 mg total) by mouth 3 (three) times a day   mupirocin (BACTROBAN) 2 % nasal ointment Unknown  No No   Sig: into each nostril 2 (two) times a day Apply 2 times daily for 5 days prior to procedure   nitroglycerin (NITROSTAT) 0 4 mg SL tablet Not Taking  No No   Sig: Place 1 tablet (0 4 mg total) under the tongue every 5 (five) minutes as needed for chest pain   Patient not taking: Reported on 12/14/2022   pregabalin (LYRICA) 75 mg capsule 12/13/2022  No Yes   Sig: Take 1 capsule (75 mg total) by mouth 2 (two) times a day      Facility-Administered Medications: None       Past Medical History:   Diagnosis Date   • Abdominal pain     LLQ on occasion   • Angina pectoris ([facility] [address] )    • Arthritis    • Breast cancer ([facility] [address] )    • Cancer ([facility] [address] )     breast left   • Colon polyp    • Constipation     at times   • Coronary artery disease    • Diabetes mellitus ([facility] [address] )    • Diarrhea     at times   • Disease of thyroid gland    • Hemorrhoids    • Hypercholesterolemia    • Hypertension    • Neuropathy     both legs and feet   • Obesity    • Osteoporosis    • Otitis media    • Ovarian ca [facility]) 1997   • 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[address] )    • Shingles    • 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   • Thyroid cancer ([facility] [address] )    • Urinary tract infection        Past Surgical History:   Procedure Laterality Date   • ANGIOPLASTY      stent x 1   • APPENDECTOMY     • BACK SURGERY     • BAND HEMORRHOIDECTOMY     • BRAIN SURGERY  1993    meningioma   • BREAST BIOPSY     • CARDIAC CATHETERIZATION N/A 12/7/2022    Procedure: Cardiac catheterization;  Surgeon: Kolby Louise MD;  Location: [place] [address] CATH LAB; Service: Cardiology   • CARPAL TUNNEL RELEASE     • CERVICAL FUSION     • CHOLECYSTECTOMY     • COLONOSCOPY      pt see Dr Jose Miguel Moe  Up to date with her colonoscopy  • COLONOSCOPY     • CORONARY STENT PLACEMENT      Dec 2015   • EYE SURGERY      cataract surgery   • GALLBLADDER SURGERY     • HYSTERECTOMY  1989   • MAMMO (HISTORICAL)      up to date   see gyn   • MASTECTOMY Left 07/13/2021   • MASTECTOMY W/ SENTINEL NODE BIOPSY Left 07/13/2021    Procedure: BREAST ROSLYN  DIRECTED MASTECTOMY, SENTINEL LYMPH NODE BIOPSY, LYMPHATIC MAPPING WITH BLUE DYE AND RADIOACTIVE DYE (INJECT AT 1500 BY DR ANDREWS IN THE OR); Surgeon: Rene De Leon MD;  Location: [facility];  Service: Surgical Oncology   • OOPHORECTOMY Bilateral 1997   • SPINE SURGERY     • THYROIDECTOMY     • UPPER GASTROINTESTINAL ENDOSCOPY     • US BREAST NEEDLE LOC LEFT Left 05/06/2021   • US GUIDANCE BREAST BIOPSY LEFT EACH ADDITIONAL Left 05/06/2021   • US GUIDED BREAST BIOPSY LEFT COMPLETE Left 03/15/2021   • US GUIDED BREAST BIOPSY LEFT COMPLETE Left 05/06/2021       Family History   Problem Relation Age of Onset   • Diabetes Mother    • Heart disease Mother    • Dementia Mother    • Esophageal cancer Father 61   • Endometrial cancer Sister 76   • Asthma Sister    • Cancer Sister    • No Known Problems Sister    • No Known Problems Sister    • No Known Problems Sister    • Stomach cancer Brother 70   • Multiple myeloma Brother 67   • Cancer Brother    • No Known Problems Maternal Grandmother    • Prostate cancer Maternal Grandfather    • No Known Problems Paternal Grandmother    • Prostate cancer Paternal Grandfather    • Breast cancer Maternal Aunt 46   • No Known Problems Paternal Aunt    • No Known Problems Paternal [de-identified]    • Asthma Daughter    • Asthma Son    • Depression Son    • Breast cancer Other 39   • Breast cancer Other 39   • Diabetes Family    • Cancer Family    • Arthritis Family    • Heart disease Family      I have reviewed and agree with the history as documented      E-Cigarette/Vaping   • E-Cigarette Use Never User      E-Cigarette/Vaping Substances   • Nicotine No    • THC No    • CBD No    • Flavoring No    • Other No    • Unknown No      Social History     Tobacco Use   • Smoking status: Never   • Smokeless tobacco: Never   Vaping Use   • Vaping Use: Never used   Substance Use Topics   • Alcohol use: Never   • Drug use: Never       Review of Systems   Constitutional: Negative  HENT: Positive for congestion  Eyes: Negative  Respiratory: Positive for cough, shortness of breath and wheezing  Cardiovascular: Negative  Gastrointestinal: Negative  Endocrine: Negative  Genitourinary: Negative  Musculoskeletal: Negative  Skin: Negative  Allergic/Immunologic: Negative  Neurological: Negative  Hematological: Negative  Psychiatric/Behavioral: Negative  All other systems reviewed and are negative  Physical Exam  Physical Exam  Constitutional:       Appearance: Normal appearance  HENT:      Head: Normocephalic and atraumatic  Nose: Nose normal       Mouth/Throat:      Mouth: Mucous membranes are moist    Eyes:      Extraocular Movements: Extraocular movements intact  Pupils: Pupils are equal, round, and reactive to light  Cardiovascular:      Rate and Rhythm: Normal rate and regular rhythm  Pulmonary:      Effort: Pulmonary effort is normal       Breath sounds: Normal breath sounds  Abdominal:      General: Abdomen is flat  Bowel sounds are normal       Palpations: Abdomen is soft  Musculoskeletal:         General: Normal range of motion  Cervical back: Normal range of motion and neck supple  Skin:     General: Skin is warm  Capillary Refill: Capillary refill takes less than 2 seconds  Neurological:      General: No focal deficit present  Mental Status: She is alert and oriented to person, place, and time  Mental status is at baseline  Psychiatric:         Mood and Affect: Mood normal          Thought Content:  Thought content normal          Vital Signs  ED Triage Vitals   Temperature Pulse Respirations Blood Pressure SpO2   12/14/22 0751 12/14/22 0751 12/14/22 0751 12/14/22 0753 12/14/22 0751   98 4 °F (36 9 °C) 78 18 (!) 193/92 100 %      Temp Source Heart Rate Source Patient Position - Orthostatic VS BP Location FiO2 (%)   12/14/22 0751 12/14/22 1641 12/14/22 0751 12/14/22 0751 --   Oral Monitor Lying Right arm       Pain Score       12/14/22 0751       No Pain           Vitals:    12/14/22 0751 12/14/22 0753 12/14/22 0845 12/14/22 1000   BP:  (!) 193/92 123/58 146/61   Pulse: 78  72 84   Patient Position - Orthostatic VS: Lying            Visual Acuity      ED Medications  Medications   benzonatate (TESSALON PERLES) capsule 100 mg (100 mg Oral Given 12/14/22 0816)   dextromethorphan-guaiFENesin (ROBITUSSIN DM) oral syrup 10 mL (10 mL Oral Given 12/14/22 0816)   ipratropium-albuterol (DUO-NEB) 0 5-2 5 mg/3 mL inhalation solution 3 mL (3 mL Nebulization Given 12/14/22 0828)   methylPREDNISolone sodium succinate (Solu-MEDROL) injection 125 mg (125 mg Intravenous Given 12/14/22 0825)       Diagnostic Studies  Results Reviewed     Procedure Component Value Units Date/Time    HS Troponin I 2hr [[id number]]  (Normal) Collected: 12/14/22 1018    Lab Status: Final result Specimen: Blood from Arm, Right Updated: 12/14/22 1050     hs TnI 2hr 7 ng/L      Delta 2hr hsTnI -1 ng/L     FLU/RSV/COVID - if FLU/RSV clinically relevant [[id number]]  (Abnormal) Collected: 12/14/22 0824    Lab Status: Final result Specimen: Nares from Nose Updated: 12/14/22 0927     SARS-CoV-2 Positive     INFLUENZA A PCR Negative     INFLUENZA B PCR Negative     RSV PCR Negative    Narrative:      FOR PEDIATRIC PATIENTS - copy/paste COVID Guidelines URL to browser: [email or web address]    SARS-CoV-2 assay is a Nucleic Acid Amplification assay intended for the  qualitative detection of nucleic acid from SARS-CoV-2 in nasopharyngeal  swabs  Results are for the presumptive identification of SARS-CoV-2 RNA  Positive results are indicative of infection with SARS-CoV-2, the virus  causing COVID-19, but do not rule out bacterial infection or co-infection  with other viruses   Laboratories within the [place] and its  territories are required to report all positive results to the appropriate  public health authorities  Negative results do not preclude SARS-CoV-2  infection and should not be used as the sole basis for treatment or other  patient management decisions  Negative results must be combined with  clinical observations, patient history, and epidemiological information  This test has not been FDA cleared or approved  This test has been authorized by FDA under an Emergency Use Authorization  (EUA)  This test is only authorized for the duration of time the  declaration that circumstances exist justifying the authorization of the  emergency use of an in vitro diagnostic tests for detection of SARS-CoV-2  virus and/or diagnosis of COVID-19 infection under section 564(b)(1) of  the Act, 21 U  S C  201EYY-5(T)(7), unless the authorization is terminated  or revoked sooner  The test has been validated but independent review by FDA  and CLIA is pending  Test performed using [employer] GeneXpert: This RT-PCR assay targets N2,  a region unique to SARS-CoV-2  A conserved region in the E-gene was chosen  for pan-Sarbecovirus detection which includes SARS-CoV-2  According to CMS-2020-01-R, this platform meets the definition of high-throughput technology      HS Troponin 0hr (reflex protocol) [[id number]]  (Normal) Collected: 12/14/22 0824    Lab Status: Final result Specimen: Blood from Arm, Right Updated: 12/14/22 0901     hs TnI 0hr 8 ng/L     Magnesium [[id number]]  (Normal) Collected: 12/14/22 0824    Lab Status: Final result Specimen: Blood from Arm, Right Updated: 12/14/22 0859     Magnesium 1 7 mg/dL     NT-BNP PRO [[id number]]  (Normal) Collected: 12/14/22 0824    Lab Status: Final result Specimen: Blood from Arm, Right Updated: 12/14/22 0859     NT-proBNP 331 pg/mL     Comprehensive metabolic panel [[id number]]  (Abnormal) Collected: 12/14/22 0824    Lab Status: Final result Specimen: Blood from Arm, Right Updated: 12/14/22 0853     Sodium 137 mmol/L Potassium 4 2 mmol/L      Chloride 101 mmol/L      CO2 24 mmol/L      ANION GAP 12 mmol/L      BUN 11 mg/dL      Creatinine 0 65 mg/dL      Glucose 147 mg/dL      Calcium 8 7 mg/dL      Corrected Calcium 9 2 mg/dL      AST 21 U/L      ALT 28 U/L      Alkaline Phosphatase 40 U/L      Total Protein 6 8 g/dL      Albumin 3 4 g/dL      Total Bilirubin 0 73 mg/dL      eGFR 86 ml/min/1 73sq m     Narrative:      National Kidney Disease Foundation guidelines for Chronic Kidney Disease (CKD):   •  Stage 1 with normal or high GFR (GFR > 90 mL/min/1 73 square meters)  •  Stage 2 Mild CKD (GFR = 60-89 mL/min/1 73 square meters)  •  Stage 3A Moderate CKD (GFR = 45-59 mL/min/1 73 square meters)  •  Stage 3B Moderate CKD (GFR = 30-44 mL/min/1 73 square meters)  •  Stage 4 Severe CKD (GFR = 15-29 mL/min/1 73 square meters)  •  Stage 5 End Stage CKD (GFR <15 mL/min/1 73 square meters)  Note: GFR calculation is accurate only with a steady state creatinine    Protime-INR [[id number]]  (Normal) Collected: 12/14/22 0824    Lab Status: Final result Specimen: Blood from Arm, Right Updated: 12/14/22 0848     Protime 14 2 seconds      INR 1 09    APTT [[id number]]  (Normal) Collected: 12/14/22 0824    Lab Status: Final result Specimen: Blood from Arm, Right Updated: 12/14/22 0848     PTT 26 seconds     CBC and differential [[id number]]  (Abnormal) Collected: 12/14/22 0824    Lab Status: Final result Specimen: Blood from Arm, Right Updated: 12/14/22 0839     WBC 6 05 Thousand/uL      RBC 3 95 Million/uL      Hemoglobin 12 3 g/dL      Hematocrit 34 7 %      MCV 88 fL      MCH 31 1 pg      MCHC 35 4 g/dL      RDW 12 2 %      MPV 10 1 fL      Platelets 551 Thousands/uL      nRBC 0 /100 WBCs      Neutrophils Relative 73 %      Immat GRANS % 0 %      Lymphocytes Relative 13 %      Monocytes Relative 12 %      Eosinophils Relative 1 %      Basophils Relative 1 %      Neutrophils Absolute 4 47 Thousands/µL      Immature Grans Absolute 0 02 Thousand/uL      Lymphocytes Absolute 0 76 Thousands/µL      Monocytes Absolute 0 70 Thousand/µL      Eosinophils Absolute 0 05 Thousand/µL      Basophils Absolute 0 05 Thousands/µL                  XR chest 1 view portable   Final Result by Nelly Cabrera MD (12/14 3897)      Left upper lobe groundglass opacity faintly visible but better evident by CTA  Likely unchanged  Workstation performed: [id number]                    Procedures  ECG 12 Lead Documentation Only  Performed by: Amalia Hernandez DO  Authorized by: Amalia Hernandez DO     ECG reviewed by me, the ED Provider: yes    Patient location:  ED  Previous ECG:     Previous ECG:  Compared to current    Similarity:  No change    Comparison to cardiac monitor: Yes    Interpretation:     Interpretation: non-specific    Rate:     ECG rate assessment: normal    Rhythm:     Rhythm: sinus rhythm    Ectopy:     Ectopy: none    QRS:     QRS axis:  Normal  Conduction:     Conduction: normal    ST segments:     ST segments:  Non-specific  T waves:     T waves: non-specific               ED Course                                             MDM  Number of Diagnoses or Management Options     Amount and/or Complexity of Data Reviewed  Clinical lab tests: ordered and reviewed  Tests in the radiology section of CPT®: ordered and reviewed  Tests in the medicine section of CPT®: ordered and reviewed    Patient Progress  Patient progress: stable      Disposition  Final diagnoses:   COVID-19     Time reflects when diagnosis was documented in both MDM as applicable and the Disposition within this note     Time User Action Codes Description Comment    12/14/2022 11:01 AM Ritika De La Fuente Add [U07 1] COVID-19       ED Disposition     ED Disposition   Discharge    Condition   Stable    Date/Time   Wed Dec 14, 2022 11:00 AM    Comment   Diana Villa discharge to home/self care                 Follow-up Information    None         Patient's Medications   Discharge Prescriptions    ALBUTEROL (PROAIR HFA) 90 MCG/ACT INHALER    Inhale 2 puffs every 6 (six) hours as needed for wheezing       Start Date: 12/14/2022End Date: --       Order Dose: 2 puffs       Quantity: 8 5 g    Refills: 0    GUAIFENESIN (ROBITUSSIN) 100 MG/5ML ORAL LIQUID    Take 10 mL (200 mg total) by mouth 3 (three) times a day as needed for cough for up to 7 days       Start Date: 12/14/2022End Date: 12/21/2022       Order Dose: 200 mg       Quantity: 120 mL    Refills: 0    NIRMATRELVIR & RITONAVIR (PAXLOVID, 300/100,) TABLET THERAPY PACK    Take 3 tablets by mouth 2 (two) times a day for 5 days Take 2 nirmatrelvir tablets + 1 ritonavir tablet together per dose       Start Date: 12/14/2022End Date: 12/19/2022       Order Dose: 3 tablets       Quantity: 30 tablet    Refills: 0       No discharge procedures on file      PDMP Review       Value Time User    PDMP Reviewed  Yes 1/11/2022  4:08 PM Devin Norman MD          ED Provider  Electronically Signed by           Anjelica Acuña DO  12/14/22 9017

## 2022-12-14 NOTE — DISCHARGE INSTRUCTIONS
Please obtain a pulse oximeter and if your saturations drop below 90% at rest please return to the ED also return if you have worsening shortness of breath passing out lightheadedness or any other symptoms  CT surgery has been notified about COVID positive they will call you about the surgery for now your surgery was canceled on Monday

## 2022-12-16 LAB
ATRIAL RATE: 74 BPM
P AXIS: 46 DEGREES
PR INTERVAL: 168 MS
QRS AXIS: 4 DEGREES
QRSD INTERVAL: 76 MS
QT INTERVAL: 384 MS
QTC INTERVAL: 426 MS
T WAVE AXIS: 115 DEGREES
VENTRICULAR RATE: 74 BPM

## 2022-12-19 ENCOUNTER — TELEPHONE (OUTPATIENT)
Dept: FAMILY MEDICINE CLINIC | Facility: CLINIC | Age: 77
End: 2022-12-19

## 2022-12-19 NOTE — TELEPHONE ENCOUNTER
Patient states the hospital gave her paxlovid- tested positive on 12/14 and she says shes feeling much better   tested positive on the 17th and hes just fatigued 0 = understands/communicates without difficulty

## 2022-12-19 NOTE — TELEPHONE ENCOUNTER
All Manley and her  both tested positive for covid  Her open heart surgery is not postponed        Patient ph # [phone]

## 2022-12-27 ENCOUNTER — TELEPHONE (OUTPATIENT)
Dept: FAMILY MEDICINE CLINIC | Facility: CLINIC | Age: 77
End: 2022-12-27

## 2022-12-27 DIAGNOSIS — E11.9 TYPE 2 DIABETES MELLITUS WITHOUT COMPLICATION, WITHOUT LONG-TERM CURRENT USE OF INSULIN (HCC): ICD-10-CM

## 2022-12-27 DIAGNOSIS — I10 ESSENTIAL (PRIMARY) HYPERTENSION: ICD-10-CM

## 2022-12-27 DIAGNOSIS — R94.31 EKG ABNORMALITY: ICD-10-CM

## 2022-12-27 RX ORDER — LISINOPRIL 20 MG/1
20 TABLET ORAL
Qty: 90 TABLET | Refills: 1 | Status: SHIPPED | OUTPATIENT
Start: 2022-12-27

## 2022-12-27 NOTE — TELEPHONE ENCOUNTER
Refill:  Lisinopril 20mg 1 daily #90 and Metoprolol Tartrate 25mg (WAS 2xday, but hospital put her on 3xday) #270  [facility] [address]  **ALSO WILI, was suppose to have heart surgery in Dec, but it was postponed due to having Covid 
Sent to pharmacy
[facility] [place]
Charlene Stewart

## 2023-01-03 ENCOUNTER — TELEPHONE (OUTPATIENT)
Dept: FAMILY MEDICINE CLINIC | Facility: CLINIC | Age: 78
End: 2023-01-03

## 2023-01-03 DIAGNOSIS — E11.9 DIABETES MELLITUS WITHOUT COMPLICATION (HCC): ICD-10-CM

## 2023-01-03 RX ORDER — GLIPIZIDE 10 MG/1
10 TABLET ORAL
Qty: 180 TABLET | Refills: 0 | Status: SHIPPED | OUTPATIENT
Start: 2023-01-03

## 2023-01-09 ENCOUNTER — OFFICE VISIT (OUTPATIENT)
Dept: CARDIOLOGY CLINIC | Facility: CLINIC | Age: 78
End: 2023-01-09

## 2023-01-09 VITALS
BODY MASS INDEX: 36.82 KG/M2 | WEIGHT: 195 LBS | OXYGEN SATURATION: 98 % | TEMPERATURE: 98 F | DIASTOLIC BLOOD PRESSURE: 68 MMHG | SYSTOLIC BLOOD PRESSURE: 144 MMHG | HEIGHT: 61 IN

## 2023-01-09 DIAGNOSIS — E11.9 TYPE 2 DIABETES MELLITUS WITHOUT COMPLICATION, WITHOUT LONG-TERM CURRENT USE OF INSULIN (HCC): ICD-10-CM

## 2023-01-09 DIAGNOSIS — I25.10 CORONARY ARTERY DISEASE INVOLVING NATIVE CORONARY ARTERY OF NATIVE HEART WITHOUT ANGINA PECTORIS: Primary | ICD-10-CM

## 2023-01-09 DIAGNOSIS — I10 ESSENTIAL HYPERTENSION: ICD-10-CM

## 2023-01-09 DIAGNOSIS — E66.01 CLASS 2 SEVERE OBESITY WITH SERIOUS COMORBIDITY AND BODY MASS INDEX (BMI) OF 35.0 TO 35.9 IN ADULT, UNSPECIFIED OBESITY TYPE (HCC): ICD-10-CM

## 2023-01-09 DIAGNOSIS — E78.2 MIXED DYSLIPIDEMIA: ICD-10-CM

## 2023-01-09 NOTE — PROGRESS NOTES
Cardiology   Neeta Poster, DO, Shara Vega MD, Sushila Bear MD, Caitlyn Conti MD, [facility] [place]  -------------------------------------------------------------------  [facility]  [address], [address] [facility],[address], [address] [place] [address], [address]  [phone]    Cardiology Follow Up  Bridget Olson  1945  [id number]          Assessment/Plan:    1  Coronary artery disease involving native coronary artery of native heart without angina pectoris    2  Mixed dyslipidemia    3  Essential hypertension    4  Class 2 severe obesity with serious comorbidity and body mass index (BMI) of 35 0 to 35 9 in adult, unspecified obesity type ([address] )    5  Type 2 diabetes mellitus without complication, without long-term current use of insulin ([facility])      - Patient has Left main stenosis and requires CABG postponed due to COVID-19 infection  He has recovered and is awaiting surgery  We will reach out to CT surgery to schedule her for procedure   -In the interim, she should continue aspirin 81 mg daily along with metoprolol and lisinopril  If she developed any chest pain or worsening shortness of breath, he should go to the nearest emergency room  - She has failed 2 separate statins  Postprocedure, will start patient on Leqvio as outpatient  - Discussed with patient and family  Interval History:     Bridget Olson is 69 y o  female here for followup of CAD  Since her last visit, she developed exertional dyspnea and chest tightness and was seen in the emergency room  He subsequently underwent cardiac catheterization which showed left main 70% stenosis and 95% proximal circumflex stenosis  In addition she had 45% RCA stenosis  She was scheduled for CABG on 12/19 but surgery was canceled because patient had COVID infection  She has recovered and awaiting surgery  She has some shortness of breath with exertion  Denies any chest pain    Denies any LE Edema, orthopnea or PND  She could not tolerate pravastatin or atorvastatin due to myalgias  She has been on 2 g of fish oil twice a day  Most recent blood work showed LDL of 77, HDL of 35 and triglycerides of 224  She has a history of CAD with SAMMIE to proximal LAD in 2015  Prior to stent placement, she was feeling episode of left arm pain with exertion  She has not had any further episodes since stent was placed  She denies any exertional dyspnea, LE edema, orthopnea  Patient was previously on atorvastatin 40 mg daily but stopped it due to myalgias  She uses metoprolol 25 mg and lisinopril 20 mg daily  She had an echocardiogram in December which showed normal ejection fraction with mild valve disease      The following portions of the patient's history were reviewed and updated as appropriate: allergies, current medications, past family history, past medical history, past social history, past surgical history, and problem list        Current Outpatient Medications:   •  anastrozole (ARIMIDEX) 1 mg tablet, Take 1 tablet (1 mg total) by mouth daily, Disp: 90 tablet, Rfl: 0  •  aspirin (ECOTRIN LOW STRENGTH) 81 mg EC tablet, Take 162 mg by mouth daily, Disp: , Rfl:   •  glipiZIDE (GLUCOTROL) 10 mg tablet, Take 1 tablet (10 mg total) by mouth 2 (two) times a day before meals, Disp: 180 tablet, Rfl: 0  •  Insulin Pen Needle (Sure Comfort Pen Needles) 31G X 5 MM MISC, by Does not apply route daily, Disp: 100 each, Rfl: 3  •  Lancets (OneTouch Delica Plus [id number]) MISC, USE DAILY, Disp: 100 each, Rfl: 3  •  levothyroxine 88 mcg tablet, Take 1 tablet (88 mcg total) by mouth daily, Disp: 90 tablet, Rfl: 0  •  liraglutide (VICTOZA) injection, Inject 1 2 mg under the skin daily at bedtime , Disp: , Rfl:   •  lisinopril (ZESTRIL) 20 mg tablet, Take 1 tablet (20 mg total) by mouth daily at bedtime, Disp: 90 tablet, Rfl: 1  •  metFORMIN (GLUCOPHAGE) 1000 MG tablet, Take 1 tablet (1,000 mg total) by mouth 2 (two) times a day with meals, Disp: 180 tablet, Rfl: 0  •  metoprolol tartrate (LOPRESSOR) 25 mg tablet, Take 1 tablet (25 mg total) by mouth 3 (three) times a day, Disp: 270 tablet, Rfl: 1  •  mupirocin (BACTROBAN) 2 % nasal ointment, into each nostril 2 (two) times a day Apply 2 times daily for 5 days prior to procedure, Disp: 10 g, Rfl: 0  •  OneTouch Ultra test strip, Use 1 each daily Use as instructed, Disp: 100 each, Rfl: 3  •  pregabalin (LYRICA) 75 mg capsule, Take 1 capsule (75 mg total) by mouth 2 (two) times a day, Disp: 180 capsule, Rfl: 1  •  albuterol (ProAir HFA) 90 mcg/act inhaler, Inhale 2 puffs every 6 (six) hours as needed for wheezing (Patient not taking: Reported on 1/9/2023), Disp: 8 5 g, Rfl: 0  •  nitroglycerin (NITROSTAT) 0 4 mg SL tablet, Place 1 tablet (0 4 mg total) under the tongue every 5 (five) minutes as needed for chest pain (Patient not taking: Reported on 12/14/2022), Disp: 30 tablet, Rfl: 0        Review of Systems:  Review of Systems   Respiratory: Positive for chest tightness and shortness of breath  Cardiovascular: Negative for chest pain, palpitations and leg swelling  Musculoskeletal: Positive for arthralgias and myalgias  All other systems reviewed and are negative  Physical Exam:  Vitals:  Vitals:    01/09/23 1430   BP: 144/68   BP Location: Right arm   Patient Position: Sitting   Cuff Size: Standard   Temp: 98 °F (36 7 °C)   SpO2: 98%   Weight: 88 5 kg (195 lb)   Height: 5' 1" (1 549 m)     Physical Exam   Constitutional: She appears healthy  No distress  Eyes: Pupils are equal, round, and reactive to light  Conjunctivae are normal    Neck: No JVD present  Cardiovascular: Normal rate, regular rhythm and normal heart sounds  Exam reveals no gallop and no friction rub  No murmur heard  Pulmonary/Chest: Effort normal and breath sounds normal  She has no wheezes  She has no rales  Musculoskeletal:         General: No tenderness, deformity or edema  Cervical back: Normal range of motion and neck supple  Neurological: She is alert and oriented to person, place, and time  Skin: Skin is warm and dry  Cardiographics:  EKG: Personally reviewed normal sinus rhythm with rate 75 beats per minute  Last known EF:  60-65%    This note was completed in part utilizing M-Modal Fluency Direct Software  Grammatical errors, random word insertions, spelling mistakes, and incomplete sentences can be an occasional consequence of this system secondary to software limitations, ambient noise, and hardware issues  If you have any questions or concerns about the content, text, or information contained within the body of this dictation, please contact the provider for clarification

## 2023-01-25 ENCOUNTER — TELEPHONE (OUTPATIENT)
Dept: HEMATOLOGY ONCOLOGY | Facility: CLINIC | Age: 78
End: 2023-01-25

## 2023-01-25 DIAGNOSIS — C50.812 MALIGNANT NEOPLASM OF OVERLAPPING SITES OF LEFT BREAST IN FEMALE, ESTROGEN RECEPTOR POSITIVE (HCC): ICD-10-CM

## 2023-01-25 DIAGNOSIS — Z17.0 MALIGNANT NEOPLASM OF OVERLAPPING SITES OF LEFT BREAST IN FEMALE, ESTROGEN RECEPTOR POSITIVE (HCC): ICD-10-CM

## 2023-01-25 RX ORDER — ANASTROZOLE 1 MG/1
1 TABLET ORAL DAILY
Qty: 90 TABLET | Refills: 0 | Status: SHIPPED | OUTPATIENT
Start: 2023-01-25

## 2023-01-25 NOTE — TELEPHONE ENCOUNTER
MEDICATION REFILL ROUTE TO RN    Who is calling? Patient   If someone other than patient is calling, are they listed on the communication consent form?  N/A   Medication (name and dose) anastrozole (ARIMIDEX) 1 mg tablet       How many pills left 4 days   Preferred Pharmacy / Address [id number] [address] Saurav Dumont   Call back number [phone]   Relevant Information  Yes

## 2023-01-25 NOTE — TELEPHONE ENCOUNTER
CALL RETURN FORM   Reason for patient call? Patient would like to discuss whether or not her appointment on 01/30/23 with Lalo Gould is needed at this time  Patient's primary oncologist? Lalo Gould   Name of person the patient was calling for? Lalo Gould   Any additional information to add, if applicable? N/A   Informed patient that the message will be forwarded to the team and someone will get back to them as soon as possible    Did you relay this information to the patient?  Yes

## 2023-01-26 ENCOUNTER — OFFICE VISIT (OUTPATIENT)
Dept: CARDIAC SURGERY | Facility: CLINIC | Age: 78
End: 2023-01-26

## 2023-01-26 VITALS
SYSTOLIC BLOOD PRESSURE: 160 MMHG | HEART RATE: 70 BPM | TEMPERATURE: 98.8 F | HEIGHT: 61 IN | BODY MASS INDEX: 36.5 KG/M2 | OXYGEN SATURATION: 98 % | WEIGHT: 193.3 LBS | DIASTOLIC BLOOD PRESSURE: 71 MMHG

## 2023-01-26 DIAGNOSIS — Z01.812 ENCOUNTER FOR PRE-OPERATIVE LABORATORY TESTING: ICD-10-CM

## 2023-01-26 DIAGNOSIS — Z01.810 PRE-OPERATIVE CARDIOVASCULAR EXAMINATION: ICD-10-CM

## 2023-01-26 DIAGNOSIS — I25.118 CORONARY ARTERY DISEASE OF NATIVE ARTERY OF NATIVE HEART WITH STABLE ANGINA PECTORIS (HCC): Primary | ICD-10-CM

## 2023-01-26 RX ORDER — CEFAZOLIN SODIUM 2 G/50ML
2000 SOLUTION INTRAVENOUS ONCE
Status: CANCELLED | OUTPATIENT
Start: 2023-01-26 | End: 2023-01-26

## 2023-01-26 RX ORDER — CHLORHEXIDINE GLUCONATE 0.12 MG/ML
15 RINSE ORAL ONCE
Status: CANCELLED | OUTPATIENT
Start: 2023-01-26 | End: 2023-01-26

## 2023-01-26 NOTE — LETTER
January 26, 2023     Hannah Ley, DO  [address] [facility]    Patient: Bob Bah   YOB: 1945   Date of Visit: 1/26/2023       Dear Dr Yadiel Echevarria:    Thank you for referring Shabnam Schulte to me for evaluation  Below are my notes for this consultation  If you have questions, please do not hesitate to call me  I look forward to following your patient along with you  Sincerely,        Jeffy Vizcaino MD        CC: Cathryne Muslim, MD Wilma Nageotte, PA-C  1/26/2023  2:54 PM  Attested  Follow up preop visit - Cardiothoracic Surgery   Bob Bah 69 y o  female MRN: [id number]    Physician Requesting Consult: No att  providers found    Reason for Consult / Principal Problem: Coronary artery disease    History of Present Illness: Bob Bah is a 69y o  year old female with PMH CAD (s/p PCI to LAD in December 2015), HTN, HLD, NIDDM2, MO (BMI 36), stage 1 left breast cancer s/p left mastectomy (no chemo or radiation required), stage III ovarian cancer (s/p oophorectomy and chemo in 1997), thyroid cancer s/p thyroidecomy, neuropathy and osteopenia who presented to [facility] on 12/6 with worsening SOB/HAMM as well as exertional chest pain  She was unable to get in to see her cardiologist (Dr Yadiel Echevarria), so she went to the ED  In the ED, troponins were negative x3, however EKG showed T wave inversions  Cardiac catheterization performed revealing severe multivessel CAD  Patient was scheduled to have surgery on 12/19, however on 12/14, she went to the ED with worsening shortness of breath and tested positive for COVID  As a result, her surgery was cancelled  She presents today to discuss surgery and reschedule  Upon interview, patient reports feeling progressive HAMM, especially when going up stairs  She denies chest pain since she was last seen in our office, and states that her LE edema has improved   She states that she has had to adjust her activities to accommodate her symptoms  She recently installed a chair lift in her home, because her  is chronically ill  Patient denies CP at rest, orthopnea, PND, syncope and palpitations  She is independent with her ADL's, uses a cane to ambulate and is mainly sedentary  She has never been a smoker, does not drink, and does not use recreational drugs  Past Medical History:  Past Medical History:   Diagnosis Date   • Abdominal pain     LLQ on occasion   • Angina pectoris ([facility] )    • Arthritis    • Breast cancer ([place] [address] )    • Cancer ([place] [address] )     breast left   • Colon polyp    • Constipation     at times   • Coronary artery disease    • Diabetes mellitus ([place] [address] )    • Diarrhea     at times   • Disease of thyroid gland    • Hemorrhoids    • Hypercholesterolemia    • Hypertension    • Neuropathy     both legs and feet   • Obesity    • Osteoporosis    • Otitis media    • Ovarian ca [facility]) 1997   • 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[facility] )    • Shingles    • 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   • Thyroid cancer ([place] [address] )    • Urinary tract infection          Past Surgical History:   Past Surgical History:   Procedure Laterality Date   • ANGIOPLASTY      stent x 1   • APPENDECTOMY     • BACK SURGERY     • BAND HEMORRHOIDECTOMY     • BRAIN SURGERY  1993    meningioma   • BREAST BIOPSY     • CARDIAC CATHETERIZATION N/A 12/7/2022    Procedure: Cardiac catheterization;  Surgeon: Alessia Ro MD;  Location: [place] [address] CATH LAB; Service: Cardiology   • CARPAL TUNNEL RELEASE     • CERVICAL FUSION     • CHOLECYSTECTOMY     • COLONOSCOPY      pt see Dr Datlon Matthews  Up to date with her colonoscopy  • COLONOSCOPY     • CORONARY STENT PLACEMENT      Dec 2015   • EYE SURGERY      cataract surgery   • GALLBLADDER SURGERY     • HYSTERECTOMY  1989   • MAMMO (HISTORICAL)      up to date   see gyn   • MASTECTOMY Left 07/13/2021   • MASTECTOMY W/ SENTINEL NODE BIOPSY Left 07/13/2021    Procedure: BREAST ROSLYN  DIRECTED MASTECTOMY, SENTINEL LYMPH NODE BIOPSY, LYMPHATIC MAPPING WITH BLUE DYE AND RADIOACTIVE DYE (INJECT AT 1500 BY DR ANDREWS IN THE OR); Surgeon: Vijay Mae MD;  Location: [facility];  Service: Surgical Oncology   • OOPHORECTOMY Bilateral 1997   • SPINE SURGERY     • THYROIDECTOMY     • UPPER GASTROINTESTINAL ENDOSCOPY     • US BREAST NEEDLE LOC LEFT Left 05/06/2021   • US GUIDANCE BREAST BIOPSY LEFT EACH ADDITIONAL Left 05/06/2021   • US GUIDED BREAST BIOPSY LEFT COMPLETE Left 03/15/2021   • US GUIDED BREAST BIOPSY LEFT COMPLETE Left 05/06/2021         Family History:  Family History   Problem Relation Age of Onset   • Diabetes Mother    • Heart disease Mother    • Dementia Mother    • Esophageal cancer Father 61   • Endometrial cancer Sister 76   • Asthma Sister    • Cancer Sister    • No Known Problems Sister    • No Known Problems Sister    • No Known Problems Sister    • Stomach cancer Brother 70   • Multiple myeloma Brother 67   • Cancer Brother    • No Known Problems Maternal Grandmother    • Prostate cancer Maternal Grandfather    • No Known Problems Paternal Grandmother    • Prostate cancer Paternal Grandfather    • Breast cancer Maternal Aunt 46   • No Known Problems Paternal Aunt    • No Known Problems Paternal Aunt    • Asthma Daughter    • Asthma Son    • Depression Son    • Breast cancer Other 39   • Breast cancer Other 39   • Diabetes Family    • Cancer Family    • Arthritis Family    • Heart disease Family          Social History:    Social History     Substance and Sexual Activity   Alcohol Use Never     Social History     Substance and Sexual Activity   Drug Use Never     Social History     Tobacco Use   Smoking Status Never   Smokeless Tobacco Never       Home Medications:   Prior to Admission medications    Medication Sig Start Date End Date Taking?  Authorizing Provider   albuterol (ProAir HFA) 90 mcg/act inhaler Inhale 2 puffs every 6 (six) hours as needed for wheezing  Patient not taking: Reported on 1/9/2023 12/14/22   Keo Warren DO Leisa   anastrozole (ARIMIDEX) 1 mg tablet Take 1 tablet (1 mg total) by mouth daily 1/25/23   Kala Lynn MD   aspirin (ECOTRIN LOW STRENGTH) 81 mg EC tablet Take 162 mg by mouth daily    Historical Provider, MD   glipiZIDE (GLUCOTROL) 10 mg tablet Take 1 tablet (10 mg total) by mouth 2 (two) times a day before meals 1/3/23   Roxanne Hernandez MD   Insulin Pen Needle (Sure Comfort Pen Needles) 31G X 5 MM MISC by Does not apply route daily 9/24/20   Roxanne Hernandez MD   Lancets (OneTouch Delica Plus [id number]) [facility] USE DAILY 3/31/22   Roxanne Hernandez MD   levothyroxine 88 mcg tablet Take 1 tablet (88 mcg total) by mouth daily 11/7/22   Roxanne Hernandez MD   liraglutide (VICTOZA) injection Inject 1 2 mg under the skin daily at bedtime  7/12/17   Historical Provider, MD   lisinopril (ZESTRIL) 20 mg tablet Take 1 tablet (20 mg total) by mouth daily at bedtime 12/27/22   Roxanne Hernandez MD   metFORMIN (GLUCOPHAGE) 1000 MG tablet Take 1 tablet (1,000 mg total) by mouth 2 (two) times a day with meals 11/7/22   Roxanne Hernandez MD   metoprolol tartrate (LOPRESSOR) 25 mg tablet Take 1 tablet (25 mg total) by mouth 3 (three) times a day 12/27/22   Roxanne Hernandez MD   mupirocin (BACTROBAN) 2 % nasal ointment into each nostril 2 (two) times a day Apply 2 times daily for 5 days prior to procedure 12/8/22   [place]wego Flair, PA-C   nitroglycerin (NITROSTAT) 0 4 mg SL tablet Place 1 tablet (0 4 mg total) under the tongue every 5 (five) minutes as needed for chest pain  Patient not taking: Reported on 12/14/2022 12/7/22   BERNADETTE Perez   OneTouch Ultra test strip Use 1 each daily Use as instructed 4/11/22   Roxanne Hernandez MD   pregabalin (LYRICA) 75 mg capsule Take 1 capsule (75 mg total) by mouth 2 (two) times a day 10/10/22   Roxanne Hernandez MD       Allergies:   Allergies   Allergen Reactions   • Duloxetine Other (See Comments)     Extreme somnolence/lethargy   • Sulfa Antibiotics Rash       Review of Systems:  Review of Systems - History obtained from the patient  General ROS: positive for  - activity change  Psychological ROS: patient is nervous/anxious  Ophthalmic ROS: negative  ENT ROS: negative  Allergy and Immunology ROS: negative  Hematological and Lymphatic ROS: negative  Respiratory ROS: positive for - shortness of breath  Cardiovascular ROS: positive for - dyspnea on exertion, rapid heart rate and shortness of breath  Gastrointestinal ROS: no abdominal pain, change in bowel habits, or black or bloody stools  Genito-Urinary ROS: no dysuria, trouble voiding, or hematuria  Musculoskeletal ROS: positive for - gait disturbance  Neurological ROS: no TIA or stroke symptoms  Dermatological ROS: negative    Vital Signs:     Vitals:    01/26/23 1354   BP: 160/71   BP Location: Right arm   Patient Position: Sitting   Cuff Size: Standard   Pulse: 70   Temp: 98 8 °F (37 1 °C)   TempSrc: Tympanic   SpO2: 98%   Weight: 87 7 kg (193 lb 4 8 oz)   Height: 5' 1" (1 549 m)       Physical Exam:    General:  Alert, oriented, NAD   HEENT/NECK:  PERRL  No jugular venous distention  Cardiac:Regular rate and rhythm  No murmur  Carotid arteries: brisk, no bruits  Pulmonary:  Breath sounds clear bilaterally  Abdomen:  Non-tender, Non-distended  Positive bowel sounds  Upper extremities: 2+ radial pulses; brisk capillary refill  Lower extremities: Extremities warm/dry  PT/DP pulses 2+ bilaterally  No edema B/L  Musculoskeletal: TIMMONS   Neuro: Alert and oriented X 3  Sensation is grossly intact  No focal deficits  Skin: Warm/Dry, without rashes or lesions  Lab Results:               Invalid input(s): LABGLOM      Lab Results   Component Value Date    HGBA1C 5 8 (H) 10/18/2022     Lab Results   Component Value Date    CKTOTAL 101 12/07/2018    TROPONINI 0 03 12/23/2015       Imaging Studies:     Cardiac Catheterization: 12/7/22  •  Ost LAD to Prox LAD lesion is 20% stenosed  •  Mid LM to Dist LM lesion is 70% stenosed    •  Prox Cx lesion is 95% stenosed  •  Ost RCA lesion is 40% stenosed  •  Mid RCA lesion is 45% stenosed  •  The left ventricular systolic function is normal   EF is around 55 to 60% no gradient across aortic valve  •  Patient has significant stenosis of distal left main involving ostial LAD as well as proximal circumflex with a nonobstructive disease involving right coronary artery with preserved LV systolic function  •  Lateral annulus calcification and calcification of aorta was noted      Echocardiogram: 12/6/22   Findings    Left Ventricle Left ventricular cavity size is normal  Wall thickness is mildly increased  There is mild concentric hypertrophy  The left ventricular ejection fraction is 60% by visual estimation  Systolic function is normal   Although no diagnostic regional wall motion abnormality was identified, this possibility cannot be completely excluded on the basis of this study  Unable to assess diastolic function due to moderate mitral annulus calcification      Right Ventricle Right ventricular cavity size is normal  Systolic function is normal  Wall thickness is normal    Left Atrium The atrium is mildly dilated  Right Atrium The atrium is normal in size  Aortic Valve The aortic valve is trileaflet  The leaflets are mildly thickened  The leaflets are mildly calcified  The leaflets exhibit normal mobility  There is no evidence of regurgitation  The aortic valve has no significant stenosis  Mitral Valve There is mild diffuse thickening of the anterior leaflet and posterior leaflet  Some thickening of subdural pruritus noted  There is moderate to severe annular calcification  There is mild regurgitation  There is very mild stenosis  Mean gradient 3 to 4 mmHg  Tricuspid Valve There is trace regurgitation  There is no evidence of stenosis  Right ventricular systolic pressure could not be assessed  Pulmonic Valve There is trace regurgitation  There is no evidence of stenosis     Ascending Aorta The aortic root is normal in size  IVC/SVC The inferior vena cava is not well visualized  Pericardium There is no pericardial effusion  The pericardium is normal in appearance  CT Scan: 22  IMPRESSION:  1  No evidence of acute pulmonary embolus, thoracic aortic aneurysm or dissection  No acute cardiopulmonary process  2   Groundglass 1 3 cm lesion in the left upper lobe, stable since 2015 and therefore likely benign    Carotid Duplex: 22  RIGHT:  There is <50% stenosis noted in the internal carotid artery  Plaque is  heterogenous and smooth  Vertebral artery flow is antegrade  There is no significant subclavian artery  disease  LEFT:  There is <50% stenosis noted in the internal carotid artery  Plaque is  heterogenous and smooth  Vertebral artery flow is antegrade  There is no significant subclavian artery  disease  Vein Mappin22  Impression:  RIGHT LOWER LIMB:  The great saphenous vein is patent  from the groin to the ankle  The vein measures >2mm in caliber from the groin to the ankle  LEFT LOWER LIMB:  The great saphenous vein is patent  from the groin to the ankle  The vein measures >2mm in caliber from the groin to the knee  I have personally reviewed pertinent reports     and I have personally reviewed pertinent films in PACS    Assessment:  Patient Active Problem List    Diagnosis Date Noted   • Dyspnea on exertion 2022   • T wave inversion in EKG 2022   • Diarrhea 10/10/2022   • Non-ST elevation myocardial infarction (NSTEMI) ([facility] ) 2022   • History of PTCA 2022   • H/O heart artery stent 2022   • Colon polyp 2022   • Neuropathy 2022   • Use of anastrozole (Arimidex)    • Monoallelic mutation of CHEK2 gene in female patient 2021   • Class 2 obesity in adult 2021   • 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, estrogen receptor positive ([facility] ) 2021   • Bilateral leg edema 2020   • Hypothyroidism 05/07/2020   • Spinal stenosis of lumbar region 05/07/2020   • Osteopenia of necks of both femurs 05/07/2020   • S/P lumbar fusion 06/06/2018   • Coronary artery disease involving native coronary artery of native heart without angina pectoris 01/18/2016   • Abnormal carotid ultrasound 01/18/2016   • 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 ) 08/27/2013   • Type 2 diabetes mellitus without complication, without long-term current use of insulin ([facility] ) 02/27/2013   • Mixed dyslipidemia 02/27/2013   • Essential hypertension 02/27/2013     Severe coronary artery disease; Ongoing CABG workup    Plan:  Risks and benefits of coronary artery bypass grafting were discussed in detail today with the patient  We have reviewed results all preoperative radiographic,  laboratory and vascular studies  They understand and wish to proceed with surgical intervention  She will be scheduled for CORONARY ARTERY BYPASS GRAFTING ON 2/8/23 with LUCIO Everett     1  Hortensia Miller will receive a phone call from the hospital between 2:00 PM and 8:00 PM the day prior to surgery to confirm arrival time and location  For surgery on Mondays, you will receive a call on Friday  2  Do not drink or eat anything after midnight the night before surgery  That includes no water, candy, gum, lozenges, Lifesavers, etc  We recommend you not eat any salty or fatty snack foods, consume alcohol or smoke the night before surgery  3  Continue taking aspirin but only 81 mg daily  4  If you take Coumadin and/or Plavix, discontinue it 5 days before surgery  5  If you are diabetic, do not take any of your diabetic pills the morning of surgery  If you take Lantus insulin, you may take it at your regularly scheduled time the day before surgery  Do not take any other insulins the morning of surgery    6  The 2 nights before surgery, take a shower each night using the special antiseptic soap or soap sponges you received from the office or hospital  Jamel Menchaca your hair with regular shampoo and rinse completely before using the antiseptic sponges  Use the sponge to wash from your neck down, with special attention to the armpits and groin area  Do not use any other soap or cleanser on your skin  Do not use lotions, powder, deodorant or perfume of any kind on your skin after you shower  Use clean bed linens and wear clean pajamas after your shower  7  You will be prescribed Mupirocin nasal ointment  Apply to both nostrils twice a day for 5 days prior to surgery  8  Do not take a shower the morning of surgery; you'll be given a special" bath" at the hospital   9  Notify the CT surgery office if you develop a cold, sore throat, cough, fever or other health issues before your surgery  10  Other medication changes included the following: HOLD LISINOPRIL, GLIPIZIDE AND METFORMIN 3 DAYS BEFORE SURGERY  Dianeveronica Villa was comfortable with our recommendations, and their questions were answered to their satisfaction  Thank you for allowing us to participate in the care of this patient  The patient recently had a screening colonoscopy in July 2022  Therefore GI referral is not indicated at this time  SIGNATURE: Sarmad Sewell PA-C  DATE: January 26, 2023  TIME: 2:35 PM  Attestation signed by Melony Randolph MD at 1/26/2023  3:26 PM:  Patient seen and evaluated with Zoya Person PA-C  I agree with the above assessment and plan with the following additions  The patient is a 81-year-old female with symptomatic severe multivessel coronary artery disease, she has associated HTN, HLD, DM, MO  She was scheduled for surgery but surgery was canceled because she had COVID  It has been 6 weeks since her COVID infection, she has completed fully recovered from it  She returns to schedule surgery  I reviewed the diagnosis once again and the treatment options including medications, PCI and surgery    I recommend that coronary artery bypass surgery based on her coronary anatomy and associated diabetes  I explained the procedure, benefits, risk and possible complications  She understands and agrees to proceed with surgery  She will return for elective CABG  This note was completed in part utilizing m-modal fluency direct voice recognition software  Grammatical errors, random word insertion, spelling mistakes, and incomplete sentences may be an occasional consequence of the system secondary to software limitations, ambient noise and hardware issues  At the time of dictation, efforts were made to edit, clarify and /or correct errors  Please read the chart carefully and recognize, using context, where substitutions have occurred  If you have any questions or concerns about the context, text or information contained within the body of this dictation, please contact myself, the provider, for further clarification

## 2023-01-26 NOTE — H&P
Preop History and Physical - Cardiothoracic Surgery   Arcelia Del Rio 69 y o  female MRN: [id number]    Physician Requesting Consult: No att  providers found    Reason for Consult / Principal Problem: Coronary artery disease    History of Present Illness: Arcelia Del Rio is a 69y o  year old female with PMH CAD (s/p PCI to LAD in December 2015), HTN, HLD, NIDDM2, MO (BMI 36), stage 1 left breast cancer s/p left mastectomy (no chemo or radiation required), stage III ovarian cancer (s/p oophorectomy and chemo in 1997), thyroid cancer s/p thyroidecomy, neuropathy and osteopenia who presented to [facility] on 12/6 with worsening SOB/HAMM as well as exertional chest pain  She was unable to get in to see her cardiologist (Dr Sabiha Muñoz), so she went to the ED  In the ED, troponins were negative x3, however EKG showed T wave inversions  Cardiac catheterization performed revealing severe multivessel CAD  Patient was scheduled to have surgery on 12/19, however on 12/14, she went to the ED with worsening shortness of breath and tested positive for COVID  As a result, her surgery was cancelled  She presents today to discuss surgery and reschedule  Upon interview, patient reports feeling progressive HAMM, especially when going up stairs  She denies chest pain since she was last seen in our office, and states that her LE edema has improved  She states that she has had to adjust her activities to accommodate her symptoms  She recently installed a chair lift in her home, because her  is chronically ill  Patient denies CP at rest, orthopnea, PND, syncope and palpitations  She is independent with her ADL's, uses a cane to ambulate and is mainly sedentary  She has never been a smoker, does not drink, and does not use recreational drugs       Past Medical History:  Past Medical History:   Diagnosis Date   • Abdominal pain     LLQ on occasion   • Angina pectoris [facility])    • Arthritis    • Breast cancer ([facility] )    • Cancer ([facility] )     breast left   • Colon polyp    • Constipation     at times   • Coronary artery disease    • Diabetes mellitus (HCC)    • Diarrhea     at times   • Disease of thyroid gland    • Hemorrhoids    • Hypercholesterolemia    • Hypertension    • Neuropathy     both legs and feet   • Obesity    • Osteoporosis    • Otitis media    • Ovarian ca [facility]) 1997   • 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[facility] )    • Shingles    • 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   • Thyroid cancer ([facility] )    • Urinary tract infection          Past Surgical History:   Past Surgical History:   Procedure Laterality Date   • ANGIOPLASTY      stent x 1   • APPENDECTOMY     • BACK SURGERY     • BAND HEMORRHOIDECTOMY     • BRAIN SURGERY  1993    meningioma   • BREAST BIOPSY     • CARDIAC CATHETERIZATION N/A 12/7/2022    Procedure: Cardiac catheterization;  Surgeon: Cris Soto MD;  Location: [place] [address] CATH LAB; Service: Cardiology   • CARPAL TUNNEL RELEASE     • CERVICAL FUSION     • CHOLECYSTECTOMY     • COLONOSCOPY      pt see Dr Dru Toro  Up to date with her colonoscopy  • COLONOSCOPY     • CORONARY STENT PLACEMENT      Dec 2015   • EYE SURGERY      cataract surgery   • GALLBLADDER SURGERY     • HYSTERECTOMY  1989   • MAMMO (HISTORICAL)      up to date  see gyn   • MASTECTOMY Left 07/13/2021   • MASTECTOMY W/ SENTINEL NODE BIOPSY Left 07/13/2021    Procedure: BREAST ROSLYN  DIRECTED MASTECTOMY, SENTINEL LYMPH NODE BIOPSY, LYMPHATIC MAPPING WITH BLUE DYE AND RADIOACTIVE DYE (INJECT AT 1500 BY DR ANDREWS IN THE OR);   Surgeon: Ton Yeung MD;  Location: [facility];  Service: Surgical Oncology   • OOPHORECTOMY Bilateral 1997   • SPINE SURGERY     • THYROIDECTOMY     • UPPER GASTROINTESTINAL ENDOSCOPY     • US BREAST NEEDLE LOC LEFT Left 05/06/2021   • US GUIDANCE BREAST BIOPSY LEFT EACH ADDITIONAL Left 05/06/2021   • US GUIDED BREAST BIOPSY LEFT COMPLETE Left 03/15/2021   • US GUIDED BREAST BIOPSY LEFT COMPLETE Left 05/06/2021         Family History:  Family History   Problem Relation Age of Onset   • Diabetes Mother    • Heart disease Mother    • Dementia Mother    • Esophageal cancer Father 61   • Endometrial cancer Sister 76   • Asthma Sister    • Cancer Sister    • No Known Problems Sister    • No Known Problems Sister    • No Known Problems Sister    • Stomach cancer Brother 70   • Multiple myeloma Brother 67   • Cancer Brother    • No Known Problems Maternal Grandmother    • Prostate cancer Maternal Grandfather    • No Known Problems Paternal Grandmother    • Prostate cancer Paternal Grandfather    • Breast cancer Maternal Aunt 46   • No Known Problems Paternal Aunt    • No Known Problems Paternal [de-identified]    • Asthma Daughter    • Asthma Son    • Depression Son    • Breast cancer Other 39   • Breast cancer Other 39   • Diabetes Family    • Cancer Family    • Arthritis Family    • Heart disease Family          Social History:    Social History     Substance and Sexual Activity   Alcohol Use Never     Social History     Substance and Sexual Activity   Drug Use Never     Social History     Tobacco Use   Smoking Status Never   Smokeless Tobacco Never       Home Medications:   Prior to Admission medications    Medication Sig Start Date End Date Taking?  Authorizing Provider   albuterol (ProAir HFA) 90 mcg/act inhaler Inhale 2 puffs every 6 (six) hours as needed for wheezing  Patient not taking: Reported on 1/9/2023 12/14/22   Martha De La Fuente,    anastrozole (ARIMIDEX) 1 mg tablet Take 1 tablet (1 mg total) by mouth daily 1/25/23   Aurelia Barba MD   aspirin (ECOTRIN LOW STRENGTH) 81 mg EC tablet Take 162 mg by mouth daily    Historical Provider, MD   glipiZIDE (GLUCOTROL) 10 mg tablet Take 1 tablet (10 mg total) by mouth 2 (two) times a day before meals 1/3/23   Radha Webb MD   Insulin Pen Needle (Sure Comfort Pen Needles) 31G X 5 MM MISC by Does not apply route daily 9/24/20   Radha Webb MD   Lancets (OneTouch Delica Plus [id number]) MISC USE DAILY 3/31/22   Princess Plascencia Nadege Pandey MD   levothyroxine 88 mcg tablet Take 1 tablet (88 mcg total) by mouth daily 11/7/22   Joselo Espinoza MD   liraglutide (VICTOZA) injection Inject 1 2 mg under the skin daily at bedtime  7/12/17   Historical Provider, MD   lisinopril (ZESTRIL) 20 mg tablet Take 1 tablet (20 mg total) by mouth daily at bedtime 12/27/22   Joselo Espinoza MD   metFORMIN (GLUCOPHAGE) 1000 MG tablet Take 1 tablet (1,000 mg total) by mouth 2 (two) times a day with meals 11/7/22   Joselo Espinoza MD   metoprolol tartrate (LOPRESSOR) 25 mg tablet Take 1 tablet (25 mg total) by mouth 3 (three) times a day 12/27/22   Joselo Espinoza MD   [facility]) 2 % nasal ointment into each nostril 2 (two) times a day Apply 2 times daily for 5 days prior to procedure 12/8/22   Jermain Eli PA-C   nitroglycerin (NITROSTAT) 0 4 mg SL tablet Place 1 tablet (0 4 mg total) under the tongue every 5 (five) minutes as needed for chest pain  Patient not taking: Reported on 12/14/2022 12/7/22   BERNADETTE Holloway   OneTouch Ultra test strip Use 1 each daily Use as instructed 4/11/22   Joselo Espinoza MD   pregabalin (LYRICA) 75 mg capsule Take 1 capsule (75 mg total) by mouth 2 (two) times a day 10/10/22   Joselo Espinoza MD       Allergies:   Allergies   Allergen Reactions   • Duloxetine Other (See Comments)     Extreme somnolence/lethargy   • Sulfa Antibiotics Rash       Review of Systems:  Review of Systems - History obtained from the patient  General ROS: positive for  - activity change  Psychological ROS: patient is nervous/anxious  Ophthalmic ROS: negative  ENT ROS: negative  Allergy and Immunology ROS: negative  Hematological and Lymphatic ROS: negative  Respiratory ROS: positive for - shortness of breath  Cardiovascular ROS: positive for - dyspnea on exertion, rapid heart rate and shortness of breath  Gastrointestinal ROS: no abdominal pain, change in bowel habits, or black or bloody stools  Genito-Urinary ROS: no dysuria, trouble voiding, or hematuria  Musculoskeletal ROS: positive for - gait disturbance  Neurological ROS: no TIA or stroke symptoms  Dermatological ROS: negative    Vital Signs:     Vitals:    01/26/23 1354   BP: 160/71   BP Location: Right arm   Patient Position: Sitting   Cuff Size: Standard   Pulse: 70   Temp: 98 8 °F (37 1 °C)   TempSrc: Tympanic   SpO2: 98%   Weight: 87 7 kg (193 lb 4 8 oz)   Height: 5' 1" (1 549 m)       Physical Exam:    General:  Alert, oriented, NAD   HEENT/NECK:  PERRL  No jugular venous distention  Cardiac:Regular rate and rhythm  No murmur  Carotid arteries: brisk, no bruits  Pulmonary:  Breath sounds clear bilaterally  Abdomen:  Non-tender, Non-distended  Positive bowel sounds  Upper extremities: 2+ radial pulses; brisk capillary refill  Lower extremities: Extremities warm/dry  PT/DP pulses 2+ bilaterally  No edema B/L  Musculoskeletal: TIMMONS   Neuro: Alert and oriented X 3  Sensation is grossly intact  No focal deficits  Skin: Warm/Dry, without rashes or lesions  Lab Results:               Invalid input(s): LABGLOM      Lab Results   Component Value Date    HGBA1C 5 8 (H) 10/18/2022     Lab Results   Component Value Date    CKTOTAL 101 12/07/2018    TROPONINI 0 03 12/23/2015       Imaging Studies:     Cardiac Catheterization: 12/7/22  •  Ost LAD to Prox LAD lesion is 20% stenosed  •  Mid LM to Dist LM lesion is 70% stenosed  •  Prox Cx lesion is 95% stenosed  •  Ost RCA lesion is 40% stenosed  •  Mid RCA lesion is 45% stenosed  •  The left ventricular systolic function is normal   EF is around 55 to 60% no gradient across aortic valve  •  Patient has significant stenosis of distal left main involving ostial LAD as well as proximal circumflex with a nonobstructive disease involving right coronary artery with preserved LV systolic function    •  Lateral annulus calcification and calcification of aorta was noted      Echocardiogram: 12/6/22   Findings    Left Ventricle Left ventricular cavity size is normal  Wall thickness is mildly increased  There is mild concentric hypertrophy  The left ventricular ejection fraction is 60% by visual estimation  Systolic function is normal   Although no diagnostic regional wall motion abnormality was identified, this possibility cannot be completely excluded on the basis of this study  Unable to assess diastolic function due to moderate mitral annulus calcification      Right Ventricle Right ventricular cavity size is normal  Systolic function is normal  Wall thickness is normal    Left Atrium The atrium is mildly dilated  Right Atrium The atrium is normal in size  Aortic Valve The aortic valve is trileaflet  The leaflets are mildly thickened  The leaflets are mildly calcified  The leaflets exhibit normal mobility  There is no evidence of regurgitation  The aortic valve has no significant stenosis  Mitral Valve There is mild diffuse thickening of the anterior leaflet and posterior leaflet  Some thickening of subdural pruritus noted  There is moderate to severe annular calcification  There is mild regurgitation  There is very mild stenosis  Mean gradient 3 to 4 mmHg  Tricuspid Valve There is trace regurgitation  There is no evidence of stenosis  Right ventricular systolic pressure could not be assessed  Pulmonic Valve There is trace regurgitation  There is no evidence of stenosis  Ascending Aorta The aortic root is normal in size  IVC/SVC The inferior vena cava is not well visualized  Pericardium There is no pericardial effusion  The pericardium is normal in appearance  CT Scan: 12/7/22  IMPRESSION:  1  No evidence of acute pulmonary embolus, thoracic aortic aneurysm or dissection  No acute cardiopulmonary process  2   Groundglass 1 3 cm lesion in the left upper lobe, stable since 2015 and therefore likely benign    Carotid Duplex: 12/7/22  RIGHT:  There is <50% stenosis noted in the internal carotid artery   Plaque is  heterogenous and smooth  Vertebral artery flow is antegrade  There is no significant subclavian artery  disease  LEFT:  There is <50% stenosis noted in the internal carotid artery  Plaque is  heterogenous and smooth  Vertebral artery flow is antegrade  There is no significant subclavian artery  disease  Vein Mappin22  Impression:  RIGHT LOWER LIMB:  The great saphenous vein is patent  from the groin to the ankle  The vein measures >2mm in caliber from the groin to the ankle  LEFT LOWER LIMB:  The great saphenous vein is patent  from the groin to the ankle  The vein measures >2mm in caliber from the groin to the knee  I have personally reviewed pertinent reports     and I have personally reviewed pertinent films in PACS    Assessment:  Patient Active Problem List    Diagnosis Date Noted   • Dyspnea on exertion 2022   • T wave inversion in EKG 2022   • Diarrhea 10/10/2022   • Non-ST elevation myocardial infarction (NSTEMI) ([facility] ) 2022   • History of PTCA 2022   • H/O heart artery stent 2022   • Colon polyp 2022   • Neuropathy 2022   • Use of anastrozole (Arimidex)    • Monoallelic mutation of CHEK2 gene in female patient 2021   • Class 2 obesity in adult 2021   • 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, estrogen receptor positive ([facility] ) 2021   • Bilateral leg edema 2020   • Hypothyroidism 2020   • Spinal stenosis of lumbar region 2020   • Osteopenia of necks of both femurs 2020   • S/P lumbar fusion 2018   • Coronary artery disease involving native coronary artery of native heart without angina pectoris 2016   • Abnormal carotid ultrasound 2016   • 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[facility] ) 2013   • Type 2 diabetes mellitus without complication, without long-term current use of insulin ([facility] ) 2013   • Mixed dyslipidemia 2013   • Essential hypertension 2013 Severe coronary artery disease; Ongoing CABG workup    Plan:  Risks and benefits of coronary artery bypass grafting were discussed in detail today with the patient  We have reviewed results all preoperative radiographic,  laboratory and vascular studies  They understand and wish to proceed with surgical intervention  She will be scheduled for CORONARY ARTERY BYPASS GRAFTING ON 2/8/23 with [facility]     1  Kimberly Neal will receive a phone call from the hospital between 2:00 PM and 8:00 PM the day prior to surgery to confirm arrival time and location  For surgery on Mondays, you will receive a call on Friday  2  Do not drink or eat anything after midnight the night before surgery  That includes no water, candy, gum, lozenges, Lifesavers, etc  We recommend you not eat any salty or fatty snack foods, consume alcohol or smoke the night before surgery  3  Continue taking aspirin but only 81 mg daily  4  If you take Coumadin and/or Plavix, discontinue it 5 days before surgery  5  If you are diabetic, do not take any of your diabetic pills the morning of surgery  If you take Lantus insulin, you may take it at your regularly scheduled time the day before surgery  Do not take any other insulins the morning of surgery  6  The 2 nights before surgery, take a shower each night using the special antiseptic soap or soap sponges you received from the office or hospitals  Eve Dela Cruzon your hair with regular shampoo and rinse completely before using the antiseptic sponges  Use the sponge to wash from your neck down, with special attention to the armpits and groin area  Do not use any other soap or cleanser on your skin  Do not use lotions, powder, deodorant or perfume of any kind on your skin after you shower  Use clean bed linens and wear clean pajamas after your shower  7  You will be prescribed Mupirocin nasal ointment  Apply to both nostrils twice a day for 5 days prior to surgery    8  Do not take a shower the morning of surgery; you'll be given a special" bath" at the hospital   9  Notify the CT surgery office if you develop a cold, sore throat, cough, fever or other health issues before your surgery  10  Other medication changes included the following: HOLD LISINOPRIL, GLIPIZIDE AND METFORMIN 3 DAYS BEFORE SURGERY  Jennifernaomi Izquierdo Daisy was comfortable with our recommendations, and their questions were answered to their satisfaction  Thank you for allowing us to participate in the care of this patient  The patient recently had a screening colonoscopy in July 2022  Therefore GI referral is not indicated at this time       SIGNATURE: Tereso Baer PA-C  DATE: January 26, 2023  TIME: 2:35 PM

## 2023-01-26 NOTE — PROGRESS NOTES
Follow up preop visit - Cardiothoracic Surgery   Billie Graves 69 y o  female MRN: [id number]    Physician Requesting Consult: No att  providers found    Reason for Consult / Principal Problem: Coronary artery disease    History of Present Illness: Billie Graves is a 69y o  year old female with PMH CAD (s/p PCI to LAD in December 2015), HTN, HLD, NIDDM2, MO (BMI 36), stage 1 left breast cancer s/p left mastectomy (no chemo or radiation required), stage III ovarian cancer (s/p oophorectomy and chemo in 1997), thyroid cancer s/p thyroidecomy, neuropathy and osteopenia who presented to [facility] on 12/6 with worsening SOB/HAMM as well as exertional chest pain  She was unable to get in to see her cardiologist (Dr Meliza Lambert), so she went to the ED  In the ED, troponins were negative x3, however EKG showed T wave inversions  Cardiac catheterization performed revealing severe multivessel CAD  Patient was scheduled to have surgery on 12/19, however on 12/14, she went to the ED with worsening shortness of breath and tested positive for COVID  As a result, her surgery was cancelled  She presents today to discuss surgery and reschedule  Upon interview, patient reports feeling progressive HAMM, especially when going up stairs  She denies chest pain since she was last seen in our office, and states that her LE edema has improved  She states that she has had to adjust her activities to accommodate her symptoms  She recently installed a chair lift in her home, because her  is chronically ill  Patient denies CP at rest, orthopnea, PND, syncope and palpitations  She is independent with her ADL's, uses a cane to ambulate and is mainly sedentary  She has never been a smoker, does not drink, and does not use recreational drugs       Past Medical History:  Past Medical History:   Diagnosis Date   • Abdominal pain     LLQ on occasion   • Angina pectoris [facility])    • Arthritis    • Breast cancer ([facility] )    • Cancer ([facility] )     breast left • Colon polyp    • Constipation     at times   • Coronary artery disease    • Diabetes mellitus ([facility] )    • Diarrhea     at times   • Disease of thyroid gland    • Hemorrhoids    • Hypercholesterolemia    • Hypertension    • Neuropathy     both legs and feet   • Obesity    • Osteoporosis    • Otitis media    • Ovarian ca [facility]) 1997   • 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[facility] )    • Shingles    • 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   • Thyroid cancer ([facility] )    • Urinary tract infection          Past Surgical History:   Past Surgical History:   Procedure Laterality Date   • ANGIOPLASTY      stent x 1   • APPENDECTOMY     • BACK SURGERY     • BAND HEMORRHOIDECTOMY     • BRAIN SURGERY  1993    meningioma   • BREAST BIOPSY     • CARDIAC CATHETERIZATION N/A 12/7/2022    Procedure: Cardiac catheterization;  Surgeon: Raisa Graves MD;  Location: [place] [address] CATH LAB; Service: Cardiology   • CARPAL TUNNEL RELEASE     • CERVICAL FUSION     • CHOLECYSTECTOMY     • COLONOSCOPY      pt see Dr Beach Board  Up to date with her colonoscopy  • COLONOSCOPY     • CORONARY STENT PLACEMENT      Dec 2015   • EYE SURGERY      cataract surgery   • GALLBLADDER SURGERY     • HYSTERECTOMY  1989   • MAMMO (HISTORICAL)      up to date  see gyn   • MASTECTOMY Left 07/13/2021   • MASTECTOMY W/ SENTINEL NODE BIOPSY Left 07/13/2021    Procedure: BREAST ROSLYN  DIRECTED MASTECTOMY, SENTINEL LYMPH NODE BIOPSY, LYMPHATIC MAPPING WITH BLUE DYE AND RADIOACTIVE DYE (INJECT AT 1500 BY DR ANDREWS IN THE OR);   Surgeon: Kaylie Gambino MD;  Location: [facility];  Service: Surgical Oncology   • OOPHORECTOMY Bilateral 1997   • SPINE SURGERY     • THYROIDECTOMY     • UPPER GASTROINTESTINAL ENDOSCOPY     • US BREAST NEEDLE LOC LEFT Left 05/06/2021   • US GUIDANCE BREAST BIOPSY LEFT EACH ADDITIONAL Left 05/06/2021   • US GUIDED BREAST BIOPSY LEFT COMPLETE Left 03/15/2021   • US GUIDED BREAST BIOPSY LEFT COMPLETE Left 05/06/2021         Family History:  Family History Problem Relation Age of Onset   • Diabetes Mother    • Heart disease Mother    • Dementia Mother    • Esophageal cancer Father 61   • Endometrial cancer Sister 76   • Asthma Sister    • Cancer Sister    • No Known Problems Sister    • No Known Problems Sister    • No Known Problems Sister    • Stomach cancer Brother 70   • Multiple myeloma Brother 67   • Cancer Brother    • No Known Problems Maternal Grandmother    • Prostate cancer Maternal Grandfather    • No Known Problems Paternal Grandmother    • Prostate cancer Paternal Grandfather    • Breast cancer Maternal Aunt 46   • No Known Problems Paternal Aunt    • No Known Problems Paternal [de-identified]    • Asthma Daughter    • Asthma Son    • Depression Son    • Breast cancer Other 39   • Breast cancer Other 39   • Diabetes Family    • Cancer Family    • Arthritis Family    • Heart disease Family          Social History:    Social History     Substance and Sexual Activity   Alcohol Use Never     Social History     Substance and Sexual Activity   Drug Use Never     Social History     Tobacco Use   Smoking Status Never   Smokeless Tobacco Never       Home Medications:   Prior to Admission medications    Medication Sig Start Date End Date Taking?  Authorizing Provider   albuterol (ProAir HFA) 90 mcg/act inhaler Inhale 2 puffs every 6 (six) hours as needed for wheezing  Patient not taking: Reported on 1/9/2023 12/14/22   Martha De La Fuente DO   anastrozole (ARIMIDEX) 1 mg tablet Take 1 tablet (1 mg total) by mouth daily 1/25/23   Michelle Jo MD   aspirin (ECOTRIN LOW STRENGTH) 81 mg EC tablet Take 162 mg by mouth daily    Historical Provider, MD   glipiZIDE (GLUCOTROL) 10 mg tablet Take 1 tablet (10 mg total) by mouth 2 (two) times a day before meals 1/3/23   Renae Leyva MD   Insulin Pen Needle (Sure Comfort Pen Needles) 31G X 5 MM MISC by Does not apply route daily 9/24/20   Renae Leyva MD   Lancets ([address]) [address] USE DAILY 3/31/22   Renae Leyva MD levothyroxine 88 mcg tablet Take 1 tablet (88 mcg total) by mouth daily 11/7/22   Yash Baig MD   liraglutide (VICTOZA) injection Inject 1 2 mg under the skin daily at bedtime  7/12/17   Historical Provider, MD   lisinopril (ZESTRIL) 20 mg tablet Take 1 tablet (20 mg total) by mouth daily at bedtime 12/27/22   Yash Baig MD   metFORMIN (GLUCOPHAGE) 1000 MG tablet Take 1 tablet (1,000 mg total) by mouth 2 (two) times a day with meals 11/7/22   Yash Baig MD   metoprolol tartrate (LOPRESSOR) 25 mg tablet Take 1 tablet (25 mg total) by mouth 3 (three) times a day 12/27/22   Yash Baig MD   mupirocin [facility]) 2 % nasal ointment into each nostril 2 (two) times a day Apply 2 times daily for 5 days prior to procedure 12/8/22   Serafin Aviles PA-C   nitroglycerin (NITROSTAT) 0 4 mg SL tablet Place 1 tablet (0 4 mg total) under the tongue every 5 (five) minutes as needed for chest pain  Patient not taking: Reported on 12/14/2022 12/7/22   BERNADETTE Rivas   OneTouch Ultra test strip Use 1 each daily Use as instructed 4/11/22   Yash Baig MD   pregabalin (LYRICA) 75 mg capsule Take 1 capsule (75 mg total) by mouth 2 (two) times a day 10/10/22   Yash Baig MD       Allergies:   Allergies   Allergen Reactions   • Duloxetine Other (See Comments)     Extreme somnolence/lethargy   • Sulfa Antibiotics Rash       Review of Systems:  Review of Systems - History obtained from the patient  General ROS: positive for  - activity change  Psychological ROS: patient is nervous/anxious  Ophthalmic ROS: negative  ENT ROS: negative  Allergy and Immunology ROS: negative  Hematological and Lymphatic ROS: negative  Respiratory ROS: positive for - shortness of breath  Cardiovascular ROS: positive for - dyspnea on exertion, rapid heart rate and shortness of breath  Gastrointestinal ROS: no abdominal pain, change in bowel habits, or black or bloody stools  Genito-Urinary ROS: no dysuria, trouble voiding, or hematuria  Musculoskeletal ROS: positive for - gait disturbance  Neurological ROS: no TIA or stroke symptoms  Dermatological ROS: negative    Vital Signs:     Vitals:    01/26/23 1354   BP: 160/71   BP Location: Right arm   Patient Position: Sitting   Cuff Size: Standard   Pulse: 70   Temp: 98 8 °F (37 1 °C)   TempSrc: Tympanic   SpO2: 98%   Weight: 87 7 kg (193 lb 4 8 oz)   Height: 5' 1" (1 549 m)       Physical Exam:    General:  Alert, oriented, NAD   HEENT/NECK:  PERRL  No jugular venous distention  Cardiac:Regular rate and rhythm  No murmur  Carotid arteries: brisk, no bruits  Pulmonary:  Breath sounds clear bilaterally  Abdomen:  Non-tender, Non-distended  Positive bowel sounds  Upper extremities: 2+ radial pulses; brisk capillary refill  Lower extremities: Extremities warm/dry  PT/DP pulses 2+ bilaterally  No edema B/L  Musculoskeletal: TIMMONS   Neuro: Alert and oriented X 3  Sensation is grossly intact  No focal deficits  Skin: Warm/Dry, without rashes or lesions  Lab Results:               Invalid input(s): LABGLOM      Lab Results   Component Value Date    HGBA1C 5 8 (H) 10/18/2022     Lab Results   Component Value Date    CKTOTAL 101 12/07/2018    TROPONINI 0 03 12/23/2015       Imaging Studies:     Cardiac Catheterization: 12/7/22  •  Ost LAD to Prox LAD lesion is 20% stenosed  •  Mid LM to Dist LM lesion is 70% stenosed  •  Prox Cx lesion is 95% stenosed  •  Ost RCA lesion is 40% stenosed  •  Mid RCA lesion is 45% stenosed  •  The left ventricular systolic function is normal   EF is around 55 to 60% no gradient across aortic valve  •  Patient has significant stenosis of distal left main involving ostial LAD as well as proximal circumflex with a nonobstructive disease involving right coronary artery with preserved LV systolic function    •  Lateral annulus calcification and calcification of aorta was noted      Echocardiogram: 12/6/22   Findings    Left Ventricle Left ventricular cavity size is normal  Wall thickness is mildly increased  There is mild concentric hypertrophy  The left ventricular ejection fraction is 60% by visual estimation  Systolic function is normal   Although no diagnostic regional wall motion abnormality was identified, this possibility cannot be completely excluded on the basis of this study  Unable to assess diastolic function due to moderate mitral annulus calcification      Right Ventricle Right ventricular cavity size is normal  Systolic function is normal  Wall thickness is normal    Left Atrium The atrium is mildly dilated  Right Atrium The atrium is normal in size  Aortic Valve The aortic valve is trileaflet  The leaflets are mildly thickened  The leaflets are mildly calcified  The leaflets exhibit normal mobility  There is no evidence of regurgitation  The aortic valve has no significant stenosis  Mitral Valve There is mild diffuse thickening of the anterior leaflet and posterior leaflet  Some thickening of subdural pruritus noted  There is moderate to severe annular calcification  There is mild regurgitation  There is very mild stenosis  Mean gradient 3 to 4 mmHg  Tricuspid Valve There is trace regurgitation  There is no evidence of stenosis  Right ventricular systolic pressure could not be assessed  Pulmonic Valve There is trace regurgitation  There is no evidence of stenosis  Ascending Aorta The aortic root is normal in size  IVC/SVC The inferior vena cava is not well visualized  Pericardium There is no pericardial effusion  The pericardium is normal in appearance  CT Scan: 12/7/22  IMPRESSION:  1  No evidence of acute pulmonary embolus, thoracic aortic aneurysm or dissection  No acute cardiopulmonary process  2   Groundglass 1 3 cm lesion in the left upper lobe, stable since 2015 and therefore likely benign    Carotid Duplex: 12/7/22  RIGHT:  There is <50% stenosis noted in the internal carotid artery   Plaque is  heterogenous and smooth  Vertebral artery flow is antegrade  There is no significant subclavian artery  disease  LEFT:  There is <50% stenosis noted in the internal carotid artery  Plaque is  heterogenous and smooth  Vertebral artery flow is antegrade  There is no significant subclavian artery  disease  Vein Mappin22  Impression:  RIGHT LOWER LIMB:  The great saphenous vein is patent  from the groin to the ankle  The vein measures >2mm in caliber from the groin to the ankle  LEFT LOWER LIMB:  The great saphenous vein is patent  from the groin to the ankle  The vein measures >2mm in caliber from the groin to the knee  I have personally reviewed pertinent reports     and I have personally reviewed pertinent films in PACS    Assessment:  Patient Active Problem List    Diagnosis Date Noted   • Dyspnea on exertion 2022   • T wave inversion in EKG 2022   • Diarrhea 10/10/2022   • Non-ST elevation myocardial infarction (NSTEMI) ([facility] ) 2022   • History of PTCA 2022   • H/O heart artery stent 2022   • Colon polyp 2022   • Neuropathy 2022   • Use of anastrozole (Arimidex)    • Monoallelic mutation of CHEK2 gene in female patient 2021   • Class 2 obesity in adult 2021   • 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, estrogen receptor positive ([facility] ) 2021   • Bilateral leg edema 2020   • Hypothyroidism 2020   • Spinal stenosis of lumbar region 2020   • Osteopenia of necks of both femurs 2020   • S/P lumbar fusion 2018   • Coronary artery disease involving native coronary artery of native heart without angina pectoris 2016   • Abnormal carotid ultrasound 2016   • 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[facility] ) 2013   • Type 2 diabetes mellitus without complication, without long-term current use of insulin ([facility] ) 2013   • Mixed dyslipidemia 2013   • Essential hypertension 2013 Severe coronary artery disease; Ongoing CABG workup    Plan:  Risks and benefits of coronary artery bypass grafting were discussed in detail today with the patient  We have reviewed results all preoperative radiographic,  laboratory and vascular studies  They understand and wish to proceed with surgical intervention  She will be scheduled for CORONARY ARTERY BYPASS GRAFTING ON 2/8/23 with LUCIO Lentz     1  Dariusz Robles will receive a phone call from the hospital between 2:00 PM and 8:00 PM the day prior to surgery to confirm arrival time and location  For surgery on Mondays, you will receive a call on Friday  2  Do not drink or eat anything after midnight the night before surgery  That includes no water, candy, gum, lozenges, Lifesavers, etc  We recommend you not eat any salty or fatty snack foods, consume alcohol or smoke the night before surgery  3  Continue taking aspirin but only 81 mg daily  4  If you take Coumadin and/or Plavix, discontinue it 5 days before surgery  5  If you are diabetic, do not take any of your diabetic pills the morning of surgery  If you take Lantus insulin, you may take it at your regularly scheduled time the day before surgery  Do not take any other insulins the morning of surgery  6  The 2 nights before surgery, take a shower each night using the special antiseptic soap or soap sponges you received from the office or hospital  Norma Barrs your hair with regular shampoo and rinse completely before using the antiseptic sponges  Use the sponge to wash from your neck down, with special attention to the armpits and groin area  Do not use any other soap or cleanser on your skin  Do not use lotions, powder, deodorant or perfume of any kind on your skin after you shower  Use clean bed linens and wear clean pajamas after your shower  7  You will be prescribed Mupirocin nasal ointment  Apply to both nostrils twice a day for 5 days prior to surgery    8  Do not take a shower the morning of surgery; you'll be given a special" bath" at the hospital   9  Notify the CT surgery office if you develop a cold, sore throat, cough, fever or other health issues before your surgery  10  Other medication changes included the following: HOLD LISINOPRIL, GLIPIZIDE AND METFORMIN 3 DAYS BEFORE SURGERY  Nimo Villa was comfortable with our recommendations, and their questions were answered to their satisfaction  Thank you for allowing us to participate in the care of this patient  The patient recently had a screening colonoscopy in July 2022  Therefore GI referral is not indicated at this time       SIGNATURE: Calvin Porter PA-C  DATE: January 26, 2023  TIME: 2:35 PM

## 2023-01-26 NOTE — H&P (VIEW-ONLY)
Preop History and Physical - Cardiothoracic Surgery   Basilio Kan 69 y o  female MRN: [id number]    Physician Requesting Consult: No att  providers found    Reason for Consult / Principal Problem: Coronary artery disease    History of Present Illness: Basilio Kan is a 69y o  year old female with PMH CAD (s/p PCI to LAD in December 2015), HTN, HLD, NIDDM2, MO (BMI 36), stage 1 left breast cancer s/p left mastectomy (no chemo or radiation required), stage III ovarian cancer (s/p oophorectomy and chemo in 1997), thyroid cancer s/p thyroidecomy, neuropathy and osteopenia who [facility] to [facility] on [facility] with worsening SOB/HAMM as well as exertional chest pain  She was unable to get in to see her cardiologist (Dr Kamala Phillips), so she went to the ED  In the ED, troponins were negative x3, however EKG showed T wave inversions  Cardiac catheterization performed revealing severe multivessel CAD  Patient was scheduled to have surgery on 12/19, however on 12/14, she went to the ED with worsening shortness of breath and tested positive for COVID  As a result, her surgery was cancelled  She presents today to discuss surgery and reschedule  Upon interview, patient reports feeling progressive HAMM, especially when going up stairs  She denies chest pain since she was last seen in our office, and states that her LE edema has improved  She states that she has had to adjust her activities to accommodate her symptoms  She recently installed a chair lift in her home, because her  is chronically ill  Patient denies CP at rest, orthopnea, PND, syncope and palpitations  She is independent with her ADL's, uses a cane to ambulate and is mainly sedentary  She has never been a smoker, does not drink, and does not use recreational drugs       Past Medical History:  Past Medical History:   Diagnosis Date   • Abdominal pain     LLQ on occasion   • Angina pectoris [facility])    • Arthritis    • Breast cancer ([facility] )    • Cancer ([facility] )     breast left   • Colon polyp    • Constipation     at times   • Coronary artery disease    • Diabetes mellitus (HCC)    • Diarrhea     at times   • Disease of thyroid gland    • Hemorrhoids    • Hypercholesterolemia    • Hypertension    • Neuropathy     both legs and feet   • Obesity    • Osteoporosis    • Otitis media    • Ovarian ca [facility]) 1997   • 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[facility] )    • Shingles    • 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   • Thyroid cancer ([facility] )    • Urinary tract infection          Past Surgical History:   Past Surgical History:   Procedure Laterality Date   • ANGIOPLASTY      stent x 1   • APPENDECTOMY     • BACK SURGERY     • BAND HEMORRHOIDECTOMY     • BRAIN SURGERY  1993    meningioma   • BREAST BIOPSY     • CARDIAC CATHETERIZATION N/A 12/7/2022    Procedure: Cardiac catheterization;  Surgeon: Jennifer Hale MD;  Location: [place] [address] CATH LAB; Service: Cardiology   • CARPAL TUNNEL RELEASE     • CERVICAL FUSION     • CHOLECYSTECTOMY     • COLONOSCOPY      pt see Dr Elizabeth Fernández  Up to date with her colonoscopy  • COLONOSCOPY     • CORONARY STENT PLACEMENT      Dec 2015   • EYE SURGERY      cataract surgery   • GALLBLADDER SURGERY     • HYSTERECTOMY  1989   • MAMMO (HISTORICAL)      up to date  see gyn   • MASTECTOMY Left 07/13/2021   • MASTECTOMY W/ SENTINEL NODE BIOPSY Left 07/13/2021    Procedure: BREAST ROSLYN  DIRECTED MASTECTOMY, SENTINEL LYMPH NODE BIOPSY, LYMPHATIC MAPPING WITH BLUE DYE AND RADIOACTIVE DYE (INJECT AT 1500 BY DR ANDREWS IN THE OR);   Surgeon: Marcela Sampson MD;  [facility];  Service: Surgical Oncology   • OOPHORECTOMY Bilateral 1997   • SPINE SURGERY     • THYROIDECTOMY     • UPPER GASTROINTESTINAL ENDOSCOPY     • US BREAST NEEDLE LOC LEFT Left 05/06/2021   • US GUIDANCE BREAST BIOPSY LEFT EACH ADDITIONAL Left 05/06/2021   • US GUIDED BREAST BIOPSY LEFT COMPLETE Left 03/15/2021   • US GUIDED BREAST BIOPSY LEFT COMPLETE Left 05/06/2021         Family History:  Family History   Problem Relation Age of Onset   • Diabetes Mother    • Heart disease Mother    • Dementia Mother    • Esophageal cancer Father 61   • Endometrial cancer Sister 76   • Asthma Sister    • Cancer Sister    • No Known Problems Sister    • No Known Problems Sister    • No Known Problems Sister    • Stomach cancer Brother 70   • Multiple myeloma Brother 67   • Cancer Brother    • No Known Problems Maternal Grandmother    • Prostate cancer Maternal Grandfather    • No Known Problems Paternal Grandmother    • Prostate cancer Paternal Grandfather    • Breast cancer Maternal Aunt 46   • No Known Problems Paternal Aunt    • No Known Problems Paternal [de-identified]    • Asthma Daughter    • Asthma Son    • Depression Son    • Breast cancer Other 39   • Breast cancer Other 39   • Diabetes Family    • Cancer Family    • Arthritis Family    • Heart disease Family          Social History:    Social History     Substance and Sexual Activity   Alcohol Use Never     Social History     Substance and Sexual Activity   Drug Use Never     Social History     Tobacco Use   Smoking Status Never   Smokeless Tobacco Never       Home Medications:   Prior to Admission medications    Medication Sig Start Date End Date Taking?  Authorizing Provider   albuterol (ProAir HFA) 90 mcg/act inhaler Inhale 2 puffs every 6 (six) hours as needed for wheezing  Patient not taking: Reported on 1/9/2023 12/14/22   Martha De La Fuente DO   anastrozole (ARIMIDEX) 1 mg tablet Take 1 tablet (1 mg total) by mouth daily 1/25/23   Olena Tan MD   aspirin (ECOTRIN LOW STRENGTH) 81 mg EC tablet Take 162 mg by mouth daily    Historical Provider, MD   glipiZIDE (GLUCOTROL) 10 mg tablet Take 1 tablet (10 mg total) by mouth 2 (two) times a day before meals 1/3/23   Xavier White MD   Insulin Pen Needle (Sure Comfort Pen Needles) 31G X 5 MM MISC by Does not apply route daily 9/24/20   Xavier White MD   Lancets (OneTouch Delica Plus [id number]) MISC USE DAILY 3/31/22   Steve Bahr Geovanna De La Rosa MD   levothyroxine 88 mcg tablet Take 1 tablet (88 mcg total) by mouth daily 11/7/22   Yolette Diaz MD   liraglutide (VICTOZA) injection Inject 1 2 mg under the skin daily at bedtime  7/12/17   Historical Provider, MD   lisinopril (ZESTRIL) 20 mg tablet Take 1 tablet (20 mg total) by mouth daily at bedtime 12/27/22   Yolette Diaz MD   metFORMIN (GLUCOPHAGE) 1000 MG tablet Take 1 tablet (1,000 mg total) by mouth 2 (two) times a day with meals 11/7/22   Yolette Diaz MD   metoprolol tartrate (LOPRESSOR) 25 mg tablet Take 1 tablet (25 mg total) by mouth 3 (three) times a day 12/27/22   Yolette Diaz MD   [facility]) 2 % nasal ointment into each nostril 2 (two) times a day Apply 2 times daily for 5 days prior to procedure 12/8/22   Mona García PA-C   nitroglycerin (NITROSTAT) 0 4 mg SL tablet Place 1 tablet (0 4 mg total) under the tongue every 5 (five) minutes as needed for chest pain  Patient not taking: Reported on 12/14/2022 12/7/22   BERNADETTE Lind   OneTouch Ultra test strip Use 1 each daily Use as instructed 4/11/22   Yolette iDaz MD   pregabalin (LYRICA) 75 mg capsule Take 1 capsule (75 mg total) by mouth 2 (two) times a day 10/10/22   Yolette Diaz MD       Allergies:   Allergies   Allergen Reactions   • Duloxetine Other (See Comments)     Extreme somnolence/lethargy   • Sulfa Antibiotics Rash       Review of Systems:  Review of Systems - History obtained from the patient  General ROS: positive for  - activity change  Psychological ROS: patient is nervous/anxious  Ophthalmic ROS: negative  ENT ROS: negative  Allergy and Immunology ROS: negative  Hematological and Lymphatic ROS: negative  Respiratory ROS: positive for - shortness of breath  Cardiovascular ROS: positive for - dyspnea on exertion, rapid heart rate and shortness of breath  Gastrointestinal ROS: no abdominal pain, change in bowel habits, or black or bloody stools  Genito-Urinary ROS: no dysuria, trouble voiding, or hematuria  Musculoskeletal ROS: positive for - gait disturbance  Neurological ROS: no TIA or stroke symptoms  Dermatological ROS: negative    Vital Signs:     Vitals:    01/26/23 1354   BP: 160/71   BP Location: Right arm   Patient Position: Sitting   Cuff Size: Standard   Pulse: 70   Temp: 98 8 °F (37 1 °C)   TempSrc: Tympanic   SpO2: 98%   Weight: 87 7 kg (193 lb 4 8 oz)   Height: 5' 1" (1 549 m)       Physical Exam:    General:  Alert, oriented, NAD   HEENT/NECK:  PERRL  No jugular venous distention  Cardiac:Regular rate and rhythm  No murmur  Carotid arteries: brisk, no bruits  Pulmonary:  Breath sounds clear bilaterally  Abdomen:  Non-tender, Non-distended  Positive bowel sounds  Upper extremities: 2+ radial pulses; brisk capillary refill  Lower extremities: Extremities warm/dry  PT/DP pulses 2+ bilaterally  No edema B/L  Musculoskeletal: TIMMONS   Neuro: Alert and oriented X 3  Sensation is grossly intact  No focal deficits  Skin: Warm/Dry, without rashes or lesions  Lab Results:               Invalid input(s): LABGLOM      Lab Results   Component Value Date    HGBA1C 5 8 (H) 10/18/2022     Lab Results   Component Value Date    CKTOTAL 101 12/07/2018    TROPONINI 0 03 12/23/2015       Imaging Studies:     Cardiac Catheterization: 12/7/22  •  Ost LAD to Prox LAD lesion is 20% stenosed  •  Mid LM to Dist LM lesion is 70% stenosed  •  Prox Cx lesion is 95% stenosed  •  Ost RCA lesion is 40% stenosed  •  Mid RCA lesion is 45% stenosed  •  The left ventricular systolic function is normal   EF is around 55 to 60% no gradient across aortic valve  •  Patient has significant stenosis of distal left main involving ostial LAD as well as proximal circumflex with a nonobstructive disease involving right coronary artery with preserved LV systolic function    •  Lateral annulus calcification and calcification of aorta was noted      Echocardiogram: 12/6/22   Findings    Left Ventricle Left ventricular cavity size is normal  Wall thickness is mildly increased  There is mild concentric hypertrophy  The left ventricular ejection fraction is 60% by visual estimation  Systolic function is normal   Although no diagnostic regional wall motion abnormality was identified, this possibility cannot be completely excluded on the basis of this study  Unable to assess diastolic function due to moderate mitral annulus calcification      Right Ventricle Right ventricular cavity size is normal  Systolic function is normal  Wall thickness is normal    Left Atrium The atrium is mildly dilated  Right Atrium The atrium is normal in size  Aortic Valve The aortic valve is trileaflet  The leaflets are mildly thickened  The leaflets are mildly calcified  The leaflets exhibit normal mobility  There is no evidence of regurgitation  The aortic valve has no significant stenosis  Mitral Valve There is mild diffuse thickening of the anterior leaflet and posterior leaflet  Some thickening of subdural pruritus noted  There is moderate to severe annular calcification  There is mild regurgitation  There is very mild stenosis  Mean gradient 3 to 4 mmHg  Tricuspid Valve There is trace regurgitation  There is no evidence of stenosis  Right ventricular systolic pressure could not be assessed  Pulmonic Valve There is trace regurgitation  There is no evidence of stenosis  Ascending Aorta The aortic root is normal in size  IVC/SVC The inferior vena cava is not well visualized  Pericardium There is no pericardial effusion  The pericardium is normal in appearance  CT Scan: 12/7/22  IMPRESSION:  1  No evidence of acute pulmonary embolus, thoracic aortic aneurysm or dissection  No acute cardiopulmonary process  2   Groundglass 1 3 cm lesion in the left upper lobe, stable since 2015 and therefore likely benign    Carotid Duplex: 12/7/22  RIGHT:  There is <50% stenosis noted in the internal carotid artery   Plaque is  heterogenous and smooth  Vertebral artery flow is antegrade  There is no significant subclavian artery  disease  LEFT:  There is <50% stenosis noted in the internal carotid artery  Plaque is  heterogenous and smooth  Vertebral artery flow is antegrade  There is no significant subclavian artery  disease  Vein Mappin22  Impression:  RIGHT LOWER LIMB:  The great saphenous vein is patent  from the groin to the ankle  The vein measures >2mm in caliber from the groin to the ankle  LEFT LOWER LIMB:  The great saphenous vein is patent  from the groin to the ankle  The vein measures >2mm in caliber from the groin to the knee  I have personally reviewed pertinent reports     and I have personally reviewed pertinent films in PACS    Assessment:  Patient Active Problem List    Diagnosis Date Noted   • Dyspnea on exertion 2022   • T wave inversion in EKG 2022   • Diarrhea 10/10/2022   • Non-ST elevation myocardial infarction (NSTEMI) ([facility] ) 2022   • History of PTCA 2022   • H/O heart artery stent 2022   • Colon polyp 2022   • Neuropathy 2022   • Use of anastrozole (Arimidex)    • Monoallelic mutation of CHEK2 gene in female patient 2021   • Class 2 obesity in adult 2021   • 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, estrogen receptor positive ([facility] ) 2021   • Bilateral leg edema 2020   • Hypothyroidism 2020   • Spinal stenosis of lumbar region 2020   • Osteopenia of necks of both femurs 2020   • S/P lumbar fusion 2018   • Coronary artery disease involving native coronary artery of native heart without angina pectoris 2016   • Abnormal carotid ultrasound 2016   • 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[facility] ) 2013   • Type 2 diabetes mellitus without complication, without long-term current use of insulin ([facility] ) 2013   • Mixed dyslipidemia 2013   • Essential hypertension 2013 Severe coronary artery disease; Ongoing CABG workup    Plan:  Risks and benefits of coronary artery bypass grafting were discussed in detail today with the patient  We have reviewed results all preoperative radiographic,  laboratory and vascular studies  They understand and wish to proceed with surgical intervention  She will be scheduled for CORONARY ARTERY BYPASS GRAFTING ON 2/8/23 with LUCIO Medina     1  Pedro Pablo Garza will receive a phone call from the hospital between 2:00 PM and 8:00 PM the day prior to surgery to confirm arrival time and location  For surgery on Mondays, you will receive a call on Friday  2  Do not drink or eat anything after midnight the night before surgery  That includes no water, candy, gum, lozenges, Lifesavers, etc  We recommend you not eat any salty or fatty snack foods, consume alcohol or smoke the night before surgery  3  Continue taking aspirin but only 81 mg daily  4  If you take Coumadin and/or Plavix, discontinue it 5 days before surgery  5  If you are diabetic, do not take any of your diabetic pills the morning of surgery  If you take Lantus insulin, you may take it at your regularly scheduled time the day before surgery  Do not take any other insulins the morning of surgery  6  The 2 nights before surgery, take a shower each night using the special antiseptic soap or soap sponges you received from the office or [facility] your hair with regular shampoo and rinse completely before using the antiseptic sponges  Use the sponge to wash from your neck down, with special attention to the armpits and groin area  Do not use any other soap or cleanser on your skin  Do not use lotions, powder, deodorant or perfume of any kind on your skin after you shower  Use clean bed linens and wear clean pajamas after your shower  7  You will be prescribed Mupirocin nasal ointment  Apply to both nostrils twice a day for 5 days prior to surgery    8  Do not take a shower the morning of surgery; you'll be given a special" bath" at the hospital   9  Notify the CT surgery office if you develop a cold, sore throat, cough, fever or other health issues before your surgery  10  Other medication changes included the following: HOLD LISINOPRIL, GLIPIZIDE AND METFORMIN 3 DAYS BEFORE SURGERY  Maddi Villa was comfortable with our recommendations, and their questions were answered to their satisfaction  Thank you for allowing us to participate in the care of this patient  The patient recently had a screening colonoscopy in July 2022  Therefore GI referral is not indicated at this time       SIGNATURE: Harley Sr PA-C  DATE: January 26, 2023  TIME: 2:35 PM

## 2023-01-26 NOTE — PATIENT INSTRUCTIONS
1  You will receive a phone call from the hospital between 2:00 PM and 8:00 PM the day prior to surgery to confirm arrival time and location  For surgery on Mondays, you will receive a call on Friday  2  Do not drink or eat anything after midnight the night before surgery  That includes no water, candy, gum, lozenges, Lifesavers, etc  We recommend you not eat any salty or fatty snack foods, consume alcohol or smoke the night before surgery  3  Continue taking aspirin but only 81 mg daily  4  If you take Coumadin and/or Plavix, discontinue it 5 days before surgery  5  If you are diabetic, do not take any of your diabetic pills the morning of surgery  If you take Lantus insulin, you may take it at your regularly scheduled time the day before surgery  Do not take any other insulins the morning of surgery  6  The 2 nights before surgery, take a shower each night using the special antiseptic soap or soap sponges you received from the office or hospital  Lova Better your hair with regular shampoo and rinse completely before using the antiseptic sponges  Use the sponge to wash from your neck down, with special attention to the armpits and groin area  Do not use any other soap or cleanser on your skin  Do not use lotions, powder, deodorant or perfume of any kind on your skin after you shower  Use clean bed linens and wear clean pajamas after your shower  7  You will be prescribed Mupirocin nasal ointment  Apply to both nostrils twice a day for 5 days prior to surgery  8  Do not take a shower the morning of surgery; you'll be given a special" bath" at the hospital   9  Notify the CT surgery office if you develop a cold, sore throat, cough, fever or other health issues before your surgery  10  Other medication changes included the following: HOLD LISINOPRIL, METFORMIN AND GLIPIZIDE 3 DAYS BEFORE SURGERY

## 2023-01-27 ENCOUNTER — TELEPHONE (OUTPATIENT)
Dept: HEMATOLOGY ONCOLOGY | Facility: CLINIC | Age: 78
End: 2023-01-27

## 2023-01-27 NOTE — TELEPHONE ENCOUNTER
Appointment Cancellation Or Reschedule     Person calling In self   If someone other than patient calling, are they listed on the communication consent form? self   Provider Ba Quezada   Office Visit Date and Time 2/22 1:20PM   Office Visit Location [facility]   Did patient want to reschedule their office appointment? If so, when was it scheduled to? No, will call   Did you have STAR scheduled for this appointment? no   Do you need STAR set up for your new appointment? If yes, [facility] for [facility] rescheduling no   Is this patient calling to reschedule an infusion appointment? no   When is their next infusion appointment? no   Is this patient a Chemo patient? no   Reason for Cancellation or Reschedule Heart surgery scheduled     If the patient is cancelling an appointment and needs their STAR Transport cancelled, please route to [facility]  If the patient is a treatment patient, please route this to the office nurse  If the patient is not on treatment, please route to the Clerical pool based on location  If the patient is a surgical oncology patient, please route to surg/onc clinical pool  Route message as high priority if same day cancellation

## 2023-01-30 ENCOUNTER — OFFICE VISIT (OUTPATIENT)
Dept: SURGICAL ONCOLOGY | Facility: CLINIC | Age: 78
End: 2023-01-30

## 2023-01-30 ENCOUNTER — LAB REQUISITION (OUTPATIENT)
Dept: LAB | Facility: HOSPITAL | Age: 78
End: 2023-01-30

## 2023-01-30 ENCOUNTER — APPOINTMENT (OUTPATIENT)
Dept: LAB | Facility: CLINIC | Age: 78
End: 2023-01-30

## 2023-01-30 VITALS
SYSTOLIC BLOOD PRESSURE: 136 MMHG | DIASTOLIC BLOOD PRESSURE: 70 MMHG | BODY MASS INDEX: 36.63 KG/M2 | RESPIRATION RATE: 20 BRPM | OXYGEN SATURATION: 99 % | TEMPERATURE: 97.8 F | HEIGHT: 61 IN | HEART RATE: 80 BPM | WEIGHT: 194 LBS

## 2023-01-30 DIAGNOSIS — Z01.810 PRE-OPERATIVE CARDIOVASCULAR EXAMINATION: ICD-10-CM

## 2023-01-30 DIAGNOSIS — Z15.09 MONOALLELIC MUTATION OF CHEK2 GENE IN FEMALE PATIENT: ICD-10-CM

## 2023-01-30 DIAGNOSIS — C50.812 MALIGNANT NEOPLASM OF OVERLAPPING SITES OF LEFT BREAST IN FEMALE, ESTROGEN RECEPTOR POSITIVE (HCC): Primary | ICD-10-CM

## 2023-01-30 DIAGNOSIS — I25.118 ATHEROSCLEROTIC HEART DISEASE OF NATIVE CORONARY ARTERY WITH OTHER FORMS OF ANGINA PECTORIS (HCC): ICD-10-CM

## 2023-01-30 DIAGNOSIS — Z15.01 MONOALLELIC MUTATION OF CHEK2 GENE IN FEMALE PATIENT: ICD-10-CM

## 2023-01-30 DIAGNOSIS — I25.118 CORONARY ARTERY DISEASE OF NATIVE ARTERY OF NATIVE HEART WITH STABLE ANGINA PECTORIS (HCC): ICD-10-CM

## 2023-01-30 DIAGNOSIS — Z15.02 MONOALLELIC MUTATION OF CHEK2 GENE IN FEMALE PATIENT: ICD-10-CM

## 2023-01-30 DIAGNOSIS — Z01.812 ENCOUNTER FOR PRE-OPERATIVE LABORATORY TESTING: ICD-10-CM

## 2023-01-30 DIAGNOSIS — Z17.0 MALIGNANT NEOPLASM OF OVERLAPPING SITES OF LEFT BREAST IN FEMALE, ESTROGEN RECEPTOR POSITIVE (HCC): Primary | ICD-10-CM

## 2023-01-30 DIAGNOSIS — Z15.89 MONOALLELIC MUTATION OF CHEK2 GENE IN FEMALE PATIENT: ICD-10-CM

## 2023-01-30 LAB
ABO GROUP BLD: NORMAL
BLD GP AB SCN SERPL QL: NEGATIVE
RH BLD: POSITIVE
SPECIMEN EXPIRATION DATE: NORMAL

## 2023-01-30 NOTE — PROGRESS NOTES
Surgical Oncology Follow Up       [address]  [facility]  [address]  [facility] [id number]    Arcelia Del Rio  1945  [id number]  [address] [facility]  [facility]  [address] [id number]    Chief Complaint   Patient presents with   • Breast Cancer       Assessment/Plan:  1  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, estrogen receptor positive ([facility] )  - 6 month follow up  - Mammo diagnostic right w 3d & cad; Future    2  Monoallelic mutation of CHEK2 gene in female patient       Discussion/Summary: Patient is a 50-year-old female presenting today for six-month follow-up for left breast cancer diagnosed in March 2021  Pathology revealed multifocal DCIS ER/%  She had genetic testing which revealed a CHEK2 mutation  She underwent a left breast mastectomy with sentinel node biopsy with Dr Tyler Ramirez  She is currently on anastrozole  She is due for a mammogram in March  She had a diagnostic ultrasound of the left axilla on 8/15/2022 for possible lymph node enlargement palpated on exam was BI-RADS 2  There are typical appearing lymph nodes without asymmetrical cortical thickening  There were no concerns on her cbe  I will see the patient back in 6 months or sooner should the need arise  She was instructed to call with any questions or concerns prior to this time  All questions were answered today  History of Present Illness:     Oncology History   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[address] )   8/27/2013 Initial Diagnosis    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[address] )     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, estrogen receptor positive ([facility] )   3/15/2021 Biopsy    Left breast ultrasound-guided biopsy  12 o'clock, 5 cm from nipple  Ductal carcinoma in situ  Grade 2        Concordant  Malignancy appears unifocal; masses cover 2 6 cm  US left axilla negative  Right breast clear  4/19/2021 Genetic Testing    One pathogenic (low penetrance) variant identified in CHEK2  Total of 23 genes evaluated  [employer]     5/6/2021 Observation    Imaging was reviewed prior to ROSLYN clip insertion  Additional findings in the left breast on ultrasound; 2 additional biopsies were recommended     5/6/2021 Biopsy    Left breast ultrasound-guided biopsy  A  1 o'clock, 4 cm from nipple  Ductal carcinoma in situ  Grade 2  ,     B  11 o'clock, 9 cm from nipple  Invasive carcinoma of no special type (ductal)  Grade 1  ER 90, ND 90, HER2 1+  Lymphovascular invasion not identified    Concordant  Malignancy is multifocal and spans at least 8 5 cm in 2 directions  ROSLYN  clip placed at 12:00, 5cmfn biopsy site  7/13/2021 Surgery    Left breast ROSLYN  directed mastectomy with sentinel lymph node biopsy  Invasive mammary carcinoma of no special type (ductal)  Grade 1  8 mm  Extensive DCIS (size cannot be determined, throughout all quadrants)  Margins negative  0/1 Lymph node  Anatomic/Prognostic Stage IA     8/11/2021 -  Hormone Therapy    Anastrozole 1 mg daily  Dr Alea Lauren          -Interval History: Patient is a 73-year-old female presenting today for six-month follow-up for left breast cancer diagnosed in March 2021  She is currently on anastrozole  She had a diagnostic ultrasound of the left axilla on 8/15/2022 for possible lymph node enlargement palpated on exam was BI-RADS 2  She is due for open heart surgery next week  Patient denies changes on her breast exam  She denies persistent headaches, bone pain, back pain, shortness of breath, or abdominal pain  Review of Systems:  Review of Systems   Constitutional: Negative for activity change, appetite change, fatigue and unexpected weight change  Respiratory: Negative for cough and shortness of breath  Cardiovascular: Negative for chest pain     Gastrointestinal: Negative for abdominal pain, diarrhea, nausea and vomiting  Endocrine: Negative for heat intolerance  Musculoskeletal: Negative for arthralgias, back pain and myalgias  Skin: Negative for rash  Neurological: Negative for weakness and headaches  Hematological: Negative for adenopathy         Patient Active Problem List   Diagnosis   • Coronary artery disease involving native coronary artery of native heart without angina pectoris   • Type 2 diabetes mellitus without complication, without long-term current use of insulin (HCC)   • Mixed dyslipidemia   • Essential hypertension   • Abnormal carotid ultrasound   • Hypothyroidism   • Spinal stenosis of lumbar region   • Osteopenia of necks of both femurs   • 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HCC)   • S/P lumbar fusion   • Bilateral leg edema   • 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, estrogen receptor positive ([place] [address] )   • Class 2 obesity in adult   • Monoallelic mutation of CHEK2 gene in female patient   • Use of anastrozole (Arimidex)   • Neuropathy   • Non-ST elevation myocardial infarction (NSTEMI) ([facility] )   • History of PTCA   • H/O heart artery stent   • Colon polyp   • Diarrhea   • Dyspnea on exertion   • T wave inversion in EKG     Past Medical History:   Diagnosis Date   • Abdominal pain     LLQ on occasion   • Angina pectoris ([facility] )    • Arthritis    • Breast cancer (HCC)    • Cancer ([facility] )     breast left   • Colon polyp    • Constipation     at times   • Coronary artery disease    • Diabetes mellitus ([facility] )    • Diarrhea     at times   • Disease of thyroid gland    • Hemorrhoids    • Hypercholesterolemia    • Hypertension    • Neuropathy     both legs and feet   • Obesity    • Osteoporosis    • Otitis media    • Ovarian ca [facility]) 1997   • 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[facility] [facility] )    • Shingles    • 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   • Thyroid cancer ([facility] )    • Urinary tract infection      Past Surgical History:   Procedure Laterality Date   • ANGIOPLASTY      stent x 1   • APPENDECTOMY     • BACK SURGERY     • BAND HEMORRHOIDECTOMY     • BRAIN SURGERY  1993    meningioma   • BREAST BIOPSY     • CARDIAC CATHETERIZATION N/A 12/7/2022    Procedure: Cardiac catheterization;  Surgeon: Saroj Esqueda MD;  Location: [place] [address] CATH LAB; Service: Cardiology   • CARPAL TUNNEL RELEASE     • CERVICAL FUSION     • CHOLECYSTECTOMY     • COLONOSCOPY      pt see Dr Sunita Patiño  Up to date with her colonoscopy  • COLONOSCOPY     • CORONARY STENT PLACEMENT      Dec 2015   • EYE SURGERY      cataract surgery   • GALLBLADDER SURGERY     • HYSTERECTOMY  1989   • MAMMO (HISTORICAL)      up to date  see gyn   • MASTECTOMY Left 07/13/2021   • MASTECTOMY W/ SENTINEL NODE BIOPSY Left 07/13/2021    Procedure: BREAST ROSLYN  DIRECTED MASTECTOMY, SENTINEL LYMPH NODE BIOPSY, LYMPHATIC MAPPING WITH BLUE DYE AND RADIOACTIVE DYE (INJECT AT 1500 BY DR ANDREWS IN THE OR);   Surgeon: Alexa Butterfield MD;  Location: [facility];  Service: Surgical Oncology   • OOPHORECTOMY Bilateral 1997   • SPINE SURGERY     • THYROIDECTOMY     • UPPER GASTROINTESTINAL ENDOSCOPY     • US BREAST NEEDLE LOC LEFT Left 05/06/2021   • US GUIDANCE BREAST BIOPSY LEFT EACH ADDITIONAL Left 05/06/2021   • US GUIDED BREAST BIOPSY LEFT COMPLETE Left 03/15/2021   • US GUIDED BREAST BIOPSY LEFT COMPLETE Left 05/06/2021     Family History   Problem Relation Age of Onset   • Diabetes Mother    • Heart disease Mother    • Dementia Mother    • Esophageal cancer Father 61   • Endometrial cancer Sister 76   • Asthma Sister    • Cancer Sister    • No Known Problems Sister    • No Known Problems Sister    • No Known Problems Sister    • Stomach cancer Brother 70   • Multiple myeloma Brother 67   • Cancer Brother    • No Known Problems Maternal Grandmother    • Prostate cancer Maternal Grandfather    • No Known Problems Paternal Grandmother    • Prostate cancer Paternal Grandfather    • Breast cancer Maternal Aunt 48   • No Known Problems Paternal Aunt    • No Known Problems Paternal [de-identified]    • Asthma Daughter    • Asthma Son    • Depression Son    • Breast cancer Other 39   • Breast cancer Other 39   • Diabetes Family    • Cancer Family    • Arthritis Family    • Heart disease Family      Social History     Socioeconomic History   • Marital status: /Civil Union     Spouse name: Not on file   • Number of children: Not on file   • Years of education: Not on file   • Highest education level: Not on file   Occupational History   • Not on file   Tobacco Use   • Smoking status: Never   • Smokeless tobacco: Never   Vaping Use   • Vaping Use: Never used   Substance and Sexual Activity   • Alcohol use: Never   • Drug use: Never   • Sexual activity: Not Currently     Partners: Male     Birth control/protection: Post-menopausal, None   Other Topics Concern   • Not on file   Social History Narrative    · Tobacco smoking status:   Never smoker      This question's title has changed from "Smoking status" to "Tobacco smoking status" with the  update  Please use this field only for documenting tobacco smoking behavior  To accommodate this change, new fields for documenting smokeless tobacco and e-cigarette/vape usage have been added       · Smoking - how much          None  1 PPW  2 PPW  1/4 PPD  1/2 PPD  1 PPD  1 1/2 PPD  2 PPD  3+ PPD          NOTE     · Smokeless tobacco status          Never used smokeless tobacco  Former smokeless tobacco user  Current snuff user  Currently chews tobacco  Currently uses moist powdered tobacco  ----  Not indicated  Not tolerated  Patient refused          NOTE     · Tobacco-years of use               NOTE     · E-cigarette/vape status          Never used electronic cigarettes  Former user of electronic cigarettes  Current user of electronic cigarettes          NOTE     · Most recent tobacco use screenin2018      · Alcohol intake:   None         Per laine     Social Determinants of Health Financial Resource Strain: Not on file   Food Insecurity: No Food Insecurity   • Worried About [facility] in the Last Year: Never true   • Ran Out of Food in the Last Year: Never true   Transportation Needs: No Transportation Needs   • Lack of Transportation (Medical): No   • Lack of Transportation (Non-Medical):  No   Physical Activity: Not on file   Stress: Not on file   Social Connections: Not on file   Intimate Partner Violence: Not on file   Housing Stability: Low Risk    • Unable to Pay for Housing in the Last Year: No   • Number of Places Lived in the Last Year: 1   • Unstable Housing in the Last Year: No       Current Outpatient Medications:   •  albuterol (ProAir HFA) 90 mcg/act inhaler, Inhale 2 puffs every 6 (six) hours as needed for wheezing, Disp: 8 5 g, Rfl: 0  •  anastrozole (ARIMIDEX) 1 mg tablet, Take 1 tablet (1 mg total) by mouth daily, Disp: 90 tablet, Rfl: 0  •  aspirin (ECOTRIN LOW STRENGTH) 81 mg EC tablet, Take 162 mg by mouth daily, Disp: , Rfl:   •  glipiZIDE (GLUCOTROL) 10 mg tablet, Take 1 tablet (10 mg total) by mouth 2 (two) times a day before meals, Disp: 180 tablet, Rfl: 0  •  Insulin Pen Needle (Sure Comfort Pen Needles) 31G X 5 MM MISC, by Does not apply route daily, Disp: 100 each, Rfl: 3  •  Lancets (OneTouch Delica Plus [id number]) MISC, USE DAILY, Disp: 100 each, Rfl: 3  •  levothyroxine 88 mcg tablet, Take 1 tablet (88 mcg total) by mouth daily, Disp: 90 tablet, Rfl: 0  •  liraglutide (VICTOZA) injection, Inject 1 2 mg under the skin daily at bedtime , Disp: , Rfl:   •  lisinopril (ZESTRIL) 20 mg tablet, Take 1 tablet (20 mg total) by mouth daily at bedtime, Disp: 90 tablet, Rfl: 1  •  metFORMIN (GLUCOPHAGE) 1000 MG tablet, Take 1 tablet (1,000 mg total) by mouth 2 (two) times a day with meals, Disp: 180 tablet, Rfl: 0  •  metoprolol tartrate (LOPRESSOR) 25 mg tablet, Take 1 tablet (25 mg total) by mouth 3 (three) times a day, Disp: 270 tablet, Rfl: 1  •  mupirocin (BACTROBAN) 2 % nasal ointment, into each nostril 2 (two) times a day Apply 2 times daily for 5 days prior to procedure, Disp: 10 g, Rfl: 0  •  nitroglycerin (NITROSTAT) 0 4 mg SL tablet, Place 1 tablet (0 4 mg total) under the tongue every 5 (five) minutes as needed for chest pain, Disp: 30 tablet, Rfl: 0  •  OneTouch Ultra test strip, Use 1 each daily Use as instructed, Disp: 100 each, Rfl: 3  •  pregabalin (LYRICA) 75 mg capsule, Take 1 capsule (75 mg total) by mouth 2 (two) times a day, Disp: 180 capsule, Rfl: 1  Allergies   Allergen Reactions   • Duloxetine Other (See Comments)     Extreme somnolence/lethargy   • Sulfa Antibiotics Rash     Vitals:    01/30/23 1320   BP: 136/70   Pulse: 80   Resp: 20   Temp: 97 8 °F (36 6 °C)   SpO2: 99%       Physical Exam  Constitutional:       General: She is not in acute distress  Appearance: Normal appearance  Cardiovascular:      Rate and Rhythm: Normal rate and regular rhythm  Pulses: Normal pulses  Heart sounds: Normal heart sounds  Pulmonary:      Effort: Pulmonary effort is normal       Breath sounds: Normal breath sounds  Chest:      Chest wall: No mass  Breasts:     Right: No swelling, bleeding, inverted nipple, mass, nipple discharge, skin change or tenderness  Left: No swelling, bleeding, mass, skin change or tenderness  Comments: Left breast mastectomy scar  No masses, nodularity, skin changes, nipple changes or discharge (of right breast), or adenopathy appreciated on physical exam      Abdominal:      General: Abdomen is flat  Palpations: Abdomen is soft  Lymphadenopathy:      Upper Body:      Right upper body: No supraclavicular, axillary or pectoral adenopathy  Left upper body: No supraclavicular, axillary or pectoral adenopathy  Skin:     General: Skin is warm  Neurological:      General: No focal deficit present  Mental Status: She is alert and oriented to person, place, and time     Psychiatric: Mood and Affect: Mood normal          Behavior: Behavior normal            Results:    Imaging  No results found  I reviewed the above imaging data  Advance Care Planning/Advance Directives:  Discussed disease status, cancer treatment plans and/or cancer treatment goals with the patient

## 2023-02-07 ENCOUNTER — ANESTHESIA EVENT (OUTPATIENT)
Dept: PERIOP | Facility: HOSPITAL | Age: 78
End: 2023-02-07

## 2023-02-07 DIAGNOSIS — E03.9 ACQUIRED HYPOTHYROIDISM: ICD-10-CM

## 2023-02-07 DIAGNOSIS — E11.9 TYPE 2 DIABETES MELLITUS WITHOUT COMPLICATION, WITHOUT LONG-TERM CURRENT USE OF INSULIN (HCC): ICD-10-CM

## 2023-02-07 RX ORDER — LEVOTHYROXINE SODIUM 88 UG/1
88 TABLET ORAL DAILY
Qty: 90 TABLET | Refills: 0 | Status: SHIPPED | OUTPATIENT
Start: 2023-02-07

## 2023-02-07 NOTE — ANESTHESIA PREPROCEDURE EVALUATION
Procedure:  CORONARY ARTERY BYPASS GRAFT (CABG) X 2 VESSELS EVH  w/ RADHA (Chest)    Relevant Problems   ANESTHESIA (within normal limits)      CARDIO   (+) Coronary artery disease involving native coronary artery of native heart without angina pectoris   (+) Dyspnea on exertion   (+) Essential hypertension   (+) History of PTCA   (+) Non-ST elevation myocardial infarction (NSTEMI) (HCC)      ENDO   (+) Hypothyroidism   (+) Type 2 diabetes mellitus without complication, without long-term current use of insulin (HCC)      GYN   (+) 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, estrogen receptor positive ([address] ) (s/p Surgery)      PULMONARY   (+) Dyspnea on exertion      Other   (+) Class 2 obesity in adult   (+) H/O heart artery stent   (+) Mixed dyslipidemia   (+) S/P lumbar fusion   Findings    Left Ventricle Left ventricular cavity size is normal  Wall thickness is mildly increased  There is mild concentric hypertrophy  The left ventricular ejection fraction is 60% by visual estimation  Systolic function is normal   Although no diagnostic regional wall motion abnormality was identified, this possibility cannot be completely excluded on the basis of this study  Unable to assess diastolic function due to moderate mitral annulus calcification      Right Ventricle Right ventricular cavity size is normal  Systolic function is normal  Wall thickness is normal    Left Atrium The atrium is mildly dilated  Right Atrium The atrium is normal in size  Aortic Valve The aortic valve is trileaflet  The leaflets are mildly thickened  The leaflets are mildly calcified  The leaflets exhibit normal mobility  There is no evidence of regurgitation  The aortic valve has no significant stenosis  Mitral Valve There is mild diffuse thickening of the anterior leaflet and posterior leaflet  Some thickening of subdural pruritus noted  There is moderate to severe annular calcification  There is mild regurgitation   There is very mild stenosis  Mean gradient 3 to 4 mmHg  Tricuspid Valve There is trace regurgitation  There is no evidence of stenosis  Right ventricular systolic pressure could not be assessed  Pulmonic Valve There is trace regurgitation  There is no evidence of stenosis  Ascending Aorta The aortic root is normal in size  IVC/SVC The inferior vena cava is not well visualized  Pericardium There is no pericardial effusion  The pericardium is normal in appearance     Impression       •  Ost LAD to Prox LAD lesion is 20% stenosed  •  Mid LM to Dist LM lesion is 70% stenosed  •  Prox Cx lesion is 95% stenosed  •  Ost RCA lesion is 40% stenosed  •  Mid RCA lesion is 45% stenosed  •  The left ventricular systolic function is normal   EF is around 55 to 60% no gradient across aortic valve  •  Patient has significant stenosis of distal left main involving ostial LAD as well as proximal circumflex with a nonobstructive disease involving right coronary artery with preserved LV systolic function  •  Lateral annulus calcification and calcification of aorta was noted  Normal sinus rhythm  ST & T wave abnormality, consider lateral ischemia  Abnormal ECG  When compared with ECG of 06-DEC-2022 10:28,  No significant change was found       Physical Exam    Airway    Mallampati score: II  TM Distance: >3 FB  Neck ROM: full     Dental   upper dentures and lower dentures,     Cardiovascular  Rhythm: regular, Rate: normal, Murmur,     Pulmonary  Pulmonary exam normal Breath sounds clear to auscultation,     Other Findings        Anesthesia Plan  ASA Score- 4     Anesthesia Type- general with ASA Monitors  Additional Monitors: arterial line, central venous line and pulmonary artery catheter  Airway Plan: ETT  Comment: RADHA  Plan Factors-Exercise tolerance (METS): <4 METS  Chart reviewed  EKG reviewed  Existing labs reviewed  Patient summary reviewed  Patient is not a current smoker           Induction- intravenous  Postoperative Plan-     Informed Consent- Anesthetic plan and risks discussed with patient

## 2023-02-08 ENCOUNTER — ANESTHESIA (OUTPATIENT)
Dept: PERIOP | Facility: HOSPITAL | Age: 78
End: 2023-02-08

## 2023-02-08 ENCOUNTER — HOSPITAL ENCOUNTER (INPATIENT)
Facility: HOSPITAL | Age: 78
LOS: 3 days | Discharge: HOME HEALTH | End: 2023-02-11
Attending: THORACIC SURGERY (CARDIOTHORACIC VASCULAR SURGERY) | Admitting: THORACIC SURGERY (CARDIOTHORACIC VASCULAR SURGERY)

## 2023-02-08 ENCOUNTER — APPOINTMENT (OUTPATIENT)
Dept: NON INVASIVE DIAGNOSTICS | Facility: HOSPITAL | Age: 78
End: 2023-02-08
Attending: THORACIC SURGERY (CARDIOTHORACIC VASCULAR SURGERY)

## 2023-02-08 ENCOUNTER — APPOINTMENT (OUTPATIENT)
Dept: RADIOLOGY | Facility: HOSPITAL | Age: 78
End: 2023-02-08

## 2023-02-08 DIAGNOSIS — Z95.1 S/P CABG X 2: Primary | ICD-10-CM

## 2023-02-08 DIAGNOSIS — I25.10 CAD IN NATIVE ARTERY: ICD-10-CM

## 2023-02-08 DIAGNOSIS — E11.9 TYPE 2 DIABETES MELLITUS WITHOUT COMPLICATION, WITHOUT LONG-TERM CURRENT USE OF INSULIN (HCC): ICD-10-CM

## 2023-02-08 LAB
ANION GAP SERPL CALCULATED.3IONS-SCNC: 7 MMOL/L (ref 4–13)
BASE EXCESS BLDA CALC-SCNC: -3 MMOL/L (ref -2–3)
BASE EXCESS BLDA CALC-SCNC: 0 MMOL/L (ref -2–3)
BASE EXCESS BLDA CALC-SCNC: 0 MMOL/L (ref -2–3)
BASE EXCESS BLDA CALC-SCNC: 1 MMOL/L (ref -2–3)
BASE EXCESS BLDA CALC-SCNC: 2 MMOL/L (ref -2–3)
BASE EXCESS BLDA CALC-SCNC: 3 MMOL/L (ref -2–3)
BASE EXCESS BLDA CALC-SCNC: 4 MMOL/L (ref -2–3)
BUN SERPL-MCNC: 9 MG/DL (ref 5–25)
CA-I BLD-SCNC: 0.94 MMOL/L (ref 1.12–1.32)
CA-I BLD-SCNC: 1.02 MMOL/L (ref 1.12–1.32)
CA-I BLD-SCNC: 1.04 MMOL/L (ref 1.12–1.32)
CA-I BLD-SCNC: 1.15 MMOL/L (ref 1.12–1.32)
CA-I BLD-SCNC: 1.17 MMOL/L (ref 1.12–1.32)
CA-I BLD-SCNC: 1.24 MMOL/L (ref 1.12–1.32)
CA-I BLD-SCNC: 1.25 MMOL/L (ref 1.12–1.32)
CALCIUM SERPL-MCNC: 8.3 MG/DL (ref 8.3–10.1)
CHLORIDE SERPL-SCNC: 112 MMOL/L (ref 96–108)
CO2 SERPL-SCNC: 22 MMOL/L (ref 21–32)
CREAT SERPL-MCNC: 0.63 MG/DL (ref 0.6–1.3)
FIO2 GAS DIL.REBREATH: 50 L
GFR SERPL CREATININE-BSD FRML MDRD: 87 ML/MIN/1.73SQ M
GLUCOSE SERPL-MCNC: 127 MG/DL (ref 65–140)
GLUCOSE SERPL-MCNC: 128 MG/DL (ref 65–140)
GLUCOSE SERPL-MCNC: 133 MG/DL (ref 65–140)
GLUCOSE SERPL-MCNC: 134 MG/DL (ref 65–140)
GLUCOSE SERPL-MCNC: 137 MG/DL (ref 65–140)
GLUCOSE SERPL-MCNC: 138 MG/DL (ref 65–140)
GLUCOSE SERPL-MCNC: 140 MG/DL (ref 65–140)
GLUCOSE SERPL-MCNC: 140 MG/DL (ref 65–140)
GLUCOSE SERPL-MCNC: 142 MG/DL (ref 65–140)
GLUCOSE SERPL-MCNC: 142 MG/DL (ref 65–140)
GLUCOSE SERPL-MCNC: 144 MG/DL (ref 65–140)
GLUCOSE SERPL-MCNC: 162 MG/DL (ref 65–140)
GLUCOSE SERPL-MCNC: 180 MG/DL (ref 65–140)
GLUCOSE SERPL-MCNC: 183 MG/DL (ref 65–140)
GLUCOSE SERPL-MCNC: 205 MG/DL (ref 65–140)
HCO3 BLDA-SCNC: 21.5 MMOL/L (ref 22–28)
HCO3 BLDA-SCNC: 23.7 MMOL/L (ref 22–28)
HCO3 BLDA-SCNC: 24.7 MMOL/L (ref 24–30)
HCO3 BLDA-SCNC: 25.3 MMOL/L (ref 22–28)
HCO3 BLDA-SCNC: 26 MMOL/L (ref 22–28)
HCO3 BLDA-SCNC: 28.2 MMOL/L (ref 22–28)
HCO3 BLDA-SCNC: 28.6 MMOL/L (ref 24–30)
HCT VFR BLD AUTO: 32.2 % (ref 34.8–46.1)
HCT VFR BLD AUTO: 33.6 % (ref 34.8–46.1)
HCT VFR BLD CALC: 20 % (ref 34.8–46.1)
HCT VFR BLD CALC: 28 % (ref 34.8–46.1)
HCT VFR BLD CALC: 30 % (ref 34.8–46.1)
HCT VFR BLD CALC: 32 % (ref 34.8–46.1)
HGB BLD-MCNC: 11.1 G/DL (ref 11.5–15.4)
HGB BLD-MCNC: 11.7 G/DL (ref 11.5–15.4)
HGB BLDA-MCNC: 10.2 G/DL (ref 11.5–15.4)
HGB BLDA-MCNC: 10.9 G/DL (ref 11.5–15.4)
HGB BLDA-MCNC: 6.8 G/DL (ref 11.5–15.4)
HGB BLDA-MCNC: 9.5 G/DL (ref 11.5–15.4)
KCT BLD-ACNC: 120 SEC (ref 89–137)
KCT BLD-ACNC: 132 SEC (ref 89–137)
KCT BLD-ACNC: 698 SEC (ref 89–137)
KCT BLD-ACNC: 698 SEC (ref 89–137)
KCT BLD-ACNC: 741 SEC (ref 89–137)
KCT BLD-ACNC: 765 SEC (ref 89–137)
KCT BLD-ACNC: 843 SEC (ref 89–137)
PCO2 BLD: 23 MMOL/L (ref 21–32)
PCO2 BLD: 25 MMOL/L (ref 21–32)
PCO2 BLD: 26 MMOL/L (ref 21–32)
PCO2 BLD: 26 MMOL/L (ref 21–32)
PCO2 BLD: 27 MMOL/L (ref 21–32)
PCO2 BLD: 29 MMOL/L (ref 21–32)
PCO2 BLD: 30 MMOL/L (ref 21–32)
PCO2 BLD: 33.7 MM HG (ref 36–44)
PCO2 BLD: 37 MM HG (ref 36–44)
PCO2 BLD: 37.3 MM HG (ref 36–44)
PCO2 BLD: 37.9 MM HG (ref 36–44)
PCO2 BLD: 39 MM HG (ref 42–50)
PCO2 BLD: 42.5 MM HG (ref 36–44)
PCO2 BLD: 47.8 MM HG (ref 42–50)
PH BLD: 7.37 [PH] (ref 7.35–7.45)
PH BLD: 7.38 [PH] (ref 7.3–7.4)
PH BLD: 7.41 [PH] (ref 7.3–7.4)
PH BLD: 7.43 [PH] (ref 7.35–7.45)
PH BLD: 7.44 [PH] (ref 7.35–7.45)
PH BLD: 7.45 [PH] (ref 7.35–7.45)
PH BLD: 7.45 [PH] (ref 7.35–7.45)
PLATELET # BLD AUTO: 108 THOUSANDS/UL (ref 149–390)
PLATELET # BLD AUTO: 130 THOUSANDS/UL (ref 149–390)
PMV BLD AUTO: 10.1 FL (ref 8.9–12.7)
PMV BLD AUTO: 9.8 FL (ref 8.9–12.7)
PO2 BLD: 155 MM HG (ref 75–129)
PO2 BLD: 205 MM HG (ref 75–129)
PO2 BLD: 226 MM HG (ref 75–129)
PO2 BLD: 34 MM HG (ref 35–45)
PO2 BLD: 379 MM HG (ref 75–129)
PO2 BLD: 392 MM HG (ref 75–129)
PO2 BLD: 51 MM HG (ref 35–45)
POTASSIUM BLD-SCNC: 3.8 MMOL/L (ref 3.5–5.3)
POTASSIUM BLD-SCNC: 3.9 MMOL/L (ref 3.5–5.3)
POTASSIUM BLD-SCNC: 4 MMOL/L (ref 3.5–5.3)
POTASSIUM BLD-SCNC: 4.2 MMOL/L (ref 3.5–5.3)
POTASSIUM BLD-SCNC: 4.4 MMOL/L (ref 3.5–5.3)
POTASSIUM BLD-SCNC: 4.5 MMOL/L (ref 3.5–5.3)
POTASSIUM BLD-SCNC: 4.9 MMOL/L (ref 3.5–5.3)
POTASSIUM SERPL-SCNC: 3.1 MMOL/L (ref 3.5–5.3)
POTASSIUM SERPL-SCNC: 4.2 MMOL/L (ref 3.5–5.3)
POTASSIUM SERPL-SCNC: 4.3 MMOL/L (ref 3.5–5.3)
SAO2 % BLD FROM PO2: 100 % (ref 60–85)
SAO2 % BLD FROM PO2: 63 % (ref 60–85)
SAO2 % BLD FROM PO2: 86 % (ref 60–85)
SAO2 % BLD FROM PO2: 99 % (ref 60–85)
SODIUM BLD-SCNC: 134 MMOL/L (ref 136–145)
SODIUM BLD-SCNC: 136 MMOL/L (ref 136–145)
SODIUM BLD-SCNC: 137 MMOL/L (ref 136–145)
SODIUM BLD-SCNC: 138 MMOL/L (ref 136–145)
SODIUM BLD-SCNC: 138 MMOL/L (ref 136–145)
SODIUM BLD-SCNC: 140 MMOL/L (ref 136–145)
SODIUM BLD-SCNC: 141 MMOL/L (ref 136–145)
SODIUM SERPL-SCNC: 141 MMOL/L (ref 135–147)
SPECIMEN SOURCE: ABNORMAL
SPECIMEN SOURCE: NORMAL
SPECIMEN SOURCE: NORMAL

## 2023-02-08 PROCEDURE — 06BQ4ZZ EXCISION OF LEFT SAPHENOUS VEIN, PERCUTANEOUS ENDOSCOPIC APPROACH: ICD-10-PCS | Performed by: THORACIC SURGERY (CARDIOTHORACIC VASCULAR SURGERY)

## 2023-02-08 PROCEDURE — 02100Z9 BYPASS CORONARY ARTERY, ONE ARTERY FROM LEFT INTERNAL MAMMARY, OPEN APPROACH: ICD-10-PCS | Performed by: THORACIC SURGERY (CARDIOTHORACIC VASCULAR SURGERY)

## 2023-02-08 PROCEDURE — 021009W BYPASS CORONARY ARTERY, ONE ARTERY FROM AORTA WITH AUTOLOGOUS VENOUS TISSUE, OPEN APPROACH: ICD-10-PCS | Performed by: THORACIC SURGERY (CARDIOTHORACIC VASCULAR SURGERY)

## 2023-02-08 PROCEDURE — 5A1221Z PERFORMANCE OF CARDIAC OUTPUT, CONTINUOUS: ICD-10-PCS | Performed by: THORACIC SURGERY (CARDIOTHORACIC VASCULAR SURGERY)

## 2023-02-08 PROCEDURE — 5A1223Z PERFORMANCE OF CARDIAC PACING, CONTINUOUS: ICD-10-PCS | Performed by: THORACIC SURGERY (CARDIOTHORACIC VASCULAR SURGERY)

## 2023-02-08 PROCEDURE — 02HV33Z INSERTION OF INFUSION DEVICE INTO SUPERIOR VENA CAVA, PERCUTANEOUS APPROACH: ICD-10-PCS | Performed by: ANESTHESIOLOGY

## 2023-02-08 DEVICE — MARKER CORONARY BYPASS VOSS GRAFT: Type: IMPLANTABLE DEVICE | Site: HEART | Status: FUNCTIONAL

## 2023-02-08 RX ORDER — POTASSIUM CHLORIDE 14.9 MG/ML
20 INJECTION INTRAVENOUS ONCE AS NEEDED
Status: DISCONTINUED | OUTPATIENT
Start: 2023-02-08 | End: 2023-02-09

## 2023-02-08 RX ORDER — FENTANYL CITRATE 50 UG/ML
50 INJECTION, SOLUTION INTRAMUSCULAR; INTRAVENOUS
Status: DISCONTINUED | OUTPATIENT
Start: 2023-02-08 | End: 2023-02-08

## 2023-02-08 RX ORDER — FENTANYL CITRATE 50 UG/ML
50 INJECTION, SOLUTION INTRAMUSCULAR; INTRAVENOUS ONCE
Status: DISCONTINUED | OUTPATIENT
Start: 2023-02-08 | End: 2023-02-08

## 2023-02-08 RX ORDER — MIDAZOLAM HYDROCHLORIDE 2 MG/2ML
INJECTION, SOLUTION INTRAMUSCULAR; INTRAVENOUS AS NEEDED
Status: DISCONTINUED | OUTPATIENT
Start: 2023-02-08 | End: 2023-02-08

## 2023-02-08 RX ORDER — MAGNESIUM SULFATE 500 MG/ML
VIAL (ML) INJECTION AS NEEDED
Status: DISCONTINUED | OUTPATIENT
Start: 2023-02-08 | End: 2023-02-08

## 2023-02-08 RX ORDER — OXYCODONE HYDROCHLORIDE 5 MG/1
2.5 TABLET ORAL EVERY 4 HOURS PRN
Status: DISCONTINUED | OUTPATIENT
Start: 2023-02-08 | End: 2023-02-11 | Stop reason: HOSPADM

## 2023-02-08 RX ORDER — BISACODYL 10 MG
10 SUPPOSITORY, RECTAL RECTAL DAILY PRN
Status: DISCONTINUED | OUTPATIENT
Start: 2023-02-08 | End: 2023-02-11 | Stop reason: HOSPADM

## 2023-02-08 RX ORDER — AMIODARONE HYDROCHLORIDE 200 MG/1
200 TABLET ORAL EVERY 8 HOURS SCHEDULED
Status: DISCONTINUED | OUTPATIENT
Start: 2023-02-08 | End: 2023-02-11 | Stop reason: HOSPADM

## 2023-02-08 RX ORDER — ONDANSETRON 2 MG/ML
4 INJECTION INTRAMUSCULAR; INTRAVENOUS EVERY 6 HOURS PRN
Status: DISCONTINUED | OUTPATIENT
Start: 2023-02-08 | End: 2023-02-11 | Stop reason: HOSPADM

## 2023-02-08 RX ORDER — HEPARIN SODIUM 1000 [USP'U]/ML
INJECTION, SOLUTION INTRAVENOUS; SUBCUTANEOUS AS NEEDED
Status: DISCONTINUED | OUTPATIENT
Start: 2023-02-08 | End: 2023-02-08

## 2023-02-08 RX ORDER — ALBUMIN, HUMAN INJ 5% 5 %
12.5 SOLUTION INTRAVENOUS ONCE
Status: COMPLETED | OUTPATIENT
Start: 2023-02-08 | End: 2023-02-09

## 2023-02-08 RX ORDER — CEFAZOLIN SODIUM 2 G/50ML
2000 SOLUTION INTRAVENOUS EVERY 8 HOURS
Status: COMPLETED | OUTPATIENT
Start: 2023-02-08 | End: 2023-02-09

## 2023-02-08 RX ORDER — CHLORHEXIDINE GLUCONATE 0.12 MG/ML
15 RINSE ORAL ONCE
Status: COMPLETED | OUTPATIENT
Start: 2023-02-08 | End: 2023-02-08

## 2023-02-08 RX ORDER — DEXAMETHASONE SODIUM PHOSPHATE 10 MG/ML
INJECTION, SOLUTION INTRAMUSCULAR; INTRAVENOUS AS NEEDED
Status: DISCONTINUED | OUTPATIENT
Start: 2023-02-08 | End: 2023-02-08

## 2023-02-08 RX ORDER — PANTOPRAZOLE SODIUM 40 MG/1
40 TABLET, DELAYED RELEASE ORAL
Status: DISCONTINUED | OUTPATIENT
Start: 2023-02-08 | End: 2023-02-11 | Stop reason: HOSPADM

## 2023-02-08 RX ORDER — AMIODARONE HYDROCHLORIDE 50 MG/ML
INJECTION, SOLUTION INTRAVENOUS AS NEEDED
Status: DISCONTINUED | OUTPATIENT
Start: 2023-02-08 | End: 2023-02-08

## 2023-02-08 RX ORDER — SODIUM CHLORIDE, SODIUM GLUCONATE, SODIUM ACETATE, POTASSIUM CHLORIDE, MAGNESIUM CHLORIDE, SODIUM PHOSPHATE, DIBASIC, AND POTASSIUM PHOSPHATE .53; .5; .37; .037; .03; .012; .00082 G/100ML; G/100ML; G/100ML; G/100ML; G/100ML; G/100ML; G/100ML
INJECTION, SOLUTION INTRAVENOUS AS NEEDED
Status: DISCONTINUED | OUTPATIENT
Start: 2023-02-08 | End: 2023-02-08

## 2023-02-08 RX ORDER — SODIUM CHLORIDE 450 MG/100ML
20 INJECTION, SOLUTION INTRAVENOUS CONTINUOUS
Status: DISCONTINUED | OUTPATIENT
Start: 2023-02-08 | End: 2023-02-09

## 2023-02-08 RX ORDER — LIDOCAINE HYDROCHLORIDE 10 MG/ML
INJECTION, SOLUTION EPIDURAL; INFILTRATION; INTRACAUDAL; PERINEURAL
Status: COMPLETED | OUTPATIENT
Start: 2023-02-08 | End: 2023-02-08

## 2023-02-08 RX ORDER — PREGABALIN 75 MG/1
75 CAPSULE ORAL 2 TIMES DAILY
Status: DISCONTINUED | OUTPATIENT
Start: 2023-02-08 | End: 2023-02-11 | Stop reason: HOSPADM

## 2023-02-08 RX ORDER — LIDOCAINE HYDROCHLORIDE 20 MG/ML
INJECTION, SOLUTION EPIDURAL; INFILTRATION; INTRACAUDAL; PERINEURAL AS NEEDED
Status: DISCONTINUED | OUTPATIENT
Start: 2023-02-08 | End: 2023-02-08

## 2023-02-08 RX ORDER — ALBUMIN, HUMAN INJ 5% 5 %
SOLUTION INTRAVENOUS CONTINUOUS PRN
Status: DISCONTINUED | OUTPATIENT
Start: 2023-02-08 | End: 2023-02-08

## 2023-02-08 RX ORDER — MANNITOL 250 MG/ML
INJECTION, SOLUTION INTRAVENOUS AS NEEDED
Status: DISCONTINUED | OUTPATIENT
Start: 2023-02-08 | End: 2023-02-08

## 2023-02-08 RX ORDER — SODIUM CHLORIDE 9 MG/ML
INJECTION, SOLUTION INTRAVENOUS CONTINUOUS PRN
Status: DISCONTINUED | OUTPATIENT
Start: 2023-02-08 | End: 2023-02-08

## 2023-02-08 RX ORDER — POLYETHYLENE GLYCOL 3350 17 G/17G
17 POWDER, FOR SOLUTION ORAL DAILY
Status: DISCONTINUED | OUTPATIENT
Start: 2023-02-08 | End: 2023-02-11 | Stop reason: HOSPADM

## 2023-02-08 RX ORDER — ACETAMINOPHEN 325 MG/1
975 TABLET ORAL ONCE
Status: COMPLETED | OUTPATIENT
Start: 2023-02-08 | End: 2023-02-08

## 2023-02-08 RX ORDER — CHLORHEXIDINE GLUCONATE 0.12 MG/ML
15 RINSE ORAL 2 TIMES DAILY
Status: DISCONTINUED | OUTPATIENT
Start: 2023-02-08 | End: 2023-02-09

## 2023-02-08 RX ORDER — HYDROMORPHONE HCL/PF 1 MG/ML
0.5 SYRINGE (ML) INJECTION EVERY 2 HOUR PRN
Status: DISCONTINUED | OUTPATIENT
Start: 2023-02-08 | End: 2023-02-09

## 2023-02-08 RX ORDER — CEFAZOLIN SODIUM 2 G/50ML
2000 SOLUTION INTRAVENOUS ONCE
Status: COMPLETED | OUTPATIENT
Start: 2023-02-08 | End: 2023-02-08

## 2023-02-08 RX ORDER — ONDANSETRON 2 MG/ML
INJECTION INTRAMUSCULAR; INTRAVENOUS AS NEEDED
Status: DISCONTINUED | OUTPATIENT
Start: 2023-02-08 | End: 2023-02-08

## 2023-02-08 RX ORDER — ALBUMIN, HUMAN INJ 5% 5 %
SOLUTION INTRAVENOUS
Status: COMPLETED
Start: 2023-02-08 | End: 2023-02-08

## 2023-02-08 RX ORDER — MAGNESIUM SULFATE HEPTAHYDRATE 40 MG/ML
2 INJECTION, SOLUTION INTRAVENOUS ONCE
Status: COMPLETED | OUTPATIENT
Start: 2023-02-08 | End: 2023-02-08

## 2023-02-08 RX ORDER — ALBUMIN, HUMAN INJ 5% 5 %
12.5 SOLUTION INTRAVENOUS ONCE
Status: COMPLETED | OUTPATIENT
Start: 2023-02-08 | End: 2023-02-08

## 2023-02-08 RX ORDER — ACETAMINOPHEN 650 MG/1
650 SUPPOSITORY RECTAL EVERY 4 HOURS PRN
Status: DISCONTINUED | OUTPATIENT
Start: 2023-02-08 | End: 2023-02-09

## 2023-02-08 RX ORDER — VECURONIUM BROMIDE 1 MG/ML
INJECTION, POWDER, LYOPHILIZED, FOR SOLUTION INTRAVENOUS AS NEEDED
Status: DISCONTINUED | OUTPATIENT
Start: 2023-02-08 | End: 2023-02-08

## 2023-02-08 RX ORDER — ALBUMIN, HUMAN INJ 5% 5 %
25 SOLUTION INTRAVENOUS ONCE
Status: COMPLETED | OUTPATIENT
Start: 2023-02-08 | End: 2023-02-08

## 2023-02-08 RX ORDER — NOREPINEPHRINE BITARTRATE 1 MG/ML
INJECTION, SOLUTION INTRAVENOUS
Status: DISPENSED
Start: 2023-02-08 | End: 2023-02-09

## 2023-02-08 RX ORDER — CALCIUM CHLORIDE 100 MG/ML
INJECTION INTRAVENOUS; INTRAVENTRICULAR AS NEEDED
Status: DISCONTINUED | OUTPATIENT
Start: 2023-02-08 | End: 2023-02-08

## 2023-02-08 RX ORDER — FUROSEMIDE 10 MG/ML
40 INJECTION INTRAMUSCULAR; INTRAVENOUS EVERY 6 HOURS PRN
Status: DISCONTINUED | OUTPATIENT
Start: 2023-02-08 | End: 2023-02-09

## 2023-02-08 RX ORDER — VANCOMYCIN HYDROCHLORIDE 1 G/20ML
INJECTION, POWDER, LYOPHILIZED, FOR SOLUTION INTRAVENOUS AS NEEDED
Status: DISCONTINUED | OUTPATIENT
Start: 2023-02-08 | End: 2023-02-08 | Stop reason: HOSPADM

## 2023-02-08 RX ORDER — ACETAMINOPHEN 325 MG/1
975 TABLET ORAL EVERY 8 HOURS
Status: DISCONTINUED | OUTPATIENT
Start: 2023-02-08 | End: 2023-02-11 | Stop reason: HOSPADM

## 2023-02-08 RX ORDER — HEPARIN SODIUM 1000 [USP'U]/ML
400 INJECTION, SOLUTION INTRAVENOUS; SUBCUTANEOUS ONCE
Status: COMPLETED | OUTPATIENT
Start: 2023-02-08 | End: 2023-02-08

## 2023-02-08 RX ORDER — CARDIOPLEGIC SOLUTION NO.16 26/1052.8
PLASTIC BAG, PERFUSION (ML) PERFUSION AS NEEDED
Status: DISCONTINUED | OUTPATIENT
Start: 2023-02-08 | End: 2023-02-08

## 2023-02-08 RX ORDER — POTASSIUM CHLORIDE 14.9 MG/ML
20 INJECTION INTRAVENOUS
Status: DISCONTINUED | OUTPATIENT
Start: 2023-02-08 | End: 2023-02-09

## 2023-02-08 RX ORDER — HEPARIN SODIUM 1000 [USP'U]/ML
10000 INJECTION, SOLUTION INTRAVENOUS; SUBCUTANEOUS ONCE
Status: DISCONTINUED | OUTPATIENT
Start: 2023-02-08 | End: 2023-02-08

## 2023-02-08 RX ORDER — PROTAMINE SULFATE 10 MG/ML
INJECTION, SOLUTION INTRAVENOUS AS NEEDED
Status: DISCONTINUED | OUTPATIENT
Start: 2023-02-08 | End: 2023-02-08

## 2023-02-08 RX ORDER — CALCIUM CHLORIDE 100 MG/ML
1 INJECTION INTRAVENOUS; INTRAVENTRICULAR ONCE
Status: DISCONTINUED | OUTPATIENT
Start: 2023-02-08 | End: 2023-02-08

## 2023-02-08 RX ORDER — EPHEDRINE SULFATE 50 MG/ML
INJECTION INTRAVENOUS AS NEEDED
Status: DISCONTINUED | OUTPATIENT
Start: 2023-02-08 | End: 2023-02-08

## 2023-02-08 RX ORDER — LEVOTHYROXINE SODIUM 88 UG/1
88 TABLET ORAL
Status: DISCONTINUED | OUTPATIENT
Start: 2023-02-08 | End: 2023-02-11 | Stop reason: HOSPADM

## 2023-02-08 RX ORDER — FENTANYL CITRATE 50 UG/ML
INJECTION, SOLUTION INTRAMUSCULAR; INTRAVENOUS AS NEEDED
Status: DISCONTINUED | OUTPATIENT
Start: 2023-02-08 | End: 2023-02-08

## 2023-02-08 RX ORDER — LIDOCAINE HYDROCHLORIDE 10 MG/ML
INJECTION, SOLUTION EPIDURAL; INFILTRATION; INTRACAUDAL; PERINEURAL
Status: COMPLETED
Start: 2023-02-08 | End: 2023-02-08

## 2023-02-08 RX ORDER — ATORVASTATIN CALCIUM 80 MG/1
80 TABLET, FILM COATED ORAL
Status: DISCONTINUED | OUTPATIENT
Start: 2023-02-08 | End: 2023-02-11 | Stop reason: HOSPADM

## 2023-02-08 RX ORDER — OXYCODONE HYDROCHLORIDE 5 MG/1
5 TABLET ORAL EVERY 4 HOURS PRN
Status: DISCONTINUED | OUTPATIENT
Start: 2023-02-08 | End: 2023-02-11 | Stop reason: HOSPADM

## 2023-02-08 RX ORDER — ASPIRIN 325 MG
325 TABLET ORAL DAILY
Status: DISCONTINUED | OUTPATIENT
Start: 2023-02-08 | End: 2023-02-11 | Stop reason: HOSPADM

## 2023-02-08 RX ORDER — GABAPENTIN 300 MG/1
300 CAPSULE ORAL ONCE
Status: COMPLETED | OUTPATIENT
Start: 2023-02-08 | End: 2023-02-08

## 2023-02-08 RX ORDER — ANASTROZOLE 1 MG/1
1 TABLET ORAL DAILY
Status: DISCONTINUED | OUTPATIENT
Start: 2023-02-08 | End: 2023-02-11 | Stop reason: HOSPADM

## 2023-02-08 RX ORDER — FONDAPARINUX SODIUM 2.5 MG/.5ML
2.5 INJECTION SUBCUTANEOUS DAILY
Status: DISCONTINUED | OUTPATIENT
Start: 2023-02-09 | End: 2023-02-11 | Stop reason: HOSPADM

## 2023-02-08 RX ADMIN — ACETAMINOPHEN 975 MG: 325 TABLET ORAL at 06:47

## 2023-02-08 RX ADMIN — LIDOCAINE HYDROCHLORIDE 100 MG: 20 INJECTION INTRAVENOUS at 10:55

## 2023-02-08 RX ADMIN — ALBUMIN (HUMAN): 12.5 INJECTION, SOLUTION INTRAVENOUS at 11:19

## 2023-02-08 RX ADMIN — CHLORHEXIDINE GLUCONATE 15 ML: 1.2 SOLUTION ORAL at 06:50

## 2023-02-08 RX ADMIN — GABAPENTIN 300 MG: 300 CAPSULE ORAL at 06:46

## 2023-02-08 RX ADMIN — ACETAMINOPHEN 975 MG: 325 TABLET ORAL at 14:15

## 2023-02-08 RX ADMIN — MANNITOL 25 G: 12.5 INJECTION, SOLUTION INTRAVENOUS at 10:17

## 2023-02-08 RX ADMIN — OXYCODONE HYDROCHLORIDE 5 MG: 5 TABLET ORAL at 18:10

## 2023-02-08 RX ADMIN — CEFAZOLIN SODIUM 2000 MG: 2 SOLUTION INTRAVENOUS at 08:50

## 2023-02-08 RX ADMIN — MIDAZOLAM 2 MG: 1 INJECTION INTRAMUSCULAR; INTRAVENOUS at 10:19

## 2023-02-08 RX ADMIN — HEPARIN SODIUM 5000 UNITS: 1000 INJECTION INTRAVENOUS; SUBCUTANEOUS at 09:52

## 2023-02-08 RX ADMIN — VECURONIUM BROMIDE 3 MG: 10 INJECTION, POWDER, LYOPHILIZED, FOR SOLUTION INTRAVENOUS at 10:19

## 2023-02-08 RX ADMIN — HYDROMORPHONE HYDROCHLORIDE 0.5 MG: 1 INJECTION, SOLUTION INTRAMUSCULAR; INTRAVENOUS; SUBCUTANEOUS at 16:12

## 2023-02-08 RX ADMIN — ONDANSETRON 4 MG: 2 INJECTION INTRAMUSCULAR; INTRAVENOUS at 11:30

## 2023-02-08 RX ADMIN — SODIUM CHLORIDE, SODIUM LACTATE, POTASSIUM CHLORIDE, AND CALCIUM CHLORIDE 1000 ML: .6; .31; .03; .02 INJECTION, SOLUTION INTRAVENOUS at 13:23

## 2023-02-08 RX ADMIN — ALBUMIN (HUMAN) 25 G: 12.5 INJECTION, SOLUTION INTRAVENOUS at 17:12

## 2023-02-08 RX ADMIN — AMINOCAPROIC ACID 1 G/HR: 250 INJECTION, SOLUTION INTRAVENOUS at 09:11

## 2023-02-08 RX ADMIN — ALBUMIN (HUMAN) 12.5 G: 12.5 INJECTION, SOLUTION INTRAVENOUS at 22:16

## 2023-02-08 RX ADMIN — OXYCODONE HYDROCHLORIDE 5 MG: 5 TABLET ORAL at 14:15

## 2023-02-08 RX ADMIN — ACETAMINOPHEN 975 MG: 325 TABLET ORAL at 21:09

## 2023-02-08 RX ADMIN — EPHEDRINE SULFATE 5 MG: 50 INJECTION INTRAVENOUS at 08:43

## 2023-02-08 RX ADMIN — LIDOCAINE HYDROCHLORIDE 5 ML: 20 INJECTION, SOLUTION EPIDURAL; INFILTRATION; INTRACAUDAL; PERINEURAL at 08:26

## 2023-02-08 RX ADMIN — POTASSIUM CHLORIDE 20 MEQ: 14.9 INJECTION, SOLUTION INTRAVENOUS at 18:29

## 2023-02-08 RX ADMIN — VECURONIUM BROMIDE 10 MG: 10 INJECTION, POWDER, LYOPHILIZED, FOR SOLUTION INTRAVENOUS at 08:26

## 2023-02-08 RX ADMIN — AMIODARONE HYDROCHLORIDE 150 MG: 50 INJECTION, SOLUTION INTRAVENOUS at 10:41

## 2023-02-08 RX ADMIN — SODIUM CHLORIDE, SODIUM GLUCONATE, SODIUM ACETATE, POTASSIUM CHLORIDE, MAGNESIUM CHLORIDE, SODIUM PHOSPHATE, DIBASIC, AND POTASSIUM PHOSPHATE 800 ML: .53; .5; .37; .037; .03; .012; .00082 INJECTION, SOLUTION INTRAVENOUS at 08:16

## 2023-02-08 RX ADMIN — NOREPINEPHRINE BITARTRATE 2 MCG/MIN: 1 INJECTION, SOLUTION, CONCENTRATE INTRAVENOUS at 10:55

## 2023-02-08 RX ADMIN — Medication 50 MG: at 08:26

## 2023-02-08 RX ADMIN — Medication 1000 ML: at 10:20

## 2023-02-08 RX ADMIN — PROTAMINE SULFATE 250 MG: 10 INJECTION, SOLUTION INTRAVENOUS at 11:01

## 2023-02-08 RX ADMIN — SODIUM CHLORIDE 2 ML/HR: 9 INJECTION, SOLUTION INTRAVENOUS at 08:50

## 2023-02-08 RX ADMIN — NOREPINEPHRINE BITARTRATE 3 MCG/MIN: 1 INJECTION, SOLUTION, CONCENTRATE INTRAVENOUS at 15:37

## 2023-02-08 RX ADMIN — PHENYLEPHRINE HYDROCHLORIDE 2500 MCG: 50 INJECTION INTRAVENOUS at 09:04

## 2023-02-08 RX ADMIN — MUPIROCIN 1 APPLICATION: 20 OINTMENT TOPICAL at 06:50

## 2023-02-08 RX ADMIN — AMIODARONE HYDROCHLORIDE 200 MG: 200 TABLET ORAL at 21:09

## 2023-02-08 RX ADMIN — EPHEDRINE SULFATE 5 MG: 50 INJECTION INTRAVENOUS at 08:30

## 2023-02-08 RX ADMIN — MUPIROCIN 1 APPLICATION: 20 OINTMENT TOPICAL at 21:09

## 2023-02-08 RX ADMIN — SODIUM CHLORIDE 20 ML/HR: 0.45 INJECTION, SOLUTION INTRAVENOUS at 12:00

## 2023-02-08 RX ADMIN — HEPARIN SODIUM 1 EACH: 5000 INJECTION INTRAVENOUS; SUBCUTANEOUS at 11:35

## 2023-02-08 RX ADMIN — HEPARIN SODIUM 30200 UNITS: 1000 INJECTION INTRAVENOUS; SUBCUTANEOUS at 10:03

## 2023-02-08 RX ADMIN — LIDOCAINE HYDROCHLORIDE 0.5 ML: 10 INJECTION, SOLUTION EPIDURAL; INFILTRATION; INTRACAUDAL; PERINEURAL at 08:22

## 2023-02-08 RX ADMIN — SODIUM CHLORIDE 4 UNITS/HR: 9 INJECTION, SOLUTION INTRAVENOUS at 15:06

## 2023-02-08 RX ADMIN — Medication 12.5 MG: at 06:46

## 2023-02-08 RX ADMIN — CALCIUM CHLORIDE 1 G: 100 INJECTION INTRAVENOUS; INTRAVENTRICULAR at 10:57

## 2023-02-08 RX ADMIN — SODIUM CHLORIDE: 0.9 INJECTION, SOLUTION INTRAVENOUS at 08:50

## 2023-02-08 RX ADMIN — ALBUMIN, HUMAN INJ 5% 12.5 G: 5 SOLUTION at 19:08

## 2023-02-08 RX ADMIN — SODIUM CHLORIDE: 9 INJECTION, SOLUTION INTRAVENOUS at 08:11

## 2023-02-08 RX ADMIN — SODIUM BICARBONATE 50 MEQ: 84 INJECTION, SOLUTION INTRAVENOUS at 08:16

## 2023-02-08 RX ADMIN — HEPARIN SODIUM 5000 UNITS: 1000 INJECTION INTRAVENOUS; SUBCUTANEOUS at 10:17

## 2023-02-08 RX ADMIN — MAGNESIUM SULFATE HEPTAHYDRATE 2 G: 40 INJECTION, SOLUTION INTRAVENOUS at 12:29

## 2023-02-08 RX ADMIN — MIDAZOLAM 4 MG: 1 INJECTION INTRAMUSCULAR; INTRAVENOUS at 08:14

## 2023-02-08 RX ADMIN — AMINOCAPROIC ACID 5 G: 250 INJECTION, SOLUTION INTRAVENOUS at 08:50

## 2023-02-08 RX ADMIN — DEXAMETHASONE SODIUM PHOSPHATE 5 MG: 10 INJECTION, SOLUTION INTRAMUSCULAR; INTRAVENOUS at 09:52

## 2023-02-08 RX ADMIN — CEFAZOLIN SODIUM 2000 MG: 2 SOLUTION INTRAVENOUS at 18:09

## 2023-02-08 RX ADMIN — ALBUMIN (HUMAN) 12.5 G: 12.5 INJECTION, SOLUTION INTRAVENOUS at 19:08

## 2023-02-08 RX ADMIN — SODIUM CHLORIDE, SODIUM GLUCONATE, SODIUM ACETATE, POTASSIUM CHLORIDE, MAGNESIUM CHLORIDE, SODIUM PHOSPHATE, DIBASIC, AND POTASSIUM PHOSPHATE 500 ML: .53; .5; .37; .037; .03; .012; .00082 INJECTION, SOLUTION INTRAVENOUS at 11:00

## 2023-02-08 RX ADMIN — CEFAZOLIN SODIUM 2000 MG: 2 SOLUTION INTRAVENOUS at 10:56

## 2023-02-08 RX ADMIN — Medication 50 MG: at 10:55

## 2023-02-08 RX ADMIN — AMIODARONE HYDROCHLORIDE 200 MG: 200 TABLET ORAL at 14:15

## 2023-02-08 RX ADMIN — PREGABALIN 75 MG: 75 CAPSULE ORAL at 18:10

## 2023-02-08 RX ADMIN — MIDAZOLAM 4 MG: 1 INJECTION INTRAMUSCULAR; INTRAVENOUS at 10:55

## 2023-02-08 RX ADMIN — FENTANYL CITRATE 1000 MCG: 0.05 INJECTION, SOLUTION INTRAMUSCULAR; INTRAVENOUS at 08:26

## 2023-02-08 RX ADMIN — MAGNESIUM SULFATE HEPTAHYDRATE 2 G: 500 INJECTION, SOLUTION INTRAMUSCULAR; INTRAVENOUS at 10:45

## 2023-02-08 NOTE — ANESTHESIA PROCEDURE NOTES
Procedure Performed: RADHA Anesthesia  Start Time:  2/8/2023 8:49 AM        Preanesthesia Checklist    Patient identified, IV assessed, risks and benefits discussed, monitors and equipment assessed, procedure being performed at surgeon's request and anesthesia consent obtained  Procedure    Diagnostic Indications for RADHA:  assessment of ascending aorta, assessment of surgical repair and hemodynamic monitoring  Type of RADHA: complete RADHA with interpretation  Images Saved: ultrasound permanent image saved  Physician Requesting Echo: Graciela Tejeda MD   Location performed: OR  Intubated  Heart visualized  Insertion of RADHA Probe:  Easy  Probe Type:  Epiaortic and multiplane  Modalities:  Color flow mapping, 3D, pulse wave Doppler and continuous wave Doppler  Echocardiographic and Doppler Measurements    PREPROCEDURE    LEFT VENTRICLE:  Systolic Function: normal  Ejection Fraction: 55-60%  Cavity size: normal    Regional Wall Motion Abnormalities: none  RIGHT VENTRICLE:  Systolic Function: normal   Cavity size normal  Hypertrophy (LVH) present  AORTIC VALVE:  Leaflets: normal and trileaflet  Leaflet motions normal and normal  Stenosis: none  Regurgitation: trace  MITRAL VALVE:  Leaflets: calcified, severe MAC and normal  Leaflet Motions: normal  Regurgitation: mild  Stenosis: none  Mean Gradient: 1 mmHg  TRICUSPID VALVE:  Leaflets: normal  Leaflet Motions: normal  Stenosis: none  Regurgitation: mild  PULMONIC VALVE:  Leaflets: normal  Regurgitation: trace  Stenosis: none  ASCENDING AORTA:  Size:  normal   Dissection not present  AORTIC ARCH:  Size:  normal   dissection not present  Grade 2: severe intimal thickening without protruding atheroma  DESCENDING AORTA:  Size: normal   Dissection not present  Grade 3: atheroma protruding < 0 5 cm into lumen  RIGHT ATRIUM:  Size:  normal  No spontaneous echo contrast     LEFT ATRIUM:  Size: dilated   No spontaneous echo contrast     LEFT ATRIAL APPENDAGE:  Size: normal  No spontaneous echo contrast         ATRIAL SEPTUM:  Intra-atrial septal morphology: normal           VENTRICULAR SEPTUM:  Intra-ventricular septum morphology: normal          EPIAORTIC:  Plaque Thickness: 0-5 mm  OTHER FINDINGS:  Pericardium:  normal  Pleural Effusion:  none  POSTPROCEDURE    LEFT VENTRICLE: Unchanged   RIGHT VENTRICLE: Unchanged   AORTIC VALVE: Unchanged   MITRAL VALVE: Unchanged   TRICUSPID VALVE: Unchanged   PULMONIC VALVE: Unchanged             ATRIA: Unchanged   AORTA: Unchanged   REMOVAL:  Probe Removal: atraumatic

## 2023-02-08 NOTE — ANESTHESIA PROCEDURE NOTES
Arterial Line Insertion  Performed by: Joelle Lopes MD  Authorized by: Joelle Lopes MD   Consent: Verbal consent obtained  Written consent obtained  Risks and benefits: risks, benefits and alternatives were discussed  Consent given by: patient  Patient understanding: patient states understanding of the procedure being performed  Patient consent: the patient's understanding of the procedure matches consent given  Patient identity confirmed: verbally with patient and arm band  Time out: Immediately prior to procedure a "time out" was called to verify the correct patient, procedure, equipment, support staff and site/side marked as required  Preparation: Patient was prepped and draped in the usual sterile fashion  Indications: hemodynamic monitoring  Orientation:  Left  Location: radial arterylidocaine (PF) (XYLOCAINE-MPF) 1 % - Infiltration   0 5 mL - 2/8/2023 8:22:00 AM  Sedation:  Patient sedated: yes  Sedation type: anxiolysis  Sedatives: midazolam and see MAR for details  Vitals: Vital signs were monitored during sedation      Procedure Details:  Needle gauge: 20  Seldinger technique: Seldinger technique used  Number of attempts: 1    Post-procedure:  Post-procedure: dressing applied  Waveform: good waveform  Post-procedure CNS: unchanged  Patient tolerance: Patient tolerated the procedure well with no immediate complications and patient tolerated the procedure well with no immediate complications

## 2023-02-08 NOTE — RESPIRATORY THERAPY NOTE
RT Ventilator Management Note  Dariusz Garrett 69 y o  female MRN: [id number]  Unit/Bed#: [facility] 414-01 Encounter: [id number]       02/08/23 1208   Respiratory Assessment   Assessment Type Assess only   General Appearance Sedated;Unresponsive   Respiratory Pattern Assisted   Chest Assessment Chest expansion symmetrical   Bilateral Breath Sounds Clear   Location Specific No   Cough None   Resp Comments S/p CABG pt, 70 y/o, CABG x 2, currently sedated and set up on her initial vent settings per OR MD   Endotube 7 5, 21 at the lip, with cuff pressure of 29  BS's equal and bilateral and no cuff leak heard when auscultated  Vent alarms on and functional   Will continue to monitor  O2 Device C3 Morales Vent   Vent Information   Vent ID [id number]   Vent type Morales C3   Morales C3/G5 Vent Mode (S)CMV   $ Vent Charge-INITIAL Yes   Ventilator Start Yes   $ Pulse Oximetry Spot Check Charge Completed   SpO2 99 %   (S)CMV Settings   Resp Rate (BPM) 10 BPM   VT (mL) 480 mL   FIO2 (%) 50 %   PEEP (cmH2O) 6 cmH2O   I:E Ratio 1:6 5   Insp Time (%) 0 8 %   Flow Trigger (LPM) 3   Humidification Heat and moisture exchanger   Heater Temperature (Set)   (HME)   (S)CMV Actuals   Resp Rate (BPM) 10 BPM   VT (mL) 411   MV 4 4   MAP (cmH2O) 8 cmH2O   Peak Pressure (cmH2O) 23 cmH2O   I:E Ratio (Obs) 1:6 5   Insp Resistance 14   Heater Temperature (Obs)   (HME)   Static Compliance (mL/cmH20) 44 mL/cmH2O   Plateau Pressure (cm H2O) 20 1 cm H2O   (S)CMV Alarms   High Peak Pressure (cmH2O) 36   Low Pressure (cmH2O) 5 cm H2O   High Resp Rate (BPM) 36 BPM   Low Resp Rate (BPM) 4 BPM   High MV (L/min) 7 5 L/min   Low MV (L/min) 2 L/min   High VT (mL) 700 mL   Low VT (mL) 170 mL   Apnea Time (s) 20 S   Maintenance   Alarm (pink) cable attached No   Resuscitation bag with peep valve at bedside Yes   Water bag changed No   Circuit changed No   Daily Screen   Patient safety screen outcome: Failed   Not Ready for Weaning due to:  Underline problem not resolved   ETT  Hi-Lo; Cuffed;Oral 7 5 mm   Placement Date/Time: 02/08/23 0828   Mask Ventilation: Ventilated by mask (1)  Preoxygenated: Yes  Technique: Direct laryngoscopy  Type: Hi-Lo; Cuffed;Oral  Tube Size: 7 5 mm  Laryngoscope: Mac  Blade Size: 3  Location: Oral  Grade View: Full view of t    Secured at (cm) 21   Measured from Lips   Secured Location Right   Secured by Pink tape   Site Condition Dry   Cuff Pressure (cm H2O) 29 cm H2O   HI-LO Suction  Intermittent suction   HI-LO Secretions Scant   HI-LO Intervention Patent         Daily Screen         2/8/2023  1208             Patient safety screen outcome[de-identified] Failed    Not Ready for Weaning due to[de-identified] Underline problem not resolved              Physical Exam:   Assessment Type: Assess only  General Appearance: Sedated, Unresponsive  Respiratory Pattern: Assisted  Chest Assessment: Chest expansion symmetrical  Bilateral Breath Sounds: Clear  Location Specific: No  Cough: None  O2 Device: C3 Morales Vent      Resp Comments: S/p CABG pt, 68 y/o, CABG x 2, currently sedated and set up on her initial vent settings per OR MD   Endotube 7 5, 21 at the lip, with cuff pressure of 29  BS's equal and bilateral and no cuff leak heard when auscultated  Vent alarms on and functional   Will continue to monitor

## 2023-02-08 NOTE — ANESTHESIA PROCEDURE NOTES
Introducer/Pasquale-Eliud  Performed by: Perico Argueta MD  Authorized by: Perico Argueta MD     Date/Time: 2/8/2023 8:46 AM  Consent: Verbal consent obtained  Written consent obtained  Risks and benefits: risks, benefits and alternatives were discussed  Consent given by: patient  Patient understanding: patient states understanding of the procedure being performed  Patient consent: the patient's understanding of the procedure matches consent given  Required items: required blood products, implants, devices, and special equipment available  Patient identity confirmed: arm band, provided demographic data and hospital-assigned identification number  Time out: Immediately prior to procedure a "time out" was called to verify the correct patient, procedure, equipment, support staff and site/side marked as required    Indications: vascular access and central pressure monitoring  Location details: right internal jugular  Catheter size: 9 Fr  Patient position: Trendelenburg  Assessment: blood return through all ports and free fluid flow  Preparation: skin prepped with 2% chlorhexidine  Skin prep agent dried: skin prep agent completely dried prior to procedure  Sterile barriers: all five maximum sterile barriers used - cap, mask, sterile gown, sterile gloves, and large sterile sheet  Hand hygiene: hand hygiene performed prior to central venous catheter insertion  Ultrasound guidance: yes  ultrasound permanent image saved  Number of attempts: 1  Successful placement: yes  Post-procedure: line sutured and dressing applied  Patient tolerance: Patient tolerated the procedure well with no immediate complications and patient tolerated the procedure well with no immediate complications

## 2023-02-08 NOTE — ANESTHESIA POSTPROCEDURE EVALUATION
Post-Op Assessment Note    CV Status:  Stable    Pain management: adequate     Mental Status:  Sleepy and somnolent   Hydration Status:  Stable   PONV Controlled:  None   Airway Patency:  Patent  Airway: intubated      Post Op Vitals Reviewed: Yes      Staff: Anesthesiologist         No notable events documented      BP   108/51   Temp  97 2F   Pulse  80 a paced   Resp   10/min   SpO2   99%

## 2023-02-08 NOTE — ANESTHESIA PROCEDURE NOTES
Central Line Insertion  Performed by: Vazquez Perry MD  Authorized by: Vazquez Perry MD     Date/Time: 2/8/2023 8:46 AM  Catheter Type:  triple lumen  Consent: Verbal consent obtained  Written consent obtained  Risks and benefits: risks, benefits and alternatives were discussed  Consent given by: patient  Patient understanding: patient states understanding of the procedure being performed  Patient consent: the patient's understanding of the procedure matches consent given  Required items: required blood products, implants, devices, and special equipment available  Patient identity confirmed: arm band, provided demographic data and hospital-assigned identification number  Time out: Immediately prior to procedure a "time out" was called to verify the correct patient, procedure, equipment, support staff and site/side marked as required    Indications: vascular access and central pressure monitoring  Catheter size: 7 Fr  Patient position: Trendelenburg  Assessment: blood return through all ports and free fluid flow  Preparation: skin prepped with 2% chlorhexidine  Skin prep agent dried: skin prep agent completely dried prior to procedure  Sterile barriers: all five maximum sterile barriers used - cap, mask, sterile gown, sterile gloves, and large sterile sheet  Hand hygiene: hand hygiene performed prior to central venous catheter insertion  sterile gel and probe cover used in ultrasound-guided central venous catheter insertionultrasound permanent image saved  Vessel of Catheter Tip End: svc  Number of attempts: 1  Successful placement: yes  Post-procedure: line sutured, dressing applied and chlorhexidine patch applied  Patient tolerance: Patient tolerated the procedure well with no immediate complications and patient tolerated the procedure well with no immediate complications

## 2023-02-08 NOTE — INTERVAL H&P NOTE
H&P reviewed  After examining the patient I find no changes in the patients condition since the H&P had been written  Vitals:    02/08/23 0624   BP: 167/66   Pulse: 66   Resp: 16   Temp: (!) 97 1 °F (36 2 °C)   SpO2: 98%     Anticipated Length of Stay:  Patient will be admitted on an Inpatient basis with an anticipated length of stay of  greater than 2 midnights  Justification for Hospital Stay:  Post surgical recovery following open heart surgery

## 2023-02-08 NOTE — PROGRESS NOTES
Progress Note - Critical Care   Doug Bo 69 y o  female MRN: [id number]  Unit/Bed#: [facility] 414-01 Encounter: [id number]      Attending Physician: Mckenna Urbina MD    24 Hour Events/HPI: POD # 1 s/p CABG x 2 LIMA to LAD and SVG to OM1  Received 2L LR and 500 albumin post-op for low CI, hypotensive, and low filling pressures  Post-op started on Epi and Levo which were weaned off overnight, patient now on Cardene  Overnight received an additional 750mL 5% albumin for low CI/high SVR and low filling pressures  ROS: Review of Systems   Cardiovascular: Positive for chest pain  All other systems reviewed and are negative     ------------------------------------------------------------------------------------------------------------  Impressions:  1  CAD s/p CABG x 2 and prior PCI to LAD 12/2015  2  ABLA  3  Thrombocytopenia   4  Acute post-op pulmonary insuffiencey  5  HTN  6  HLD  7  NIDDM2  8  Morbid obesity    Plan:    Neuro:   · Pain controlled with: ATC Tylenol, prn oxy 2 5/5, d/c PRN dilaudid  · Regulate sleep/wake cycle  · Start PRN restoril  · Delirium precautions  · CAM-ICU daily  · Trend neuro exam    CV:   · Cardiac infusions: Cardene, 3 mg/hour  · Wean Cardene as able  · MAP goal > 65 and CI >2 2  · Discontinue PA catheter  · Discontinue arterial line  · Rhythm: NSR  · Follow rhythm on telemetry  · Lopressor 12 5 mg PO BID  · Maintain epicardial pacing wires for post-op day #1  ·  mg QD  · Statin: Lipitor 80mg qHS  · Amiodarone 200 mg PO q8 hours      Lung:   · Acute post-op pulmonary insufficiency; Requiring 2 Liters via nasal cannula, secondary to atelectasis and pleural effusion  Continue incentive spirometry/coughing/deep breathing exercises    Wean supplemental oxygen as tolerated for saturation > 90%  · Chest tube output remains persistently high; Continue chest tubes to suction today    GI:   · Continue PPI for stress ulcer prophylaxis  · Continue bowel regimen  · Trend abdominal exam and bowel function    FEN:   · Diuretic plan: PRN Lasix  · PRN K repletion  · Nutrition/diet plan: Cardiac diet with 1 8L FR   · Replenish electrolytes with goals: K >4 0, Mag >2 0, and Phos >3 0    :   · Indwelling Coker present: yes   · Keep Coker  · Trend UOP and BUN/creat  · Strict I and O    ID:   · Trend temps and WBC count  · Maintain normothermia        Heme:   · Trend hgb and plts    Endo:   · Glycemic control plan: History of diabetes: Continue insulin infusion today  Consult Endocrinology for management    Results from last 6 Months   Lab Units 10/18/22  1053   HEMOGLOBIN A1C % 5 8*     MSK/Skin:  · Mobility goal: OOB to chair, ambulate as tolerated   · PT consult: yes  · OT consult: yes  · Frequent turning and pressure off-loading  · Local wound care as needed    Disposition:  Transfer to telemetry  ---------------------------------------------------------------------------------------------------------------------------------------------------------------------  Allergies:    Allergies   Allergen Reactions   • Duloxetine Other (See Comments)     Extreme somnolence/lethargy   • Sulfa Antibiotics Rash       Medications:     VTE Pharmacologic Prophylaxis: Fondaparinux (Arixtra)  VTE Mechanical Prophylaxis: sequential compression device    Scheduled Meds:   acetaminophen, 975 mg, Q8H  amiodarone, 200 mg, Q8H VICTOR MANUEL  anastrozole, 1 mg, Daily  aspirin, 325 mg, Daily  atorvastatin, 80 mg, Daily With Dinner  cefazolin, 2,000 mg, Q8H  chlorhexidine, 15 mL, BID  fondaparinux, 2 5 mg, Daily  levothyroxine, 88 mcg, Early Morning  mupirocin, 1 application, A60P VICTOR MANUEL  pantoprazole, 40 mg, Early Morning  polyethylene glycol, 17 g, Daily  pregabalin, 75 mg, BID       Continuous Infusions:  epinephrine, 1-10 mcg/min, Last Rate: Stopped (02/08/23 2109)  insulin regular (HumuLIN R,NovoLIN R) infusion, 0 3-21 Units/hr, Last Rate: Stopped (02/09/23 0214)  niCARdipine, 2 5-15 mg/hr, Last Rate: 3 mg/hr (02/09/23 9324)  norepinephrine, 1-30 mcg/min, Last Rate: Stopped (23 0100)  sodium chloride, 20 mL/hr, Last Rate: 20 mL/hr (23 1200)      PRN Meds:  acetaminophen, 650 mg, Q4H PRN  bisacodyl, 10 mg, Daily PRN  furosemide, 40 mg, Q6H PRN  HYDROmorphone, 0 5 mg, Q2H PRN  lidocaine (cardiac), 100 mg, Q30 Min PRN  ondansetron, 4 mg, Q6H PRN  oxyCODONE, 2 5 mg, Q4H PRN  oxyCODONE, 5 mg, Q4H PRN  potassium chloride, 20 mEq, Once PRN  potassium chloride, 20 mEq, Q1H PRN  potassium chloride, 20 mEq, Q30 Min PRN      ---------------------------------------------------------------------------------------------------------------------------------------------------------------------  Vitals:   Vitals:    23 0400 23 0500 23 0600 23 0700   BP: 115/56 119/60 110/56 114/56   Pulse: 75 73 74 68   Resp: (!) 11 12 15 16   Temp: 98 2 °F (36 8 °C) 98 4 °F (36 9 °C) 98 6 °F (37 °C)    TempSrc: Core      SpO2: 98% 98% 97% 93%   Weight:   88 3 kg (194 lb 10 7 oz)      Arterial Line:  Arterial Line BP: 106/40  Arterial Line MAP (mmHg): (!) 63 mmHg    Tele Rhythm: NSR (This was personally reviewed by myself )    Respiratory:  SpO2: SpO2: 93 %  SpO2 Device: O2 Device: Nasal cannula  Nasal Cannula O2 Flow Rate (L/min): 2 L/min    Ventilator:  Respiratory    Lab Data (Last 4 hours)    None         O2/Vent Data (Last 4 hours)    None              Temperature: Temp (24hrs), Av 3 °F (36 3 °C), Min:95 9 °F (35 5 °C), Max:98 6 °F (37 °C)  Current: Temperature: 98 6 °F (37 °C)    Weights:   Weight (last 2 days)     Date/Time Weight    23 0600 88 3 (194 67)    23 0801 87 (191 8)    23 0633 4 082 (9)        Body mass index is 36 78 kg/m²      Hemodynamic Monitoring:  PAP: PAP: 27/10  CVP: CVP (mean): 9 mmHg  CO: CO (L/min): 3 7 L/min  CI: CI (L/min/m2): 1 9 L/min/m2  SVR: SVR (dyne*sec)/cm5: 1209 (dyne*sec)/cm5    Intake and Outputs:    Intake/Output Summary (Last 24 hours) at 2023 0710  Last data filed at 2/9/2023 0600  Gross per 24 hour   Intake 5638 83 ml   Output 2330 ml   Net 3308 83 ml     I/O last 24 hours: In: 5638 8 [I V :1168 8; Blood:350; IV Piggyback:3670]  Out: 9642 [Urine:1530; Blood:450; Chest Tube:350]    BM: PTA    Chest tube Output:  Mediastinal tubes: 40 mL/8 hours  40 mL/24 hours   Pleural tubes: 70 mL/8 hours  290 mL/24 hours     Labs:  Results from last 7 days   Lab Units 02/09/23 0354 02/08/23 2013 02/08/23  1218 02/08/23  1214 02/08/23  1042 02/08/23  1040   WBC Thousand/uL 9 68  --   --   --   --   --    HEMOGLOBIN g/dL 10 1* 11 1*  --  11 7  --   --    I STAT HEMOGLOBIN g/dl  --   --  10 2*  --    < >  --    HEMATOCRIT % 30 1* 32 2*  --  33 6*  --   --    HEMATOCRIT, ISTAT %  --   --  30*  --    < >  --    PLATELETS Thousands/uL 102*  --   --  108*  --  130*    < > = values in this interval not displayed  Results from last 7 days   Lab Units 02/09/23 0354 02/08/23 2013 02/08/23  1555 02/08/23  1218 02/08/23  1214 02/08/23  1214 02/08/23  1120 02/08/23  1052   SODIUM mmol/L 138  --   --   --   --  141  --   --    POTASSIUM mmol/L 4 8 4 3 3 1*  --    < > 4 2  --   --    CHLORIDE mmol/L 113*  --   --   --   --  112*  --   --    CO2 mmol/L 23  --   --   --   --  22  --   --    CO2, I-STAT mmol/L  --   --   --  23  --   --  25 29   BUN mg/dL 12  --   --   --   --  9  --   --    CREATININE mg/dL 0 53*  --   --   --   --  0 63  --   --    CALCIUM mg/dL 7 6*  --   --   --   --  8 3  --   --    GLUCOSE, ISTAT mg/dl  --   --   --  128  --   --  134 138    < > = values in this interval not displayed       Baseline Creat: 0 6    SVO2: 70%    Micro:   Blood Culture: No results found for: BLOODCX  Urine Culture:   Lab Results   Component Value Date    URINECX >100,000 cfu/ml Escherichia coli (A) 07/27/2022    URINECX >100,000 cfu/ml Escherichia coli (A) 09/01/2020     Sputum Culture: No components found for: SPUTUMCX  Wound Culure: No results found for: [facility]     Diagnostic Studies:  02/09/23 CXR: lines and tubes stable post-op, no pneumo  Large gastric bubble under left hemidiaphragm  Small R pleural effusion, bibasilar atelectasis  This was personally reviewed by myself in PACS   02/09/23 EKG: NSR-rate 74  This was personally reviewed by myself  Nutrition:        Diet Orders   (From admission, onward)             Start     Ordered    02/09/23 0000  Diet Cardiovascular; Cardiac; Fluid Restriction 1800 ML, Consistent Carbohydrate Diet Level 2 (5 carb servings/75 grams CHO/meal)  Diet effective 0500        References:    Nutrtion Support Algorithm Enteral vs  Parenteral   Question Answer Comment   Diet Type Cardiovascular    Cardiac Cardiac    Other Restriction(s): Fluid Restriction 1800 ML    Other Restriction(s): Consistent Carbohydrate Diet Level 2 (5 carb servings/75 grams CHO/meal)    RD to adjust diet per protocol? No        02/08/23 1203              Physical Exam  Constitutional:       General: She is awake  Appearance: Normal appearance  She is well-developed  HENT:      Head: Normocephalic and atraumatic  Eyes:      General: Lids are normal       Extraocular Movements: Extraocular movements intact  Conjunctiva/sclera: Conjunctivae normal    Neck:      Trachea: Trachea normal    Cardiovascular:      Rate and Rhythm: Normal rate and regular rhythm  Pulses:           Radial pulses are 2+ on the right side and 2+ on the left side  Dorsalis pedis pulses are 2+ on the right side and 2+ on the left side  Heart sounds: S1 normal and S2 normal      Friction rub present  Pulmonary:      Effort: Pulmonary effort is normal       Breath sounds: Normal breath sounds  Chest:      Comments: Mediastinal incision C/D/I with acticoat   Abdominal:      General: Bowel sounds are normal       Palpations: Abdomen is soft  Musculoskeletal:      Cervical back: Full passive range of motion without pain, normal range of motion and neck supple        Comments: Normal ROM    Skin:     General: Skin is warm and dry  Capillary Refill: Capillary refill takes less than 2 seconds  Coloration: Skin is pale  Neurological:      General: No focal deficit present  Mental Status: She is alert and oriented to person, place, and time  GCS: GCS eye subscore is 4  GCS verbal subscore is 5  GCS motor subscore is 6  Psychiatric:         Attention and Perception: Attention and perception normal          Mood and Affect: Mood and affect normal          Speech: Speech normal          Behavior: Behavior normal  Behavior is cooperative  Thought Content: Thought content normal          Cognition and Memory: Cognition and memory normal          Judgment: Judgment normal        Invasive lines and devices: Invasive Devices     Central Venous Catheter Line  Duration           CVC Central Lines 02/08/23 Triple <1 day    Introducer 02/08/23 <1 day          Peripheral Intravenous Line  Duration           Peripheral IV 12/14/22 Right;Dorsal (posterior) Forearm 56 days    Peripheral IV 02/08/23 Right Wrist <1 day          Arterial Line  Duration           Arterial Line 02/08/23 Left Radial <1 day          Line  Duration           Pacer Wires <1 day    Pacer Wires <1 day          Drain  Duration           Closed/Suction Drain Inferior; Left Breast Bulb 19 Fr  575 days    Closed/Suction Drain Left;Superior Breast Bulb 19 Fr  575 days    Chest Tube 1 Left Mediastinal 32 Fr  <1 day    Chest Tube 2 Left Pleural 32 Fr  <1 day    Chest Tube 3 Mediastinal 32 Fr  <1 day    Urethral Catheter Non-latex; Temperature probe 16 Fr  <1 day              ---------------------------------------------------------------------------------------------------------------------------------------------------------------------  Code Status: Level 1 - Full Code    Care Time Delivered:   No Critical Care time spent     Collaborative bedside rounds performed with cardiac surgery attending, critical care attending and bedside RN      SIGNATURE: BERNADETTE Gutierres  DATE: February 9, 2023  TIME: 7:10 AM

## 2023-02-08 NOTE — RESPIRATORY THERAPY NOTE
Extubation Note         02/08/23 1604   Respiratory Assessment   Resp Comments Extubated uneventfully  Cuff leak test performed pre procedure  Pt immediately able to phonate and clearly report about her whereabouts and name  Onto a 6 lpm NC   Pt will need IS brought to the room  No other changes to suggest at this time     O2 Device NC  6 lpm

## 2023-02-08 NOTE — CONSULTS
[address] [place] [address] 69 y o  female MRN: [id number]  Unit/Bed#: [facility] 414-01 Encounter: [id number]    Inpatient consult to Bina Louis performed by: BERNADETTE Maynard  Consult ordered by: Clayton Maza PA-C          Physician Requesting Consult: Marbin Pruitt MD    Reason for Consult / Principal Problem: S/P CABG x 2    HPI: Monalisa Scruggs is a 69year old female who presented to [facility] ED on 12/6 with c/o SOB/HAMM and exertional chest pain  Troponins were negative, however EKG showed T wave inversions  She underwent cardiac cath which demonstrated MVCAD including 70% LM stenosis  Patient was scheduled for CABG on 12/19 however on 12/14 she presented to the ED again d/t SOB and was found to have COVID  She was re-evaluated by CT surgery 6 weeks post infection and had fully recovered and was therefore recommended to proceed with surgery  She presents to the ICU today s/p CABG x 2 LIMA to LAD and SVG to OM1 Post procedure ARDHA demonstrated EF 60%  She received 1 unit of PRBC intra-op  She arrives on no vasoactive or inotropic medications  Cardiopulmonary bypass time: 43 min  Crossclamp time: 35 min     PMH: CAD (s/p PCI to LAD in December 2015), COVID-19 (Dec 2022), HTN, HLD, NIDDM2, morbid obesity (BMI 36), stage 1 left breast cancer s/p left mastectomy (no chemo or radiation required), stage III ovarian cancer (s/p oophorectomy and chemo in 1997), thyroid cancer s/p thyroidectomy, neuropathy and osteopenia  History obtained from chart review due to patient being intubated and sedated  ---------------------------------------------------------------------------------------------------------------------------------------------------------------------  Impressions:  1  CAD s/p CABG x 2 and prior PCI to LAD 12/2015  2  ABLA  3  Acute post-op pulmonary insuffiencey  4  HTN  5  HLD  6  NIDDM2  7  Morbid obesity      Plan:    Neuro: D/C continuous sedation   ATC Tylenol, prn oxy 2 5/5 for pain, dilaudid for breakthrough  Trend neuro exam   Delirium precautions  CV: MAP goal >65  SBP goal <140  CI>2 2  Post-op medications: None  Volume resuscitation as needed  Monitor rhythm on telemetry  Epicardial pacing wires 1A 1V  Intra-op RADHA LVEF 60%  Lung: Check STAT post-op ABG and CXR  Wean vent with spontaneous breathing trial with goal to extubate  GI: GI prophylaxis with PPI  Bowel regimen  Zofran PRN for nausea  FEN: NPO  Replenish K >4 0, mag >2 0 and calcium >7 0  : Check STAT post-op BMP  Coker in place  Monitor UOP with goal >0 5cc/kg/hour  Lasix versus volume resuscitate as needed depending on hemodynamics and volume status  ID: Prophylactic post-op abx  Maintain normothermia  Trend temps  Heme: Check STAT post-op H/H and platelets  Monitor incision site, invasive lines, and chest tube outputs for bleeding  Send coag panel if needed  Endo: Insulin gtt for blood sugar control       Results from last 6 Months   Lab Units 10/18/22  1053   HEMOGLOBIN A1C % 5 8*     Disposition: ICU Care   ---------------------------------------------------------------------------------------------------------------------------------------------------------------------  Historical Information   Past Medical History:   Diagnosis Date   • Abdominal pain     LLQ on occasion   • Angina pectoris ([facility] )    • Arthritis    • Breast cancer ([facility] )    • Cancer ([facility] )     breast left   • Colon polyp    • Constipation     at times   • Coronary artery disease    • Diabetes mellitus ([facility] )    • Diarrhea     at times   • Disease of thyroid gland    • Hemorrhoids    • Hypercholesterolemia    • Hypertension    • Neuropathy     both legs and feet   • Obesity    • Osteoporosis    • Otitis media    • Ovarian ca [facility]) 1997   • 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[facility] )    • Shingles    • 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   • Thyroid cancer ([facility] )    • Urinary tract infection      Past Surgical History:   Procedure Laterality Date   • ANGIOPLASTY      stent x 1   • APPENDECTOMY     • BACK SURGERY     • BAND HEMORRHOIDECTOMY     • BRAIN SURGERY  1993    meningioma   • BREAST BIOPSY     • CARDIAC CATHETERIZATION N/A 12/7/2022    Procedure: Cardiac catheterization;  Surgeon: Chema Segal MD;  Location: [place] [address] CATH LAB; Service: Cardiology   • CARPAL TUNNEL RELEASE     • CERVICAL FUSION     • CHOLECYSTECTOMY     • COLONOSCOPY      pt see Dr Susy Crespo  Up to date with her colonoscopy  • COLONOSCOPY     • CORONARY STENT PLACEMENT      Dec 2015   • EYE SURGERY      cataract surgery   • GALLBLADDER SURGERY     • HYSTERECTOMY  1989   • MAMMO (HISTORICAL)      up to date  see gyn   • MASTECTOMY Left 07/13/2021   • MASTECTOMY W/ SENTINEL NODE BIOPSY Left 07/13/2021    Procedure: BREAST ROSLYN  DIRECTED MASTECTOMY, SENTINEL LYMPH NODE BIOPSY, LYMPHATIC MAPPING WITH BLUE DYE AND RADIOACTIVE DYE (INJECT AT 1500 BY DR ANDREWS IN THE OR);   Surgeon: Yana Sebastian MD;  Location: [facility];  Service: Surgical Oncology   • OOPHORECTOMY Bilateral 1997   • SPINE SURGERY     • THYROIDECTOMY     • UPPER GASTROINTESTINAL ENDOSCOPY     • US BREAST NEEDLE LOC LEFT Left 05/06/2021   • US GUIDANCE BREAST BIOPSY LEFT EACH ADDITIONAL Left 05/06/2021   • US GUIDED BREAST BIOPSY LEFT COMPLETE Left 03/15/2021   • US GUIDED BREAST BIOPSY LEFT COMPLETE Left 05/06/2021     Social History   Social History     Substance and Sexual Activity   Alcohol Use Never     Social History     Substance and Sexual Activity   Drug Use Never     Social History     Tobacco Use   Smoking Status Never   Smokeless Tobacco Never     Family History   Problem Relation Age of Onset   • Diabetes Mother    • Heart disease Mother    • Dementia Mother    • Esophageal cancer Father 61   • Endometrial cancer Sister 76   • Asthma Sister    • Cancer Sister    • No Known Problems Sister    • No Known Problems Sister    • No Known Problems Sister    • Stomach cancer Brother 70   • Multiple myeloma Brother 67   • Cancer Brother    • No Known Problems Maternal Grandmother    • Prostate cancer Maternal Grandfather    • No Known Problems Paternal Grandmother    • Prostate cancer Paternal Grandfather    • Breast cancer Maternal Aunt 46   • No Known Problems Paternal Aunt    • No Known Problems Paternal [de-identified]    • Asthma Daughter    • Asthma Son    • Depression Son    • Breast cancer Other 39   • Breast cancer Other 39   • Diabetes Family    • Cancer Family    • Arthritis Family    • Heart disease Family      I have reviewed this patient's family history and commented on sigificant items within the HPI    ROS: ROS unable to be obtained due to patient being intubated and sedated  Allergies: Allergies   Allergen Reactions   • Duloxetine Other (See Comments)     Extreme somnolence/lethargy   • Sulfa Antibiotics Rash       Home Medications:   Prior to Admission medications    Medication Sig Start Date End Date Taking?  Authorizing Provider   anastrozole (ARIMIDEX) 1 mg tablet Take 1 tablet (1 mg total) by mouth daily 1/25/23  Yes Monica Rinaldi MD   levothyroxine 88 mcg tablet Take 1 tablet (88 mcg total) by mouth daily 2/7/23  Yes Rut Perez MD   metoprolol tartrate (LOPRESSOR) 25 mg tablet Take 1 tablet (25 mg total) by mouth 3 (three) times a day 12/27/22  Yes Rut Perez MD   pregabalin (LYRICA) 75 mg capsule Take 1 capsule (75 mg total) by mouth 2 (two) times a day 10/10/22  Yes Rut Perez MD   aspirin (ECOTRIN LOW STRENGTH) 81 mg EC tablet Take 162 mg by mouth daily    Historical Provider, MD   glipiZIDE (GLUCOTROL) 10 mg tablet Take 1 tablet (10 mg total) by mouth 2 (two) times a day before meals 1/3/23   Rut Perez MD   Insulin Pen Needle (Sure Comfort Pen Needles) 31G X 5 MM MISC by Does not apply route daily 9/24/20   Rut Perez MD   Lancets (OneTouch Delica Plus [id number]) [address] [facility] USE DAILY 3/31/22   Rut Perez MD   liraglutide (VICTOZA) injection Inject 1 2 mg under the skin daily at bedtime  7/12/17   Historical Provider, MD   lisinopril (ZESTRIL) 20 mg tablet Take 1 tablet (20 mg total) by mouth daily at bedtime 12/27/22   Timmy Puckett MD   metFORMIN (GLUCOPHAGE) 1000 MG tablet Take 1 tablet (1,000 mg total) by mouth 2 (two) times a day with meals 2/7/23   Timmy Puckett MD   mupirocin [facility]) 2 % nasal ointment into each nostril 2 (two) times a day Apply 2 times daily for 5 days prior to procedure 12/8/22   Alma Delia Llanes PA-C   nitroglycerin (NITROSTAT) 0 4 mg SL tablet Place 1 tablet (0 4 mg total) under the tongue every 5 (five) minutes as needed for chest pain 12/7/22   BERNADETTE Laird   OneTouch Ultra test strip Use 1 each daily Use as instructed 4/11/22   Timmy Puckett MD   albuterol (ProAir HFA) 90 mcg/act inhaler Inhale 2 puffs every 6 (six) hours as needed for wheezing 12/14/22 2/8/23  Martha De La Fuente,      Inpatient Medications:  Scheduled Meds:   acetaminophen, 975 mg, Q8H  amiodarone, 200 mg, Q8H [address]  anastrozole, 1 mg, Daily  aspirin, 325 mg, Daily  atorvastatin, 80 mg, Daily With Dinner  calcium chloride, 1 g, Once  cefazolin, 2,000 mg, Q8H  chlorhexidine, 15 mL, BID  fentanyl citrate (PF), 50 mcg, Once  [START ON 2/9/2023] fondaparinux, 2 5 mg, Daily  lactated ringers, 1,000 mL, Once  levothyroxine, 88 mcg, Early Morning  magnesium sulfate, 2 g, Once  mupirocin, 1 application, G38M [address]  pantoprazole, 40 mg, Early Morning  polyethylene glycol, 17 g, Daily  pregabalin, 75 mg, BID       Continuous Infusions:  insulin regular (HumuLIN R,NovoLIN R) infusion, 0 3-21 Units/hr  niCARdipine, 2 5-15 mg/hr  sodium chloride, 20 mL/hr, Last Rate: 20 mL/hr (02/08/23 1200)      PRN Meds:  acetaminophen, 650 mg, Q4H PRN  bisacodyl, 10 mg, Daily PRN  fentanyl citrate (PF), 50 mcg, Q1H PRN  furosemide, 40 mg, Q6H PRN  HYDROmorphone, 0 5 mg, Q2H PRN  lactated ringers, 500 mL, Q30 Min PRN  lidocaine (cardiac), 100 mg, Q30 Min PRN  ondansetron, 4 mg, Q6H PRN  oxyCODONE, 2 5 mg, Q4H PRN  oxyCODONE, 5 mg, Q4H PRN  potassium chloride, 20 mEq, Once PRN  potassium chloride, 20 mEq, Q1H PRN  potassium chloride, 20 mEq, Q30 Min PRN      ---------------------------------------------------------------------------------------------------------------------------------------------------------------------  Vitals:   Vitals:    23 1215 23 1230 23 1245 23 1300   BP:       Pulse: 80 73 71 66   Resp: (!) 10 (!) 10 (!) 10 (!) 10   Temp: (!) 97 2 °F (36 2 °C)   (!) 96 3 °F (35 7 °C)   TempSrc: Core      SpO2: 99% 100% 100% 100%   Weight:         Arterial Line:  Arterial Line BP: 102/43  Arterial Line MAP (mmHg): (!) 62 mmHg    Temperature: Temp (24hrs), Av 9 °F (36 1 °C), Min:96 3 °F (35 7 °C), Max:97 2 °F (36 2 °C)  Current: Temperature: (!) 96 3 °F (35 7 °C)    Weights:    Body mass index is 36 24 kg/m²  Hemodynamic Monitoring:  PAP: PAP: 30/20, CVP: CVP (mean): 15 mmHg, CO: CO (L/min): 2 7 L/min, CI: CI (L/min/m2): 1 4 L/min/m2, SVR: SVR (dyne*sec)/cm5: 1378 (dyne*sec)/cm5    Ventilator Settings:  Respiratory    Lab Data (Last 4 hours)    None         O2/Vent Data (Last 4 hours)       1208          Patient safety screen outcome: Failed                 Labs:   Results from last 7 days   Lab Units 23  1218 23  1214 23  1120 23  1042 23  1040   HEMOGLOBIN g/dL  --  11 7  --   --   --    I STAT HEMOGLOBIN g/dl 10 2*  --  6 8*   < >  --    HEMATOCRIT %  --  33 6*  --   --   --    HEMATOCRIT, ISTAT % 30*  --  20*   < >  --    PLATELETS Thousands/uL  --  108*  --   --  130*    < > = values in this interval not displayed       Results from last 7 days   Lab Units 23  1218 23  1214 23  1120 23  1052   SODIUM mmol/L  --  141  --   --    POTASSIUM mmol/L  --  4 2  --   --    CHLORIDE mmol/L  --  112*  --   --    CO2 mmol/L  --  22  --   --    CO2, I-STAT mmol/L 23  --  25 29   BUN mg/dL  --  9  --   -- CREATININE mg/dL  --  0 63  --   --    CALCIUM mg/dL  --  8 3  --   --    GLUCOSE, ISTAT mg/dl 128  --  134 138     Post-op AB 37/37/155/21 5/-3/99%  Post-op CXR: ETT 6cm above lee and will be advanced 2cm, otherwise lines and tubes stable post-op, no pneumo  Lungs clear  I have personally reviewed pertinent films in PACS  Post-op EKG: NSR-rate 75, RBBB, left axis deviation, QTc 502 This was personally reviewed by myself  Physical Exam  Constitutional:       Appearance: Normal appearance  She is well-developed  Interventions: She is sedated, intubated and restrained  HENT:      Head: Normocephalic and atraumatic  Eyes:      General: Lids are normal       Extraocular Movements: Extraocular movements intact  Conjunctiva/sclera: Conjunctivae normal       Comments: Pupils +2 sluggish    Neck:      Trachea: Trachea normal    Cardiovascular:      Rate and Rhythm: Normal rate and regular rhythm  Pulses:           Radial pulses are 2+ on the right side and 2+ on the left side  Dorsalis pedis pulses are 2+ on the right side and 2+ on the left side  Heart sounds: S1 normal and S2 normal      Friction rub present  Pulmonary:      Effort: Pulmonary effort is normal  She is intubated  Breath sounds: Normal breath sounds  Chest:      Comments: Mediastinal incision C/D/I with acticoat  Abdominal:      General: Bowel sounds are decreased  Palpations: Abdomen is soft  Musculoskeletal:      Cervical back: Neck supple  Comments: Unable to assess d/t sedation   Skin:     General: Skin is warm and dry  Capillary Refill: Capillary refill takes less than 2 seconds  Neurological:      GCS: GCS eye subscore is 1  GCS verbal subscore is 1  GCS motor subscore is 1  Invasive lines and devices:   Invasive Devices     Central Venous Catheter Line  Duration           CVC Central Lines 23 Triple <1 day    Introducer 23 <1 day          Peripheral Intravenous Line  Duration           Peripheral IV 12/14/22 Right;Dorsal (posterior) Forearm 56 days    Peripheral IV 02/08/23 Right Wrist <1 day          Arterial Line  Duration           Arterial Line 02/08/23 Left Radial <1 day          Line  Duration           Pacer Wires <1 day    Pacer Wires <1 day          Drain  Duration           Closed/Suction Drain Inferior; Left Breast Bulb 19 Fr  574 days    Closed/Suction Drain Left;Superior Breast Bulb 19 Fr  574 days    Chest Tube 1 Left Mediastinal 32 Fr  <1 day    Chest Tube 2 Left Pleural 32 Fr  <1 day    Chest Tube 3 Mediastinal 32 Fr  <1 day    Urethral Catheter Non-latex; Temperature probe 16 Fr  <1 day          Airway  Duration           ETT  Hi-Lo; Cuffed;Oral 7 5 mm <1 day              ---------------------------------------------------------------------------------------------------------------------------------------------------------------------  Care Time Delivered:   No Critical Care time spent     SIGNATURE: BERNADETTE Song  DATE: February 8, 2023  TIME: 1:24 PM

## 2023-02-08 NOTE — OP NOTE
OPERATIVE REPORT  PATIENT NAME: Tonio Dietz    :  1945  MRN: [id number]  Pt Location: [facility] OR ROOM 16    SURGERY DATE: 2023    SURGEON: Can Aleman MD    ASSISTANT: Errol Rosario PA-C    ADDITIONAL ASSISTANT: N/A    PREOPERATIVE DIAGNOSIS:  Multivessel coronary artery disease    POSTOPERATIVE DIAGNOSIS:  Multivessel coronary artery disease    NYHA: 2  CCS: 2    PROCEDURE:   Coronary artery bypass grafting x 2 with left internal mammary artery to left anterior descending artery and saphenous vein graft to obtuse marginal 1    CARDIOPULMONARY BYPASS TIME: 43 minutes  CROSSCLAMP TIME: 35 minutes  ANESTHESIA: General endotracheal anesthesia with transesophageal echocardiogram  guidance, Dr Perico Argueta    INDICATIONS:  The patient is a 69y o  year-old female diagnosed with Multivessel coronary artery disease  Coronary angiography showed LM 70%, CX 95%  I saw the patient in consultation and recommended the procedure described previously  FINDINGS:  1  Intraoperative transesophageal echocardiogram revealed EF 60%, mild MS mean gradient 1 mm Hg  2  Epiaortic ultrasound showed less than 5 mm non-mobile plaque  3  Coronary anatomy as described in coronary angiography  4  Final transesophageal echocardiogram demonstrated no changes  OPERATIVE TECHNIQUE:    The patient was taken to the operating room, properly identified and placed supine on the operating table  Following the satisfactory induction of general anesthesia and placement of monitoring lines, the patient was prepped and draped in the usual sterile fashion  A time-out procedure was performed  The patient underwent median sternotomy, pericardiotomy, left internal mammary artery harvest with the standard technique and endoscopic left greater saphenous vein harvest, systemic heparinization and conduit preparation  Epiaortic ultrasound showed less than 5 mm non-mobile plaque   Cannulation for cardiopulmonary bypass was performed with an arterial dispersion cannula, double stage venous cannula and a vented antegrade cardioplegia cannula  Once the ACT was greater than 480 seconds we placed the patient on cardiopulmonary bypass, the ascending aorta was crossclamped and cardiac arrest was obtained without complications with [place] cardioplegia  The following grafts were completed, left internal mamamry artery to left anterior descending artery with excellent flow and saphenous vein graft to obtuse marginal 1 with flow of 150 ml/min  All the distal anastomoses were performed in end-to-side fashion with 7-0 Prolene  A total of 1 proximal anastomosis was completed on the ascending aorta in end-to-side fashion using running 6-0 Prolene suture  The patient was placed in the Trendelenburg position, the heart was de-aired, a hotshot was given and the crossclamp was removed  Atrial and ventricular pacing wires were placed  Following a period of reperfusion, the patient was weaned from cardiopulmonary bypass and decannulated  Protamine was administered with normalization of the ACT  Hemostasis was confirmed in all fields  Thoracostomy tubes were placed  The sternum was closed with stainless steel wires  The fascia, subdermis and skin were closed with multiple layers of running absorbable suture  COMPLICATIONS: None  PACKS/TUBES/DRAINS: Chest tubes x 3  EBL: 200 cc  TRANSFUSION: 1u PRBC  SPECIMENS: None  As the attending surgeon, I was present and scrubbed for all critical portions of this procedure  Sponge, needle and instrument counts were reported as correct by the nursing staff  There is no cardiac residency program at this facility and for complex procedures such as this, it is vital to have a physician assistant experienced in cardiac surgery    The assistance of Vish Melendrez PA-C was necessary for endoscopic saphenous vein harvesting, retraction of the heart and coronary conduit with proper tissue handling techniques, and following of the suture with correct tension during construction of the proximal and distal coronary anastomoses            SIGNATURE: Graciela Tejeda MD  DATE: February 8, 2023  TIME: 11:47 AM

## 2023-02-09 ENCOUNTER — APPOINTMENT (OUTPATIENT)
Dept: RADIOLOGY | Facility: HOSPITAL | Age: 78
End: 2023-02-09

## 2023-02-09 LAB
ABO GROUP BLD BPU: NORMAL
ANION GAP SERPL CALCULATED.3IONS-SCNC: 2 MMOL/L (ref 4–13)
ATRIAL RATE: 74 BPM
ATRIAL RATE: 75 BPM
ATRIAL RATE: 89 BPM
BASE EX.OXY STD BLDV CALC-SCNC: 70.8 % (ref 60–80)
BASE EXCESS BLDV CALC-SCNC: -3.5 MMOL/L
BODY TEMPERATURE: 98.2 DEGREES FEHRENHEIT
BPU ID: NORMAL
BUN SERPL-MCNC: 12 MG/DL (ref 5–25)
CALCIUM SERPL-MCNC: 7.6 MG/DL (ref 8.3–10.1)
CHLORIDE SERPL-SCNC: 113 MMOL/L (ref 96–108)
CO2 SERPL-SCNC: 23 MMOL/L (ref 21–32)
CREAT SERPL-MCNC: 0.53 MG/DL (ref 0.6–1.3)
CROSSMATCH: NORMAL
ERYTHROCYTE [DISTWIDTH] IN BLOOD BY AUTOMATED COUNT: 13.4 % (ref 11.6–15.1)
GFR SERPL CREATININE-BSD FRML MDRD: 92 ML/MIN/1.73SQ M
GLUCOSE SERPL-MCNC: 117 MG/DL (ref 65–140)
GLUCOSE SERPL-MCNC: 118 MG/DL (ref 65–140)
GLUCOSE SERPL-MCNC: 123 MG/DL (ref 65–140)
GLUCOSE SERPL-MCNC: 141 MG/DL (ref 65–140)
GLUCOSE SERPL-MCNC: 154 MG/DL (ref 65–140)
GLUCOSE SERPL-MCNC: 211 MG/DL (ref 65–140)
GLUCOSE SERPL-MCNC: 222 MG/DL (ref 65–140)
GLUCOSE SERPL-MCNC: 69 MG/DL (ref 65–140)
HCO3 BLDV-SCNC: 23.1 MMOL/L (ref 24–30)
HCT VFR BLD AUTO: 30.1 % (ref 34.8–46.1)
HGB BLD-MCNC: 10.1 G/DL (ref 11.5–15.4)
MAGNESIUM SERPL-MCNC: 2.5 MG/DL (ref 1.6–2.6)
MCH RBC QN AUTO: 30.5 PG (ref 26.8–34.3)
MCHC RBC AUTO-ENTMCNC: 33.6 G/DL (ref 31.4–37.4)
MCV RBC AUTO: 91 FL (ref 82–98)
NASAL CANNULA: 2
O2 CT BLDV-SCNC: 10.9 ML/DL
P AXIS: 54 DEGREES
P AXIS: 58 DEGREES
P AXIS: 65 DEGREES
PCO2 BLD: 47.7 MM HG (ref 42–50)
PCO2 BLDV: 48.1 MM HG (ref 42–50)
PH BLD: 7.3 [PH] (ref 7.3–7.4)
PH BLDV: 7.3 [PH] (ref 7.3–7.4)
PLATELET # BLD AUTO: 102 THOUSANDS/UL (ref 149–390)
PMV BLD AUTO: 10.3 FL (ref 8.9–12.7)
PO2 BLDV: 38.5 MM HG (ref 35–45)
PO2 VENOUS TEMP CORRECTED: 38 MM HG (ref 35–45)
POTASSIUM SERPL-SCNC: 4.8 MMOL/L (ref 3.5–5.3)
PR INTERVAL: 154 MS
PR INTERVAL: 162 MS
PR INTERVAL: 180 MS
QRS AXIS: -21 DEGREES
QRS AXIS: 5 DEGREES
QRS AXIS: 5 DEGREES
QRSD INTERVAL: 124 MS
QRSD INTERVAL: 76 MS
QRSD INTERVAL: 80 MS
QT INTERVAL: 384 MS
QT INTERVAL: 414 MS
QT INTERVAL: 450 MS
QTC INTERVAL: 459 MS
QTC INTERVAL: 467 MS
QTC INTERVAL: 502 MS
RBC # BLD AUTO: 3.31 MILLION/UL (ref 3.81–5.12)
SODIUM SERPL-SCNC: 138 MMOL/L (ref 135–147)
T WAVE AXIS: 40 DEGREES
T WAVE AXIS: 50 DEGREES
T WAVE AXIS: 54 DEGREES
UNIT DISPENSE STATUS: NORMAL
UNIT PRODUCT CODE: NORMAL
UNIT PRODUCT VOLUME: 350 ML
UNIT RH: NORMAL
VENTRICULAR RATE: 74 BPM
VENTRICULAR RATE: 75 BPM
VENTRICULAR RATE: 89 BPM
WBC # BLD AUTO: 9.68 THOUSAND/UL (ref 4.31–10.16)

## 2023-02-09 RX ORDER — INSULIN LISPRO 100 [IU]/ML
1-6 INJECTION, SOLUTION INTRAVENOUS; SUBCUTANEOUS
Status: DISCONTINUED | OUTPATIENT
Start: 2023-02-09 | End: 2023-02-11 | Stop reason: HOSPADM

## 2023-02-09 RX ORDER — ALBUMIN, HUMAN INJ 5% 5 %
12.5 SOLUTION INTRAVENOUS ONCE
Status: COMPLETED | OUTPATIENT
Start: 2023-02-09 | End: 2023-02-09

## 2023-02-09 RX ORDER — FUROSEMIDE 10 MG/ML
40 INJECTION INTRAMUSCULAR; INTRAVENOUS DAILY
Status: DISCONTINUED | OUTPATIENT
Start: 2023-02-09 | End: 2023-02-11 | Stop reason: HOSPADM

## 2023-02-09 RX ORDER — DOCUSATE SODIUM 100 MG/1
100 CAPSULE, LIQUID FILLED ORAL 2 TIMES DAILY
Status: DISCONTINUED | OUTPATIENT
Start: 2023-02-09 | End: 2023-02-11 | Stop reason: HOSPADM

## 2023-02-09 RX ORDER — CALCIUM GLUCONATE 20 MG/ML
1 INJECTION, SOLUTION INTRAVENOUS ONCE
Status: COMPLETED | OUTPATIENT
Start: 2023-02-09 | End: 2023-02-09

## 2023-02-09 RX ORDER — TEMAZEPAM 15 MG/1
15 CAPSULE ORAL
Status: DISCONTINUED | OUTPATIENT
Start: 2023-02-09 | End: 2023-02-11 | Stop reason: HOSPADM

## 2023-02-09 RX ADMIN — ATORVASTATIN CALCIUM 80 MG: 80 TABLET, FILM COATED ORAL at 18:20

## 2023-02-09 RX ADMIN — CEFAZOLIN SODIUM 2000 MG: 2 SOLUTION INTRAVENOUS at 02:22

## 2023-02-09 RX ADMIN — NICARDIPINE HYDROCHLORIDE 2.5 MG/HR: 2.5 INJECTION, SOLUTION INTRAVENOUS at 05:35

## 2023-02-09 RX ADMIN — LEVOTHYROXINE SODIUM 88 MCG: 88 TABLET ORAL at 05:09

## 2023-02-09 RX ADMIN — OXYCODONE HYDROCHLORIDE 5 MG: 5 TABLET ORAL at 00:38

## 2023-02-09 RX ADMIN — ALBUMIN (HUMAN) 12.5 G: 12.5 INJECTION, SOLUTION INTRAVENOUS at 02:22

## 2023-02-09 RX ADMIN — ANASTROZOLE 1 MG: 1 TABLET, COATED ORAL at 08:31

## 2023-02-09 RX ADMIN — ASPIRIN 325 MG ORAL TABLET 325 MG: 325 PILL ORAL at 08:31

## 2023-02-09 RX ADMIN — PREGABALIN 75 MG: 75 CAPSULE ORAL at 08:31

## 2023-02-09 RX ADMIN — CALCIUM GLUCONATE 1 G: 20 INJECTION, SOLUTION INTRAVENOUS at 06:36

## 2023-02-09 RX ADMIN — PREGABALIN 75 MG: 75 CAPSULE ORAL at 18:20

## 2023-02-09 RX ADMIN — Medication 12.5 MG: at 21:56

## 2023-02-09 RX ADMIN — MUPIROCIN 1 APPLICATION: 20 OINTMENT TOPICAL at 21:57

## 2023-02-09 RX ADMIN — HYDROMORPHONE HYDROCHLORIDE 0.5 MG: 1 INJECTION, SOLUTION INTRAMUSCULAR; INTRAVENOUS; SUBCUTANEOUS at 03:54

## 2023-02-09 RX ADMIN — MUPIROCIN 1 APPLICATION: 20 OINTMENT TOPICAL at 08:31

## 2023-02-09 RX ADMIN — AMIODARONE HYDROCHLORIDE 200 MG: 200 TABLET ORAL at 13:44

## 2023-02-09 RX ADMIN — AMIODARONE HYDROCHLORIDE 200 MG: 200 TABLET ORAL at 21:56

## 2023-02-09 RX ADMIN — AMIODARONE HYDROCHLORIDE 200 MG: 200 TABLET ORAL at 05:09

## 2023-02-09 RX ADMIN — OXYCODONE HYDROCHLORIDE 5 MG: 5 TABLET ORAL at 05:09

## 2023-02-09 RX ADMIN — HYDROMORPHONE HYDROCHLORIDE 0.5 MG: 1 INJECTION, SOLUTION INTRAMUSCULAR; INTRAVENOUS; SUBCUTANEOUS at 01:10

## 2023-02-09 RX ADMIN — OXYCODONE HYDROCHLORIDE 5 MG: 5 TABLET ORAL at 19:06

## 2023-02-09 RX ADMIN — ACETAMINOPHEN 975 MG: 325 TABLET ORAL at 21:55

## 2023-02-09 RX ADMIN — INSULIN LISPRO 2 UNITS: 100 INJECTION, SOLUTION INTRAVENOUS; SUBCUTANEOUS at 16:43

## 2023-02-09 RX ADMIN — PANTOPRAZOLE SODIUM 40 MG: 40 TABLET, DELAYED RELEASE ORAL at 05:09

## 2023-02-09 RX ADMIN — ACETAMINOPHEN 975 MG: 325 TABLET ORAL at 05:09

## 2023-02-09 RX ADMIN — DOCUSATE SODIUM 100 MG: 100 CAPSULE, LIQUID FILLED ORAL at 18:20

## 2023-02-09 RX ADMIN — INSULIN LISPRO 2 UNITS: 100 INJECTION, SOLUTION INTRAVENOUS; SUBCUTANEOUS at 21:57

## 2023-02-09 RX ADMIN — FUROSEMIDE 40 MG: 10 INJECTION, SOLUTION INTRAMUSCULAR; INTRAVENOUS at 08:31

## 2023-02-09 RX ADMIN — FONDAPARINUX SODIUM 2.5 MG: 2.5 INJECTION, SOLUTION SUBCUTANEOUS at 08:31

## 2023-02-09 RX ADMIN — CEFAZOLIN SODIUM 2000 MG: 2 SOLUTION INTRAVENOUS at 11:33

## 2023-02-09 RX ADMIN — Medication 12.5 MG: at 08:31

## 2023-02-09 RX ADMIN — DOCUSATE SODIUM 100 MG: 100 CAPSULE, LIQUID FILLED ORAL at 08:31

## 2023-02-09 RX ADMIN — ACETAMINOPHEN 975 MG: 325 TABLET ORAL at 13:43

## 2023-02-09 RX ADMIN — OXYCODONE HYDROCHLORIDE 5 MG: 5 TABLET ORAL at 12:34

## 2023-02-09 RX ADMIN — POLYETHYLENE GLYCOL 3350 17 G: 17 POWDER, FOR SOLUTION ORAL at 08:31

## 2023-02-09 NOTE — CASE MANAGEMENT
Case Management Assessment & Discharge Planning Note    Patient name Diego Sullivan  Location [facility] 785-64 MRN [id number]  : 1945 Date 2023       Current Admission Date: 2023  Current Admission Diagnosis:S/P CABG x 2   Patient Active Problem List    Diagnosis Date Noted   • S/P CABG x 2 2023   • Dyspnea on exertion 2022   • T wave inversion in EKG 2022   • Diarrhea 10/10/2022   • Non-ST elevation myocardial infarction (NSTEMI) ([facility] ) 2022   • History of PTCA 2022   • H/O heart artery stent 2022   • Colon polyp 2022   • Neuropathy 2022   • Use of anastrozole (Arimidex)    • Monoallelic mutation of CHEK2 gene in female patient 2021   • Class 2 obesity in adult 2021   • 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, estrogen receptor positive ([place] [address] ) 2021   • Bilateral leg edema 2020   • Hypothyroidism 2020   • Spinal stenosis of lumbar region 2020   • Osteopenia of necks of both femurs 2020   • S/P lumbar fusion 2018   • Coronary artery disease involving native coronary artery of native heart without angina pectoris 2016   • Abnormal carotid ultrasound 2016   • 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[place] [address] ) 2013   • Type 2 diabetes mellitus without complication, without long-term current use of insulin ([place] [address] ) 2013   • Mixed dyslipidemia 2013   • Essential hypertension 2013      LOS (days): 1  Geometric Mean LOS (GMLOS) (days): 8 30  Days to GMLOS:7     OBJECTIVE:    Risk of Unplanned Readmission Score: 18 96         Current admission status: Inpatient       Preferred Pharmacy:   [facility] [address],  [address] [address]  Phone: [phone] Fax: [phone]    [address] [facility] [address]  Phone: [phone] Fax: [phone]    [address], [address] [place] [address]  Phone: [phone] Fax: [phone]    Primary Care Provider: Racquel De La Torre MD    Primary Insurance: MEDICARE  Secondary Insurance: AARP    ASSESSMENT:  Active Health Care Proxies     Daisy, [facility] Representative - Daughter   Primary Phone: [phone] (Mobile)  Work Phone: [phone]               Advance Directives  Does patient have a [address]?: Yes (copy requested)  Does patient have Advance Directives?: Yes  Primary Contact: daughter Lucinda Leary [phone]         Readmission Root Cause  30 Day Readmission: No    Patient Information  Admitted from[de-identified] Home  Mental Status: Alert  During Assessment patient was accompanied by: Daughter  Assessment information provided by[de-identified] Patient, Daughter  Primary Caregiver: Self  Support Systems: Self, Spouse/significant other, Family members  [place] of Residence: [address] [place] [address] do you live in?: [address] entry access options   Select all that apply : Stairs  Number of steps to enter home : 2  Do the steps have railings?: Yes  Type of Current Residence: 2 story home  Upon entering residence, is there a bedroom on the main floor (no further steps)?: No  A bedroom is located on the following floor levels of residence (select all that apply):: 2nd Floor  Upon entering residence, is there a bathroom on the main floor (no further steps)?: No  Indicate which floors of current residence have a bathroom (select all the apply):: 2nd Floor  Number of steps to 2nd floor from main floor: One Flight  In the last 12 months, was there a time when you were not able to pay the mortgage or rent on time?: No  In the last 12 months, how many places have you lived?: 1  In the last 12 months, was there a time when you did not have a steady place to sleep or slept in a shelter (including now)?: No  Homeless/housing insecurity resource given?: N/A  Living Arrangements: Lives w/ Spouse/significant other  Is patient a ?: No    Activities of Daily Living Prior to Admission  Functional Status: Independent  Completes ADLs independently?: Yes  Ambulates independently?: Yes  Does patient use assisted devices?: Yes  Assisted Devices (DME) used: Stair Chair/Glide, Straight Cane, Walker, Shower Chair, Other (Comment) (grab bar)  Does patient currently own DME?: Yes  What DME does the patient currently own?: Other (Comment), Shower Chair, Stair Chair/Glide, Straight Marquette Sasser (grab bar)  Does patient have a history of Outpatient Therapy (PT/OT)?: No  Does the patient have a history of Short-Term Rehab?: Yes [facility])  Does patient have a history of HHC?: Yes ([address] [place] [address])  Does patient currently have [address]?: No         Patient Information Continued  Income Source: Pension/[employer]  Does patient have prescription coverage?: Yes  Within the past 12 months, you worried that your food would run out before you got the money to buy more : Never true  Within the past 12 months, the food you bought just didn't last and you didn't have money to get more : Never true  Food insecurity resource given?: N/A  Does patient receive dialysis treatments?: No  Does patient have a history of substance abuse?: No  Does patient have a history of Mental Health Diagnosis?: No         Means of Transportation  Means of Transport to Appts[de-identified] Drives Self  In the past 12 months, has lack of transportation kept you from medical appointments or from getting medications?: No  In the past 12 months, has lack of transportation kept you from meetings, work, or from getting things needed for daily living?: No  Was application for public transport provided?: N/A        DISCHARGE DETAILS:    Discharge planning discussed with[de-identified] patient and daughter at bedside  Freedom of Choice: Yes  Comments - Freedom of Choice: agreeable to [facility]  CM contacted family/caregiver?: Yes  Were Treatment Team discharge recommendations reviewed with patient/caregiver?: Yes  Did patient/caregiver verbalize understanding of patient care needs?: Yes  Were patient/caregiver advised of the risks associated with not following Treatment Team discharge recommendations?: Yes    Contacts  Patient Contacts: daughter Matthew Mcnamara  Relationship to Patient[de-identified] Family  Contact Method: In Person  Reason/Outcome: Emergency Contact, Discharge Planning, Continuity of [address] [facility]         Is the patient interested in [facility] at discharge?: Yes  [address] requested[de-identified] Nursing, Physical [address] Provider[de-identified] PCP  Home Health Services Needed[de-identified] Post-Op Care and Assessment, Strengthening/Theraputic Exercises to Improve Function, Gait/ADL Training, Evaluate Functional Status and Safety  Supporting Clincal Findings[de-identified] Fatigues Easliy in [employer], Limited Endurance         Other Referral/Resources/Interventions Provided:  Interventions: [facility]    Would you like to participate in our [address] service program?  : Yes    Treatment Team Recommendation: Home with Kanchan Motte at Discharge : Family       Additional Comments: Patient is in agreement with [facility] [place] so referral was sent via [employer] and awaiting determination  Daughter Juan Goss says she will bring in a copy of POA for chart  A list of private pay agencies was provided for support at [place]  CM to be available         CM reviewed d/c planning process including the following: identifying help at home, patient preference for d/c planning needs, Discharge Lounge, [facility] Meds to Bed program, availability of treatment team to discuss questions or concerns patient and/or family may have regarding understanding medications and recognizing signs and symptoms once discharged    CM also encouraged patient to follow up with all recommended appointments after discharge  Patient advised of importance for patient and family to participate in managing patient’s medical well being

## 2023-02-09 NOTE — PLAN OF CARE
Problem: OCCUPATIONAL THERAPY ADULT  Goal: Performs self-care activities at highest level of function for planned discharge setting  See evaluation for individualized goals  Description: Treatment Interventions: ADL retraining, Functional transfer training, Endurance training, UE strengthening/ROM, Patient/family training, Equipment evaluation/education, Compensatory technique education, Continued evaluation, Energy conservation, Activityengagement, Cardiac education          See flowsheet documentation for full assessment, interventions and recommendations  Note: Limitation: Decreased ADL status, Decreased endurance, Decreased self-care trans, Decreased high-level ADLs  Prognosis: Good  Assessment: Pt is a 69 y o  female admitted to hospitals on 2/8/2023 w/ CAD s/p CABG x2 on 2/8/2023  Pt  has a past medical history of Abdominal pain, Angina pectoris ([facility] ), Arthritis, Breast cancer ([address] ), Cancer ([address] ), Colon polyp, Constipation, Coronary artery disease, Diabetes mellitus ([facility] ), Diarrhea, Disease of thyroid gland, Hemorrhoids, Hypercholesterolemia, Hypertension, Neuropathy, Obesity, Osteoporosis, Otitis media, Ovarian ca ([address] ) (1997), 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[address] ), Shingles, 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), Thyroid cancer ([address] ), and Urinary tract infection  Pt with active OT orders and ambulate  orders  Pt resides in a 2 story home with 2 AISHA and a stair lift to reach 2nd floor  Pt lives with her  who she reports is sick and unable to assist  Pt reports she has to assist him at times but that they have other local family  Pt was I w/  ADLS and IADLS, (+) drove, & required use of SPC in the community PTA  Currently pt is mod A for functional transfers and functional mobility, and mod A for ADLS overall  Pt is limited at this time 2*: pain, endurance, activity tolerance, functional mobility, forward functional reach, functional standing tolerance and decreased I w/ ADLS/IADLS  The following Occupational Performance Areas to address include: grooming, bathing/shower, toilet hygiene, dressing, functional mobility and clothing management  Based on the aforementioned OT evaluation, functional performance deficits, and assessments, pt has been identified as a high complexity evaluation  From OT standpoint, anticipate d/c home with family support  Pt to continue to benefit from acute immediate OT services to address the following goals 3-5x/week to  w/in 10-14 days:        OT Discharge Recommendation: No rehabilitation needs (Home with increased social support pending progress)

## 2023-02-09 NOTE — PROGRESS NOTES
Progress Note - Cardiothoracic Surgery   Lillian Farrell 69 y o  female MRN: [id number]  Unit/Bed#: [facility] 414-01 Encounter: [id number]      POD # 1 s/p CABG 2    Pt seen/examined  Interval history and data reviewed with critical care team   Pt doing well  No specific complaints          Medications:   Scheduled Meds:  Current Facility-Administered Medications   Medication Dose Route Frequency Provider Last Rate   • acetaminophen  975 mg Oral Q8H Isa Olivier PA-C     • amiodarone  200 mg Oral [facility] Jose Olivier PA-C     • anastrozole  1 mg Oral Daily Jose Leyva PA-C     • aspirin  325 mg Oral Daily Jose Leyva PA-C     • atorvastatin  80 mg Oral Daily With April Leyva PA-C     • bisacodyl  10 mg Rectal Daily PRN Jose Leyva PA-C     • cefazolin  2,000 mg Intravenous Q8H Jose Leyva PA-C 2,000 mg (02/09/23 0222)   • docusate sodium  100 mg Oral BID BERNADETTE Hudson     • fondaparinux  2 5 mg Subcutaneous Daily Jose Leyva PA-C     • furosemide  40 mg Intravenous Daily BERNADETTE Hudson     • insulin regular (HumuLIN R,NovoLIN R) infusion  0 3-21 Units/hr Intravenous Titrated Jose Leyva PA-C Stopped (02/09/23 0214)   • levothyroxine  88 mcg Oral Early Morning Jose Leyva PA-C     • metoprolol tartrate  12 5 mg Oral Q12H [address] BERNADETTE Hudson     • mupirocin  1 application Nasal [id number] EVERARDO Leyva     • niCARdipine  2 5-15 mg/hr Intravenous Titrated Jose Leyva PA-C Stopped (02/09/23 0746)   • ondansetron  4 mg Intravenous Q6H PRN Jose Leyva PA-C     • oxyCODONE  2 5 mg Oral Q4H PRN Jose Leyva PA-C     • oxyCODONE  5 mg Oral Q4H PRN Jose Leyva PA-C     • pantoprazole  40 mg Oral Early Morning Jose Leyva PA-C     • polyethylene glycol  17 g Oral Daily Jose Leyva PA-C     • pregabalin  75 mg Oral BID Jose Leyva PA-C     • sodium chloride  20 mL/hr Intravenous Continuous Ana Lilia Leyva PA-C Stopped (02/09/23 0747)   • temazepam  15 mg Oral HS PRN BERNADETTE Hudson       Continuous Infusions:insulin regular (HumuLIN R,NovoLIN R) infusion, 0 3-21 Units/hr, Last Rate: Stopped (02/09/23 0214)  niCARdipine, 2 5-15 mg/hr, Last Rate: Stopped (02/09/23 0746)  sodium chloride, 20 mL/hr, Last Rate: Stopped (02/09/23 0747)      PRN Meds: •  bisacodyl  •  ondansetron  •  oxyCODONE  •  oxyCODONE  •  temazepam    Vitals: Blood pressure 114/56, pulse 68, temperature 98 6 °F (37 °C), resp  rate 16, weight 88 3 kg (194 lb 10 7 oz), SpO2 93 %, currently breastfeeding  ,Body mass index is 36 78 kg/m²  I/O last 24 hours: In: 5638 8 [I V :1168 8; Blood:350; IV VSXWAOQDH:9029]  Out: 9327 [Urine:1530; Blood:450; Chest Tube:350]  Invasive Devices     Central Venous Catheter Line  Duration           CVC Central Lines 02/08/23 Triple <1 day    Introducer 02/08/23 <1 day          Peripheral Intravenous Line  Duration           Peripheral IV 12/14/22 Right;Dorsal (posterior) Forearm 56 days    Peripheral IV 02/08/23 Right Wrist 1 day          Arterial Line  Duration           Arterial Line 02/08/23 Left Radial <1 day          Line  Duration           Pacer Wires <1 day    Pacer Wires <1 day          Drain  Duration           Closed/Suction Drain Inferior; Left Breast Bulb 19 Fr  575 days    Closed/Suction Drain Left;Superior Breast Bulb 19 Fr  575 days    Chest Tube 1 Left Mediastinal 32 Fr  <1 day    Chest Tube 2 Left Pleural 32 Fr  <1 day    Chest Tube 3 Mediastinal 32 Fr  <1 day    Urethral Catheter Non-latex; Temperature probe 16 Fr  <1 day                  Lab, Imaging and other studies:   Results from last 7 days   Lab Units 02/09/23  0354 02/08/23 2013 02/08/23  1218 02/08/23  1214 02/08/23  1042 02/08/23  1040   WBC Thousand/uL 9 68  --   --   --   --   --    HEMOGLOBIN g/dL 10 1* 11 1*  --  11 7  --   --    I STAT HEMOGLOBIN g/dl  --   --  10 2* --    < >  --    HEMATOCRIT % 30 1* 32 2*  --  33 6*  --   --    HEMATOCRIT, ISTAT %  --   --  30*  --    < >  --    PLATELETS Thousands/uL 102*  --   --  108*  --  130*    < > = values in this interval not displayed  Results from last 7 days   Lab Units 02/09/23  0354 02/08/23 2013 02/08/23  1555 02/08/23  1218 02/08/23  1214   POTASSIUM mmol/L 4 8 4 3 3 1*  --  4 2   CHLORIDE mmol/L 113*  --   --   --  112*   CO2 mmol/L 23  --   --   --  22   CO2, I-STAT mmol/L  --   --   --  23  --    BUN mg/dL 12  --   --   --  9   CREATININE mg/dL 0 53*  --   --   --  0 63   GLUCOSE, ISTAT mg/dl  --   --   --  128  --    CALCIUM mg/dL 7 6*  --   --   --  8 3         No results for input(s): PHART, WZB6WKB, PO2ART, ODH7KSG, X6PXOQNB, BEART in the last 72 hours  Plan:    DC [place]/C[place]  Continue chest tubes, pacing wires  Transfer to floor  Ambulate  Incentive spirometry  Diuresis  PO ASA/Statin/B blocker          SIGNATURE: Yamilka Boykin MD  DATE: February 9, 2023  TIME: 7:49 AM

## 2023-02-09 NOTE — CONSULTS
Consultation - Nura Buchanan 69 y o  female MRN: [id number]    Unit/Bed#: [facility] 414-01 Encounter: [id number]      HPI     This is a 69y o -year-old female with PMH of DM-2, HTN, HLD, DM Neuropathy, grade I L Breast IDC E+P+HER2 - s/p Mastectomy 07/2021 on Anastrozole, hypothyroidism on Levothyroxine 55 mcg QD, CAD s/p CABG 02/08/2023  Regarding her Diabetes type II ; home regiment include:   - Glipizide 10 g BID   - Metformin 1 g BID   - Leraglutide 1 2 mg HS     Her Most recent A1C 5 8 (10/18/22) and patient reports poor PO intake, 2 meals a day , with frequent events of symptomatic hypoglycemia at home, mainly with diaphoresis and irritability, but denies LOC events  Inpatient, s/p CABG day 1 ; s/p Insulin drip required total 48 4 units 02/8 - 02/09, while NPO   Today 02/09 patient tolerating diet, and currently on SSI algorithm 3 with meals and bedtime  ASSESSMENT AND PLAN:     Type II Diabetes Mellitus:     - case will be discussed with my attending during rounding , see attestation above when available  - recommend continue SSI with meals and bedtime for now   - continue to check per protocol and revaluate, so far BG within target/borderline low 118-154   - continue low carb diet  - On discharge , Discontinue Glipizide, and continue Metformin 1g BID and Leraglutide 1 2 HS     CC: Diabetes Consult          Inpatient consult to Endocrinology     Performed by  Stephenie Diane, DO     Authorized by BERNADETTE Santiago              Review of Systems   Constitutional: Positive for appetite change  Negative for chills, diaphoresis and fever  HENT: Negative for mouth sores, sore throat and trouble swallowing  Eyes: Negative for photophobia and visual disturbance  Respiratory: Negative for cough, choking, chest tightness, shortness of breath and wheezing  Cardiovascular: Positive for chest pain  Negative for palpitations and leg swelling          Sternal pain (post CABG) Gastrointestinal: Negative for abdominal distention, abdominal pain, nausea and vomiting  Endocrine: Negative for cold intolerance and heat intolerance  Genitourinary: Negative for dysuria, flank pain and hematuria  Musculoskeletal: Negative for back pain, myalgias, neck pain and neck stiffness  Skin: Negative for rash and wound  Neurological: Negative for dizziness, syncope, weakness and light-headedness  Hematological: Negative for adenopathy  Psychiatric/Behavioral: Negative for agitation and behavioral problems  The patient is not nervous/anxious  Historical Information   Past Medical History:   Diagnosis Date   • Abdominal pain     LLQ on occasion   • Angina pectoris ([facility] )    • Arthritis    • Breast cancer ([place] [address] )    • Cancer ([place] [address] )     breast left   • Colon polyp    • Constipation     at times   • Coronary artery disease    • Diabetes mellitus ([place] [address] )    • Diarrhea     at times   • Disease of thyroid gland    • Hemorrhoids    • Hypercholesterolemia    • Hypertension    • Neuropathy     both legs and feet   • Obesity    • Osteoporosis    • Otitis media    • Ovarian ca [facility]) 1997   • 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[address] )    • Shingles    • 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   • Thyroid cancer ([place] [address] )    • Urinary tract infection      Past Surgical History:   Procedure Laterality Date   • ANGIOPLASTY      stent x 1   • APPENDECTOMY     • BACK SURGERY     • BAND HEMORRHOIDECTOMY     • BRAIN SURGERY  1993    meningioma   • BREAST BIOPSY     • CARDIAC CATHETERIZATION N/A 12/7/2022    Procedure: Cardiac catheterization;  Surgeon: Alcides Ramirez MD;  Location: [place] [address] CATH LAB; Service: Cardiology   • CARPAL TUNNEL RELEASE     • CERVICAL FUSION     • CHOLECYSTECTOMY     • COLONOSCOPY      pt see Dr Melyssa Jasso  Up to date with her colonoscopy     • COLONOSCOPY     • CORONARY STENT PLACEMENT      Dec 2015   • EYE SURGERY      cataract surgery   • GALLBLADDER SURGERY     • HYSTERECTOMY  1989 • MAMMO (HISTORICAL)      up to date  see gyn   • MASTECTOMY Left 07/13/2021   • MASTECTOMY W/ SENTINEL NODE BIOPSY Left 07/13/2021    Procedure: BREAST ROSLYN  DIRECTED MASTECTOMY, SENTINEL LYMPH NODE BIOPSY, LYMPHATIC MAPPING WITH BLUE DYE AND RADIOACTIVE DYE (INJECT AT 1500 BY DR ANDREWS IN THE OR);   Surgeon: Courtney Hendrix MD;  Location: [facility];  Service: Surgical Oncology   • OOPHORECTOMY Bilateral 1997   • SPINE SURGERY     • THYROIDECTOMY     • UPPER GASTROINTESTINAL ENDOSCOPY     • US BREAST NEEDLE LOC LEFT Left 05/06/2021   • US GUIDANCE BREAST BIOPSY LEFT EACH ADDITIONAL Left 05/06/2021   • US GUIDED BREAST BIOPSY LEFT COMPLETE Left 03/15/2021   • US GUIDED BREAST BIOPSY LEFT COMPLETE Left 05/06/2021     Social History   Social History     Substance and Sexual Activity   Alcohol Use Never     Social History     Substance and Sexual Activity   Drug Use Never     Social History     Tobacco Use   Smoking Status Never   Smokeless Tobacco Never     Family History:   Family History   Problem Relation Age of Onset   • Diabetes Mother    • Heart disease Mother    • Dementia Mother    • Esophageal cancer Father 61   • Endometrial cancer Sister 76   • Asthma Sister    • Cancer Sister    • No Known Problems Sister    • No Known Problems Sister    • No Known Problems Sister    • Stomach cancer Brother 70   • Multiple myeloma Brother 67   • Cancer Brother    • No Known Problems Maternal Grandmother    • Prostate cancer Maternal Grandfather    • No Known Problems Paternal Grandmother    • Prostate cancer Paternal Grandfather    • Breast cancer Maternal Aunt 46   • No Known Problems Paternal Aunt    • No Known Problems Paternal [de-identified]    • Asthma Daughter    • Asthma Son    • Depression Son    • Breast cancer Other 39   • Breast cancer Other 39   • Diabetes Family    • Cancer Family    • Arthritis Family    • Heart disease Family        Meds/Allergies   Current Facility-Administered Medications   Medication Dose Route Frequency Provider Last Rate Last Admin   • acetaminophen (TYLENOL) tablet 975 mg  975 mg Oral Q8H Daja Pillaio V, CRNP   975 mg at 02/09/23 4947   • amiodarone tablet 200 mg  200 mg Oral Q8H [address] Daja Pillaio V, CRNP   200 mg at 02/09/23 4859   • anastrozole (ARIMIDEX) tablet 1 mg  1 mg Oral Daily Daja Pillaio V, CRNP   1 mg at 02/09/23 5554   • aspirin tablet 325 mg  325 mg Oral Daily Daja Pillaio V, CRNP   325 mg at 02/09/23 0831   • atorvastatin (LIPITOR) tablet 80 mg  80 mg Oral Daily With [facility] [employer] [facility] VRICHARDNP       • bisacodyl (DULCOLAX) rectal suppository 10 mg  10 mg Rectal Daily PRN Daja Fuller V CRNP       • ceFAZolin (ANCEF) IVPB (premix in dextrose) 2,000 mg 50 mL  2,000 mg Intravenous Q8H Daja Fuller V, CRNP 100 mL/hr at 02/09/23 0222 2,000 mg at 02/09/23 0222   • docusate sodium (COLACE) capsule 100 mg  100 mg Oral BID Daja Fuller V, CRNP   100 mg at 02/09/23 0831   • fondaparinux (ARIXTRA) subcutaneous injection 2 5 mg  2 5 mg Subcutaneous Daily Daja Fuller V, CRNP   2 5 mg at 02/09/23 0831   • furosemide (LASIX) injection 40 mg  40 mg Intravenous Daily Daja Fuller V, CRNP   40 mg at 02/09/23 0831   • insulin regular (HumuLIN R,NovoLIN R) 1 Units/mL in sodium chloride 0 9 % 100 mL infusion  0 3-21 Units/hr Intravenous Titrated Daja Pillaio V, CRNP   Stopped at 02/09/23 0214   • levothyroxine tablet 88 mcg  88 mcg Oral Early Morning Daja Fuller V, CRNP   88 mcg at 02/09/23 0509   • metoprolol tartrate (LOPRESSOR) partial tablet 12 5 mg  12 5 mg Oral Q12H [address] Daja Fuller V, CRNP   12 5 mg at 02/09/23 0831   • mupirocin (BACTROBAN) 2 % nasal ointment 1 application  1 application Nasal V12Y [address] BERNADETTE Hudson   1 application at 25/51/91 0831   • niCARdipine (CARDENE) 25 mg (STANDARD CONCENTRATION) in sodium chloride 0 9% 250 mL  2 5-15 mg/hr Intravenous Titrated BERNADETTE Hudson Stopped at 02/09/23 0746   • ondansetron (ZOFRAN) injection 4 mg  4 mg Intravenous Q6H PRN Daja Spironello V, CRNP       • oxyCODONE (ROXICODONE) IR tablet 2 5 mg  2 5 mg Oral Q4H PRN Daja Spironello V, CRNP       • oxyCODONE (ROXICODONE) IR tablet 5 mg  5 mg Oral Q4H PRN Daja Spironello V, CRNP   5 mg at 02/09/23 0509   • pantoprazole (PROTONIX) EC tablet 40 mg  40 mg Oral Early Morning Daja Spironello V, CRNP   40 mg at 02/09/23 0509   • polyethylene glycol (MIRALAX) packet 17 g  17 g Oral Daily Daja Spironello V, CRNP   17 g at 02/09/23 0831   • pregabalin (LYRICA) capsule 75 mg  75 mg Oral BID Daja Spironello V, CRNP   75 mg at 02/09/23 0831   • sodium chloride infusion 0 45 %  20 mL/hr Intravenous Continuous Daja Spironello V, CRNP   Stopped at 02/09/23 0747   • temazepam (RESTORIL) capsule 15 mg  15 mg Oral HS PRN Daja Spironello V, CRNP         Allergies   Allergen Reactions   • Duloxetine Other (See Comments)     Extreme somnolence/lethargy   • Sulfa Antibiotics Rash       Objective   Vitals: Blood pressure 114/56, pulse 75, temperature 98 4 °F (36 9 °C), temperature source Core, resp  rate (!) 30, weight 88 3 kg (194 lb 10 7 oz), SpO2 95 %, currently breastfeeding  Intake/Output Summary (Last 24 hours) at 2/9/2023 0935  Last data filed at 2/9/2023 0800  Gross per 24 hour   Intake 5753 ml   Output 2505 ml   Net 3248 ml     Invasive Devices     Central Venous Catheter Line  Duration           CVC Central Lines 02/08/23 Triple 1 day    Introducer 02/08/23 1 day          Peripheral Intravenous Line  Duration           Peripheral IV 12/14/22 Right;Dorsal (posterior) Forearm 57 days    Peripheral IV 02/08/23 Right Wrist 1 day          Arterial Line  Duration           Arterial Line 02/08/23 Left Radial 1 day          Line  Duration           Pacer Wires <1 day    Pacer Wires <1 day          Drain  Duration           Closed/Suction Drain Inferior; Left Breast Bulb 19 Fr  575 days    Closed/Suction Drain Left;Superior Breast Bulb 19 Fr  575 days    Urethral Catheter Non-latex; Temperature probe 16 Fr  1 day    Chest Tube 1 Left Mediastinal 32 Fr  <1 day    Chest Tube 2 Left Pleural 32 Fr  <1 day    Chest Tube 3 Mediastinal 32 Fr  <1 day                Physical Exam  Constitutional:       General: She is not in acute distress  Appearance: She is obese  She is ill-appearing  She is not toxic-appearing or diaphoretic  Comments: Sitting in recliner, hugging heart pillow against sternum    HENT:      Right Ear: External ear normal       Left Ear: External ear normal       Nose: Nose normal  No congestion or rhinorrhea  Mouth/Throat:      Pharynx: Oropharynx is clear  No oropharyngeal exudate or posterior oropharyngeal erythema  Eyes:      Extraocular Movements: Extraocular movements intact  Conjunctiva/sclera: Conjunctivae normal       Pupils: Pupils are equal, round, and reactive to light  Cardiovascular:      Rate and Rhythm: Normal rate and regular rhythm  Pulses: Normal pulses  Heart sounds: No murmur heard  No gallop  Comments: Chest tubes in place   Pulmonary:      Effort: Pulmonary effort is normal  No respiratory distress  Breath sounds: Normal breath sounds  No wheezing or rales  Abdominal:      General: There is no distension  Palpations: Abdomen is soft  Musculoskeletal:      Cervical back: Normal range of motion and neck supple  No rigidity  Right lower leg: No edema  Left lower leg: No edema  Lymphadenopathy:      Cervical: No cervical adenopathy  Skin:     General: Skin is warm  Capillary Refill: Capillary refill takes less than 2 seconds  Coloration: Skin is not jaundiced or pale  Findings: No rash  Neurological:      General: No focal deficit present  Mental Status: She is alert and oriented to person, place, and time  Motor: No weakness     Psychiatric:         Mood and Affect: Mood normal          Behavior: Behavior normal          Thought Content: Thought content normal          Judgment: Judgment normal          The history was obtained from the review of the chart, patient  Lab Results:       Lab Results   Component Value Date    WBC 9 68 02/09/2023    HGB 10 1 (L) 02/09/2023    HCT 30 1 (L) 02/09/2023    MCV 91 02/09/2023     (L) 02/09/2023     Lab Results   Component Value Date/Time    BUN 12 02/09/2023 03:54 AM    BUN 9 12/30/2015 11:28 AM     12/30/2015 11:28 AM    K 4 8 02/09/2023 03:54 AM    K 4 2 12/30/2015 11:28 AM     (H) 02/09/2023 03:54 AM     12/30/2015 11:28 AM    CO2 23 02/09/2023 03:54 AM    CO2 23 02/08/2023 12:18 PM    CREATININE 0 53 (L) 02/09/2023 03:54 AM    CREATININE 0 6 12/30/2015 11:28 AM    AST 21 12/14/2022 08:24 AM    AST 23 12/21/2015 05:59 AM    ALT 28 12/14/2022 08:24 AM    ALT 36 12/21/2015 05:59 AM    ALB 3 4 (L) 12/14/2022 08:24 AM    ALB 3 9 12/21/2015 05:59 AM     No results for input(s): CHOL, HDL, LDL, TRIG, VLDL in the last 72 hours  No results found for: Sharee Escalona  POC Glucose   Date Value   02/09/2023 118 mg/dl   02/09/2023 123 mg/dl   02/09/2023 154 mg/dl (H)   02/09/2023 69 mg/dl   02/08/2023 117 mg/dl   02/08/2023 140 mg/dl   02/08/2023 162 mg/dl (H)   02/08/2023 180 mg/dl (H)   02/08/2023 205 mg/dl (H)   02/08/2023 183 mg/dl (H)   10/13/2015 132 mg/dL   10/13/2015 112 mg/dL       Imaging Studies: I have personally reviewed pertinent reports  Portions of the record may have been created with voice recognition software

## 2023-02-09 NOTE — RESTORATIVE TECHNICIAN NOTE
Restorative Technician Note      Patient Name: Wanda April     Restorative Tech Visit Date: 02/09/23  Note Type: Mobility  Patient Position Upon Consult: Bedside chair  Activity Performed: Ambulated  Assistive Device: Roller walker  Patient Position at End of Consult: All needs within reach;  Bedside chair

## 2023-02-09 NOTE — PLAN OF CARE
Problem: MOBILITY - ADULT  Goal: Maintain or return to baseline ADL function  Description: INTERVENTIONS:  -  Assess patient's ability to carry out ADLs; assess patient's baseline for ADL function and identify physical deficits which impact ability to perform ADLs (bathing, care of mouth/teeth, toileting, grooming, dressing, etc )  - Assess/evaluate cause of self-care deficits   - Assess range of motion  - Assess patient's mobility; develop plan if impaired  - Assess patient's need for assistive devices and provide as appropriate  - Encourage maximum independence but intervene and supervise when necessary  - Involve family in performance of ADLs  - Assess for home care needs following discharge   - Consider OT consult to assist with ADL evaluation and planning for discharge  - Provide patient education as appropriate  Outcome: Progressing  Goal: Maintains/Returns to pre admission functional level  Description: INTERVENTIONS:  - Perform BMAT or MOVE assessment daily    - Set and communicate daily mobility goal to care team and patient/family/caregiver  - Collaborate with rehabilitation services on mobility goals if consulted  - Reposition patient every 2 hours    - Out of bed for meals 3 times a day  - Out of bed for toileting  - Record patient progress and toleration of activity level   Outcome: Progressing

## 2023-02-09 NOTE — PHYSICAL THERAPY NOTE
Physical Therapy Evaluation     Patient's Name: Dariusz Garrett    Admitting Diagnosis  Coronary artery disease of native artery of native heart with stable angina pectoris ([facility] ) [I25 [id number]]    Problem List  Patient Active Problem List   Diagnosis    Coronary artery disease involving native coronary artery of native heart without angina pectoris    Type 2 diabetes mellitus without complication, without long-term current use of insulin (HCC)    Mixed dyslipidemia    Essential hypertension    Abnormal carotid ultrasound    Hypothyroidism    Spinal stenosis of lumbar region    Osteopenia of necks of both femurs    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HCC)    S/P lumbar fusion    Bilateral leg edema    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, estrogen receptor positive ([facility] )    Class 2 obesity in adult    Monoallelic mutation of CHEK2 gene in female patient    Use of anastrozole (Arimidex)    Neuropathy    Non-ST elevation myocardial infarction (NSTEMI) ([facility] )    History of PTCA    H/O heart artery stent    Colon polyp    Diarrhea    Dyspnea on exertion    T wave inversion in EKG    S/P CABG x 2       Past Medical History  Past Medical History:   Diagnosis Date    Abdominal pain     LLQ on occasion    Angina pectoris ([facility] )     Arthritis     Breast cancer ([facility] )     Cancer ([facility] )     breast left    Colon polyp     Constipation     at times    Coronary artery disease     Diabetes mellitus ([facility] )     Diarrhea     at times    Disease of thyroid gland     Hemorrhoids     Hypercholesterolemia     Hypertension     Neuropathy     both legs and feet    Obesity     Osteoporosis     Otitis media     Ovarian ca ([address] ) 1997    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[address] )     Shingles     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    Thyroid cancer ([facility] )     Urinary tract infection        Past Surgical History  Past Surgical History:   Procedure Laterality Date    ANGIOPLASTY      stent x 1    APPENDECTOMY      BACK SURGERY BAND HEMORRHOIDECTOMY      BRAIN SURGERY  1993    meningioma    BREAST BIOPSY      CARDIAC CATHETERIZATION N/A 12/7/2022    Procedure: Cardiac catheterization;  Surgeon: Raisa Graves MD;  Location: [place] [address] CATH LAB; Service: Cardiology    CARPAL TUNNEL RELEASE      CERVICAL FUSION      CHOLECYSTECTOMY      COLONOSCOPY      pt see Dr Beach Board  Up to date with her colonoscopy  COLONOSCOPY      CORONARY STENT PLACEMENT      Dec 2015    EYE SURGERY      cataract surgery    GALLBLADDER SURGERY      HYSTERECTOMY  1989    MAMMO (HISTORICAL)      up to date  see gyn    MASTECTOMY Left 07/13/2021    MASTECTOMY W/ SENTINEL NODE BIOPSY Left 07/13/2021    Procedure: BREAST ROSLYN  DIRECTED MASTECTOMY, SENTINEL LYMPH NODE BIOPSY, LYMPHATIC MAPPING WITH BLUE DYE AND RADIOACTIVE DYE (INJECT AT 1500 BY DR ANDREWS IN THE OR); Surgeon: Kaylie Gambino MD;  [facility];  Service: Surgical Oncology    OOPHORECTOMY Bilateral 1997    [place] CORONARY ARTERY BYP W/VEIN & ARTERY GRAFT 2 VEIN N/A 2/8/2023    Procedure: CORONARY ARTERY BYPASS GRAFT (CABG) X 2 VESSELS GSV-->OM1, LIMA-->LAD; EVH  LEFT LEG w/ RADHA;  Surgeon: Natacha Sanchez MD;  [facility]: [facility] MA[facility];  Service: Cardiac Surgery    SPINE SURGERY      THYROIDECTOMY      UPPER GASTROINTESTINAL ENDOSCOPY      US BREAST NEEDLE LOC LEFT Left 05/06/2021    US GUIDANCE BREAST BIOPSY LEFT EACH ADDITIONAL Left 05/06/2021    US GUIDED BREAST BIOPSY LEFT COMPLETE Left 03/15/2021    US GUIDED BREAST BIOPSY LEFT COMPLETE Left 05/06/2021 02/09/23 0939   PT Last Visit   PT Visit Date 02/09/23   Note Type   Note type Evaluation  (and Tx)   Pain Assessment   Pain Assessment Tool FLACC   Pain Location/Orientation Orientation: Mid;Location: Chest;Location: Incision   Pain Onset/Description Onset: Ongoing;Frequency: Intermittent; Descriptor: Aching;Descriptor: Discomfort   Effect of Pain on Daily Activities guarding   Patient's Stated Pain Goal No pain   Hospital Pain Intervention(s) Repositioned; Ambulation/increased activity; Emotional support   Pain Rating: FLACC (Rest) - Face 0   Pain Rating: FLACC (Rest) - Legs 0   Pain Rating: FLACC (Rest) - Activity 0   Pain Rating: FLACC (Rest) - Cry 0   Pain Rating: FLACC (Rest) - Consolability 0   Score: FLACC (Rest) 0   Pain Rating: FLACC (Activity) - Face 1   Pain Rating: FLACC (Activity) - Legs 0   Pain Rating: FLACC (Activity) - Activity 0   Pain Rating: FLACC (Activity) - Cry 1   Pain Rating: FLACC (Activity) - Consolability 1   Score: FLACC (Activity) 3   Restrictions/Precautions   Braces or Orthoses   (denies)   Other Precautions Cardiac/sternal;Multiple lines;Telemetry  (CT)   Home Living   Type of Home House   Home Layout Two level  (2 AISHA)   Home Equipment Cane;Stair glide   Prior Function   Level of [place] Independent with functional mobility  (amb w/ occasional use of SPC when in the store)   Lives With Spouse   General   Additional Pertinent History cleared for assessment by marcelo   Cognition   Overall Cognitive Status [facility]   Arousal/Participation Alert   Attention Attends with cues to redirect   Orientation Level Oriented to person;Oriented to place;Oriented to situation   Memory Decreased recall of precautions   Following Commands Follows one step commands without difficulty   Subjective   Subjective Alert; in the chair; agreeable to mobilize   RUE Assessment   RUE Assessment WFL  (AROM)   LUE Assessment   LUE Assessment WFL  (AROM)   RLE Assessment   RLE Assessment WFL  (AROM)   Strength RLE   RLE Overall Strength   (fair +/ good - (grossly))   LLE Assessment   LLE Assessment WFL  (AROM)   Strength LLE   LLE Overall Strength   (fair +/ good - (grossly))   Transfers   Sit to Stand 3  Moderate assistance   Additional items Assist x 1; Increased time required;Verbal cues   Stand to Sit 3  Moderate assistance   Additional items Assist x 1; Increased time required;Verbal cues   Ambulation/Elevation   Gait pattern Excessively slow;Short stride; Inconsistent zaheer   Gait Assistance 3  Moderate assist   Additional items Assist x 1;Verbal cues; Tactile cues   Assistive Device Rolling walker   Distance 20 ft  (limited by fatigue)   Balance   Static Sitting Fair   Dynamic Sitting Fair -   Static Standing Poor +   Dynamic Standing Poor +   Ambulatory Poor   Activity Tolerance   Activity Tolerance Patient limited by fatigue   Medical Staff Made Aware Co-eval performed w/ OTR due to complexity of medical status and multiple comorbidities   Nurse Made Aware spoke to [place]or, [facility]   Assessment   Prognosis Good   Problem List Decreased strength;Decreased endurance; Impaired balance;Decreased mobility;Obesity   Assessment Pt is 69 y o  female admitted with Dx of Multivessel coronary artery disease and underwent Coronary artery bypass grafting x 2 with left internal mammary artery to left anterior descending artery and saphenous vein graft to obtuse marginal 1 on 2/8/2023  Pt 's comorbidities affecting POC include: Arthritis, Breast cancer ([facility] ), Diabetes mellitus ([facility] [address] ), Hypertension, Neuropathy, Obesity, and Osteoporosis and personal factors of: AISHA, Pt's clinical presentation is currently unstable/unpredictable which is evident in ongoing telemetry monitoring while in a critical care unit, abnormal lab values being monitored/trending, CT in place and inability to progress further w/ mobilization at this time  Pt presents w/ generalized weakness, incl decreased LE strength, decreased functional endurance and activity tolerance, impaired balance w/ associated gait deviations requiring use of rw at this time and fall risk  Will cont to follow pt in PT for progressive mobilization to address above functional deficits and to max level of (I), endurance, and safety   Otherwise, anticipate pt will return home w/ available family support upon D/C provided she cont improving w/ mobility skills, safety, and endurance (incl on the steps) and when medically cleared; home PT follow up is recommended at this time; will follow  Barriers to Discharge Inaccessible home environment   Goals   Patient Goals to get better   STG Expiration Date 02/19/23   Short Term Goal #1 7-10 days  Pt will amb 300 ft w/ least restrictive assistive device PRN, mod (I) in order to facilitate safe return to premorbid environment and community amb status  Pt will negotiate 2 steps w/ hand rail and SPC PRN, mod (I) in order to navigate in and out of home environment safely  Pt will achieve (I) level w/ bed mob in order to facilitate safety with OOB and back to bed transitions in own living environment  Pt will perform transfers w/ mod (I) to assure (I) and safety w/ functional mobility/transitions w/ all aspects of mobility/locomotion  Pt will participate in LE therex and balance activities to max progression w/ mobility skills  PT Treatment Day 0   Plan   Treatment/Interventions Functional transfer training;LE strengthening/ROM; Elevations; Therapeutic exercise; Endurance training;Equipment eval/education; Bed mobility;Gait training;Spoke to nursing;Spoke to case management;OT   PT Frequency 4-6x/wk   Recommendation   PT Discharge Recommendation Home with home health rehabilitation   Equipment Recommended [place] walker   AM-PAC Basic Mobility Inpatient   Turning in Flat Bed Without Bedrails 3   Lying on Back to Sitting on Edge of Flat Bed Without Bedrails 2   Moving Bed to Chair 2   Standing Up From Chair Using Arms 2   Walk in Room 2   Climb 3-5 Stairs With Railing 2   Basic Mobility Inpatient Raw Score 13   Basic Mobility Standardized Score 33 99   Highest Level Of Mobility   JH-HLM Goal 4: Move to chair/commode   JH-HLM Achieved 6: Walk 10 steps or more   Modified [place] Scale   Modified [place] Scale 4   Additional Treatment Session   Start Time 0940   End Time 0949   Treatment Assessment Additional follow up consecutive session performed to progress further w/ mobilization/ambulation distance to max overall strength and endurance and to facilitate progression w/ functional mobility skills and overall level of (I)  Sit <--> stand transfers w/ min (A)x1; amb 25 ft w/ rw, min (A)x1; chair ride back to room   Additional Treatment Day 1   End of Consult   Patient Position at End of Consult Bedside chair; All needs within reach         [facility], PT

## 2023-02-09 NOTE — OCCUPATIONAL THERAPY NOTE
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, estrogen receptor positive ([address] )    Class 2 obesity in adult    Neuropathy    Past Medical History  Past Medical History:   Diagnosis Date    Abdominal pain     LLQ on occasion    Angina pectoris ([address] )     Arthritis     Breast cancer ([address] )     Cancer ([address] )     breast left    Colon polyp     Constipation     at times    Coronary artery disease     Diabetes mellitus (HCC)     Diarrhea     at times    Disease of thyroid gland     Hemorrhoids     Hypercholesterolemia     Hypertension     Neuropathy     both legs and feet    Obesity     Osteoporosis     Otitis media     Ovarian ca ([address] ) 1997    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[address] )     Shingles     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    Thyroid cancer ([address] )     Urinary tract infection      Past Surgical History  Past Surgical History:   Procedure Laterality Date    ANGIOPLASTY      stent x 1    APPENDECTOMY      BACK SURGERY      BAND HEMORRHOIDECTOMY      BRAIN SURGERY  1993    meningioma    BREAST BIOPSY      CARDIAC CATHETERIZATION N/A 12/7/2022    Procedure: Cardiac catheterization;  Surgeon: Cory Anaya MD;  Location: [place] [address] CATH LAB; Service: Cardiology    CARPAL TUNNEL RELEASE      CERVICAL FUSION      CHOLECYSTECTOMY      COLONOSCOPY      pt see Dr Tania Morris  Up to date with her colonoscopy      COLONOSCOPY      CORONARY STENT PLACEMENT      Dec 2015    EYE SURGERY cataract surgery    [address] (HISTORICAL)      up to date  see gyn    MASTECTOMY Left 07/13/2021    MASTECTOMY W/ SENTINEL NODE BIOPSY Left 07/13/2021    Procedure: BREAST ROSLYN  DIRECTED MASTECTOMY, SENTINEL LYMPH NODE BIOPSY, LYMPHATIC MAPPING WITH BLUE DYE AND RADIOACTIVE DYE (INJECT AT 1500 BY DR ANDREWS IN THE OR); Surgeon: Lon Miller MD;  [facility];  Service: Surgical Oncology    OOPHORECTOMY Bilateral 1997    [place] CORONARY ARTERY BYP W/VEIN & ARTERY GRAFT 2 VEIN N/A 2/8/2023    Procedure: CORONARY ARTERY BYPASS GRAFT (CABG) X 2 VESSELS GSV-->OM1, LIMA-->LAD; EVH  LEFT LEG w/ RADHA;  Surgeon: Mikki Kenyon MD;  [facility];  Service: Cardiac Surgery    SPINE SURGERY      THYROIDECTOMY      UPPER GASTROINTESTINAL ENDOSCOPY      US BREAST NEEDLE LOC LEFT Left 05/06/2021    US GUIDANCE BREAST BIOPSY LEFT EACH ADDITIONAL Left 05/06/2021    US GUIDED BREAST BIOPSY LEFT COMPLETE Left 03/15/2021    US GUIDED BREAST BIOPSY LEFT COMPLETE Left 05/06/2021 02/09/23 0948   OT Last Visit   OT Visit Date 02/09/23   Note Type   Note type Evaluation   Pain Assessment   Pain Assessment Tool FLACC   Pain Location/Orientation Location: Chest   Hospital Pain Intervention(s) Repositioned; Ambulation/increased activity; Emotional support   Pain Rating: FLACC (Rest) - Face 0   Pain Rating: FLACC (Rest) - Legs 0   Pain Rating: FLACC (Rest) - Activity 0   Pain Rating: FLACC (Rest) - Cry 0   Pain Rating: FLACC (Rest) - Consolability 0   Score: FLACC (Rest) 0   Pain Rating: FLACC (Activity) - Face 1   Pain Rating: FLACC (Activity) - Legs 0   Pain Rating: FLACC (Activity) - Activity 0   Pain Rating: FLACC (Activity) - Cry 1   Pain Rating: FLACC (Activity) - Consolability 0   Score: FLACC (Activity) 2   Restrictions/Precautions   Other Precautions Cardiac/sternal;Multiple lines;Telemetry; Fall Risk;Pain  (CT)   Home Living   Type of [address] Two level;Stairs to enter with rails   Bathroom Shower/Tub Tub/shower unit   Bathroom Toilet Standard   Bathroom Equipment Grab bars in [place] [address];Stair glide   Additional Comments Pt reports living in a 2 story home with 2 AISHA and a stair lift to reach 2nd floor  Prior Function   Level of [place] Independent with ADLs; Independent with IADLS   Lives With Spouse   Receives Help From [facility] in the last 6 months 0   Vocational Retired   Lifestyle   Autonomy Pt reports being I with ADLS, IADLS and mobility with SPC in the community PTA  (+)    Reciprocal Relationships Pt lives with her  who she reports is sick and unable to assist  Pt reports she has to assist him at times but that they have other local family   Service to Others Retired   [address] Enjoys sewing   ADL   Where [address] 5  Supervision/Setup   Grooming Assistance 5  Supervision/Setup   UB [address] 3  Moderate Assistance   LB [address] 3  Moderate Assistance   [address] 3  Moderate Assistance    [address] 3  Moderate Assistance   [address]  3  Moderate Assistance   Transfers   Sit to Stand 3  Moderate assistance   Additional items Increased time required   Stand to Sit 3  Moderate assistance   Additional items Increased time required   Functional Mobility   Functional Mobility 3  Moderate assistance   Additional Comments Pt demonstrated short household mobility with several seated rest breaks  Additional items Rolling walker   Balance   Static Sitting Fair +   Dynamic Sitting Fair   Static Standing Fair -   Dynamic Standing Poor +   Ambulatory Poor   Activity Tolerance   Activity Tolerance Patient limited by pain; Patient limited by fatigue   Medical Staff Made Aware Seen with PT 2* medical complexity/ instability   Nurse Made Aware RN confirmed okay to see pt   Cognition   Overall Cognitive Status WFL   Arousal/Participation Alert; Cooperative   Attention Within functional limits   Orientation Level Oriented X4   Memory Decreased recall of precautions   Following Commands Follows one step commands without difficulty   Comments Pt is pleasant and cooperative  Pt participated in cardiac packet this session  Assessment   Limitation Decreased ADL status; Decreased endurance;Decreased self-care trans;Decreased high-level ADLs   Prognosis Good   Assessment Pt is a 69 y o  female admitted to B on 2/8/2023 w/ CAD s/p CABG x2 on 2/8/2023  Pt  has a past medical history of Abdominal pain, Angina pectoris ([address] ), Arthritis, Breast cancer ([address] ), Cancer ([address] ), Colon polyp, Constipation, Coronary artery disease, Diabetes mellitus ([facility] ), Diarrhea, Disease of thyroid gland, Hemorrhoids, Hypercholesterolemia, Hypertension, Neuropathy, Obesity, Osteoporosis, Otitis media, Ovarian ca ([address] ) (1997), 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[facility] ), Shingles, 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), Thyroid cancer ([facility] ), and Urinary tract infection  Pt with active OT orders and ambulate  orders  Pt resides in a 2 story home with 2 AISHA and a stair lift to reach 2nd floor  Pt lives with her  who she reports is sick and unable to assist  Pt reports she has to assist him at times but that they have other local family  Pt was I w/  ADLS and IADLS, (+) drove, & required use of SPC in the community PTA  Currently pt is mod A for functional transfers and functional mobility, and mod A for ADLS overall  Pt is limited at this time 2*: pain, endurance, activity tolerance, functional mobility, forward functional reach, functional standing tolerance and decreased I w/ ADLS/IADLS  The following Occupational Performance Areas to address include: grooming, bathing/shower, toilet hygiene, dressing, functional mobility and clothing management   Based on the aforementioned OT evaluation, functional performance deficits, and assessments, pt has been identified as a high complexity evaluation  From OT standpoint, anticipate d/c home with family support  Pt to continue to benefit from acute immediate OT services to address the following goals 3-5x/week to  w/in 10-14 days: Ciarra Nunez Goals   Patient Goals To feel better and return home   LTG Time Frame 10-14   Long Term Goal #1 See goals below   Plan   Treatment Interventions ADL retraining;Functional transfer training; Endurance training;UE strengthening/ROM; Patient/family training;Equipment evaluation/education; Compensatory technique education;Continued evaluation; Energy conservation; Activityengagement;Cardiac education   Goal Expiration Date 23   OT Frequency 3-5x/wk   Recommendation   OT Discharge Recommendation No rehabilitation needs  (Home with increased social support pending progress)   AM-PAC Daily Activity Inpatient   Lower Body Dressing 2   Bathing 2   Toileting 2   Upper Body Dressing 3   Grooming 3   Eating 4   Daily Activity Raw Score 16   Daily Activity Standardized Score (Calc for Raw Score >=11) 35 96   AM-PAC Applied Cognition Inpatient   Following a Speech/Presentation 3   Understanding Ordinary Conversation 4   Taking Medications 4   Remembering Where Things Are Placed or Put Away 4   Remembering List of 4-5 Errands 4   Taking Care of Complicated Tasks 3   Applied Cognition Raw Score 22   Applied Cognition Standardized Score 47 83   Modified [place] Scale   Modified [place] Scale 4       GOALS    1) Pt will increase activity tolerance to G for 30 min txment sessions    2) Pt will complete UB/LB dressing/self care w/ mod I using adaptive device and DME as needed    3) Pt will complete bathing w/ Mod I w/ use of AE and DME as needed    4) Pt will complete toileting w/ mod I w/ G hygiene/thoroughness using DME as needed    5) Pt will improve functional transfers to Mod I on/off all surfaces using DME as needed w/ G balance/safety     6) Pt will improve functional mobility during ADL/IADL/leisure tasks to Mod I using DME as needed w/ G balance/safety     7) Pt will demonstrate G carryover of pt/caregiver education and training as appropriate w/ mod I     8) Pt will engage in cardiac education using the Recovering After Cardiac Surgery packet w/ G participation and G carryover  9) Pt will demonstrate 100% carryover of precautions s/p review w/ mod I w/ G tolerance/participation t/o functional ADL/IADL/leisure tasks      10) Pt will independently identify and utilize 2-3 coping strategies to increase positive affect and promote overall well-being      11) Pt will engage in ongoing cognitive assessment w/ G participation to assist w/ safe d/c planning/recommendations (as needed)    Yony Raphael, CYNTHIA, OTR/L

## 2023-02-09 NOTE — PLAN OF CARE
Problem: MOBILITY - ADULT  Goal: Maintains/Returns to pre admission functional level  Description: INTERVENTIONS:  - Perform BMAT or MOVE assessment daily    - Set and communicate daily mobility goal to care team and patient/family/caregiver  - Collaborate with rehabilitation services on mobility goals if consulted  - Perform Range of Motion 2 times a day  - Reposition patient every 2 hours    - Dangle patient 2 times a day  - Stand patient 2 times a day  - Ambulate patient 2 times a day  - Out of bed to chair 3 times a day   - Out of bed for meals 3 times a day  - Out of bed for toileting  - Record patient progress and toleration of activity level   Outcome: Progressing

## 2023-02-09 NOTE — CONSULTS
Consultation - Cardiology Team One  Macel April 69 y o  female MRN: [id number]  Unit/Bed#: [facility] 414-01 Encounter: [id number]    Inpatient consult to Cardiology  Consult performed by: Tita Hines PA-C  Consult ordered by: BERNADETTE Canales          Physician Requesting Consult: Hieu Weber MD  Reason for Consult / Principal Problem: CAD s/p CABG    Assessment:    1   CAD: s/p CABG x 2 with LIMA-LAD and SVG-OM1  POD #1  · History of SAMMIE to prox LAD in 2015  · Final intraoperative RADHA: EF 55-60%  · Rhythm management: Amiodarone 200 mg TID and lopressor 12 5 mg BID   · Volume management:  IV lasix 40 mg daily w/ K+ supplement  Net output + 3 3 L  · Hemodynamics: Pressor discontinued  Remains with EPW and CTs  · Aspirin, BB and statin  2  Preserved biventricular systolic function: Final intraoperative RADHA EF 55-60%  Volume management as above  3   Postoperative anemia: Baseline hemoglobin 12  Hemoglobin 10 1 today  4   Essential hypertension: Average /57 on lopressor 12 5 mg BID  5   Dyslipidemia: Lipid panel 10/2022 , , HDL 35, LDL 83 on fish oil  Not on a statin at baseline 2/2 myalgias  Started on atorvastatin 80 mg daily this admission  6   Type II DM: Hgb A1C 5 8 in 10/2022  Management per primary team     Plan/Recommendations:  · Continue IV diuretics for negative fluid balance  · Monitor I&Os, renal function, electrolytes and weight  · Maintain potassium >4 and magnesium >2  · Encourage ambulation and incentive spirometry  · Continue aspirin, BB and statin  · Continue amiodarone  · Follow up with Dr Baltazar Alegria on 2/28/2023  __________________________________________________________    CC: No chief complaint on file      History of Present Illness   HPI: Wanda April is a 69y o  year old female who has CAD s/p SAMMIE to proximal LAD in 2015, essential hypertension, dyslipidemia, type II DM, obesity who follows with cardiologist Dr Baltazar Alegria    Patient had reported exertional chest pain and dyspnea in 12/2022 was evaluated in the ED and underwent cardiac catheterization on 12/7 that revealed multivessel CAD involving 70% mid-distal LM, 95% proximal LCx, 40% ostial RCA  She was evaluated by CT surgery with plans for CABG on 12/19 but this was canceled as patient had COVID infection  Patient presented 2/8/2022 and underwent successful CABG x2 with LIMA-LAD and SVG-OM1  Final intraoperative RADHA revealed EF of 55-60%  Cardiology has been consulted for postoperative co-management  Medication regimen includes aspirin 162 mg daily, lisinopril 20 mg daily, Lopressor 25 mg TID  Patient resting in bed during consultation and has some incisional chest pain  She denies sob, palpitations, lightheadedness or dizziness  She feels tired  Cardiac catheterization 12/7/2022:  Impression:  •  Ost LAD to Prox LAD lesion is 20% stenosed  •  Mid LM to Dist LM lesion is 70% stenosed  •  Prox Cx lesion is 95% stenosed  •  Ost RCA lesion is 40% stenosed  •  Mid RCA lesion is 45% stenosed  •  The left ventricular systolic function is normal   EF is around 55 to 60% no gradient across aortic valve  •  Patient has significant stenosis of distal left main involving ostial LAD as well as proximal circumflex with a nonobstructive disease involving right coronary artery with preserved LV systolic function  •  Lateral annulus calcification and calcification of aorta was noted  Left Main   The vessel was visualized by angiography and is moderate in size  There is severe diffuse disease throughout the vessel  Left main is a moderate-sized vessel  It is heavily calcified  It has distal left main eccentric around 70% stenosis  Some pressure dampening was noted   Mid LM to Dist LM lesion is 70% stenosed  BRENT flow is 3  The lesion is focal  The lesion is moderately calcified  Left Anterior Descending   The vessel was visualized by angiography and is moderate in size   There is mild diffuse disease throughout the vessel  Left main is a normal to lysis vessel it has proximally patent stent with about 10% stenosis  Ostial LAD appears to have some stenosis in certain views  It is a good target vessel for bypass   Ost LAD to Prox LAD lesion is 20% stenosed  Not the culprit lesion  BRENT flow is 3  The lesion was previously treated using a stent of unknown type  Left Circumflex   The vessel was visualized by angiography and is moderate in size  There is mild diffuse disease throughout the vessel  Circumflex is a nondominant but medium to large size artery it has mild luminal regularities  It is a focal stenosis in the proximal circumflex  Prox Cx lesion is 95% stenosed  BRENT flow is 3  The lesion is located at the bend and tubular  The lesion is moderately calcified  Right Coronary Artery   The vessel was visualized by angiography and is moderate in size  There is mild diffuse disease throughout the vessel  Ost RCA lesion is 40% stenosed  RBENT flow is 3  The lesion is focal    Mid RCA lesion is 45% stenosed  BRENT flow is 3  The lesion is focal         Echocardiogram 12/6/2022: EF 60% with no obvious WMA, normal RV function, moderate-severe mitral valve calcification with mild regurgitation and very mild stenosis with mean gradient 3-4 mmHg  EKG reviewed personally: 2/9/2023-sinus rhythm at a rate of 84 bpm with no acute ischemic change  No significant change compared to the EKG from 2/8/2023  Telemetry reviewed personally: NSR in the 80s      Review of Systems   Constitutional: Positive for malaise/fatigue  Negative for chills  Cardiovascular: Positive for chest pain  Negative for dyspnea on exertion, leg swelling, near-syncope, orthopnea, palpitations, paroxysmal nocturnal dyspnea and syncope  Respiratory: Negative  Negative for cough, shortness of breath and wheezing  Endocrine: Negative  Hematologic/Lymphatic: Negative  Skin: Negative  Musculoskeletal: Negative  Gastrointestinal: Negative  Negative for diarrhea, nausea and vomiting  Neurological: Negative for dizziness, light-headedness and weakness  Psychiatric/Behavioral: Negative  Negative for altered mental status  All other systems reviewed and are negative  Historical Information   Past Medical History:   Diagnosis Date   • Abdominal pain     LLQ on occasion   • [facility]    • Arthritis    • Breast cancer ([facility] )    • Cancer ([place] [address] )     breast left   • Colon polyp    • Constipation     at times   • Coronary artery disease    • Diabetes mellitus ([place] [address] )    • Diarrhea     at times   • Disease of thyroid gland    • Hemorrhoids    • Hypercholesterolemia    • Hypertension    • Neuropathy     both legs and feet   • Obesity    • Osteoporosis    • Otitis media    • Ovarian ca [facility]) 1997   • 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[address] )    • Shingles    • 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   • Thyroid cancer ([place] [address] )    • Urinary tract infection      Past Surgical History:   Procedure Laterality Date   • ANGIOPLASTY      stent x 1   • APPENDECTOMY     • BACK SURGERY     • BAND HEMORRHOIDECTOMY     • BRAIN SURGERY  1993    meningioma   • BREAST BIOPSY     • CARDIAC CATHETERIZATION N/A 12/7/2022    Procedure: Cardiac catheterization;  Surgeon: Cory Anaya MD;  Location: [place] [address] CATH LAB; Service: Cardiology   • CARPAL TUNNEL RELEASE     • CERVICAL FUSION     • CHOLECYSTECTOMY     • COLONOSCOPY      pt see Dr Tania Morris  Up to date with her colonoscopy  • COLONOSCOPY     • CORONARY STENT PLACEMENT      Dec 2015   • EYE SURGERY      cataract surgery   • GALLBLADDER SURGERY     • HYSTERECTOMY  1989   • MAMMO (HISTORICAL)      up to date  see gyn   • MASTECTOMY Left 07/13/2021   • MASTECTOMY W/ SENTINEL NODE BIOPSY Left 07/13/2021    Procedure: BREAST ROSLYN  DIRECTED MASTECTOMY, SENTINEL LYMPH NODE BIOPSY, LYMPHATIC MAPPING WITH BLUE DYE AND RADIOACTIVE DYE (INJECT AT 1500 BY DR ANDREWS IN THE OR);   Surgeon: Betty Barrios MD;  Location: [facility];  Service: Surgical Oncology   • OOPHORECTOMY Bilateral 1997   • SPINE SURGERY     • THYROIDECTOMY     • UPPER GASTROINTESTINAL ENDOSCOPY     • US BREAST NEEDLE LOC LEFT Left 05/06/2021   • US GUIDANCE BREAST BIOPSY LEFT EACH ADDITIONAL Left 05/06/2021   • US GUIDED BREAST BIOPSY LEFT COMPLETE Left 03/15/2021   • US GUIDED BREAST BIOPSY LEFT COMPLETE Left 05/06/2021     Social History     Substance and Sexual Activity   Alcohol Use Never     Social History     Substance and Sexual Activity   Drug Use Never     Social History     Tobacco Use   Smoking Status Never   Smokeless Tobacco Never     Family History:   Family History   Problem Relation Age of Onset   • Diabetes Mother    • Heart disease Mother    • Dementia Mother    • Esophageal cancer Father 61   • Endometrial cancer Sister 76   • Asthma Sister    • Cancer Sister    • No Known Problems Sister    • No Known Problems Sister    • No Known Problems Sister    • Stomach cancer Brother 70   • Multiple myeloma Brother 67   • Cancer Brother    • No Known Problems Maternal Grandmother    • Prostate cancer Maternal Grandfather    • No Known Problems Paternal Grandmother    • Prostate cancer Paternal Grandfather    • Breast cancer Maternal Aunt 46   • No Known Problems Paternal Aunt    • No Known Problems Paternal [de-identified]    • Asthma Daughter    • Asthma Son    • Depression Son    • Breast cancer Other 39   • Breast cancer Other 39   • Diabetes Family    • Cancer Family    • Arthritis Family    • Heart disease Family        Meds/Allergies   all current active meds have been reviewed, current meds:   Current Facility-Administered Medications   Medication Dose Route Frequency   • acetaminophen (TYLENOL) tablet 975 mg  975 mg Oral Q8H   • amiodarone tablet 200 mg  200 mg Oral Q8H [address]   • anastrozole (ARIMIDEX) tablet 1 mg  1 mg Oral Daily   • aspirin tablet 325 mg  325 mg Oral Daily   • atorvastatin (LIPITOR) tablet 80 mg  80 mg Oral Daily With Dinner   • bisacodyl (DULCOLAX) rectal suppository 10 mg  10 mg Rectal Daily PRN   • ceFAZolin (ANCEF) IVPB (premix in dextrose) 2,000 mg 50 mL  2,000 mg Intravenous Q8H   • docusate sodium (COLACE) capsule 100 mg  100 mg Oral BID   • fondaparinux (ARIXTRA) subcutaneous injection 2 5 mg  2 5 mg Subcutaneous Daily   • furosemide (LASIX) injection 40 mg  40 mg Intravenous Daily   • insulin lispro (HumaLOG) 100 units/mL subcutaneous injection 1-6 Units  1-6 Units Subcutaneous TID AC   • insulin lispro (HumaLOG) 100 units/mL subcutaneous injection 1-6 Units  1-6 Units Subcutaneous HS   • levothyroxine tablet 88 mcg  88 mcg Oral Early Morning   • metoprolol tartrate (LOPRESSOR) partial tablet 12 5 mg  12 5 mg Oral Q12H [address]   • mupirocin (BACTROBAN) 2 % nasal ointment 1 application  1 application Nasal R96P [address]   • ondansetron (ZOFRAN) injection 4 mg  4 mg Intravenous Q6H PRN   • oxyCODONE (ROXICODONE) IR tablet 2 5 mg  2 5 mg Oral Q4H PRN   • oxyCODONE (ROXICODONE) IR tablet 5 mg  5 mg Oral Q4H PRN   • pantoprazole (PROTONIX) EC tablet 40 mg  40 mg Oral Early Morning   • polyethylene glycol (MIRALAX) packet 17 g  17 g Oral Daily   • pregabalin (LYRICA) capsule 75 mg  75 mg Oral BID   • temazepam (RESTORIL) capsule 15 mg  15 mg Oral HS PRN    and PTA meds:   Prior to Admission Medications   Prescriptions Last Dose Informant Patient Reported? Taking?    Insulin Pen Needle (Sure Comfort Pen Needles) 31G X 5 MM MISC   No No   Sig: by Does not apply route daily   Lancets (OneTouch Delica Plus [id number]) MISC   No No   Sig: USE DAILY   OneTouch Ultra test strip   No No   Sig: Use 1 each daily Use as instructed   anastrozole (ARIMIDEX) 1 mg tablet 2/7/2023 at 1200  No Yes   Sig: Take 1 tablet (1 mg total) by mouth daily   aspirin (ECOTRIN LOW STRENGTH) 81 mg EC tablet 2/6/2023  Yes No   Sig: Take 162 mg by mouth daily   glipiZIDE (GLUCOTROL) 10 mg tablet 2/4/2023  No No   Sig: Take 1 tablet (10 mg total) by mouth 2 (two) times a day before meals   levothyroxine 88 mcg tablet 2/7/2023  No Yes   Sig: Take 1 tablet (88 mcg total) by mouth daily   liraglutide (VICTOZA) injection 2/6/2023  Yes No   Sig: Inject 1 2 mg under the skin daily at bedtime    lisinopril (ZESTRIL) 20 mg tablet 2/4/2023  No No   Sig: Take 1 tablet (20 mg total) by mouth daily at bedtime   metFORMIN (GLUCOPHAGE) 1000 MG tablet 2/4/2023  No No   Sig: Take 1 tablet (1,000 mg total) by mouth 2 (two) times a day with meals   metoprolol tartrate (LOPRESSOR) 25 mg tablet 2/7/2023 at 1800  No Yes   Sig: Take 1 tablet (25 mg total) by mouth 3 (three) times a day   mupirocin (BACTROBAN) 2 % nasal ointment   No No   Sig: into each nostril 2 (two) times a day Apply 2 times daily for 5 days prior to procedure   nitroglycerin (NITROSTAT) 0 4 mg SL tablet   No No   Sig: Place 1 tablet (0 4 mg total) under the tongue every 5 (five) minutes as needed for chest pain   pregabalin (LYRICA) 75 mg capsule 2/7/2023  No Yes   Sig: Take 1 capsule (75 mg total) by mouth 2 (two) times a day      Facility-Administered Medications: None          Allergies   Allergen Reactions   • Duloxetine Other (See Comments)     Extreme somnolence/lethargy   • Sulfa Antibiotics Rash       Objective   Vitals: Blood pressure 131/59, pulse 66, temperature 98 4 °F (36 9 °C), temperature source Core, resp  rate 14, weight 88 3 kg (194 lb 10 7 oz), SpO2 96 %, currently breastfeeding ,     Body mass index is 36 78 kg/m²  ,     Systolic (61DJF), RMM:425 , Min:102 , JDK:015     Diastolic (56TOV), CDB:53, Min:53, Max:60    Wt Readings from Last 3 Encounters:   02/09/23 88 3 kg (194 lb 10 7 oz)   01/30/23 88 kg (194 lb)   01/26/23 87 7 kg (193 lb 4 8 oz)      Lab Results   Component Value Date    CREATININE 0 53 (L) 02/09/2023    CREATININE 0 63 02/08/2023    CREATININE 0 65 12/14/2022             Intake/Output Summary (Last 24 hours) at 2/9/2023 1139  Last data filed at 2/9/2023 0800  Gross per 24 hour   Intake 4703 ml   Output 1655 ml   Net 3048 ml     Weight (last 2 days)     Date/Time Weight    02/09/23 0600 88 3 (194 67)    02/08/23 0801 87 (191 8)    02/08/23 0633 4 082 (9)        Invasive Devices     Central Venous Catheter Line  Duration           CVC Central Lines 02/08/23 Triple 1 day          Peripheral Intravenous Line  Duration           Peripheral IV 12/14/22 Right;Dorsal (posterior) Forearm 57 days    Peripheral IV 02/08/23 Right Wrist 1 day          Line  Duration           Pacer Wires 1 day    Pacer Wires 1 day          Drain  Duration           Chest Tube 1 Left Mediastinal 32 Fr  1 day    Chest Tube 2 Left Pleural 32 Fr  1 day    Chest Tube 3 Mediastinal 32 Fr  1 day    Urethral Catheter Non-latex; Temperature probe 16 Fr  1 day                  Physical Exam  Vitals and nursing note reviewed  Constitutional:       General: She is not in acute distress  Appearance: She is well-developed  Comments: On RA in NAD   HENT:      Head: Normocephalic and atraumatic  Neck:      Vascular: No JVD  Comments: Invasive lines noted in right neck  Cardiovascular:      Rate and Rhythm: Normal rate and regular rhythm  Heart sounds: Normal heart sounds  No murmur heard  No friction rub  Pulmonary:      Effort: Pulmonary effort is normal  No respiratory distress  Breath sounds: Normal breath sounds  No wheezing or rales  Abdominal:      General: Bowel sounds are normal  There is no distension  Palpations: Abdomen is soft  Tenderness: There is no abdominal tenderness  Musculoskeletal:         General: No tenderness  Normal range of motion  Cervical back: Normal range of motion and neck supple  Right lower leg: No edema  Left lower leg: No edema  Skin:     General: Skin is warm and dry  Coloration: Skin is pale  Findings: No erythema        Comments: Sternal incision with occlusive dressing  LLE wrapped   Neurological:      Mental Status: She is alert and oriented to person, place, and time  Psychiatric:         Mood and Affect: Mood normal          Behavior: Behavior normal          Thought Content:  Thought content normal          Judgment: Judgment normal            LABORATORY RESULTS:      CBC with diff:   Results from last 7 days   Lab Units 02/09/23  0354 02/08/23 2013 02/08/23  1218 02/08/23  1214 02/08/23  1120 02/08/23  1052 02/08/23  1042 02/08/23  1040   WBC Thousand/uL 9 68  --   --   --   --   --   --   --    HEMOGLOBIN g/dL 10 1* 11 1*  --  11 7  --   --   --   --    I STAT HEMOGLOBIN g/dl  --   --  10 2*  --  6 8* 6 8* 6 8*  --    HEMATOCRIT % 30 1* 32 2*  --  33 6*  --   --   --   --    HEMATOCRIT, ISTAT %  --   --  30*  --  20* 20* 20*  --    MCV fL 91  --   --   --   --   --   --   --    PLATELETS Thousands/uL 102*  --   --  108*  --   --   --  130*   MCH pg 30 5  --   --   --   --   --   --   --    MCHC g/dL 33 6  --   --   --   --   --   --   --    RDW % 13 4  --   --   --   --   --   --   --    MPV fL 10 3  --   --  10 1  --   --   --  9 8       CMP:  Results from last 7 days   Lab Units 02/09/23  0354 02/08/23 2013 02/08/23  1555 02/08/23  1218 02/08/23  1214 02/08/23  1120 02/08/23  1052 02/08/23  1042 02/08/23  1029 02/08/23  1010 02/08/23  0842   POTASSIUM mmol/L 4 8 4 3 3 1*  --  4 2  --   --   --   --   --   --    CHLORIDE mmol/L 113*  --   --   --  112*  --   --   --   --   --   --    CO2 mmol/L 23  --   --   --  22  --   --   --   --   --   --    CO2, I-STAT mmol/L  --   --   --  23  --  25 29 26 26 27 30   BUN mg/dL 12  --   --   --  9  --   --   --   --   --   --    CREATININE mg/dL 0 53*  --   --   --  0 63  --   --   --   --   --   --    GLUCOSE, ISTAT mg/dl  --   --   --  128  --  134 138 133 127 144* 140   CALCIUM mg/dL 7 6*  --   --   --  8 3  --   --   --   --   --   --    EGFR ml/min/1 73sq m 92  --   --   --  87  --   --   --   --   --   --        BMP:  Results from last 7 days   Lab Units 02/09/23  0354 02/08/23 2013 23  1555 23  1218 23  1214 23  1120 23  1052 23  1042 23  1029   POTASSIUM mmol/L 4 8 4 3 3 1*  --  4 2  --   --   --   --    CHLORIDE mmol/L 113*  --   --   --  112*  --   --   --   --    CO2 mmol/L 23  --   --   --  22  --   --   --   --    CO2, I-STAT mmol/L  --   --   --  23  --  25 29 26 26   BUN mg/dL 12  --   --   --  9  --   --   --   --    CREATININE mg/dL 0 53*  --   --   --  0 63  --   --   --   --    GLUCOSE, ISTAT mg/dl  --   --   --  128  --  134 138 133 127   CALCIUM mg/dL 7 6*  --   --   --  8 3  --   --   --   --           Lab Results   Component Value Date    NTBNP 331 2022    NTBNP 315 2022    NTBNP 164 (H) 2020            Results from last 7 days   Lab Units 23  0354   MAGNESIUM mg/dL 2 5                         Lipid Profile:   Lab Results   Component Value Date    CHOL 140 2015    CHOL 178 2015    CHOL 171 10/13/2015     Lab Results   Component Value Date    HDL 35 (L) 10/18/2022    HDL 35 (L) 2022    HDL 34 (L) 2022     Lab Results   Component Value Date    LDLCALC 83 10/18/2022    LDLCALC 77 2022    LDLCALC 69 2022     Lab Results   Component Value Date    TRIG 183 (H) 10/18/2022    TRIG 224 (H) 2022    TRIG 219 (H) 2022         Cardiac testing:   Results for orders placed during the hospital encounter of 21    Echo complete with contrast if indicated    [address] [address]  [address] [facility]  [phone]  Transthoracic Echocardiogram  2D, M-mode, Doppler, and Color Doppler  Study date:  11-Mar-2021  Patient: Erika Alfonso  MR number: [id number]  Account number: [de-identified]  : 1945  Age: 77 years  Gender: Female  Status: Outpatient  Location: Echo lab  Height: 61 in  Weight: 207 5 lb  BP: 184/ 96 mmHg  Indications:  Follow up  Diagnoses: I25 83 - Coronary atherosclerosis due to lipid rich plaque  Sonographer: Salley Frankel, RCS  Primary Physician:  Kleber Tilley MD  Referring Physician:  Charleen Soria MD  Group:  [id number]  Interpreting Physician:  Ovidio Bloch, MD  SUMMARY  PROCEDURE INFORMATION:  Technically difficult study with suboptimal acoustic windows  Measurements were done off-axis, therefore subject to inaccuracy  LEFT VENTRICLE:  Visually left ventricular chamber size and wall thickness appear normal   Image quality is inadequate for evaluation of regional wall motion  Visually estimated left ventricular ejection fraction is 60-65 %  Indeterminate diastolic function (with underlying MAC)  RIGHT VENTRICLE:  Normal right ventricular size and systolic function  TAPSE is 2 5 cm  LEFT ATRIUM:  Mildly dilated left atrium  MITRAL VALVE:  There is moderate to severe mitral annular calcification  There is trace mitral regurgitation  No significant mitral stenosis seen  AORTIC VALVE:  Aortic valve is probably tricuspid  Leaflets appear mildly sclerotic  There was no evidence of significant aortic stenosis or regurgitation  TRICUSPID VALVE:  Trace tricuspid regurgitation  AORTA:  Visually aortic root diameter appears normal  PERICARDIUM:  No pericardial effusion seen  COMPARISONS:  There is no significant change compared to previous echo from 12/21/2015  HISTORY: PRIOR HISTORY: Diabetes,Hypothyroidism,HTN,CAD,Dyslipidemia,adenocarcinoma of thyroid  PROCEDURE: The procedure was performed in the echo lab  This was a routine study  Technically difficult study with suboptimal acoustic windows  Measurements were done off-axis, therefore subject to inaccuracy The transthoracic approach was used  The study included complete 2D imaging, M-mode, complete spectral Doppler, and color Doppler  The heart rate was 87 bpm, at the start of  the study  Images were obtained from the parasternal, apical, subcostal, and suprasternal notch acoustic windows   Echocardiographic views were limited due to restricted patient mobility, poor acoustic window availability, decreased  penetration, and lung interference  This was a technically difficult study  LEFT VENTRICLE: Visually left ventricular chamber size and wall thickness appear normal   Image quality is inadequate for evaluation of regional wall motion  Visually estimated left ventricular ejection fraction is 60-65 %  Indeterminate diastolic function (with underlying MAC)  RIGHT VENTRICLE: Normal right ventricular size and systolic function  TAPSE is 2 5 cm  LEFT ATRIUM: Mildly dilated left atrium  RIGHT ATRIUM: Normal right atrial size  MITRAL VALVE: There is moderate to severe mitral annular calcification  There is trace mitral regurgitation  No significant mitral stenosis seen  AORTIC VALVE: Aortic valve is probably tricuspid  Leaflets appear mildly sclerotic  There was no evidence of significant aortic stenosis or regurgitation  TRICUSPID VALVE: Trace tricuspid regurgitation  PERICARDIUM: No pericardial effusion seen  AORTA: Visually aortic root diameter appears normal  SYSTEM MEASUREMENT TABLES  2D  EF (Teich): 50 88 %  %FS: 24 97 %  Ao Diam: 3 41 cm  EDV(Teich): 36 84 ml  ESV(Teich): 18 1 ml  IVSd: 1 43 cm  LA Area: 19 94 cm2  LA Diam: 3 56 cm  LVIDd: 3 06 cm  LVIDs: 2 3 cm  LVOT Diam: 1 87 cm  LVPWd: 1 41 cm  RA Area: 13 81 cm2  RVIDd: 3 35 cm  SV (Teich): 18 75 ml  PW  MV E/A Ratio: 0 78  [employer] Accredited Echocardiography Laboratory  Prepared and electronically signed by  Hyacinth Carter MD  Signed 11-Mar-2021 14:46:33    No results found for this or any previous visit  No valid procedures specified  No results found for this or any previous visit  Imaging: I have personally reviewed pertinent reports      RADHA Anesthesia    Result Date: 2/8/2023  Narrative: Joelle Lopes MD     2/8/2023 11:22 AM Procedure Performed: RADHA Anesthesia Start Time:  2/8/2023 8:49 AM Preanesthesia Checklist Patient identified, IV assessed, risks and benefits discussed, monitors and equipment assessed, procedure being performed at surgeon's request and anesthesia consent obtained  Procedure Diagnostic Indications for RADHA:  assessment of ascending aorta, assessment of surgical repair and hemodynamic monitoring  Type of RADHA: complete RADHA with interpretation  Images Saved: ultrasound permanent image saved  Physician Requesting Echo: Mae Barnhart MD   Location performed: OR  Intubated  Heart visualized  Insertion of RADHA Probe:  Easy  Probe Type:  Epiaortic and multiplane  Modalities:  Color flow mapping, 3D, pulse wave Doppler and continuous wave Doppler  Echocardiographic and Doppler Measurements PREPROCEDURE LEFT VENTRICLE: Systolic Function: normal  Ejection Fraction: 55-60%  Cavity size: normal    Regional Wall Motion Abnormalities: none  RIGHT VENTRICLE: Systolic Function: normal   Cavity size normal  Hypertrophy (LVH) present  AORTIC VALVE: Leaflets: normal and trileaflet  Leaflet motions normal and normal  Stenosis: none  Regurgitation: trace  MITRAL VALVE: Leaflets: calcified, severe MAC and normal  Leaflet Motions: normal  Regurgitation: mild  Stenosis: none  Mean Gradient: 1 mmHg  TRICUSPID VALVE: Leaflets: normal  Leaflet Motions: normal  Stenosis: none  Regurgitation: mild  PULMONIC VALVE: Leaflets: normal  Regurgitation: trace  Stenosis: none  ASCENDING AORTA: Size:  normal   Dissection not present  AORTIC ARCH: Size:  normal   dissection not present  Grade 2: severe intimal thickening without protruding atheroma  DESCENDING AORTA: Size: normal   Dissection not present  Grade 3: atheroma protruding < 0 5 cm into lumen  RIGHT ATRIUM: Size:  normal  No spontaneous echo contrast  LEFT ATRIUM: Size: dilated   No spontaneous echo contrast  LEFT ATRIAL APPENDAGE: Size: normal  No spontaneous echo contrast ATRIAL SEPTUM: Intra-atrial septal morphology: normal   VENTRICULAR SEPTUM: Intra-ventricular septum morphology: normal  EPIAORTIC: Plaque Thickness: 0-5 mm  OTHER FINDINGS: Pericardium:  normal  Pleural Effusion:  none  POSTPROCEDURE LEFT VENTRICLE: Unchanged   RIGHT VENTRICLE: Unchanged   AORTIC VALVE: Unchanged   MITRAL VALVE: Unchanged   TRICUSPID VALVE: Unchanged   PULMONIC VALVE: Unchanged   ATRIA: Unchanged   AORTA: Unchanged   REMOVAL: Probe Removal: atraumatic  XR chest portable ICU    Result Date: 2/8/2023  Narrative: CHEST INDICATION:   S/P open heart  COMPARISON:  Chest x-ray December 14, 2022 EXAM PERFORMED/VIEWS:  XR CHEST PORTABLE ICU FINDINGS: Patient is status post median sternotomy  Endotracheal tube terminates 5 cm above the lee  Right [place] catheter terminates in the pulmonary outflow tract  Right internal jugular catheter terminates in the superior vena cava  Mediastinal tubes and chest tubes are seen with no thorax  No infiltrates or congestive heart failure  Cardiomediastinal silhouette appears unremarkable  Impression: Status post median sternotomy with no pneumothorax  Tubes and catheters in satisfactory position  Workstation performed: [id number]           Counseling / Coordination of Care  Total floor / unit time spent today 45 minutes  Greater than 50% of total time was spent with the patient and / or family counseling and / or coordination of care  A description of the counseling / coordination of care: Review of history, current assessment, development of a plan  Code Status: Level 1 - Full Code    ** Please Note: Dragon 360 Dictation voice to text software may have been used in the creation of this document   **

## 2023-02-09 NOTE — PLAN OF CARE
Problem: PHYSICAL THERAPY ADULT  Goal: Performs mobility at highest level of function for planned discharge setting  See evaluation for individualized goals  Description: Treatment/Interventions: Functional transfer training, LE strengthening/ROM, Elevations, Therapeutic exercise, Endurance training, Equipment eval/education, Bed mobility, Gait training, Spoke to nursing, Spoke to case management, OT  Equipment Recommended: Dante Fry       See flowsheet documentation for full assessment, interventions and recommendations  Note: Prognosis: Good  Problem List: Decreased strength, Decreased endurance, Impaired balance, Decreased mobility, Obesity  Assessment: Pt is 69 y o  female admitted with Dx of Multivessel coronary artery disease and underwent Coronary artery bypass grafting x 2 with left internal mammary artery to left anterior descending artery and saphenous vein graft to obtuse marginal 1 on 2/8/2023  Pt 's comorbidities affecting POC include: Arthritis, Breast cancer ([facility] ), Diabetes mellitus ([facility] ), Hypertension, Neuropathy, Obesity, and Osteoporosis and personal factors of: AISHA, Pt's clinical presentation is currently unstable/unpredictable which is evident in ongoing telemetry monitoring while in a critical care unit, abnormal lab values being monitored/trending, CT in place and inability to progress further w/ mobilization at this time  Pt presents w/ generalized weakness, incl decreased LE strength, decreased functional endurance and activity tolerance, impaired balance w/ associated gait deviations requiring use of rw at this time and fall risk  Will cont to follow pt in PT for progressive mobilization to address above functional deficits and to max level of (I), endurance, and safety   Otherwise, anticipate pt will return home w/ available family support upon D/C provided she cont improving w/ mobility skills, safety, and endurance (incl on the steps) and when medically cleared; home PT follow up is recommended at this time; will follow  Barriers to Discharge: Inaccessible home environment     PT Discharge Recommendation: Home with home health rehabilitation    See flowsheet documentation for full assessment

## 2023-02-10 LAB
ANION GAP SERPL CALCULATED.3IONS-SCNC: 3 MMOL/L (ref 4–13)
BUN SERPL-MCNC: 23 MG/DL (ref 5–25)
CALCIUM SERPL-MCNC: 8.3 MG/DL (ref 8.3–10.1)
CHLORIDE SERPL-SCNC: 107 MMOL/L (ref 96–108)
CO2 SERPL-SCNC: 23 MMOL/L (ref 21–32)
CREAT SERPL-MCNC: 0.78 MG/DL (ref 0.6–1.3)
ERYTHROCYTE [DISTWIDTH] IN BLOOD BY AUTOMATED COUNT: 13.6 % (ref 11.6–15.1)
GFR SERPL CREATININE-BSD FRML MDRD: 74 ML/MIN/1.73SQ M
GLUCOSE SERPL-MCNC: 148 MG/DL (ref 65–140)
GLUCOSE SERPL-MCNC: 152 MG/DL (ref 65–140)
GLUCOSE SERPL-MCNC: 162 MG/DL (ref 65–140)
GLUCOSE SERPL-MCNC: 178 MG/DL (ref 65–140)
GLUCOSE SERPL-MCNC: 199 MG/DL (ref 65–140)
HCT VFR BLD AUTO: 33.4 % (ref 34.8–46.1)
HGB BLD-MCNC: 11.4 G/DL (ref 11.5–15.4)
MAGNESIUM SERPL-MCNC: 2.4 MG/DL (ref 1.6–2.6)
MCH RBC QN AUTO: 31.3 PG (ref 26.8–34.3)
MCHC RBC AUTO-ENTMCNC: 34.1 G/DL (ref 31.4–37.4)
MCV RBC AUTO: 92 FL (ref 82–98)
MV STENOSIS PRESSURE HALF TIME: 62 MS
MV VALVE AREA P 1/2 METHOD: 3.5 CM2
PLATELET # BLD AUTO: 121 THOUSANDS/UL (ref 149–390)
PMV BLD AUTO: 10.8 FL (ref 8.9–12.7)
POTASSIUM SERPL-SCNC: 4.5 MMOL/L (ref 3.5–5.3)
RBC # BLD AUTO: 3.64 MILLION/UL (ref 3.81–5.12)
SODIUM SERPL-SCNC: 133 MMOL/L (ref 135–147)
WBC # BLD AUTO: 12.87 THOUSAND/UL (ref 4.31–10.16)

## 2023-02-10 RX ADMIN — INSULIN LISPRO 2 UNITS: 100 INJECTION, SOLUTION INTRAVENOUS; SUBCUTANEOUS at 22:45

## 2023-02-10 RX ADMIN — Medication 12.5 MG: at 21:36

## 2023-02-10 RX ADMIN — PREGABALIN 75 MG: 75 CAPSULE ORAL at 08:28

## 2023-02-10 RX ADMIN — AMIODARONE HYDROCHLORIDE 200 MG: 200 TABLET ORAL at 06:29

## 2023-02-10 RX ADMIN — DOCUSATE SODIUM 100 MG: 100 CAPSULE, LIQUID FILLED ORAL at 17:53

## 2023-02-10 RX ADMIN — FONDAPARINUX SODIUM 2.5 MG: 2.5 INJECTION, SOLUTION SUBCUTANEOUS at 08:29

## 2023-02-10 RX ADMIN — AMIODARONE HYDROCHLORIDE 200 MG: 200 TABLET ORAL at 21:37

## 2023-02-10 RX ADMIN — DOCUSATE SODIUM 100 MG: 100 CAPSULE, LIQUID FILLED ORAL at 08:28

## 2023-02-10 RX ADMIN — FUROSEMIDE 40 MG: 10 INJECTION, SOLUTION INTRAMUSCULAR; INTRAVENOUS at 08:28

## 2023-02-10 RX ADMIN — ATORVASTATIN CALCIUM 80 MG: 80 TABLET, FILM COATED ORAL at 15:58

## 2023-02-10 RX ADMIN — ACETAMINOPHEN 975 MG: 325 TABLET ORAL at 15:58

## 2023-02-10 RX ADMIN — PANTOPRAZOLE SODIUM 40 MG: 40 TABLET, DELAYED RELEASE ORAL at 06:29

## 2023-02-10 RX ADMIN — MUPIROCIN 1 APPLICATION: 20 OINTMENT TOPICAL at 08:28

## 2023-02-10 RX ADMIN — ACETAMINOPHEN 975 MG: 325 TABLET ORAL at 06:27

## 2023-02-10 RX ADMIN — Medication 12.5 MG: at 08:28

## 2023-02-10 RX ADMIN — POLYETHYLENE GLYCOL 3350 17 G: 17 POWDER, FOR SOLUTION ORAL at 08:29

## 2023-02-10 RX ADMIN — LEVOTHYROXINE SODIUM 88 MCG: 88 TABLET ORAL at 06:29

## 2023-02-10 RX ADMIN — AMIODARONE HYDROCHLORIDE 200 MG: 200 TABLET ORAL at 15:58

## 2023-02-10 RX ADMIN — PREGABALIN 75 MG: 75 CAPSULE ORAL at 17:53

## 2023-02-10 RX ADMIN — MUPIROCIN 1 APPLICATION: 20 OINTMENT TOPICAL at 21:39

## 2023-02-10 RX ADMIN — ASPIRIN 325 MG ORAL TABLET 325 MG: 325 PILL ORAL at 08:28

## 2023-02-10 RX ADMIN — ACETAMINOPHEN 975 MG: 325 TABLET ORAL at 21:36

## 2023-02-10 RX ADMIN — ANASTROZOLE 1 MG: 1 TABLET, COATED ORAL at 12:18

## 2023-02-10 RX ADMIN — INSULIN LISPRO 1 UNITS: 100 INJECTION, SOLUTION INTRAVENOUS; SUBCUTANEOUS at 12:18

## 2023-02-10 NOTE — PHYSICAL THERAPY NOTE
PHYSICAL THERAPY NOTE          Patient Name: Bob Bah  JTEXW'N Date: 2/10/2023         02/10/23 0935   PT Last Visit   PT Visit Date 02/10/23   Note Type   Note Type Treatment   Pain Assessment   Pain Assessment Tool 0-10   Pain Score No Pain   Restrictions/Precautions   Other Precautions Cardiac/sternal;Telemetry   General   Chart Reviewed Yes   Additional Pertinent History cleared for Tx session (spoke to nsjillian)   Response to Previous Treatment Patient with no complaints from previous session  Cognition   Overall Cognitive Status WFL   Arousal/Participation Alert; Cooperative   Attention Within functional limits   Orientation Level Oriented to person;Oriented to place;Oriented to situation   Memory Within functional limits   Following Commands Follows all commands and directions without difficulty   Subjective   Subjective Alert; in the chair; agreeable to amb and try steps   Transfers   Sit to Stand 6  Modified independent   Stand to Sit 6  Modified independent   Ambulation/Elevation   Gait pattern Excessively slow; Short stride   Gait Assistance 5  Supervision   Additional items Verbal cues   Assistive Device Rolling walker   Distance 40 ft + 10 ft + 100 ft w/ seated rest periods and steps negotiation in between   Stair Management Assistance 5  Supervision   Additional items Assist x 1;Verbal cues; Tactile cues  (reviewed sequencing, UE precaution on the hand rail and rw set-up prior to steps at home)   Stair Management Technique One rail L;Step to pattern; Foreward;Backward; Sideways   Number of Stairs 2  (1 step x 2 trials (AISHA; pt reports she has a stairglide to the 2nd floor))   Balance   Static Sitting Good   Dynamic Sitting Fair +   Static Standing Fair   Dynamic Standing Ezequiel Mora 5551 -   Activity Tolerance   Activity Tolerance Patient tolerated treatment well   Nurse Made Aware spoke to Melissa Reyez RN   Exercises Knee AROM Long Arc Quad Sitting;15 reps;AAROM; Bilateral   Ankle Pumps Sitting;15 reps;AROM; Bilateral   Marching Sitting;10 reps;AROM; Bilateral   Assessment   Prognosis Good   Problem List Decreased endurance;Obesity   Assessment Pt demonstrated overall improvement in functional mobility progressing to mod (I) level w/ transfers and (S) w/ amb and on the steps; rest periods provided and pt remained in NAD; LE therex for HEP reviewed and performed; overall, cont to recommend home PT follow up upon returning home w/ available support; will follow  Barriers to Discharge None   Goals   Patient Goals to go home   STG Expiration Date 02/19/23   PT Treatment Day 1   Plan   Treatment/Interventions LE strengthening/ROM; Elevations; Therapeutic exercise; Endurance training;Bed mobility;Gait training;Spoke to nursing;OT   Progress Improving as expected   PT Frequency 4-6x/wk   Recommendation   PT Discharge Recommendation Home with home health rehabilitation  (home PT;)   Equipment Recommended [facility] Recommended Wheeled walker   AM-PAC Basic Mobility Inpatient   Turning in Flat Bed Without Bedrails 4   Lying on Back to Sitting on Edge of Flat Bed Without Bedrails 3   Moving Bed to Chair 4   Standing Up From Chair Using Arms 4   Walk in Room 3   Climb 3-5 Stairs With Railing 3   Basic Mobility Inpatient Raw Score 21   Basic Mobility Standardized Score 45 55   Highest Level Of Mobility   JH-HLM Goal 6: Walk 10 steps or more   JH-HLM Achieved 8: Walk 250 feet ot more   Education   Education Provided Mobility training;Home exercise program;Assistive device   Patient Demonstrates verbal understanding   End of Consult   Patient Position at End of Consult Bedside chair; All needs within [facility]

## 2023-02-10 NOTE — RESTORATIVE TECHNICIAN NOTE
Restorative Technician Note      Patient Name: Tonio Dietz     [facility] Tech Visit Date: 02/10/23  Note Type: Mobility  Patient Position Upon Consult: Bedside chair  Activity Performed: Ambulated  Assistive Device: Roller walker  Patient Position at End of Consult: All needs within reach; Supine;  Other (comment) (for tubes/wires)

## 2023-02-10 NOTE — CASE MANAGEMENT
Case Management Discharge Planning Note    Patient name Clarence Victor  Location [facility] 411/[facility] 354-87 MRN [id number]  : 1945 Date 2/10/2023       Current Admission Date: 2023  Current Admission Diagnosis:S/P CABG x 2   Patient Active Problem List    Diagnosis Date Noted   • S/P CABG x 2 2023   • Dyspnea on exertion 2022   • T wave inversion in EKG 2022   • Diarrhea 10/10/2022   • Non-ST elevation myocardial infarction (NSTEMI) ([facility] ) 2022   • History of PTCA 2022   • H/O heart artery stent 2022   • Colon polyp 2022   • Neuropathy 2022   • Use of anastrozole (Arimidex)    • Monoallelic mutation of CHEK2 gene in female patient 2021   • Class 2 obesity in adult 2021   • 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, estrogen receptor positive ([place] [address] ) 2021   • Bilateral leg edema 2020   • Hypothyroidism 2020   • Spinal stenosis of lumbar region 2020   • Osteopenia of necks of both femurs 2020   • S/P lumbar fusion 2018   • Coronary artery disease involving native coronary artery of native heart without angina pectoris 2016   • Abnormal carotid ultrasound 2016   • 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[place] [address] ) 2013   • Type 2 diabetes mellitus without complication, without long-term current use of insulin ([place] [address] ) 2013   • Mixed dyslipidemia 2013   • Essential hypertension 2013      LOS (days): 2  Geometric Mean LOS (GMLOS) (days): 8 30  Days to GMLOS:5 9     OBJECTIVE:  Risk of Unplanned Readmission Score: 16 82         Current admission status: Inpatient   Preferred Pharmacy:   [id number] [address],  [address] [address]  Phone: [phone] Fax: [id number] [place] [address], [address] [place], [place] [address]furt  [address] [facility] [place] [address]  Phone: [phone] Fax: [phone]    [place] [address] [address] [place] [address]  Phone: [phone] Fax: [phone]    Primary Care Provider: Mauri Rodriguez MD    Primary Insurance: MEDICARE  Secondary Insurance: Belen Vidales DETAILS:                                                                                        IMM Given (Date):: 02/10/23  IMM Given to[de-identified] Patient

## 2023-02-10 NOTE — OCCUPATIONAL THERAPY NOTE
Occupational Therapy Progress Note     Patient Name: Billie Graves  AOAOB'T Date: 2/10/2023  Problem List  Principal Problem:    S/P CABG x 2  Active Problems:    Coronary artery disease involving native coronary artery of native heart without angina pectoris    Type 2 diabetes mellitus without complication, without long-term current use of insulin (HCC)    Mixed dyslipidemia    Essential hypertension    Hypothyroidism    Spinal stenosis of lumbar region    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HCC)    Bilateral leg edema    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, estrogen receptor positive ([facility] )    Class 2 obesity in adult    Neuropathy          02/10/23 0909   OT Last Visit   OT Visit Date 02/10/23   Note Type   Note Type Treatment   Pain Assessment   Pain Assessment Tool 0-10   Pain Score No Pain   Restrictions/Precautions   Other Precautions Cardiac/sternal;Multiple lines;Telemetry   Lifestyle   Autonomy Pt reports being I with ADLS, IADLS and mobility with SPC in the community PTA  (+)    Reciprocal Relationships Pt lives with her  who she reports is sick and unable to assist  Pt reports she has to assist him at times but that they have other local family   Service to Others Retired   [address] Enjoys sewing   ADL   Where Assessed Chair   Grooming Assistance 5  Supervision/Setup   Grooming Deficit Setup;Supervision/safety; Increased time to complete;Brushing hair   Toileting Assistance  3  Moderate Assistance   Toileting Deficit Setup; Increased time to complete;Supervison/safety   Functional Standing Tolerance   Time ~5 minutes   Activity Grooming   Comments Pt stood at the sink with RW to complete grooming tasks  Reqiures occasional rest breaks of leaning on the sink 2* fatigue   Transfers   Sit to Stand 4  Minimal assistance   Additional items Assist x 1; Increased time required   Stand to Sit 4  Minimal assistance   Additional items Assist x 1; Increased time required   Toilet transfer 4  Minimal assistance   Additional items Assist x 1; Increased time required   Functional Mobility   Functional Mobility 5  Supervision   Additional Comments Pt demonstrated mobility within room with RW  Additional items Rolling walker   Cognition   Overall Cognitive Status WFL   Arousal/Participation Alert; Cooperative   Attention Within functional limits   Orientation Level Oriented X4   Memory Decreased recall of precautions   Following Commands Follows one step commands without difficulty   Comments Pt is very pleasant and cooperative  Activity Tolerance   Activity Tolerance Patient limited by fatigue   Medical Staff Made Aware RN confirmed okay to see pt   Assessment   Assessment Patient participated in Skilled OT session this date with interventions consisting of ADL re training with the use of correct body mechanics, maintaining sternal precautions and  therapeutic activities to: increase activity tolerance   Patient agreeable to OT treatment session, upon arrival patient was found supine in bed  In comparison to previous session, patient with improvements in mobility and ADLS overall  Patient requiring frequent rest periods  Patient continues to be functioning below baseline level, occupational performance remains limited secondary to factors listed above and increased risk for falls and injury  From OT standpoint, recommendation at time of d/c would be Home with family support and Home OT  Patient to benefit from continued Occupational Therapy treatment while in the hospital to address deficits as defined above and maximize level of functional independence with ADLs and functional mobility     Plan   Treatment Interventions ADL retraining;Functional transfer training;Cardiac education   Goal Expiration Date 02/23/23   OT Treatment Day 1   OT Frequency 3-5x/wk   Recommendation   OT Discharge Recommendation Home with home health rehabilitation   AM-PAC Daily Activity Inpatient   Lower Body Dressing 3   Bathing 3   Toileting 3   Upper Body Dressing 3   Grooming 4   Eating 4   Daily Activity Raw Score 20   Daily Activity Standardized Score (Calc for Raw Score >=11) 42 03   AM-PAC Applied Cognition Inpatient   Following a Speech/Presentation 4   Understanding Ordinary Conversation 4   Taking Medications 4   Remembering Where Things Are Placed or Put Away 4   Remembering List of 4-5 Errands 4   Taking Care of Complicated Tasks 4   Applied Cognition Raw Score 24   Applied Cognition Standardized Score 62 21   Modified Ronnie Scale   Modified Ronnie Scale 3     Sheri Massey, CYNTHIA, OTR/L

## 2023-02-10 NOTE — PROGRESS NOTES
Progress Note - Cardiothoracic Surgery   Gemma Older 69 y o  female MRN: [id number]  Unit/Bed#: [facility] 411-01 Encounter: [id number]    Coronary artery disease  S/P coronary artery bypass grafting; POD # 2      24 Hour Events: Transferred from ICU to telemetry  Lamine weaned off  Oxygen weaned off       Medications:   Scheduled Meds:  Current Facility-Administered Medications   Medication Dose Route Frequency Provider Last Rate   • acetaminophen  975 mg Oral Q8H BERNADETTE Hudson     • amiodarone  200 mg Oral Q8H [facility] BERNADETTE Hudson     • anastrozole  1 mg Oral Daily BERNADETTE Hudson     • aspirin  325 mg Oral Daily BERNADETTE Hudson     • atorvastatin  80 mg Oral Daily With uVore BERNADETTE MENDEZ     • bisacodyl  10 mg Rectal Daily PRN BERNADETTE Hudson     • docusate sodium  100 mg Oral BID BERNADETTE Hudson     • fondaparinux  2 5 mg Subcutaneous Daily BERNADETTE Hudson     • furosemide  40 mg Intravenous Daily BERNADETTE Hudson     • insulin lispro  1-6 Units Subcutaneous TID AC BERNADETTE Hudson     • insulin lispro  1-6 Units Subcutaneous HS BERNADETTE Hudson     • levothyroxine  88 mcg Oral Early Morning BERNADETTE Hudson     • metoprolol tartrate  12 5 mg Oral Q12H [facility] BERNADETTE Hudson     • mupirocin  1 application Nasal C25U [facility] BERNADETTE Hudson     • ondansetron  4 mg Intravenous Q6H PRN BERNADETTE Hudson     • oxyCODONE  2 5 mg Oral Q4H PRN BERNADETTE Hudson     • oxyCODONE  5 mg Oral Q4H PRN BERNADETTE Hudson     • pantoprazole  40 mg Oral Early Morning BERNADETTE Hudson     • polyethylene glycol  17 g Oral Daily BERNADETTE Hudson     • pregabalin  75 mg Oral BID Daja Spironello V, CRNP     • temazepam  15 mg Oral HS PRN BERNADETTE Hudson       Continuous Infusions:   PRN Meds: •  bisacodyl  • ondansetron  •  oxyCODONE  •  oxyCODONE  •  temazepam    Vitals:   Vitals:    02/09/23 1906 02/09/23 2300 02/10/23 0300 02/10/23 0626   BP: 132/60 102/52 129/58    BP Location: Right arm Right arm Right arm    Pulse: 63 60 59    Resp: 18 18     Temp: 98 6 °F (37 °C) 98 °F (36 7 °C) 97 8 °F (36 6 °C)    TempSrc: Oral Oral Oral    SpO2: 95% 94% 96%    Weight:    91 2 kg (201 lb 1 oz)       Telemetry: NSR; Heart Rate: 64    Respiratory:   SpO2: SpO2: 96 %, SpO2 Activity: SpO2 Activity: At Rest; Room Air    Intake/Output:     Intake/Output Summary (Last 24 hours) at 2/10/2023 0655  Last data filed at 2/10/2023 0601  Gross per 24 hour   Intake 184 17 ml   Output 1130 ml   Net -945 83 ml        Chest tube Output:    Mediastinal tubes: 70 mL/8 hours  370 mL/24 hours   Pleural tubes: 20 mL/8 hours  220 mL/24 hours     Weights:   Weight (last 2 days)     Date/Time Weight    02/10/23 0626 91 2 (201 06)    02/09/23 0600 88 3 (194 67)    02/08/23 0801 87 (191 8)    02/08/23 0633 4 082 (9)            Results:   Results from last 7 days   Lab Units 02/09/23  0354 02/08/23 2013 02/08/23  1218 02/08/23  1214 02/08/23  1042 02/08/23  1040   WBC Thousand/uL 9 68  --   --   --   --   --    HEMOGLOBIN g/dL 10 1* 11 1*  --  11 7  --   --    I STAT HEMOGLOBIN g/dl  --   --  10 2*  --    < >  --    HEMATOCRIT % 30 1* 32 2*  --  33 6*  --   --    HEMATOCRIT, ISTAT %  --   --  30*  --    < >  --    PLATELETS Thousands/uL 102*  --   --  108*  --  130*    < > = values in this interval not displayed       Results from last 7 days   Lab Units 02/09/23  0354 02/08/23 2013 02/08/23  1555 02/08/23  1218 02/08/23  1214   SODIUM mmol/L 138  --   --   --  141   POTASSIUM mmol/L 4 8 4 3 3 1*  --  4 2   CHLORIDE mmol/L 113*  --   --   --  112*   CO2 mmol/L 23  --   --   --  22   CO2, I-STAT mmol/L  --   --   --  23  --    BUN mg/dL 12  --   --   --  9   CREATININE mg/dL 0 53*  --   --   --  0 63   GLUCOSE, ISTAT mg/dl  --   --   --  128  --    CALCIUM mg/dL 7 6*  --   --   --  8 3         Point of care glucose: 148-222    Studies:  CXR: No effusion  No PTX  Large gastric bubble  I have personally reviewed pertinent reports  and I have personally reviewed pertinent films in PACS    Invasive Lines/Tubes:  Invasive Devices     Central Venous Catheter Line  Duration           CVC Central Lines 02/08/23 Triple 1 day          Peripheral Intravenous Line  Duration           Peripheral IV 12/14/22 Right;Dorsal (posterior) Forearm 57 days    Peripheral IV 02/08/23 Right Wrist 1 day          Line  Duration           Pacer Wires 1 day    Pacer Wires 1 day          Drain  Duration           Chest Tube 1 Left Mediastinal 32 Fr  1 day    Chest Tube 2 Left Pleural 32 Fr  1 day    Chest Tube 3 Mediastinal 32 Fr  1 day                Physical Exam:    HEENT/NECK:  Normocephalic  Atraumatic  No jugular venous distention  Cardiac: Regular rate and rhythm and No murmurs/rubs/gallops  Pulmonary:  Breath sounds clear bilaterally and No rales/rhonchi/wheezes  Abdomen:  Non-tender, Non-distended and Normal bowel sounds  Incisions: Sternum is stable  Incision dressed with Acticoat  No erythema or drainage and Saphenectomy incision dressed with Acticoat  No erythema or drainage  Extremities: Extremities warm/dry and 1+ edema B/L  Neuro: Alert and oriented X 3 and No focal deficits  Skin: Warm/Dry, without rashes or lesions      Assessment:  Principal Problem:    S/P CABG x 2  Active Problems:    Coronary artery disease involving native coronary artery of native heart without angina pectoris    Type 2 diabetes mellitus without complication, without long-term current use of insulin (HCC)    Mixed dyslipidemia    Essential hypertension    Hypothyroidism    Spinal stenosis of lumbar region    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HCC)    Bilateral leg edema    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, estrogen receptor positive ([address] )    Class 2 obesity in adult    Neuropathy       Coronary artery disease  S/P coronary artery bypass grafting; POD # 2    Plan:    1  Cardiac:   NSR; HR/BP well-controlled  Continue Lopressor, 12 5mg PO BID  Continue ASA and Statin therapy  Epicardial pacing wires no longer required  Remove today  Maintain central IV access today   Continue DVT prophylaxis    2  Pulmonary:   Good Room air oxygen saturation; Continue incentive spirometry/Coughing/Deep breathing exercises  Chest tube drainage diminished; D/C today    3  Renal:   Intake/Output net: -900 mL/24 hours  Diuretic Regimen:  Continue Lasix 40 mg IV QD  Continue Potassium Chloride 20 mEq PO QD  Post op Creatinine stable; Follow up labs prn    4  Neuro:  Neurologically intact; No active issues  Incisional pain well-controlled  Continue Tylenol, 975 mg PO q 8, standing dose  Continue Oxycodone, 2 5 to 5 mg PO q 4 hours prn pain    5  GI:  Tolerating TLC 2 3 gm sodium diet  Maintain 1800 mL daily fluid restriction   Continue stool softeners and prn suppository  Continue GI prophylaxis    6  Endo:   History of diabetes; Continue SQ insulin therapy as directed by endocrinology physician  Insulin administration teaching for home therapy ordered    7    Hematology:    Post-operative blood count acceptable; Trend prn    Thrombocytopenia; (102)        8     Disposition:      Ambulating independently, Anticipate discharge to home 2/11 vs 2/12     VTE Pharmacologic Prophylaxis: Sequential compression device (Venodyne)  and Fondaparinux (Arixtra)  VTE Mechanical Prophylaxis: sequential compression device    Collaborative rounds completed with supervising physician  Plan of care discussed with bedside nurse    SIGNATURE: Sanjeev Bravo PA-C  DATE: February 10, 2023  TIME: 6:55 AM

## 2023-02-10 NOTE — RESTORATIVE TECHNICIAN NOTE
Restorative Technician Note      Patient Name: Sonya Haas Tech Visit Date: 02/10/23  Note Type: Mobility  Patient Position Upon Consult: Bedside chair  Activity Performed: Transferred  Assistive Device: Other (Comment) (HHA x 1)  Patient Position at End of Consult: All needs within reach; Supine;  Other (comment) (tubes/wires)

## 2023-02-10 NOTE — DISCHARGE INSTR - AVS FIRST PAGE
Type 2 Diabetes Mellitus    STOP Glipizide ; continue take Metformin and Leraglutide   Discuss with your PCP if switching from Leraglutide to Semaglutide available/affordable given cardiovascular benefit studied with Semaglutide

## 2023-02-10 NOTE — CASE MANAGEMENT
Case Management Discharge Planning Note    Patient name Neetu Santoyo  Location [facility] 137-29 MRN [id number]  : 1945 Date 2/10/2023       Current Admission Date: 2023  Current Admission Diagnosis:S/P CABG x 2   Patient Active Problem List    Diagnosis Date Noted   • S/P CABG x 2 2023   • Dyspnea on exertion 2022   • T wave inversion in EKG 2022   • Diarrhea 10/10/2022   • Non-ST elevation myocardial infarction (NSTEMI) ([facility] ) 2022   • History of PTCA 2022   • H/O heart artery stent 2022   • Colon polyp 2022   • Neuropathy 2022   • Use of anastrozole (Arimidex)    • Monoallelic mutation of CHEK2 gene in female patient 2021   • Class 2 obesity in adult 2021   • 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, estrogen receptor positive ([place] [address] ) 2021   • Bilateral leg edema 2020   • Hypothyroidism 2020   • Spinal stenosis of lumbar region 2020   • Osteopenia of necks of both femurs 2020   • S/P lumbar fusion 2018   • Coronary artery disease involving native coronary artery of native heart without angina pectoris 2016   • Abnormal carotid ultrasound 2016   • 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[place] [address] ) 2013   • Type 2 diabetes mellitus without complication, without long-term current use of insulin ([place] [address] ) 2013   • Mixed dyslipidemia 2013   • Essential hypertension 2013      LOS (days): 2  Geometric Mean LOS (GMLOS) (days): 8 30  Days to GMLOS:6 2     OBJECTIVE:  Risk of Unplanned Readmission Score: 16 74         Current admission status: Inpatient   Preferred Pharmacy:   [facility] [address] [facility], [address] [place] [address],  [id number]  Phone: [phone] Fax: [phone]    [id number] [place] [address] [place] [address]  Phone: [phone] Fax: [phone]    [address], [place] [address] [facility] [address] Lars Jeronimoronal Perez [address] [place] [address] [address] [place] [address]  Phone: [phone] Fax: [phone]    Primary Care Provider: Tana Alaniz MD    Primary Insurance: MEDICARE  Secondary Insurance: AARP    DISCHARGE DETAILS:       Freedom of Choice: Yes                        Requested [address]         Is the patient interested in Nai Smith at discharge?: Yes  [address] requested[de-identified] Nursing, Physical [address] Name[de-identified] [address] Provider[de-identified] PCP  Home Health Services Needed[de-identified] Post-Op Care and Assessment, Evaluate Functional Status and Safety, Gait/ADL Training, Strengthening/Theraputic Exercises to Improve Function, Diabetes Management  Homebound Criteria Met[de-identified] Requires the Assistance of Another Person for Safe Ambulation or to Leave the Home, Uses an Assist Device (i e  cane, walker, etc)  Supporting Clincal Findings[de-identified] Limited Endurance, Fatigues Easliy in [employer]    DME Referral Provided  Referral made for DME?: Yes  DME referral completed for the following items[de-identified] Jaquita Riedel  DME Supplier Name[de-identified] [employer]    Other Referral/Resources/Interventions Provided:  Interventions: HHC, DME  Referral Comments: Accepted by [facility] nsg and PT for visit on 2/13/23    Would you like to participate in our [address] service program?  : No - Declined       Discharge Destination Plan[de-identified] Home with [place] at Discharge : Family                                      Additional Comments: Pt is POD#2 CABG, doing well, anticipate home on 2/11/23  Accepted only by [facility], admised them pt needs visit on Monday 2/13  Ordered RW

## 2023-02-10 NOTE — PLAN OF CARE
Problem: PHYSICAL THERAPY ADULT  Goal: Performs mobility at highest level of function for planned discharge setting  See evaluation for individualized goals  Description: Treatment/Interventions: Functional transfer training, LE strengthening/ROM, Elevations, Therapeutic exercise, Endurance training, Equipment eval/education, Bed mobility, Gait training, Spoke to nursing, Spoke to case management, OT  Equipment Recommended: Kristie Plasencia       See flowsheet documentation for full assessment, interventions and recommendations  Outcome: Adequate for Discharge  Note: Prognosis: Good  Problem List: Decreased endurance, Obesity  Assessment: Pt demonstrated overall improvement in functional mobility progressing to mod (I) level w/ transfers and (S) w/ amb and on the steps; rest periods provided and pt remained in NAD; LE therex for HEP reviewed and performed; overall, cont to recommend home PT follow up upon returning home w/ available support; will follow  Barriers to Discharge: None     PT Discharge Recommendation: Home with home health rehabilitation (home PT;)    See flowsheet documentation for full assessment

## 2023-02-10 NOTE — PROGRESS NOTES
02/10/23    Procedure: Chest tube removal    Chest tubes removed in routine fashion without incident  Insertion site dressed with Acticoat  Tom Hammans I Daisy tolerated the procedure well  Nurse notified  Procedure: Epicardial Pacing Wire removal    Domenick Hammans I Nuss was returned to bed and informed of mandatory one hour post-procedure bed rest   The assigned nurse was notified  Epicardial pacing wires removed in routine fashion, without incident  The patient tolerated the procedure well  Vital signs ordered  q 15 minutes for one hour, as per protocol      SIGNATURE: Whitney Weber PA-C  DATE: February 10, 2023  TIME: 7:16 AM

## 2023-02-10 NOTE — PLAN OF CARE
Problem: OCCUPATIONAL THERAPY ADULT  Goal: Performs self-care activities at highest level of function for planned discharge setting  See evaluation for individualized goals  Description: Treatment Interventions: ADL retraining, Functional transfer training, Endurance training, UE strengthening/ROM, Patient/family training, Equipment evaluation/education, Compensatory technique education, Continued evaluation, Energy conservation, Activityengagement, Cardiac education          See flowsheet documentation for full assessment, interventions and recommendations  Outcome: Progressing  Note: Limitation: Decreased ADL status, Decreased endurance, Decreased self-care trans, Decreased high-level ADLs  Prognosis: Good  Assessment: Patient participated in Skilled OT session this date with interventions consisting of ADL re training with the use of correct body mechanics, maintaining sternal precautions and  therapeutic activities to: increase activity tolerance   Patient agreeable to OT treatment session, upon arrival patient was found supine in bed  In comparison to previous session, patient with improvements in mobility and ADLS overall  Patient requiring frequent rest periods  Patient continues to be functioning below baseline level, occupational performance remains limited secondary to factors listed above and increased risk for falls and injury  From OT standpoint, recommendation at time of d/c would be Home with family support and Home OT  Patient to benefit from continued Occupational Therapy treatment while in the hospital to address deficits as defined above and maximize level of functional independence with ADLs and functional mobility       OT Discharge Recommendation: Home with home health rehabilitation

## 2023-02-10 NOTE — PROGRESS NOTES
Cardiology Progress Note - Wyatt Nelson 69 y o  female MRN: [id number]    Unit/Bed#: [facility] 411-01 Encounter: [id number]    Hospital Problems:  Principal Problem:    S/P CABG x 2  Active Problems:    Coronary artery disease involving native coronary artery of native heart without angina pectoris    Type 2 diabetes mellitus without complication, without long-term current use of insulin (HCC)    Mixed dyslipidemia    Essential hypertension    Hypothyroidism    Spinal stenosis of lumbar region    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HCC)    Bilateral leg edema    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, estrogen receptor positive ([facility] )    Class 2 obesity in adult    Neuropathy      Assessment & Plan:    1  CAD s/p CABG - stable post op  Continue ASA, BB, statin  2  Post op hypervolemia - improving, continue lasix 40 mg IV daily  3  HTN - BP at goal, <130/80, continue metoprolol 12 5 mg BID  4  HLD - last LDL 83 mg/dL untreated  H/o statin myalgia allegedly  Will trial statin inpatient  If not tolerated will need outpatient consideration of PCKS9-inh for goal LDL < 70 mg/dL         Subjective:   Patient seen and examined  No significant events overnight  Chest tubes removed    Objective:     Vitals: Blood pressure 120/56, pulse 62, temperature 98 2 °F (36 8 °C), temperature source Oral, resp   rate 16, weight 91 2 kg (201 lb 1 oz), SpO2 94 %, currently breastfeeding , Body mass index is 37 99 kg/m² ,   Orthostatic Blood Pressures    Flowsheet Row Most Recent Value   Blood Pressure 120/56 filed at 02/10/2023 1136   Patient Position - Orthostatic VS Sitting filed at 02/10/2023 1136            Intake/Output Summary (Last 24 hours) at 2/10/2023 1206  Last data filed at 2/10/2023 0843  Gross per 24 hour   Intake 120 ml   Output 550 ml   Net -430 ml             Physical Exam:    General: Wyatt Nelson is a well appearing female, in no acute distress, sitting comfortably  HEENT: moist mucous membranes, EOMI  Neck:  No JVD, supple, trachea midline  Cardiovascular: unremarkable S1/S2, regular rate and rhythm, no murmurs, rubs or gallops  Pulmonary: normal respiratory effort, faint rales  Abdomen: soft and nondistended  Extremities: trace lower extremity edema  Warm and well perfused extremities    Neuro: no focal motor deficits, AAOx3 (person, place, time)  Psych: Normal mood and affect, cooperative      Medications:      Current Facility-Administered Medications:   •  acetaminophen (TYLENOL) tablet 975 mg, 975 mg, Oral, Q8H, Daja Spironello V, CRNP, 975 mg at 02/10/23 8416  •  amiodarone tablet 200 mg, 200 mg, Oral, Q8H VICTOR MANUEL, Daja Spironello V, CRNP, 200 mg at 02/10/23 0427  •  anastrozole (ARIMIDEX) tablet 1 mg, 1 mg, Oral, Daily, Daja Spironello V, CRNP, 1 mg at 02/09/23 2209  •  aspirin tablet 325 mg, 325 mg, Oral, Daily, Daja Spironello V, CRNP, 325 mg at 02/10/23 8937  •  atorvastatin (LIPITOR) tablet 80 mg, 80 mg, Oral, Daily With Dinner, Paz Machado Spironello V, CRNP, 80 mg at 02/09/23 1820  •  bisacodyl (DULCOLAX) rectal suppository 10 mg, 10 mg, Rectal, Daily PRN, Daja Spironello V, CRNP  •  docusate sodium (COLACE) capsule 100 mg, 100 mg, Oral, BID, Daja Spironello V, CRNP, 100 mg at 02/10/23 9953  •  fondaparinux (ARIXTRA) subcutaneous injection 2 5 mg, 2 5 mg, Subcutaneous, Daily, Daja Spironello V, CRNP, 2 5 mg at 02/10/23 8640  •  furosemide (LASIX) injection 40 mg, 40 mg, Intravenous, Daily, Daja Spironello V, CRNP, 40 mg at 02/10/23 7964  •  insulin lispro (HumaLOG) 100 units/mL subcutaneous injection 1-6 Units, 1-6 Units, Subcutaneous, TID AC, 2 Units at 02/09/23 1643 **AND** Fingerstick Glucose (POCT), , , TID AC, BERNADETTE Hudson  •  insulin lispro (HumaLOG) 100 units/mL subcutaneous injection 1-6 Units, 1-6 Units, Subcutaneous, HS, BERNADETTE Hudson, 2 Units at 02/09/23 6632  •  levothyroxine tablet 88 mcg, 88 mcg, Oral, Early Morning, Daja Pillaio V, CRNP, 88 mcg at 02/10/23 6000  •  metoprolol tartrate (LOPRESSOR) partial tablet 12 5 mg, 12 5 mg, Oral, Q12H VICTOR MANUEL, Daja Pillaio V, CRNP, 12 5 mg at 02/10/23 2833  •  mupirocin (BACTROBAN) 2 % nasal ointment 1 application, 1 application, Nasal, E00B [facility] & correction, Daja Fuller V CRNP, 1 application at 22/21/42 6024  •  ondansetron (ZOFRAN) injection 4 mg, 4 mg, Intravenous, Q6H PRN, Daja Pillaio V, CRNP  •  oxyCODONE (ROXICODONE) IR tablet 2 5 mg, 2 5 mg, Oral, Q4H PRN, Daja Pillaio V, CRNP  •  oxyCODONE (ROXICODONE) IR tablet 5 mg, 5 mg, Oral, Q4H PRN, Daja Pillaio V, CRNP, 5 mg at 02/09/23 1906  •  pantoprazole (PROTONIX) EC tablet 40 mg, 40 mg, Oral, Early Morning, Daja Pillaio V, CRNP, 40 mg at 02/10/23 5474  •  polyethylene glycol (MIRALAX) packet 17 g, 17 g, Oral, Daily, Daja Pillaio V, CRNP, 17 g at 02/10/23 1579  •  pregabalin (LYRICA) capsule 75 mg, 75 mg, Oral, BID, Daja Pillaio V, CRNP, 75 mg at 02/10/23 0562  •  temazepam (RESTORIL) capsule 15 mg, 15 mg, Oral, HS PRN, Anais Ly V, CRNP     Labs & Results:        Results from last 7 days   Lab Units 02/10/23  0610 02/09/23  0354 02/08/23 2013 02/08/23  1218 02/08/23  1214   WBC Thousand/uL 12 87* 9 68  --   --   --    HEMOGLOBIN g/dL 11 4* 10 1* 11 1*  --  11 7   I STAT HEMOGLOBIN   --   --   --    < >  --    HEMATOCRIT % 33 4* 30 1* 32 2*  --  33 6*   HEMATOCRIT, ISTAT   --   --   --    < >  --    PLATELETS Thousands/uL 121* 102*  --   --  108*    < > = values in this interval not displayed           Results from last 7 days   Lab Units 02/10/23  0610 02/09/23  0354 02/08/23 2013 02/08/23  1555 02/08/23  1218 02/08/23  1214 02/08/23  1120 02/08/23  1052   POTASSIUM mmol/L 4 5 4 8 4 3   < >  --  4 2  --   --    CHLORIDE mmol/L 107 113*  --   --   --  112*  --   --    CO2 mmol/L 23 23  --   --   --  22  --   --    CO2, I-STAT mmol/L  --   --   --   --  23  --  25 29   BUN mg/dL 23 12  --   --   --  9  --   --    CREATININE mg/dL 0 78 0 53*  --   --   --  0 63  --   --    CALCIUM mg/dL 8 3 7 6*  --   --   --  8 3  --   --    GLUCOSE, ISTAT mg/dl  --   --   --   --  128  --  134 138    < > = values in this interval not displayed  Results from last 7 days   Lab Units 02/10/23  0610 02/09/23  0354   MAGNESIUM mg/dL 2 4 2 5            This note was completed in part utilizing Expert360 direct voice recognition software  Grammatical errors, random word insertion, spelling mistakes, occasional wrong word or "sound-alike" substitutions and incomplete sentences may be an occasional consequence of the system secondary to software limitations, ambient noise and hardware issues  At the time of dictation, efforts were made to edit, clarify and /or correct errors  Please read the chart carefully and recognize, using context, where substitutions have occurred  If you have any questions or concerns about the context, text or information contained within the body of this dictation, please contact myself, the provider, for further clarification

## 2023-02-11 VITALS
OXYGEN SATURATION: 98 % | TEMPERATURE: 97.7 F | SYSTOLIC BLOOD PRESSURE: 106 MMHG | DIASTOLIC BLOOD PRESSURE: 72 MMHG | HEART RATE: 65 BPM | RESPIRATION RATE: 15 BRPM | BODY MASS INDEX: 37.66 KG/M2 | WEIGHT: 199.3 LBS

## 2023-02-11 LAB
ATRIAL RATE: 76 BPM
ERYTHROCYTE [DISTWIDTH] IN BLOOD BY AUTOMATED COUNT: 13.2 % (ref 11.6–15.1)
GLUCOSE SERPL-MCNC: 180 MG/DL (ref 65–140)
GLUCOSE SERPL-MCNC: 196 MG/DL (ref 65–140)
HCT VFR BLD AUTO: 31.5 % (ref 34.8–46.1)
HGB BLD-MCNC: 10.8 G/DL (ref 11.5–15.4)
MCH RBC QN AUTO: 30.9 PG (ref 26.8–34.3)
MCHC RBC AUTO-ENTMCNC: 34.3 G/DL (ref 31.4–37.4)
MCV RBC AUTO: 90 FL (ref 82–98)
P AXIS: 63 DEGREES
PLATELET # BLD AUTO: 121 THOUSANDS/UL (ref 149–390)
PMV BLD AUTO: 11 FL (ref 8.9–12.7)
PR INTERVAL: 160 MS
QRS AXIS: -6 DEGREES
QRSD INTERVAL: 76 MS
QT INTERVAL: 404 MS
QTC INTERVAL: 454 MS
RBC # BLD AUTO: 3.49 MILLION/UL (ref 3.81–5.12)
T WAVE AXIS: 123 DEGREES
VENTRICULAR RATE: 76 BPM
WBC # BLD AUTO: 10.23 THOUSAND/UL (ref 4.31–10.16)

## 2023-02-11 RX ORDER — OXYCODONE HYDROCHLORIDE 5 MG/1
5 TABLET ORAL EVERY 6 HOURS PRN
Qty: 28 TABLET | Refills: 0 | Status: SHIPPED | OUTPATIENT
Start: 2023-02-11 | End: 2023-02-18

## 2023-02-11 RX ORDER — DOCUSATE SODIUM 100 MG/1
100 CAPSULE, LIQUID FILLED ORAL 2 TIMES DAILY
Qty: 60 CAPSULE | Refills: 0 | Status: SHIPPED | OUTPATIENT
Start: 2023-02-11 | End: 2023-03-13

## 2023-02-11 RX ORDER — TORSEMIDE 20 MG/1
20 TABLET ORAL DAILY
Qty: 14 TABLET | Refills: 0 | Status: SHIPPED | OUTPATIENT
Start: 2023-02-11 | End: 2023-02-25

## 2023-02-11 RX ORDER — ATORVASTATIN CALCIUM 80 MG/1
80 TABLET, FILM COATED ORAL
Qty: 30 TABLET | Refills: 2 | Status: SHIPPED | OUTPATIENT
Start: 2023-02-11

## 2023-02-11 RX ORDER — POTASSIUM CHLORIDE 20 MEQ/1
20 TABLET, EXTENDED RELEASE ORAL DAILY
Qty: 14 TABLET | Refills: 0 | Status: SHIPPED | OUTPATIENT
Start: 2023-02-11 | End: 2023-02-25

## 2023-02-11 RX ORDER — POLYETHYLENE GLYCOL 3350 17 G/17G
17 POWDER, FOR SOLUTION ORAL DAILY
Qty: 510 G | Refills: 0 | Status: SHIPPED | OUTPATIENT
Start: 2023-02-11 | End: 2023-03-13

## 2023-02-11 RX ORDER — OMEPRAZOLE 20 MG/1
20 CAPSULE, DELAYED RELEASE ORAL DAILY
Qty: 30 CAPSULE | Refills: 0 | Status: SHIPPED | OUTPATIENT
Start: 2023-02-11 | End: 2023-03-13

## 2023-02-11 RX ORDER — SENNOSIDES 8.6 MG
650 CAPSULE ORAL EVERY 8 HOURS PRN
Qty: 30 TABLET | Refills: 0 | Status: SHIPPED | OUTPATIENT
Start: 2023-02-11 | End: 2023-03-13

## 2023-02-11 RX ORDER — ASPIRIN 325 MG
325 TABLET ORAL DAILY
Qty: 30 TABLET | Refills: 2 | Status: SHIPPED | OUTPATIENT
Start: 2023-02-12

## 2023-02-11 RX ADMIN — ACETAMINOPHEN 975 MG: 325 TABLET ORAL at 05:05

## 2023-02-11 RX ADMIN — FONDAPARINUX SODIUM 2.5 MG: 2.5 INJECTION, SOLUTION SUBCUTANEOUS at 08:36

## 2023-02-11 RX ADMIN — MUPIROCIN 1 APPLICATION: 20 OINTMENT TOPICAL at 08:37

## 2023-02-11 RX ADMIN — INSULIN LISPRO 2 UNITS: 100 INJECTION, SOLUTION INTRAVENOUS; SUBCUTANEOUS at 11:10

## 2023-02-11 RX ADMIN — AMIODARONE HYDROCHLORIDE 200 MG: 200 TABLET ORAL at 05:05

## 2023-02-11 RX ADMIN — PREGABALIN 75 MG: 75 CAPSULE ORAL at 08:37

## 2023-02-11 RX ADMIN — DOCUSATE SODIUM 100 MG: 100 CAPSULE, LIQUID FILLED ORAL at 08:37

## 2023-02-11 RX ADMIN — Medication 12.5 MG: at 08:36

## 2023-02-11 RX ADMIN — INSULIN LISPRO 1 UNITS: 100 INJECTION, SOLUTION INTRAVENOUS; SUBCUTANEOUS at 06:10

## 2023-02-11 RX ADMIN — OXYCODONE HYDROCHLORIDE 5 MG: 5 TABLET ORAL at 05:05

## 2023-02-11 RX ADMIN — ANASTROZOLE 1 MG: 1 TABLET, COATED ORAL at 08:38

## 2023-02-11 RX ADMIN — ASPIRIN 325 MG ORAL TABLET 325 MG: 325 PILL ORAL at 08:37

## 2023-02-11 RX ADMIN — LEVOTHYROXINE SODIUM 88 MCG: 88 TABLET ORAL at 05:05

## 2023-02-11 RX ADMIN — FUROSEMIDE 40 MG: 10 INJECTION, SOLUTION INTRAMUSCULAR; INTRAVENOUS at 08:37

## 2023-02-11 RX ADMIN — PANTOPRAZOLE SODIUM 40 MG: 40 TABLET, DELAYED RELEASE ORAL at 05:05

## 2023-02-11 RX ADMIN — POLYETHYLENE GLYCOL 3350 17 G: 17 POWDER, FOR SOLUTION ORAL at 08:36

## 2023-02-11 NOTE — DISCHARGE SUMMARY
Discharge Summary - Cardiothoracic Surgery   Gertrude Chavis 69 y o  female MRN: [id number]  Unit/Bed#: [facility] 411-01 Encounter: [id number]    Admission Date: 2/8/2023     Discharge Date: 02/11/23    Admitting Diagnosis: Coronary artery disease of native artery of native heart with stable angina pectoris [facility]) [I25 118]    Primary Discharge Diagnosis:   Coronary artery disease  S/P coronary artery bypass grafting;    Secondary Discharge Diagnosis:    HTN, HLD, NIDDM2, MO (BMI 36), stage 1 left breast cancer s/p left mastectomy (no chemo or radiation required), stage III ovarian cancer (s/p oophorectomy and chemo in 1997), thyroid cancer s/p thyroidectomy, neuropathy, osteopenia     Attending: LUCIO Greenfield  Consulting Physician(s):   Cardiology  Medical/Critical Care  Endocrinology    Procedures Performed:   Procedure(s):  CORONARY ARTERY BYPASS GRAFT (CABG) X 2 VESSELS GSV-->OM1, LIMA-->LAD; EVH  LEFT LEG w/ RADAH     Hospital Course: The patient was seen in consultation prior to this admission for evaluation of Coronary artery disease  Risks and benefits of coronary artery bypass grafting were discussed in detail, and patient was agreeable  Routine preoperative evaluation was completed and informed consent was obtained prior to admission  2/8: Elective admission for CABGx2  Intraoperative blood products include: 1u PRBCs  No significant postoperative bleeding  Transferred to ICU on no gtts  Wean towards extubation  Pm: extubated, Epi 2 and Levo 4     2/9: Cardene 3, start metoprolol 12 5 mg bid  NSR on RA  Delined  Labs stable  Start Lasix daily  Endocrinology consult  Transfer to telemetry  2/10: Cardene weaned off  Transferred from ICU to telemetry  Chest tubes and pacing wires removed  Thrombocytopenia; (102)  Repeat CBC in AM       2/11: Ambulating well  Orthopnea overnight (similar to preoperative symptoms)   Intake/Output net: -1100 mL/24 hours   D/C IV Lasix 40 and start Torsemide, 20 mg PO QD  Fit for discharge to home  Condition at Discharge:   good     Discharge Physical Exam:    Please see the documented physical exam from this morning's progress note for details  Discharge Data:  Results from last 7 days   Lab Units 02/11/23  0506 02/10/23  0610 02/09/23  0354   WBC Thousand/uL 10 23* 12 87* 9 68   HEMOGLOBIN g/dL 10 8* 11 4* 10 1*   HEMATOCRIT % 31 5* 33 4* 30 1*   PLATELETS Thousands/uL 121* 121* 102*     Results from last 7 days   Lab Units 02/10/23  0610 02/09/23  0354 02/08/23 2013 02/08/23  1555 02/08/23  1218 02/08/23  1214   POTASSIUM mmol/L 4 5 4 8 4 3   < >  --  4 2   CHLORIDE mmol/L 107 113*  --   --   --  112*   CO2 mmol/L 23 23  --   --   --  22   CO2, I-STAT mmol/L  --   --   --   --  23  --    BUN mg/dL 23 12  --   --   --  9   CREATININE mg/dL 0 78 0 53*  --   --   --  0 63   GLUCOSE, ISTAT mg/dl  --   --   --   --  128  --    CALCIUM mg/dL 8 3 7 6*  --   --   --  8 3    < > = values in this interval not displayed  Discharge instructions/Information to patient and family:   See after visit summary for information provided to patient and family  Beth Arreola was educated on restrictions regarding driving and lifting, and techniques of proper incisional care  They were specifically counselled on signs and symptoms of an incisional infection, and advised to contact our service immediately should they develop fevers, sweats, chill, redness or drainage at the site of any incisions  Provisions for Follow-Up Care:  See after visit summary for information related to follow-up care and any pertinent home health orders  Disposition:  Home    Planned Readmission:   No    Discharge Medications:  See after visit summary for reconciled discharge medications provided to patient and family  Yolis Villa was provided contact information and scheduled a follow up appointment with LUCIO Fenton    Additionally, follow up appointments have been scheduled for their primary care physician and primary cardiologist   Contact information was provided  Mike Jacklyn MIGUEL Villa was counseled on the importance of avoiding tobacco products  As with all patients whom have undergone open heart surgery, tobacco cessation medication was contraindicated at the time of discharge  ACE/ARB was Contraindicated secondary to hypotension    Beta Blocker was Prescribed at discharge    Aspirin was Prescribed at discharge    Statin was Prescribed at discharge      The patient was discharged on ongoing diuretic therapy with Torsemide 20 mg, PO QD and Potassium Chloride 20 mEq, PO QD  They were advised to continue these medications for 14 days, unless otherwise directed  Narcotic pain medication was prescribed in the form of Oxycodone  Prior to prescribing, their prescription profile was reviewed on the [facility] of health prescription drug monitoring program     The patient was informed that following their postoperative surgical evaluation, they will be referred to outpatient cardiac rehabilitation  They were counseled that this program is run by specialists who will help them safely strengthen their heart and prevent more heart disease  Cardiac rehabilitation will include exercise, relaxation, stress management, and heart-healthy nutrition  Caregivers will also check to make sure their medication regimen is working  During this admission, the patient was questioned on their use of tobacco, alcohol, and illicit/non-prescription drug use in the  previous 24 months  Dariusz Garrett states that they have not used any of these substances in this time frame  I spent 20 minutes discharging the patient  This time was spent on the day of discharge  I had direct contact with the patient on the day of discharge  Additional documentation is required if more than 30 minutes were spent on discharge       SIGNATURE: Yomi Stevenson PA-C  DATE: February 11, 2023  TIME: 8:42 AM

## 2023-02-11 NOTE — PROGRESS NOTES
Progress Note - Cardiothoracic Surgery   Baby Ani 69 y o  female MRN: [id number]  Unit/Bed#: [facility] 411-01 Encounter: [id number]    Coronary artery disease  S/P coronary artery bypass grafting; POD # 3      24 Hour Events:  Ambulating well   Orthopnea overnight (similar to preoperative symptoms)    Medications:   Scheduled Meds:  Current Facility-Administered Medications   Medication Dose Route Frequency Provider Last Rate   • acetaminophen  975 mg Oral Q8H BERNADETTE Hudson     • amiodarone  200 mg Oral Q8H [address] BERNADETTE Hudson     • anastrozole  1 mg Oral Daily EBRNADETTE Hudson     • aspirin  325 mg Oral Daily BERNADETTE Hudson     • atorvastatin  80 mg Oral Daily With dscovered BERNADETTE MENDEZ     • bisacodyl  10 mg Rectal Daily PRN BERNADETTE Hudson     • docusate sodium  100 mg Oral BID BERNADETTE Hudson     • fondaparinux  2 5 mg Subcutaneous Daily BERNADETTE Hudson     • furosemide  40 mg Intravenous Daily BERNADETTE Hudson     • insulin lispro  1-6 Units Subcutaneous TID AC BERNADETTE Hudson     • insulin lispro  1-6 Units Subcutaneous HS BERNADETTE Hudson     • levothyroxine  88 mcg Oral Early Morning BERNADETTE Hudson     • metoprolol tartrate  12 5 mg Oral Q12H [address] BERNADETTE Hudson     • mupirocin  1 application Nasal A39E [address] BERNADETTE Hudson     • ondansetron  4 mg Intravenous Q6H PRN BERNADETTE Hudson     • oxyCODONE  2 5 mg Oral Q4H PRN BERNADETTE Hudson     • oxyCODONE  5 mg Oral Q4H PRN BERNADETTE Hudson     • pantoprazole  40 mg Oral Early Morning BERNADETTE Hudson     • polyethylene glycol  17 g Oral Daily BERNADETTE Hudson     • pregabalin  75 mg Oral BID BERNADETTE Hudson     • temazepam  15 mg Oral HS PRN BERNADETTE Hudson       Continuous Infusions:   PRN Meds: •  bisacodyl  • ondansetron  •  oxyCODONE  •  oxyCODONE  •  temazepam    Vitals:   Vitals:    02/11/23 0406 02/11/23 0446 02/11/23 0509 02/11/23 0719   BP:  147/67 134/63 115/54   BP Location:    Right arm   Pulse:  78 76 68   Resp:  20  16   Temp:    97 8 °F (36 6 °C)   TempSrc:    Oral   SpO2:    94%   Weight: 90 4 kg (199 lb 4 7 oz)          Telemetry: NSR; Heart Rate: 67    Respiratory:   SpO2: SpO2: 94 %, SpO2 Activity: SpO2 Activity: At Rest; Room Air    Intake/Output:     Intake/Output Summary (Last 24 hours) at 2/11/2023 0816  Last data filed at 2/11/2023 0501  Gross per 24 hour   Intake 621 ml   Output 1650 ml   Net -1029 ml          Weights:   Weight (last 2 days)     Date/Time Weight    02/11/23 0406 90 4 (199 3)    02/10/23 0626 91 2 (201 06)    02/09/23 0600 88 3 (194 67)            Results:   Results from last 7 days   Lab Units 02/11/23  0506 02/10/23  0610 02/09/23  0354   WBC Thousand/uL 10 23* 12 87* 9 68   HEMOGLOBIN g/dL 10 8* 11 4* 10 1*   HEMATOCRIT % 31 5* 33 4* 30 1*   PLATELETS Thousands/uL 121* 121* 102*     Results from last 7 days   Lab Units 02/10/23  0610 02/09/23  0354 02/08/23  2013 02/08/23  1555 02/08/23  1218 02/08/23  1214   SODIUM mmol/L 133* 138  --   --   --  141   POTASSIUM mmol/L 4 5 4 8 4 3   < >  --  4 2   CHLORIDE mmol/L 107 113*  --   --   --  112*   CO2 mmol/L 23 23  --   --   --  22   CO2, I-STAT mmol/L  --   --   --   --  23  --    BUN mg/dL 23 12  --   --   --  9   CREATININE mg/dL 0 78 0 53*  --   --   --  0 63   GLUCOSE, ISTAT mg/dl  --   --   --   --  128  --    CALCIUM mg/dL 8 3 7 6*  --   --   --  8 3    < > = values in this interval not displayed  Point of care glucose: 180    Studies:  No new studies    Invasive Lines/Tubes:  Invasive Devices     Central Venous Catheter Line  Duration           CVC Central Lines 02/08/23 Triple 2 days              Physical Exam:    HEENT/NECK:  Normocephalic  Atraumatic  No jugular venous distention      Cardiac: Regular rate and rhythm and No murmurs/rubs/gallops  Pulmonary:  Breath sounds clear bilaterally and No rales/rhonchi/wheezes  Abdomen:  Non-tender, Non-distended and Normal bowel sounds  Incisions: Sternum is stable  Incision dressed with Acticoat  No erythema or drainage and Saphenectomy incision dressed with Acticoat  No erythema or drainage  Extremities: Extremities warm/dry and 1+ edema B/L  Neuro: Alert and oriented X 3  Skin: Warm/Dry, without rashes or lesions  Assessment:  Principal Problem:    S/P CABG x 2  Active Problems:    Coronary artery disease involving native coronary artery of native heart without angina pectoris    Type 2 diabetes mellitus without complication, without long-term current use of insulin (HCC)    Mixed dyslipidemia    Essential hypertension    Hypothyroidism    Spinal stenosis of lumbar region    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HCC)    Bilateral leg edema    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, estrogen receptor positive ([facility] [address] )    Class 2 obesity in adult    Neuropathy       Coronary artery disease  S/P coronary artery bypass grafting; POD # 3    Plan:    1  Cardiac:   NSR; HR/BP well-controlled  Continue Lopressor, 12 5mg PO BID  Continue ASA and Statin therapy  D/C central IV access today   Continue DVT prophylaxis    2  Pulmonary:   Good Room air oxygen saturation; Continue incentive spirometry/Coughing/Deep breathing exercises      3  Renal:   Intake/Output net: -1100 mL/24 hours  Diuretic Regimen:  D/C IV Lasix 40   Start Torsemide, 20 mg PO QD  Potassium Chloride 20 mEq PO QD  Post op Creatinine stable; Follow up labs prn    4  Neuro:  Neurologically intact; No active issues  Incisional pain well-controlled  Continue Tylenol, 975 mg PO q 8, standing dose  Continue Oxycodone, 2 5 to 5 mg PO q 4 hours prn pain    5   GI:  Tolerating TLC 2 3 gm sodium diet  Maintain 1800 mL daily fluid restriction   Continue stool softeners and prn suppository  Continue GI prophylaxis    6  Endo:   History of diabetes; Continue SQ insulin therapy as directed by endocrinology physician  Insulin administration teaching for home therapy ordered  Discharge on her home metformin 1g bid and Victoza 1 2mg daily  Discontinue previous home glipizide on discharge due to reported hypoglycemia  discuss with her PCP on possibly changing from daily Victoza (Liraglutide) to weekly Ozempic (Semaglutide) on discharge due to added cardiovascular benefits  7    Hematology:    Post-operative blood count acceptable; Trend prn    Thrombocytopenia; (121, from 102)        8     Disposition:      Ambulating independently, Anticipate discharge to home 2/11     VTE Pharmacologic Prophylaxis: Sequential compression device (Venodyne)  and Fondaparinux (Arixtra)  VTE Mechanical Prophylaxis: sequential compression device    Collaborative rounds completed with supervising physician  Plan of care discussed with bedside nurse    SIGNATURE: Maribel Jeter PA-C  DATE: February 11, 2023  TIME: 8:16 AM

## 2023-02-11 NOTE — PLAN OF CARE
Problem: Prexisting or High Potential for Compromised Skin Integrity  Goal: Skin integrity is maintained or improved  Description: INTERVENTIONS:  - Identify patients at risk for skin breakdown  - Assess and monitor skin integrity  - Assess and monitor nutrition and hydration status  - Monitor labs   - Assess for incontinence   - Turn and reposition patient  - Assist with mobility/ambulation  - Relieve pressure over bony prominences  - Avoid friction and shearing  - Provide appropriate hygiene as needed including keeping skin clean and dry  - Evaluate need for skin moisturizer/barrier cream  - Collaborate with interdisciplinary team   - Patient/family teaching  - Consider wound care consult   Outcome: Progressing     Problem: MOBILITY - ADULT  Goal: Maintain or return to baseline ADL function  Description: INTERVENTIONS:  -  Assess patient's ability to carry out ADLs; assess patient's baseline for ADL function and identify physical deficits which impact ability to perform ADLs (bathing, care of mouth/teeth, toileting, grooming, dressing, etc )  - Assess/evaluate cause of self-care deficits   - Assess range of motion  - Assess patient's mobility; develop plan if impaired  - Assess patient's need for assistive devices and provide as appropriate  - Encourage maximum independence but intervene and supervise when necessary  - Involve family in performance of ADLs  - Assess for home care needs following discharge   - Consider OT consult to assist with ADL evaluation and planning for discharge  - Provide patient education as appropriate  Outcome: Progressing  Goal: Maintains/Returns to pre admission functional level  Description: INTERVENTIONS:  - Perform BMAT or MOVE assessment daily    - Set and communicate daily mobility goal to care team and patient/family/caregiver     - Collaborate with rehabilitation services on mobility goals if consulted  - Out of bed for toileting  - Record patient progress and toleration of activity level   Outcome: Progressing     Problem: CARDIOVASCULAR - ADULT  Goal: Maintains optimal cardiac output and hemodynamic stability  Description: INTERVENTIONS:  - Monitor I/O, vital signs and rhythm  - Monitor for S/S and trends of decreased cardiac output  - Administer and titrate ordered vasoactive medications to optimize hemodynamic stability  - Assess quality of pulses, skin color and temperature  - Assess for signs of decreased coronary artery perfusion  - Instruct patient to report change in severity of symptoms  Outcome: Progressing  Goal: Absence of cardiac dysrhythmias or at baseline rhythm  Description: INTERVENTIONS:  - Continuous cardiac monitoring, vital signs, obtain 12 lead EKG if ordered  - Administer antiarrhythmic and heart rate control medications as ordered  - Monitor electrolytes and administer replacement therapy as ordered  Outcome: Progressing

## 2023-02-11 NOTE — DISCHARGE INSTRUCTIONS
After CABG (Coronary Artery Bypass Graft)   AMBULATORY CARE:   After coronary artery bypass graft (CABG) surgery,  you may not be able to do your normal activities for a few months  Your sternum (breastbone) will need time to heal  For 6 to 8 weeks after surgery, you will need to go slowly and follow your healthcare provider's activity instructions  Call your local emergency number (911 in the [address] [facility]) for any of the following: You have any of the following signs of a heart attack:      Squeezing, pressure, or pain in your chest    You may  also have any of the following:     Discomfort or pain in your back, neck, jaw, stomach, or arm    Shortness of breath    Nausea or vomiting    Lightheadedness or a sudden cold sweat    You feel lightheaded, short of breath, and have chest pain  You cough up blood  You have a fast heartbeat that flutters  You feel like you are going to faint  Seek care immediately if:   Your arm or leg feels warm, tender, and painful  It may look swollen and red  You have numbness or tingling in your arms or legs  You have a severe headache  Call your doctor or cardiologist if:   You have a fever higher than 101°F (38 4°C)  You have gained 2 pounds in 24 hours  Your wound is red, swollen, or draining pus  Your signs and symptoms that you had before surgery return  You feel depressed  You have questions or concerns about your condition or care  Medicines: You may need any of the following:  Prescription pain medicine  may be given  Ask your healthcare provider how to take this medicine safely  Some prescription pain medicines contain acetaminophen  Do not take other medicines that contain acetaminophen without talking to your healthcare provider  Too much acetaminophen may cause liver damage  Prescription pain medicine may cause constipation  Ask your healthcare provider how to prevent or treat constipation       Antiplatelets , such as aspirin, help prevent blood clots  Take your antiplatelet medicine exactly as directed  These medicines make it more likely for you to bleed or bruise  If you are told to take aspirin, do not take acetaminophen or ibuprofen instead  Cholesterol medicine  helps decrease the amount of cholesterol in your blood  Cholesterol can cause plaque buildup that blocks your arteries  Antibiotics  help prevent a bacterial infection  Heart medicine  helps strengthen and regulate your heartbeat  Blood pressure medicine  helps lower or control your blood pressure  Take your medicine as directed  Contact your healthcare provider if you think your medicine is not helping or if you have side effects  Tell him of her if you are allergic to any medicine  Keep a list of the medicines, vitamins, and herbs you take  Include the amounts, and when and why you take them  Bring the list or the pill bottles to follow-up visits  Carry your medicine list with you in case of an emergency  Activity:  Your healthcare provider will give you specific activity instructions  The following are general guidelines to follow for up to 8 weeks after surgery:  Protect your sternum  Hug a pillow to your chest or cross your arms over your chest when you laugh, sneeze, or cough  Be careful when you get into or out of a chair or bed  Hug a pillow or cross your arms when you stand or sit  Do not twist as you move  Use only your legs to sit and stand  You may need to use a raised toilet seat if you have trouble standing up without using your arms  Your healthcare provider may teach you to use your elbow for support as you move from lying to sitting  Do not lift anything heavier than 5 pounds  until your healthcare provider says it is okay  For example, a gallon of milk weighs 8 pounds  Do not play sports that use your shoulder  Examples include tennis and golf  Do not drive  until your healthcare provider says it is okay      Keep your arms down as much as possible  Do not put your arms out to the side, behind you, or over your head  Do not let anyone pull your arms to help you move or dress  Do not reach for items  Do not push or pull anything  Examples include a car door or a vacuum   Care for your surgery area as directed:  Carefully wash around the area with soap and water  Let the soap and water run over the area  Do not scrub the area  If you do not have a bandage, gently pat the area dry with a clean towel  If you have a bandage, dry the area and put on a new, clean bandage  Change your bandage if it gets wet or dirty  Go to cardiac rehabilitation (rehab) as directed:  Cardiac rehab is a program run by specialists who help you safely strengthen your heart and prevent more heart disease  The plan includes exercise, relaxation, stress management, and heart-healthy nutrition  Healthcare providers will also check to make sure any medicines you take are working  The plan may also include instructions for when you can drive, return to work, and do other normal daily activities  Prevent another blocked artery:   Do not smoke  Nicotine and other chemicals in cigarettes and cigars can cause heart and lung damage  Ask your healthcare provider for information if you currently smoke and need help to quit  E-cigarettes or smokeless tobacco still contain nicotine  Talk to your healthcare provider before you use these products  Limit or do not drink alcohol as directed  Alcohol can raise your blood pressure and make your heart work harder  Alcohol can also increase your risk for heart disease or a stroke  Ask your healthcare provider if alcohol is okay for you  He or she can tell you how many drinks are okay to have within 24 hours and within 1 week  A drink is 12 ounces of beer, 5 ounces of wine, or 1½ ounces of liquor  Eat heart-healthy foods  You may need to eat foods that are low in salt, fat, or cholesterol   Healthy foods include fruits, vegetables, whole-grain breads, low-fat dairy products, beans, lean meats, and fish  Ask your healthcare provider for more information about a heart healthy diet  Maintain a healthy weight  Ask your healthcare provider what a healthy weight is for you  Extra weight can increase the stress on your heart  Ask your provider to help you create a safe weight loss plan if needed  Ask about vaccines you may need:  Vaccines help prevent diseases that can be dangerous for a person who has heart disease  Ask your healthcare provider about these and other vaccines you may need:  Get a flu vaccine  as soon as recommended each year, usually starting in September or October  Get a pneumonia vaccine  is recommended for all adults 72 or older  Your provider may also recommend this vaccine if you are younger than 72  COVID-19 vaccines are given as a shot in 1 or 2 doses  Vaccination is recommended for everyone 5 years or older  One 2-dose vaccine is fully approved  for those 16 years or older  This vaccine also has an emergency use authorization (EUA) for children 11to 13years old  No vaccine is currently available for children younger than 5 years  A booster (additional) dose  is given to help the immune system continue to protect against severe COVID-19  A booster is recommended for all adults 18 or older  The booster can be a different brand of the COVID-19 vaccine than you originally received  The timing for the booster depends on the type of vaccine you received:    1-dose vaccine: The booster is given at least 2 months after you received the vaccine  2-dose vaccine: The booster is given at least 5 or 6 months after the second dose  A booster can be given to adolescents 15to 16years old  Only 1 COVID-19 vaccine has this EUA  The booster is given at least 5 months after the second dose of the original vaccine series      A booster is recommended for immunocompromised children 5 to 11 years old  Only 1 COVID-19 vaccine has this EUA  The booster is given 28 days after the second dose  Follow up with your doctor or cardiologist as directed: You may need to go in regularly for tests to check how your heart is doing  Write down your questions so you remember to ask them during your visits  © Copyright [employer] 2022 Information is for End User's use only and may not be sold, redistributed or otherwise used for commercial purposes  All illustrations and images included in CareNotes® are the copyrighted property of A D A M , Inc  or [address] [place], [place] [address]daniel   The above information is an  only  It is not intended as medical advice for individual conditions or treatments  Talk to your doctor, nurse or pharmacist before following any medical regimen to see if it is safe and effective for you  Narcotic Safety   WHAT YOU NEED TO KNOW:   What do I need to know about narcotic safety? Safety includes the correct use, storage, and disposal of narcotics  Examples of narcotic pain medicines are oxycodone, morphine, fentanyl, and codeine  How are narcotics given? Narcotics can be given as a pill, patch, or suppository  They can also be given as an injection into a vein, near a nerve, or into a joint  Your prescription may include one or both of the following:  Short-acting narcotics  work fast and relieve pain for about 3 to 6 hours  They are often used for acute or breakthrough pain  Long-acting narcotics  usually last at least 8 hours  You can take them less often and they may be used for chronic pain  How do I use narcotics safely? Take prescribed narcotics exactly as directed  Narcotics come with directions based on the kind and how it is given  Talk to your healthcare provider or a pharmacist if you have any questions  Do not take more than the recommended amount  Too much can cause a life-threatening overdose  Do not continue to take it after your pain stops   You may develop tolerance  This means you keep needing higher doses to get the same effect  You may also develop narcotic use disorder  This means you are not able to control your narcotic use  Do not give narcotics to others or take narcotics that belong to someone else  The kind or amount one person takes may not be right for another  The person you share them with may also be taking medicines that do not mix with narcotics  He or she may drink alcohol or use other drugs that can cause life-threatening problems when mixed with narcotics  Do not mix narcotics with other medicines or alcohol  The combination can cause an overdose, or cause you to stop breathing  Alcohol, sleeping pills, and medicines such as antihistamines can make you sleepy  A combination with narcotics can lead to a coma  Do not drive or operate heavy machinery after you use a narcotic  You may feel drowsy or have trouble concentrating  You can injure yourself or others if you drive or use heavy machinery when you are not alert  Your provider or pharmacist can tell you how long to wait after a dose before you do these activities  Talk to your healthcare provider if you have any side effects  Side effects include nausea, sleepiness, itching, and trouble thinking clearly  Your provider may need to make changes to the kind or amount of narcotic you are taking  He or she can also help you find ways to prevent or relieve side effects  What can I do to manage constipation? Constipation is the most common side effect of narcotic medicine  Constipation is when you have hard, dry bowel movements, or you go longer than usual between bowel movements  Tell your healthcare provider about all changes in your bowel movements while you are taking narcotics  He or she may recommend laxative medicine to help you have a bowel movement  He or she may also change the kind of narcotic you are taking, or change when you take it   The following are more ways you can prevent or relieve constipation:  Drink liquids as directed  You may need to drink extra liquids to help soften and move your bowels  Ask how much liquid to drink each day and which liquids are best for you  Eat high-fiber foods  This may help decrease constipation by adding bulk to your bowel movements  High-fiber foods include fruits, vegetables, whole-grain breads and cereals, and beans  Your healthcare provider or dietitian can help you create a high-fiber meal plan  Your provider may also recommend a fiber supplement if you cannot get enough fiber from food  Exercise regularly  Regular physical activity can help stimulate your intestines  Walking is a good exercise to prevent or relieve constipation  Ask which exercises are best for you  Schedule a time each day to have a bowel movement  This may help train your body to have regular bowel movements  Bend forward while you are on the toilet to help move the bowel movement out  Sit on the toilet for at least 10 minutes, even if you do not have a bowel movement  How do I store narcotics safely? Store narcotics where others cannot easily get them  Keep them in a locked cabinet or secure area  Do not  keep them in a purse or other bag you carry with you  A person may be looking for something else and find the narcotics  Make sure narcotics are stored out of the reach of children  A child can easily overdose on narcotics  Narcotics may look like candy to a small child  What is the best way to dispose of narcotics? The laws vary by country and area  In the [place], the best way is to return the narcotics through a take-back program  This program is offered by the [employer])  The following are options for using the program:  Take the narcotics to a [place] collection site  The site is often a law enforcement center   Call your local law enforcement center for scheduled take-back days in your [facility] will be given information on where to go if the collection site is in a different location  Take the narcotics to an approved pharmacy or hospital   A pharmacy or hospital may be set up as a collection site  You will need to ask if it is a [place] collection site if you were not directed there  A pharmacy or doctor's office may not be able to take back narcotics unless it is a [place] site  Use a mail-back system  This means you are given containers to put the narcotics into  You will then mail them in the containers  Use a take-back drop box  This is a place to leave the narcotics at any time  People and animals will not be able to get into the box  Your local law enforcement agency can tell you where to find a drop box in your area  What are some other safe ways to dispose of narcotics? The medicine may come with disposal instructions  The instructions may vary depending on the brand of medicine you are using  Instructions may come in a Medication Guide, but not every medicine has one  You may instead get instructions from your pharmacy or doctor  Follow instructions carefully  The following are general guidelines to follow:  Find out if you can flush the narcotic  Some narcotics can be flushed down the toilet or poured into the sink  You will need to contact authorities in your area to see if this is an option for you  The FDA also offers a list of medicines that are safe to flush down the toilet  You can check the list if you cannot get the information for your local area  Ask your waste management company about rules for putting narcotics in the trash  The company will be able to give you specific directions  Scratch out personal information on the original medicine label so it cannot be read  Then put it in the trash  Do not label the trash or put any information on it about the narcotics  It should look like regular household trash so no one is tempted to look for the narcotics   Keep the trash out of the reach of children and animals  Always make sure trash is secure  Talk to officials if you live in a facility  If you live in a nursing home or assisted living center, talk to an official  The person will know the rules for your area  What are some other ways to manage pain? Ask your healthcare provider about non-narcotic medicines to control pain  Some medicines may even work better than narcotics, depending on the cause of your pain  Nonprescription medicines include NSAIDs (such as ibuprofen) and acetaminophen  Prescription medicines include muscle relaxers, antidepressants, and steroids  Pain may be managed without any medicines  Some ways to relieve pain include massage, aromatherapy, or meditation  Physical or occupational therapy may also help  Where can I find more information? Drug Enforcement Administration  [facility] [address]  Phone: 2- 378 - [phone]  Web Address: [facility]/drug_disposal/    [address]  and Drug Administration  [facility] , [address] [facility]  Phone: 4- 275 - [phone]  Web Address: http://[employer]/    Call your local emergency number (911 in the [address] [facility]), or have someone else call if:   You have a seizure  You cannot be woken  You have trouble staying awake and your breathing is slow or shallow  Your speech is slurred, or you are confused  You are dizzy or stumble when you walk  When should I or someone close to me call my doctor? You are extremely drowsy, or you have trouble staying awake or speaking  You have pale or clammy skin  You have blue fingernails or lips  Your heartbeat is slower than normal     You cannot stop vomiting  You have questions or concerns about your condition or care  CARE AGREEMENT:   You have the right to help plan your care  Learn about your health condition and how it may be treated   Discuss treatment options with your healthcare providers to decide what care you want to receive  You always have the right to refuse treatment  The above information is an  only  It is not intended as medical advice for individual conditions or treatments  Talk to your doctor, nurse or pharmacist before following any medical regimen to see if it is safe and effective for you  © Copyright [employer] 2022 Information is for End User's use only and may not be sold, redistributed or otherwise used for commercial purposes  All illustrations and images included in CareNotes® are the copyrighted property of A D A M , Inc  or [employer]         Sternal Precautions   AMBULATORY CARE:   Sternal precautions  are used to help protect your sternum (breastbone) after open chest surgery  Wires are placed during surgery to hold the sternum together as it heals  Sternal precautions help prevent the wires from cutting through the sternum  The precautions also help prevent the sternum from coming apart from an injury, and prevent pain and bleeding  You may need to use the precautions for up to 12 weeks after surgery  Your surgeon will give you specific instructions based on the type of surgery you had  It is important to follow the instructions carefully  An injury to the healing sternum can be life-threatening  General sternal precautions:  Start slowly and do more as you get stronger  Pain medicine might make it harder for you to know when to slow down or be careful  Stop immediately if you hear a crunch or pop in your sternum  Protect your sternum  Hug a pillow to your chest or cross your arms over your chest when you laugh, sneeze, or cough  Be careful when you get into or out of a chair or bed  Hug a pillow or cross your arms when you stand or sit  Do not twist as you move  Use only your legs to sit and stand  You may need to use a raised toilet seat if you have trouble standing up without using your arms   Your healthcare provider may teach you to use your elbow for support as you move from lying to sitting  Ask when you may take a bath or shower  You may need to use a bath chair if you have trouble getting into or out of the tub  Do not use a grab bar  Do not lift or carry anything heavier than 5 pounds  For example, a gallon of milk weighs 8 pounds  Keep your arms down as much as possible  Do not put your arms out to the side, behind you, or over your head  Do not let anyone pull your arms to help you move or dress  Do not reach for items  Do not push or pull anything  Examples include a car door or a vacuum   Do not drive while you are healing  Your surgeon will tell you when it is safe for you to start driving again  Call 911 for any of the following: You have sudden pain in your sternum and hear a crunch or pop  You have bleeding that does not stop even after you apply pressure for 5 minutes  Seek care immediately if:   You hear crunching or grinding in your sternum  You have signs of an infection, such as a fever, red or warm skin, or pus in the surgery wound  Contact your healthcare provider if:   You continue to feel pain, even after you take your pain medicine  You have new or worsening pain, or any pain with movement  You have questions or concerns about your condition or care  Care for your surgery wound:  Always wash your hands before you care for your wound  Wash your wound as directed  Do not rub the wound as you wash or dry the area  Pat the area dry with a clean towel  Change the bandages as directed and when they get wet or dirty  Do not smoke:  Nicotine can damage blood vessels and make it more difficult to heal  Do not use e-cigarettes or smokeless tobacco in place of cigarettes or to help you quit  They still contain nicotine  Ask your healthcare provider for information if you currently smoke and need help quitting    Follow up with your doctor as directed:  Write down your questions so you remember to ask them during your visits  © Copyright [employer] 2022 Information is for End User's use only and may not be sold, redistributed or otherwise used for commercial purposes  All illustrations and images included in CareNotes® are the copyrighted property of A D A M , Inc  or Tata Durand  The above information is an  only  It is not intended as medical advice for individual conditions or treatments  Talk to your doctor, nurse or pharmacist before following any medical regimen to see if it is safe and effective for you

## 2023-02-11 NOTE — PLAN OF CARE
Problem: Prexisting or High Potential for Compromised Skin Integrity  Goal: Skin integrity is maintained or improved  Description: INTERVENTIONS:  - Identify patients at risk for skin breakdown  - Assess and monitor skin integrity  - Assess and monitor nutrition and hydration status  - Monitor labs   - Assess for incontinence   - Turn and reposition patient  - Assist with mobility/ambulation  - Relieve pressure over bony prominences  - Avoid friction and shearing  - Provide appropriate hygiene as needed including keeping skin clean and dry  - Evaluate need for skin moisturizer/barrier cream  - Collaborate with interdisciplinary team   - Patient/family teaching  - Consider wound care consult   Outcome: Progressing     Problem: MOBILITY - ADULT  Goal: Maintain or return to baseline ADL function  Description: INTERVENTIONS:  -  Assess patient's ability to carry out ADLs; assess patient's baseline for ADL function and identify physical deficits which impact ability to perform ADLs (bathing, care of mouth/teeth, toileting, grooming, dressing, etc )  - Assess/evaluate cause of self-care deficits   - Assess range of motion  - Assess patient's mobility; develop plan if impaired  - Assess patient's need for assistive devices and provide as appropriate  - Encourage maximum independence but intervene and supervise when necessary  - Involve family in performance of ADLs  - Assess for home care needs following discharge   - Consider OT consult to assist with ADL evaluation and planning for discharge  - Provide patient education as appropriate  Outcome: Progressing  Goal: Maintains/Returns to pre admission functional level  Description: INTERVENTIONS:  - Perform BMAT or MOVE assessment daily    - Set and communicate daily mobility goal to care team and patient/family/caregiver     - Collaborate with rehabilitation services on mobility goals if   Problem: CARDIOVASCULAR - ADULT  Goal: Maintains optimal cardiac output and hemodynamic stability  Description: INTERVENTIONS:  - Monitor I/O, vital signs and rhythm  - Monitor for S/S and trends of decreased cardiac output  - Administer and titrate ordered vasoactive medications to optimize hemodynamic stability  - Assess quality of pulses, skin color and temperature  - Assess for signs of decreased coronary artery perfusion  - Instruct patient to report change in severity of symptoms  Outcome: Progressing  Goal: Absence of cardiac dysrhythmias or at baseline rhythm  Description: INTERVENTIONS:  - Continuous cardiac monitoring, vital signs, obtain 12 lead EKG if ordered  - Administer antiarrhythmic and heart rate control medications as ordered  - Monitor electrolytes and administer replacement therapy as ordered  Outcome: Progressing     - Out of bed for toileting  - Record patient progress and toleration of activity level   Outcome: Progressing

## 2023-02-13 ENCOUNTER — TRANSITIONAL CARE MANAGEMENT (OUTPATIENT)
Dept: FAMILY MEDICINE CLINIC | Facility: CLINIC | Age: 78
End: 2023-02-13

## 2023-02-13 ENCOUNTER — TELEPHONE (OUTPATIENT)
Dept: CARDIAC SURGERY | Facility: CLINIC | Age: 78
End: 2023-02-13

## 2023-02-13 LAB
DME PARACHUTE DELIVERY DATE ACTUAL: NORMAL
DME PARACHUTE DELIVERY DATE REQUESTED: NORMAL
DME PARACHUTE ITEM DESCRIPTION: NORMAL
DME PARACHUTE ORDER STATUS: NORMAL
DME PARACHUTE SUPPLIER NAME: NORMAL
DME PARACHUTE SUPPLIER PHONE: NORMAL

## 2023-02-13 NOTE — TELEPHONE ENCOUNTER
Patient called the office today  She was discharged home on Saturday 2/11 after undergoing elective CABG x2 on 2/8  Patient states that she never had a bowel movement while in the hospital after surgery prior to discharge home, and hasn't had one since she went home  She states that she is very uncomfortable - not in pain, but feels very bloated  States that she is passing some flatus  She has been taking colace and miralax without relief  States that she is walking, drinking adequate fluids, and is eating several small meals a day  I gave her the following instructions:   - Continue small meals, incorporate fruit or vegetable with each meal  - Continue walking, try to increase distance each time  - Ensure adequate hydration  - Continue miralax and colace  Can add senna and milk of magnesia  If the addition of these do not produce a bowel movement in a day, would advise dulcolax suppository  If this again does not produce a bowel movement, I instructed her to call our office back for next steps

## 2023-02-14 ENCOUNTER — RA CDI HCC (OUTPATIENT)
Dept: OTHER | Facility: HOSPITAL | Age: 78
End: 2023-02-14

## 2023-02-14 NOTE — PROGRESS NOTES
[facility]  coding opportunities          Chart Reviewed number of suggestions sent to Provider: [phone]  [id number]  [id number]     Patients Insurance     Medicare Insurance: [employer]

## 2023-02-20 ENCOUNTER — TELEPHONE (OUTPATIENT)
Dept: CARDIAC SURGERY | Facility: CLINIC | Age: 78
End: 2023-02-20

## 2023-02-20 NOTE — TELEPHONE ENCOUNTER
Called and spoke with patient for follow up on phone call from last week  Patient states she had a bowel movement last week, and there have been no further issues  She states that her incision is healing well, and she denies SOB, fevers/chills, lightheadedness, chest pain  She has two more days of torsemide, which I have advised her to continue as directed  Weights have been stable  Weighing herself daily  She is active  Answered additional questions and reviewed upcoming postop appointments

## 2023-02-21 ENCOUNTER — OFFICE VISIT (OUTPATIENT)
Dept: FAMILY MEDICINE CLINIC | Facility: CLINIC | Age: 78
End: 2023-02-21

## 2023-02-21 VITALS
BODY MASS INDEX: 33.61 KG/M2 | SYSTOLIC BLOOD PRESSURE: 110 MMHG | OXYGEN SATURATION: 97 % | RESPIRATION RATE: 16 BRPM | TEMPERATURE: 98.7 F | HEIGHT: 61 IN | WEIGHT: 178 LBS | HEART RATE: 76 BPM | DIASTOLIC BLOOD PRESSURE: 54 MMHG

## 2023-02-21 DIAGNOSIS — M85.852 OSTEOPENIA OF NECKS OF BOTH FEMURS: ICD-10-CM

## 2023-02-21 DIAGNOSIS — I25.10 CORONARY ARTERY DISEASE INVOLVING NATIVE CORONARY ARTERY OF NATIVE HEART WITHOUT ANGINA PECTORIS: Primary | ICD-10-CM

## 2023-02-21 DIAGNOSIS — E11.9 TYPE 2 DIABETES MELLITUS WITHOUT COMPLICATION, WITHOUT LONG-TERM CURRENT USE OF INSULIN (HCC): ICD-10-CM

## 2023-02-21 DIAGNOSIS — D69.6 PLATELETS DECREASED (HCC): ICD-10-CM

## 2023-02-21 DIAGNOSIS — E78.2 MIXED DYSLIPIDEMIA: ICD-10-CM

## 2023-02-21 DIAGNOSIS — I10 ESSENTIAL HYPERTENSION: ICD-10-CM

## 2023-02-21 DIAGNOSIS — M85.851 OSTEOPENIA OF NECKS OF BOTH FEMURS: ICD-10-CM

## 2023-02-21 LAB
CREAT UR-MCNC: 180 MG/DL
MICROALBUMIN UR-MCNC: 24 MG/L (ref 0–20)
MICROALBUMIN/CREAT 24H UR: 13 MG/G CREATININE (ref 0–30)
SL AMB POCT HEMOGLOBIN AIC: 6 (ref ?–6.5)

## 2023-02-21 NOTE — ASSESSMENT & PLAN NOTE
BP good  Continue torsemide 20 mg qd and metoprolol 25 mg bid  Pt advised to continue low Na diet and to exercise on a regular basis

## 2023-02-21 NOTE — ASSESSMENT & PLAN NOTE
LDL 83 and LFTs were normal in Oct 2022  Continue atorvastatin 80 mg qhs  Pt takes fish oil 3600 mg qd  Advised pt to follow a low cholesterol diet and to exercise on a regular basis

## 2023-02-21 NOTE — ASSESSMENT & PLAN NOTE
A1C done today and was 6 0  Continue victoza 1 2 mg qd and metformin 1000 mg bid  Pt can restart glipizide 5 mg in AM due to higher sugars when off it completely  Advised pt to follow a low carb diet and to exercise on a regular basis  Foot exam done today  Had eye exam in April 2022 by Dr Jocy Holbrook      Lab Results   Component Value Date    HGBA1C 5 8 (H) 10/18/2022     Lab Results   Component Value Date    HGBA1C 5 8 (H) 10/18/2022

## 2023-02-21 NOTE — PROGRESS NOTES
BMI Counseling: Body mass index is 33 63 kg/m²  The BMI is above normal  Nutrition recommendations include decreasing portion sizes, encouraging healthy choices of fruits and vegetables, consuming healthier snacks and moderation in carbohydrate intake  Exercise recommendations include exercising 3-5 times per week  No pharmacotherapy was ordered  Rationale for BMI follow-up plan is due to patient being overweight or obese  Depression Screening and Follow-up Plan: Patient was screened for depression during today's encounter  They screened negative with a PHQ-2 score of 0  Assessment/Plan:         Problem List Items Addressed This Visit        Endocrine    Type 2 diabetes mellitus without complication, without long-term current use of insulin (HCC)     A1C done today and was 6 0  Continue victoza 1 2 mg qd and metformin 1000 mg bid  Pt can restart glipizide 5 mg in AM due to higher sugars when off it completely  Advised pt to follow a low carb diet and to exercise on a regular basis  Foot exam done today  Had eye exam in April 2022 by Dr Mary Soria  Lab Results   Component Value Date    HGBA1C 5 8 (H) 10/18/2022     Lab Results   Component Value Date    HGBA1C 5 8 (H) 10/18/2022            Relevant Orders    Microalbumin / creatinine urine ratio    POCT hemoglobin A1c       Cardiovascular and Mediastinum    Essential hypertension     BP good  Continue torsemide 20 mg qd and metoprolol 25 mg bid  Pt advised to continue low Na diet and to exercise on a regular basis  Coronary artery disease involving native coronary artery of native heart without angina pectoris - Primary     Pt had CABG x 2 on 2/8/23  Pt was admitted from 2/8 to 2/11  Doing well  Continue current meds  Pt sees Cardiology on 2/28 and CT Surgery on 3/9  Musculoskeletal and Integument    Osteopenia of necks of both femurs     Last dexascan was in March 2022 and had slight decrease in density   Pt advised to take calcium 1200 mg qd and Vit D 1000 U qd  Pt also advised to perform weight-bearing exercise on a regular basis  Recheck dexascan in March 2024  Other    Platelets decreased ([address] )    Mixed dyslipidemia     LDL 83 and LFTs were normal in Oct 2022  Continue atorvastatin 80 mg qhs  Pt takes fish oil 3600 mg qd  Advised pt to follow a low cholesterol diet and to exercise on a regular basis  TCM Call     Date and time call was made  2/17/2023 10:26 AM    Hospital care reviewed  Records reviewed    Patient was hospitialized at  [facility]    Date of Admission  02/08/23    Date of discharge  02/11/23    Diagnosis  S/P CABG x 2    Disposition  Home    Were the patients medications reviewed and updated  Yes    Current Symptoms  None      TCM Call     Post hospital issues  None    Scheduled for follow up? Yes    Patients specialists  Cardiologist    Did you obtain your prescribed medications  Yes    Do you need help managing your prescriptions or medications  No    Is transportation to your appointment needed  No    I have advised the patient to call PCP with any new or worsening symptoms  Perlita Padilla            Subjective:      Patient ID: Delvis Wong is a 69 y o  female  Pt here for f/u hospital stay s/p CABG  Pt was admitted from 2/8 to 2/12  Had 2 arteries bypassed  BP good  Her glipizide was stopped in hospital due to low sugars  Now sugars are starting to go too high  No chest pain  No sob  Leg swelling better now  No fever or chills  Getting PT at home and has VNA weekly   Sees       The following portions of the patient's history were reviewed and updated as appropriate:   Past Medical History:  She has a past medical history of Abdominal pain, Angina pectoris ([address] ), Arthritis, Breast cancer ([address] ), Cancer ([address] ), Colon polyp, Constipation, Coronary artery disease, Diabetes mellitus ([address] ), Diarrhea, Disease of thyroid gland, Hemorrhoids, Hypercholesterolemia, Hypertension, Neuropathy, Obesity, Osteoporosis, Otitis media, Ovarian ca ([facility] ) (1997), 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[facility] ), Shingles, 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), Thyroid cancer ([facility] ), and Urinary tract infection  ,  _______________________________________________________________________  Medical Problems:  does not have any pertinent problems on file ,  _______________________________________________________________________  Past Surgical History:   has a past surgical history that includes Coronary stent placement; Carpal tunnel release; Cholecystectomy; Back surgery; Hysterectomy (1989); Oophorectomy (Bilateral, 1997); Mammo (historical); Eye surgery; Colonoscopy; US guided breast biopsy left complete (Left, 03/15/2021); Appendectomy; Gallbladder surgery; US breast needle loc left (Left, 05/06/2021); US guided breast biopsy left complete (Left, 05/06/2021); US guidance breast biopsy left each additional (Left, 05/06/2021); Spine surgery; Thyroidectomy; Brain surgery (1993); Mastectomy w/ sentinel node biopsy (Left, 07/13/2021); Mastectomy (Left, 07/13/2021); Breast biopsy; Cervical fusion; Band hemorrhoidectomy; Angioplasty; Colonoscopy; Upper gastrointestinal endoscopy; Cardiac catheterization (N/A, 12/7/2022); and pr coronary artery byp w/vein & artery graft 2 vein (N/A, 2/8/2023)  ,  _______________________________________________________________________  Family History:  family history includes Arthritis in her family; Asthma in her daughter, sister, and son; Breast cancer (age of onset: 39) in her other and other; Breast cancer (age of onset: 48) in her maternal aunt;  Cancer in her brother, family, and sister; Dementia in her mother; Depression in her son; Diabetes in her family and mother; Endometrial cancer (age of onset: 76) in her sister; Esophageal cancer (age of onset: 61) in her father; Heart disease in her family and mother; Multiple myeloma (age of onset: 67) in her brother; No Known Problems in her maternal grandmother, paternal aunt, paternal aunt, paternal grandmother, sister, sister, and sister; Prostate cancer in her maternal grandfather and paternal grandfather; Stomach cancer (age of onset: 70) in her brother ,  _______________________________________________________________________  Social History:   reports that she has never smoked  She has never used smokeless tobacco  She reports that she does not drink alcohol and does not use drugs  ,  _______________________________________________________________________  Allergies:  is allergic to duloxetine and sulfa antibiotics     _______________________________________________________________________  Current Outpatient Medications   Medication Sig Dispense Refill   • acetaminophen (TYLENOL) 650 mg CR tablet Take 1 tablet (650 mg total) by mouth every 8 (eight) hours as needed for mild pain 30 tablet 0   • anastrozole (ARIMIDEX) 1 mg tablet Take 1 tablet (1 mg total) by mouth daily 90 tablet 0   • aspirin 325 mg tablet Take 1 tablet (325 mg total) by mouth daily Do not start before February 12, 2023  30 tablet 2   • atorvastatin (LIPITOR) 80 mg tablet Take 1 tablet (80 mg total) by mouth daily with dinner 30 tablet 2   • Insulin Pen Needle (Sure Comfort Pen Needles) 31G X 5 MM MISC by Does not apply route daily 100 each 3   • Lancets (OneTouch Delica Plus [id number]) MISC USE DAILY 100 each 3   • levothyroxine 88 mcg tablet Take 1 tablet (88 mcg total) by mouth daily 90 tablet 0   • liraglutide (VICTOZA) injection Inject 1 2 mg under the skin daily at bedtime      • metFORMIN (GLUCOPHAGE) 1000 MG tablet Take 1 tablet (1,000 mg total) by mouth 2 (two) times a day with meals 180 tablet 0   • metoprolol tartrate (LOPRESSOR) 25 mg tablet Take 0 5 tablets (12 5 mg total) by mouth every 12 (twelve) hours 30 tablet 2   • omeprazole (PriLOSEC) 20 mg delayed release capsule Take 1 capsule (20 mg total) by mouth daily 30 capsule 0   • OneTouch Ultra test strip Use 1 each daily Use as instructed 100 each 3   • potassium chloride (K-DUR,KLOR-CON) 20 mEq tablet Take 1 tablet (20 mEq total) by mouth daily for 14 days 14 tablet 0   • pregabalin (LYRICA) 75 mg capsule Take 1 capsule (75 mg total) by mouth 2 (two) times a day 180 capsule 1   • torsemide (DEMADEX) 20 mg tablet Take 1 tablet (20 mg total) by mouth daily for 14 days 14 tablet 0   • docusate sodium (COLACE) 100 mg capsule Take 1 capsule (100 mg total) by mouth 2 (two) times a day (Patient not taking: Reported on 2/21/2023) 60 capsule 0   • polyethylene glycol (GLYCOLAX) 17 GM/SCOOP powder Take 17 g by mouth daily (Patient not taking: Reported on 2/21/2023) 510 g 0     No current facility-administered medications for this visit      _______________________________________________________________________  Review of Systems   Constitutional: Negative for fatigue and unexpected weight change  Respiratory: Negative for cough, chest tightness and shortness of breath  Cardiovascular: Negative for chest pain and palpitations  Gastrointestinal: Negative for abdominal pain, constipation, diarrhea, nausea and vomiting  Genitourinary: Negative for difficulty urinating  Musculoskeletal: Negative for arthralgias  Skin: Negative for rash  Neurological: Negative for dizziness and headaches  Objective:  Vitals:    02/21/23 1324   BP: 110/54   BP Location: Left arm   Patient Position: Sitting   Cuff Size: Standard   Pulse: 76   Resp: 16   Temp: 98 7 °F (37 1 °C)   TempSrc: Tympanic   SpO2: 97%   Weight: 80 7 kg (178 lb)   Height: 5' 1" (1 549 m)     Body mass index is 33 63 kg/m²  Physical Exam  Vitals and nursing note reviewed  Constitutional:       Appearance: Normal appearance  She is well-developed  She is obese  HENT:      Head: Normocephalic and atraumatic  Cardiovascular:      Rate and Rhythm: Normal rate and regular rhythm  Pulses: no weak pulses     Heart sounds: Normal heart sounds   No murmur heard   Pulmonary:      Effort: Pulmonary effort is normal  No respiratory distress  Breath sounds: Normal breath sounds  No wheezing  Musculoskeletal:      Cervical back: Normal range of motion and neck supple  Right lower leg: No edema  Left lower leg: No edema  Feet:      Right foot:      Skin integrity: No ulcer, skin breakdown, erythema, warmth, callus or dry skin  Left foot:      Skin integrity: No ulcer, skin breakdown, erythema, warmth, callus or dry skin  Lymphadenopathy:      Cervical: No cervical adenopathy  Skin:     Comments: + midline chest incision with no redness or swelling  Neurological:      Mental Status: She is alert and oriented to person, place, and time  Psychiatric:         Mood and Affect: Mood normal          Behavior: Behavior normal          Thought Content: Thought content normal          Judgment: Judgment normal        Patient's shoes and socks removed  Right Foot/Ankle   Right Foot Inspection  Skin Exam: skin normal and skin intact  No dry skin, no warmth, no callus, no erythema, no maceration, no abnormal color, no pre-ulcer, no ulcer and no callus  Sensory   Proprioception: intact      Vascular  Capillary refills: < 3 seconds          Left Foot/Ankle  Left Foot Inspection  Skin Exam: skin normal and skin intact  No dry skin, no warmth, no erythema, no maceration, normal color, no pre-ulcer, no ulcer and no callus       Sensory   Proprioception: intact      Vascular  Capillary refills: < 3 seconds        Assign Risk Category  No deformity present  No loss of protective sensation  No weak pulses  Risk: 0

## 2023-02-21 NOTE — ASSESSMENT & PLAN NOTE
Last dexascan was in March 2022 and had slight decrease in density  Pt advised to take calcium 1200 mg qd and Vit D 1000 U qd  Pt also advised to perform weight-bearing exercise on a regular basis  Recheck dexascan in March 2024

## 2023-02-21 NOTE — ASSESSMENT & PLAN NOTE
Pt had CABG x 2 on 2/8/23  Pt was admitted from 2/8 to 2/11  Doing well  Continue current meds  Pt sees Cardiology on 2/28 and CT Surgery on 3/9

## 2023-03-13 ENCOUNTER — TELEPHONE (OUTPATIENT)
Dept: FAMILY MEDICINE CLINIC | Facility: CLINIC | Age: 78
End: 2023-03-13

## 2023-03-13 NOTE — TELEPHONE ENCOUNTER
Mannie Porter said she missed your call over the weekend  She said you didn't leave a message, but we both agreed it was probably about Eliseo Schafer passing away  She said he  on Sat

## 2023-03-24 ENCOUNTER — OFFICE VISIT (OUTPATIENT)
Dept: CARDIOLOGY CLINIC | Facility: CLINIC | Age: 78
End: 2023-03-24

## 2023-03-24 VITALS
HEART RATE: 75 BPM | SYSTOLIC BLOOD PRESSURE: 120 MMHG | WEIGHT: 183 LBS | OXYGEN SATURATION: 99 % | BODY MASS INDEX: 34.55 KG/M2 | DIASTOLIC BLOOD PRESSURE: 68 MMHG | HEIGHT: 61 IN

## 2023-03-24 DIAGNOSIS — I10 ESSENTIAL HYPERTENSION: ICD-10-CM

## 2023-03-24 DIAGNOSIS — E66.01 CLASS 2 SEVERE OBESITY WITH SERIOUS COMORBIDITY AND BODY MASS INDEX (BMI) OF 35.0 TO 35.9 IN ADULT, UNSPECIFIED OBESITY TYPE (HCC): ICD-10-CM

## 2023-03-24 DIAGNOSIS — I25.10 CORONARY ARTERY DISEASE INVOLVING NATIVE CORONARY ARTERY OF NATIVE HEART WITHOUT ANGINA PECTORIS: Primary | ICD-10-CM

## 2023-03-24 DIAGNOSIS — E78.2 MIXED DYSLIPIDEMIA: ICD-10-CM

## 2023-03-24 DIAGNOSIS — I25.2 CORONARY ARTERY DISEASE WITH HISTORY OF MYOCARDIAL INFARCTION WITHOUT HISTORY OF CABG: ICD-10-CM

## 2023-03-24 DIAGNOSIS — I25.10 CORONARY ARTERY DISEASE WITH HISTORY OF MYOCARDIAL INFARCTION WITHOUT HISTORY OF CABG: ICD-10-CM

## 2023-03-24 DIAGNOSIS — E11.9 TYPE 2 DIABETES MELLITUS WITHOUT COMPLICATION, WITHOUT LONG-TERM CURRENT USE OF INSULIN (HCC): ICD-10-CM

## 2023-03-24 NOTE — PROGRESS NOTES
Cardiology   Warden Eladio DO, Daisha Arvizu MD, Natividad Jones MD, Aminata Ford MD, [facility] - [place]  -------------------------------------------------------------------  [facility] ORTHOPEDIC [facility]  [address], One [facility],[address], [address] [place] [address], [address] [place] [address]  [phone]    Cardiology Follow Up  Amanda Almazan  1945  [id number]          Assessment/Plan:    1  Coronary artery disease involving native coronary artery of native heart without angina pectoris    2  Coronary artery disease with history of myocardial infarction without history of CABG    3  Mixed dyslipidemia    4  Essential hypertension    5  Class 2 severe obesity with serious comorbidity and body mass index (BMI) of 35 0 to 35 9 in adult, unspecified obesity type ([facility] )    6  Type 2 diabetes mellitus without complication, without long-term current use of insulin ([facility] )      -Patient is recovering well post CABG  She had an improvement in her exertional dyspnea  She may start cardiac rehab next week as planned  She may drive and lift up to 20 pounds at this stage   -Continue atorvastatin 80 mg daily  Previously, she was unable to tolerate this medication but is doing well now  -Continue aspirin  -Continue metoprolol 12 5 mg twice daily   -Discussed diet    Interval History:     Amanda Almazan is 69 y o  female here for followup of CAD/CABG  On February 8, 2023, she underwent two-vessel CABG with LIMA to LAD and SVG to OM1  Surgery was uncomplicated and she was discharged home 4 days later  Since then, she has not had any chest pain or shortness of breath  She does feel significant improvement in her dyspnea  She denies any lower extremity edema, orthopnea or paroxysmal nocturnal dyspnea  Her  passed away last month  In December 2022, she developed exertional dyspnea and chest tightness and was seen in the emergency room    She subsequently underwent cardiac catheterization which showed left main 70% stenosis and 95% proximal circumflex stenosis  In addition she had 45% RCA stenosis  She was scheduled for CABG on 12/19 but surgery was canceled because patient had COVID infection  Previously, she could not tolerate pravastatin or atorvastatin due to myalgias but is tolerating atorvastatin 80 mg daily which was started post CABG  She has been on 2 g of fish oil twice a day  Most recent blood work showed LDL of 77, HDL of 35 and triglycerides of 224  She has a history of CAD with SAMMIE to proximal LAD in 2015  Prior to stent placement, she was feeling episode of left arm pain with exertion  She had an echocardiogram in December which showed normal ejection fraction with mild valve disease      The following portions of the patient's history were reviewed and updated as appropriate: allergies, current medications, past family history, past medical history, past social history, past surgical history, and problem list        Current Outpatient Medications:   •  anastrozole (ARIMIDEX) 1 mg tablet, Take 1 tablet (1 mg total) by mouth daily, Disp: 90 tablet, Rfl: 0  •  aspirin 325 mg tablet, Take 1 tablet (325 mg total) by mouth daily Do not start before February 12, 2023 , Disp: 30 tablet, Rfl: 2  •  atorvastatin (LIPITOR) 80 mg tablet, Take 1 tablet (80 mg total) by mouth daily with dinner, Disp: 30 tablet, Rfl: 2  •  Insulin Pen Needle (Sure Comfort Pen Needles) 31G X 5 MM MISC, by Does not apply route daily, Disp: 100 each, Rfl: 3  •  Lancets (OneTouch Delica Plus [id number]) MISC, USE DAILY, Disp: 100 each, Rfl: 3  •  levothyroxine 88 mcg tablet, Take 1 tablet (88 mcg total) by mouth daily, Disp: 90 tablet, Rfl: 0  •  liraglutide (VICTOZA) injection, Inject 1 2 mg under the skin daily at bedtime , Disp: , Rfl:   •  metFORMIN (GLUCOPHAGE) 1000 MG tablet, Take 1 tablet (1,000 mg total) by mouth 2 (two) times a day with meals, Disp: 180 tablet, Rfl: 0  •  metoprolol tartrate (LOPRESSOR) 25 mg tablet, Take 0 5 tablets (12 5 mg total) by mouth every 12 (twelve) hours, Disp: 30 tablet, Rfl: 2  •  OneTouch Ultra test strip, Use 1 each daily Use as instructed, Disp: 100 each, Rfl: 3  •  pregabalin (LYRICA) 75 mg capsule, Take 1 capsule (75 mg total) by mouth 2 (two) times a day, Disp: 180 capsule, Rfl: 1  •  docusate sodium (COLACE) 100 mg capsule, Take 1 capsule (100 mg total) by mouth 2 (two) times a day (Patient not taking: Reported on 2/21/2023), Disp: 60 capsule, Rfl: 0  •  omeprazole (PriLOSEC) 20 mg delayed release capsule, Take 1 capsule (20 mg total) by mouth daily, Disp: 30 capsule, Rfl: 0  •  polyethylene glycol (GLYCOLAX) 17 GM/SCOOP powder, Take 17 g by mouth daily (Patient not taking: Reported on 2/21/2023), Disp: 510 g, Rfl: 0  •  potassium chloride (K-DUR,KLOR-CON) 20 mEq tablet, Take 1 tablet (20 mEq total) by mouth daily for 14 days, Disp: 14 tablet, Rfl: 0  •  torsemide (DEMADEX) 20 mg tablet, Take 1 tablet (20 mg total) by mouth daily for 14 days, Disp: 14 tablet, Rfl: 0        Review of Systems:  Review of Systems   Respiratory: Negative for chest tightness and shortness of breath  Cardiovascular: Negative for chest pain, palpitations and leg swelling  Musculoskeletal: Positive for arthralgias  All other systems reviewed and are negative  Physical Exam:  Vitals:  Vitals:    03/24/23 1136   BP: 120/68   BP Location: Right arm   Patient Position: Sitting   Cuff Size: Large   Pulse: 75   SpO2: 99%   Weight: 83 kg (183 lb)   Height: 5' 1" (1 549 m)     Physical Exam   Constitutional: She appears healthy  No distress  Eyes: Pupils are equal, round, and reactive to light  Conjunctivae are normal    Neck: No JVD present  Cardiovascular: Normal rate, regular rhythm and normal heart sounds  Exam reveals no gallop and no friction rub  No murmur heard  Pulmonary/Chest: Effort normal and breath sounds normal  She has no wheezes  She has no rales     Musculoskeletal: General: No tenderness, deformity or edema  Cervical back: Normal range of motion and neck supple  Neurological: She is alert and oriented to person, place, and time  Skin: Skin is warm and dry  Cardiographics:  EKG: Personally reviewed normal sinus rhythm with rate 75 beats per minute Q waves inferiorly  Last known EF:  60-65%    This note was completed in part utilizing M-Modal Fluency Direct Software  Grammatical errors, random word insertions, spelling mistakes, and incomplete sentences can be an occasional consequence of this system secondary to software limitations, ambient noise, and hardware issues  If you have any questions or concerns about the content, text, or information contained within the body of this dictation, please contact the provider for clarification

## 2023-03-28 ENCOUNTER — CLINICAL SUPPORT (OUTPATIENT)
Dept: CARDIAC REHAB | Facility: CLINIC | Age: 78
End: 2023-03-28

## 2023-03-28 DIAGNOSIS — Z95.1 S/P CABG X 2: ICD-10-CM

## 2023-03-28 DIAGNOSIS — E11.9 TYPE 2 DIABETES MELLITUS WITHOUT COMPLICATION, WITHOUT LONG-TERM CURRENT USE OF INSULIN (HCC): ICD-10-CM

## 2023-03-28 NOTE — PROGRESS NOTES
Cardiac Rehabilitation Plan of Care   Initial Care Plan          Today's date: 3/28/2023   # of Exercise Sessions Completed: Initial +1  Patient name: Fernie Santamaria      : 1945  Age: 69 y o  MRN: [id number]  Referring Physician: Mark Chaudhari MD  Cardiologist: Mouna Posey DO  Provider: Reggie Bustillos  Clinician: Isabelle Kahn RN     Dx:   Encounter Diagnoses   Name Primary? • S/P CABG x 2    • Type 2 diabetes mellitus without complication, without long-term current use of insulin ([facility] )      Date of onset: 2023      SUMMARY OF PROGRESS:  Today is Conrad Villa initial evaluation to begin Cardiac Rehab post Z95 1 CABG times 2  The patient does not currently follow a formal exercise program at home  She has resumed light ADLs following sternal restrictions, reporting dyspnea with activity and reporting weakness and fatigue  Depression screening using the PHQ-9 interprets the patient's score of  7  as  5-9 = Mild Depression  GISELLA-7 screening tool for anxiety suggests 8  5-9 = Mild anxiety  When addressed, the patient admits to having depression/anxiety due to  as of 3/11/2023  Patient reports excellent social/emotional support from family and friends  Information to utilize [employer] was provided as well as contact information for counseling through [facility]  PHQ-9 score will be reassessed in 30 days due to an initial score revealing concern for depression  They rate stress 3-7/10 with the following stressors:  as of 3/11/2023  Stress management will be reviewed  The patient is a non-smoker  Patient admits to 100% medication compliance  They report the following physical limitations: shortness of breath, fatigue especially lower extremities, lightheadednes  The patient completed an initial submaximal TM ETT   The patient completed 3 minutes of stage 1 (1 92METs, modified to 1 2 mph with 0% incline) with test termination of RPE 6, symptomatic response and lighheaded, leg fatigue, shortness of breath  Resting  /68 with Normal response to exercise reaching 132/70  Blood Pressure will be monitored throughout the program and cardiologist will be notified of elevated trends  Patient reported symptoms with exercise including lightheaded, leg fatigue, shortness of breath    Telemetry revealed NSR  She was counseled on exercise guidelines to achieve a minimum of 150 mins/wk of moderate intensity (RPE 4-6) exercise and encouraged to add 1-2 days of exercise on opposite days of cardiac rehab as tolerated  We discussed current dietary habits and goals of heart healthy eating for lipid management, diabetes management, weight loss and HTN management  The patient has T2D, Patient monitors BS 2 times/day, Patient reported fasting , no insulin  Patient's personal goals include: increase mobility, return to sewing, be more active, improve PHQ9/GAD7 score, use [employer] program, attend CR regularly  The patient's CAD risk factors include:  inactivity, stress, obesity/overweight, hypertension, hyperlipidemia and diabetes  Her education will focus on lifestyle modification/education specific to Her needs  Patient will attend group education classes on heart healthy eating, reading food labels, stress management, risk factor reduction, understanding heart disease and common heart medications  Patient will attend 35 monitored exercise sessions, 3x/wk for 12-18 weeks beginning 3/31/2023         Medication compliance: Yes   Comments: Pt reports to be compliant with medications  Fall Risk: Moderate   Comments: Patient uses walking assist device (walker/cane/rollator) and Denies a fall in the past 6 months    EKG Interpretation: NSR      EXERCISE ASSESSMENT and PLAN    Exercise Prescription:      Frequency: 3 days/week   Supplement with home exercise 2+ days/wk as tolerated       Minutes: 30-35         METS: 1 5-3 0            HR: 20-30 bpm above resting   RPE: 4-6         Modalities: UBE, NuStep, Recumbent bike and Room walking      30 Day Goals for Exercise Progression:    Frequency: 3 days/week of cardiac rehab       Supplement with home exercise 2+ days/wk as tolerated    Minutes: 30-35                              >150 mins/wk of moderate intensity exercise   METS: 1 5-3 0   HR: 20-30 bpm above resting    RPE: 4-6   Modalities: UBE, NuStep, Recumbent bike and Room walking    Strength training: Will be added following at least 8 weeks post surgery and 8-10 monitored sessions   Modalities: Arm Curl and Sit to [facility] Exercise: none    Goals: 10% improvement in functional capacity - based on max METs achieved in fitness assessment, Reduced dyspnea with physical activity  0-1/10, Attend Rehab regularly, Decrease sitting time and return to sewing, be more active    Progression Toward Goals:  Reviewed Pt goals and determined plan of care, Patient will attend CR regularly, decrease sitting time at home in the next 30 days, Will continue to educate and progress as tolerated  Education: benefit of exercise for CAD risk factors, home exercise guidelines, AHA guidelines to achieve >150 mins/wk of moderate exercise and RPE scale   Plan:home exercise 30+ mins 2 days opposite CR  Readiness to change: Preparation:  (Getting ready to change)       NUTRITION ASSESSMENT AND PLAN    Weight control:    Starting weight: 183   Current weight:     Waist circumference:    Startin   Current:      Diabetes: T2D, Patient monitors BS 2 times/day, Patient reported fasting , no insulin  A1c: 6 0    last measured: 2023    Lipid management: Discussed diet and lipid management and Last lipid profile 10/18/2022  Chol 155    HDL 35  LDL 83    Goals:Eat 4-5 cups of fruits and vegetables daily and read food labels, recent  difficult cooking at this time    Measurable goals were based Rate Your Plate Dietary Self-Assessment   These are the areas in which the patient could score higher on the assessment  Goals include recommendations for a heart healthy diet based on American Heart Association  Progression Toward Goals: Reviewed Pt goals and determined plan of care, Patient will read food labels in the next 30 days, Will continue to educate and progress as tolerated  Education: heart healthy eating  low sodium diet  hydration  nutrition for  lipid management  exercise and diabetes management   Plan: Education class: Reading Food Labels, Education Class: Heart Healthy Eating, refer to diabetes educator, replace unhealthy snacks with fruits & vegs and learn how to read food labels  Readiness to change: Pre-Contemplation:   (Not yet acknowledging that there is a problem behavior that needs to change)      PSYCHOSOCIAL ASSESSMENT AND PLAN    Emotional:  Depression assessment:  PHQ-9 = 7  5-9 = Mild Depression            Anxiety measure:  GISELLA-7 = 8  5-9 = Mild anxiety  Self-reported stress level:  3-7  Social support: Very Good, Patient reports excellent emotional/social support from family and Patient has friends available for support when needed     Goals:  Reduce perceived stress to 1-3/10, PHQ-9 - reduced severity by one level, Feelings in [place] Score < 3, Physical Fitness in [place] Score < 3, Daily Activity in [place] Score < 3, Overall Health in [place] Score < 3 and increased energy    Progression Toward Goals: Reviewed Pt goals and determined plan of care, Patient will use [employer] program, reach out to friends and family for grieving support in the next 30 days, Will continue to educate and progress as tolerated      Education: signs/sxs of depression, benefits of a positive support system, stress management techniques, depression and CAD, benefits of mental health counseling and greiving spouse*  Plan: Class: Stress and Your Health, Class: Relaxation, Refer to behavioral health/counseling, PHQ-9 >5 will refer to MD, Refer to [employer], Practice relaxation techniques, Exercise, Keep a positive mindset, Join a support group  and Repeat PHQ-9 every 30 days if score >5  Readiness to change: Contemplation:  (Acknowledging that there is a problem but not yet ready or sure of wanting to make a change)      OTHER CORE COMPONENTS     Tobacco:   Social History     Tobacco Use   Smoking Status Never   Smokeless Tobacco Never       Tobacco Use Intervention:   N/A:  Patient is a non-smoker     Anginal Symptoms:  HAMM and fatigue   NTG use: Compliant with carrying NTG, Understands proper use, Reviewed Proper use and Pt has not used NTG since event    Blood pressure:    Restin/68   Exercise: 132/70    Goals: consistent BP < 130/80, reduced dietary sodium <2300mg, moderate intensity exercise >150 mins/wk and medication compliance    Progression Toward Goals: Reviewed Pt goals and determined plan of care, Patient will read labels for sodium, take medication as prescribed in the next 30 days, Will continue to educate and progress as tolerated      Education:  understanding high blood pressure and it's relationship to CAD, low sodium diet and HTN and proper use of sublingual NTG  Plan: Class: Understanding Heart Disease, Class: Common Heart Medications, Avoid Processed foods, engage in regular exercise and check labels for sodium content  Readiness to change: Preparation:  (Getting ready to change)

## 2023-03-28 NOTE — PROGRESS NOTES
"CARDIAC REHAB ASSESSMENT    Today's date: 2023  Patient name: Ivan Dodge     : 1945       MRN: [id number]  PCP: Jaelyn Barksdale MD  Referring Physician: Mellissa Farris MD  Cardiologist: Edie Carty DO  Surgeon: Mellissa Farris MD  Dx:   Encounter Diagnoses   Name Primary?    • S/P CABG x 2    • Type 2 diabetes mellitus without complication, without long-term current use of insulin (HCC)        Date of onset: 2023  Cultural needs: none    Weight    Wt Readings from Last 1 Encounters:   23 83 kg (183 lb)      Height:   Ht Readings from Last 1 Encounters:   23 5' 1\" (1 549 m)     Medical History:   Past Medical History:   Diagnosis Date   • Abdominal pain     LLQ on occasion   • Angina pectoris ([facility] )    • Arthritis    • Breast cancer ([facility] )    • Cancer ([facility] )     breast left   • Colon polyp    • Constipation     at times   • Coronary artery disease    • Diabetes mellitus ([facility] )    • Diarrhea     at times   • Disease of thyroid gland    • Hemorrhoids    • Hypercholesterolemia    • Hypertension    • Neuropathy     both legs and feet   • Obesity    • Osteoporosis    • Otitis media    • Ovarian ca [facility])    • 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[facility] )    • Shingles    • 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   • Thyroid cancer ([facility] )    • Urinary tract infection          Physical Limitations: sternal lifting precautions    Fall Risk: Moderate   Comments: Patient uses walking assist device (walker/cane/rollator) and Denies a fall in the past 6 months    Anginal Equivalent: Lightheadedness and Dyspnea on Exertion   NTG use: Compliant with carrying NTG, Understands proper use, Reviewed Proper use and Pt has not used NTG since event    Risk Factors   Cholesterol: Yes  Smoking: Never used  HTN: Yes  DM: Type 2   average   insulin  Obesity: Yes   Inactivity: Yes  Stress:  perceived  stress: 3-7/10   Stressors:greiving spouse since 3/11/2023   Goals for Stress Management:Exercise, Keep a " positive mindset, Enjoy a hobby, Spend time with family, TV and sewing, states she has lizy support from Confucianism, family, and friends    Family History:  Family History   Problem Relation Age of Onset   • Diabetes Mother    • Heart disease Mother    • Dementia Mother    • Esophageal cancer Father 61   • Endometrial cancer Sister 76   • Asthma Sister    • Cancer Sister    • No Known Problems Sister    • No Known Problems Sister    • No Known Problems Sister    • Stomach cancer Brother 70   • Multiple myeloma Brother 67   • Cancer Brother    • No Known Problems Maternal Grandmother    • Prostate cancer Maternal Grandfather    • No Known Problems Paternal Grandmother    • Prostate cancer Paternal Grandfather    • Breast cancer Maternal Aunt 50   • No Known Problems Paternal Aunt    • No Known Problems Paternal Aunt    • Asthma Daughter    • Asthma Son    • Depression Son    • Breast cancer Other 39   • Breast cancer Other 39   • Diabetes Family    • Cancer Family    • Arthritis Family    • Heart disease Family        Allergies: Duloxetine and Sulfa antibiotics  ETOH:   Social History     Substance and Sexual Activity   Alcohol Use Never         Current Medications:   Current Outpatient Medications   Medication Sig Dispense Refill   • anastrozole (ARIMIDEX) 1 mg tablet Take 1 tablet (1 mg total) by mouth daily 90 tablet 0   • aspirin 325 mg tablet Take 1 tablet (325 mg total) by mouth daily Do not start before February 12, 2023  30 tablet 2   • atorvastatin (LIPITOR) 80 mg tablet Take 1 tablet (80 mg total) by mouth daily with dinner 30 tablet 2   • docusate sodium (COLACE) 100 mg capsule Take 1 capsule (100 mg total) by mouth 2 (two) times a day (Patient not taking: Reported on 2/21/2023) 60 capsule 0   • Insulin Pen Needle (Sure Comfort Pen Needles) 31G X 5 MM MISC by Does not apply route daily 100 each 3   • Lancets (OneTouch Delica Plus [id number]) MISC USE DAILY 100 each 3   • levothyroxine 88 mcg tablet Take 1 tablet (88 mcg total) by mouth daily 90 tablet 0   • liraglutide (VICTOZA) injection Inject 1 2 mg under the skin daily at bedtime      • metFORMIN (GLUCOPHAGE) 1000 MG tablet Take 1 tablet (1,000 mg total) by mouth 2 (two) times a day with meals 180 tablet 0   • metoprolol tartrate (LOPRESSOR) 25 mg tablet Take 0 5 tablets (12 5 mg total) by mouth every 12 (twelve) hours 30 tablet 2   • omeprazole (PriLOSEC) 20 mg delayed release capsule Take 1 capsule (20 mg total) by mouth daily 30 capsule 0   • OneTouch Ultra test strip Use 1 each daily Use as instructed 100 each 3   • polyethylene glycol (GLYCOLAX) 17 GM/SCOOP powder Take 17 g by mouth daily (Patient not taking: Reported on 2/21/2023) 510 g 0   • potassium chloride (K-DUR,KLOR-CON) 20 mEq tablet Take 1 tablet (20 mEq total) by mouth daily for 14 days 14 tablet 0   • pregabalin (LYRICA) 75 mg capsule Take 1 capsule (75 mg total) by mouth 2 (two) times a day 180 capsule 1   • torsemide (DEMADEX) 20 mg tablet Take 1 tablet (20 mg total) by mouth daily for 14 days 14 tablet 0     No current facility-administered medications for this visit  Functional Status Prior to Diagnosis for Treatment   Occupation: retired  Recreation: Housework, sewing, reading, watch TV, has a dog, shopping, crossword puzzles  ADL’s: able to perform self-care  [place]: able to perform self-care  Exercise: states no exercise due to fatigue and shortness of breath    Current Functional Status  Occupation: retired  Recreation: reading, cooking, caring for dog, crossword puzzles  ADL’s:resumed all ADLs within sternal precautions  [place]: resumed all ADLs within sternal precautions  Exercise: none      Patient Specific Goals:  Attend CR regularly, increase mobility, be more active, improve nutrition, improve PHQ9   GAD7 score, return to sewing, improve fatigue, shortness of breath, increase endurance    Short Term Program Goals: improved energy/stamina with ADLs    Long Term Goals: increased maximal walking duration  Improved Quality of Life - [facility] score reduced  Reduced stress  improved Rate Your Plate Score    Ability to reach goals/rehabilitation potential:  Very Good     Projected return to function: 8-12 weeks  Objective tests: sub-max TM ETT      Nutritional   Reviewed details of Rate your Plate  Discussed key elements of heart healthy eating  Reviewed patient goals for dietary modifications and their clinical implications  Reviewed most recent lipid profile       Goals for dietary modification based on Rate Your Plate Dietary Assessment:  increase fruits and vegetables  Read food labels, recent , difficulty cooking at this time      Emotional/Social  Patient reports feelings of depression   Patient reports feelings of anxiety  Reports sufficient emotional support  Provided contact information for [facility] to speak to MD regarding medical therapy  Provided [employer] information,   Has friends and family support    Marital status:     Domestic Violence Screening: feels safe and free of harm    Comments: initial evaluation completed

## 2023-03-29 ENCOUNTER — APPOINTMENT (OUTPATIENT)
Dept: CARDIAC REHAB | Facility: CLINIC | Age: 78
End: 2023-03-29

## 2023-03-31 ENCOUNTER — CLINICAL SUPPORT (OUTPATIENT)
Dept: CARDIAC REHAB | Facility: CLINIC | Age: 78
End: 2023-03-31

## 2023-03-31 DIAGNOSIS — Z95.1 S/P CABG X 2: Primary | ICD-10-CM

## 2023-04-03 ENCOUNTER — CLINICAL SUPPORT (OUTPATIENT)
Dept: CARDIAC REHAB | Facility: CLINIC | Age: 78
End: 2023-04-03

## 2023-04-03 DIAGNOSIS — Z95.1 S/P CABG X 2: Primary | ICD-10-CM

## 2023-04-04 ENCOUNTER — APPOINTMENT (OUTPATIENT)
Dept: CARDIAC REHAB | Facility: CLINIC | Age: 78
End: 2023-04-04

## 2023-04-05 ENCOUNTER — APPOINTMENT (OUTPATIENT)
Dept: CARDIAC REHAB | Facility: CLINIC | Age: 78
End: 2023-04-05

## 2023-04-07 ENCOUNTER — APPOINTMENT (OUTPATIENT)
Dept: CARDIAC REHAB | Facility: CLINIC | Age: 78
End: 2023-04-07

## 2023-04-10 ENCOUNTER — APPOINTMENT (OUTPATIENT)
Dept: CARDIAC REHAB | Facility: CLINIC | Age: 78
End: 2023-04-10

## 2023-04-10 NOTE — PROGRESS NOTES
Cardiac Rehabilitation Plan of Care   Discharge          Today's date: 4/10/2023   # of Exercise Sessions Completed: 3  Patient name: Nik Schaeffer      : 1945  Age: 69 y o  MRN: [id number]  Referring Physician: Paul Sandra PA-C  Cardiologist: Brooklynn Alex DO  Provider: Rosario Bowser  Clinician: Courtney Kahn RN     Dx:   Encounter Diagnosis   Name Primary? • S/P CABG x 2 Yes     Date of onset: 2023      SUMMARY OF PROGRESS:    4/10/2023 Geena Davis has been discharged from the cardiac rehabilitation program  She completed 3 sessions (2 plus initial evaluation)  She completed a total of 30-35 minutes of cardiovascular exercise  20 minutes of nu-step, 10 on recumbent and 5 minutes room walking  Telemetry NSR at rest and with exercise  Resting HR 66-79 resting /68-74  Increased to 122-132/68-72 with exercise  RPE 4  Exercise MET level 2 3-2 6 MET's  She denied any c/o discomfort during exercise  Her weight decreased from 183 to 182 pounds  She stated via phone today she will not be returning to cardiac rehabilitation due to discomfort in her right arm  Unable to to complete outcome evaluation due to patient did not return to cardiac rehabilitation  Today is [facility] initial evaluation to begin Cardiac Rehab post Z95 1 CABG times 2  The patient does not currently follow a formal exercise program at home  She has resumed light ADLs following sternal restrictions, reporting dyspnea with activity and reporting weakness and fatigue  Depression screening using the PHQ-9 interprets the patient's score of  7  as  5-9 = Mild Depression  GISELLA-7 screening tool for anxiety suggests 8  5-9 = Mild anxiety  When addressed, the patient admits to having depression/anxiety due to  as of 3/11/2023  Patient reports excellent social/emotional support from family and friends    Information to utilize [facility] was provided as well as contact information for counseling through [facility]  PHQ-9 score will be reassessed in 30 days due to an initial score revealing concern for depression  They rate stress 3-7/10 with the following stressors:  as of 3/11/2023  Stress management will be reviewed  The patient is a non-smoker  Patient admits to 100% medication compliance  They report the following physical limitations: shortness of breath, fatigue especially lower extremities, lightheadednes  The patient completed an initial submaximal TM ETT  The patient completed 3 minutes of stage 1 (1 92METs, modified to 1 2 mph with 0% incline) with test termination of RPE 6, symptomatic response and lighheaded, leg fatigue, shortness of breath  Resting  /68 with Normal response to exercise reaching 132/70  Blood Pressure will be monitored throughout the program and cardiologist will be notified of elevated trends  Patient reported symptoms with exercise including lightheaded, leg fatigue, shortness of breath    Telemetry revealed NSR  She was counseled on exercise guidelines to achieve a minimum of 150 mins/wk of moderate intensity (RPE 4-6) exercise and encouraged to add 1-2 days of exercise on opposite days of cardiac rehab as tolerated  We discussed current dietary habits and goals of heart healthy eating for lipid management, diabetes management, weight loss and HTN management  The patient has T2D, Patient monitors BS 2 times/day, Patient reported fasting , no insulin  Patient's personal goals include: increase mobility, return to sewing, be more active, improve PHQ9/GAD7 score, use silver cloud program, attend CR regularly  The patient's CAD risk factors include:  inactivity, stress, obesity/overweight, hypertension, hyperlipidemia and diabetes  Her education will focus on lifestyle modification/education specific to Her needs    Patient will attend group education classes on heart healthy eating, reading food labels, stress management, risk factor reduction, understanding heart disease and common heart medications  Patient will attend 35 monitored exercise sessions, 3x/wk for 12-18 weeks beginning 3/31/2023  Medication compliance: Yes   Comments: Pt reports to be compliant with medications  Fall Risk: Moderate   Comments: Patient uses walking assist device (walker/cane/rollator) and Denies a fall in the past 6 months    EKG Interpretation: NSR      EXERCISE ASSESSMENT and PLAN    Exercise Prescription:      Frequency: 3 days/week   Supplement with home exercise 2+ days/wk as tolerated       Minutes: 30-35         METS: 2 3-2 6           HR: 20-30 bpm above resting   RPE: 4-6         Modalities: UBE, NuStep, Recumbent bike and Room walking      30 Day Goals for Exercise Progression:    Frequency: 3 days/week of cardiac rehab       Supplement with home exercise 2+ days/wk as tolerated    Minutes: 30-35                              >150 mins/wk of moderate intensity exercise   METS: 1 5-3 0   HR: 20-30 bpm above resting    RPE: 4-6   Modalities: UBE, NuStep, Recumbent bike and Room walking    Strength training: Will be added following at least 8 weeks post surgery and 8-10 monitored sessions   Modalities: Arm Curl and Sit to [facility] Exercise: none    Goals: 10% improvement in functional capacity - based on max METs achieved in fitness assessment, Reduced dyspnea with physical activity  0-1/10, Attend Rehab regularly, Decrease sitting time and return to sewing, be more active    Progression Toward Goals:  Reviewed Pt goals and determined plan of care, Patient will attend CR regularly, decrease sitting time at home in the next 30 days, Will continue to educate and progress as tolerated      Education: benefit of exercise for CAD risk factors, home exercise guidelines, AHA guidelines to achieve >150 mins/wk of moderate exercise and RPE scale   Plan:home exercise 30+ mins 2 days opposite CR  Readiness to change: Preparation:  (Getting ready to change)       NUTRITION ASSESSMENT AND PLAN    Weight control:    Starting weight: 183   Current weight:   182  Waist circumference:    Startin   Current:      Diabetes: T2D, Patient monitors BS 2 times/day, Patient reported fasting , no insulin  A1c: 6 0    last measured: 2023    Lipid management: Discussed diet and lipid management and Last lipid profile 10/18/2022  Chol 155    HDL 35  LDL 83    Goals:Eat 4-5 cups of fruits and vegetables daily and read food labels, recent  difficult cooking at this time    Measurable goals were based Rate Your Plate Dietary Self-Assessment  These are the areas in which the patient could score higher on the assessment  Goals include recommendations for a heart healthy diet based on American Heart Association  Progression Toward Goals: Reviewed Pt goals and determined plan of care, Patient will read food labels in the next 30 days, Will continue to educate and progress as tolerated      Education: heart healthy eating  low sodium diet  hydration  nutrition for  lipid management  exercise and diabetes management   Plan: Education class: Reading Food Labels, Education Class: Heart Healthy Eating, refer to diabetes educator, replace unhealthy snacks with fruits & vegs and learn how to read food labels  Readiness to change: Pre-Contemplation:   (Not yet acknowledging that there is a problem behavior that needs to change)      PSYCHOSOCIAL ASSESSMENT AND PLAN    Emotional:  Depression assessment:  PHQ-9 = 7  5-9 = Mild Depression            Anxiety measure:  GISELLA-7 = 8  5-9 = Mild anxiety  Self-reported stress level:  3-7  Social support: Very Good, Patient reports excellent emotional/social support from family and Patient has friends available for support when needed     Goals:  Reduce perceived stress to 1-3/10, PHQ-9 - reduced severity by one level, Feelings in [place] Score < 3, Physical Fitness in [place] Score < 3, Daily Activity in [place] Score < 3, Overall Health in [place] Score < 3 and increased energy    Progression Toward Goals: Reviewed Pt goals and determined plan of care, Patient will use silver cloud program, reach out to friends and family for grieving support in the next 30 days, Will continue to educate and progress as tolerated  Education: signs/sxs of depression, benefits of a positive support system, stress management techniques, depression and CAD, benefits of mental health counseling and greiving spouse*  Plan: Class: Stress and Your Health, Class: Relaxation, Refer to behavioral health/counseling, PHQ-9 >5 will refer to MD, Refer to [employer], Practice relaxation techniques, Exercise, Keep a positive mindset, Join a support group  and Repeat PHQ-9 every 30 days if score >5  Readiness to change: Contemplation:  (Acknowledging that there is a problem but not yet ready or sure of wanting to make a change)      OTHER CORE COMPONENTS     Tobacco:   Social History     Tobacco Use   Smoking Status Never   Smokeless Tobacco Never       Tobacco Use Intervention:   N/A:  Patient is a non-smoker     Anginal Symptoms:  HAMM and fatigue   NTG use: Compliant with carrying NTG, Understands proper use, Reviewed Proper use and Pt has not used NTG since event    Blood pressure:    Restin/68-74   Exercise: 118-132/68-72    Goals: consistent BP < 130/80, reduced dietary sodium <2300mg, moderate intensity exercise >150 mins/wk and medication compliance    Progression Toward Goals: Reviewed Pt goals and determined plan of care, Patient will read labels for sodium, take medication as prescribed in the next 30 days, Will continue to educate and progress as tolerated      Education:  understanding high blood pressure and it's relationship to CAD, low sodium diet and HTN and proper use of sublingual NTG  Plan: Class: Understanding Heart Disease, Class: Common Heart Medications, Avoid Processed foods, engage in regular exercise and check labels for sodium content  Readiness to change: Preparation:  (Getting ready to change)

## 2023-04-12 ENCOUNTER — APPOINTMENT (OUTPATIENT)
Dept: CARDIAC REHAB | Facility: CLINIC | Age: 78
End: 2023-04-12

## 2023-04-14 ENCOUNTER — APPOINTMENT (OUTPATIENT)
Dept: CARDIAC REHAB | Facility: CLINIC | Age: 78
End: 2023-04-14

## 2023-04-17 ENCOUNTER — APPOINTMENT (OUTPATIENT)
Dept: CARDIAC REHAB | Facility: CLINIC | Age: 78
End: 2023-04-17

## 2023-04-19 ENCOUNTER — APPOINTMENT (OUTPATIENT)
Dept: CARDIAC REHAB | Facility: CLINIC | Age: 78
End: 2023-04-19

## 2023-04-20 DIAGNOSIS — G62.9 NEUROPATHY: ICD-10-CM

## 2023-04-20 RX ORDER — PREGABALIN 75 MG/1
75 CAPSULE ORAL 2 TIMES DAILY
Qty: 180 CAPSULE | Refills: 1 | Status: SHIPPED | OUTPATIENT
Start: 2023-04-20

## 2023-04-21 ENCOUNTER — APPOINTMENT (OUTPATIENT)
Dept: CARDIAC REHAB | Facility: CLINIC | Age: 78
End: 2023-04-21

## 2023-04-24 ENCOUNTER — APPOINTMENT (OUTPATIENT)
Dept: CARDIAC REHAB | Facility: CLINIC | Age: 78
End: 2023-04-24

## 2023-04-24 ENCOUNTER — TELEPHONE (OUTPATIENT)
Dept: HEMATOLOGY ONCOLOGY | Facility: CLINIC | Age: 78
End: 2023-04-24

## 2023-04-24 DIAGNOSIS — Z17.0 MALIGNANT NEOPLASM OF OVERLAPPING SITES OF LEFT BREAST IN FEMALE, ESTROGEN RECEPTOR POSITIVE (HCC): ICD-10-CM

## 2023-04-24 DIAGNOSIS — C50.812 MALIGNANT NEOPLASM OF OVERLAPPING SITES OF LEFT BREAST IN FEMALE, ESTROGEN RECEPTOR POSITIVE (HCC): ICD-10-CM

## 2023-04-24 RX ORDER — ANASTROZOLE 1 MG/1
1 TABLET ORAL DAILY
Qty: 90 TABLET | Refills: 1 | Status: SHIPPED | OUTPATIENT
Start: 2023-04-24

## 2023-04-24 NOTE — TELEPHONE ENCOUNTER
Medication Refill Request   Who are you speaking with? self   If it is not the patient, are they listed on an active communication consent form? self   Which medication is being requested for refill? Please list medication name and dosage anastrozole (ARIMIDEX) 1 mg tablet        How many pills does the patient have left? 5   Preferred Pharmacy / Address [place] [address] [address], [address], [place] [address]   Phone:  [phone]  Fax:  [phone]    Who is the prescribing provider?  Irma Hearing   Call back number [phone]   Relevant Information 90 day supply please

## 2023-04-24 NOTE — TELEPHONE ENCOUNTER
Appointment Schedule   Who are you speaking with? self   If it is not the patient, are they listed on an active communication consent form? self   Which provider is the appointment scheduled with? Alyce Hill   At which location is the appointment scheduled for? [facility]   When is the appointment scheduled? Please list date and time 6/13 2:20pm   What is the reason for this appointment? Follow up   Did patient voice understanding of the details of this appointment? yes   Was the no show policy reviewed with patient?  yes

## 2023-04-26 ENCOUNTER — APPOINTMENT (OUTPATIENT)
Dept: CARDIAC REHAB | Facility: CLINIC | Age: 78
End: 2023-04-26

## 2023-04-28 ENCOUNTER — APPOINTMENT (OUTPATIENT)
Dept: CARDIAC REHAB | Facility: CLINIC | Age: 78
End: 2023-04-28

## 2023-04-30 DIAGNOSIS — E03.9 ACQUIRED HYPOTHYROIDISM: ICD-10-CM

## 2023-05-01 RX ORDER — LEVOTHYROXINE SODIUM 88 UG/1
TABLET ORAL
Qty: 90 TABLET | Refills: 0 | Status: SHIPPED | OUTPATIENT
Start: 2023-05-01

## 2023-05-25 NOTE — PATIENT INSTRUCTIONS
Pre-Surgery Instructions:   Medication Instructions    Aspirin Buf,CaCarb-MgCarb-MgO, 81 MG TABS per anesthesia guidelines     clotrimazole-betamethasone (LOTRISONE) 1-0 05 % cream Stop taking 1 days prior to surgery    gabapentin (NEURONTIN) 600 MG tablet Stop taking 1 days prior to surgery    glipiZIDE (GLUCOTROL) 10 mg tablet Stop taking 1 days prior to surgery              levothyroxine 100 mcg tablet Take morning of surgery    Liraglutide (VICTOZA) 18 MG/3ML SOPN Stop taking 1 days prior to surgery    lisinopril (ZESTRIL) 20 mg tablet Stop taking 1 days prior to surgery    metoprolol tartrate (LOPRESSOR) 25 mg tablet Take morning of surgery if this is when you would normally take it   Marisol Mccormack [phone] MG TABS Stop taking 1 week prior to surgery     Please stop all supplements and vitamins one week prior to your surgery  Follow the diabetic patient guidelines handout for when to stop your diabetic medications before surgery  Mastectomy   AMBULATORY CARE:   What you need to know about a mastectomy:  A mastectomy is surgery to remove all or part of one or both breasts  Tissue, lymph nodes, or muscle near the breast may also be removed  A mastectomy may be done to treat breast cancer or prevent the cancer from spreading  The type of mastectomy used depends on the size of the tumor and if the cancer has spread  A mastectomy can also be done to prevent breast cancer  This may be a choice if you are at high risk for breast cancer  How to prepare for a mastectomy:   · Your surgeon will talk to you about how to prepare for surgery  He or she may tell you not to eat or drink anything after midnight on the day before your surgery  Arrange for someone to drive you home and stay with you after surgery  This person can help care for you and watch for complications from surgery  · Tell your surgeon about all medicines you currently take   He or she will tell you if you need to stop any medicine for the surgery, and when to stop  You may need to stop taking aspirin or blood thinners several days before surgery  He or she will tell you which medicines to take or not take on the day of surgery  · Tell your healthcare provider or surgeon about all your allergies, including allergic reactions to medicine, anesthesia, or antibiotics  What will happen during a mastectomy:   · You will be given general anesthesia to keep you asleep and free from pain during surgery  You may be given an antibiotic to help prevent a bacterial infection  · Your surgeon will make an incision over your breast  He or she will remove the tumor and breast tissue  The nipple and areola (area around the nipple) may be removed  Surgery that leaves these in place is called nipple-sparing mastectomy  Your surgeon may also remove muscle from behind your breast  If lymph nodes will be taken, a small incision will be made in your armpit  The lymph nodes will be removed and tested for cancer  · Your surgeon may leave extra skin if you are having reconstruction surgery  This surgery is called skin-sparing mastectomy  Reconstruction surgery helps make the breast look more natural  It is done right after the mastectomy  · One or more drains may be inserted near your incision  A drain removes extra fluid and helps your incision heal  Your surgeon will close your incision with stitches and cover it with a bandage  He or she may also wrap a tight-fitting bandage around both of your breasts  This may decrease swelling, bleeding, and pain  What to expect after a mastectomy:   · You will be helped to walk around after surgery to help prevent blood clots  · Healthcare providers will monitor you until you are awake  You may need to spend 1 to 2 nights in the hospital     · You may have trouble moving the arm closest to your mastectomy  This should get better in a few days      Risks of a mastectomy:  You may bleed more than expected or develop an infection  Nerves, blood vessels, and muscles may be damaged during your surgery  Blood or fluid may collect under your skin  You may need other procedures to remove the fluid or blood  You may have swelling in the arm closest to the mastectomy or where lymph nodes were removed  This swelling is called lymphedema  Lymphedema may cause tingling, numbness, stiffness, and weakness in your arm  This may be permanent  You may get a blood clot in your arm or leg  The blood clot may travel to your heart, lungs, or brain  This may become life-threatening  Call your local emergency number (911 in the [address] [facility], located within [facility]) for any of the following:   · You feel lightheaded, short of breath, and have chest pain  · You have trouble breathing  · You cough up blood  Seek care immediately if:   · Blood soaks through your bandage  · Your stitches come apart  · Your bruise suddenly gets bigger  · Your leg or arm is larger than usual and painful  Call your doctor or surgeon if:   · You have a fever or chills  · Your wound is red, swollen, or draining pus  · You have nausea or are vomiting  · Your skin is itchy, swollen, or you have a rash  · Your pain does not get better after you take pain medicine  · Your drain falls out or stops draining fluid  · Your drain has pus or foul-smelling fluid coming out of it  · You have numbness, tingling, or swelling in your arm or hand  · You feel very sad or anxious, or are having trouble coping with changes from the mastectomy  · You have questions or concerns about your condition or care  Medicines: You may need any of the following:  · Antibiotics  help prevent a bacterial infection  · Prescription pain medicine  may be given  Ask your healthcare provider how to take this medicine safely  Some prescription pain medicines contain acetaminophen   Do not take other medicines that contain acetaminophen without talking to your healthcare provider  Too much acetaminophen may cause liver damage  Prescription pain medicine may cause constipation  Ask your healthcare provider how to prevent or treat constipation  · NSAIDs , such as ibuprofen, help decrease swelling, pain, and fever  NSAIDs can cause stomach bleeding or kidney problems in certain people  If you take blood thinner medicine, always ask your healthcare provider if NSAIDs are safe for you  Always read the medicine label and follow directions  · Take your medicine as directed  Contact your healthcare provider if you think your medicine is not helping or if you have side effects  Tell him or her if you are allergic to any medicine  Keep a list of the medicines, vitamins, and herbs you take  Include the amounts, and when and why you take them  Bring the list or the pill bottles to follow-up visits  Carry your medicine list with you in case of an emergency  Care for your wound as directed: If you have a tight-fitting bandage, you can remove it in 24 to 48 hours, or as directed  Ask your healthcare provider when your incision can get wet  You may need to take a sponge bath until your drain is removed  Carefully wash around the incision with soap and water  It is okay to allow the soap and water to gently run over your incision  Gently pat dry the area and put on new, clean bandages as directed  Change your bandages when they get wet or dirty  If lymph nodes were removed from your armpit, ask your healthcare provider when you can wear deodorant  Check your incision every day for redness, pus, or swelling  Self-care:   · Apply ice  on your incision for 15 to 20 minutes every hour or as directed  Use an ice pack, or put crushed ice in a plastic bag  Cover it with a towel  Ice helps prevent tissue damage and decreases swelling and pain  · Elevate  your arm nearest to your incision above the level of your heart  Do this as often as you can  This will help decrease swelling and pain  Prop your arm on pillows or blankets to keep it elevated comfortably  · Rest  as directed  Do not lift anything heavier than 5 pounds  Do not push or pull with your arms  You can use your arms to groom, eat, and bathe  Take short walks around the house  Gradually walk further as you feel better  Ask your healthcare provider when you can return to your normal activities  · Do not sleep on your stomach  This will put too much pressure on your incision  Sleep on your back or on the opposite side of your incision  · Empty your drain  as directed  You may need to write down how much fluid you empty from your drain each day  Ask your healthcare provider for more information about how to empty your drain  · Wear a supportive bra  as directed  Wait until you remove the tight-fitting bandage to wear a bra  You may be given a surgical bra or told to wear a sports bra  A supportive bra may help hold your bandages in place  It may also help with swelling and pain  Do not  wear bras with lace or underwire  They may rub against your incision and cause discomfort  Arm stretches: Your healthcare provider may show you how to do arm stretches  Arm stretches may prevent stiff arms or shoulders  You may need to wait until after your drains are removed to begin stretching  Do not do arm stretches until your healthcare provider says it is okay  Ask your healthcare provider for more information about arm stretches  Follow up with your doctor or surgeon as directed:  Write down your questions so you remember to ask them during your visits  For support and more information:  You may have difficulty coping with the changes to your body  Talk to your family or friends about how you are feeling  Ask your healthcare provider about support groups  It may be helpful to talk with other women who have had a mastectomy    · Sacha Guerrero [address]  [place] [address]  Phone: [phone]  Web Address: http://[employer]/  org  · [address] [place] [address], [address] [place] [address]  Phone: [phone]  Web Address: http://[employer]/  [address] Information is for End User's use only and may not be sold, redistributed or otherwise used for commercial purposes  All illustrations and images included in CareNotes® are the copyrighted property of [employer]  or [address] [place] [address]  The above information is an  only  It is not intended as medical advice for individual conditions or treatments  Talk to your doctor, nurse or pharmacist before following any medical regimen to see if it is safe and effective for you  Breast Cancer [place] Lymph Node Biopsy   AMBULATORY CARE:   What you need to know about a sentinel lymph node biopsy (SLNB):  A sentinel lymph node (SLN) is usually the lymph node closest to the breast tumor  It is usually found in the armpit, or along the sternum (breastbone) or collarbone  A biopsy is a procedure used to find and remove a SLN  During the biopsy, the SLN will be tested for cancer cells  If the test is positive, it may mean that breast cancer has spread outside of your breast  This information can help your healthcare provider decide what other treatments you need  How to prepare for a SLNB:   · You may need a nuclear scan before your procedure  During a nuclear scan, healthcare providers will inject a small amount of radioactive liquid in your breast  Radioactive liquid will move to the location of your lymph nodes and help them show up better in pictures  A camera will take pictures of the lymph nodes  The pictures will help your healthcare provider plan for your procedure  · Your healthcare provider will talk to you about how to prepare for your procedure  He or she may tell you not to eat or drink anything after midnight on the day of your procedure   He or she will tell you what medicines to take or not take on the day of your procedure  You may be given contrast liquid during your biopsy  Tell your healthcare provider if you have ever had an allergic reaction to contrast liquid  Arrange for someone to drive you home and stay with you after your procedure  What will happen during a SLNB:   · You may be given an antibiotic through your IV to help prevent a bacterial infection  Tell the healthcare provider if you have ever had an allergic reaction to an antibiotic  You may be given general anesthesia to keep you asleep and free from pain during your procedure  You may instead be given local anesthesia to numb the area  With local anesthesia, you may still feel pressure or pushing during the procedure, but you should not feel any pain  · Your healthcare provider will inject blue contrast liquid, radioactive liquid, or both near the tumor  The liquid will move to the SLN  Your healthcare provider may use an instrument to help find the SLN  He or she will do this by gently moving an instrument over your skin  The instrument will show pictures of the SLN on a monitor  Your healthcare provider will make a small incision in the skin that covers the SLN  The incision is usually in your armpit or chest  The SLN will be removed and checked for cancer cells  If cancer is found, your healthcare provider may remove several more lymph nodes for testing  Your incision may be closed with stitches or strips of medical tape and covered with a bandage  What will happen after a SLNB:  Healthcare providers will monitor you until you are awake  You may be able to go home after you are awake and your pain is controlled  Your urine or bowel movement may be blue for 24 to 48 hours after your procedure  This is caused by the blue contrast liquid given to you during the procedure  You may have bruising or swelling at the biopsy site  This is normal and expected  The arm closest to the biopsy site may be sore   This should get better within 48 to 72 hours  Risks of a SLNB:  You may bleed more than expected or get an infection  You may develop a condition called lymphedema  Lymphedema is tissue swelling in your arm nearest to where the SLN was removed  You may have long-term pain or discomfort in your arm  Your skin in the arm may be permanently thick or hard  Your nerves may be damaged during your procedure  This may cause numbness or tingling in your arm  It may also cause difficulty moving your arm  You may have an allergic reaction to the contrast liquid  This may require medicine or other treatments  Seek care immediately if:   · Blood soaks through your bandage  · Your stitches come apart  · Your bruise suddenly gets larger and feels firm  Call your doctor if:   · You have a fever or chills  · Your wound is red, swollen, or draining pus  · You have nausea or are vomiting  · Your skin is itchy, swollen, or you have a rash  · Your pain does not get better after you take medicine for pain  · You have questions or concerns about your condition or care  Medicines: You may need any of the following:  · NSAIDs , such as ibuprofen, help decrease swelling, pain, and fever  This medicine is available with or without a doctor's order  NSAIDs can cause stomach bleeding or kidney problems in certain people  If you take blood thinner medicine, always ask your healthcare provider if NSAIDs are safe for you  Always read the medicine label and follow directions  · Acetaminophen  decreases pain and fever  It is available without a doctor's order  Ask how much to take and how often to take it  Follow directions  Read the labels of all other medicines you are using to see if they also contain acetaminophen, or ask your doctor or pharmacist  Acetaminophen can cause liver damage if not taken correctly  Do not use more than 4 grams (4,000 milligrams) total of acetaminophen in one day       · Prescription pain medicine may be given  Ask your healthcare provider how to take this medicine safely  Some prescription pain medicines contain acetaminophen  Do not take other medicines that contain acetaminophen without talking to your healthcare provider  Too much acetaminophen may cause liver damage  Prescription pain medicine may cause constipation  Ask your healthcare provider how to prevent or treat constipation  · Take your medicine as directed  Contact your healthcare provider if you think your medicine is not helping or if you have side effects  Tell him or her if you are allergic to any medicine  Keep a list of the medicines, vitamins, and herbs you take  Include the amounts, and when and why you take them  Bring the list or the pill bottles to follow-up visits  Carry your medicine list with you in case of an emergency  Care for your incision wound as directed:  Ask your healthcare provider when your wound can get wet  Carefully wash around the wound with soap and water  It is okay to let soap and water gently run over your wound  Do not  scrub your wound  Gently pat dry the area and put on new, clean bandages as directed  Change your bandages when they get wet or dirty  If you have strips of medical tape, let them fall of on their own  It may take 10 to 14 days for them to fall off  Check your wound every day for signs of infection, such as redness, swelling, or pus  Do not put powders or lotions on your wound  If lymph nodes have been taken from your armpit, ask your healthcare provider when you can wear deodorant  Self-care:   · Apply ice  on your wound for 15 to 20 minutes every hour or as directed  Use an ice pack, or put crushed ice in a plastic bag  Cover it with a towel before you apply it to your skin  Ice helps prevent tissue damage and decreases swelling and pain  · Elevate  your arm nearest to the biopsy site as often as you can  This will help decrease swelling and pain   Prop your arm on pillows or blankets to keep it elevated above the level of your heart comfortably  · Do not do strenuous activities  for 24 to 48 hours  Strenuous activities include heavy lifting, sports, or running  If lymph nodes were taken from your armpit, do not push or pull with your arm  These activities may put too much stress on your wound  Rest and take short walks around the house  Ask your healthcare provider when you can return to your normal activities  · Drink plenty of liquids  as directed  This will help flush out contrast liquid from your body  Ask how much liquid to drink each day and which liquids are best for you  Ask your healthcare provider how to prevent lymphedema and infection:  Lymphedema is fluid buildup in fatty tissues under your skin  Lymphedema may happen in the arm closest to where lymph nodes were removed  An infection in your skin can make lymphedema worse  Ask your healthcare provider how you can decrease your risk for skin infections and lymphedema  Follow up with your doctor as directed:  Write down your questions so you remember to ask them during your visits  © Copyright [address] Information is for End User's use only and may not be sold, redistributed or otherwise used for commercial purposes  All illustrations and images included in CareNotes® are the copyrighted property of [employer]  or [address] [place] [address]  The above information is an  only  It is not intended as medical advice for individual conditions or treatments  Talk to your doctor, nurse or pharmacist before following any medical regimen to see if it is safe and effective for you  Arturo-Flanagan Drain Care   AMBULATORY CARE:   A Arturo-Flanagan (JARED) drain  is used to remove fluids that build up in an area of your body after surgery  The JARED drain is a bulb shaped device connected to a tube  One end of the tube is placed inside you during surgery   The other end comes out through a small cut in your skin  The bulb is connected to this end  You may have a stitch to hold the tube in place  Seek care immediately if:   · Your JARED drain breaks or comes out  · You have cloudy yellow or brown drainage from your JARED drain site, or the drainage smells bad  Contact your healthcare provider if:   · You drain less than 30 milliliters (2 tablespoons) in 24 hours  This may mean your drain can be removed  · You suddenly stop draining fluid or think your JARED drain is blocked  · You have a fever higher than 101 5°F (38 6°C)  · You have increased pain, redness, or swelling around the drain site  · You have questions about your JARED drain care  How a Arturo-Flanagan drain works: The JARED drain removes fluids by creating suction in the tube  The bulb is squeezed flat and connected to the tube that sticks out of your body  The bulb expands as it fills with fluid  How to change the bandage around your Arturo-Flanagan drain:  If you have a bandage, change it once a day  You may need to change your bandage more than once a day if it gets completely wet  · Wash your hands with soap and water  · Loosen the tape and gently remove the old bandage  Throw the old bandage into a plastic trash bag  · Use soap and water or saline (salt water) solution to clean your JARED drain site  Dip a cotton swab or gauze pad in the solution and gently clean your skin  · Pat the area dry  · Place a new bandage on your JARED drain site and secure it to your skin with medical tape  · Wash your hands  How to empty the Arturo-Flanagan drain:  Empty the bulb when it is half full or every 8 to 12 hours  · Wash your hands with soap and water  · Remove the plug from the bulb  · Pour the fluid into a measuring cup  · Clean the plug with an alcohol swab or a cotton ball dipped in rubbing alcohol  · Squeeze the bulb flat and put the plug back in   The bulb should stay flat until it starts to fill with fluid again      · Measure the amount of fluid you pour out  Write down how much fluid you empty from the JARED drain and the date and time you collected it  · Flush the fluid down the toilet  Wash your hands  Clear clogged tubing: Use the following steps to clear your Arturo-Flanagan tubing:  · Hold the tubing between your thumb and first finger at the place closest to your skin  This hand will prevent the tube from being pulled out of your skin  · Use your other thumb and first finger to slide the clog down the tubing toward the bulb  You may have to repeat the sliding until the tubing is unclogged  Arturo-Flanagan drain removal:  The amount of fluid that you drain will decrease as your wound heals  The JARED drain usually is removed when less than 30 milliliters (2 tablespoons) is collected in 24 hours  Ask your healthcare provider when and how your JARED drain will be removed  Follow up with your healthcare provider as directed:  Write down your questions so you remember to ask them during your visits  © Copyright [address] Information is for End User's use only and may not be sold, redistributed or otherwise used for commercial purposes  All illustrations and images included in CareNotes® are the copyrighted property of [employer]  or [address] [place] [address] [place]   The above information is an  only  It is not intended as medical advice for individual conditions or treatments  Talk to your doctor, nurse or pharmacist before following any medical regimen to see if it is safe and effective for you  Complex Repair And Rotation Flap Text: The defect edges were debeveled with a #15 scalpel blade.  The primary defect was closed partially with a complex linear closure.  Given the location of the remaining defect, shape of the defect and the proximity to free margins a rotation flap was deemed most appropriate for complete closure of the defect.  Using a sterile surgical marker, an appropriate advancement flap was drawn incorporating the defect and placing the expected incisions within the relaxed skin tension lines where possible.    The area thus outlined was incised deep to adipose tissue with a #15 scalpel blade.  The skin margins were undermined to an appropriate distance in all directions utilizing iris scissors.

## 2023-06-06 LAB
LEFT EYE DIABETIC RETINOPATHY: POSITIVE
RIGHT EYE DIABETIC RETINOPATHY: POSITIVE

## 2023-06-13 ENCOUNTER — OFFICE VISIT (OUTPATIENT)
Dept: HEMATOLOGY ONCOLOGY | Facility: CLINIC | Age: 78
End: 2023-06-13
Payer: MEDICARE

## 2023-06-13 VITALS
RESPIRATION RATE: 16 BRPM | BODY MASS INDEX: 34.17 KG/M2 | SYSTOLIC BLOOD PRESSURE: 120 MMHG | WEIGHT: 181 LBS | DIASTOLIC BLOOD PRESSURE: 54 MMHG | HEIGHT: 61 IN | TEMPERATURE: 98.1 F | HEART RATE: 64 BPM | OXYGEN SATURATION: 98 %

## 2023-06-13 DIAGNOSIS — C50.812 MALIGNANT NEOPLASM OF OVERLAPPING SITES OF LEFT BREAST IN FEMALE, ESTROGEN RECEPTOR POSITIVE (HCC): Primary | ICD-10-CM

## 2023-06-13 DIAGNOSIS — Z17.0 MALIGNANT NEOPLASM OF OVERLAPPING SITES OF LEFT BREAST IN FEMALE, ESTROGEN RECEPTOR POSITIVE (HCC): Primary | ICD-10-CM

## 2023-06-13 PROCEDURE — 99214 OFFICE O/P EST MOD 30 MIN: CPT | Performed by: INTERNAL MEDICINE

## 2023-06-13 NOTE — PROGRESS NOTES
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  She has history of stage III ovarian cancer, diagnosed 0 which was treated with oophorectomy followed by adjuvant chemotherapy with carboplatin and paclitaxel, resulting in cure  She was diagnosed with stage I A left breast cancer, grade 1, ER 90% positive, ND 90% positive, HER2 negative disease in 2021  She underwent mastectomy and sentinel lymph node biopsy, resulting in PIERRE   She is currently on adjuvant hormonal therapy with anastrozole with excellent tolerance  She has no evidence of recurrent disease, based on her symptoms and physical examinations  I recommended her to continue anastrozole 1 mg once a day  She is in agreement with my recommendation  I will see her again in a year for routine follow-up            Subjective:      HPI:    A 36-year-old postmenopausal woman who was found to have abnormality in her left breast based on a screening mammography  Therefore, she underwent left breast biopsy in May 6, 2021 which showed invasive ductal carcinoma, grade 1, ER 90% positive, ND 90% positive, HER2 negative disease  She also had lesion, characterized as a 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, % positive, % positive  Because of the multifocal disease, she underwent mastectomy and sentinel lymph node biopsy by Dr Daiana Hedrick in 2021  She had 8 mm of invasive ductal carcinoma, grade 1  There was no evidence of lymphovascular invasion  1 sentinel lymph node was negative for metastatic disease  She did not have reconstruction  She presents today with her  to discuss adjuvant treatment options  She has history of stage III ovarian cancer, diagnosed in    She underwent surgical resection followed by adjuvant chemotherapy with carboplatin and paclitaxel, resulting in cure  She has heterozygote CHEK2 mutation  She feels well with no complaint of pain  Her weight is stable  She has no respiratory symptoms  She has diabetes as well as coronary artery disease for which she underwent coronary artery stent placement  She has hypertension  She is a lifetime never smoker  Her performance status is normal            Interval History:  A 16-year-old postmenopausal woman who has CHEK2 mutation  She has history of stage III ovarian cancer, diagnosed 0 which was treated with oophorectomy followed by adjuvant chemotherapy with carboplatin and paclitaxel, resulting in cure  She was diagnosed with stage I A left breast cancer, grade 1, ER 90% positive, VT 90% positive, HER2 negative disease in July 2021  She underwent mastectomy and sentinel lymph node biopsy, resulting in PIERRE   Since August 2021, she has been on adjuvant hormonal therapy with anastrozole  She presents today for routine follow-up  She underwent coronary artery bypass grafting in February 2023 from which she recovered very quickly  She has been tolerating anastrozole well  She has no hot flashes or musculoskeletal symptoms  She denies any pain  Her weight is stable  Her performance status is quite stable            Objective:      Primary Diagnosis:       1  Left breast cancer, stage I A ( pT1b, pN0, M0 ) grade 1, ER 90% positive, VT 90% positive, HER2 negative disease  Diagnosed in July 2021      2  History of stage III ovarian cancer, diagnosed in 0      3    CHEK 2 mutation      Cancer Staging:  Cancer Staging  No matching staging information was found for the patient         Previous Hematologic/ Oncologic Treatment:        Adjuvant chemotherapy with carboplatin and paclitaxel for ovarian cancer in 1997      Current Hematologic/ Oncologic Treatment:        adjuvant hormonal therapy with anastrozole since August 2021      Disease Status:        PIERRE status post mastectomy and sentinel lymph node biopsy      Test Results:     Pathology:       8 mm of invasive ductal carcinoma, grade 1  No evidence of lymphovascular invasion  1 sentinel lymph node was negative for metastatic disease  ER 90% positive, AR 90% positive, HER2 negative disease  Stage I A ( pT1b, pN0, M0)      Radiology:      Mammography in March 2022 was benign  BI-RADS 2      Laboratory:       See below      Physical Exam:        General Appearance:    Alert, oriented          Eyes:    PERRL   Ears:    Normal external ear canals, both ears   Nose:   Nares normal, septum midline   Throat:   Mucosa moist  Pharynx without injection  Neck:   Supple         Lungs:     Clear to auscultation bilaterally   Chest Wall:    No tenderness or deformity    Heart:    Regular rate and rhythm         Abdomen:     Soft, non-tender, bowel sounds +, no organomegaly               Extremities:   Extremities no cyanosis or edema         Skin:   no rash or icterus  Lymph nodes:   Cervical, supraclavicular, and axillary nodes normal   Neurologic:   CNII-XII intact, normal strength, sensation and reflexes     Throughout             Breast exam:    Status post left mastectomy without reconstruction  No palpable abnormality in her left chest wall  Right breast exam is negative  ROS: Review of Systems   All other systems reviewed and are negative  Imaging: No results found        Labs:   Lab Results   Component Value Date    HCT 31 5 (L) 02/11/2023    HGB 10 8 (L) 02/11/2023    MCV 90 02/11/2023     (L) 02/11/2023    WBC 10 23 (H) 02/11/2023     Lab Results   Component Value Date    ALKPHOS 40 (L) 12/14/2022    ALT 28 12/14/2022    ANIONGAP 12 4 12/30/2015    AST 21 12/14/2022    BILITOT 0 42 12/21/2015    BUN 23 02/10/2023    CALCIUM 8 3 02/10/2023     02/10/2023    CO2 23 02/10/2023    CORRECTEDCA 9 2 12/14/2022    CREATININE 0 78 02/10/2023    EGFR 74 02/10/2023    GLUCOSE 128 02/08/2023 GLUF 107 (H) 10/18/2022    K 4 5 02/10/2023     12/30/2015    PROT 7 1 12/21/2015         Lab Results   Component Value Date     5 3 09/27/2018         Current Medications: Reviewed  Allergies: Reviewed  PMH/FH/SH:  Reviewed      Vital Sign:    Body surface area is 1 81 meters squared      Wt Readings from Last 3 Encounters:   06/13/23 82 1 kg (181 lb)   03/24/23 83 kg (183 lb)   02/21/23 80 7 kg (178 lb)        Temp Readings from Last 3 Encounters:   06/13/23 98 1 °F (36 7 °C) (Temporal)   02/21/23 98 7 °F (37 1 °C) (Tympanic)   02/11/23 97 7 °F (36 5 °C) (Oral)        BP Readings from Last 3 Encounters:   06/13/23 120/54   03/24/23 120/68   02/21/23 110/54         Pulse Readings from Last 3 Encounters:   06/13/23 64   03/24/23 75   02/21/23 76     @LASTSAO2(3)@

## 2023-06-15 ENCOUNTER — RA CDI HCC (OUTPATIENT)
Dept: OTHER | Facility: HOSPITAL | Age: 78
End: 2023-06-15

## 2023-06-15 NOTE — PROGRESS NOTES
[facility]  coding opportunities          Chart Reviewed number of suggestions sent to Provider:   [phone]     Patients Insurance     Medicare Insurance: [employer]

## 2023-06-21 ENCOUNTER — TELEPHONE (OUTPATIENT)
Dept: FAMILY MEDICINE CLINIC | Facility: CLINIC | Age: 78
End: 2023-06-21

## 2023-06-21 DIAGNOSIS — F41.9 ANXIETY: Primary | ICD-10-CM

## 2023-06-21 RX ORDER — ALPRAZOLAM 0.25 MG/1
0.25 TABLET ORAL 3 TIMES DAILY PRN
Qty: 20 TABLET | Refills: 0 | Status: SHIPPED | OUTPATIENT
Start: 2023-06-21

## 2023-06-21 NOTE — TELEPHONE ENCOUNTER
Selene Cogan wanted you to know that her youngest son Angelika Miguel, 46years old) passed away suddenly on June 14 at work  She is SO devastated, and just wanted to share this with you

## 2023-07-03 ENCOUNTER — TELEPHONE (OUTPATIENT)
Dept: FAMILY MEDICINE CLINIC | Facility: CLINIC | Age: 78
End: 2023-07-03

## 2023-07-03 DIAGNOSIS — R21 RASH: Primary | ICD-10-CM

## 2023-07-03 RX ORDER — CLOTRIMAZOLE AND BETAMETHASONE DIPROPIONATE 10; .64 MG/G; MG/G
CREAM TOPICAL 2 TIMES DAILY
Qty: 45 G | Refills: 3 | Status: SHIPPED | OUTPATIENT
Start: 2023-07-03

## 2023-07-03 RX ORDER — CLOTRIMAZOLE AND BETAMETHASONE DIPROPIONATE 10; .64 MG/G; MG/G
CREAM TOPICAL 2 TIMES DAILY
COMMUNITY
End: 2023-07-03 | Stop reason: SDUPTHER

## 2023-07-03 NOTE — TELEPHONE ENCOUNTER
clotrimazole-betamethasone (LOTRISONE) 1-0.05 % cream         Wants med refill for rash under her breast and top of leg - says cream usually last her a year.     Gabriel Boyer # [phone]

## 2023-07-08 NOTE — TELEPHONE ENCOUNTER
Intake received  Insurance reviewed, financial counseling outreach not needed at this time 
Intra-Department Referral    Patient Details:  Ronnie Sanchez  1945  [id number]   Background Information:  [address] starts by opening a telephone encounter and gathering the following information   Who is calling to schedule and relationship? Patient   Diagnosis Breast ca   Is this Cancer or Non-Cancer? Cancer   What department was patient seen in last? Surg Onc   Scheduling Information:    Preferred [facility]   Are there any days the patient cannot be seen? Miscellaneous:  Pt hs been scheduled with Dr Micki Javed    After completing the above information, please route to nurse navigation, clinical trials (for re-evaluation) and Shwetha 
Yes

## 2023-07-12 ENCOUNTER — HOSPITAL ENCOUNTER (OUTPATIENT)
Dept: RADIOLOGY | Facility: HOSPITAL | Age: 78
Discharge: HOME | End: 2023-07-12
Payer: MEDICARE

## 2023-07-12 DIAGNOSIS — C50.812 MALIGNANT NEOPLASM OF OVERLAPPING SITES OF LEFT BREAST IN FEMALE, ESTROGEN RECEPTOR POSITIVE (HCC): ICD-10-CM

## 2023-07-12 DIAGNOSIS — Z17.0 MALIGNANT NEOPLASM OF OVERLAPPING SITES OF LEFT BREAST IN FEMALE, ESTROGEN RECEPTOR POSITIVE (HCC): ICD-10-CM

## 2023-07-12 PROCEDURE — G0279 TOMOSYNTHESIS, MAMMO: HCPCS

## 2023-07-12 PROCEDURE — 77065 DX MAMMO INCL CAD UNI: CPT

## 2023-07-28 ENCOUNTER — TELEPHONE (OUTPATIENT)
Dept: FAMILY MEDICINE CLINIC | Facility: CLINIC | Age: 78
End: 2023-07-28

## 2023-07-28 DIAGNOSIS — E03.9 ACQUIRED HYPOTHYROIDISM: ICD-10-CM

## 2023-07-28 RX ORDER — LEVOTHYROXINE SODIUM 88 UG/1
88 TABLET ORAL DAILY
Qty: 90 TABLET | Refills: 0 | Status: SHIPPED | OUTPATIENT
Start: 2023-07-28 | End: 2023-08-03 | Stop reason: SDUPTHER

## 2023-07-31 DIAGNOSIS — Z95.1 S/P CABG X 2: ICD-10-CM

## 2023-07-31 DIAGNOSIS — E11.9 TYPE 2 DIABETES MELLITUS WITHOUT COMPLICATION, WITHOUT LONG-TERM CURRENT USE OF INSULIN (HCC): ICD-10-CM

## 2023-07-31 RX ORDER — LANCETS 33 GAUGE
EACH MISCELLANEOUS
Qty: 100 EACH | Refills: 3 | Status: SHIPPED | OUTPATIENT
Start: 2023-07-31

## 2023-08-02 ENCOUNTER — OFFICE VISIT (OUTPATIENT)
Dept: SURGICAL ONCOLOGY | Facility: CLINIC | Age: 78
End: 2023-08-02
Payer: MEDICARE

## 2023-08-02 VITALS
HEIGHT: 61 IN | SYSTOLIC BLOOD PRESSURE: 120 MMHG | HEART RATE: 65 BPM | TEMPERATURE: 97.5 F | DIASTOLIC BLOOD PRESSURE: 58 MMHG | BODY MASS INDEX: 33.23 KG/M2 | OXYGEN SATURATION: 98 % | RESPIRATION RATE: 15 BRPM | WEIGHT: 176 LBS

## 2023-08-02 DIAGNOSIS — Z15.09 MONOALLELIC MUTATION OF CHEK2 GENE IN FEMALE PATIENT: ICD-10-CM

## 2023-08-02 DIAGNOSIS — Z17.0 MALIGNANT NEOPLASM OF OVERLAPPING SITES OF LEFT BREAST IN FEMALE, ESTROGEN RECEPTOR POSITIVE (HCC): Primary | ICD-10-CM

## 2023-08-02 DIAGNOSIS — Z79.811 USE OF ANASTROZOLE (ARIMIDEX): ICD-10-CM

## 2023-08-02 DIAGNOSIS — Z15.89 MONOALLELIC MUTATION OF CHEK2 GENE IN FEMALE PATIENT: ICD-10-CM

## 2023-08-02 DIAGNOSIS — Z15.01 MONOALLELIC MUTATION OF CHEK2 GENE IN FEMALE PATIENT: ICD-10-CM

## 2023-08-02 DIAGNOSIS — C50.812 MALIGNANT NEOPLASM OF OVERLAPPING SITES OF LEFT BREAST IN FEMALE, ESTROGEN RECEPTOR POSITIVE (HCC): Primary | ICD-10-CM

## 2023-08-02 DIAGNOSIS — Z15.02 MONOALLELIC MUTATION OF CHEK2 GENE IN FEMALE PATIENT: ICD-10-CM

## 2023-08-02 PROCEDURE — 99214 OFFICE O/P EST MOD 30 MIN: CPT

## 2023-08-02 NOTE — PROGRESS NOTES
Surgical Oncology Follow Up       [address] [facility]  [facility]  [address]  [facility] [id number]    Jeovany Rehman  1945  [id number]  [address] [facility]  [facility]  [place] [id number]    Chief Complaint   Patient presents with   • Follow-up       Assessment/Plan:  1. 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, estrogen receptor positive ([address])  - 6 month follow up    2. Monoallelic mutation of CHEK2 gene in female patient    3. Use of anastrozole (Arimidex)  - continue use per medical oncology      Discussion/Summary: Patient is a 67-year-old female presenting today for six-month follow-up for left breast cancer diagnosed in March 2021.  Pathology revealed multifocal DCIS ER/%.  She had genetic testing which revealed a CHEK2 mutation. She underwent a left breast mastectomy with sentinel node biopsy with Dr. Diony Traylor. She is currently on anastrozole. She had a diagnostic mammogram of the right breast on 7/12/23 which was BIRADS 2 category 1 density. She mentioned she had a CABG x2 in February of this year. I will see the patient back in 6 months or sooner should the need arise. She was instructed to call with any questions or concerns prior to this time. All questions were answered today. History of Present Illness:     Oncology History   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[address])   8/27/2013 Initial Diagnosis    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[address])     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, estrogen receptor positive ([address])   3/15/2021 Biopsy    Left breast ultrasound-guided biopsy  12 o'clock, 5 cm from nipple  Ductal carcinoma in situ  Grade 2        Concordant. Malignancy appears unifocal; masses cover 2.6 cm. US left axilla negative. Right breast clear.      4/19/2021 Genetic Testing    One pathogenic (low penetrance) variant identified in CHEK2  Total of 23 genes evaluated  [employer]     5/6/2021 Observation    Imaging was reviewed prior to ROSLYN clip insertion  Additional findings in the left breast on ultrasound; 2 additional biopsies were recommended     5/6/2021 Biopsy    Left breast ultrasound-guided biopsy  A. 1 o'clock, 4 cm from nipple  Ductal carcinoma in situ  Grade 2  ,     B. 11 o'clock, 9 cm from nipple  Invasive carcinoma of no special type (ductal)  Grade 1  ER 90, UT 90, HER2 1+  Lymphovascular invasion not identified    Concordant. Malignancy is multifocal and spans at least 8.5 cm in 2 directions. ROSLYN  clip placed at 12:00, 5cmfn biopsy site. 7/13/2021 Surgery    Left breast ROSLYN  directed mastectomy with sentinel lymph node biopsy  Invasive mammary carcinoma of no special type (ductal)  Grade 1  8 mm  Extensive DCIS (size cannot be determined, throughout all quadrants)  Margins negative  0/1 Lymph node  Anatomic/Prognostic Stage IA     8/11/2021 -  Hormone Therapy    Anastrozole 1 mg daily  Dr. Mar Palma          -Interval History: Patient is a 71-year-old female presenting today for six-month follow-up for left breast cancer diagnosed in March 2021. She is currently on anastrozole. She had a diagnostic mammogram of the right breast on 7/12/23 which was BIRADS 2 category 1 density. She mentioned she had a CABG x2 in February of this year. She also noted the recent loss of her  and son. Patient denies changes on her breast exam. She denies persistent headaches, bone pain, back pain, shortness of breath, or abdominal pain. Review of Systems:  Review of Systems   Constitutional: Negative for activity change, appetite change, fatigue and unexpected weight change. Respiratory: Negative for cough and shortness of breath. Cardiovascular: Negative for chest pain. Gastrointestinal: Negative for abdominal pain, diarrhea, nausea and vomiting.    Endocrine: Negative for heat intolerance. Musculoskeletal: Positive for gait problem (wheelchair). Negative for arthralgias, back pain and myalgias. Skin: Negative for rash. Neurological: Negative for weakness and headaches. Hematological: Negative for adenopathy.        Patient Active Problem List   Diagnosis   • Coronary artery disease involving native coronary artery of native heart without angina pectoris   • Type 2 diabetes mellitus without complication, without long-term current use of insulin (HCC)   • Mixed dyslipidemia   • Essential hypertension   • Abnormal carotid ultrasound   • Hypothyroidism   • Spinal stenosis of lumbar region   • Osteopenia of necks of both femurs   • 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HCC)   • S/P lumbar fusion   • Bilateral leg edema   • 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, estrogen receptor positive ([address])   • Class 2 obesity in adult   • Monoallelic mutation of CHEK2 gene in female patient   • Use of anastrozole (Arimidex)   • Neuropathy   • Non-ST elevation myocardial infarction (NSTEMI) ([address])   • History of PTCA   • H/O heart artery stent   • Colon polyp   • Diarrhea   • Dyspnea on exertion   • T wave inversion in EKG   • S/P CABG x 2   • Platelets decreased ([address])     Past Medical History:   Diagnosis Date   • Abdominal pain     LLQ on occasion   • Angina pectoris ([address])    • Arthritis    • Breast cancer ([address]) 07/01/2021   • Cancer ([address])     breast left   • Colon polyp    • Constipation     at times   • Coronary artery disease    • Diabetes mellitus ([address])    • Diarrhea     at times   • Disease of thyroid gland    • Hemorrhoids    • Hypercholesterolemia    • Hypertension    • Neuropathy     both legs and feet   • Obesity    • Osteoporosis    • Otitis media    • Ovarian ca [facility]) 1997   • 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[address])    • Shingles    • 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 Enid Tucker. Up to date with her colonoscopy. • COLONOSCOPY     • CORONARY STENT PLACEMENT      Dec 2015   • EYE SURGERY      cataract surgery   • GALLBLADDER SURGERY     • HYSTERECTOMY  1989   • MAMMO (HISTORICAL)      up to date. see gyn   • MASTECTOMY Left 07/13/2021   • MASTECTOMY W/ SENTINEL NODE BIOPSY Left 07/13/2021    Procedure: BREAST ROSLYN  DIRECTED MASTECTOMY, SENTINEL LYMPH NODE BIOPSY, LYMPHATIC MAPPING WITH BLUE DYE AND RADIOACTIVE DYE (INJECT AT 1500 BY DR ANDREWS IN THE OR);   Surgeon: Jackson Alfonso MD;  Location: [facility];  Service: Surgical Oncology   • OOPHORECTOMY Bilateral 1997   • MI CORONARY ARTERY BYP W/VEIN & ARTERY GRAFT 2 VEIN N/A 2/8/2023    Procedure: CORONARY ARTERY BYPASS GRAFT (CABG) X 2 VESSELS GSV-->OM1, LIMA-->LAD; EVH  LEFT LEG w/ RADHA;  Surgeon: Joy Magallanes MD;  Location: [facility];  Service: Cardiac Surgery   • SPINE SURGERY     • THYROIDECTOMY     • UPPER GASTROINTESTINAL ENDOSCOPY     • US BREAST NEEDLE LOC LEFT Left 05/06/2021   • US GUIDANCE BREAST BIOPSY LEFT EACH ADDITIONAL Left 05/06/2021   • US GUIDED BREAST BIOPSY LEFT COMPLETE Left 03/15/2021   • US GUIDED BREAST BIOPSY LEFT COMPLETE Left 05/06/2021     Family History   Problem Relation Age of Onset   • Diabetes Mother    • Heart disease Mother    • Dementia Mother    • Esophageal cancer Father 61   • Endometrial cancer Sister 76   • Asthma Sister    • Cancer Sister    • No Known Problems Sister    • No Known Problems Sister    • No Known Problems Sister    • Asthma Daughter    • No Known Problems Maternal Grandmother    • Prostate cancer Maternal Grandfather    • No Known Problems Paternal Grandmother    • Prostate cancer Paternal Grandfather    • Stomach cancer Brother 70   • Multiple myeloma Brother 67   • Cancer Brother    • Asthma Son    • Depression Son    • Breast cancer Maternal Aunt 50   • No Known Problems Paternal Aunt    • No Known Problems Paternal Aunt    • Breast cancer Other 39   • Breast cancer Other 39   • Diabetes Family    • Cancer Family    • Arthritis Family    • Heart disease Family      Social History     Socioeconomic History   • Marital status:      Spouse name: Not on file   • Number of children: Not on file   • Years of education: Not on file   • Highest education level: Not on file   Occupational History   • Not on file   Tobacco Use   • Smoking status: Never   • Smokeless tobacco: Never   Vaping Use   • Vaping Use: Never used   Substance and Sexual Activity   • Alcohol use: Never   • Drug use: Never   • Sexual activity: Not Currently     Partners: Male     Birth control/protection: Post-menopausal, None   Other Topics Concern   • Not on file   Social History Narrative    · Tobacco smoking status:   Never smoker      This question's title has changed from "Smoking status" to "Tobacco smoking status" with the 19.7 update. Please use this field only for documenting tobacco smoking behavior. To accommodate this change, new fields for documenting smokeless tobacco and e-cigarette/vape usage have been added.      · Smoking - how much          None  1 PPW  2 PPW  1/4 PPD  1/2 PPD  1 PPD  1 1/2 PPD  2 PPD  3+ PPD          NOTE     · Smokeless tobacco status          Never used smokeless tobacco  Former smokeless tobacco user  Current snuff user  Currently chews tobacco  Currently uses moist powdered tobacco  ----  Not indicated  Not tolerated  Patient refused          NOTE     · Tobacco-years of use               NOTE     · E-cigarette/vape status Never used electronic cigarettes  Former user of electronic cigarettes  Current user of electronic cigarettes          NOTE     · Most recent tobacco use screenin2018      · Alcohol intake:   None         Per laine     Social Determinants of Health     Financial Resource Strain: Not on file   Food Insecurity: No Food Insecurity (2023)    Hunger Vital Sign    • Worried About Running Out of Food in the Last Year: Never true    • Ran Out of Food in the Last Year: Never true   Transportation Needs: No Transportation Needs (2023)    PRAPARE - Transportation    • Lack of Transportation (Medical): No    • Lack of Transportation (Non-Medical):  No   Physical Activity: Not on file   Stress: Not on file   Social Connections: Not on file   Intimate Partner Violence: Not on file   Housing Stability: Low Risk  (2023)    Housing Stability Vital Sign    • Unable to Pay for Housing in the Last Year: No    • Number of Places Lived in the Last Year: 1    • Unstable Housing in the Last Year: No       Current Outpatient Medications:   •  ALPRAZolam (XANAX) 0.25 mg tablet, Take 1 tablet (0.25 mg total) by mouth 3 (three) times a day as needed for anxiety, Disp: 20 tablet, Rfl: 0  •  anastrozole (ARIMIDEX) 1 mg tablet, Take 1 tablet (1 mg total) by mouth daily, Disp: 90 tablet, Rfl: 1  •  aspirin 325 mg tablet, Take 1 tablet (325 mg total) by mouth daily Do not start before 2023., Disp: 30 tablet, Rfl: 2  •  atorvastatin (LIPITOR) 80 mg tablet, Take 1 tablet (80 mg total) by mouth daily with dinner, Disp: 30 tablet, Rfl: 2  •  clotrimazole-betamethasone (LOTRISONE) 1-0.05 % cream, Apply topically 2 (two) times a day, Disp: 45 g, Rfl: 3  •  docusate sodium (COLACE) 100 mg capsule, Take 1 capsule (100 mg total) by mouth 2 (two) times a day (Patient not taking: Reported on 2023), Disp: 60 capsule, Rfl: 0  •  Insulin Pen Needle (Sure Comfort Pen Needles) 31G X 5 MM MISC, by Does not apply route daily, Disp: 100 each, Rfl: 3  •  Lancets (OneTouch Delica Plus [id number]) MISC, Use once a day to check blood sugar, Disp: 100 each, Rfl: 3  •  levothyroxine 88 mcg tablet, Take 1 tablet (88 mcg total) by mouth daily, Disp: 90 tablet, Rfl: 0  •  liraglutide (VICTOZA) injection, Inject 1.2 mg under the skin daily at bedtime , Disp: , Rfl:   •  metFORMIN (GLUCOPHAGE) 1000 MG tablet, TAKE 1 TABLET(1000 MG) BY MOUTH TWICE DAILY WITH MEALS, Disp: 180 tablet, Rfl: 1  •  metoprolol tartrate (LOPRESSOR) 25 mg tablet, TAKE 1/2 TABLET(12.5 MG) BY MOUTH EVERY 12 HOURS, Disp: 90 tablet, Rfl: 1  •  omeprazole (PriLOSEC) 20 mg delayed release capsule, Take 1 capsule (20 mg total) by mouth daily, Disp: 30 capsule, Rfl: 0  •  OneTouch Ultra test strip, Use 1 each daily Use as instructed, Disp: 100 each, Rfl: 3  •  polyethylene glycol (GLYCOLAX) 17 GM/SCOOP powder, Take 17 g by mouth daily (Patient not taking: Reported on 2/21/2023), Disp: 510 g, Rfl: 0  •  potassium chloride (K-DUR,KLOR-CON) 20 mEq tablet, Take 1 tablet (20 mEq total) by mouth daily for 14 days, Disp: 14 tablet, Rfl: 0  •  pregabalin (LYRICA) 75 mg capsule, Take 1 capsule (75 mg total) by mouth 2 (two) times a day, Disp: 180 capsule, Rfl: 1  •  torsemide (DEMADEX) 20 mg tablet, Take 1 tablet (20 mg total) by mouth daily for 14 days, Disp: 14 tablet, Rfl: 0  Allergies   Allergen Reactions   • Duloxetine Other (See Comments)     Extreme somnolence/lethargy   • Sulfa Antibiotics Rash     There were no vitals filed for this visit. Physical Exam  Constitutional:       General: She is not in acute distress. Appearance: Normal appearance. Cardiovascular:      Rate and Rhythm: Normal rate and regular rhythm. Pulses: Normal pulses. Heart sounds: Normal heart sounds. Pulmonary:      Effort: Pulmonary effort is normal.      Breath sounds: Normal breath sounds. Chest:      Chest wall: No mass.    Breasts:     Right: No swelling, bleeding, inverted nipple, mass, nipple discharge, skin change or tenderness. Left: No swelling, bleeding, mass, skin change or tenderness. Abdominal:      General: Abdomen is flat. Palpations: Abdomen is soft. Lymphadenopathy:      Upper Body:      Right upper body: No supraclavicular, axillary or pectoral adenopathy. Left upper body: No supraclavicular, axillary or pectoral adenopathy. Skin:     General: Skin is warm. Neurological:      General: No focal deficit present. Mental Status: She is alert and oriented to person, place, and time. Psychiatric:         Mood and Affect: Mood normal.         Behavior: Behavior normal.           Results:    Imaging  Mammo diagnostic right w 3d & cad    Result Date: 7/12/2023  Narrative: DIAGNOSIS: 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, estrogen receptor positive ([address]) TECHNIQUE: Digital diagnostic mammography was performed. Computer Aided Detection (CAD) analyzed all applicable images. COMPARISONS: Prior breast imaging dated: 08/15/2022, 03/02/2022, 07/13/2021, 05/06/2021, 05/06/2021, 05/06/2021, 05/06/2021, 03/15/2021, 03/15/2021, 02/24/2021, 02/24/2021, 01/16/2020, 01/16/2020, 07/10/2019, 07/10/2019, 01/02/2019, 01/02/2019, 12/04/2018, 11/09/2017, 11/01/2016, 10/26/2015, 09/30/2014, and 09/26/2013 RELEVANT HISTORY: Family Breast Cancer History: History of breast cancer in Maternal Aunt, Other, Other. Family Medical History: Family medical history includes breast cancer in 3 relatives (maternal aunt, other, other) and endometrial cancer in sister. Personal History: Hormone history includes tamoxifen. Surgical history includes breast biopsy, mastectomy, hysterectomy, and oophorectomy. Medical history includes breast cancer and ovarian cancer. RISK ASS[facility] due to the patient's history and/or demographic factors. TISSUE DENSITY: The breasts are almost entirely fatty.  INDICATION: Renu Doshi is a 69 y.o. female presenting for annual. FINDINGS: There are no suspicious masses, grouped microcalcifications or areas of unexplained architectural distortion. The skin and nipple areolar complex are unremarkable. Numerous benign type calcifications are stable. These mostly appear dermal.  Small circumscribed mass in the outer right breast is unchanged. Impression:  Stable findings ASSESSMENT/BI-RADS CATEGORY: Right: 2 - Benign Overall: 2 - Benign RECOMMENDATION:      - Diagnostic mammogram in 1 year for the right breast. Workstation ID: [id number]      I reviewed the above imaging data. Advance Care Planning/Advance Directives:  Discussed disease status, cancer treatment plans and/or cancer treatment goals with the patient.

## 2023-08-03 ENCOUNTER — TELEPHONE (OUTPATIENT)
Dept: FAMILY MEDICINE CLINIC | Facility: CLINIC | Age: 78
End: 2023-08-03

## 2023-08-03 DIAGNOSIS — E11.9 TYPE 2 DIABETES MELLITUS WITHOUT COMPLICATION, WITHOUT LONG-TERM CURRENT USE OF INSULIN (HCC): ICD-10-CM

## 2023-08-03 DIAGNOSIS — E03.9 ACQUIRED HYPOTHYROIDISM: ICD-10-CM

## 2023-08-03 RX ORDER — LEVOTHYROXINE SODIUM 88 UG/1
88 TABLET ORAL DAILY
Qty: 90 TABLET | Refills: 0 | Status: SHIPPED | OUTPATIENT
Start: 2023-08-03 | End: 2023-08-08 | Stop reason: SDUPTHER

## 2023-08-03 RX ORDER — BLOOD SUGAR DIAGNOSTIC
1 STRIP MISCELLANEOUS DAILY
Qty: 100 EACH | Refills: 3 | Status: SHIPPED | OUTPATIENT
Start: 2023-08-03

## 2023-08-03 NOTE — TELEPHONE ENCOUNTER
Michael Curtis said she went to the pharmacy but they claim they never got the order for Levothyroxine or One Touch Ultra Strips.   [facility] [address]

## 2023-08-08 DIAGNOSIS — E03.9 ACQUIRED HYPOTHYROIDISM: ICD-10-CM

## 2023-08-08 RX ORDER — LEVOTHYROXINE SODIUM 88 UG/1
88 TABLET ORAL DAILY
Qty: 90 TABLET | Refills: 1 | Status: SHIPPED | OUTPATIENT
Start: 2023-08-08

## 2023-09-02 ENCOUNTER — APPOINTMENT (EMERGENCY)
Dept: RADIOLOGY | Facility: HOSPITAL | Age: 78
End: 2023-09-02
Payer: MEDICARE

## 2023-09-02 ENCOUNTER — HOSPITAL ENCOUNTER (EMERGENCY)
Facility: HOSPITAL | Age: 78
Discharge: HOME | End: 2023-09-02
Attending: EMERGENCY MEDICINE
Payer: MEDICARE

## 2023-09-02 VITALS
TEMPERATURE: 98.2 F | DIASTOLIC BLOOD PRESSURE: 72 MMHG | OXYGEN SATURATION: 97 % | SYSTOLIC BLOOD PRESSURE: 169 MMHG | HEART RATE: 76 BPM | RESPIRATION RATE: 20 BRPM

## 2023-09-02 DIAGNOSIS — S42.309A HUMERAL FRACTURE: ICD-10-CM
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
## 2023-09-02 PROCEDURE — 73030 X-RAY EXAM OF SHOULDER: CPT

## 2023-09-02 PROCEDURE — 72125 CT NECK SPINE W/O DYE: CPT

## 2023-09-02 PROCEDURE — 99284 EMERGENCY DEPT VISIT MOD MDM: CPT

## 2023-09-02 PROCEDURE — 99285 EMERGENCY DEPT VISIT HI MDM: CPT | Performed by: PHYSICIAN ASSISTANT

## 2023-09-02 PROCEDURE — 73080 X-RAY EXAM OF ELBOW: CPT

## 2023-09-02 PROCEDURE — 96372 THER/PROPH/DIAG INJ SC/IM: CPT

## 2023-09-02 PROCEDURE — 70450 CT HEAD/BRAIN W/O DYE: CPT

## 2023-09-02 PROCEDURE — 73564 X-RAY EXAM KNEE 4 OR MORE: CPT

## 2023-09-02 PROCEDURE — G1004 CDSM NDSC: HCPCS

## 2023-09-02 PROCEDURE — 71250 CT THORAX DX C-: CPT

## 2023-09-02 RX ORDER — HYDROMORPHONE HCL/PF 1 MG/ML
0.5 SYRINGE (ML) INJECTION ONCE
Status: COMPLETED | OUTPATIENT
Start: 2023-09-02 | End: 2023-09-02

## 2023-09-02 RX ORDER — HYDROMORPHONE HCL/PF 1 MG/ML
0.5 SYRINGE (ML) INJECTION ONCE
Status: DISCONTINUED | OUTPATIENT
Start: 2023-09-02 | End: 2023-09-02

## 2023-09-02 RX ORDER — OXYCODONE HYDROCHLORIDE AND ACETAMINOPHEN 5; 325 MG/1; MG/1
1 TABLET ORAL EVERY 6 HOURS PRN
Qty: 16 TABLET | Refills: 0 | Status: SHIPPED | OUTPATIENT
Start: 2023-09-02 | End: 2023-09-06

## 2023-09-02 RX ADMIN — HYDROMORPHONE HYDROCHLORIDE 0.5 MG: 1 INJECTION, SOLUTION INTRAMUSCULAR; INTRAVENOUS; SUBCUTANEOUS at 15:55

## 2023-09-02 RX ADMIN — HYDROMORPHONE HYDROCHLORIDE 0.5 MG: 1 INJECTION, SOLUTION INTRAMUSCULAR; INTRAVENOUS; SUBCUTANEOUS at 13:48

## 2023-09-02 NOTE — ED PROVIDER NOTES
History  Chief Complaint   Patient presents with   • Fall     Patient brought in by squad after having a fall at home. Patient c/o right arm, and hip pain 8/10. Patient did hit head, pt not on thinner and no LOC     67-year-old female arriving via EMS for evaluation of a fall that occurred half an hour prior to arrival at home, states that she was walking outside down 2 steps, made it to the landing when she then fell after tripping over her foot landing directly onto the right shoulder track in the right portion of her head. She is not on any blood thinners did not lose consciousness. Primary area of pain is noted along the right shoulder. She does note some soreness to the right knee as well as the right scapular region, mild headache and neck stiffness. Denies chest or abdominal pain, hip or groin pain, numbness, paresthesias, nausea, vomiting, confusion, changes in vision. Differential history is not limited to fracture, sprain, dislocation, contusion, intracranial abnormality. Prior to Admission Medications   Prescriptions Last Dose Informant Patient Reported? Taking? ALPRAZolam (XANAX) 0.25 mg tablet   No No   Sig: Take 1 tablet (0.25 mg total) by mouth 3 (three) times a day as needed for anxiety   Patient not taking: Reported on 2023   Aspirin 81 MG CAPS   Yes No   Si (two) times a day   Insulin Pen Needle (Sure Comfort Pen Needles) 31G X 5 MM MISC  Self No No   Sig: by Does not apply route daily   Lancets (OneTouch Delica Plus [id number]) MISC   No No   Sig: Use once a day to check blood sugar   OneTouch Ultra test strip   No No   Sig: Use 1 each daily Use as instructed   anastrozole (ARIMIDEX) 1 mg tablet   No No   Sig: Take 1 tablet (1 mg total) by mouth daily   aspirin 325 mg tablet  Self No No   Sig: Take 1 tablet (325 mg total) by mouth daily Do not start before 2023.    Patient not taking: Reported on 2023   atorvastatin (LIPITOR) 80 mg tablet  Self No No   Sig: Take 1 tablet (80 mg total) by mouth daily with dinner   clotrimazole-betamethasone (LOTRISONE) 1-0.05 % cream   No No   Sig: Apply topically 2 (two) times a day   docusate sodium (COLACE) 100 mg capsule   No No   Sig: Take 1 capsule (100 mg total) by mouth 2 (two) times a day   Patient not taking: Reported on 2/21/2023   levothyroxine 88 mcg tablet   No No   Sig: Take 1 tablet (88 mcg total) by mouth daily   liraglutide (VICTOZA) injection  Self Yes No   Sig: Inject 1.2 mg under the skin daily at bedtime    metFORMIN (GLUCOPHAGE) 1000 MG tablet   No No   Sig: TAKE 1 TABLET(1000 MG) BY MOUTH TWICE DAILY WITH MEALS   metoprolol tartrate (LOPRESSOR) 25 mg tablet   No No   Sig: TAKE 1/2 TABLET(12.5 MG) BY MOUTH EVERY 12 HOURS   omeprazole (PriLOSEC) 20 mg delayed release capsule   No No   Sig: Take 1 capsule (20 mg total) by mouth daily   Patient not taking: Reported on 8/2/2023   polyethylene glycol (GLYCOLAX) 17 GM/SCOOP powder   No No   Sig: Take 17 g by mouth daily   Patient not taking: Reported on 2/21/2023   potassium chloride (K-DUR,KLOR-CON) 20 mEq tablet   No No   Sig: Take 1 tablet (20 mEq total) by mouth daily for 14 days   Patient not taking: Reported on 8/2/2023   pregabalin (LYRICA) 75 mg capsule   No No   Sig: Take 1 capsule (75 mg total) by mouth 2 (two) times a day   torsemide (DEMADEX) 20 mg tablet   No No   Sig: Take 1 tablet (20 mg total) by mouth daily for 14 days   Patient not taking: Reported on 8/2/2023      Facility-Administered Medications: None       Past Medical History:   Diagnosis Date   • Abdominal pain     LLQ on occasion   • Angina pectoris (HCC)    • Arthritis    • Breast cancer (HCC) 07/01/2021   • Cancer (HCC)     breast left   • Colon polyp    • Constipation     at times   • Coronary artery disease    • Diabetes mellitus (HCC)    • Diarrhea     at times   • Disease of thyroid gland    • Hemorrhoids    • Hypercholesterolemia    • Hypertension    • Neuropathy     both legs and feet • Obesity    • Osteoporosis    • Otitis media    • Ovarian ca [facility]) 1997   • 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[address])    • Shingles    • 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 Aguila Hurt. Up to date with her colonoscopy. • COLONOSCOPY     • CORONARY STENT PLACEMENT      Dec 2015   • EYE SURGERY      cataract surgery   • GALLBLADDER SURGERY     • HYSTERECTOMY  1989   • MAMMO (HISTORICAL)      up to date. see gyn   • MASTECTOMY Left 07/13/2021   • MASTECTOMY W/ SENTINEL NODE BIOPSY Left 07/13/2021    Procedure: BREAST ROSLYN  DIRECTED MASTECTOMY, SENTINEL LYMPH NODE BIOPSY, LYMPHATIC MAPPING WITH BLUE DYE AND RADIOACTIVE DYE (INJECT AT 1500 BY DR ANDREWS IN THE OR);   Surgeon: Opal Sun MD;  Location: [facility];  Service: Surgical Oncology   • OOPHORECTOMY Bilateral 1997   • SD CORONARY ARTERY BYP W/VEIN & ARTERY GRAFT 2 VEIN N/A 2/8/2023    Procedure: CORONARY ARTERY BYPASS GRAFT (CABG) X 2 VESSELS GSV-->OM1, LIMA-->LAD; EVH  LEFT LEG w/ RADHA;  Surgeon: Margarita Calderon MD;  Location: [facility];  Service: Cardiac Surgery   • SPINE SURGERY     • THYROIDECTOMY     • UPPER GASTROINTESTINAL ENDOSCOPY     • US BREAST NEEDLE LOC LEFT Left 05/06/2021   • US GUIDANCE BREAST BIOPSY LEFT EACH ADDITIONAL Left 05/06/2021   • US GUIDED BREAST BIOPSY LEFT COMPLETE Left 03/15/2021   • US GUIDED BREAST BIOPSY LEFT COMPLETE Left 05/06/2021       Family History   Problem Relation Age of Onset   • Diabetes Mother    • Heart disease Mother    • Dementia Mother    • Esophageal cancer Father 61   • Endometrial cancer Sister 76   • Asthma Sister    • Cancer Sister    • No Known Problems Sister    • No Known Problems Sister    • No Known Problems Sister    • Asthma Daughter    • No Known Problems Maternal Grandmother    • Prostate cancer Maternal Grandfather    • No Known Problems Paternal Grandmother    • Prostate cancer Paternal Grandfather    • Stomach cancer Brother 70   • Multiple myeloma Brother 67   • Cancer Brother    • Asthma Son    • Depression Son    • Breast cancer Maternal Aunt 50   • No Known Problems Paternal Aunt    • No Known Problems Paternal Aunt    • Breast cancer Other 39   • Breast cancer Other 39   • Diabetes Family    • Cancer Family    • Arthritis Family    • Heart disease Family      I have reviewed and agree with the history as documented. E-Cigarette/Vaping   • E-Cigarette Use Never User      E-Cigarette/Vaping Substances   • Nicotine No    • THC No    • CBD No    • Flavoring No    • Other No    • Unknown No      Social History     Tobacco Use   • Smoking status: Never   • Smokeless tobacco: Never   Vaping Use   • Vaping Use: Never used   Substance Use Topics   • Alcohol use: Never   • Drug use: Never       Review of Systems   Constitutional: Negative. Negative for chills, fatigue and fever. HENT: Negative. Negative for congestion, postnasal drip, rhinorrhea and sore throat. Eyes: Negative. Respiratory: Negative. Negative for cough, shortness of breath and wheezing. Cardiovascular: Negative. Gastrointestinal: Negative. Negative for abdominal pain, diarrhea, nausea and vomiting. Endocrine: Negative. Genitourinary: Negative. Musculoskeletal: Positive for arthralgias and neck pain. Negative for back pain, gait problem, joint swelling, myalgias and neck stiffness. Skin: Positive for wound. Negative for color change, pallor and rash.    Neurological: Positive for headaches. Negative for dizziness, tremors, seizures, syncope, facial asymmetry, speech difficulty, weakness, light-headedness and numbness. Hematological: Negative. Psychiatric/Behavioral: Negative. All other systems reviewed and are negative. Physical Exam  Physical Exam  Vitals and nursing note reviewed. Constitutional:       General: She is not in acute distress. Appearance: She is well-developed. She is not diaphoretic. HENT:      Head: Normocephalic and atraumatic. Right Ear: External ear normal.      Left Ear: External ear normal.      Nose: Nose normal.      Mouth/Throat:      Pharynx: No oropharyngeal exudate. Eyes:      General: No scleral icterus. Right eye: No discharge. Left eye: No discharge. Conjunctiva/sclera: Conjunctivae normal.      Pupils: Pupils are equal, round, and reactive to light. Cardiovascular:      Rate and Rhythm: Normal rate and regular rhythm. Pulses: Normal pulses. Heart sounds: Normal heart sounds. No murmur heard. No friction rub. No gallop. Pulmonary:      Effort: Pulmonary effort is normal. No respiratory distress. Breath sounds: Normal breath sounds. No stridor. No wheezing, rhonchi or rales. Comments: No signs of chest or abdominal trauma there is no rib tenderness no crepitus no chest or abdominal tenderness  Chest:      Chest wall: No tenderness. Abdominal:      General: Abdomen is flat. Bowel sounds are normal. There is no distension. Palpations: Abdomen is soft. There is no mass. Tenderness: There is no abdominal tenderness. There is no guarding or rebound. Hernia: No hernia is present. Musculoskeletal:        Arms:       Cervical back: Normal range of motion and neck supple. Legs:       Comments: No leg shortening or malrotation, neurovascular exam intact no tenderness to the hip or groin. Lymphadenopathy:      Cervical: No cervical adenopathy.    Skin:     General: Skin is warm and dry. Capillary Refill: Capillary refill takes less than 2 seconds. Coloration: Skin is not pale. Findings: No erythema or rash. Neurological:      General: No focal deficit present. Mental Status: She is alert and oriented to person, place, and time. Mental status is at baseline. Vital Signs  ED Triage Vitals [09/02/23 1322]   Temperature Pulse Respirations Blood Pressure SpO2   98.2 °F (36.8 °C) 76 20 169/72 97 %      Temp Source Heart Rate Source Patient Position - Orthostatic VS BP Location FiO2 (%)   Oral Monitor Lying Right leg --      Pain Score       8           Vitals:    09/02/23 1322   BP: 169/72   Pulse: 76   Patient Position - Orthostatic VS: Lying         Visual Acuity      ED Medications  Medications   HYDROmorphone (DILAUDID) injection 0.5 mg (has no administration in time range)   HYDROmorphone (DILAUDID) injection 0.5 mg (0.5 mg Intramuscular Given 9/2/23 1348)       Diagnostic Studies  Results Reviewed     None                 XR shoulder 2+ views RIGHT   ED Interpretation by Susan Anderson PA-C (09/02 1521)   Humeral neck fracture      XR elbow 3+ views RIGHT   ED Interpretation by Susan Anderson PA-C (09/02 1533)   No radial head tenderness. No obvious acute osseous abnormality. XR knee 4+ views Right injury   ED Interpretation by Susan Anderson PA-C (09/02 1521)   No acute osseous abnormality      CT head without contrast   Final Result by Debbie Hammonds MD (09/02 1512)      No acute intracranial abnormality. Right frontal scalp hematoma. Workstation performed: [id number]         CT chest without contrast   Final Result by Debbie Hammonds MD (09/02 1532)      Impacted right proximal humeral fracture. Workstation performed: [id number]         CT cervical spine without contrast   Final Result by Debbie Hammonds MD (09/02 1518)      No cervical spine fracture or traumatic malalignment. Workstation performed: [id number]                    Procedures  Procedures         ED Course  ED Course as of 09/02/23 1542   Sat Sep 02, 2023   1420 CT tech at bedside    1532 Updated patient                                SBIRT 20yo+    Flowsheet Row Most Recent Value   Initial Alcohol Screen: US AUDIT-C     1. How often do you have a drink containing alcohol? 0 Filed at: 09/02/2023 1324   2. How many drinks containing alcohol do you have on a typical day you are drinking? 0 Filed at: 09/02/2023 1324   3a. Male UNDER 65: How often do you have five or more drinks on one occasion? 0 Filed at: 09/02/2023 1324   3b. FEMALE Any Age, or MALE 65+: How often do you have 4 or more drinks on one occassion? 0 Filed at: 09/02/2023 1324   Audit-C Score 0 Filed at: 09/02/2023 1324   PAUL: How many times in the past year have you. .. Used an illegal drug or used a prescription medication for non-medical reasons? Never Filed at: 09/02/2023 1324                    Medical Decision Making  Impacted proximal humeral fracture. Patient here with family, she feels comfortable being discharged at this point time. Will place in a right arm sling. She has tolerated Percocet well before in the past, discussed potential side effects with the patient including not limited to sedation, lightheadedness dizziness, risk for falls, constipation etc.  We will have patient follow-up with orthopedics for reevaluation. There is no signs of intracranial abnormality, cervical fracture or rib fractures etc.  Patient ambulatory in the ED. Patient is informed to return to the emergency department for worsening of symptoms and was given proper education regarding their diagnosis and symptoms. Otherwise the patient is informed to follow up with orthopedics for re-evaluation. The patient verbalizes understanding and agrees with above assessment and plan. All questions were answered.             Fall, initial encounter: acute illness or injury  Humeral fracture: acute illness or injury  Amount and/or Complexity of Data Reviewed  Radiology: ordered and independent interpretation performed. Risk  Prescription drug management. Disposition  Final diagnoses:   Fall, initial encounter   Humeral fracture     Time reflects when diagnosis was documented in both MDM as applicable and the Disposition within this note     Time User Action Codes Description Comment    9/2/2023  3:34 PM Jaswinder Munguia Add [A74. AHGN] Fall, initial encounter     9/2/2023  3:34 PM Kelly Luna, [address] [T68.736W] Humeral fracture       ED Disposition     ED Disposition   Discharge    Condition   Stable    Date/Time   Sat Sep 2, 2023  3:34 PM    Comment   Jerome Griffinlencho discharge to home/self care. Follow-up Information     Follow up With Specialties Details Why Contact Info Additional [address],  Orthopedic Surgery Schedule an appointment as soon as possible for a visit in 3 days for re-evaluation of your shoulder fracture [address], [address]  [phone]       [address] Emergency Department Emergency Medicine Go to  If symptoms worsen such as chest pain, fevers, difficulty breathing etc [address] [address]  [address] [facility] Emergency Department, [address] [facility] [address] [place] [address], [facility]          Patient's Medications   Discharge Prescriptions    OXYCODONE-ACETAMINOPHEN (PERCOCET) 5-325 MG PER TABLET    Take 1 tablet by mouth every 6 (six) hours as needed for moderate pain for up to 4 days Max Daily Amount: 4 tablets       Start Date: 9/2/2023  End Date: 9/6/2023       Order Dose: 1 tablet       Quantity: 16 tablet    Refills: 0       No discharge procedures on file.     PDMP Review       Value Time User    PDMP Reviewed  Yes 6/21/2023 11:33 AM Noam Reilly MD          ED Provider  Electronically Signed by           Susan Anderson PA-C  09/02/23 1545

## 2023-09-02 NOTE — DISCHARGE INSTRUCTIONS
PLEASE BE CAUTIOUS WHILE TAKING PERCOCET AS THIS MEDICATION WILL MAKE YOU SLEEPY AND PUT YOU AT RISK FOR FALLS, INJURIES, SEDATION AND CONSTIPATION.

## 2023-09-05 ENCOUNTER — OFFICE VISIT (OUTPATIENT)
Dept: OBGYN CLINIC | Facility: CLINIC | Age: 78
End: 2023-09-05
Payer: MEDICARE

## 2023-09-05 VITALS — DIASTOLIC BLOOD PRESSURE: 75 MMHG | SYSTOLIC BLOOD PRESSURE: 160 MMHG | TEMPERATURE: 98 F | HEART RATE: 68 BPM

## 2023-09-05 DIAGNOSIS — S42.201A CLOSED FRACTURE OF PROXIMAL END OF RIGHT HUMERUS, UNSPECIFIED FRACTURE MORPHOLOGY, INITIAL ENCOUNTER: Primary | ICD-10-CM

## 2023-09-05 PROCEDURE — 99203 OFFICE O/P NEW LOW 30 MIN: CPT | Performed by: PHYSICIAN ASSISTANT

## 2023-09-05 NOTE — PROGRESS NOTES
Assessment/Plan:  1. Closed fracture of proximal end of right humerus, unspecified fracture morphology, initial encounter          The patient has a minimally displaced proximal humerus fracture that should heal well with nonoperative treatment if this remains well aligned. She was provided with a more comfortable sling today. She should wear this at all times except for showering and dressing. She will follow-up in 1 week with repeat x-rays to ensure this fracture does not displace. She may ice and take over-the-counter medications as needed for discomfort. Subjective:   Darrin Earl is a 69 y.o. female who presents today for evaluation of her right shoulder. She sustained a fall on 9/2/23, injuring this shoulder. She did go to the ED and had xrays which showed a minimally displaced proximal humerus fracture. I am able to view these images today. She has been in a sling since that time. She notes pain about the shoulder diffusely, as well as ecchymosis extending down the upper arm. She denies any paresthesias of the right upper extremity. She did have open hear surgery in February or 2022. She is not currently on any anticoagulation. Review of Systems   Constitutional: Negative. Negative for chills and fever. HENT: Negative. Negative for ear pain and sore throat. Eyes: Negative. Negative for pain and redness. Respiratory: Negative. Negative for shortness of breath and wheezing. Cardiovascular: Negative for chest pain and palpitations. Gastrointestinal: Negative. Negative for abdominal pain and blood in stool. Endocrine: Negative. Negative for polydipsia and polyuria. Genitourinary: Negative. Negative for difficulty urinating and dysuria. Musculoskeletal:        As noted in HPI   Skin: Negative. Negative for pallor and rash. Neurological: Negative. Negative for dizziness and numbness. Hematological: Negative. Negative for adenopathy. Does not bruise/bleed easily. Psychiatric/Behavioral: Negative. Negative for confusion and suicidal ideas. 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[address])    • Shingles    • 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 Clement Gottlieb. Up to date with her colonoscopy. • COLONOSCOPY     • CORONARY STENT PLACEMENT      Dec 2015   • EYE SURGERY      cataract surgery   • GALLBLADDER SURGERY     • HYSTERECTOMY  1989   • MAMMO (HISTORICAL)      up to date. see gyn   • MASTECTOMY Left 07/13/2021   • MASTECTOMY W/ SENTINEL NODE BIOPSY Left 07/13/2021    Procedure: BREAST ROSLYN  DIRECTED MASTECTOMY, SENTINEL LYMPH NODE BIOPSY, LYMPHATIC MAPPING WITH BLUE DYE AND RADIOACTIVE DYE (INJECT AT 1500 BY DR ANDREWS IN THE OR);   Surgeon: Kathy Wright MD;  Location[facility];  Service: Surgical Oncology   • OOPHORECTOMY Bilateral 1997   • NC CORONARY ARTERY BYP W/VEIN & ARTERY GRAFT 2 VEIN N/A 2/8/2023    Procedure: CORONARY ARTERY BYPASS GRAFT (CABG) X 2 VESSELS GSV-->OM1, LIMA-->LAD; EVH  LEFT LEG w/ RADHA;  Surgeon: Sally Mendoza MD;  Location: [facility];  Service: Cardiac Surgery   • SPINE SURGERY     • THYROIDECTOMY     • UPPER GASTROINTESTINAL ENDOSCOPY     • US BREAST NEEDLE LOC LEFT Left 05/06/2021   • US GUIDANCE BREAST BIOPSY LEFT EACH ADDITIONAL Left 05/06/2021   • US GUIDED BREAST BIOPSY LEFT COMPLETE Left 03/15/2021   • US GUIDED BREAST BIOPSY LEFT COMPLETE Left 05/06/2021       Family History   Problem Relation Age of Onset   • Diabetes Mother    • Heart disease Mother    • Dementia Mother    • Esophageal cancer Father 61   • Endometrial cancer Sister 76   • Asthma Sister    • Cancer Sister    • No Known Problems Sister    • No Known Problems Sister    • No Known Problems Sister    • Asthma Daughter    • No Known Problems Maternal Grandmother    • Prostate cancer Maternal Grandfather    • No Known Problems Paternal Grandmother    • Prostate cancer Paternal Grandfather    • Stomach cancer Brother 70   • Multiple myeloma Brother 67   • Cancer Brother    • Asthma Son    • Depression Son    • Breast cancer Maternal Aunt 50   • No Known Problems Paternal Aunt    • No Known Problems Paternal Aunt    • Breast cancer Other 39   • Breast cancer Other 39   • Diabetes Family    • Cancer Family    • Arthritis Family    • Heart disease Family        Social History     Occupational History   • Not on file   Tobacco Use   • Smoking status: Never   • Smokeless tobacco: Never   Vaping Use   • Vaping Use: Never used   Substance and Sexual Activity   • Alcohol use: Never   • Drug use: Never   • Sexual activity: Not Currently     Partners: Male     Birth control/protection: Post-menopausal, None         Current Outpatient Medications:   •  ALPRAZolam (XANAX) 0.25 mg tablet, Take 1 tablet (0.25 mg total) by mouth 3 (three) times a day as needed for anxiety, Disp: 20 tablet, Rfl: 0  •  anastrozole (ARIMIDEX) 1 mg tablet, Take 1 tablet (1 mg total) by mouth daily, Disp: 90 tablet, Rfl: 1  •  aspirin 325 mg tablet, Take 1 tablet (325 mg total) by mouth daily Do not start before February 12, 2023., Disp: 30 tablet, Rfl: 2  •  Aspirin 81 MG CAPS, 2 (two) times a day, Disp: , Rfl:   •  clotrimazole-betamethasone (LOTRISONE) 1-0.05 % cream, Apply topically 2 (two) times a day, Disp: 45 g, Rfl: 3  •  Insulin Pen Needle (Sure Comfort Pen Needles) 31G X 5 MM MISC, by Does not apply route daily, Disp: 100 each, Rfl: 3  •  Lancets (OneTouch Delica Plus [id number]) MISC, Use once a day to check blood sugar, Disp: 100 each, Rfl: 3  •  levothyroxine 88 mcg tablet, Take 1 tablet (88 mcg total) by mouth daily, Disp: 90 tablet, Rfl: 1  •  liraglutide (VICTOZA) injection, Inject 1.2 mg under the skin daily at bedtime , Disp: , Rfl:   •  metFORMIN (GLUCOPHAGE) 1000 MG tablet, TAKE 1 TABLET(1000 MG) BY MOUTH TWICE DAILY WITH MEALS, Disp: 180 tablet, Rfl: 1  •  metoprolol tartrate (LOPRESSOR) 25 mg tablet, TAKE 1/2 TABLET(12.5 MG) BY MOUTH EVERY 12 HOURS, Disp: 90 tablet, Rfl: 1  •  OneTouch Ultra test strip, Use 1 each daily Use as instructed, Disp: 100 each, Rfl: 3  •  oxyCODONE-acetaminophen (Percocet) 5-325 mg per tablet, Take 1 tablet by mouth every 6 (six) hours as needed for moderate pain for up to 4 days Max Daily Amount: 4 tablets, Disp: 16 tablet, Rfl: 0  •  pregabalin (LYRICA) 75 mg capsule, Take 1 capsule (75 mg total) by mouth 2 (two) times a day, Disp: 180 capsule, Rfl: 1  •  atorvastatin (LIPITOR) 80 mg tablet, Take 1 tablet (80 mg total) by mouth daily with dinner (Patient not taking: Reported on 9/5/2023), Disp: 30 tablet, Rfl: 2  •  docusate sodium (COLACE) 100 mg capsule, Take 1 capsule (100 mg total) by mouth 2 (two) times a day (Patient not taking: Reported on 2/21/2023), Disp: 60 capsule, Rfl: 0  •  omeprazole (PriLOSEC) 20 mg delayed release capsule, Take 1 capsule (20 mg total) by mouth daily (Patient not taking: Reported on 8/2/2023), Disp: 30 capsule, Rfl: 0  •  polyethylene glycol (GLYCOLAX) 17 GM/SCOOP powder, Take 17 g by mouth daily (Patient not taking: Reported on 2/21/2023), Disp: 510 g, Rfl: 0  •  potassium chloride (K-DUR,KLOR-CON) 20 mEq tablet, Take 1 tablet (20 mEq total) by mouth daily for 14 days (Patient not taking: Reported on 8/2/2023), Disp: 14 tablet, Rfl: 0  •  torsemide (DEMADEX) 20 mg tablet, Take 1 tablet (20 mg total) by mouth daily for 14 days (Patient not taking: Reported on 8/2/2023), Disp: 14 tablet, Rfl: 0    Allergies   Allergen Reactions   • Duloxetine Other (See Comments)     Extreme somnolence/lethargy   • Sulfa Antibiotics Rash       Objective:  Vitals:    09/05/23 1111   BP: 160/75   Pulse: 68   Temp: 98 °F (36.7 °C)     Pain Score: 10-Worst pain ever      Ortho Exam     Right shoulder: Diffuse swelling. Ecchymosis extending down the upper arm toward the elbow. Tenderness proximal humerus. Shoulder range of motion and strength testing deferred. Patient moves all fingers freely. Sensation intact right upper extremity. Physical Exam  Constitutional:       General: She is not in acute distress. Appearance: She is well-developed. HENT:      Head: Normocephalic and atraumatic. Eyes:      General: No scleral icterus. Conjunctiva/sclera: Conjunctivae normal.   Neck:      Vascular: No JVD. Cardiovascular:      Rate and Rhythm: Normal rate. Pulmonary:      Effort: Pulmonary effort is normal. No respiratory distress. Skin:     General: Skin is warm. Neurological:      Mental Status: She is alert and oriented to person, place, and time. Coordination: Coordination normal.         I have personally reviewed pertinent films in PACS and my interpretation is as follows:  Xrays right shoulder from 9/2/23: Minimally displaced proximal humerus fracture. This document was created using speech voice recognition software.    Grammatical errors, random word insertions, pronoun errors, and incomplete sentences are an occasional consequence of this system due to software limitations, ambient noise, and hardware issues. Any formal questions or concerns about content, text, or information contained within the body of this dictation should be directly addressed to the provider for clarification.

## 2023-09-06 ENCOUNTER — OFFICE VISIT (OUTPATIENT)
Dept: CARDIOLOGY CLINIC | Facility: CLINIC | Age: 78
End: 2023-09-06
Payer: MEDICARE

## 2023-09-06 VITALS — HEIGHT: 61 IN | OXYGEN SATURATION: 98 % | BODY MASS INDEX: 32.85 KG/M2 | WEIGHT: 174 LBS | HEART RATE: 78 BPM

## 2023-09-06 DIAGNOSIS — E66.01 CLASS 2 SEVERE OBESITY WITH SERIOUS COMORBIDITY AND BODY MASS INDEX (BMI) OF 35.0 TO 35.9 IN ADULT, UNSPECIFIED OBESITY TYPE (HCC): ICD-10-CM

## 2023-09-06 DIAGNOSIS — I10 ESSENTIAL HYPERTENSION: ICD-10-CM

## 2023-09-06 DIAGNOSIS — I25.10 CORONARY ARTERY DISEASE INVOLVING NATIVE CORONARY ARTERY OF NATIVE HEART WITHOUT ANGINA PECTORIS: Primary | ICD-10-CM

## 2023-09-06 DIAGNOSIS — E11.9 TYPE 2 DIABETES MELLITUS WITHOUT COMPLICATION, WITHOUT LONG-TERM CURRENT USE OF INSULIN (HCC): ICD-10-CM

## 2023-09-06 DIAGNOSIS — E78.2 MIXED DYSLIPIDEMIA: ICD-10-CM

## 2023-09-06 PROCEDURE — 99214 OFFICE O/P EST MOD 30 MIN: CPT | Performed by: INTERNAL MEDICINE

## 2023-09-06 NOTE — PROGRESS NOTES
Cardiology   Werner Aguila DO, Shashank Govea MD, Maxim Owusu MD, Nick Parish MD, [facility] [place]  -------------------------------------------------------------------  [facility] and [facility]  [address] [facility], [address] [place] [phone]    Cardiology Follow Up  Collette Bramble  1945  [id number]          Assessment/Plan:    1. Coronary artery disease involving native coronary artery of native heart without angina pectoris    2. Mixed dyslipidemia    3. Essential hypertension    4. Class 2 severe obesity with serious comorbidity and body mass index (BMI) of 35.0 to 35.9 in adult, unspecified obesity type ([address])    5. Type 2 diabetes mellitus without complication, without long-term current use of insulin ([facility])      - Collette Bramble Was doing well until recent fall. She now has a right humeral neck fracture. She may require surgery to correct. If necessary, may proceed with surgery as planned as she is currently medically stable from a cardiac standpoint and had a recent CABG. -Continue atorvastatin 80 mg daily. Previously, she was unable to tolerate this medication but is doing well now. She should have blood work done with her next appointment with her primary medical doctor  -Continue aspirin  -Continue metoprolol 12.5 mg twice daily. Interval History:     Collette Bramble is 69 y.o. female here for followup of CAD/CABG. On February 8, 2023, she underwent two-vessel CABG with LIMA to LAD and SVG to OM1. Surgery was uncomplicated and she was discharged home 4 days later. Since then, she has not had any chest pain or shortness of breath. She had a mechanical fall in the past week and fractured her right humeral head. She currently is in a sling and will be following up with orthopedic surgery to determine course. She has pain in her shoulder but otherwise is feeling well. She denies syncope or near syncope.   She denies any palpitations. She reports good adherence with medications. In December 2022, she developed exertional dyspnea and chest tightness and was seen in the emergency room. She subsequently underwent cardiac catheterization which showed left main 70% stenosis and 95% proximal circumflex stenosis. In addition she had 45% RCA stenosis. She was scheduled for CABG on 12/19 but surgery was canceled because patient had COVID infection. Previously, she could not tolerate pravastatin or atorvastatin due to myalgias but is tolerating atorvastatin 80 mg daily which was started post CABG. She has been on 2 g of fish oil twice a day. No recent blood work available. She has blood work done with her primary medical doctor. Most recent available blood work showed LDL of 77, HDL of 35 and triglycerides of 224. She has a history of CAD with SAMMIE to proximal LAD in 2015. Prior to stent placement, she was feeling episode of left arm pain with exertion. She had an echocardiogram in December which showed normal ejection fraction with mild valve disease.     The following portions of the patient's history were reviewed and updated as appropriate: allergies, current medications, past family history, past medical history, past social history, past surgical history, and problem list.       Current Outpatient Medications:   •  ALPRAZolam (XANAX) 0.25 mg tablet, Take 1 tablet (0.25 mg total) by mouth 3 (three) times a day as needed for anxiety, Disp: 20 tablet, Rfl: 0  •  anastrozole (ARIMIDEX) 1 mg tablet, Take 1 tablet (1 mg total) by mouth daily, Disp: 90 tablet, Rfl: 1  •  aspirin 325 mg tablet, Take 1 tablet (325 mg total) by mouth daily Do not start before February 12, 2023., Disp: 30 tablet, Rfl: 2  •  Aspirin 81 MG CAPS, 2 (two) times a day, Disp: , Rfl:   •  clotrimazole-betamethasone (LOTRISONE) 1-0.05 % cream, Apply topically 2 (two) times a day, Disp: 45 g, Rfl: 3  •  Insulin Pen Needle (Sure Comfort Pen Needles) 31G X 5 MM MISC, by Does not apply route daily, Disp: 100 each, Rfl: 3  •  Lancets (OneTouch Delica Plus [id number]) MISC, Use once a day to check blood sugar, Disp: 100 each, Rfl: 3  •  levothyroxine 88 mcg tablet, Take 1 tablet (88 mcg total) by mouth daily, Disp: 90 tablet, Rfl: 1  •  liraglutide (VICTOZA) injection, Inject 1.2 mg under the skin daily at bedtime , Disp: , Rfl:   •  metFORMIN (GLUCOPHAGE) 1000 MG tablet, TAKE 1 TABLET(1000 MG) BY MOUTH TWICE DAILY WITH MEALS, Disp: 180 tablet, Rfl: 1  •  metoprolol tartrate (LOPRESSOR) 25 mg tablet, TAKE 1/2 TABLET(12.5 MG) BY MOUTH EVERY 12 HOURS, Disp: 90 tablet, Rfl: 1  •  OneTouch Ultra test strip, Use 1 each daily Use as instructed, Disp: 100 each, Rfl: 3  •  oxyCODONE-acetaminophen (Percocet) 5-325 mg per tablet, Take 1 tablet by mouth every 6 (six) hours as needed for moderate pain for up to 4 days Max Daily Amount: 4 tablets, Disp: 16 tablet, Rfl: 0  •  pregabalin (LYRICA) 75 mg capsule, Take 1 capsule (75 mg total) by mouth 2 (two) times a day, Disp: 180 capsule, Rfl: 1  •  atorvastatin (LIPITOR) 80 mg tablet, Take 1 tablet (80 mg total) by mouth daily with dinner (Patient not taking: Reported on 9/5/2023), Disp: 30 tablet, Rfl: 2  •  docusate sodium (COLACE) 100 mg capsule, Take 1 capsule (100 mg total) by mouth 2 (two) times a day (Patient not taking: Reported on 2/21/2023), Disp: 60 capsule, Rfl: 0  •  omeprazole (PriLOSEC) 20 mg delayed release capsule, Take 1 capsule (20 mg total) by mouth daily (Patient not taking: Reported on 8/2/2023), Disp: 30 capsule, Rfl: 0  •  polyethylene glycol (GLYCOLAX) 17 GM/SCOOP powder, Take 17 g by mouth daily (Patient not taking: Reported on 2/21/2023), Disp: 510 g, Rfl: 0  •  potassium chloride (K-DUR,KLOR-CON) 20 mEq tablet, Take 1 tablet (20 mEq total) by mouth daily for 14 days (Patient not taking: Reported on 8/2/2023), Disp: 14 tablet, Rfl: 0  •  torsemide (DEMADEX) 20 mg tablet, Take 1 tablet (20 mg total) by mouth daily for 14 days (Patient not taking: Reported on 8/2/2023), Disp: 14 tablet, Rfl: 0        Review of Systems:  Review of Systems   Respiratory: Negative for chest tightness and shortness of breath. Cardiovascular: Negative for chest pain, palpitations and leg swelling. Musculoskeletal: Positive for arthralgias and myalgias. All other systems reviewed and are negative. Physical Exam:  Vitals:  Vitals:    09/06/23 1254   Pulse: 78   SpO2: 98%   Weight: 78.9 kg (174 lb)   Height: 5' 1" (1.549 m)     Physical Exam   Constitutional: She appears healthy. No distress. Eyes: Pupils are equal, round, and reactive to light. Conjunctivae are normal.   Neck: No JVD present. Cardiovascular: Normal rate, regular rhythm and normal heart sounds. Exam reveals no gallop and no friction rub. No murmur heard. Pulmonary/Chest: Effort normal and breath sounds normal. She has no wheezes. She has no rales. Musculoskeletal:         General: Tenderness and deformity present. No edema. Cervical back: Normal range of motion and neck supple. Neurological: She is alert and oriented to person, place, and time. Skin: Skin is warm and dry. Cardiographics:  EKG: Could not obtain today because of arm. Last known EF:  60-65%    This note was completed in part utilizing [employer] Direct Software. Grammatical errors, random word insertions, spelling mistakes, and incomplete sentences can be an occasional consequence of this system secondary to software limitations, ambient noise, and hardware issues. If you have any questions or concerns about the content, text, or information contained within the body of this dictation, please contact the provider for clarification.

## 2023-09-08 ENCOUNTER — APPOINTMENT (OUTPATIENT)
Dept: RADIOLOGY | Facility: CLINIC | Age: 78
End: 2023-09-08
Payer: MEDICARE

## 2023-09-08 ENCOUNTER — OFFICE VISIT (OUTPATIENT)
Dept: OBGYN CLINIC | Facility: CLINIC | Age: 78
End: 2023-09-08
Payer: MEDICARE

## 2023-09-08 VITALS — WEIGHT: 171 LBS | BODY MASS INDEX: 32.28 KG/M2 | HEIGHT: 61 IN

## 2023-09-08 DIAGNOSIS — S42.201A CLOSED FRACTURE OF PROXIMAL END OF RIGHT HUMERUS, UNSPECIFIED FRACTURE MORPHOLOGY, INITIAL ENCOUNTER: Primary | ICD-10-CM

## 2023-09-08 DIAGNOSIS — S42.201A CLOSED FRACTURE OF PROXIMAL END OF RIGHT HUMERUS, UNSPECIFIED FRACTURE MORPHOLOGY, INITIAL ENCOUNTER: ICD-10-CM

## 2023-09-08 PROCEDURE — 99213 OFFICE O/P EST LOW 20 MIN: CPT | Performed by: ORTHOPAEDIC SURGERY

## 2023-09-08 PROCEDURE — 73030 X-RAY EXAM OF SHOULDER: CPT

## 2023-09-08 NOTE — PROGRESS NOTES
Assessment/Plan:  1. Closed fracture of proximal end of right humerus, unspecified fracture morphology, initial encounter  XR shoulder 2+ vw right    Referral to [address] [place] [address] [address] Attestation    I,:  Francisco Holman am acting as a scribe while in the presence of the attending physician.:       I,:  Sohan Elizalde MD personally performed the services described in this documentation    as scribed in my presence.:         Jose Pinal upon examination and review of the x-rays of the right shoulder obtained today does demonstrate a minimally displaced fracture of the proximal humeral neck. It does appear to be stable when compared to previous films. The articular margins are intact. I do believe this will do well with nonoperative care with immobilization in a sling. Her sling was adjusted today by my . She may remove the sling while at rest with support to range her elbow to avoid development of stiffness. She may also utilize ibuprofen and Tylenol and can together for pain management. Ice is also recommended for additional pain relief. I did encourage her to continue her calcium and vitamin D supplementation for overall bone health and healing potential.  She will follow-up in 2 weeks time for repeat clinical evaluation and repeat x-ray of the right shoulder. Subjective:   Valentine Trent is a 69 y.o. female who presents for evaluation of her right shoulder. She is referred to me today by Alejandro Malone PA-C. She did suffer a proximal humerus fracture of the right shoulder 5 days ago. Date of injury was September 2, 2023. She was evaluated at the emergency department that same day demonstrate a minimally displaced proximal humerus fracture. She was treated nonoperatively with immobilization in a sling. She does have complaints of pain diffusely about the shoulder in addition to ecchymosis formation. This does extend into the upper arm.   Today she denies any distal paresthesias. She is currently not on any anticoagulation medication as she did have a coronary artery bypass surgery in February 2, 2022. He does report to today's visit in her sling. Review of Systems   Constitutional: Negative for chills, fever and unexpected weight change. HENT: Negative for hearing loss, nosebleeds and sore throat. Eyes: Negative for pain, redness and visual disturbance. Respiratory: Negative for cough, shortness of breath and wheezing. Cardiovascular: Negative for chest pain, palpitations and leg swelling. Gastrointestinal: Negative for abdominal pain, nausea and vomiting. Endocrine: Negative for polydipsia and polyuria. Genitourinary: Negative for dysuria and hematuria. Musculoskeletal: Positive for arthralgias and myalgias. See HPI   Skin: Negative for rash and wound. Neurological: Negative for dizziness, numbness and headaches. Psychiatric/Behavioral: Negative for decreased concentration and suicidal ideas. The patient is not nervous/anxious.           Past Medical History:   Diagnosis Date   • Abdominal pain     LLQ on occasion   • Angina pectoris [facility])    • Arthritis    • Breast cancer ([address]) 07/01/2021   • Cancer ([address])     breast left   • Colon polyp    • Constipation     at times   • Coronary artery disease    • Diabetes mellitus ([address])    • Diarrhea     at times   • Disease of thyroid gland    • Hemorrhoids    • Hypercholesterolemia    • Hypertension    • Neuropathy     both legs and feet   • Obesity    • Osteoporosis    • Otitis media    • Ovarian ca [facility]) 1997   • 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[address])    • Shingles    • 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 Yashira Suero. Up to date with her colonoscopy. • COLONOSCOPY     • CORONARY STENT PLACEMENT      Dec 2015   • EYE SURGERY      cataract surgery   • GALLBLADDER SURGERY     • HYSTERECTOMY  1989   • MAMMO (HISTORICAL)      up to date. see gyn   • MASTECTOMY Left 07/13/2021   • MASTECTOMY W/ SENTINEL NODE BIOPSY Left 07/13/2021    Procedure: BREAST ROSLYN  DIRECTED MASTECTOMY, SENTINEL LYMPH NODE BIOPSY, LYMPHATIC MAPPING WITH BLUE DYE AND RADIOACTIVE DYE (INJECT AT 1500 BY DR ANDREWS IN THE OR);   Surgeon: Santana Ramirez MD;  Location: [facility];  Service: Surgical Oncology   • OOPHORECTOMY Bilateral 1997   • NM CORONARY ARTERY BYP W/VEIN & ARTERY GRAFT 2 VEIN N/A 2/8/2023    Procedure: CORONARY ARTERY BYPASS GRAFT (CABG) X 2 VESSELS GSV-->OM1, LIMA-->LAD; EVH  LEFT LEG w/ RADHA;  Surgeon: Christiano Armendariz MD;  Location: [facility];  Service: Cardiac Surgery   • SPINE SURGERY     • THYROIDECTOMY     • UPPER GASTROINTESTINAL ENDOSCOPY     • US BREAST NEEDLE LOC LEFT Left 05/06/2021   • US GUIDANCE BREAST BIOPSY LEFT EACH ADDITIONAL Left 05/06/2021   • US GUIDED BREAST BIOPSY LEFT COMPLETE Left 03/15/2021   • US GUIDED BREAST BIOPSY LEFT COMPLETE Left 05/06/2021       Family History   Problem Relation Age of Onset   • Diabetes Mother    • Heart disease Mother    • Dementia Mother    • Esophageal cancer Father 61   • Endometrial cancer Sister 76   • Asthma Sister    • Cancer Sister    • No Known Problems Sister    • No Known Problems Sister    • No Known Problems Sister    • Asthma Daughter    • No Known Problems Maternal Grandmother    • Prostate cancer Maternal Grandfather    • No Known Problems Paternal Grandmother    • Prostate cancer Paternal Grandfather    • Stomach cancer Brother 70   • Multiple myeloma Brother 67   • Cancer Brother    • Asthma Son    • Depression Son    • Breast cancer Maternal Aunt 50   • No Known Problems Paternal Aunt    • No Known Problems Paternal Aunt    • Breast cancer Other 39   • Breast cancer Other 39   • Diabetes Family    • Cancer Family    • Arthritis Family    • Heart disease Family        Social History     Occupational History   • Not on file   Tobacco Use   • Smoking status: Never   • Smokeless tobacco: Never   Vaping Use   • Vaping Use: Never used   Substance and Sexual Activity   • Alcohol use: Never   • Drug use: Never   • Sexual activity: Not Currently     Partners: Male     Birth control/protection: Post-menopausal, None         Current Outpatient Medications:   •  ALPRAZolam (XANAX) 0.25 mg tablet, Take 1 tablet (0.25 mg total) by mouth 3 (three) times a day as needed for anxiety, Disp: 20 tablet, Rfl: 0  •  anastrozole (ARIMIDEX) 1 mg tablet, Take 1 tablet (1 mg total) by mouth daily, Disp: 90 tablet, Rfl: 1  •  aspirin 325 mg tablet, Take 1 tablet (325 mg total) by mouth daily Do not start before February 12, 2023., Disp: 30 tablet, Rfl: 2  •  Aspirin 81 MG CAPS, 2 (two) times a day, Disp: , Rfl:   •  clotrimazole-betamethasone (LOTRISONE) 1-0.05 % cream, Apply topically 2 (two) times a day, Disp: 45 g, Rfl: 3  •  Insulin Pen Needle (Sure Comfort Pen Needles) 31G X 5 MM MISC, by Does not apply route daily, Disp: 100 each, Rfl: 3  •  Lancets (OneTouch Delica Plus [id number]) MISC, Use once a day to check blood sugar, Disp: 100 each, Rfl: 3  •  levothyroxine 88 mcg tablet, Take 1 tablet (88 mcg total) by mouth daily, Disp: 90 tablet, Rfl: 1  •  liraglutide (VICTOZA) injection, Inject 1.2 mg under the skin daily at bedtime , Disp: , Rfl:   •  metFORMIN (GLUCOPHAGE) 1000 MG tablet, TAKE 1 TABLET(1000 MG) BY MOUTH TWICE DAILY WITH MEALS, Disp: 180 tablet, Rfl: 1  •  metoprolol tartrate (LOPRESSOR) 25 mg tablet, TAKE 1/2 TABLET(12.5 MG) BY MOUTH EVERY 12 HOURS, Disp: 90 tablet, Rfl: 1  •  OneTouch Ultra test strip, Use 1 each daily Use as instructed, Disp: 100 each, Rfl: 3  •  pregabalin (LYRICA) 75 mg capsule, Take 1 capsule (75 mg total) by mouth 2 (two) times a day, Disp: 180 capsule, Rfl: 1  •  atorvastatin (LIPITOR) 80 mg tablet, Take 1 tablet (80 mg total) by mouth daily with dinner (Patient not taking: Reported on 9/5/2023), Disp: 30 tablet, Rfl: 2  •  docusate sodium (COLACE) 100 mg capsule, Take 1 capsule (100 mg total) by mouth 2 (two) times a day (Patient not taking: Reported on 2/21/2023), Disp: 60 capsule, Rfl: 0  •  omeprazole (PriLOSEC) 20 mg delayed release capsule, Take 1 capsule (20 mg total) by mouth daily (Patient not taking: Reported on 8/2/2023), Disp: 30 capsule, Rfl: 0  •  polyethylene glycol (GLYCOLAX) 17 GM/SCOOP powder, Take 17 g by mouth daily (Patient not taking: Reported on 2/21/2023), Disp: 510 g, Rfl: 0  •  potassium chloride (K-DUR,KLOR-CON) 20 mEq tablet, Take 1 tablet (20 mEq total) by mouth daily for 14 days (Patient not taking: Reported on 8/2/2023), Disp: 14 tablet, Rfl: 0  •  torsemide (DEMADEX) 20 mg tablet, Take 1 tablet (20 mg total) by mouth daily for 14 days (Patient not taking: Reported on 8/2/2023), Disp: 14 tablet, Rfl: 0    Allergies   Allergen Reactions   • Duloxetine Other (See Comments)     Extreme somnolence/lethargy   • Sulfa Antibiotics Rash       Objective:  Vitals:       Right Shoulder Exam     Tenderness   Right shoulder tenderness location: Proximal humerus. Other   Erythema: absent  Scars: absent  Sensation: normal  Pulse: present    Comments:  Ecchymosis diffuse about the anterior and lateral upper arm. Range of motion and strengthening omitted due to fracture. Physical Exam  Vitals and nursing note reviewed. Constitutional:       Appearance: She is well-developed. HENT:      Head: Normocephalic and atraumatic.       Right Ear: External ear normal.      Left Ear: External ear normal.      Nose: Nose normal.   Eyes:      General:         Right eye: No discharge. Left eye: No discharge. Conjunctiva/sclera: Conjunctivae normal.   Cardiovascular:      Rate and Rhythm: Normal rate. Pulmonary:      Effort: Pulmonary effort is normal. No respiratory distress. Musculoskeletal:      Cervical back: Normal range of motion and neck supple. Comments: As noted in HPI   Skin:     General: Skin is warm and dry. Neurological:      Mental Status: She is alert and oriented to person, place, and time. Psychiatric:         Behavior: Behavior normal.         Thought Content: Thought content normal.         Judgment: Judgment normal.         I have personally reviewed pertinent films in PACS and my interpretation is as follows:  X-rays of the right shoulder obtained today 9/8/23 demonstrate a stable appearing minimally displaced proximal humerus neck fracture without interval displacement      This document was created using speech voice recognition software. Grammatical errors, random word insertions, pronoun errors, and incomplete sentences are an occasional consequence of this system due to software limitations, ambient noise, and hardware issues. Any formal questions or concerns about content, text, or information contained within the body of this dictation should be directly addressed to the provider for clarification.

## 2023-09-10 NOTE — TELEPHONE ENCOUNTER
Reviewed photo with Dr Apoorva Taylor who recommended the patient's post-op appointment be moved up to a sooner date  Appointment moved to 7/26  Instructed patient to present to the emergency room if symptoms worsened over the weekend  flank pain

## 2023-09-11 ENCOUNTER — OFFICE VISIT (OUTPATIENT)
Dept: FAMILY MEDICINE CLINIC | Facility: CLINIC | Age: 78
End: 2023-09-11
Payer: MEDICARE

## 2023-09-11 ENCOUNTER — TELEPHONE (OUTPATIENT)
Dept: OBGYN CLINIC | Facility: CLINIC | Age: 78
End: 2023-09-11

## 2023-09-11 VITALS — WEIGHT: 169 LBS | HEIGHT: 61 IN | BODY MASS INDEX: 31.91 KG/M2 | RESPIRATION RATE: 20 BRPM

## 2023-09-11 DIAGNOSIS — S42.201A CLOSED FRACTURE OF PROXIMAL END OF RIGHT HUMERUS, UNSPECIFIED FRACTURE MORPHOLOGY, INITIAL ENCOUNTER: Primary | ICD-10-CM

## 2023-09-11 DIAGNOSIS — S42.201D CLOSED FRACTURE OF PROXIMAL END OF RIGHT HUMERUS WITH ROUTINE HEALING, UNSPECIFIED FRACTURE MORPHOLOGY, SUBSEQUENT ENCOUNTER: ICD-10-CM

## 2023-09-11 DIAGNOSIS — E11.9 TYPE 2 DIABETES MELLITUS WITHOUT COMPLICATION, WITHOUT LONG-TERM CURRENT USE OF INSULIN (HCC): ICD-10-CM

## 2023-09-11 DIAGNOSIS — M85.851 OSTEOPENIA OF NECKS OF BOTH FEMURS: ICD-10-CM

## 2023-09-11 DIAGNOSIS — I10 ESSENTIAL HYPERTENSION: ICD-10-CM

## 2023-09-11 DIAGNOSIS — E03.9 ACQUIRED HYPOTHYROIDISM: ICD-10-CM

## 2023-09-11 DIAGNOSIS — Z00.00 MEDICARE ANNUAL WELLNESS VISIT, SUBSEQUENT: Primary | ICD-10-CM

## 2023-09-11 DIAGNOSIS — E78.2 MIXED DYSLIPIDEMIA: ICD-10-CM

## 2023-09-11 DIAGNOSIS — I25.10 CORONARY ARTERY DISEASE INVOLVING NATIVE CORONARY ARTERY OF NATIVE HEART WITHOUT ANGINA PECTORIS: ICD-10-CM

## 2023-09-11 DIAGNOSIS — M85.852 OSTEOPENIA OF NECKS OF BOTH FEMURS: ICD-10-CM

## 2023-09-11 PROBLEM — R06.09 DYSPNEA ON EXERTION: Status: RESOLVED | Noted: 2022-12-06 | Resolved: 2023-09-11

## 2023-09-11 PROBLEM — R94.31 T WAVE INVERSION IN EKG: Status: RESOLVED | Noted: 2022-12-06 | Resolved: 2023-09-11

## 2023-09-11 PROBLEM — R19.7 DIARRHEA: Status: RESOLVED | Noted: 2022-10-10 | Resolved: 2023-09-11

## 2023-09-11 LAB — SL AMB POCT HEMOGLOBIN AIC: 5.9 (ref ?–6.5)

## 2023-09-11 PROCEDURE — 83036 HEMOGLOBIN GLYCOSYLATED A1C: CPT | Performed by: FAMILY MEDICINE

## 2023-09-11 PROCEDURE — 99214 OFFICE O/P EST MOD 30 MIN: CPT | Performed by: FAMILY MEDICINE

## 2023-09-11 PROCEDURE — G0439 PPPS, SUBSEQ VISIT: HCPCS | Performed by: FAMILY MEDICINE

## 2023-09-11 NOTE — ASSESSMENT & PLAN NOTE
Patient fell on 9/2 and seen in ER. Had x-rays and arm placed in sling. Patient saw Ortho on 9/8 and will follow with them. Patient has pain right arm. Taking tylenol or advil for pain.

## 2023-09-11 NOTE — PATIENT INSTRUCTIONS
Medicare Preventive Visit Patient Instructions  Thank you for completing your Welcome to Medicare Visit or Medicare Annual Wellness Visit today. Your next wellness visit will be due in one year (9/11/2024). The screening/preventive services that you may require over the next 5-10 years are detailed below. Some tests may not apply to you based off risk factors and/or age. Screening tests ordered at today's visit but not completed yet may show as past due. Also, please note that scanned in results may not display below. Preventive Screenings:  Service Recommendations Previous Testing/Comments   Colorectal Cancer Screening  * Colonoscopy    * Fecal Occult Blood Test (FOBT)/Fecal Immunochemical Test (FIT)  * Fecal DNA/Cologuard Test  * Flexible Sigmoidoscopy Age: 43-73 years old   Colonoscopy: every 10 years (may be performed more frequently if at higher risk)  OR  FOBT/FIT: every 1 year  OR  Cologuard: every 3 years  OR  Sigmoidoscopy: every 5 years  Screening may be recommended earlier than age 39 if at higher risk for colorectal cancer. Also, an individualized decision between you and your healthcare provider will decide whether screening between the ages of 77-80 would be appropriate. Colonoscopy: 07/21/2022  FOBT/FIT: Not on file  Cologuard: Not on file  Sigmoidoscopy: Not on file    Screening Current     Breast Cancer Screening Age: 36 years old  Frequency: every 1-2 years  Not required if history of left and right mastectomy Mammogram: 07/12/2023    History Breast Cancer   Cervical Cancer Screening Between the ages of 21-29, pap smear recommended once every 3 years. Between the ages of 32-69, can perform pap smear with HPV co-testing every 5 years.    Recommendations may differ for women with a history of total hysterectomy, cervical cancer, or abnormal pap smears in past. Pap Smear: 11/09/2022    Screening Not Indicated   Hepatitis C Screening Once for adults born between 1945 and 1965  More frequently in patients at high risk for Hepatitis C Hep C Antibody: Not on file        Diabetes Screening 1-2 times per year if you're at risk for diabetes or have pre-diabetes Fasting glucose: 107 mg/dL (10/18/2022)  A1C: 6.0 (2/21/2023)  Screening Not Indicated  History Diabetes   Cholesterol Screening Once every 5 years if you don't have a lipid disorder. May order more often based on risk factors. Lipid panel: 10/18/2022    Screening Current     Other Preventive Screenings Covered by Medicare:  1. Abdominal Aortic Aneurysm (AAA) Screening: covered once if your at risk. You're considered to be at risk if you have a family history of AAA. 2. Lung Cancer Screening: covers low dose CT scan once per year if you meet all of the following conditions: (1) Age 48-67; (2) No signs or symptoms of lung cancer; (3) Current smoker or have quit smoking within the last 15 years; (4) You have a tobacco smoking history of at least 20 pack years (packs per day multiplied by number of years you smoked); (5) You get a written order from a healthcare provider. 3. Glaucoma Screening: covered annually if you're considered high risk: (1) You have diabetes OR (2) Family history of glaucoma OR (3)  aged 48 and older OR (3)  American aged 72 and older  3. Osteoporosis Screening: covered every 2 years if you meet one of the following conditions: (1) You're estrogen deficient and at risk for osteoporosis based off medical history and other findings; (2) Have a vertebral abnormality; (3) On glucocorticoid therapy for more than 3 months; (4) Have primary hyperparathyroidism; (5) On osteoporosis medications and need to assess response to drug therapy. · Last bone density test (DXA Scan): 03/31/2022.  5. HIV Screening: covered annually if you're between the age of 15-65. Also covered annually if you are younger than 13 and older than 72 with risk factors for HIV infection.  For pregnant patients, it is covered up to 3 times per pregnancy. Immunizations:  Immunization Recommendations   Influenza Vaccine Annual influenza vaccination during flu season is recommended for all persons aged >= 6 months who do not have contraindications   Pneumococcal Vaccine   * Pneumococcal conjugate vaccine = PCV13 (Prevnar 13), PCV15 (Vaxneuvance), PCV20 (Prevnar 20)  * Pneumococcal polysaccharide vaccine = PPSV23 (Pneumovax) Adults 20-63 years old: 1-3 doses may be recommended based on certain risk factors  Adults 72 years old: 1-2 doses may be recommended based off what pneumonia vaccine you previously received   Hepatitis B Vaccine 3 dose series if at intermediate or high risk (ex: diabetes, end stage renal disease, liver disease)   Tetanus (Td) Vaccine - COST NOT COVERED BY MEDICARE PART B Following completion of primary series, a booster dose should be given every 10 years to maintain immunity against tetanus. Td may also be given as tetanus wound prophylaxis. Tdap Vaccine - COST NOT COVERED BY MEDICARE PART B Recommended at least once for all adults. For pregnant patients, recommended with each pregnancy. Shingles Vaccine (Shingrix) - COST NOT COVERED BY MEDICARE PART B  2 shot series recommended in those aged 48 and above     Health Maintenance Due:      Topic Date Due   • Hepatitis C Screening  Never done   • Breast Cancer Screening: Mammogram  07/12/2024   • Colorectal Cancer Screening  07/20/2025     Immunizations Due:      Topic Date Due   • COVID-19 Vaccine (4 - Pfizer series) 01/18/2022   • Influenza Vaccine (1) 09/01/2023     Advance Directives   What are advance directives? Advance directives are legal documents that state your wishes and plans for medical care. These plans are made ahead of time in case you lose your ability to make decisions for yourself. Advance directives can apply to any medical decision, such as the treatments you want, and if you want to donate organs. What are the types of advance directives?   There are many types of advance directives, and each state has rules about how to use them. You may choose a combination of any of the following:  · Living will: This is a written record of the treatment you want. You can also choose which treatments you do not want, which to limit, and which to stop at a certain time. This includes surgery, medicine, IV fluid, and tube feedings. · Durable power of  for healthcare [facility]): This is a written record that states who you want to make healthcare choices for you when you are unable to make them for yourself. This person, called a proxy, is usually a family member or a friend. You may choose more than 1 proxy. · Do not resuscitate (DNR) order:  A DNR order is used in case your heart stops beating or you stop breathing. It is a request not to have certain forms of treatment, such as CPR. A DNR order may be included in other types of advance directives. · Medical directive: This covers the care that you want if you are in a coma, near death, or unable to make decisions for yourself. You can list the treatments you want for each condition. Treatment may include pain medicine, surgery, blood transfusions, dialysis, IV or tube feedings, and a ventilator (breathing machine). · Values history: This document has questions about your views, beliefs, and how you feel and think about life. This information can help others choose the care that you would choose. Why are advance directives important? An advance directive helps you control your care. Although spoken wishes may be used, it is better to have your wishes written down. Spoken wishes can be misunderstood, or not followed. Treatments may be given even if you do not want them. An advance directive may make it easier for your family to make difficult choices about your care. Fall Prevention    Fall prevention  includes ways to make your home and other areas safer.  It also includes ways you can move more carefully to prevent a fall. Health conditions that cause changes in your blood pressure, vision, or muscle strength and coordination may increase your risk for falls. Medicines may also increase your risk for falls if they make you dizzy, weak, or sleepy. Fall prevention tips:   · Stand or sit up slowly. · Use assistive devices as directed. · Wear shoes that fit well and have soles that . · Wear a personal alarm. · Stay active. · Manage your medical conditions. Home Safety Tips:  · Add items to prevent falls in the bathroom. · Keep paths clear. · Install bright lights in your home. · Keep items you use often on shelves within reach. · Paint or place reflective tape on the edges of your stairs. Weight Management   Why it is important to manage your weight:  Being overweight increases your risk of health conditions such as heart disease, high blood pressure, type 2 diabetes, and certain types of cancer. It can also increase your risk for osteoarthritis, sleep apnea, and other respiratory problems. Aim for a slow, steady weight loss. Even a small amount of weight loss can lower your risk of health problems. How to lose weight safely:  A safe and healthy way to lose weight is to eat fewer calories and get regular exercise. You can lose up about 1 pound a week by decreasing the number of calories you eat by 500 calories each day. Healthy meal plan for weight management:  A healthy meal plan includes a variety of foods, contains fewer calories, and helps you stay healthy. A healthy meal plan includes the following:  · Eat whole-grain foods more often. A healthy meal plan should contain fiber. Fiber is the part of grains, fruits, and vegetables that is not broken down by your body. Whole-grain foods are healthy and provide extra fiber in your diet. Some examples of whole-grain foods are whole-wheat breads and pastas, oatmeal, brown rice, and bulgur. · Eat a variety of vegetables every day. Include dark, leafy greens such as spinach, kale, shannon greens, and mustard greens. Eat yellow and orange vegetables such as carrots, sweet potatoes, and winter squash. · Eat a variety of fruits every day. Choose fresh or canned fruit (canned in its own juice or light syrup) instead of juice. Fruit juice has very little or no fiber. · Eat low-fat dairy foods. Drink fat-free (skim) milk or 1% milk. Eat fat-free yogurt and low-fat cottage cheese. Try low-fat cheeses such as mozzarella and other reduced-fat cheeses. · Choose meat and other protein foods that are low in fat. Choose beans or other legumes such as split peas or lentils. Choose fish, skinless poultry (chicken or turkey), or lean cuts of red meat (beef or pork). Before you cook meat or poultry, cut off any visible fat. · Use less fat and oil. Try baking foods instead of frying them. Add less fat, such as margarine, sour cream, regular salad dressing and mayonnaise to foods. Eat fewer high-fat foods. Some examples of high-fat foods include french fries, doughnuts, ice cream, and cakes. · Eat fewer sweets. Limit foods and drinks that are high in sugar. This includes candy, cookies, regular soda, and sweetened drinks. Exercise:  Exercise at least 30 minutes per day on most days of the week. Some examples of exercise include walking, biking, dancing, and swimming. You can also fit in more physical activity by taking the stairs instead of the elevator or parking farther away from stores. Ask your healthcare provider about the best exercise plan for you. © Copyright [employer] 2018 Information is for End User's use only and may not be sold, redistributed or otherwise used for commercial purposes.  All illustrations and images included in CareNotes® are the copyrighted property of [employer]M., Inc. or  [employer]

## 2023-09-11 NOTE — ASSESSMENT & PLAN NOTE
Wellness exam done. Had COVID vaccines, prevnar 13, and pneumovacc 23. Recommend Tdap and Shingrix. Had colonoscopy in 2015 and no polyps. Had mammogram in July 2023 and ok. Last Dexa was in 2022. Had recent fall. Mood ok.

## 2023-09-11 NOTE — ASSESSMENT & PLAN NOTE
BP good. Continue metoprolol 25 mg bid. Pt advised to continue low Na diet and to exercise on a regular basis.

## 2023-09-11 NOTE — TELEPHONE ENCOUNTER
New order placed for in home PT services as  VNA only does not service [place]. [facility] who services [place] will not be able to get home services auth through [facility] as patient does not meet Medicare's medically homebound criteria. I have placed a PT rx and sent this to [place] with  [facility] as Individuals do not need to be confined to the home to qualify for our services. As long as an individual demonstrates medical necessity for physical, occupational and/or speech therapy services, [facility] may be covered by Medicare Part B and other private insurance. They will contact our office with any questions/issues and will reach out to patient for scheduling.     O:[phone]

## 2023-09-11 NOTE — ASSESSMENT & PLAN NOTE
Recheck. Not on atorvastatin 80 mg daily at bedtime at present. Pt takes fish oil 3600 mg qd. Advised pt to follow a low cholesterol diet and to exercise on a regular basis.

## 2023-09-11 NOTE — PROGRESS NOTES
Assessment and Plan:     Problem List Items Addressed This Visit        Endocrine    Type 2 diabetes mellitus without complication, without long-term current use of insulin (Formerly [facility])     A1C done today and was 5.9. Continue victoza 1.2 mg qd and metformin 1000 mg bid. Advised pt to follow a low carb diet and to exercise on a regular basis. Foot exam done in Feb 2023. Had eye exam in June 2023 by Dr Osmany Kemp. Urine albumin/creatinine ratio done in Feb 2023. Lab Results   Component Value Date    HGBA1C 6.0 02/21/2023     Lab Results   Component Value Date    HGBA1C 6.0 02/21/2023            Relevant Orders    POCT hemoglobin A1c (Completed)    Comprehensive metabolic panel    Lipid Panel with Direct LDL reflex    Hypothyroidism     Recheck TSH and Free T4. Continue levothyroxine 88 mcg qd. Relevant Orders    TSH, 3rd generation    T4, free       Cardiovascular and Mediastinum    Essential hypertension     BP good. Continue metoprolol 25 mg bid. Pt advised to continue low Na diet and to exercise on a regular basis. Relevant Orders    Comprehensive metabolic panel    CBC and Platelet    Lipid Panel with Direct LDL reflex    Coronary artery disease involving native coronary artery of native heart without angina pectoris     Pt had CABG x 2 on 2/8/23. No chest pain or shortness of breath. Continue current meds. Pt saw Cardiology in Sept 2023. Relevant Orders    Comprehensive metabolic panel    CBC and Platelet    Lipid Panel with Direct LDL reflex       Musculoskeletal and Integument    Osteopenia of necks of both femurs     Last dexascan was in March 2022 and had slight decrease in density. Pt advised to take calcium 1200 mg qd and Vit D 1000 U qd. Pt also advised to perform weight-bearing exercise on a regular basis. Recheck dexascan in March 2024. Closed fracture of right proximal humerus     Patient fell on 9/2 and seen in ER. Had x-rays and arm placed in sling.  Patient saw [facility] on 9/8 and will follow with them. Patient has pain right arm. Taking tylenol or advil for pain. Other    Mixed dyslipidemia     Recheck. Not on atorvastatin 80 mg daily at bedtime at present. Pt takes fish oil 3600 mg qd. Advised pt to follow a low cholesterol diet and to exercise on a regular basis. Relevant Orders    Comprehensive metabolic panel    Lipid Panel with Direct LDL reflex    Medicare annual wellness visit, subsequent - Primary     Wellness exam done. Had COVID vaccines, prevnar 13, and pneumovacc 23. Recommend Tdap and Shingrix. Had colonoscopy in 2015 and no polyps. Had mammogram in July 2023 and ok. Last Dexa was in 2022. Had recent fall. Mood ok. Falls Plan of Care: balance, strength, and gait training instructions were provided. Preventive health issues were discussed with patient, and age appropriate screening tests were ordered as noted in patient's After Visit Summary. Personalized health advice and appropriate referrals for health education or preventive services given if needed, as noted in patient's After Visit Summary. History of Present Illness:     Patient presents for a Medicare Wellness Visit    Patient here for follow-up Hypertension, Hyperlipidemia, Coronary Artery Disease, DM, Hypothyroidism, Osteopenia. Patient also due for wellness exam. Doing ok but fell last week and broke right humerus. Patient saw Ortho and has arm in sling. Has pain in right arm. Patient has right knee pain as well. Blood pressure has been ok. Sugars up and down. Saw Cardiology last week and no change in medications. Doesn't want flu shot today.       Patient Care Team:  Jose Luis Isaac MD as PCP - Darene Innocent, MD Gwenda Osgood, MD as Surgeon (Surgical Oncology)  Sancho Garza MD (Obstetrics and Gynecology)  Chavez Hercules MD as Medical Oncologist (Hematology)  Carmen Edouard DO (Cardiology)     Review of Systems:     Review of Systems   Constitutional: Negative for fatigue and unexpected weight change. Respiratory: Negative for cough, chest tightness and shortness of breath. Cardiovascular: Negative for chest pain and palpitations. Gastrointestinal: Negative for abdominal pain, constipation, diarrhea, nausea and vomiting. Genitourinary: Negative for difficulty urinating. Musculoskeletal: Positive for arthralgias and gait problem. Skin: Negative for rash. Neurological: Negative for dizziness and headaches.         Problem List:     Patient Active Problem List   Diagnosis   • Coronary artery disease involving native coronary artery of native heart without angina pectoris   • Type 2 diabetes mellitus without complication, without long-term current use of insulin (HCC)   • Mixed dyslipidemia   • Essential hypertension   • Abnormal carotid ultrasound   • Hypothyroidism   • Spinal stenosis of lumbar region   • Osteopenia of necks of both femurs   • 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HCC)   • S/P lumbar fusion   • Bilateral leg edema   • Medicare annual wellness visit, subsequent   • 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, estrogen receptor positive ([address])   • Class 2 obesity in adult   • Monoallelic mutation of CHEK2 gene in female patient   • Use of anastrozole (Arimidex)   • Neuropathy   • Non-ST elevation myocardial infarction (NSTEMI) ([address])   • History of PTCA   • H/O heart artery stent   • Colon polyp   • S/P CABG x 2   • Platelets decreased ([address])   • Closed fracture of right proximal humerus      Past Medical and Surgical History:     Past Medical History:   Diagnosis Date   • Abdominal pain     LLQ on occasion   • Angina pectoris ([address])    • Arthritis    • Breast cancer ([address]) 07/01/2021   • Cancer ([address])     breast left   • Colon polyp    • Constipation     at times   • Coronary artery disease    • Diabetes mellitus ([address])    • Diarrhea     at times   • Disease of thyroid gland    • Hemorrhoids    • Hypercholesterolemia    • Hypertension    • Neuropathy     both legs and feet   • Obesity    • Osteoporosis    • Otitis media    • Ovarian ca [facility]) 1997   • 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[address])    • Shingles    • 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 Kala Sewell. Up to date with her colonoscopy. • COLONOSCOPY     • CORONARY STENT PLACEMENT      Dec 2015   • EYE SURGERY      cataract surgery   • GALLBLADDER SURGERY     • HYSTERECTOMY  1989   • MAMMO (HISTORICAL)      up to date. see gyn   • MASTECTOMY Left 07/13/2021   • MASTECTOMY W/ SENTINEL NODE BIOPSY Left 07/13/2021    Procedure: BREAST ROSLYN  DIRECTED MASTECTOMY, SENTINEL LYMPH NODE BIOPSY, LYMPHATIC MAPPING WITH BLUE DYE AND RADIOACTIVE DYE (INJECT AT 1500 BY DR ANDREWS IN THE OR);   Surgeon: Kyra Schroeder MD;  Location[facility];  Service: Surgical Oncology   • OOPHORECTOMY Bilateral 1997   • AK CORONARY ARTERY BYP W/VEIN & ARTERY GRAFT 2 VEIN N/A 2/8/2023    Procedure: CORONARY ARTERY BYPASS GRAFT (CABG) X 2 VESSELS GSV-->OM1, LIMA-->LAD; EVH  LEFT LEG w/ RADHA;  Surgeon: Stuart Tyler MD;  Location: [facility];  Service: Cardiac Surgery   • SPINE SURGERY     • THYROIDECTOMY     • UPPER GASTROINTESTINAL ENDOSCOPY     • US BREAST NEEDLE LOC LEFT Left 05/06/2021   • US GUIDANCE BREAST BIOPSY LEFT EACH ADDITIONAL Left 05/06/2021   • US GUIDED BREAST BIOPSY LEFT COMPLETE Left 03/15/2021   • US GUIDED BREAST BIOPSY LEFT COMPLETE Left 05/06/2021      Family History: Family History   Problem Relation Age of Onset   • Diabetes Mother    • Heart disease Mother    • Dementia Mother    • Esophageal cancer Father 61   • Endometrial cancer Sister 76   • Asthma Sister    • Cancer Sister    • No Known Problems Sister    • No Known Problems Sister    • No Known Problems Sister    • Asthma Daughter    • No Known Problems Maternal Grandmother    • Prostate cancer Maternal Grandfather    • No Known Problems Paternal Grandmother    • Prostate cancer Paternal Grandfather    • Stomach cancer Brother 70   • Multiple myeloma Brother 67   • Cancer Brother    • Asthma Son    • Depression Son    • Breast cancer Maternal Aunt 50   • No Known Problems Paternal Aunt    • No Known Problems Paternal Aunt    • Breast cancer Other 39   • Breast cancer Other 39   • Diabetes Family    • Cancer Family    • Arthritis Family    • Heart disease Family       Social History:     Social History     Socioeconomic History   • Marital status:      Spouse name: None   • Number of children: None   • Years of education: None   • Highest education level: None   Occupational History   • None   Tobacco Use   • Smoking status: Never   • Smokeless tobacco: Never   Vaping Use   • Vaping Use: Never used   Substance and Sexual Activity   • Alcohol use: Never   • Drug use: Never   • Sexual activity: Not Currently     Partners: Male     Birth control/protection: Post-menopausal, None   Other Topics Concern   • None   Social History Narrative    · Tobacco smoking status:   Never smoker      This question's title has changed from "Smoking status" to "Tobacco smoking status" with the 19.7 update. Please use this field only for documenting tobacco smoking behavior. To accommodate this change, new fields for documenting smokeless tobacco and e-cigarette/vape usage have been added.      · Smoking - how much          None  1 PPW  2 PPW  1/4 PPD  1/2 PPD  1 PPD  1 1/2 PPD  2 PPD  3+ PPD          NOTE     · Smokeless tobacco status          Never used smokeless tobacco  Former smokeless tobacco user  Current snuff user  Currently chews tobacco  Currently uses moist powdered tobacco  ----  Not indicated  Not tolerated  Patient refused          NOTE     · Tobacco-years of use               NOTE     · E-cigarette/vape status          Never used electronic cigarettes  Former user of electronic cigarettes  Current user of electronic cigarettes          NOTE     · Most recent tobacco use screenin2018      · Alcohol intake:   None         Per laine     Social Determinants of Health     Financial Resource Strain: Low Risk  (2023)    Overall Financial Resource Strain (CARDIA)    • Difficulty of Paying Living Expenses: Not hard at all   Food Insecurity: No Food Insecurity (2023)    Hunger Vital Sign    • Worried About Running Out of Food in the Last Year: Never true    • Ran Out of Food in the Last Year: Never true   Transportation Needs: No Transportation Needs (2023)    PRAPARE - Transportation    • Lack of Transportation (Medical): No    • Lack of Transportation (Non-Medical):  No   Physical Activity: Not on file   Stress: Not on file   Social Connections: Not on file   Intimate Partner Violence: Not on file   Housing Stability: Low Risk  (2023)    Housing Stability Vital Sign    • Unable to Pay for Housing in the Last Year: No    • Number of Places Lived in the Last Year: 1    • Unstable Housing in the Last Year: No      Medications and Allergies:     Current Outpatient Medications   Medication Sig Dispense Refill   • ALPRAZolam (XANAX) 0.25 mg tablet Take 1 tablet (0.25 mg total) by mouth 3 (three) times a day as needed for anxiety 20 tablet 0   • anastrozole (ARIMIDEX) 1 mg tablet Take 1 tablet (1 mg total) by mouth daily 90 tablet 1   • aspirin 325 mg tablet Take 1 tablet (325 mg total) by mouth daily Do not start before 2023. 30 tablet 2   • Aspirin 81 MG CAPS 2 (two) times a day     • clotrimazole-betamethasone (LOTRISONE) 1-0.05 % cream Apply topically 2 (two) times a day 45 g 3   • Insulin Pen Needle (Sure Comfort Pen Needles) 31G X 5 MM MISC by Does not apply route daily 100 each 3   • Lancets (OneTouch Delica Plus CHCKGR41E) MISC Use once a day to check blood sugar 100 each 3   • levothyroxine 88 mcg tablet Take 1 tablet (88 mcg total) by mouth daily 90 tablet 1   • liraglutide (VICTOZA) injection Inject 1.2 mg under the skin daily at bedtime      • metFORMIN (GLUCOPHAGE) 1000 MG tablet TAKE 1 TABLET(1000 MG) BY MOUTH TWICE DAILY WITH MEALS 180 tablet 1   • metoprolol tartrate (LOPRESSOR) 25 mg tablet TAKE 1/2 TABLET(12.5 MG) BY MOUTH EVERY 12 HOURS 90 tablet 1   • mupirocin (BACTROBAN) 2 % ointment      • OneTouch Ultra test strip Use 1 each daily Use as instructed 100 each 3   • pregabalin (LYRICA) 75 mg capsule Take 1 capsule (75 mg total) by mouth 2 (two) times a day 180 capsule 1   • atorvastatin (LIPITOR) 80 mg tablet Take 1 tablet (80 mg total) by mouth daily with dinner (Patient not taking: Reported on 9/5/2023) 30 tablet 2     No current facility-administered medications for this visit.      Allergies   Allergen Reactions   • Duloxetine Other (See Comments)     Extreme somnolence/lethargy   • Sulfa Antibiotics Rash      Immunizations:     Immunization History   Administered Date(s) Administered   • COVID-19 PFIZER VACCINE 0.3 ML IM 04/02/2021, 04/23/2021, 11/23/2021   • H1N1, All Formulations 01/06/2010   • INFLUENZA 10/14/2010, 10/10/2011, 11/20/2012, 12/31/2012, 11/19/2013, 09/18/2015, 10/03/2016, 11/01/2017, 10/09/2019   • Influenza, high dose seasonal 0.7 mL 10/06/2020, 10/22/2021, 10/10/2022   • Pneumococcal Conjugate 13-Valent 03/19/2015   • Pneumococcal Polysaccharide PPV23 04/18/2019   • influenza, trivalent, adjuvanted 10/09/2019      Health Maintenance:         Topic Date Due   • Hepatitis C Screening  Never done   • Breast Cancer Screening: Mammogram  07/12/2024   • Colorectal Cancer Screening  07/20/2025         Topic Date Due   • COVID-19 Vaccine (4 - Pfizer series) 01/18/2022   • Influenza Vaccine (1) 09/01/2023      Medicare Screening Tests and Risk Assessments:     Mckenzie Hurst is here for her Subsequent Wellness visit. Health Risk Assessment:   Patient rates overall health as good. Patient feels that their physical health rating is same. Patient is dissatisfied with their life. Eyesight was rated as same. Hearing was rated as same. Patient feels that their emotional and mental health rating is slightly worse. Patients states they are often angry. Patient states they are always unusually tired/fatigued. Pain experienced in the last 7 days has been a lot. Patient's pain rating has been 10/10. Patient states that she has experienced weight loss or gain in last 6 months. Fractured shoulder and arm    Fall Risk Screening: In the past year, patient has experienced: history of falling in past year    Number of falls: 1  Injured during fall?: Yes    Feels unsteady when standing or walking?: Yes    Worried about falling?: Yes      Urinary Incontinence Screening:   Patient has not leaked urine accidently in the last six months. Home Safety:  Patient has trouble with stairs inside or outside of their home. Patient has working smoke alarms and has working carbon monoxide detector. Home safety hazards include: none. Nutrition:   Current diet is Regular. Medications:   Patient is not currently taking any over-the-counter supplements. Patient is able to manage medications. Activities of Daily Living (ADLs)/Instrumental Activities of Daily Living (IADLs):   Walk and transfer into and out of bed and chair?: Yes  Dress and groom yourself?: Yes    Bathe or shower yourself?: Yes    Feed yourself?  Yes  Do your laundry/housekeeping?: Yes  Manage your money, pay your bills and track your expenses?: Yes  Make your own meals?: Yes    Do your own shopping?: Yes    Previous Hospitalizations:   Any hospitalizations or ED visits within the last 12 months?: Yes    How many hospitalizations have you had in the last year?: 1-2    Hospitalization Comments: ER for fall    Advance Care Planning:   Living will: Yes    Durable POA for healthcare: Yes    Advanced directive: Yes    Advanced directive counseling given: Yes    Five wishes given: No    Patient declined ACP directive: Yes      Cognitive Screening:   Provider or family/friend/caregiver concerned regarding cognition?: No    PREVENTIVE SCREENINGS      Cardiovascular Screening:    General: Screening Current      Diabetes Screening:     General: Screening Not Indicated and History Diabetes      Colorectal Cancer Screening:     General: Screening Current      Breast Cancer Screening:     General: History Breast Cancer      Cervical Cancer Screening:    General: Screening Not Indicated      Osteoporosis Screening:    General: Screening Current      Abdominal Aortic Aneurysm (AAA) Screening:        General: Screening Not Indicated      Lung Cancer Screening:     General: Screening Not Indicated    Screening, Brief Intervention, and Referral to Treatment (SBIRT)    Screening  Typical number of drinks in a day: 0  Typical number of drinks in a week: 0  Interpretation: Low risk drinking behavior.     AUDIT-C Screenin) How often did you have a drink containing alcohol in the past year? never  2) How many drinks did you have on a typical day when you were drinking in the past year? 0  3) How often did you have 6 or more drinks on one occasion in the past year? never    AUDIT-C Score: 0  Interpretation: Score 0-2 (female): Negative screen for alcohol misuse    Single Item Drug Screening:  How often have you used an illegal drug (including marijuana) or a prescription medication for non-medical reasons in the past year? never    Single Item Drug Screen Score: 0  Interpretation: Negative screen for possible drug use disorder    Brief Intervention  Alcohol & drug use screenings were reviewed. No concerns regarding substance use disorder identified. No results found. Physical Exam:     Resp 20   Ht 5' 1" (1.549 m)   Wt 76.7 kg (169 lb)   BMI 31.93 kg/m²     Physical Exam  Vitals and nursing note reviewed. Constitutional:       General: She is not in acute distress. Appearance: Normal appearance. She is well-developed. Comments: Walks with cane   Neck:      Vascular: No carotid bruit. Cardiovascular:      Rate and Rhythm: Normal rate and regular rhythm. Heart sounds: No murmur heard. Pulmonary:      Effort: Pulmonary effort is normal. No respiratory distress. Breath sounds: Normal breath sounds. Musculoskeletal:      Cervical back: Normal range of motion and neck supple. Right lower leg: No edema. Left lower leg: No edema. Comments: right arm in sling and has decreased ROM. Lymphadenopathy:      Cervical: No cervical adenopathy. Skin:     Comments: Bruising right temple area   Neurological:      Mental Status: She is alert. Psychiatric:         Mood and Affect: Mood normal.         Behavior: Behavior normal.         Thought Content:  Thought content normal.         Judgment: Judgment normal.          Mikey Buckley MD

## 2023-09-11 NOTE — ASSESSMENT & PLAN NOTE
Pt had CABG x 2 on 2/8/23. No chest pain or shortness of breath. Continue current meds. Pt saw Cardiology in Sept 2023.

## 2023-09-11 NOTE — ASSESSMENT & PLAN NOTE
A1C done today and was 5.9. Continue victoza 1.2 mg qd and metformin 1000 mg bid. Advised pt to follow a low carb diet and to exercise on a regular basis. Foot exam done in Feb 2023. Had eye exam in June 2023 by Dr Thania Montes De Oca. Urine albumin/creatinine ratio done in Feb 2023.      Lab Results   Component Value Date    HGBA1C 6.0 02/21/2023     Lab Results   Component Value Date    HGBA1C 6.0 02/21/2023

## 2023-09-11 NOTE — ASSESSMENT & PLAN NOTE
Last dexascan was in March 2022 and had slight decrease in density. Pt advised to take calcium 1200 mg qd and Vit D 1000 U qd. Pt also advised to perform weight-bearing exercise on a regular basis. Recheck dexascan in March 2024.

## 2023-09-22 ENCOUNTER — OFFICE VISIT (OUTPATIENT)
Dept: OBGYN CLINIC | Facility: CLINIC | Age: 78
End: 2023-09-22
Payer: MEDICARE

## 2023-09-22 ENCOUNTER — APPOINTMENT (OUTPATIENT)
Dept: RADIOLOGY | Facility: CLINIC | Age: 78
End: 2023-09-22
Payer: MEDICARE

## 2023-09-22 VITALS — HEIGHT: 61 IN | BODY MASS INDEX: 31.91 KG/M2 | WEIGHT: 169 LBS

## 2023-09-22 DIAGNOSIS — S42.201A CLOSED FRACTURE OF PROXIMAL END OF RIGHT HUMERUS, UNSPECIFIED FRACTURE MORPHOLOGY, INITIAL ENCOUNTER: ICD-10-CM

## 2023-09-22 DIAGNOSIS — S42.201A CLOSED FRACTURE OF PROXIMAL END OF RIGHT HUMERUS, UNSPECIFIED FRACTURE MORPHOLOGY, INITIAL ENCOUNTER: Primary | ICD-10-CM

## 2023-09-22 PROCEDURE — 99213 OFFICE O/P EST LOW 20 MIN: CPT | Performed by: ORTHOPAEDIC SURGERY

## 2023-09-22 PROCEDURE — 73030 X-RAY EXAM OF SHOULDER: CPT

## 2023-09-22 NOTE — PROGRESS NOTES
Orthopedic Sports Medicine New Patient Visit     Assesment:   69 y.o. female with mildly displaced proximal humerus fracture    Plan:    Upon review of updated x-rays today, we discussed that there is no further displacement of the fracture is encouraging and we can continue with non-operative treatment, including sling wear and gentle exercises. She can perform pendulum swings and scapular exercises with her home physical therapist. Continue to avoid any lifting in the arm and overhead reaching. The patient can use ice/heat and over-the-counter medications as needed. Follow up:    Return in about 4 weeks (around 10/20/2023) for New set of x-rays. Chief Complaint   Patient presents with    Right Shoulder - Follow-up       History of Present Illness: The patient is a 69 y.o., right hand dominant, female, presenting for follow up of right proximal humerus fracture.      Shoulder Surgical History:  None    Past Medical, Social and Family History:  Past Medical History:   Diagnosis Date    Abdominal pain     LLQ on occasion    Angina pectoris ([address])     Arthritis     Breast cancer ([address]) 07/01/2021    Cancer ([address])     breast left    Colon polyp     Constipation     at times    Coronary artery disease     Diabetes mellitus (HCC)     Diarrhea     at times    Disease of thyroid gland     Hemorrhoids     Hypercholesterolemia     Hypertension     Neuropathy     both legs and feet    Obesity     Osteoporosis     Otitis media     Ovarian ca ([address]) 1997    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[address])     Shingles     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 Ann Storm. Up to date with her colonoscopy. COLONOSCOPY      CORONARY STENT PLACEMENT      Dec 2015    EYE SURGERY      cataract surgery    GALLBLADDER SURGERY      HYSTERECTOMY  1989    MAMMO (HISTORICAL)      up to date. see gyn    MASTECTOMY Left 07/13/2021    MASTECTOMY W/ SENTINEL NODE BIOPSY Left 07/13/2021    Procedure: BREAST ROSLYN  DIRECTED MASTECTOMY, SENTINEL LYMPH NODE BIOPSY, LYMPHATIC MAPPING WITH BLUE DYE AND RADIOACTIVE DYE (INJECT AT 1500 BY DR ANDREWS IN THE OR);   Surgeon: Meagan Jade MD;  [facility];  Service: Surgical Oncology    OOPHORECTOMY Bilateral 1997    [place] CORONARY ARTERY BYP W/VEIN & ARTERY GRAFT 2 VEIN N/A 2/8/2023    Procedure: CORONARY ARTERY BYPASS GRAFT (CABG) X 2 VESSELS GSV-->OM1, LIMA-->LAD; EVH  LEFT LEG w/ RADHA;  Surgeon: Vaishali Timmons MD;  [facility]: [facility];  Service: Cardiac Surgery    SPINE SURGERY      THYROIDECTOMY      UPPER GASTROINTESTINAL ENDOSCOPY      US BREAST NEEDLE LOC LEFT Left 05/06/2021    US GUIDANCE BREAST BIOPSY LEFT EACH ADDITIONAL Left 05/06/2021    US GUIDED BREAST BIOPSY LEFT COMPLETE Left 03/15/2021    US GUIDED BREAST BIOPSY LEFT COMPLETE Left 05/06/2021     Allergies   Allergen Reactions    Duloxetine Other (See Comments)     Extreme somnolence/lethargy    Sulfa Antibiotics Rash     Current Outpatient Medications on File Prior to Visit   Medication Sig Dispense Refill    ALPRAZolam (XANAX) 0.25 mg tablet Take 1 tablet (0.25 mg total) by mouth 3 (three) times a day as needed for anxiety 20 tablet 0    anastrozole (ARIMIDEX) 1 mg tablet Take 1 tablet (1 mg total) by mouth daily 90 tablet 1    aspirin 325 mg tablet Take 1 tablet (325 mg total) by mouth daily Do not start before February 12, 2023. 30 tablet 2    Aspirin 81 MG CAPS 2 (two) times a day      clotrimazole-betamethasone (LOTRISONE) 1-0.05 % cream Apply topically 2 (two) times a day 45 g 3    Insulin Pen Needle (Sure Comfort Pen Needles) 31G X 5 MM MISC by Does not apply route daily 100 each 3    Lancets (OneTouch Delica Plus [id number]) MISC Use once a day to check blood sugar 100 each 3    levothyroxine 88 mcg tablet Take 1 tablet (88 mcg total) by mouth daily 90 tablet 1    liraglutide (VICTOZA) injection Inject 1.2 mg under the skin daily at bedtime       metFORMIN (GLUCOPHAGE) 1000 MG tablet TAKE 1 TABLET(1000 MG) BY MOUTH TWICE DAILY WITH MEALS 180 tablet 1    metoprolol tartrate (LOPRESSOR) 25 mg tablet TAKE 1/2 TABLET(12.5 MG) BY MOUTH EVERY 12 HOURS 90 tablet 1    mupirocin (BACTROBAN) 2 % ointment       OneTouch Ultra test strip Use 1 each daily Use as instructed 100 each 3    pregabalin (LYRICA) 75 mg capsule Take 1 capsule (75 mg total) by mouth 2 (two) times a day 180 capsule 1    atorvastatin (LIPITOR) 80 mg tablet Take 1 tablet (80 mg total) by mouth daily with dinner (Patient not taking: Reported on 9/5/2023) 30 tablet 2     No current facility-administered medications on file prior to visit. Social History     Socioeconomic History    Marital status:      Spouse name: Not on file    Number of children: Not on file    Years of education: Not on file    Highest education level: Not on file   Occupational History    Not on file   Tobacco Use    Smoking status: Never    Smokeless tobacco: Never   Vaping Use    Vaping Use: Never used   Substance and Sexual Activity    Alcohol use: Never    Drug use: Never    Sexual activity: Not Currently     Partners: Male     Birth control/protection: Post-menopausal, None   Other Topics Concern    Not on file   Social History Narrative    · Tobacco smoking status:   Never smoker      This question's title has changed from "Smoking status" to "Tobacco smoking status" with the 19.7 update. Please use this field only for documenting tobacco smoking behavior.  To accommodate this change, new fields for documenting smokeless tobacco and e-cigarette/vape usage have been added. · Smoking - how much          None  1 PPW  2 PPW  1/4 PPD  1/2 PPD  1 PPD  1 1/2 PPD  2 PPD  3+ PPD          NOTE     · Smokeless tobacco status          Never used smokeless tobacco  Former smokeless tobacco user  Current snuff user  Currently chews tobacco  Currently uses moist powdered tobacco  ----  Not indicated  Not tolerated  Patient refused          NOTE     · Tobacco-years of use               NOTE     · E-cigarette/vape status          Never used electronic cigarettes  Former user of electronic cigarettes  Current user of electronic cigarettes          NOTE     · Most recent tobacco use screenin2018      · Alcohol intake:   None         Per laine     Social Determinants of Health     Financial Resource Strain: Low Risk  (2023)    Overall Financial Resource Strain (CARDIA)     Difficulty of Paying Living Expenses: Not hard at all   Food Insecurity: No Food Insecurity (2023)    Hunger Vital Sign     Worried About Running Out of Food in the Last Year: Never true     Ran Out of Food in the Last Year: Never true   Transportation Needs: No Transportation Needs (2023)    PRAPARE - Transportation     Lack of Transportation (Medical): No     Lack of Transportation (Non-Medical): No   Physical Activity: Not on file   Stress: Not on file   Social Connections: Not on file   Intimate Partner Violence: Not on file   Housing Stability: Low Risk  (2023)    Housing Stability Vital Sign     Unable to Pay for Housing in the Last Year: No     Number of Places Lived in the Last Year: 1     Unstable Housing in the Last Year: No         I have reviewed the past medical, surgical, social and family history, medications and allergies as documented in the EMR. Review of systems: ROS is negative other than that noted in the HPI. Constitutional: Negative for fatigue and fever.    HENT: Negative for sore throat. Respiratory: Negative for shortness of breath. Cardiovascular: Negative for chest pain. Gastrointestinal: Negative for abdominal pain. Endocrine: Negative for cold intolerance and heat intolerance. Genitourinary: Negative for flank pain. Musculoskeletal: Negative for back pain. Skin: Negative for rash. Allergic/Immunologic: Negative for immunocompromised state. Neurological: Negative for dizziness. Psychiatric/Behavioral: Negative for agitation. Physical Exam:    Height 5' 1" (1.549 m), weight 76.7 kg (169 lb), currently breastfeeding. General/Constitutional: NAD, well developed, well nourished  HENT: Normocephalic, atraumatic  CV: Intact distal pulses, regular rate  Resp: No respiratory distress or labored breathing  Abdomen: soft, nondistended, non tender   Lymphatic: No lymphadenopathy palpated  Neuro: Alert and Oriented x 3, no focal deficits  Psych: Normal mood, normal affect  Skin: Warm, dry, no rashes, no erythema      Shoulder Exam:      Inspection: Healing ecchymosis throughout the RUE. No edema or deformity. No visualized wounds or skin lesions   Palpation: tenderness at the fracture site  Active Motion: not assessed secondary to known fracture  Strength: not assessed secondary to known fracture  Sensory - SILT in the Radial / Ulnar / Median / Axillary nerve distributions  Motor - AIN / PIN / Radial / Ulnar / Median / Axillary motor nerves in tact  Palpable Radial pulse  Cap refill <2secs in all digits        Imaging    I reviewed and interpreted X-rays of the right shoulder which show stable alignment of mildly displaced proximal humerus fracture with inferior subluxation of the humeral head in relation to the glenoid. Calcific tendinitis. Mild AC joint degenerative changes.

## 2023-10-20 ENCOUNTER — APPOINTMENT (OUTPATIENT)
Dept: RADIOLOGY | Facility: CLINIC | Age: 78
End: 2023-10-20
Payer: MEDICARE

## 2023-10-20 ENCOUNTER — OFFICE VISIT (OUTPATIENT)
Dept: OBGYN CLINIC | Facility: CLINIC | Age: 78
End: 2023-10-20
Payer: MEDICARE

## 2023-10-20 VITALS — BODY MASS INDEX: 31.91 KG/M2 | HEIGHT: 61 IN | WEIGHT: 169 LBS

## 2023-10-20 DIAGNOSIS — S42.201A CLOSED FRACTURE OF PROXIMAL END OF RIGHT HUMERUS, UNSPECIFIED FRACTURE MORPHOLOGY, INITIAL ENCOUNTER: Primary | ICD-10-CM

## 2023-10-20 DIAGNOSIS — S42.201A CLOSED FRACTURE OF PROXIMAL END OF RIGHT HUMERUS, UNSPECIFIED FRACTURE MORPHOLOGY, INITIAL ENCOUNTER: ICD-10-CM

## 2023-10-20 PROCEDURE — 73030 X-RAY EXAM OF SHOULDER: CPT

## 2023-10-20 PROCEDURE — 99213 OFFICE O/P EST LOW 20 MIN: CPT | Performed by: ORTHOPAEDIC SURGERY

## 2023-10-24 NOTE — PROGRESS NOTES
Orthopedic Sports Medicine New Patient Visit     Assesment:   69 y.o. female with mildly displaced proximal humerus fracture    Plan:    The patient continues to have improvements in healing and some function. She does have significant pain about the shoulder still. The x-rays show stable alignment with interval healing as well as osteoarthritic changes and chronic calcifications in the rotator cuff. She continues to want to avoid any surgery and attempt to return to normal function. I provided additional self-directed exercises to do at home. We may progress into physical therapy if possible after next visit. She prefers home exercise at this time. Continue Tylenol as needed for pain. Follow up:    Return in about 6 weeks (around 12/1/2023). Chief Complaint   Patient presents with    Right Shoulder - Follow-up       History of Present Illness: The patient is a 69 y.o., right hand dominant, female, presenting for follow up of right proximal humerus fracture.      Shoulder Surgical History:  None    Past Medical, Social and Family History:  Past Medical History:   Diagnosis Date    Abdominal pain     LLQ on occasion    Angina pectoris ([address])     Arthritis     Breast cancer ([address]) 07/01/2021    Cancer ([address])     breast left    Colon polyp     Constipation     at times    Coronary artery disease     Diabetes mellitus (HCC)     Diarrhea     at times    Disease of thyroid gland     Hemorrhoids     Hypercholesterolemia     Hypertension     Neuropathy     both legs and feet    Obesity     Osteoporosis     Otitis media     Ovarian ca ([address]) 1997    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 Sallye Apgar. Up to date with her colonoscopy. COLONOSCOPY      CORONARY STENT PLACEMENT      Dec 2015    EYE SURGERY      cataract surgery    GALLBLADDER SURGERY      HYSTERECTOMY      MAMMO (HISTORICAL)      up to date. see gyn    MASTECTOMY Left 2021    MASTECTOMY W/ SENTINEL NODE BIOPSY Left 2021    Procedure: BREAST ROSLYN  DIRECTED MASTECTOMY, SENTINEL LYMPH NODE BIOPSY, LYMPHATIC MAPPING WITH BLUE DYE AND RADIOACTIVE DYE (INJECT AT 1500 BY DR ANDREWS IN THE OR);   Surgeon: Verna Ho MD;  [facility];  Service: Surgical Oncology    OOPHORECTOMY Bilateral     [place] CORONARY ARTERY BYP W/VEIN & ARTERY GRAFT 2 VEIN N/A 2023    Procedure: CORONARY ARTERY BYPASS GRAFT (CABG) X 2 VESSELS GSV-->OM1, LIMA-->LAD; EVH  LEFT LEG w/ RADHA;  Surgeon: Mally Hummel MD;  [facility];  Service: Cardiac Surgery    SPINE SURGERY      THYROIDECTOMY      UPPER GASTROINTESTINAL ENDOSCOPY      US BREAST NEEDLE LOC LEFT Left 2021    US GUIDANCE BREAST BIOPSY LEFT EACH ADDITIONAL Left 2021    US GUIDED BREAST BIOPSY LEFT COMPLETE Left 03/15/2021    US GUIDED BREAST BIOPSY LEFT COMPLETE Left 2021     Allergies   Allergen Reactions    Duloxetine Other (See Comments)     Extreme somnolence/lethargy    Sulfa Antibiotics Rash     Current Outpatient Medications on File Prior to Visit   Medication Sig Dispense Refill    ALPRAZolam (XANAX) 0.25 mg tablet Take 1 tablet (0.25 mg total) by mouth 3 (three) times a day as needed for anxiety 20 tablet 0    anastrozole (ARIMIDEX) 1 mg tablet Take 1 tablet (1 mg total) by mouth daily 90 tablet 1    aspirin 325 mg tablet Take 1 tablet (325 mg total) by mouth daily Do not start before  2023. 30 tablet 2    Aspirin 81 MG CAPS 2 (two) times a day      atorvastatin (LIPITOR) 80 mg tablet Take 1 tablet (80 mg total) by mouth daily with dinner (Patient not taking: Reported on 9/5/2023) 30 tablet 2    clotrimazole-betamethasone (LOTRISONE) 1-0.05 % cream Apply topically 2 (two) times a day 45 g 3    Insulin Pen Needle (Sure Comfort Pen Needles) 31G X 5 MM MISC by Does not apply route daily 100 each 3    Lancets (OneTouch Delica Plus [id number]) MISC Use once a day to check blood sugar 100 each 3    levothyroxine 88 mcg tablet Take 1 tablet (88 mcg total) by mouth daily 90 tablet 1    liraglutide (VICTOZA) injection Inject 1.2 mg under the skin daily at bedtime       metFORMIN (GLUCOPHAGE) 1000 MG tablet TAKE 1 TABLET(1000 MG) BY MOUTH TWICE DAILY WITH MEALS 180 tablet 1    metoprolol tartrate (LOPRESSOR) 25 mg tablet TAKE 1/2 TABLET(12.5 MG) BY MOUTH EVERY 12 HOURS 90 tablet 1    mupirocin (BACTROBAN) 2 % ointment       OneTouch Ultra test strip Use 1 each daily Use as instructed 100 each 3    pregabalin (LYRICA) 75 mg capsule Take 1 capsule (75 mg total) by mouth 2 (two) times a day 180 capsule 1     No current facility-administered medications on file prior to visit. Social History     Socioeconomic History    Marital status:       Spouse name: Not on file    Number of children: Not on file    Years of education: Not on file    Highest education level: Not on file   Occupational History    Not on file   Tobacco Use    Smoking status: Never    Smokeless tobacco: Never   Vaping Use    Vaping Use: Never used   Substance and Sexual Activity    Alcohol use: Never    Drug use: Never    Sexual activity: Not Currently     Partners: Male     Birth control/protection: Post-menopausal, None   Other Topics Concern    Not on file   Social History Narrative    · Tobacco smoking status:   Never smoker      This question's title has changed from "Smoking status" to "Tobacco smoking status" with the 19.7 update. Please use this field only for documenting tobacco smoking behavior. To accommodate this change, new fields for documenting smokeless tobacco and e-cigarette/vape usage have been added. · Smoking - how much          None  1 PPW  2 PPW  1/4 PPD  1/2 PPD  1 PPD  1 1/2 PPD  2 PPD  3+ PPD          NOTE     · Smokeless tobacco status          Never used smokeless tobacco  Former smokeless tobacco user  Current snuff user  Currently chews tobacco  Currently uses moist powdered tobacco  ----  Not indicated  Not tolerated  Patient refused          NOTE     · Tobacco-years of use               NOTE     · E-cigarette/vape status          Never used electronic cigarettes  Former user of electronic cigarettes  Current user of electronic cigarettes          NOTE     · Most recent tobacco use screenin2018      · Alcohol intake:   None         Per laine     Social Determinants of Health     Financial Resource Strain: Low Risk  (2023)    Overall Financial Resource Strain (CARDIA)     Difficulty of Paying Living Expenses: Not hard at all   Food Insecurity: No Food Insecurity (2023)    Hunger Vital Sign     Worried About Running Out of Food in the Last Year: Never true     Ran Out of Food in the Last Year: Never true   Transportation Needs: No Transportation Needs (2023)    PRAPARE - Transportation     Lack of Transportation (Medical): No     Lack of Transportation (Non-Medical): No   Physical Activity: Not on file   Stress: Not on file   Social Connections: Not on file   Intimate Partner Violence: Not on file   Housing Stability: Low Risk  (2023)    Housing Stability Vital Sign     Unable to Pay for Housing in the Last Year: No     Number of Places Lived in the Last Year: 1     Unstable Housing in the Last Year: No         I have reviewed the past medical, surgical, social and family history, medications and allergies as documented in the EMR.     Review of systems: ROS is negative other than that noted in the HPI. Constitutional: Negative for fatigue and fever. HENT: Negative for sore throat. Respiratory: Negative for shortness of breath. Cardiovascular: Negative for chest pain. Gastrointestinal: Negative for abdominal pain. Endocrine: Negative for cold intolerance and heat intolerance. Genitourinary: Negative for flank pain. Musculoskeletal: Negative for back pain. Skin: Negative for rash. Allergic/Immunologic: Negative for immunocompromised state. Neurological: Negative for dizziness. Psychiatric/Behavioral: Negative for agitation. Physical Exam:    Height 5' 1" (1.549 m), weight 76.7 kg (169 lb), currently breastfeeding. General/Constitutional: NAD, well developed, well nourished  HENT: Normocephalic, atraumatic  CV: Intact distal pulses, regular rate  Resp: No respiratory distress or labored breathing  Abdomen: soft, nondistended, non tender   Lymphatic: No lymphadenopathy palpated  Neuro: Alert and Oriented x 3, no focal deficits  Psych: Normal mood, normal affect  Skin: Warm, dry, no rashes, no erythema      Shoulder Exam:      Inspection: Healing ecchymosis throughout the RUE. No edema or deformity.  No visualized wounds or skin lesions   Palpation: Improving tenderness at the fracture site  Active Motion: Improving but still limited by pain  Strength: not assessed secondary to known fracture  Sensory - SILT in the Radial / Ulnar / Median / Axillary nerve distributions  Motor - AIN / PIN / Radial / Ulnar / Median / Axillary motor nerves in tact  Palpable Radial pulse  Cap refill <2secs in all digits        Imaging    I reviewed and interpreted X-rays of the right shoulder which show stable alignment of mildly displaced proximal humerus fracture with interval healing

## 2023-10-30 DIAGNOSIS — Z17.0 MALIGNANT NEOPLASM OF OVERLAPPING SITES OF LEFT BREAST IN FEMALE, ESTROGEN RECEPTOR POSITIVE: ICD-10-CM

## 2023-10-30 DIAGNOSIS — G62.9 NEUROPATHY: ICD-10-CM

## 2023-10-30 DIAGNOSIS — C50.812 MALIGNANT NEOPLASM OF OVERLAPPING SITES OF LEFT BREAST IN FEMALE, ESTROGEN RECEPTOR POSITIVE: ICD-10-CM

## 2023-10-30 RX ORDER — ANASTROZOLE 1 MG/1
1 TABLET ORAL DAILY
Qty: 90 TABLET | Refills: 3 | Status: SHIPPED | OUTPATIENT
Start: 2023-10-30

## 2023-10-30 RX ORDER — PREGABALIN 75 MG/1
75 CAPSULE ORAL 2 TIMES DAILY
Qty: 180 CAPSULE | Refills: 1 | Status: SHIPPED | OUTPATIENT
Start: 2023-10-30

## 2023-10-30 NOTE — TELEPHONE ENCOUNTER
Reason for call:   [x] Refill   [] Prior Auth  [] Other:     Office:   [x] PCP/Provider - Bobo Curry  [] Specialty/Provider -     Medication: Pregabalin     Dose/Frequency: 75 mg twice a day     Quantity: 180    [facility]    Does the patient have enough for 3 days?    [x] Yes   [] No - Send as HP to POD

## 2023-11-02 DIAGNOSIS — E11.9 TYPE 2 DIABETES MELLITUS WITHOUT COMPLICATION, WITHOUT LONG-TERM CURRENT USE OF INSULIN (HCC): ICD-10-CM

## 2023-11-02 RX ORDER — BLOOD SUGAR DIAGNOSTIC
1 STRIP MISCELLANEOUS DAILY
Qty: 100 EACH | Refills: 3 | Status: SHIPPED | OUTPATIENT
Start: 2023-11-02

## 2023-11-02 RX ORDER — LANCETS 33 GAUGE
EACH MISCELLANEOUS
Qty: 100 EACH | Refills: 3 | Status: SHIPPED | OUTPATIENT
Start: 2023-11-02

## 2023-11-02 NOTE — TELEPHONE ENCOUNTER
Reason for call:   [x] Refill   [] Prior Auth  [] Other:     Office:   [x] PCP/Provider - Dr Osman Mir  [] Specialty/Provider -     Medication: One touch lancets, One touch ultra test strips, [id number]      Pharmacy:   [facility]    Does the patient have enough for 3 days?    [] Yes   [x] No - Send as HP to POD

## 2023-11-10 PROBLEM — Z00.00 MEDICARE ANNUAL WELLNESS VISIT, SUBSEQUENT: Status: RESOLVED | Noted: 2021-02-09 | Resolved: 2023-11-10

## 2023-11-13 DIAGNOSIS — E03.9 ACQUIRED HYPOTHYROIDISM: ICD-10-CM

## 2023-11-13 RX ORDER — LEVOTHYROXINE SODIUM 88 UG/1
88 TABLET ORAL DAILY
Qty: 90 TABLET | Refills: 0 | Status: SHIPPED | OUTPATIENT
Start: 2023-11-13

## 2023-11-13 NOTE — TELEPHONE ENCOUNTER
Patient needs updated blood work and has previously placed orders. Please contact patient to go for labs.   TSH

## 2023-11-13 NOTE — TELEPHONE ENCOUNTER
Pt would like script to be sent to [address] [place] [address]     Reason for call:   [x] Refill   [] Prior Auth  [] Other:     Office:   [x] PCP/Provider -   [] Specialty/Provider -     Medication: levothyroxine     Dose/Frequency: 88 mcg/ daily     Quantity: 90 day supply     Pharmacy: [address] [place] [address]     Does the patient have enough for 3 days?    [x] Yes   [] No - Send as HP to POD

## 2023-12-01 ENCOUNTER — OFFICE VISIT (OUTPATIENT)
Dept: OBGYN CLINIC | Facility: CLINIC | Age: 78
End: 2023-12-01
Payer: MEDICARE

## 2023-12-01 ENCOUNTER — APPOINTMENT (OUTPATIENT)
Dept: RADIOLOGY | Facility: CLINIC | Age: 78
End: 2023-12-01
Payer: MEDICARE

## 2023-12-01 VITALS — WEIGHT: 169 LBS | HEIGHT: 61 IN | BODY MASS INDEX: 31.91 KG/M2

## 2023-12-01 DIAGNOSIS — S42.201A CLOSED FRACTURE OF PROXIMAL END OF RIGHT HUMERUS, UNSPECIFIED FRACTURE MORPHOLOGY, INITIAL ENCOUNTER: Primary | ICD-10-CM

## 2023-12-01 DIAGNOSIS — S42.201A CLOSED FRACTURE OF PROXIMAL END OF RIGHT HUMERUS, UNSPECIFIED FRACTURE MORPHOLOGY, INITIAL ENCOUNTER: ICD-10-CM

## 2023-12-01 PROCEDURE — 99212 OFFICE O/P EST SF 10 MIN: CPT | Performed by: ORTHOPAEDIC SURGERY

## 2023-12-01 PROCEDURE — 73030 X-RAY EXAM OF SHOULDER: CPT

## 2023-12-01 NOTE — PROGRESS NOTES
Orthopedic Sports Medicine Follow-up Patient Visit     Assesment:   69 y.o. female with mildly displaced proximal humerus fracture     Plan:    David Sebastian is a pleasant 69year-old female who presents for a follow-up evaluation of the right shoulder. I am pleased with the progress she has made thus far. She may discontinue use of the sling at this time. Together, we discussed that she may still experience some pain at the right shoulder while she begins the strengthening aspect of physical therapy. She may progress to strengthening with physical therapy a she continues to improve her range of motion. She may call back if she needs or schedule a follow-up visit in a couple of months. Follow up:    Return in about 2 months (around 2/1/2024) for Recheck. Chief Complaint   Patient presents with   • Right Shoulder - Follow-up       History of Present Illness: The patient is a 69 y.o. female, who presents for follow-up evaluation of the right shoulder. She is 3 months s/p proximal right humerus fracture after sustaining a fall on 9/2/2023. Today, she presents wearing a sling. Today, she reports she is feeling much better. She has home physical therapy twice a week working on range of motion, which she feels has been going well. She denies experiencing pain at rest.; however, she does experience pain at the anterior shoulder when the sling is off and she lifts her arm. She notes the pain is no longer excruciating like it was. She takes Tylenol and Ibuprofen as needed at night. She denies any paresthesias.     Past Medical, Social and Family History:  Past Medical History:   Diagnosis Date   • Abdominal pain     LLQ on occasion   • Angina pectoris [facility])    • Arthritis    • Breast cancer ([address]) 07/01/2021   • Cancer ([address])     breast left   • Colon polyp    • Constipation     at times   • Coronary artery disease    • Diabetes mellitus ([address])    • Diarrhea     at times   • Disease of thyroid gland    • Hemorrhoids • Hypercholesterolemia    • Hypertension    • Neuropathy     both legs and feet   • Obesity    • Osteoporosis    • Otitis media    • Ovarian ca [facility]) 1997   • 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[address])    • Shingles    • 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 Jann Chu. Up to date with her colonoscopy. • COLONOSCOPY     • CORONARY STENT PLACEMENT      Dec 2015   • EYE SURGERY      cataract surgery   • GALLBLADDER SURGERY     • HYSTERECTOMY  1989   • MAMMO (HISTORICAL)      up to date. see gyn   • MASTECTOMY Left 07/13/2021   • MASTECTOMY W/ SENTINEL NODE BIOPSY Left 07/13/2021    Procedure: BREAST ROSLYN  DIRECTED MASTECTOMY, SENTINEL LYMPH NODE BIOPSY, LYMPHATIC MAPPING WITH BLUE DYE AND RADIOACTIVE DYE (INJECT AT 1500 BY DR ANDREWS IN THE OR);   Surgeon: Nancy Mcginnis MD;  Location: [facility];  Service: Surgical Oncology   • OOPHORECTOMY Bilateral 1997   • SD CORONARY ARTERY BYP W/VEIN & ARTERY GRAFT 2 VEIN N/A 2/8/2023    Procedure: CORONARY ARTERY BYPASS GRAFT (CABG) X 2 VESSELS GSV-->OM1, LIMA-->LAD; EVH  LEFT LEG w/ RADHA;  Surgeon: Rosa M Jaffe MD;  Location: [facility];  Service: Cardiac Surgery   • SPINE SURGERY     • THYROIDECTOMY     • UPPER GASTROINTESTINAL ENDOSCOPY     • US BREAST NEEDLE LOC LEFT Left 05/06/2021   • US GUIDANCE BREAST BIOPSY LEFT EACH ADDITIONAL Left 05/06/2021   • US GUIDED BREAST BIOPSY LEFT COMPLETE Left 03/15/2021   • US GUIDED BREAST BIOPSY LEFT COMPLETE Left 05/06/2021     Allergies   Allergen Reactions   • Duloxetine Other (See Comments)     Extreme somnolence/lethargy   • Sulfa Antibiotics Rash     Current Outpatient Medications on File Prior to Visit   Medication Sig Dispense Refill   • ALPRAZolam (XANAX) 0.25 mg tablet Take 1 tablet (0.25 mg total) by mouth 3 (three) times a day as needed for anxiety 20 tablet 0   • anastrozole (ARIMIDEX) 1 mg tablet Take 1 tablet (1 mg total) by mouth daily 90 tablet 3   • aspirin 325 mg tablet Take 1 tablet (325 mg total) by mouth daily Do not start before February 12, 2023. 30 tablet 2   • Aspirin 81 MG CAPS 2 (two) times a day     • atorvastatin (LIPITOR) 80 mg tablet Take 1 tablet (80 mg total) by mouth daily with dinner (Patient not taking: Reported on 9/5/2023) 30 tablet 2   • clotrimazole-betamethasone (LOTRISONE) 1-0.05 % cream Apply topically 2 (two) times a day 45 g 3   • Insulin Pen Needle (Sure Comfort Pen Needles) 31G X 5 MM MISC by Does not apply route daily 100 each 3   • Lancets (OneTouch Delica Plus ROUKRT01C) MISC Use once a day to check blood sugar 100 each 3   • levothyroxine 88 mcg tablet Take 1 tablet (88 mcg total) by mouth daily 90 tablet 0   • liraglutide (VICTOZA) injection Inject 1.2 mg under the skin daily at bedtime      • metFORMIN (GLUCOPHAGE) 1000 MG tablet TAKE 1 TABLET(1000 MG) BY MOUTH TWICE DAILY WITH MEALS 180 tablet 1   • metoprolol tartrate (LOPRESSOR) 25 mg tablet TAKE 1/2 TABLET(12.5 MG) BY MOUTH EVERY 12 HOURS 90 tablet 1   • mupirocin (BACTROBAN) 2 % ointment      • OneTouch Ultra test strip Use 1 each daily Use as instructed 100 each 3   • pregabalin (LYRICA) 75 mg capsule Take 1 capsule (75 mg total) by mouth 2 (two) times a day 180 capsule 1     No current facility-administered medications on file prior to visit. Social History     Socioeconomic History   • Marital status:       Spouse name: Not on file   • Number of children: Not on file   • Years of education: Not on file • Highest education level: Not on file   Occupational History   • Not on file   Tobacco Use   • Smoking status: Never   • Smokeless tobacco: Never   Vaping Use   • Vaping Use: Never used   Substance and Sexual Activity   • Alcohol use: Never   • Drug use: Never   • Sexual activity: Not Currently     Partners: Male     Birth control/protection: Post-menopausal, None   Other Topics Concern   • Not on file   Social History Narrative    · Tobacco smoking status:   Never smoker      This question's title has changed from "Smoking status" to "Tobacco smoking status" with the 19.7 update. Please use this field only for documenting tobacco smoking behavior. To accommodate this change, new fields for documenting smokeless tobacco and e-cigarette/vape usage have been added.      · Smoking - how much          None  1 PPW  2 PPW  1/4 PPD  1/2 PPD  1 PPD  1 1/2 PPD  2 PPD  3+ PPD          NOTE     · Smokeless tobacco status          Never used smokeless tobacco  Former smokeless tobacco user  Current snuff user  Currently chews tobacco  Currently uses moist powdered tobacco  ----  Not indicated  Not tolerated  Patient refused          NOTE     · Tobacco-years of use               NOTE     · E-cigarette/vape status          Never used electronic cigarettes  Former user of electronic cigarettes  Current user of electronic cigarettes          NOTE     · Most recent tobacco use screenin2018      · Alcohol intake:   None         Per laine     Social Determinants of Health     Financial Resource Strain: Low Risk  (2023)    Overall Financial Resource Strain (CARDIA)    • Difficulty of Paying Living Expenses: Not hard at all   Food Insecurity: No Food Insecurity (2023)    Hunger Vital Sign    • Worried About Running Out of Food in the Last Year: Never true    • Ran Out of Food in the Last Year: Never true   Transportation Needs: No Transportation Needs (2023)    PRAPARE - Transportation    • Lack of Transportation (Medical): No    • Lack of Transportation (Non-Medical): No   Physical Activity: Not on file   Stress: Not on file   Social Connections: Not on file   Intimate Partner Violence: Not on file   Housing Stability: Low Risk  (2/9/2023)    Housing Stability Vital Sign    • Unable to Pay for Housing in the Last Year: No    • Number of Places Lived in the Last Year: 1    • Unstable Housing in the Last Year: No         I have reviewed the past medical, surgical, social and family history, medications and allergies as documented in the EMR. Review of systems: ROS is negative other than that noted in the HPI. Constitutional: Negative for fatigue and fever. HENT: Negative for sore throat. Respiratory: Negative for shortness of breath. Cardiovascular: Negative for chest pain. Gastrointestinal: Negative for abdominal pain. Endocrine: Negative for cold intolerance and heat intolerance. Genitourinary: Negative for flank pain. Musculoskeletal: Negative for back pain. Skin: Negative for rash. Allergic/Immunologic: Negative for immunocompromised state. Neurological: Negative for dizziness. Psychiatric/Behavioral: Negative for agitation. Physical Exam:    Height 5' 1" (1.549 m), weight 76.7 kg (169 lb), currently breastfeeding. General/Constitutional: NAD, well developed, well nourished  HENT: Normocephalic, atraumatic  CV: Intact distal pulses, regular rate  Resp: No respiratory distress or labored breathing  Abdomen: soft, nondistended, non tender   Lymphatic: No lymphadenopathy palpated  Neuro: Alert and Oriented x 3, no focal deficits  Psych: Normal mood, normal affect  Skin: Warm, dry, no rashes, no erythema      Shoulder Exam:      Inspection: No ecchymosis, edema, or deformity.  No visualized wounds or skin lesions   Palpation: Anterior shoulder  Active Motion:       FF: 90°       ER: 30°        IR: R Sacrum  Strength: 4+/5 empty can, 5/5 ER,  5/5 IR   Sensory - SILT in the Radial / Ulnar / Median / Axillary nerve distributions  Motor - AIN / PIN / Radial / Ulnar / Median / Axillary motor nerves in tact  Palpable Radial pulse  Cap refill <2secs in all digits      Imaging    I reviewed and interpreted X-rays of the right shoulder which show stable alignment of healed mildly displaced proximal humerus fracture.     Scribe Attestation    I,:  Mateusz Taveras am acting as a scribe while in the presence of the attending physician.:       I,:  Panfilo Parmar MD personally performed the services described in this documentation    as scribed in my presence.:

## 2023-12-17 DIAGNOSIS — E11.9 TYPE 2 DIABETES MELLITUS WITHOUT COMPLICATION, WITHOUT LONG-TERM CURRENT USE OF INSULIN (HCC): ICD-10-CM

## 2024-01-04 ENCOUNTER — RA CDI HCC (OUTPATIENT)
Dept: OTHER | Facility: HOSPITAL | Age: 79
End: 2024-01-04

## 2024-01-04 ENCOUNTER — APPOINTMENT (OUTPATIENT)
Dept: LAB | Facility: CLINIC | Age: 79
End: 2024-01-04
Payer: MEDICARE

## 2024-01-04 DIAGNOSIS — I10 ESSENTIAL HYPERTENSION: ICD-10-CM

## 2024-01-04 DIAGNOSIS — I25.10 CORONARY ARTERY DISEASE INVOLVING NATIVE CORONARY ARTERY OF NATIVE HEART WITHOUT ANGINA PECTORIS: ICD-10-CM

## 2024-01-04 DIAGNOSIS — E11.9 TYPE 2 DIABETES MELLITUS WITHOUT COMPLICATION, WITHOUT LONG-TERM CURRENT USE OF INSULIN (HCC): ICD-10-CM

## 2024-01-04 DIAGNOSIS — E03.9 ACQUIRED HYPOTHYROIDISM: ICD-10-CM

## 2024-01-04 DIAGNOSIS — E78.2 MIXED DYSLIPIDEMIA: ICD-10-CM

## 2024-01-04 LAB
ALBUMIN SERPL BCP-MCNC: 4 G/DL (ref 3.5–5)
ALP SERPL-CCNC: 47 U/L (ref 34–104)
ALT SERPL W P-5'-P-CCNC: 15 U/L (ref 7–52)
ANION GAP SERPL CALCULATED.3IONS-SCNC: 9 MMOL/L
AST SERPL W P-5'-P-CCNC: 22 U/L (ref 13–39)
BILIRUB SERPL-MCNC: 0.82 MG/DL (ref 0.2–1)
BUN SERPL-MCNC: 12 MG/DL (ref 5–25)
CALCIUM SERPL-MCNC: 9.6 MG/DL (ref 8.4–10.2)
CHLORIDE SERPL-SCNC: 102 MMOL/L (ref 96–108)
CHOLEST SERPL-MCNC: 139 MG/DL
CO2 SERPL-SCNC: 28 MMOL/L (ref 21–32)
CREAT SERPL-MCNC: 0.65 MG/DL (ref 0.6–1.3)
ERYTHROCYTE [DISTWIDTH] IN BLOOD BY AUTOMATED COUNT: 12 % (ref 11.6–15.1)
GFR SERPL CREATININE-BSD FRML MDRD: 85 ML/MIN/1.73SQ M
GLUCOSE P FAST SERPL-MCNC: 129 MG/DL (ref 65–99)
HCT VFR BLD AUTO: 37.5 % (ref 34.8–46.1)
HDLC SERPL-MCNC: 38 MG/DL
HGB BLD-MCNC: 13 G/DL (ref 11.5–15.4)
LDLC SERPL CALC-MCNC: 59 MG/DL (ref 0–100)
MCH RBC QN AUTO: 31.2 PG (ref 26.8–34.3)
MCHC RBC AUTO-ENTMCNC: 34.7 G/DL (ref 31.4–37.4)
MCV RBC AUTO: 90 FL (ref 82–98)
PLATELET # BLD AUTO: 194 THOUSANDS/UL (ref 149–390)
PMV BLD AUTO: 10.3 FL (ref 8.9–12.7)
POTASSIUM SERPL-SCNC: 4.6 MMOL/L (ref 3.5–5.3)
PROT SERPL-MCNC: 6.5 G/DL (ref 6.4–8.4)
RBC # BLD AUTO: 4.17 MILLION/UL (ref 3.81–5.12)
SODIUM SERPL-SCNC: 139 MMOL/L (ref 135–147)
T4 FREE SERPL-MCNC: 1.11 NG/DL (ref 0.61–1.12)
TRIGL SERPL-MCNC: 209 MG/DL
TSH SERPL DL<=0.05 MIU/L-ACNC: 0.97 UIU/ML (ref 0.45–4.5)
WBC # BLD AUTO: 6.49 THOUSAND/UL (ref 4.31–10.16)

## 2024-01-04 PROCEDURE — 85027 COMPLETE CBC AUTOMATED: CPT

## 2024-01-04 PROCEDURE — 84443 ASSAY THYROID STIM HORMONE: CPT

## 2024-01-04 PROCEDURE — 80053 COMPREHEN METABOLIC PANEL: CPT

## 2024-01-04 PROCEDURE — 36415 COLL VENOUS BLD VENIPUNCTURE: CPT

## 2024-01-04 PROCEDURE — 84439 ASSAY OF FREE THYROXINE: CPT

## 2024-01-04 PROCEDURE — 80061 LIPID PANEL: CPT

## 2024-01-04 NOTE — PROGRESS NOTES
HCC coding opportunities          Chart Reviewed number of suggestions sent to Provider: 5     Patients Insurance     Medicare Insurance: Medicare        [id number]  E11.40  D69.6  [id number]  [id number]

## 2024-01-05 DIAGNOSIS — E11.9 TYPE 2 DIABETES MELLITUS WITHOUT COMPLICATION, WITHOUT LONG-TERM CURRENT USE OF INSULIN (HCC): ICD-10-CM

## 2024-01-05 DIAGNOSIS — Z95.1 S/P CABG X 2: ICD-10-CM

## 2024-01-05 RX ORDER — GABAPENTIN 600 MG/1
TABLET ORAL
COMMUNITY

## 2024-01-06 ENCOUNTER — HOSPITAL ENCOUNTER (EMERGENCY)
Facility: HOSPITAL | Age: 79
Discharge: HOME | End: 2024-01-06
Attending: EMERGENCY MEDICINE | Admitting: EMERGENCY MEDICINE
Payer: MEDICARE

## 2024-01-06 ENCOUNTER — APPOINTMENT (EMERGENCY)
Dept: RADIOLOGY | Facility: HOSPITAL | Age: 79
End: 2024-01-06
Payer: MEDICARE

## 2024-01-06 VITALS
DIASTOLIC BLOOD PRESSURE: 75 MMHG | SYSTOLIC BLOOD PRESSURE: 177 MMHG | TEMPERATURE: 98.4 F | RESPIRATION RATE: 20 BRPM | HEART RATE: 81 BPM | OXYGEN SATURATION: 100 %

## 2024-01-06 DIAGNOSIS — M25.562 LEFT KNEE PAIN: ICD-10-CM
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
## 2024-01-06 DIAGNOSIS — M25.531 RIGHT WRIST PAIN: ICD-10-CM

## 2024-01-06 DIAGNOSIS — S00.81XA ABRASION OF FACE, INITIAL ENCOUNTER: ICD-10-CM

## 2024-01-06 PROCEDURE — 99284 EMERGENCY DEPT VISIT MOD MDM: CPT

## 2024-01-06 PROCEDURE — 90715 TDAP VACCINE 7 YRS/> IM: CPT | Performed by: EMERGENCY MEDICINE

## 2024-01-06 PROCEDURE — G1004 CDSM NDSC: HCPCS

## 2024-01-06 PROCEDURE — 90471 IMMUNIZATION ADMIN: CPT

## 2024-01-06 PROCEDURE — 70450 CT HEAD/BRAIN W/O DYE: CPT

## 2024-01-06 PROCEDURE — 99285 EMERGENCY DEPT VISIT HI MDM: CPT | Performed by: EMERGENCY MEDICINE

## 2024-01-06 PROCEDURE — 73564 X-RAY EXAM KNEE 4 OR MORE: CPT

## 2024-01-06 PROCEDURE — 73110 X-RAY EXAM OF WRIST: CPT

## 2024-01-06 RX ORDER — GINSENG 100 MG
1 CAPSULE ORAL ONCE
Status: COMPLETED | OUTPATIENT
Start: 2024-01-06 | End: 2024-01-06

## 2024-01-06 RX ADMIN — BACITRACIN 1 SMALL APPLICATION: 500 OINTMENT TOPICAL at 16:54

## 2024-01-06 RX ADMIN — TETANUS TOXOID, REDUCED DIPHTHERIA TOXOID AND ACELLULAR PERTUSSIS VACCINE, ADSORBED 0.5 ML: 5; 2.5; 8; 8; 2.5 SUSPENSION INTRAMUSCULAR at 16:54

## 2024-01-06 NOTE — DISCHARGE INSTRUCTIONS
CAT scan, x-rays today were normal.  Continue local wound care with topical antibiotic ointment like Neosporin for the abrasion on your face.  If you have any severe worsening of headache, nausea, vomiting, confusion, focal weakness or numbness, if you are unable to walk, return to the emergency department.

## 2024-01-06 NOTE — ED PROVIDER NOTES
History  Chief Complaint   Patient presents with    Fall     Pt states she lost her balance and fell onto carpeted floor, hitting face first.  Denies LOC, denies thinners.  C/o left knee pain, right wrist pain and soreness to face      HPI  Patient is a 78-year-old female history of CAD, diabetes, neuropathy presenting for evaluation after fall at home.  Patient was moving laundry when she lost her balance and fell onto her right hand, left knee, and face onto carpeted floor.  Patient denies loss of consciousness.  Patient denies generalized headache, vision changes, nausea, vomiting, neck pain, back pain.  Patient patient takes a baby aspirin daily.  Of moderate left-sided knee pain and right-sided wrist pain.  Patient required the assistance of family members to get up.  Patient denies any preceding illness, denies additional illness or additional recent falls.  Prior to Admission Medications   Prescriptions Last Dose Informant Patient Reported? Taking?   ALPRAZolam (XANAX) 0.25 mg tablet   No No   Sig: Take 1 tablet (0.25 mg total) by mouth 3 (three) times a day as needed for anxiety   Aspirin 81 MG CAPS   Yes No   Si (two) times a day   Insulin Pen Needle (Sure Comfort Pen Needles) 31G X 5 MM MISC  Self No No   Sig: by Does not apply route daily   Lancets (OneTouch Delica Plus [id number]) MISC   No No   Sig: Use once a day to check blood sugar   OneTouch Ultra test strip   No No   Sig: Use 1 each daily Use as instructed   anastrozole (ARIMIDEX) 1 mg tablet   No No   Sig: Take 1 tablet (1 mg total) by mouth daily   aspirin 325 mg tablet  Self No No   Sig: Take 1 tablet (325 mg total) by mouth daily Do not start before 2023.   atorvastatin (LIPITOR) 80 mg tablet  Self No No   Sig: Take 1 tablet (80 mg total) by mouth daily with dinner   Patient not taking: Reported on 2023   clotrimazole-betamethasone (LOTRISONE) 1-0.05 % cream   No No   Sig: Apply topically 2 (two) times a day   gabapentin  (NEURONTIN) 600 MG tablet   Yes No   levothyroxine 88 mcg tablet   No No   Sig: Take 1 tablet (88 mcg total) by mouth daily   liraglutide (VICTOZA) injection  Self Yes No   Sig: Inject 1.2 mg under the skin daily at bedtime    metFORMIN (GLUCOPHAGE) 1000 MG tablet   No No   Sig: TAKE 1 TABLET(1000 MG) BY MOUTH TWICE DAILY WITH MEALS   metoprolol tartrate (LOPRESSOR) 25 mg tablet   No No   Sig: TAKE 1/2 TABLET(12.5 MG) BY MOUTH EVERY 12 HOURS   mupirocin (BACTROBAN) 2 % ointment   Yes No   pregabalin (LYRICA) 75 mg capsule   No No   Sig: Take 1 capsule (75 mg total) by mouth 2 (two) times a day      Facility-Administered Medications: None       Past Medical History:   Diagnosis Date    Abdominal pain     LLQ on occasion    Angina pectoris (HCC)     Arthritis     Breast cancer (HCC) 07/01/2021    Cancer (HCC)     breast left    Colon polyp     Constipation     at times    Coronary artery disease     Diabetes mellitus (HCC)     Diarrhea     at times    Disease of thyroid gland     Hemorrhoids     Hypercholesterolemia     Hypertension     Neuropathy     both legs and feet    Obesity     Osteoporosis     Otitis media     Ovarian ca (HCC) 1997    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HCC)     Shingles     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 Cleary. Up to date with her colonoscopy.    COLONOSCOPY      CORONARY STENT PLACEMENT      Dec 2015    EYE SURGERY      cataract surgery    GALLBLADDER SURGERY       HYSTERECTOMY  1989    MAMMO (HISTORICAL)      up to date. see gyn    MASTECTOMY Left 07/13/2021    MASTECTOMY W/ SENTINEL NODE BIOPSY Left 07/13/2021    Procedure: BREAST ROSLYN  DIRECTED MASTECTOMY, SENTINEL LYMPH NODE BIOPSY, LYMPHATIC MAPPING WITH BLUE DYE AND RADIOACTIVE DYE (INJECT AT 1500 BY DR MONTEJO IN THE OR);  Surgeon: Anthony Montejo MD;  Location: [facility];  Service: Surgical Oncology    OOPHORECTOMY Bilateral 1997    MN CORONARY ARTERY BYP W/VEIN & ARTERY GRAFT 2 VEIN N/A 2/8/2023    Procedure: CORONARY ARTERY BYPASS GRAFT (CABG) X 2 VESSELS GSV-->OM1, LIMA-->LAD; EVH  LEFT LEG w/ RADHA;  Surgeon: Javan Allen MD;  Location: [facility];  Service: Cardiac Surgery    SPINE SURGERY      THYROIDECTOMY      UPPER GASTROINTESTINAL ENDOSCOPY      US BREAST NEEDLE LOC LEFT Left 05/06/2021    US GUIDANCE BREAST BIOPSY LEFT EACH ADDITIONAL Left 05/06/2021    US GUIDED BREAST BIOPSY LEFT COMPLETE Left 03/15/2021    US GUIDED BREAST BIOPSY LEFT COMPLETE Left 05/06/2021       Family History   Problem Relation Age of Onset    Diabetes Mother     Heart disease Mother     Dementia Mother     Esophageal cancer Father 60    Endometrial cancer Sister 68    Asthma Sister     Cancer Sister     No Known Problems Sister     No Known Problems Sister     No Known Problems Sister     Asthma Daughter     No Known Problems Maternal Grandmother     Prostate cancer Maternal Grandfather     No Known Problems Paternal Grandmother     Prostate cancer Paternal Grandfather     Stomach cancer Brother 71    Multiple myeloma Brother 72    Cancer Brother     Asthma Son     Depression Son     Breast cancer Maternal Aunt 50    No Known Problems Paternal Aunt     No Known Problems Paternal Aunt     Breast cancer Other 45    Breast cancer Other 45    Diabetes Family     Cancer Family     Arthritis Family     Heart disease Family      I have reviewed and agree with the history as documented.    E-Cigarette/Vaping    E-Cigarette Use Never User       E-Cigarette/Vaping Substances    Nicotine No     THC No     CBD No     Flavoring No     Other No     Unknown No      Social History     Tobacco Use    Smoking status: Never    Smokeless tobacco: Never   Vaping Use    Vaping status: Never Used   Substance Use Topics    Alcohol use: Never    Drug use: Never       Review of Systems   Constitutional:  Negative for fatigue and fever.   Respiratory:  Negative for cough and shortness of breath.    Gastrointestinal:  Negative for diarrhea, nausea and vomiting.   Musculoskeletal:  Positive for arthralgias (Left knee, right wrist). Negative for myalgias, neck pain and neck stiffness.   Skin:  Positive for wound (Abrasions on the face). Negative for color change, pallor and rash.   Neurological:  Negative for weakness, numbness and headaches.   Psychiatric/Behavioral:  Negative for confusion.    All other systems reviewed and are negative.      Physical Exam  Physical Exam  Vitals and nursing note reviewed.   Constitutional:       General: She is not in acute distress.     Appearance: Normal appearance. She is not ill-appearing, toxic-appearing or diaphoretic.   HENT:      Head: Normocephalic and atraumatic.      Comments: Multiple areas of facial abrasion.  No laceration requiring closure.  Pupils 3 mm bilaterally, reactive to light.     Right Ear: External ear normal.      Left Ear: External ear normal.   Eyes:      General:         Right eye: No discharge.         Left eye: No discharge.   Pulmonary:      Effort: No respiratory distress.   Abdominal:      General: There is no distension.   Musculoskeletal:         General: No deformity.      Cervical back: Normal range of motion.      Comments: No C/T/L-spine tenderness, step-off, deformity.  Mild tenderness of distal radius.  No visible or palpable deformity of the wrist.  Able to flex and extend with pain.  No tenderness in the anatomic snuffbox.  Mild tenderness of the left proximal tibia.  No overlying skin changes.   No knee swelling.  Able to flex and extend without significant pain.   Skin:     Findings: No lesion or rash.   Neurological:      Mental Status: She is alert and oriented to person, place, and time. Mental status is at baseline.      Comments: Cranial nerves II through XII intact.  Full strength and sensation bilaterally in upper and lower extremities.    Psychiatric:         Mood and Affect: Mood and affect normal.         Vital Signs  ED Triage Vitals [01/06/24 1407]   Temperature Pulse Respirations Blood Pressure SpO2   98.4 °F (36.9 °C) 81 20 (!) 177/75 100 %      Temp Source Heart Rate Source Patient Position - Orthostatic VS BP Location FiO2 (%)   Oral Monitor Lying Right arm --      Pain Score       --           Vitals:    01/06/24 1407   BP: (!) 177/75   Pulse: 81   Patient Position - Orthostatic VS: Lying         Visual Acuity      ED Medications  Medications   bacitracin topical ointment 1 small application (1 small application Topical Given 1/6/24 1654)   tetanus-diphtheria-acellular pertussis (BOOSTRIX) IM injection 0.5 mL (0.5 mL Intramuscular Given 1/6/24 1654)       Diagnostic Studies  Results Reviewed       None                   CT head without contrast   Final Result by E. Alec Schoenberger, MD (01/06 1637)      No acute intracranial abnormality. Chronic microangiopathy..                  Workstation performed: [id number]         XR wrist 3+ views RIGHT    (Results Pending)   XR knee 4+ vw left injury    (Results Pending)              Procedures  Procedures         ED Course                                             Medical Decision Making  I obtained history from the patient and from the patient's family.  Given patient's head strike on baby aspirin, I ordered and reviewed a CT head to evaluate for intracranial injury.  CT cervical spine cleared clinically Via Nexus criteria.  I ordered and independently interpreted plain films of the right wrist and left knee to evaluate for fracture or  dislocation.  Per my independent interpretation of plain films, wrist and the left knee atraumatic.  CT head without acute findings.  Patient remaining comfortable in the emergency department.  Provided with reassurance, discharged with return precautions.    Amount and/or Complexity of Data Reviewed  Radiology: ordered.    Risk  OTC drugs.  Prescription drug management.             Disposition  Final diagnoses:   Fall, initial encounter   Abrasion of face, initial encounter   Right wrist pain   Left knee pain     Time reflects when diagnosis was documented in both MDM as applicable and the Disposition within this note       Time User Action Codes Description Comment    1/6/2024  4:40 PM Ricky Gould Add [W19.XXXA] Fall, initial encounter     1/6/2024  4:40 PM Ricky Gould Add [S00.81XA] Abrasion of face, initial encounter     1/6/2024  4:40 PM Ricky Gould Add [M25.531] Right wrist pain     1/6/2024  4:40 PM Ricky Gould Add [M25.562] Left knee pain           ED Disposition       ED Disposition   Discharge    Condition   Stable    Date/Time   Sat Jan 6, 2024  4:40 PM    Comment   Delfina Villa discharge to home/self care.                   Follow-up Information       Follow up With Specialties Details Why Contact Info Additional Information    [facility] Emergency Department Emergency Medicine  If symptoms worsen [address] [address]  [phone] [facility] Emergency Department, [address], [place], [address]            Discharge Medication List as of 1/6/2024  4:41 PM        CONTINUE these medications which have NOT CHANGED    Details   ALPRAZolam (XANAX) 0.25 mg tablet Take 1 tablet (0.25 mg total) by mouth 3 (three) times a day as needed for anxiety, Starting Wed 6/21/2023, Normal      anastrozole (ARIMIDEX) 1 mg tablet Take 1 tablet (1 mg total) by mouth daily, Starting Mon 10/30/2023, Normal       aspirin 325 mg tablet Take 1 tablet (325 mg total) by mouth daily Do not start before February 12, 2023., Starting Sun 2/12/2023, Normal      Aspirin 81 MG CAPS 2 (two) times a day, Historical Med      atorvastatin (LIPITOR) 80 mg tablet Take 1 tablet (80 mg total) by mouth daily with dinner, Starting Sat 2/11/2023, Normal      clotrimazole-betamethasone (LOTRISONE) 1-0.05 % cream Apply topically 2 (two) times a day, Starting Mon 7/3/2023, Normal      gabapentin (NEURONTIN) 600 MG tablet Historical Med      Insulin Pen Needle (Sure Comfort Pen Needles) 31G X 5 MM MISC by Does not apply route daily, Starting Thu 9/24/2020, Normal      Lancets (OneTouch Delica Plus [id number]) MISC Use once a day to check blood sugar, Normal      levothyroxine 88 mcg tablet Take 1 tablet (88 mcg total) by mouth daily, Starting Mon 11/13/2023, Normal      liraglutide (VICTOZA) injection Inject 1.2 mg under the skin daily at bedtime , Starting Wed 7/12/2017, Historical Med      metFORMIN (GLUCOPHAGE) 1000 MG tablet TAKE 1 TABLET(1000 MG) BY MOUTH TWICE DAILY WITH MEALS, Normal      metoprolol tartrate (LOPRESSOR) 25 mg tablet TAKE 1/2 TABLET(12.5 MG) BY MOUTH EVERY 12 HOURS, Normal      mupirocin (BACTROBAN) 2 % ointment Historical Med      OneTouch Ultra test strip Use 1 each daily Use as instructed, Starting Thu 11/2/2023, Normal      pregabalin (LYRICA) 75 mg capsule Take 1 capsule (75 mg total) by mouth 2 (two) times a day, Starting Mon 10/30/2023, Normal             No discharge procedures on file.    PDMP Review         Value Time User    PDMP Reviewed  Yes 10/30/2023 11:40 PM Rogerio Mancera MD            ED Provider  Electronically Signed by             Ricky Gould MD  01/06/24 2998

## 2024-01-10 ENCOUNTER — OFFICE VISIT (OUTPATIENT)
Dept: CARDIOLOGY CLINIC | Facility: CLINIC | Age: 79
End: 2024-01-10
Payer: MEDICARE

## 2024-01-10 VITALS
SYSTOLIC BLOOD PRESSURE: 124 MMHG | OXYGEN SATURATION: 99 % | HEART RATE: 66 BPM | HEIGHT: 61 IN | WEIGHT: 169 LBS | BODY MASS INDEX: 31.91 KG/M2 | DIASTOLIC BLOOD PRESSURE: 62 MMHG

## 2024-01-10 DIAGNOSIS — I25.118 CORONARY ARTERY DISEASE OF NATIVE ARTERY OF NATIVE HEART WITH STABLE ANGINA PECTORIS (HCC): ICD-10-CM

## 2024-01-10 DIAGNOSIS — I10 ESSENTIAL HYPERTENSION: ICD-10-CM

## 2024-01-10 DIAGNOSIS — E78.2 MIXED DYSLIPIDEMIA: ICD-10-CM

## 2024-01-10 DIAGNOSIS — I25.10 CORONARY ARTERY DISEASE INVOLVING NATIVE CORONARY ARTERY OF NATIVE HEART WITHOUT ANGINA PECTORIS: Primary | ICD-10-CM

## 2024-01-10 PROCEDURE — 99214 OFFICE O/P EST MOD 30 MIN: CPT | Performed by: INTERNAL MEDICINE

## 2024-01-10 RX ORDER — ATORVASTATIN CALCIUM 40 MG/1
40 TABLET, FILM COATED ORAL DAILY
Qty: 90 TABLET | Refills: 3 | Status: SHIPPED | OUTPATIENT
Start: 2024-01-10

## 2024-01-10 NOTE — PROGRESS NOTES
Cardiology   Ruby Garcia DO, [facility]  Dawson Mccoy MD, [facility]  Dylan Bartlett MD, [facility]  Anjali Bush MD, [facility]  -------------------------------------------------------------------  [facility]  [address], [address], [address]  [place], [place], [address]  [phone]    Cardiology Follow Up  Delfina Villa  1945  [id number]          Assessment/Plan:    1. Coronary artery disease involving native coronary artery of native heart without angina pectoris    2. Mixed dyslipidemia    3. Essential hypertension      -Resume taking atorvastatin.  May use 40 mg daily.  Previously was using 80 mg a day.  She is hesitant about using statin as she has had previous intolerance but was doing well with atorvastatin recently.       -Continue aspirin  -Continue metoprolol 12.5 mg twice daily.  - Encouraged PT to prevent repeat falling      Interval History:     Delfina Villa is 78 y.o. female here for followup of CAD/CABG.  Since her last visit, she had another fall.  She did not have syncope.  No significant injuries.  Last visit, she had a mechanical fall and had a shoulder fracture.  She was treated conservatively.   Since then, she has not had any chest pain or shortness of breath.    She reports good adherence with medications.       Previously, she could not tolerate pravastatin or atorvastatin due to myalgias but was tolerating atorvastatin 80 mg daily which was started post CABG.   She stopped atorvastatin but does not know why.  She has been on 2 g of fish oil twice a day.  She had blood work done on January 4, 2024 which showed total cholesterol 139, triglycerides 209, HDL 38 and LDL of 59    She has a history of CAD with SAMMIE to proximal LAD in 2015.  Prior to stent placement, she was feeling episode of left arm pain with exertion.   In December 2022, she developed exertional dyspnea and chest tightness and was seen in the emergency room.  She subsequently  underwent cardiac catheterization which showed left main 70% stenosis and 95% proximal circumflex stenosis.  In addition she had 45% RCA stenosis.  She had an echocardiogram which showed normal ejection fraction with mild valve disease.  On February 8, 2023, she underwent two-vessel CABG with LIMA to LAD and SVG to OM1.  Surgery was uncomplicated and she was discharged home 4 days later.    The following portions of the patient's history were reviewed and updated as appropriate: allergies, current medications, past family history, past medical history, past social history, past surgical history, and problem list.       Current Outpatient Medications:     ALPRAZolam (XANAX) 0.25 mg tablet, Take 1 tablet (0.25 mg total) by mouth 3 (three) times a day as needed for anxiety, Disp: 20 tablet, Rfl: 0    anastrozole (ARIMIDEX) 1 mg tablet, Take 1 tablet (1 mg total) by mouth daily, Disp: 90 tablet, Rfl: 3    aspirin 325 mg tablet, Take 1 tablet (325 mg total) by mouth daily Do not start before February 12, 2023., Disp: 30 tablet, Rfl: 2    Aspirin 81 MG CAPS, 2 (two) times a day, Disp: , Rfl:     clotrimazole-betamethasone (LOTRISONE) 1-0.05 % cream, Apply topically 2 (two) times a day, Disp: 45 g, Rfl: 3    gabapentin (NEURONTIN) 600 MG tablet, , Disp: , Rfl:     Insulin Pen Needle (Sure Comfort Pen Needles) 31G X 5 MM MISC, by Does not apply route daily, Disp: 100 each, Rfl: 3    Lancets (OneTouch Delica Plus [id number]) MISC, Use once a day to check blood sugar, Disp: 100 each, Rfl: 3    levothyroxine 88 mcg tablet, Take 1 tablet (88 mcg total) by mouth daily, Disp: 90 tablet, Rfl: 0    liraglutide (VICTOZA) injection, Inject 1.2 mg under the skin daily at bedtime , Disp: , Rfl:     metFORMIN (GLUCOPHAGE) 1000 MG tablet, TAKE 1 TABLET(1000 MG) BY MOUTH TWICE DAILY WITH MEALS, Disp: 180 tablet, Rfl: 1    metoprolol tartrate (LOPRESSOR) 25 mg tablet, TAKE 1/2 TABLET(12.5 MG) BY MOUTH EVERY 12 HOURS, Disp: 90 tablet, Rfl:  "1    mupirocin (BACTROBAN) 2 % ointment, , Disp: , Rfl:     OneTouch Ultra test strip, Use 1 each daily Use as instructed, Disp: 100 each, Rfl: 3    pregabalin (LYRICA) 75 mg capsule, Take 1 capsule (75 mg total) by mouth 2 (two) times a day, Disp: 180 capsule, Rfl: 1    atorvastatin (LIPITOR) 80 mg tablet, Take 1 tablet (80 mg total) by mouth daily with dinner (Patient not taking: Reported on 9/5/2023), Disp: 30 tablet, Rfl: 2        Review of Systems:  Review of Systems   Respiratory:  Negative for shortness of breath.    Cardiovascular:  Negative for chest pain, palpitations and leg swelling.   Musculoskeletal:  Positive for arthralgias and myalgias.   All other systems reviewed and are negative.        Physical Exam:  Vitals:  Vitals:    01/10/24 1400   BP: 124/62   BP Location: Right arm   Patient Position: Sitting   Cuff Size: Standard   Pulse: 66   SpO2: 99%   Weight: 76.7 kg (169 lb)   Height: 5' 1\" (1.549 m)     Physical Exam   Constitutional: She appears healthy. No distress.   Eyes: Pupils are equal, round, and reactive to light. Conjunctivae are normal.   Neck: No JVD present.   Cardiovascular: Normal rate, regular rhythm and normal heart sounds. Exam reveals no gallop and no friction rub.   No murmur heard.  Pulmonary/Chest: Effort normal and breath sounds normal. She has no wheezes. She has no rales.   Musculoskeletal:         General: No tenderness, deformity or edema.      Cervical back: Normal range of motion and neck supple.   Neurological: She is alert and oriented to person, place, and time.   Skin: Skin is warm and dry.        Cardiographics:  EKG: NSR with inferior Q waves  Last known EF:  60-65%    This note was completed in part utilizing M-Modal Fluency Direct Software.  Grammatical errors, random word insertions, spelling mistakes, and incomplete sentences can be an occasional consequence of this system secondary to software limitations, ambient noise, and hardware issues.  If you have any " questions or concerns about the content, text, or information contained within the body of this dictation, please contact the provider for clarification.

## 2024-01-11 PROCEDURE — 93000 ELECTROCARDIOGRAM COMPLETE: CPT | Performed by: INTERNAL MEDICINE

## 2024-01-22 ENCOUNTER — OFFICE VISIT (OUTPATIENT)
Dept: FAMILY MEDICINE CLINIC | Facility: CLINIC | Age: 79
End: 2024-01-22
Payer: MEDICARE

## 2024-01-22 VITALS
SYSTOLIC BLOOD PRESSURE: 118 MMHG | DIASTOLIC BLOOD PRESSURE: 60 MMHG | WEIGHT: 164 LBS | HEIGHT: 61 IN | HEART RATE: 74 BPM | RESPIRATION RATE: 16 BRPM | BODY MASS INDEX: 30.96 KG/M2 | OXYGEN SATURATION: 99 %

## 2024-01-22 DIAGNOSIS — I10 ESSENTIAL HYPERTENSION: Primary | ICD-10-CM

## 2024-01-22 DIAGNOSIS — M85.851 OSTEOPENIA OF NECKS OF BOTH FEMURS: ICD-10-CM

## 2024-01-22 DIAGNOSIS — E78.2 MIXED DYSLIPIDEMIA: ICD-10-CM

## 2024-01-22 DIAGNOSIS — E11.9 TYPE 2 DIABETES MELLITUS WITHOUT COMPLICATION, WITHOUT LONG-TERM CURRENT USE OF INSULIN (HCC): ICD-10-CM

## 2024-01-22 DIAGNOSIS — M85.852 OSTEOPENIA OF NECKS OF BOTH FEMURS: ICD-10-CM

## 2024-01-22 DIAGNOSIS — E03.9 ACQUIRED HYPOTHYROIDISM: ICD-10-CM

## 2024-01-22 DIAGNOSIS — Z78.0 ENCOUNTER FOR OSTEOPOROSIS SCREENING IN ASYMPTOMATIC POSTMENOPAUSAL PATIENT: ICD-10-CM

## 2024-01-22 DIAGNOSIS — Z13.820 ENCOUNTER FOR OSTEOPOROSIS SCREENING IN ASYMPTOMATIC POSTMENOPAUSAL PATIENT: ICD-10-CM

## 2024-01-22 DIAGNOSIS — I25.10 CORONARY ARTERY DISEASE INVOLVING NATIVE CORONARY ARTERY OF NATIVE HEART WITHOUT ANGINA PECTORIS: ICD-10-CM

## 2024-01-22 LAB
CREAT UR-MCNC: 99.5 MG/DL
MICROALBUMIN UR-MCNC: 17 MG/L
MICROALBUMIN/CREAT 24H UR: 17 MG/G CREATININE (ref 0–30)
SL AMB POCT HEMOGLOBIN AIC: 5.7 (ref ?–6.5)

## 2024-01-22 PROCEDURE — 82570 ASSAY OF URINE CREATININE: CPT | Performed by: FAMILY MEDICINE

## 2024-01-22 PROCEDURE — 83036 HEMOGLOBIN GLYCOSYLATED A1C: CPT | Performed by: FAMILY MEDICINE

## 2024-01-22 PROCEDURE — 82043 UR ALBUMIN QUANTITATIVE: CPT | Performed by: FAMILY MEDICINE

## 2024-01-22 PROCEDURE — 99214 OFFICE O/P EST MOD 30 MIN: CPT | Performed by: FAMILY MEDICINE

## 2024-01-22 NOTE — ASSESSMENT & PLAN NOTE
Pt had CABG x 2 on 2/8/23. No chest pain or shortness of breath. Continue current meds. Pt saw Cardiology in January 2024 for follow-up.

## 2024-01-22 NOTE — ASSESSMENT & PLAN NOTE
LDL 59 and LFTs normal in January 2024. Continue atorvastatin 40 mg daily at bedtime. Pt takes fish oil 3600 mg qd.  Advised pt to follow a low cholesterol diet and to exercise on a regular basis.

## 2024-01-22 NOTE — ASSESSMENT & PLAN NOTE
Blood pressure remains good. Continue metoprolol 25 mg bid. Pt advised to continue low Na diet and to exercise on a regular basis.

## 2024-01-22 NOTE — ASSESSMENT & PLAN NOTE
A1C done today and was 5.7. Continue victoza 1.2 mg qd and decrease metformin to 500 mg bid. Advised pt to follow a low carb diet and to exercise on a regular basis. Foot exam done today. Had eye exam in June 2023 by Dr Rapp.     Lab Results   Component Value Date    HGBA1C 5.9 09/11/2023

## 2024-01-22 NOTE — PROGRESS NOTES
Assessment/Plan:         Problem List Items Addressed This Visit        Endocrine    Type 2 diabetes mellitus without complication, without long-term current use of insulin ([facility])     A1C done today and was 5.7. Continue victoza 1.2 mg qd and decrease metformin to 500 mg bid. Advised pt to follow a low carb diet and to exercise on a regular basis. Foot exam done today. Had eye exam in June 2023 by Dr Rapp.     Lab Results   Component Value Date    HGBA1C 5.9 09/11/2023          Relevant Orders    Albumin / creatinine urine ratio    POCT hemoglobin A1c    Hypothyroidism     TSH 0.971 and Free T4 1.11 in January 2024. Continue levothyroxine 88 mcg qd.             Cardiovascular and Mediastinum    Essential hypertension - Primary     Blood pressure remains good. Continue metoprolol 25 mg bid. Pt advised to continue low Na diet and to exercise on a regular basis.          Coronary artery disease involving native coronary artery of native heart without angina pectoris     Pt had CABG x 2 on 2/8/23. No chest pain or shortness of breath. Continue current meds. Pt saw Cardiology in January 2024 for follow-up.            Musculoskeletal and Integument    Osteopenia of necks of both femurs     Last dexascan was in March 2022 and had slight decrease in density. Pt advised to take calcium 1200 mg qd and Vit D 1000 U qd. Pt also advised to perform weight-bearing exercise on a regular basis. Recheck dexascan in March 2024.          Relevant Orders    DXA bone density spine hip and pelvis       Other    Mixed dyslipidemia     LDL 59 and LFTs normal in January 2024. Continue atorvastatin 40 mg daily at bedtime. Pt takes fish oil 3600 mg qd.  Advised pt to follow a low cholesterol diet and to exercise on a regular basis.        Other Visit Diagnoses     Encounter for osteoporosis screening in asymptomatic postmenopausal patient                Subjective:      Patient ID: Delfina Villa is a 78 y.o. female.    Patient here for  follow-up Hypertension, Hyperlipidemia, DM, Coronary Artery Disease, Hypothyroidism, Osteopenia. Patient doing well. No chest pain or shortness of breath. No headaches. Gets diarrhea off and on.         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), Thyroid cancer (HCC), and Urinary tract infection.,  _______________________________________________________________________  Medical Problems:  does not have any pertinent problems on file.,  _______________________________________________________________________  Past Surgical History:   has a past surgical history that includes Coronary stent placement; Carpal tunnel release; Cholecystectomy; Back surgery; Hysterectomy (1989); Oophorectomy (Bilateral, 1997); Mammo (historical); Eye surgery; Colonoscopy; US guided breast biopsy left complete (Left, 03/15/2021); Appendectomy; Gallbladder surgery; US breast needle loc left (Left, 05/06/2021); US guided breast biopsy left complete (Left, 05/06/2021); US guidance breast biopsy left each additional (Left, 05/06/2021); Spine surgery; Thyroidectomy; Brain surgery (1993); Mastectomy w/ sentinel node biopsy (Left, 07/13/2021); Mastectomy (Left, 07/13/2021); Breast biopsy; Cervical fusion; Band hemorrhoidectomy; Angioplasty; Colonoscopy; Upper gastrointestinal endoscopy; Cardiac catheterization (N/A, 12/7/2022); and pr coronary artery byp w/vein & artery graft 2 vein (N/A,  2/8/2023).,  _______________________________________________________________________  Family History:  family history includes Arthritis in her family; Asthma in her daughter, sister, and son; Breast cancer (age of onset: 45) in her other and other; Breast cancer (age of onset: 50) in her maternal aunt; Cancer in her brother, family, and sister; Dementia in her mother; Depression in her son; Diabetes in her family and mother; Endometrial cancer (age of onset: 68) in her sister; Esophageal cancer (age of onset: 60) in her father; Heart disease in her family and mother; Multiple myeloma (age of onset: 72) in her brother; No Known Problems in her maternal grandmother, paternal aunt, paternal aunt, paternal grandmother, sister, sister, and sister; Prostate cancer in her maternal grandfather and paternal grandfather; Stomach cancer (age of onset: 71) in her brother.,  _______________________________________________________________________  Social History:   reports that she has never smoked. She has never used smokeless tobacco. She reports that she does not drink alcohol and does not use drugs.,  _______________________________________________________________________  Allergies:  is allergic to duloxetine and sulfa antibiotics..  _______________________________________________________________________  Current Outpatient Medications   Medication Sig Dispense Refill   • ALPRAZolam (XANAX) 0.25 mg tablet Take 1 tablet (0.25 mg total) by mouth 3 (three) times a day as needed for anxiety 20 tablet 0   • anastrozole (ARIMIDEX) 1 mg tablet Take 1 tablet (1 mg total) by mouth daily 90 tablet 3   • Aspirin 81 MG CAPS 2 (two) times a day     • atorvastatin (LIPITOR) 40 mg tablet Take 1 tablet (40 mg total) by mouth daily 90 tablet 3   • clotrimazole-betamethasone (LOTRISONE) 1-0.05 % cream Apply topically 2 (two) times a day 45 g 3   • Insulin Pen Needle (Sure Comfort Pen Needles) 31G X 5 MM MISC by Does not apply route daily 100  "each 3   • Lancets (OneTouch Delica Plus [id number]) MISC Use once a day to check blood sugar 100 each 3   • levothyroxine 88 mcg tablet Take 1 tablet (88 mcg total) by mouth daily 90 tablet 0   • liraglutide (VICTOZA) injection Inject 1.2 mg under the skin daily at bedtime      • metFORMIN (GLUCOPHAGE) 1000 MG tablet TAKE 1 TABLET(1000 MG) BY MOUTH TWICE DAILY WITH MEALS 180 tablet 1   • metoprolol tartrate (LOPRESSOR) 25 mg tablet TAKE 1/2 TABLET(12.5 MG) BY MOUTH EVERY 12 HOURS 90 tablet 1   • OneTouch Ultra test strip Use 1 each daily Use as instructed 100 each 3   • pregabalin (LYRICA) 75 mg capsule Take 1 capsule (75 mg total) by mouth 2 (two) times a day 180 capsule 1     No current facility-administered medications for this visit.     _______________________________________________________________________  Review of Systems   Constitutional:  Negative for fatigue and unexpected weight change.   Respiratory:  Negative for cough, chest tightness and shortness of breath.    Cardiovascular:  Negative for chest pain and palpitations.   Gastrointestinal:  Positive for diarrhea. Negative for abdominal pain, constipation, nausea and vomiting.   Genitourinary:  Negative for difficulty urinating.   Musculoskeletal:  Positive for arthralgias and gait problem.   Skin:  Negative for rash.   Neurological:  Negative for dizziness and headaches.         Objective:  Vitals:    01/22/24 1322   BP: 118/60   BP Location: Left arm   Patient Position: Sitting   Cuff Size: Large   Pulse: 74   Resp: 16   SpO2: 99%   Weight: 74.4 kg (164 lb)   Height: 5' 1\" (1.549 m)     Body mass index is 30.99 kg/m².     Physical Exam  Vitals and nursing note reviewed.   Constitutional:       Appearance: Normal appearance. She is well-developed.      Comments: Walks with cane   HENT:      Head: Normocephalic and atraumatic.   Cardiovascular:      Rate and Rhythm: Normal rate and regular rhythm.      Pulses: no weak pulses     Heart sounds: Normal " heart sounds. No murmur heard.  Pulmonary:      Effort: Pulmonary effort is normal. No respiratory distress.      Breath sounds: Normal breath sounds. No wheezing.   Musculoskeletal:      Cervical back: Normal range of motion and neck supple.      Right lower leg: No edema.      Left lower leg: No edema.   Feet:      Right foot:      Skin integrity: No ulcer, skin breakdown, erythema, warmth, callus or dry skin.      Left foot:      Skin integrity: No ulcer, skin breakdown, erythema, warmth, callus or dry skin.   Lymphadenopathy:      Cervical: No cervical adenopathy.   Neurological:      Mental Status: She is alert and oriented to person, place, and time.   Psychiatric:         Mood and Affect: Mood normal.         Behavior: Behavior normal.         Thought Content: Thought content normal.         Judgment: Judgment normal.       Patient's shoes and socks removed.    Right Foot/Ankle   Right Foot Inspection  Skin Exam: skin normal and skin intact. No dry skin, no warmth, no callus, no erythema, no maceration, no abnormal color, no pre-ulcer, no ulcer and no callus.     Sensory   Proprioception: intact      Vascular  Capillary refills: < 3 seconds      Left Foot/Ankle  Left Foot Inspection  Skin Exam: skin normal and skin intact. No dry skin, no warmth, no erythema, no maceration, normal color, no pre-ulcer, no ulcer and no callus.     Sensory   Proprioception: intact      Vascular  Capillary refills: < 3 seconds      Assign Risk Category  No deformity present  No loss of protective sensation  No weak pulses  Risk: 0

## 2024-01-26 ENCOUNTER — OFFICE VISIT (OUTPATIENT)
Dept: OBGYN CLINIC | Facility: CLINIC | Age: 79
End: 2024-01-26
Payer: MEDICARE

## 2024-01-26 VITALS
BODY MASS INDEX: 30.96 KG/M2 | HEIGHT: 61 IN | SYSTOLIC BLOOD PRESSURE: 138 MMHG | WEIGHT: 164 LBS | DIASTOLIC BLOOD PRESSURE: 83 MMHG | HEART RATE: 69 BPM

## 2024-01-26 DIAGNOSIS — S42.201A CLOSED FRACTURE OF PROXIMAL END OF RIGHT HUMERUS, UNSPECIFIED FRACTURE MORPHOLOGY, INITIAL ENCOUNTER: Primary | ICD-10-CM

## 2024-01-26 PROCEDURE — 99212 OFFICE O/P EST SF 10 MIN: CPT | Performed by: ORTHOPAEDIC SURGERY

## 2024-01-29 NOTE — PROGRESS NOTES
Orthopedic Sports Medicine Follow-up Patient Visit     Assesment:   78 y.o. female with mildly displaced proximal humerus fracture     Plan:    Patient has made good progress since last visit.  She continues to have some stiffness on exam and mild intermittent pain.  But she is accomplish all of her ADLs and is happy with her progress.  At this point she is cleared to return to all activities as tolerated.  She is can follow-up as needed in the future.  If she has significant pain.  We discussed that there may be additional options for injections versus surgical intervention but she continues to wish to avoid that if at all possible.      Chief Complaint   Patient presents with    Right Shoulder - Follow-up       History of Present Illness:    The patient is a 78 y.o. female, follows up for proximal humerus fracture.  She continues to do better and better.  She still notices stiffness but it is not stopping her from accomplishing her normal activities.  She has been happy with her progress in physical therapy.  She denies any significant numbness or tingling.    Past Medical, Social and Family History:  Past Medical History:   Diagnosis Date    Abdominal pain     LLQ on occasion    Angina pectoris (HCC)     Arthritis     Breast cancer (HCC) 07/01/2021    Cancer (HCC)     breast left    Colon polyp     Constipation     at times    Coronary artery disease     Diabetes mellitus (HCC)     Diarrhea     at times    Disease of thyroid gland     Hemorrhoids     Hypercholesterolemia     Hypertension     Neuropathy     both legs and feet    Obesity     Osteoporosis     Otitis media     Ovarian ca (HCC) 1997    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HCC)     Shingles     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    Thyroid cancer (HCC)     Urinary tract infection      Past Surgical History:   Procedure Laterality Date    ANGIOPLASTY      stent x 1    APPENDECTOMY      BACK SURGERY      BAND HEMORRHOIDECTOMY      BRAIN SURGERY  1993     meningioma    BREAST BIOPSY      CARDIAC CATHETERIZATION N/A 12/7/2022    Procedure: Cardiac catheterization;  Surgeon: Anjali Bush MD;  Location: [facility];  Service: Cardiology    CARPAL TUNNEL RELEASE      CERVICAL FUSION      CHOLECYSTECTOMY      COLONOSCOPY      pt see Dr. Cleary. Up to date with her colonoscopy.    COLONOSCOPY      CORONARY STENT PLACEMENT      Dec 2015    EYE SURGERY      cataract surgery    GALLBLADDER SURGERY      HYSTERECTOMY  1989    MAMMO (HISTORICAL)      up to date. see gyn    MASTECTOMY Left 07/13/2021    MASTECTOMY W/ SENTINEL NODE BIOPSY Left 07/13/2021    Procedure: BREAST ROSLYN  DIRECTED MASTECTOMY, SENTINEL LYMPH NODE BIOPSY, LYMPHATIC MAPPING WITH BLUE DYE AND RADIOACTIVE DYE (INJECT AT 1500 BY DR ANDREWS IN THE OR);  Surgeon: Anthony Andrews MD;  Location: [facility];  Service: Surgical Oncology    OOPHORECTOMY Bilateral 1997    OR CORONARY ARTERY BYP W/VEIN & ARTERY GRAFT 2 VEIN N/A 2/8/2023    Procedure: CORONARY ARTERY BYPASS GRAFT (CABG) X 2 VESSELS GSV-->OM1, LIMA-->LAD; EVH  LEFT LEG w/ RADAH;  Surgeon: Javan Allen MD;  Location: [facility];  Service: Cardiac Surgery    SPINE SURGERY      THYROIDECTOMY      UPPER GASTROINTESTINAL ENDOSCOPY      US BREAST NEEDLE LOC LEFT Left 05/06/2021    US GUIDANCE BREAST BIOPSY LEFT EACH ADDITIONAL Left 05/06/2021    US GUIDED BREAST BIOPSY LEFT COMPLETE Left 03/15/2021    US GUIDED BREAST BIOPSY LEFT COMPLETE Left 05/06/2021     Allergies   Allergen Reactions    Duloxetine Other (See Comments)     Extreme somnolence/lethargy    Sulfa Antibiotics Rash     Current Outpatient Medications on File Prior to Visit   Medication Sig Dispense Refill    ALPRAZolam (XANAX) 0.25 mg tablet Take 1 tablet (0.25 mg total) by mouth 3 (three) times a day as needed for anxiety 20 tablet 0    anastrozole (ARIMIDEX) 1 mg tablet Take 1 tablet (1 mg total) by mouth daily 90 tablet 3    Aspirin 81 MG CAPS 2 (two) times a day      atorvastatin  "(LIPITOR) 40 mg tablet Take 1 tablet (40 mg total) by mouth daily 90 tablet 3    clotrimazole-betamethasone (LOTRISONE) 1-0.05 % cream Apply topically 2 (two) times a day 45 g 3    Insulin Pen Needle (Sure Comfort Pen Needles) 31G X 5 MM MISC by Does not apply route daily 100 each 3    Lancets (OneTouch Delica Plus [id number]) MISC Use once a day to check blood sugar 100 each 3    levothyroxine 88 mcg tablet Take 1 tablet (88 mcg total) by mouth daily 90 tablet 0    liraglutide (VICTOZA) injection Inject 1.2 mg under the skin daily at bedtime       metFORMIN (GLUCOPHAGE) 1000 MG tablet TAKE 1 TABLET(1000 MG) BY MOUTH TWICE DAILY WITH MEALS 180 tablet 1    metoprolol tartrate (LOPRESSOR) 25 mg tablet TAKE 1/2 TABLET(12.5 MG) BY MOUTH EVERY 12 HOURS 90 tablet 1    OneTouch Ultra test strip Use 1 each daily Use as instructed 100 each 3    pregabalin (LYRICA) 75 mg capsule Take 1 capsule (75 mg total) by mouth 2 (two) times a day 180 capsule 1     No current facility-administered medications on file prior to visit.     Social History     Socioeconomic History    Marital status:      Spouse name: Not on file    Number of children: Not on file    Years of education: Not on file    Highest education level: Not on file   Occupational History    Not on file   Tobacco Use    Smoking status: Never    Smokeless tobacco: Never   Vaping Use    Vaping status: Never Used   Substance and Sexual Activity    Alcohol use: Never    Drug use: Never    Sexual activity: Not Currently     Partners: Male     Birth control/protection: Post-menopausal, None   Other Topics Concern    Not on file   Social History Narrative    · Tobacco smoking status:   Never smoker      This question's title has changed from \"Smoking status\" to \"Tobacco smoking status\" with the 19.7 update. Please use this field only for documenting tobacco smoking behavior. To accommodate this change, new fields for documenting smokeless tobacco and e-cigarette/vape " usage have been added.     · Smoking - how much          None  1 PPW  2 PPW  1/4 PPD  1/2 PPD  1 PPD  1 1/2 PPD  2 PPD  3+ PPD          NOTE     · Smokeless tobacco status          Never used smokeless tobacco  Former smokeless tobacco user  Current snuff user  Currently chews tobacco  Currently uses moist powdered tobacco  ----  Not indicated  Not tolerated  Patient refused          NOTE     · Tobacco-years of use               NOTE     · E-cigarette/vape status          Never used electronic cigarettes  Former user of electronic cigarettes  Current user of electronic cigarettes          NOTE     · Most recent tobacco use screenin2018      · Alcohol intake:   None         Per laine     Social Determinants of Health     Financial Resource Strain: Low Risk  (2023)    Overall Financial Resource Strain (CARDIA)     Difficulty of Paying Living Expenses: Not hard at all   Food Insecurity: No Food Insecurity (2023)    Hunger Vital Sign     Worried About Running Out of Food in the Last Year: Never true     Ran Out of Food in the Last Year: Never true   Transportation Needs: No Transportation Needs (2023)    PRAPARE - Transportation     Lack of Transportation (Medical): No     Lack of Transportation (Non-Medical): No   Physical Activity: Not on file   Stress: Not on file   Social Connections: Not on file   Intimate Partner Violence: Not on file   Housing Stability: Low Risk  (2023)    Housing Stability Vital Sign     Unable to Pay for Housing in the Last Year: No     Number of Places Lived in the Last Year: 1     Unstable Housing in the Last Year: No         I have reviewed the past medical, surgical, social and family history, medications and allergies as documented in the EMR.    Review of systems: ROS is negative other than that noted in the HPI.  Constitutional: Negative for fatigue and fever.   HENT: Negative for sore throat.    Respiratory: Negative for shortness of breath.   "  Cardiovascular: Negative for chest pain.   Gastrointestinal: Negative for abdominal pain.   Endocrine: Negative for cold intolerance and heat intolerance.   Genitourinary: Negative for flank pain.   Musculoskeletal: Negative for back pain.   Skin: Negative for rash.   Allergic/Immunologic: Negative for immunocompromised state.   Neurological: Negative for dizziness.   Psychiatric/Behavioral: Negative for agitation.      Physical Exam:    Blood pressure 138/83, pulse 69, height 5' 1\" (1.549 m), weight 74.4 kg (164 lb), currently breastfeeding.    General/Constitutional: NAD, well developed, well nourished  HENT: Normocephalic, atraumatic  CV: Intact distal pulses, regular rate  Resp: No respiratory distress or labored breathing  Abdomen: soft, nondistended, non tender   Lymphatic: No lymphadenopathy palpated  Neuro: Alert and Oriented x 3, no focal deficits  Psych: Normal mood, normal affect  Skin: Warm, dry, no rashes, no erythema      Shoulder Exam:      Inspection: No ecchymosis, edema, or deformity. No visualized wounds or skin lesions   Palpation: Anterior shoulder  Active Motion:       FF: 110°       ER: 30°        IR: R Sacrum  Strength: 5/5 empty can, 5/5 ER,  5/5 IR   Sensory - SILT in the Radial / Ulnar / Median / Axillary nerve distributions  Motor - AIN / PIN / Radial / Ulnar / Median / Axillary motor nerves in tact  Palpable Radial pulse  Cap refill <2secs in all digits      Imaging  No new imaging today    "

## 2024-02-07 ENCOUNTER — OFFICE VISIT (OUTPATIENT)
Dept: SURGICAL ONCOLOGY | Facility: CLINIC | Age: 79
End: 2024-02-07
Payer: MEDICARE

## 2024-02-07 ENCOUNTER — TELEPHONE (OUTPATIENT)
Age: 79
End: 2024-02-07

## 2024-02-07 VITALS
DIASTOLIC BLOOD PRESSURE: 84 MMHG | OXYGEN SATURATION: 96 % | BODY MASS INDEX: 31.34 KG/M2 | WEIGHT: 166 LBS | RESPIRATION RATE: 18 BRPM | HEIGHT: 61 IN | TEMPERATURE: 97.2 F | HEART RATE: 68 BPM | SYSTOLIC BLOOD PRESSURE: 128 MMHG

## 2024-02-07 DIAGNOSIS — Z17.0 MALIGNANT NEOPLASM OF OVERLAPPING SITES OF LEFT BREAST IN FEMALE, ESTROGEN RECEPTOR POSITIVE: Primary | ICD-10-CM

## 2024-02-07 DIAGNOSIS — Z15.01 MONOALLELIC MUTATION OF CHEK2 GENE IN FEMALE PATIENT: ICD-10-CM

## 2024-02-07 DIAGNOSIS — Z15.09 MONOALLELIC MUTATION OF CHEK2 GENE IN FEMALE PATIENT: ICD-10-CM

## 2024-02-07 DIAGNOSIS — Z15.02 MONOALLELIC MUTATION OF CHEK2 GENE IN FEMALE PATIENT: ICD-10-CM

## 2024-02-07 DIAGNOSIS — Z15.89 MONOALLELIC MUTATION OF CHEK2 GENE IN FEMALE PATIENT: ICD-10-CM

## 2024-02-07 DIAGNOSIS — C50.812 MALIGNANT NEOPLASM OF OVERLAPPING SITES OF LEFT BREAST IN FEMALE, ESTROGEN RECEPTOR POSITIVE: Primary | ICD-10-CM

## 2024-02-07 DIAGNOSIS — E03.9 ACQUIRED HYPOTHYROIDISM: ICD-10-CM

## 2024-02-07 DIAGNOSIS — Z79.811 USE OF ANASTROZOLE (ARIMIDEX): ICD-10-CM

## 2024-02-07 PROCEDURE — 99214 OFFICE O/P EST MOD 30 MIN: CPT

## 2024-02-07 RX ORDER — LEVOTHYROXINE SODIUM 88 UG/1
88 TABLET ORAL DAILY
Qty: 90 TABLET | Refills: 1 | Status: SHIPPED | OUTPATIENT
Start: 2024-02-07

## 2024-02-07 NOTE — TELEPHONE ENCOUNTER
Reason for call:   [x] Refill   [] Prior Auth  [] Other:     Office:   [x] PCP/Provider -   [] Specialty/Provider -     Medication:  levothyroxine 88 mcg tablet    Quantity: #90    Pharmacy: [facility] #[id number] - [place] - [address] [facility] [phone]    Does the patient have enough for 3 days?   [x] Yes   [] No - Send as HP to POD

## 2024-02-07 NOTE — TELEPHONE ENCOUNTER
Patient called the RX Refill Line. Message is being forwarded to the office.     Patient is requesting a call regarding her victoza. She would like to know if it is at the office to .    Please contact patient at

## 2024-02-07 NOTE — PROGRESS NOTES
Surgical Oncology Follow Up       [address] [facility] SURGICAL ONCOLOGY [place]  [address] [facility] [place] [address]    Delfina Villa  1945  [id number]  [address] [facility] SURGICAL ONCOLOGY [place]  [address] [facility] [address]    Chief Complaint   Patient presents with   • office visit       Assessment/Plan:  1. 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, estrogen receptor positive   - 6 mo follow up  - Mammo diagnostic right w 3d & cad; Future    2. Use of anastrozole (Arimidex)  - continue use per medical oncology    3. Monoallelic mutation of CHEK2 gene in female patient       Discussion/Summary: Patient is a 78-year-old female presenting today for six-month follow-up for left breast cancer diagnosed in March 2021. Pathology revealed invasive mammary carcinoma ER/MI+, HER2-, with extensive hormone + DCIS. She had genetic testing which revealed a CHEK2 mutation. She had a left breast mastectomy with sentinel node biopsy with Dr. Montejo. She is currently on anastrozole. We will get her scheduled for mammogram in July. There were no concerns on her cbe. She states that she fell and fractured her humerus in Sept of last year, and recently had another fall in January of this year but was not seriously injured. I will see the patient back in 6 months or sooner should the need arise. She was instructed to call with any questions or concerns prior to this time. All questions were answered today.     History of Present Illness:     Oncology History   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HCC)   8/27/2013 Initial Diagnosis    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HCC)     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, estrogen receptor positive    3/15/2021 Biopsy    Left breast ultrasound-guided biopsy  12 o'clock, 5 cm from nipple  Ductal carcinoma in situ  Grade 2        Concordant.  Malignancy appears unifocal; masses cover 2.6 cm. US left axilla negative. Right breast clear.     4/19/2021 Genetic Testing    One pathogenic (low penetrance) variant identified in CHEK2  Total of 23 genes evaluated  [employer]     5/6/2021 Observation    Imaging was reviewed prior to ROSLYN clip insertion  Additional findings in the left breast on ultrasound; 2 additional biopsies were recommended     5/6/2021 Biopsy    Left breast ultrasound-guided biopsy  A. 1 o'clock, 4 cm from nipple  Ductal carcinoma in situ  Grade 2  ,     B. 11 o'clock, 9 cm from nipple  Invasive carcinoma of no special type (ductal)  Grade 1  ER 90, MI 90, HER2 1+  Lymphovascular invasion not identified    Concordant. Malignancy is multifocal and spans at least 8.5 cm in 2 directions. ROSLYN  clip placed at 12:00, 5cmfn biopsy site.     7/13/2021 Surgery    Left breast ROSLYN  directed mastectomy with sentinel lymph node biopsy  Invasive mammary carcinoma of no special type (ductal)  Grade 1  8 mm  Extensive DCIS (size cannot be determined, throughout all quadrants)  Margins negative  0/1 Lymph node  Anatomic/Prognostic Stage IA     7/26/2021 -  Cancer Staged    Staging form: Breast, AJCC 8th Edition  - Pathologic stage from 7/26/2021: pT1b, pN0, cM0, GX, ER+, MI+, HER2- - Signed by BERNADETTE Reyes on 2/7/2024  Stage prefix: Initial diagnosis  Nuclear grade: G2  Multigene prognostic tests performed: None  Histologic grading system: 3 grade system       8/11/2021 -  Hormone Therapy    Anastrozole 1 mg daily  Dr. Cordon          -Interval History: Patient is a 78-year-old female presenting today for six-month follow-up for left breast cancer diagnosed in March 2021. She is currently on anastrozole. Patient denies changes on her breast exam. She denies persistent headaches, bone pain, back pain, shortness of breath, or abdominal pain.      Review of Systems:  Review of Systems   Constitutional:  Negative for  activity change, appetite change, fatigue and unexpected weight change.   Respiratory:  Negative for cough and shortness of breath.    Cardiovascular:  Negative for chest pain.   Gastrointestinal:  Negative for abdominal pain, diarrhea, nausea and vomiting.   Endocrine: Negative for heat intolerance.   Musculoskeletal:  Positive for gait problem (wheelchair and cane). Negative for arthralgias, back pain and myalgias.   Skin:  Negative for rash.   Neurological:  Negative for weakness and headaches.   Hematological:  Negative for adenopathy.       Patient Active Problem List   Diagnosis   • Coronary artery disease involving native coronary artery of native heart without angina pectoris   • Type 2 diabetes mellitus without complication, without long-term current use of insulin (HCC)   • Mixed dyslipidemia   • Essential hypertension   • Abnormal carotid ultrasound   • Hypothyroidism   • Spinal stenosis of lumbar region   • Osteopenia of necks of both femurs   • 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HCC)   • S/P lumbar fusion   • Bilateral leg edema   • 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, estrogen receptor positive    • Class 2 obesity in adult   • Monoallelic mutation of CHEK2 gene in female patient   • Use of anastrozole (Arimidex)   • Neuropathy   • Non-ST elevation myocardial infarction (NSTEMI) (HCC)   • History of PTCA   • H/O heart artery stent   • Colon polyp   • S/P CABG x 2   • Platelets decreased (HCC)   • Closed fracture of right proximal humerus     Past Medical History:   Diagnosis Date   • Abdominal pain     LLQ on occasion   • Angina pectoris (HCC)    • Arthritis    • Breast cancer (HCC) 07/01/2021   • Cancer (HCC)     breast left   • Colon polyp    • Constipation     at times   • Coronary artery disease    • Diabetes mellitus (HCC)    • Diarrhea     at times   • Disease of thyroid gland    • Hemorrhoids    • Hypercholesterolemia    • Hypertension    • Neuropathy     both legs  and feet   • Obesity    • Osteoporosis    • Otitis media    • Ovarian ca (HCC) 1997   • 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HCC)    • Shingles    • 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 Cleary. Up to date with her colonoscopy.   • COLONOSCOPY     • CORONARY STENT PLACEMENT      Dec 2015   • EYE SURGERY      cataract surgery   • GALLBLADDER SURGERY     • HYSTERECTOMY  1989   • MAMMO (HISTORICAL)      up to date. see gyn   • MASTECTOMY Left 07/13/2021   • MASTECTOMY W/ SENTINEL NODE BIOPSY Left 07/13/2021    Procedure: BREAST ROSLYN  DIRECTED MASTECTOMY, SENTINEL LYMPH NODE BIOPSY, LYMPHATIC MAPPING WITH BLUE DYE AND RADIOACTIVE DYE (INJECT AT 1500 BY DR ANDREWS IN THE OR);  Surgeon: Anthony Adnrews MD;  Location: [facility];  Service: Surgical Oncology   • OOPHORECTOMY Bilateral 1997   • [place] CORONARY ARTERY BYP W/VEIN & ARTERY GRAFT 2 VEIN N/A 2/8/2023    Procedure: CORONARY ARTERY BYPASS GRAFT (CABG) X 2 VESSELS GSV-->OM1, LIMA-->LAD; EVH  LEFT LEG w/ RADHA;  Surgeon: Javan Allen MD;  Location: [facility];  Service: Cardiac Surgery   • SPINE SURGERY     • THYROIDECTOMY     • UPPER GASTROINTESTINAL ENDOSCOPY     • US BREAST NEEDLE LOC LEFT Left 05/06/2021   • US GUIDANCE BREAST BIOPSY LEFT EACH ADDITIONAL Left 05/06/2021   • US GUIDED BREAST BIOPSY LEFT COMPLETE Left 03/15/2021   • US GUIDED BREAST BIOPSY LEFT COMPLETE Left 05/06/2021     Family History   Problem Relation Age of Onset   • Diabetes  "Mother    • Heart disease Mother    • Dementia Mother    • Esophageal cancer Father 60   • Endometrial cancer Sister 68   • Asthma Sister    • Cancer Sister    • No Known Problems Sister    • No Known Problems Sister    • No Known Problems Sister    • Asthma Daughter    • No Known Problems Maternal Grandmother    • Prostate cancer Maternal Grandfather    • No Known Problems Paternal Grandmother    • Prostate cancer Paternal Grandfather    • Stomach cancer Brother 71   • Multiple myeloma Brother 72   • Cancer Brother    • Asthma Son    • Depression Son    • Breast cancer Maternal Aunt 50   • No Known Problems Paternal Aunt    • No Known Problems Paternal Aunt    • Breast cancer Other 45   • Breast cancer Other 45   • Diabetes Family    • Cancer Family    • Arthritis Family    • Heart disease Family      Social History     Socioeconomic History   • Marital status:      Spouse name: Not on file   • Number of children: Not on file   • Years of education: Not on file   • Highest education level: Not on file   Occupational History   • Not on file   Tobacco Use   • Smoking status: Never   • Smokeless tobacco: Never   Vaping Use   • Vaping status: Never Used   Substance and Sexual Activity   • Alcohol use: Never   • Drug use: Never   • Sexual activity: Not Currently     Partners: Male     Birth control/protection: Post-menopausal, None   Other Topics Concern   • Not on file   Social History Narrative    · Tobacco smoking status:   Never smoker      This question's title has changed from \"Smoking status\" to \"Tobacco smoking status\" with the 19.7 update. Please use this field only for documenting tobacco smoking behavior. To accommodate this change, new fields for documenting smokeless tobacco and e-cigarette/vape usage have been added.     · Smoking - how much          None  1 PPW  2 PPW  1/4 PPD  1/2 PPD  1 PPD  1 1/2 PPD  2 PPD  3+ PPD          NOTE     · Smokeless tobacco status          Never used smokeless " tobacco  Former smokeless tobacco user  Current snuff user  Currently chews tobacco  Currently uses moist powdered tobacco  ----  Not indicated  Not tolerated  Patient refused          NOTE     · Tobacco-years of use               NOTE     · E-cigarette/vape status          Never used electronic cigarettes  Former user of electronic cigarettes  Current user of electronic cigarettes          NOTE     · Most recent tobacco use screenin2018      · Alcohol intake:   None         Per laine     Social Determinants of Health     Financial Resource Strain: Low Risk  (2023)    Overall Financial Resource Strain (CARDIA)    • Difficulty of Paying Living Expenses: Not hard at all   Food Insecurity: No Food Insecurity (2023)    Hunger Vital Sign    • Worried About Running Out of Food in the Last Year: Never true    • Ran Out of Food in the Last Year: Never true   Transportation Needs: No Transportation Needs (2023)    PRAPARE - Transportation    • Lack of Transportation (Medical): No    • Lack of Transportation (Non-Medical): No   Physical Activity: Not on file   Stress: Not on file   Social Connections: Not on file   Intimate Partner Violence: Not on file   Housing Stability: Low Risk  (2023)    Housing Stability Vital Sign    • Unable to Pay for Housing in the Last Year: No    • Number of Places Lived in the Last Year: 1    • Unstable Housing in the Last Year: No       Current Outpatient Medications:   •  ALPRAZolam (XANAX) 0.25 mg tablet, Take 1 tablet (0.25 mg total) by mouth 3 (three) times a day as needed for anxiety, Disp: 20 tablet, Rfl: 0  •  anastrozole (ARIMIDEX) 1 mg tablet, Take 1 tablet (1 mg total) by mouth daily, Disp: 90 tablet, Rfl: 3  •  Aspirin 81 MG CAPS, 2 (two) times a day, Disp: , Rfl:   •  atorvastatin (LIPITOR) 40 mg tablet, Take 1 tablet (40 mg total) by mouth daily, Disp: 90 tablet, Rfl: 3  •  clotrimazole-betamethasone (LOTRISONE) 1-0.05 % cream, Apply topically 2 (two)  times a day, Disp: 45 g, Rfl: 3  •  Lancets (OneTouch Delica Plus [id number]) MISC, Use once a day to check blood sugar, Disp: 100 each, Rfl: 3  •  levothyroxine 88 mcg tablet, Take 1 tablet (88 mcg total) by mouth daily, Disp: 90 tablet, Rfl: 1  •  liraglutide (VICTOZA) injection, Inject 1.2 mg under the skin daily at bedtime , Disp: , Rfl:   •  metFORMIN (GLUCOPHAGE) 1000 MG tablet, TAKE 1 TABLET(1000 MG) BY MOUTH TWICE DAILY WITH MEALS, Disp: 180 tablet, Rfl: 1  •  metoprolol tartrate (LOPRESSOR) 25 mg tablet, TAKE 1/2 TABLET(12.5 MG) BY MOUTH EVERY 12 HOURS, Disp: 90 tablet, Rfl: 1  •  OneTouch Ultra test strip, Use 1 each daily Use as instructed, Disp: 100 each, Rfl: 3  •  pregabalin (LYRICA) 75 mg capsule, Take 1 capsule (75 mg total) by mouth 2 (two) times a day, Disp: 180 capsule, Rfl: 1  •  Insulin Pen Needle (Sure Comfort Pen Needles) 31G X 5 MM MISC, by Does not apply route daily (Patient not taking: Reported on 2/7/2024), Disp: 100 each, Rfl: 3  Allergies   Allergen Reactions   • Duloxetine Other (See Comments)     Extreme somnolence/lethargy   • Sulfa Antibiotics Rash     Vitals:    02/07/24 1516   BP: 128/84   Pulse: 68   Resp: 18   Temp: (!) 97.2 °F (36.2 °C)   SpO2: 96%       Physical Exam  Constitutional:       General: She is not in acute distress.     Appearance: Normal appearance.   Cardiovascular:      Rate and Rhythm: Normal rate and regular rhythm.      Pulses: Normal pulses.      Heart sounds: Normal heart sounds.   Pulmonary:      Effort: Pulmonary effort is normal.      Breath sounds: Normal breath sounds.   Chest:      Chest wall: No mass.   Breasts:     Right: No swelling, bleeding, inverted nipple, mass, nipple discharge, skin change or tenderness.      Left: No swelling, bleeding, mass, skin change or tenderness.       Abdominal:      General: Abdomen is flat.      Palpations: Abdomen is soft.   Lymphadenopathy:      Upper Body:      Right upper body: No supraclavicular, axillary or  pectoral adenopathy.      Left upper body: No supraclavicular, axillary or pectoral adenopathy.   Skin:     General: Skin is warm.   Neurological:      General: No focal deficit present.      Mental Status: She is alert and oriented to person, place, and time.   Psychiatric:         Mood and Affect: Mood normal.         Behavior: Behavior normal.           Results:    Imaging  No results found.    I reviewed the above imaging data.      Advance Care Planning/Advance Directives:  Discussed disease status, cancer treatment plans and/or cancer treatment goals with the patient.

## 2024-02-09 ENCOUNTER — TELEPHONE (OUTPATIENT)
Dept: MAMMOGRAPHY | Facility: CLINIC | Age: 79
End: 2024-02-09

## 2024-02-14 NOTE — TELEPHONE ENCOUNTER
Spoke to pt and advised we do not have the victoza. She spoke to the company and they are running behind and should be delivered soon to our office

## 2024-03-06 PROBLEM — Z86.010 PERSONAL HISTORY OF COLONIC POLYPS: Status: ACTIVE | Noted: 2022-07-21

## 2024-03-06 PROBLEM — I25.2 OLD MYOCARDIAL INFARCTION: Status: ACTIVE | Noted: 2022-07-21

## 2024-03-06 PROBLEM — I25.2 OLD MYOCARDIAL INFARCTION: Status: ACTIVE | Noted: 2024-03-06

## 2024-03-06 PROBLEM — Z86.0100 PERSONAL HISTORY OF COLONIC POLYPS: Status: ACTIVE | Noted: 2022-07-21

## 2024-03-06 PROBLEM — Z86.0100 PERSONAL HISTORY OF COLONIC POLYPS: Status: ACTIVE | Noted: 2024-03-06

## 2024-03-06 PROBLEM — Z86.010 PERSONAL HISTORY OF COLONIC POLYPS: Status: ACTIVE | Noted: 2024-03-06

## 2024-04-05 ENCOUNTER — OFFICE VISIT (OUTPATIENT)
Dept: FAMILY MEDICINE CLINIC | Facility: CLINIC | Age: 79
End: 2024-04-05
Payer: MEDICARE

## 2024-04-05 VITALS
HEART RATE: 72 BPM | SYSTOLIC BLOOD PRESSURE: 128 MMHG | HEIGHT: 61 IN | BODY MASS INDEX: 30.96 KG/M2 | DIASTOLIC BLOOD PRESSURE: 68 MMHG | RESPIRATION RATE: 16 BRPM | WEIGHT: 164 LBS

## 2024-04-05 DIAGNOSIS — G89.29 CHRONIC BILATERAL LOW BACK PAIN, UNSPECIFIED WHETHER SCIATICA PRESENT: Primary | ICD-10-CM

## 2024-04-05 DIAGNOSIS — E11.9 TYPE 2 DIABETES MELLITUS WITHOUT COMPLICATION, WITHOUT LONG-TERM CURRENT USE OF INSULIN (HCC): ICD-10-CM

## 2024-04-05 DIAGNOSIS — E78.2 MIXED DYSLIPIDEMIA: ICD-10-CM

## 2024-04-05 DIAGNOSIS — M54.50 CHRONIC BILATERAL LOW BACK PAIN, UNSPECIFIED WHETHER SCIATICA PRESENT: Primary | ICD-10-CM

## 2024-04-05 DIAGNOSIS — I10 ESSENTIAL HYPERTENSION: ICD-10-CM

## 2024-04-05 PROCEDURE — G2211 COMPLEX E/M VISIT ADD ON: HCPCS | Performed by: FAMILY MEDICINE

## 2024-04-05 PROCEDURE — 99213 OFFICE O/P EST LOW 20 MIN: CPT | Performed by: FAMILY MEDICINE

## 2024-04-05 NOTE — ASSESSMENT & PLAN NOTE
A1C 5.7 in January 2024. Continue victoza 1.2 mg qd and metformin 500 mg bid. Advised pt to follow a low carb diet and to exercise on a regular basis. Foot exam done in January 2024. Had eye exam in June 2023 by Dr Rapp. Urine albumin/creatinine ratio done in January 2024.    Lab Results   Component Value Date    HGBA1C 5.7 01/22/2024

## 2024-04-05 NOTE — ASSESSMENT & PLAN NOTE
Blood pressure good. Continue metoprolol 25 mg bid. Pt advised to continue low Na diet and to exercise on a regular basis.

## 2024-04-05 NOTE — ASSESSMENT & PLAN NOTE
Patient has had it for past few months since had a fall at home. Will check x-rays and refer to physical therapy. Continue advil or tylenol for pain.

## 2024-04-05 NOTE — PROGRESS NOTES
Assessment/Plan:         Problem List Items Addressed This Visit        Cardiovascular and Mediastinum    Essential hypertension     Blood pressure good. Continue metoprolol 25 mg bid. Pt advised to continue low Na diet and to exercise on a regular basis.             Endocrine    Type 2 diabetes mellitus without complication, without long-term current use of insulin ([facility])     A1C 5.7 in January 2024. Continue victoza 1.2 mg qd and metformin 500 mg bid. Advised pt to follow a low carb diet and to exercise on a regular basis. Foot exam done in January 2024. Had eye exam in June 2023 by Dr Rapp. Urine albumin/creatinine ratio done in January 2024.    Lab Results   Component Value Date    HGBA1C 5.7 01/22/2024             Surgery/Wound/Pain    Low back pain - Primary     Patient has had it for past few months since had a fall at home. Will check x-rays and refer to physical therapy. Continue advil or tylenol for pain.          Relevant Orders    XR spine lumbar 2 or 3 views injury    Ambulatory Referral to Physical Therapy       Other    Mixed dyslipidemia     LDL 59 and LFTs normal in January 2024. Continue atorvastatin 40 mg daily at bedtime. Pt also takes fish oil 3600 mg qd.  Advised pt to follow a low cholesterol diet and to exercise on a regular basis.              Subjective:      Patient ID: Delfina Villa is a 79 y.o. female.    Patient here for lower back pain for past few months. Started after had a fall at home. Hurts to bend or twist. Hurts to walk as well. Better when wakes up in AM. Patient takes tylenol and advil. Pain radiates down left leg at times. Has seem chiropractor. Not getting better.     Back Pain  Pertinent negatives include no abdominal pain, chest pain or headaches.       The following portions of the patient's history were reviewed and updated as appropriate:   Past Medical History:  She has a past medical history of Abdominal pain, Angina pectoris (HCC), Arthritis, Breast cancer  (HCC) (07/01/2021), Cancer (HCC), Colon polyp, Constipation, Coronary artery disease, Diabetes mellitus (HCC), Diarrhea, Disease of thyroid gland, Hemorrhoids, Hypercholesterolemia, Hypertension, Neuropathy, Obesity, Osteoporosis, Otitis media, Ovarian ca (HCC) (1997), 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HCC), Shingles, 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), Thyroid cancer (HCC), and Urinary tract infection.,  _______________________________________________________________________  Medical Problems:  does not have any pertinent problems on file.,  _______________________________________________________________________  Past Surgical History:   has a past surgical history that includes Coronary stent placement; Carpal tunnel release; Cholecystectomy; Back surgery; Hysterectomy (1989); Oophorectomy (Bilateral, 1997); Mammo (historical); Eye surgery; Colonoscopy; US guided breast biopsy left complete (Left, 03/15/2021); Appendectomy; Gallbladder surgery; US breast needle loc left (Left, 05/06/2021); US guided breast biopsy left complete (Left, 05/06/2021); US guidance breast biopsy left each additional (Left, 05/06/2021); Spine surgery; Thyroidectomy; Brain surgery (1993); Mastectomy w/ sentinel node biopsy (Left, 07/13/2021); Mastectomy (Left, 07/13/2021); Breast biopsy; Cervical fusion; Band hemorrhoidectomy; Angioplasty; Colonoscopy; Upper gastrointestinal endoscopy; Cardiac catheterization (N/A, 12/7/2022); and pr coronary artery byp w/vein & artery graft 2 vein (N/A, 2/8/2023).,  _______________________________________________________________________  Family History:  family history includes Arthritis in her family; Asthma in her daughter, sister, and son; Breast cancer (age of onset: 45) in her other and other; Breast cancer (age of onset: 50) in her maternal aunt; Cancer in her brother, family, and sister; Dementia in her mother; Depression in her son; Diabetes in her family and mother; Endometrial cancer (age of  onset: 68) in her sister; Esophageal cancer (age of onset: 60) in her father; Heart disease in her family and mother; Multiple myeloma (age of onset: 72) in her brother; No Known Problems in her maternal grandmother, paternal aunt, paternal aunt, paternal grandmother, sister, sister, and sister; Prostate cancer in her maternal grandfather and paternal grandfather; Stomach cancer (age of onset: 71) in her brother.,  _______________________________________________________________________  Social History:   reports that she has never smoked. She has never used smokeless tobacco. She reports that she does not drink alcohol and does not use drugs.,  _______________________________________________________________________  Allergies:  is allergic to duloxetine and sulfa antibiotics..  _______________________________________________________________________  Current Outpatient Medications   Medication Sig Dispense Refill   • ALPRAZolam (XANAX) 0.25 mg tablet Take 1 tablet (0.25 mg total) by mouth 3 (three) times a day as needed for anxiety 20 tablet 0   • anastrozole (ARIMIDEX) 1 mg tablet Take 1 tablet (1 mg total) by mouth daily 90 tablet 3   • Aspirin 81 MG CAPS 2 (two) times a day     • atorvastatin (LIPITOR) 40 mg tablet Take 1 tablet (40 mg total) by mouth daily 90 tablet 3   • clotrimazole-betamethasone (LOTRISONE) 1-0.05 % cream Apply topically 2 (two) times a day 45 g 3   • Lancets (OneTouch Delica Plus [id number]) MISC Use once a day to check blood sugar 100 each 3   • levothyroxine 88 mcg tablet Take 1 tablet (88 mcg total) by mouth daily 90 tablet 1   • liraglutide (VICTOZA) injection Inject 1.2 mg under the skin daily at bedtime      • metFORMIN (GLUCOPHAGE) 1000 MG tablet TAKE 1 TABLET(1000 MG) BY MOUTH TWICE DAILY WITH MEALS 180 tablet 1   • metoprolol tartrate (LOPRESSOR) 25 mg tablet TAKE 1/2 TABLET(12.5 MG) BY MOUTH EVERY 12 HOURS 90 tablet 1   • OneTouch Ultra test strip Use 1 each daily Use as instructed  "100 each 3   • pregabalin (LYRICA) 75 mg capsule Take 1 capsule (75 mg total) by mouth 2 (two) times a day 180 capsule 1   • Insulin Pen Needle (Sure Comfort Pen Needles) 31G X 5 MM MISC by Does not apply route daily (Patient not taking: Reported on 2/7/2024) 100 each 3     No current facility-administered medications for this visit.     _______________________________________________________________________  Review of Systems   Constitutional:  Negative for fatigue and unexpected weight change.   Respiratory:  Negative for cough, chest tightness and shortness of breath.    Cardiovascular:  Negative for chest pain and palpitations.   Gastrointestinal:  Negative for abdominal pain, constipation, diarrhea, nausea and vomiting.   Genitourinary:  Negative for difficulty urinating.   Musculoskeletal:  Positive for arthralgias, back pain and gait problem.   Skin:  Negative for rash.   Neurological:  Negative for dizziness and headaches.         Objective:  Vitals:    04/05/24 1244   BP: 128/68   BP Location: Left arm   Patient Position: Sitting   Cuff Size: Large   Pulse: 72   Resp: 16   Weight: 74.4 kg (164 lb)   Height: 5' 1\" (1.549 m)     Body mass index is 30.99 kg/m².     Physical Exam  Vitals and nursing note reviewed.   Constitutional:       Appearance: Normal appearance. She is well-developed.   HENT:      Head: Normocephalic and atraumatic.   Cardiovascular:      Rate and Rhythm: Normal rate and regular rhythm.      Heart sounds: Normal heart sounds. No murmur heard.  Pulmonary:      Effort: Pulmonary effort is normal. No respiratory distress.      Breath sounds: Normal breath sounds. No wheezing.   Musculoskeletal:      Cervical back: Normal range of motion and neck supple.      Right lower leg: No edema.      Left lower leg: No edema.      Comments: Tenderness to palpation bilateral lumbar area   Lymphadenopathy:      Cervical: No cervical adenopathy.   Neurological:      Mental Status: She is alert and " oriented to person, place, and time.   Psychiatric:         Mood and Affect: Mood normal.         Behavior: Behavior normal.         Thought Content: Thought content normal.         Judgment: Judgment normal.

## 2024-04-05 NOTE — ASSESSMENT & PLAN NOTE
LDL 59 and LFTs normal in January 2024. Continue atorvastatin 40 mg daily at bedtime. Pt also takes fish oil 3600 mg qd.  Advised pt to follow a low cholesterol diet and to exercise on a regular basis.

## 2024-04-09 ENCOUNTER — HOSPITAL ENCOUNTER (OUTPATIENT)
Dept: RADIOLOGY | Facility: HOSPITAL | Age: 79
Discharge: HOME | End: 2024-04-09
Payer: MEDICARE

## 2024-04-09 ENCOUNTER — APPOINTMENT (OUTPATIENT)
Dept: LAB | Facility: HOSPITAL | Age: 79
End: 2024-04-09
Payer: MEDICARE

## 2024-04-09 DIAGNOSIS — G89.29 CHRONIC BILATERAL LOW BACK PAIN, UNSPECIFIED WHETHER SCIATICA PRESENT: ICD-10-CM

## 2024-04-09 DIAGNOSIS — M54.50 CHRONIC BILATERAL LOW BACK PAIN, UNSPECIFIED WHETHER SCIATICA PRESENT: ICD-10-CM

## 2024-04-09 PROCEDURE — 72100 X-RAY EXAM L-S SPINE 2/3 VWS: CPT

## 2024-04-25 DIAGNOSIS — G62.9 NEUROPATHY: ICD-10-CM

## 2024-04-25 NOTE — TELEPHONE ENCOUNTER
Reason for call:   [x] Refill   [] Prior Auth  [] Other:     Office:   [x] PCP/Provider - Rogerio Mancera  [] Specialty/Provider -     Medication: Pregabalin    Dose/Frequency: 75 mg BID    Quantity: 180    Pharmacy: [facility]    Does the patient have enough for 3 days?   [x] Yes   [] No - Send as HP to POD

## 2024-04-26 RX ORDER — PREGABALIN 75 MG/1
75 CAPSULE ORAL 2 TIMES DAILY
Qty: 180 CAPSULE | Refills: 1 | Status: SHIPPED | OUTPATIENT
Start: 2024-04-26

## 2024-05-01 ENCOUNTER — EVALUATION (OUTPATIENT)
Dept: PHYSICAL THERAPY | Facility: CLINIC | Age: 79
End: 2024-05-01
Payer: MEDICARE

## 2024-05-01 DIAGNOSIS — M54.50 CHRONIC BILATERAL LOW BACK PAIN, UNSPECIFIED WHETHER SCIATICA PRESENT: Primary | ICD-10-CM

## 2024-05-01 DIAGNOSIS — G89.29 CHRONIC BILATERAL LOW BACK PAIN, UNSPECIFIED WHETHER SCIATICA PRESENT: Primary | ICD-10-CM

## 2024-05-01 PROCEDURE — 97162 PT EVAL MOD COMPLEX 30 MIN: CPT | Performed by: PHYSICAL THERAPIST

## 2024-05-01 NOTE — PROGRESS NOTES
PT Evaluation     Today's date: 2024  Patient name: Delfina Villa  : 1945  MRN: [id number]  Referring provider: Rogerio Mancera MD  Dx:   Encounter Diagnosis     ICD-10-CM    1. Chronic bilateral low back pain, unspecified whether sciatica present  M54.50 Ambulatory Referral to Physical Therapy    G89.29                      Assessment  Assessment details: Delfina Villa is a 79 y.o. female who presents with pain, decreased strength, decreased ROM, decreased joint mobility, ambulatory dysfunction, postural dysfunction, and balance dysfunction. Due to these impairments, patient has difficulty performing ADL's, recreational activities, work-related activities, engaging in social activities, ambulation, lifting/carrying, transfers. Patient's clinical presentation is consistent with their referring diagnosis of Chronic bilateral low back pain, unspecified whether sciatica present  (primary encounter diagnosis)  Plan: Ambulatory Referral to Physical Therapy  . Patient has been educated in gait training, home exercise program, and plan of care. Patient would benefit from skilled physical therapy services to address their aforementioned functional limitations and progress towards prior level of function and independence with home exercise program.     Impairments: abnormal gait, abnormal or restricted ROM, activity intolerance, impaired physical strength, lacks appropriate home exercise program, pain with function, poor posture  and poor body mechanics    Goals  Short term goals to be accomplished in 3 weeks:  STG 1: Pt will demo independence with postural management  STG 2: Pt will demo I with HEP to maximize progress between therapy sessions  STG 3: Pt will demo L/S AROM < or = min loss throughout to promote improved functional mobility and body mechanics  STG 4: Pt will demo 1/2 MMT grade core stabilizers to improve lumbar stability with functional challenges  STG 5: Pt will reports pain dec freq and intensity  50%  STG 6: Pt will deny sleep disturbance    Long term goals to be accomplished in 6 weeks:  LTG 1: Pt will demo good body mech with >75% functional challenges to prevent reinjury  LTG 2: Pt will be able to return to walking/standing activity for 20 minutes/hours pain free as per PLOF  LTG 3: Pt will demo Good strength core stabilizers to promote carryover with body mech and posture    Plan  Plan details:  HEP development, stretching, strengthening, A/AA/PROM, joint mobilizations, posture education, STM/MI as needed to reduce muscle tension, muscle reeducation, patient has been educated in Dx, prognosis and plan of care and is in agreement    Patient would benefit from: PT eval and skilled physical therapy  Planned modality interventions: cryotherapy and thermotherapy: hydrocollator packs  Planned therapy interventions: manual therapy, neuromuscular re-education, self care, therapeutic activities, therapeutic exercise and home exercise program  Frequency: 2x week  Duration in weeks: 6  Plan of Care beginning date: 2024  Plan of Care expiration date: 2024  Treatment plan discussed with: patient    Subjective Evaluation    History of Present Illness  Mechanism of injury: Pt reprots that she had a fall a few months ago and as a result has had back pain. States that pain is present when she is standing, walking, sitting, and lifting. She reports that she has had back pain before however fall has made it worse. Denies numbness and tingling. Had back surgery in 2018 Lumbar L3-5.   Patient Goals  Patient goals for therapy: increased strength, independence with ADLs/IADLs, return to sport/leisure activities, return to work, increased motion and decreased pain    Pain  Current pain ratin  At best pain ratin  At worst pain ratin  Quality: dull ache, cramping, pressure, discomfort, knife-like and tight  Relieving factors: relaxation, rest and heat  Aggravating factors: walking, standing, lifting, sitting  and stair climbing  Progression: no change    Treatments  Previous treatment: physical therapy  Current treatment: physical therapy    Objective     Active Range of Motion     Lumbar   Flexion:  with pain  Extension:  with pain  Left lateral flexion:  with pain  Right lateral flexion:  with pain    Strength/Myotome Testing     Left Hip   Planes of Motion   Flexion: 3  Extension: 3  Abduction: 3    Right Hip   Planes of Motion   Flexion: 3  Extension: 3  Abduction: 3    Left Knee   Flexion: 3  Extension: 3    Right Knee   Flexion: 3  Extension: 3    Muscle Activation   Patient able to activate left transverse abdominals and right transverse abdominals.          No auth req     Precautions: fall risk, chronic low back pain   SOC: 05/01/24  POC: 06/12/24  HEP: Access Code: [email or web address]  URL: [email or web address]  Date: 05/01/2024  Prepared by: Ai Mueller    Exercises  - Seated Long Arc Quad  - 1 x daily - 7 x weekly - 3 sets - 10 reps  - Seated Thoracic Extension AROM  - 1 x daily - 7 x weekly - 3 sets - 10 reps      Manuals                                                                 Neuro Re-Ed                                                                                                        Ther Ex                                                                                                                     Ther Activity                                       Gait Training                                       Modalities

## 2024-05-02 DIAGNOSIS — Z00.6 ENCOUNTER FOR EXAMINATION FOR NORMAL COMPARISON OR CONTROL IN CLINICAL RESEARCH PROGRAM: ICD-10-CM

## 2024-05-07 ENCOUNTER — OFFICE VISIT (OUTPATIENT)
Dept: PHYSICAL THERAPY | Facility: CLINIC | Age: 79
End: 2024-05-07
Payer: MEDICARE

## 2024-05-07 DIAGNOSIS — G89.29 CHRONIC BILATERAL LOW BACK PAIN, UNSPECIFIED WHETHER SCIATICA PRESENT: Primary | ICD-10-CM

## 2024-05-07 DIAGNOSIS — M54.50 CHRONIC BILATERAL LOW BACK PAIN, UNSPECIFIED WHETHER SCIATICA PRESENT: Primary | ICD-10-CM

## 2024-05-07 PROCEDURE — 97140 MANUAL THERAPY 1/> REGIONS: CPT | Performed by: PHYSICAL THERAPIST

## 2024-05-07 PROCEDURE — 97110 THERAPEUTIC EXERCISES: CPT | Performed by: PHYSICAL THERAPIST

## 2024-05-07 NOTE — PROGRESS NOTES
Daily Note     Today's date: 2024  Patient name: Delfina Villa  : 1945  MRN: [id number]  Referring provider: Rogerio Mancera MD  Dx:   Encounter Diagnosis     ICD-10-CM    1. Chronic bilateral low back pain, unspecified whether sciatica present  M54.50     G89.29                      Subjective: Pt reports that she is doing her HEP and has noted some improvements in her ROM however, her pain in her left hip continues to be there. Pain today is 5/10.       Objective: See treatment diary below      Assessment: Tolerated treatment fair. Patient demonstrated fatigue post treatment, exhibited good technique with therapeutic exercises, and would benefit from continued PT  Responded well to manual treatment with improvements in symptoms, ROM, and gait pattern. Pt will continue to benefit from skilled PT in order to maximize strength and improve her quality of movement.     Plan: Continue per plan of care.      No auth req     Precautions: fall risk, chronic low back pain   SOC: 24  POC: 24  HEP: Access Code: [email or web address]  URL: [email or web address]  Date: 2024  Prepared by: Ai Mueller    Exercises  - Seated Long Arc Quad  - 1 x daily - 7 x weekly - 3 sets - 10 reps  - Seated Thoracic Extension AROM  - 1 x daily - 7 x weekly - 3 sets - 10 reps      Manuals 24                          STM piriformis left side - IM              LAD             Hip flexor stretch             Neuro Re-Ed                           TA iso seated 5s x 15              LAQ x 15                                                                 Ther Ex              SAQ 5s x 15              Seated repeated extension x 15              Hamstring stretch x 10              Ball sq x 10              Hip abd seated x 10                                                    Ther Activity                                       Gait Training                                       Modalities

## 2024-05-09 ENCOUNTER — OFFICE VISIT (OUTPATIENT)
Dept: PHYSICAL THERAPY | Facility: CLINIC | Age: 79
End: 2024-05-09
Payer: MEDICARE

## 2024-05-09 DIAGNOSIS — M54.50 CHRONIC BILATERAL LOW BACK PAIN, UNSPECIFIED WHETHER SCIATICA PRESENT: Primary | ICD-10-CM

## 2024-05-09 DIAGNOSIS — G89.29 CHRONIC BILATERAL LOW BACK PAIN, UNSPECIFIED WHETHER SCIATICA PRESENT: Primary | ICD-10-CM

## 2024-05-09 PROCEDURE — 97140 MANUAL THERAPY 1/> REGIONS: CPT | Performed by: PHYSICAL THERAPIST

## 2024-05-09 PROCEDURE — 97110 THERAPEUTIC EXERCISES: CPT | Performed by: PHYSICAL THERAPIST

## 2024-05-09 NOTE — PROGRESS NOTES
Daily Note     Today's date: 2024  Patient name: Delfina Villa  : 1945  MRN: [id number]  Referring provider: Rogerio Mancera MD  Dx:   Encounter Diagnosis     ICD-10-CM    1. Chronic bilateral low back pain, unspecified whether sciatica present  M54.50     G89.29                      Subjective: Pt reports that she is feeling better and has noted improvements in symptoms. Level is 4/10 when she is walking and standing. Complains of tightness and tension along her left hip.       Objective: See treatment diary below      Assessment: Tolerated treatment well. Patient  responded well to manual treatment with improvements in symptoms. She does require strengthening of core muscles and hip muscles. Pt will continue to benefit from skilled physical therapy in order to maximize strength and improve quality of life.       Plan: Continue per plan of care.      No auth req     Precautions: fall risk, chronic low back pain   SOC: 24  POC: 24  HEP: Access Code: [email or web address]  URL: [email or web address]  Date: 2024  Prepared by: Ai Mueller    Exercises  - Seated Long Arc Quad  - 1 x daily - 7 x weekly - 3 sets - 10 reps  - Seated Thoracic Extension AROM  - 1 x daily - 7 x weekly - 3 sets - 10 reps      Manuals 24 009/25                         STM piriformis left side - IM  IM             LAD IM             Hip flexor stretch  IM            Neuro Re-Ed                           TA iso seated 5s x 15  5s x 15             LAQ x 15  X 15                                                                Ther Ex              SAQ 5s x 15  5s x 15             Seated repeated extension x 15  X 15             Hamstring stretch x 10  10s x 5 B             Ball sq x 10  X 10             Hip abd seated x 10  X 10              NU x 8 min lvl 1                                      Ther Activity                                       Gait Training                                       Modalities

## 2024-05-13 ENCOUNTER — OFFICE VISIT (OUTPATIENT)
Dept: PHYSICAL THERAPY | Facility: CLINIC | Age: 79
End: 2024-05-13
Payer: MEDICARE

## 2024-05-13 DIAGNOSIS — M54.50 CHRONIC BILATERAL LOW BACK PAIN, UNSPECIFIED WHETHER SCIATICA PRESENT: Primary | ICD-10-CM

## 2024-05-13 DIAGNOSIS — G89.29 CHRONIC BILATERAL LOW BACK PAIN, UNSPECIFIED WHETHER SCIATICA PRESENT: Primary | ICD-10-CM

## 2024-05-13 PROCEDURE — 97110 THERAPEUTIC EXERCISES: CPT | Performed by: PHYSICAL THERAPIST

## 2024-05-13 PROCEDURE — 97140 MANUAL THERAPY 1/> REGIONS: CPT | Performed by: PHYSICAL THERAPIST

## 2024-05-13 NOTE — PROGRESS NOTES
Daily Note     Today's date: 2024  Patient name: Delfina Villa  : 1945  MRN: [id number]  Referring provider: Rogerio Mancera MD  Dx:   Encounter Diagnosis     ICD-10-CM    1. Chronic bilateral low back pain, unspecified whether sciatica present  M54.50     G89.29                      Subjective: Pt reports that she is feeling a little tight and pain increased over the weekend as she did more walking than normal. She states that pain was shooting into her back last night.       Objective: See treatment diary below      Assessment: Tolerated treatment well. Patient demonstrated fatigue post treatment, exhibited good technique with therapeutic exercises, and would benefit from continued PT  Pt demos improvements in gait pattern and ROM of the left hip however, continues to have weakness in her LE and core muscles. Pt will continue to benefit from skilled physical therapy in order to maximize strength and improve quality of life.     Plan: Continue per plan of care.      No auth req     Precautions: fall risk, chronic low back pain   SOC: 24  POC: 24  HEP: Access Code: [email or web address]  URL: [email or web address]  Date: 2024  Prepared by: Ai Mueller    Exercises  - Seated Long Arc Quad  - 1 x daily - 7 x weekly - 3 sets - 10 reps  - Seated Thoracic Extension AROM  - 1 x daily - 7 x weekly - 3 sets - 10 reps      Manuals 24/24                        STM piriformis left side - IM  IM  IM            LAD IM  IM            Hip flexor stretch  IM  IM          Neuro Re-Ed                           TA iso seated 5s x 15  5s x 15  5s x 15            LAQ x 15  X 15  X 15                                                               Ther Ex              SAQ 5s x 15  5s x 15  5s x 15            Seated repeated extension x 15  X 15  X 15            Hamstring stretch x 10  10s x 5 B  10s x 5 B            Ball sq x 10  X 10  X 10            Hip abd seated x 10  X 10  X 10              NU x 8 min lvl 1  10 min lvl 1                                     Ther Activity                                       Gait Training                                       Modalities

## 2024-05-15 ENCOUNTER — OFFICE VISIT (OUTPATIENT)
Dept: PHYSICAL THERAPY | Facility: CLINIC | Age: 79
End: 2024-05-15
Payer: MEDICARE

## 2024-05-15 DIAGNOSIS — G89.29 CHRONIC BILATERAL LOW BACK PAIN, UNSPECIFIED WHETHER SCIATICA PRESENT: Primary | ICD-10-CM

## 2024-05-15 DIAGNOSIS — M54.50 CHRONIC BILATERAL LOW BACK PAIN, UNSPECIFIED WHETHER SCIATICA PRESENT: Primary | ICD-10-CM

## 2024-05-15 PROCEDURE — 97112 NEUROMUSCULAR REEDUCATION: CPT | Performed by: PHYSICAL THERAPIST

## 2024-05-15 PROCEDURE — 97110 THERAPEUTIC EXERCISES: CPT | Performed by: PHYSICAL THERAPIST

## 2024-05-15 PROCEDURE — 97140 MANUAL THERAPY 1/> REGIONS: CPT | Performed by: PHYSICAL THERAPIST

## 2024-05-15 NOTE — PROGRESS NOTES
Daily Note     Today's date: 5/15/2024  Patient name: Delfina Villa  : 1945  MRN: [id number]  Referring provider: Rogerio Mancera MD  Dx:   Encounter Diagnosis     ICD-10-CM    1. Chronic bilateral low back pain, unspecified whether sciatica present  M54.50     G89.29                      Subjective: Pt reports that she has noted improvements in symptoms. She states that since starting therapy she has noted improvements in her movements and decrease pain.     Objective: See treatment diary below      Assessment: Tolerated treatment well. Patient demonstrated fatigue post treatment, exhibited good technique with therapeutic exercises, and would benefit from continued PT      Plan: Continue per plan of care.      No auth req     Precautions: fall risk, chronic low back pain   SOC: 24  POC: 24  HEP: Access Code: [email or web address]  URL: [email or web address]  Date: 2024  Prepared by: Ai Mueller    Exercises  - Seated Long Arc Quad  - 1 x daily - 7 x weekly - 3 sets - 10 reps  - Seated Thoracic Extension AROM  - 1 x daily - 7 x weekly - 3 sets - 10 reps      Manuals 24 009/25 05/13/24 05/15/24                       STM piriformis left side - IM  IM  IM  IM          LAD IM  IM  IM          Hip flexor stretch  IM  IM IM         Neuro Re-Ed                           TA iso seated 5s x 15  5s x 15  5s x 15  5s x 15           LAQ x 15  X 15  X 15  X 15              TA + ball sq x 15              TA + glute sets x 20                                    Ther Ex              SAQ 5s x 15  5s x 15  5s x 15  5s x 15           Seated repeated extension x 15  X 15  X 15  X 15           Hamstring stretch x 10  10s x 5 B  10s x 5 B  SLR x 5 B           Ball sq x 10  X 10  X 10  Supine clamshells x 10 YTB     Supine marches x 10 B YTB             Hip abd seated x 10  X 10  X 10             NU x 8 min lvl 1  10 min lvl 1  10 min lvl 1                                    Ther Activity                                        Gait Training                                       Modalities

## 2024-05-17 ENCOUNTER — TELEPHONE (OUTPATIENT)
Age: 79
End: 2024-05-17

## 2024-05-17 NOTE — TELEPHONE ENCOUNTER
Bibiana, from [facility], called to request the last office note, showing that the patient has diabetes, to be faxed to them at [phone], as the patient was in their office for diabetic shoes.  Spoke to Danielle at the practice who stated she would fax the note over.  Advised Bibiana.  TIMUR.

## 2024-05-20 ENCOUNTER — OFFICE VISIT (OUTPATIENT)
Dept: PHYSICAL THERAPY | Facility: CLINIC | Age: 79
End: 2024-05-20
Payer: MEDICARE

## 2024-05-20 DIAGNOSIS — M54.50 CHRONIC BILATERAL LOW BACK PAIN, UNSPECIFIED WHETHER SCIATICA PRESENT: Primary | ICD-10-CM

## 2024-05-20 DIAGNOSIS — G89.29 CHRONIC BILATERAL LOW BACK PAIN, UNSPECIFIED WHETHER SCIATICA PRESENT: Primary | ICD-10-CM

## 2024-05-20 PROCEDURE — 97112 NEUROMUSCULAR REEDUCATION: CPT | Performed by: PHYSICAL THERAPIST

## 2024-05-20 PROCEDURE — 97110 THERAPEUTIC EXERCISES: CPT | Performed by: PHYSICAL THERAPIST

## 2024-05-20 PROCEDURE — 97140 MANUAL THERAPY 1/> REGIONS: CPT | Performed by: PHYSICAL THERAPIST

## 2024-05-20 NOTE — PROGRESS NOTES
Daily Note     Today's date: 2024  Patient name: Delfina Villa  : 1945  MRN: [id number]  Referring provider: Rogerio Mancera MD  Dx:   Encounter Diagnosis     ICD-10-CM    1. Chronic bilateral low back pain, unspecified whether sciatica present  M54.50     G89.29                      Subjective: Pt reports that she is feeling sore today. States that pain level is 4/10 today as she feels stiff and tightness along her back.       Objective: See treatment diary below      Assessment: Tolerated treatment well. Patient demonstrated fatigue post treatment, exhibited good technique with therapeutic exercises, and would benefit from continued PT  She responded well to repeated extension in a seated position, LTR and KTC stretch. She was able to engage TA muscles after tactile cues were provided. Pt will continue to benefit from skilled physical therapy in order to maximize strength and improve quality of life.     Plan: Continue per plan of care.      No auth req     Precautions: fall risk, chronic low back pain   SOC: 24  POC: 24  HEP: Access Code: [email or web address]  URL: [email or web address]  Date: 2024  Prepared by: Ai Mueller    Exercises  - Seated Long Arc Quad  - 1 x daily - 7 x weekly - 3 sets - 10 reps  - Seated Thoracic Extension AROM  - 1 x daily - 7 x weekly - 3 sets - 10 reps      Manuals 24 009/25 05/13/24 05/15/24 05/20/24                      STM piriformis left side - IM  IM  IM  IM IM          LAD IM  IM  IM IM          Hip flexor stretch  IM  IM IM IM         Neuro Re-Ed                           TA iso seated 5s x 15  5s x 15  5s x 15  5s x 15  5s x 15          LAQ x 15  X 15  X 15  X 15  X 15             TA + ball sq x 15  TA ball sq x 15             TA + glute sets x 20  TA glute sets x 20                                   Ther Ex              SAQ 5s x 15  5s x 15  5s x 15  5s x 15  5s x 15          Seated repeated extension x 15  X 15  X 15  X 15  X 15           Hamstring stretch x 10  10s x 5 B  10s x 5 B  SLR x 5 B  2 x 5 B          Ball sq x 10  X 10  X 10  Supine clamshells x 10 YTB     Supine marches x 10 B YTB    X 10 YTB     Marches x 15 ytb              Hip abd seated x 10  X 10  X 10             NU x 8 min lvl 1  10 min lvl 1  10 min lvl 1  10 min lvl 1 active w/u                                   Ther Activity                                       Gait Training                                       Modalities

## 2024-05-22 ENCOUNTER — OFFICE VISIT (OUTPATIENT)
Dept: PHYSICAL THERAPY | Facility: CLINIC | Age: 79
End: 2024-05-22
Payer: MEDICARE

## 2024-05-22 DIAGNOSIS — G89.29 CHRONIC BILATERAL LOW BACK PAIN, UNSPECIFIED WHETHER SCIATICA PRESENT: Primary | ICD-10-CM

## 2024-05-22 DIAGNOSIS — M54.50 CHRONIC BILATERAL LOW BACK PAIN, UNSPECIFIED WHETHER SCIATICA PRESENT: Primary | ICD-10-CM

## 2024-05-22 PROCEDURE — 97110 THERAPEUTIC EXERCISES: CPT | Performed by: PHYSICAL THERAPIST

## 2024-05-22 PROCEDURE — 97112 NEUROMUSCULAR REEDUCATION: CPT | Performed by: PHYSICAL THERAPIST

## 2024-05-22 PROCEDURE — 97140 MANUAL THERAPY 1/> REGIONS: CPT | Performed by: PHYSICAL THERAPIST

## 2024-05-22 NOTE — PROGRESS NOTES
Daily Note     Today's date: 2024  Patient name: Delfina Villa  : 1945  MRN: [id number]  Referring provider: Rogerio Mancera MD  Dx:   Encounter Diagnosis     ICD-10-CM    1. Chronic bilateral low back pain, unspecified whether sciatica present  M54.50     G89.29                      Subjective: Pt reports that she has increase soreness/tightness along her right hip. Pt reports that she feels it mainly when she is walking and states that she is not able to stand for periods of time due to pain.       Objective: See treatment diary below      Assessment: Tolerated treatment well. Patient demonstrated fatigue post treatment, exhibited good technique with therapeutic exercises, and would benefit from continued PT      Plan: Continue per plan of care.      No auth req     Precautions: fall risk, chronic low back pain   SOC: 24  POC: 24  HEP: Access Code: [email or web address]  URL: [email or web address]  Date: 2024  Prepared by: Ai Mueller    Exercises  - Seated Long Arc Quad  - 1 x daily - 7 x weekly - 3 sets - 10 reps  - Seated Thoracic Extension AROM  - 1 x daily - 7 x weekly - 3 sets - 10 reps      Manuals 24/25 05/13/24 05/15/24 05/20/24 05/22/24                     STM piriformis left side - IM  IM  IM  IM IM  IM        LAD IM  IM  IM IM  IM        Hip flexor stretch  IM  IM IM IM  IM       Neuro Re-Ed                           TA iso seated 5s x 15  5s x 15  5s x 15  5s x 15  5s x 15  5s x 15         LAQ x 15  X 15  X 15  X 15  X 15  X 15            TA + ball sq x 15  TA ball sq x 15  TA ball sq x 20            TA + glute sets x 20  TA glute sets x 20  TA glute sets x 20                                  Ther Ex              SAQ 5s x 15  5s x 15  5s x 15  5s x 15  5s x 15          Seated repeated extension x 15  X 15  X 15  X 15  X 15          Hamstring stretch x 10  10s x 5 B  10s x 5 B  SLR x 5 B  2 x 5 B          Ball sq x 10  X 10  X 10  Supine clamshells x 10 YTB      Supine marches x 10 B YTB    X 10 YTB     Marches x 15 ytb              Hip abd seated x 10  X 10  X 10             NU x 8 min lvl 1  10 min lvl 1  10 min lvl 1  10 min lvl 1 active w/u  10 min lvl 1 active w/u                                 Ther Activity                                       Gait Training                                       Modalities

## 2024-05-26 ENCOUNTER — APPOINTMENT (EMERGENCY)
Dept: RADIOLOGY | Facility: HOSPITAL | Age: 79
End: 2024-05-26
Payer: MEDICARE

## 2024-05-26 ENCOUNTER — HOSPITAL ENCOUNTER (EMERGENCY)
Facility: HOSPITAL | Age: 79
Discharge: HOME | End: 2024-05-26
Attending: EMERGENCY MEDICINE
Payer: MEDICARE

## 2024-05-26 VITALS
OXYGEN SATURATION: 98 % | SYSTOLIC BLOOD PRESSURE: 139 MMHG | HEART RATE: 79 BPM | RESPIRATION RATE: 16 BRPM | WEIGHT: 162 LBS | TEMPERATURE: 98.1 F | BODY MASS INDEX: 30.61 KG/M2 | DIASTOLIC BLOOD PRESSURE: 60 MMHG

## 2024-05-26 DIAGNOSIS — M54.9 BACK PAIN: Primary | ICD-10-CM

## 2024-05-26 DIAGNOSIS — N39.0 UTI (URINARY TRACT INFECTION): ICD-10-CM

## 2024-05-26 LAB
BACTERIA UR QL AUTO: ABNORMAL /HPF
BILIRUB UR QL STRIP: NEGATIVE
CAOX CRY URNS QL MICRO: ABNORMAL /HPF
CLARITY UR: ABNORMAL
COLOR UR: YELLOW
GLUCOSE UR STRIP-MCNC: NEGATIVE MG/DL
HGB UR QL STRIP.AUTO: NEGATIVE
KETONES UR STRIP-MCNC: NEGATIVE MG/DL
LEUKOCYTE ESTERASE UR QL STRIP: ABNORMAL
NITRITE UR QL STRIP: POSITIVE
NON-SQ EPI CELLS URNS QL MICRO: ABNORMAL /HPF
OTHER STN SPEC: ABNORMAL
PH UR STRIP.AUTO: 6.5 [PH]
PROT UR STRIP-MCNC: NEGATIVE MG/DL
RBC #/AREA URNS AUTO: ABNORMAL /HPF
SP GR UR STRIP.AUTO: 1.01 (ref 1–1.03)
UROBILINOGEN UR STRIP-ACNC: <2 MG/DL
WBC #/AREA URNS AUTO: ABNORMAL /HPF

## 2024-05-26 PROCEDURE — 74176 CT ABD & PELVIS W/O CONTRAST: CPT

## 2024-05-26 PROCEDURE — 99284 EMERGENCY DEPT VISIT MOD MDM: CPT

## 2024-05-26 PROCEDURE — 99284 EMERGENCY DEPT VISIT MOD MDM: CPT | Performed by: EMERGENCY MEDICINE

## 2024-05-26 PROCEDURE — 81001 URINALYSIS AUTO W/SCOPE: CPT | Performed by: EMERGENCY MEDICINE

## 2024-05-26 RX ORDER — CYCLOBENZAPRINE HCL 10 MG
10 TABLET ORAL ONCE
Status: COMPLETED | OUTPATIENT
Start: 2024-05-26 | End: 2024-05-26

## 2024-05-26 RX ORDER — ACETAMINOPHEN 325 MG/1
975 TABLET ORAL ONCE
Status: COMPLETED | OUTPATIENT
Start: 2024-05-26 | End: 2024-05-26

## 2024-05-26 RX ORDER — CEPHALEXIN 500 MG/1
500 CAPSULE ORAL ONCE
Status: COMPLETED | OUTPATIENT
Start: 2024-05-26 | End: 2024-05-26

## 2024-05-26 RX ORDER — NAPROXEN 500 MG/1
500 TABLET ORAL 2 TIMES DAILY WITH MEALS
Qty: 10 TABLET | Refills: 0 | Status: SHIPPED | OUTPATIENT
Start: 2024-05-26 | End: 2024-05-31

## 2024-05-26 RX ORDER — CEFADROXIL 500 MG/1
500 CAPSULE ORAL EVERY 12 HOURS SCHEDULED
Qty: 14 CAPSULE | Refills: 0 | Status: SHIPPED | OUTPATIENT
Start: 2024-05-26 | End: 2024-06-02

## 2024-05-26 RX ORDER — CYCLOBENZAPRINE HCL 10 MG
5 TABLET ORAL 2 TIMES DAILY PRN
Qty: 10 TABLET | Refills: 0 | Status: SHIPPED | OUTPATIENT
Start: 2024-05-26 | End: 2024-05-31

## 2024-05-26 RX ORDER — LIDOCAINE 50 MG/G
1 PATCH TOPICAL DAILY
Qty: 2 PATCH | Refills: 0 | Status: SHIPPED | OUTPATIENT
Start: 2024-05-26 | End: 2024-05-28

## 2024-05-26 RX ORDER — LIDOCAINE 50 MG/G
1 PATCH TOPICAL ONCE
Status: DISCONTINUED | OUTPATIENT
Start: 2024-05-26 | End: 2024-05-26 | Stop reason: HOSPADM

## 2024-05-26 RX ADMIN — LIDOCAINE 5% 1 PATCH: 700 PATCH TOPICAL at 19:17

## 2024-05-26 RX ADMIN — CYCLOBENZAPRINE HYDROCHLORIDE 10 MG: 10 TABLET, FILM COATED ORAL at 19:14

## 2024-05-26 RX ADMIN — CEPHALEXIN 500 MG: 500 CAPSULE ORAL at 21:38

## 2024-05-26 RX ADMIN — ACETAMINOPHEN 975 MG: 325 TABLET ORAL at 19:14

## 2024-05-26 RX ADMIN — PREDNISONE 50 MG: 20 TABLET ORAL at 19:14

## 2024-05-26 NOTE — ED PROVIDER NOTES
History  Chief Complaint   Patient presents with    Back Pain     Patient c/o severe lower back pain that radiates down L leg. States has had pain for a few months after a fall. Was seeing a chiropractor; started PT this month and has been 6 times. States the pain got too bad today.     79-year-old female presents with left back pain lower radiating down her leg sharp continuous currently 7 out of 10 no associated saddle anesthesia urinary incontinence urine retention bowel incontinence or any other symptoms the last time she had this pain she said she had a urinary tract infection.  No trauma noted.      History provided by:  Patient   used: No        Prior to Admission Medications   Prescriptions Last Dose Informant Patient Reported? Taking?   ALPRAZolam (XANAX) 0.25 mg tablet   No No   Sig: Take 1 tablet (0.25 mg total) by mouth 3 (three) times a day as needed for anxiety   Aspirin 81 MG CAPS   Yes No   Si (two) times a day   Insulin Pen Needle (Sure Comfort Pen Needles) 31G X 5 MM MISC  Self No No   Sig: by Does not apply route daily   Patient not taking: Reported on 2024   Lancets (OneTouch Delica Plus [id number]) MISC   No No   Sig: Use once a day to check blood sugar   OneTouch Ultra test strip   No No   Sig: Use 1 each daily Use as instructed   anastrozole (ARIMIDEX) 1 mg tablet   No No   Sig: Take 1 tablet (1 mg total) by mouth daily   atorvastatin (LIPITOR) 40 mg tablet   No No   Sig: Take 1 tablet (40 mg total) by mouth daily   clotrimazole-betamethasone (LOTRISONE) 1-0.05 % cream   No No   Sig: Apply topically 2 (two) times a day   levothyroxine 88 mcg tablet   No No   Sig: Take 1 tablet (88 mcg total) by mouth daily   liraglutide (VICTOZA) injection  Self Yes No   Sig: Inject 1.2 mg under the skin daily at bedtime    metFORMIN (GLUCOPHAGE) 1000 MG tablet   No No   Sig: TAKE 1 TABLET(1000 MG) BY MOUTH TWICE DAILY WITH MEALS   metoprolol tartrate (LOPRESSOR) 25 mg tablet   No No    Sig: TAKE 1/2 TABLET(12.5 MG) BY MOUTH EVERY 12 HOURS   pregabalin (LYRICA) 75 mg capsule   No No   Sig: Take 1 capsule (75 mg total) by mouth 2 (two) times a day      Facility-Administered Medications: None       Past Medical History:   Diagnosis Date    Abdominal pain     LLQ on occasion    Angina pectoris (HCC)     Arthritis     Breast cancer (HCC) 07/01/2021    Cancer (HCC)     breast left    Colon polyp     Constipation     at times    Coronary artery disease     Diabetes mellitus (HCC)     Diarrhea     at times    Disease of thyroid gland     Hemorrhoids     Hypercholesterolemia     Hypertension     Neuropathy     both legs and feet    Obesity     Osteoporosis     Otitis media     Ovarian ca (HCC) 1997    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HCC)     Shingles     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 Cleary. Up to date with her colonoscopy.    COLONOSCOPY      CORONARY STENT PLACEMENT      Dec 2015    EYE SURGERY      cataract surgery    GALLBLADDER SURGERY      HYSTERECTOMY  1989    MAMMO (HISTORICAL)      up to date. see gyn    MASTECTOMY Left 07/13/2021    MASTECTOMY W/ SENTINEL NODE BIOPSY Left 07/13/2021    Procedure: BREAST ROSLYN  DIRECTED MASTECTOMY, SENTINEL LYMPH NODE BIOPSY, LYMPHATIC MAPPING WITH BLUE DYE AND RADIOACTIVE DYE (INJECT AT 1500 BY DR MONTEJO IN THE OR);  Surgeon: Anthony Montejo MD;  Location: [facility];  Service: Surgical Oncology    OOPHORECTOMY Bilateral 1997    [place] CORONARY  ARTERY BYP W/VEIN & ARTERY GRAFT 2 VEIN N/A 2/8/2023    Procedure: CORONARY ARTERY BYPASS GRAFT (CABG) X 2 VESSELS GSV-->OM1, LIMA-->LAD; EVH  LEFT LEG w/ RADHA;  Surgeon: Javan Allen MD;  Location: [facility];  Service: Cardiac Surgery    SPINE SURGERY      THYROIDECTOMY      UPPER GASTROINTESTINAL ENDOSCOPY      US BREAST NEEDLE LOC LEFT Left 05/06/2021    US GUIDANCE BREAST BIOPSY LEFT EACH ADDITIONAL Left 05/06/2021    US GUIDED BREAST BIOPSY LEFT COMPLETE Left 03/15/2021    US GUIDED BREAST BIOPSY LEFT COMPLETE Left 05/06/2021       Family History   Problem Relation Age of Onset    Diabetes Mother     Heart disease Mother     Dementia Mother     Esophageal cancer Father 60    Endometrial cancer Sister 68    Asthma Sister     Cancer Sister     No Known Problems Sister     No Known Problems Sister     No Known Problems Sister     Asthma Daughter     No Known Problems Maternal Grandmother     Prostate cancer Maternal Grandfather     No Known Problems Paternal Grandmother     Prostate cancer Paternal Grandfather     Stomach cancer Brother 71    Multiple myeloma Brother 72    Cancer Brother     Asthma Son     Depression Son     Breast cancer Maternal Aunt 50    No Known Problems Paternal Aunt     No Known Problems Paternal Aunt     Breast cancer Other 45    Breast cancer Other 45    Diabetes Family     Cancer Family     Arthritis Family     Heart disease Family      I have reviewed and agree with the history as documented.    E-Cigarette/Vaping    E-Cigarette Use Never User      E-Cigarette/Vaping Substances    Nicotine No     THC No     CBD No     Flavoring No     Other No     Unknown No      Social History     Tobacco Use    Smoking status: Never    Smokeless tobacco: Never   Vaping Use    Vaping status: Never Used   Substance Use Topics    Alcohol use: Never    Drug use: Never       Review of Systems   Constitutional: Negative.    HENT: Negative.     Eyes: Negative.    Respiratory: Negative.     Cardiovascular:  Negative.    Gastrointestinal: Negative.    Endocrine: Negative.    Genitourinary: Negative.    Musculoskeletal:  Positive for back pain.   Skin: Negative.    Allergic/Immunologic: Negative.    Neurological: Negative.    Hematological: Negative.    Psychiatric/Behavioral: Negative.     All other systems reviewed and are negative.      Physical Exam  Physical Exam  Vitals and nursing note reviewed.   Constitutional:       Appearance: Normal appearance.   HENT:      Head: Normocephalic and atraumatic.      Nose: Nose normal.      Mouth/Throat:      Mouth: Mucous membranes are moist.   Eyes:      Extraocular Movements: Extraocular movements intact.      Pupils: Pupils are equal, round, and reactive to light.   Cardiovascular:      Rate and Rhythm: Normal rate and regular rhythm.   Pulmonary:      Effort: Pulmonary effort is normal.      Breath sounds: Normal breath sounds.   Abdominal:      General: Abdomen is flat. Bowel sounds are normal.      Palpations: Abdomen is soft.   Musculoskeletal:         General: Normal range of motion.      Cervical back: Normal range of motion and neck supple.      Comments: Left lumbar paravertebral tenderness some midline tenderness no step-offs noted.  No CVA tenderness.   Skin:     General: Skin is warm.      Capillary Refill: Capillary refill takes less than 2 seconds.   Neurological:      General: No focal deficit present.      Mental Status: She is alert and oriented to person, place, and time. Mental status is at baseline.   Psychiatric:         Mood and Affect: Mood normal.         Thought Content: Thought content normal.         Vital Signs  ED Triage Vitals   Temperature Pulse Respirations Blood Pressure SpO2   05/26/24 1907 05/26/24 1907 05/26/24 1907 05/26/24 1907 05/26/24 1907   98.1 °F (36.7 °C) 81 18 (!) 176/72 98 %      Temp Source Heart Rate Source Patient Position - Orthostatic VS BP Location FiO2 (%)   05/26/24 1907 05/26/24 1907 05/26/24 2140 05/26/24 2140 --   Oral  Monitor Lying Right arm       Pain Score       --                  Vitals:    05/26/24 1907 05/26/24 2140   BP: (!) 176/72 139/60   Pulse: 81 79   Patient Position - Orthostatic VS:  Lying         Visual Acuity      ED Medications  Medications   lidocaine (LIDODERM) 5 % patch 1 patch (1 patch Topical Medication Applied 5/26/24 1917)   cyclobenzaprine (FLEXERIL) tablet 10 mg (10 mg Oral Given 5/26/24 1914)   predniSONE tablet 50 mg (50 mg Oral Given 5/26/24 1914)   acetaminophen (TYLENOL) tablet 975 mg (975 mg Oral Given 5/26/24 1914)   cephalexin (KEFLEX) capsule 500 mg (500 mg Oral Given 5/26/24 2138)       Diagnostic Studies  Results Reviewed       Procedure Component Value Units Date/Time    UA (URINE) with reflex to Scope [[id number]]  (Abnormal) Collected: 05/26/24 2111    Lab Status: Final result Specimen: Urine, Clean Catch Updated: 05/26/24 2127     Color, UA Yellow     Clarity, UA Slightly Cloudy     Specific Gravity, UA 1.015     pH, UA 6.5     Leukocytes, UA Large     Nitrite, UA Positive     Protein, UA Negative mg/dl      Glucose, UA Negative mg/dl      Ketones, UA Negative mg/dl      Urobilinogen, UA <2.0 mg/dl      Bilirubin, UA Negative     Occult Blood, UA Negative    Urine Microscopic [[id number]] Collected: 05/26/24 2111    Lab Status: In process Specimen: Urine, Clean Catch Updated: 05/26/24 2127                   CT abdomen pelvis wo contrast   Final Result by Nathan Arevalo DO (05/26 2104)      No acute findings in the abdomen or pelvis within the limits of unenhanced technique.      Workstation performed: [id number]                    Procedures  Procedures         ED Course                               SBIRT 22yo+      Flowsheet Row Most Recent Value   Initial Alcohol Screen: US AUDIT-C     1. How often do you have a drink containing alcohol? 0 Filed at: 05/26/2024 1909   2. How many drinks containing alcohol do you have on a typical day you are drinking?  0 Filed at: 05/26/2024 1909   3b.  FEMALE Any Age, or MALE 65+: How often do you have 4 or more drinks on one occassion? 0 Filed at: 05/26/2024 1909   Audit-C Score 0 Filed at: 05/26/2024 1909   PAUL: How many times in the past year have you...    Used an illegal drug or used a prescription medication for non-medical reasons? Never Filed at: 05/26/2024 1909                      Medical Decision Making  Patient evaluated with UA imaging.  I reviewed the results and discussed them with the patient.  Patient discharged with appropriate instructions medications and follow-up.  Patient verbalized understanding had no further questions at the time of discharge.  Patient had stable vital signs and well-appearing at the time of discharge.    Problems Addressed:  Back pain: acute illness or injury  UTI (urinary tract infection): acute illness or injury    Amount and/or Complexity of Data Reviewed  External Data Reviewed: notes.  Labs: ordered. Decision-making details documented in ED Course.  Radiology: ordered. Decision-making details documented in ED Course.    Risk  OTC drugs.  Prescription drug management.             Disposition  Final diagnoses:   Back pain   UTI (urinary tract infection)     Time reflects when diagnosis was documented in both MDM as applicable and the Disposition within this note       Time User Action Codes Description Comment    5/26/2024  9:31 PM Martha De La Fuente Add [M54.9] Back pain     5/26/2024  9:31 PM Martha De La Fuente Add [N39.0] UTI (urinary tract infection)           ED Disposition       ED Disposition   Discharge    Condition   Stable    Date/Time   Sun May 26, 2024 2131    Comment   Delfina Villa discharge to home/self care.                   Follow-up Information       Follow up With Specialties Details Why Contact Info Additional Information    Rogerio Mancera MD Family Medicine Schedule an appointment as soon as possible for a visit   [address] [facility]  [address]  [place] [address] [phone]       [facility]  [place] [facility] Emergency Medicine  If symptoms worsen [address] [facility] [address]  [phone] [facility], [address] [place], [place], [address]            Discharge Medication List as of 5/26/2024  9:32 PM        START taking these medications    Details   cefadroxil (DURICEF) 500 mg capsule Take 1 capsule (500 mg total) by mouth every 12 (twelve) hours for 7 days, Starting Sun 5/26/2024, Until Sun 6/2/2024, Normal      cyclobenzaprine (FLEXERIL) 10 mg tablet Take 0.5 tablets (5 mg total) by mouth 2 (two) times a day as needed for muscle spasms for up to 5 days, Starting Sun 5/26/2024, Until Fri 5/31/2024 at 2359, Normal      lidocaine (LIDODERM) 5 % Apply 1 patch topically over 12 hours daily for 2 days Remove & Discard patch within 12 hours or as directed by MD, Starting Sun 5/26/2024, Until Tue 5/28/2024, Normal      naproxen (Naprosyn) 500 mg tablet Take 1 tablet (500 mg total) by mouth 2 (two) times a day with meals for 5 days, Starting Sun 5/26/2024, Until Fri 5/31/2024, Normal           CONTINUE these medications which have NOT CHANGED    Details   ALPRAZolam (XANAX) 0.25 mg tablet Take 1 tablet (0.25 mg total) by mouth 3 (three) times a day as needed for anxiety, Starting Wed 6/21/2023, Normal      anastrozole (ARIMIDEX) 1 mg tablet Take 1 tablet (1 mg total) by mouth daily, Starting Mon 10/30/2023, Normal      Aspirin 81 MG CAPS 2 (two) times a day, Historical Med      atorvastatin (LIPITOR) 40 mg tablet Take 1 tablet (40 mg total) by mouth daily, Starting Wed 1/10/2024, Normal      clotrimazole-betamethasone (LOTRISONE) 1-0.05 % cream Apply topically 2 (two) times a day, Starting Mon 7/3/2023, Normal      Insulin Pen Needle (Sure Comfort Pen Needles) 31G X 5 MM MISC by Does not apply route daily, Starting Thu 9/24/2020, Normal      Lancets (OneTouch Delica Plus Ovbxts32R) MISC Use once a day to check blood sugar, Normal       levothyroxine 88 mcg tablet Take 1 tablet (88 mcg total) by mouth daily, Starting Wed 2/7/2024, Normal      liraglutide (VICTOZA) injection Inject 1.2 mg under the skin daily at bedtime , Starting Wed 7/12/2017, Historical Med      metFORMIN (GLUCOPHAGE) 1000 MG tablet TAKE 1 TABLET(1000 MG) BY MOUTH TWICE DAILY WITH MEALS, Normal      metoprolol tartrate (LOPRESSOR) 25 mg tablet TAKE 1/2 TABLET(12.5 MG) BY MOUTH EVERY 12 HOURS, Normal      OneTouch Ultra test strip Use 1 each daily Use as instructed, Starting u 11/2/2023, Normal      pregabalin (LYRICA) 75 mg capsule Take 1 capsule (75 mg total) by mouth 2 (two) times a day, Starting Fri 4/26/2024, Normal             No discharge procedures on file.    PDMP Review         Value Time User    PDMP Reviewed  Yes 10/30/2023 11:40 PM Rogerio Mancera MD            ED Provider  Electronically Signed by             Martha De La Fuente DO  05/26/24 9213

## 2024-05-30 ENCOUNTER — APPOINTMENT (OUTPATIENT)
Dept: PHYSICAL THERAPY | Facility: CLINIC | Age: 79
End: 2024-05-30
Payer: MEDICARE

## 2024-06-04 ENCOUNTER — APPOINTMENT (OUTPATIENT)
Dept: LAB | Facility: CLINIC | Age: 79
End: 2024-06-04

## 2024-06-04 DIAGNOSIS — Z00.6 ENCOUNTER FOR EXAMINATION FOR NORMAL COMPARISON OR CONTROL IN CLINICAL RESEARCH PROGRAM: ICD-10-CM

## 2024-06-04 PROCEDURE — 36415 COLL VENOUS BLD VENIPUNCTURE: CPT

## 2024-06-05 ENCOUNTER — TELEPHONE (OUTPATIENT)
Dept: HEMATOLOGY ONCOLOGY | Facility: CLINIC | Age: 79
End: 2024-06-05

## 2024-06-05 DIAGNOSIS — C50.812 MALIGNANT NEOPLASM OF OVERLAPPING SITES OF LEFT BREAST IN FEMALE, ESTROGEN RECEPTOR POSITIVE (HCC): Primary | ICD-10-CM

## 2024-06-05 DIAGNOSIS — Z17.0 MALIGNANT NEOPLASM OF OVERLAPPING SITES OF LEFT BREAST IN FEMALE, ESTROGEN RECEPTOR POSITIVE (HCC): Primary | ICD-10-CM

## 2024-06-05 DIAGNOSIS — Z90.12 HISTORY OF LEFT MASTECTOMY: ICD-10-CM

## 2024-06-05 NOTE — TELEPHONE ENCOUNTER
Patient Call    Who are you speaking with? Patient    If it is not the patient, are they listed on an active communication consent form? Yes   What is the reason for this call? Pt is calling to inform the office that she needs a script sent to [facility] for 6 new bras and prosthesis.   Does this require a call back? N/A   If a call back is required, please list best call back number [facility] Fax: [phone]   If a call back is required, advise that a message will be forwarded to their care team and someone will return their call as soon as possible.   Did you relay this information to the patient? N/A

## 2024-06-06 ENCOUNTER — RA CDI HCC (OUTPATIENT)
Dept: OTHER | Facility: HOSPITAL | Age: 79
End: 2024-06-06

## 2024-06-06 NOTE — PROGRESS NOTES
HCC coding opportunities          Chart Reviewed number of suggestions sent to Provider: 3     Patients Insurance     Medicare Insurance: Medicare        [id number]  E11.40

## 2024-06-10 ENCOUNTER — TELEPHONE (OUTPATIENT)
Age: 79
End: 2024-06-10

## 2024-06-10 NOTE — TELEPHONE ENCOUNTER
Patients daughter Inna called in (not on communication consent form) will be dropping off POA paper work so that Dr. Mancera could speak with the patients daughter in-regards to some concerns she feels should be discussed that her mother won't mention at upcoming appt.     Possible depression  A lot of back pain even after PT  Sleeping a lot  Sits and watches TV all day  Not eating 3 meals a day  Not checking her sugars in the morning  Having some short term memory loss (asking the same questions she previously asked in a short term amount of time)  [facility]

## 2024-06-13 ENCOUNTER — OFFICE VISIT (OUTPATIENT)
Dept: FAMILY MEDICINE CLINIC | Facility: CLINIC | Age: 79
End: 2024-06-13
Payer: MEDICARE

## 2024-06-13 ENCOUNTER — TELEPHONE (OUTPATIENT)
Age: 79
End: 2024-06-13

## 2024-06-13 VITALS
TEMPERATURE: 98.2 F | WEIGHT: 161.4 LBS | RESPIRATION RATE: 14 BRPM | HEART RATE: 69 BPM | OXYGEN SATURATION: 98 % | SYSTOLIC BLOOD PRESSURE: 114 MMHG | DIASTOLIC BLOOD PRESSURE: 50 MMHG | HEIGHT: 61 IN | BODY MASS INDEX: 30.47 KG/M2

## 2024-06-13 DIAGNOSIS — I25.10 CORONARY ARTERY DISEASE INVOLVING NATIVE CORONARY ARTERY OF NATIVE HEART WITHOUT ANGINA PECTORIS: ICD-10-CM

## 2024-06-13 DIAGNOSIS — E11.9 TYPE 2 DIABETES MELLITUS WITHOUT COMPLICATION, WITHOUT LONG-TERM CURRENT USE OF INSULIN (HCC): ICD-10-CM

## 2024-06-13 DIAGNOSIS — M54.50 CHRONIC LEFT-SIDED LOW BACK PAIN, UNSPECIFIED WHETHER SCIATICA PRESENT: ICD-10-CM

## 2024-06-13 DIAGNOSIS — M85.852 OSTEOPENIA OF NECKS OF BOTH FEMURS: ICD-10-CM

## 2024-06-13 DIAGNOSIS — M85.851 OSTEOPENIA OF NECKS OF BOTH FEMURS: ICD-10-CM

## 2024-06-13 DIAGNOSIS — E78.2 MIXED DYSLIPIDEMIA: ICD-10-CM

## 2024-06-13 DIAGNOSIS — G89.29 CHRONIC LEFT-SIDED LOW BACK PAIN, UNSPECIFIED WHETHER SCIATICA PRESENT: ICD-10-CM

## 2024-06-13 DIAGNOSIS — E03.9 ACQUIRED HYPOTHYROIDISM: ICD-10-CM

## 2024-06-13 DIAGNOSIS — I10 ESSENTIAL HYPERTENSION: Primary | ICD-10-CM

## 2024-06-13 PROBLEM — D69.6 PLATELETS DECREASED (HCC): Status: RESOLVED | Noted: 2023-02-21 | Resolved: 2024-06-13

## 2024-06-13 PROBLEM — E66.812 CLASS 2 OBESITY IN ADULT: Status: RESOLVED | Noted: 2021-06-01 | Resolved: 2024-06-13

## 2024-06-13 PROBLEM — E66.9 CLASS 2 OBESITY IN ADULT: Status: RESOLVED | Noted: 2021-06-01 | Resolved: 2024-06-13

## 2024-06-13 LAB — SL AMB POCT HEMOGLOBIN AIC: 5.7 (ref ?–6.5)

## 2024-06-13 PROCEDURE — 83036 HEMOGLOBIN GLYCOSYLATED A1C: CPT | Performed by: FAMILY MEDICINE

## 2024-06-13 PROCEDURE — 99214 OFFICE O/P EST MOD 30 MIN: CPT | Performed by: FAMILY MEDICINE

## 2024-06-13 NOTE — ASSESSMENT & PLAN NOTE
LDL was 59 and LFTs were normal in January 2024. Continue atorvastatin 40 mg daily at bedtime. Pt also takes fish oil 3600 mg qd.  Advised pt to follow a low cholesterol diet and to exercise on a regular basis.

## 2024-06-13 NOTE — PROGRESS NOTES
Ambulatory Visit  Name: Delfina Villa      : 1945      MRN: [id number]  Encounter Provider: Rogerio Mancera MD  Encounter Date: 2024   Encounter department: [facility] MEDICINE    Assessment & Plan   1. Essential hypertension  Assessment & Plan:  Blood pressure remains good. Continue metoprolol 25 mg bid. Pt advised to continue low Na diet and to exercise on a regular basis.   2. Mixed dyslipidemia  Assessment & Plan:  LDL was 59 and LFTs were normal in 2024. Continue atorvastatin 40 mg daily at bedtime. Pt also takes fish oil 3600 mg qd.  Advised pt to follow a low cholesterol diet and to exercise on a regular basis.  3. Coronary artery disease involving native coronary artery of native heart without angina pectoris  Assessment & Plan:  Pt had CABG x 2 in 2023. No recent chest pain or shortness of breath. Continue current meds. Pt saw Cardiology in 2024 for follow-up.  4. Type 2 diabetes mellitus without complication, without long-term current use of insulin (HCC)  Assessment & Plan:  A1C done today and was 5.7. Continue victoza 1.2 mg qd and decrease metformin to 1000 mg daily. Advised pt to follow a low carb diet and to exercise on a regular basis. Foot exam done in 2024. Had eye exam in 2023 by Dr Rapp. Urine albumin/creatinine ratio done in 2024.    Lab Results   Component Value Date    HGBA1C 5.7 2024     Orders:  -     POCT hemoglobin A1c  5. Acquired hypothyroidism  Assessment & Plan:  TSH was 0.971 and Free T4 was 1.11 in 2024. Continue levothyroxine 88 mcg qd.   6. Osteopenia of necks of both femurs  Assessment & Plan:  Last dexascan was in 2022 and had slight decrease in density. Pt advised to take calcium 1200 mg qd and Vit D 1000 U qd. Pt also advised to perform weight-bearing exercise on a regular basis. Recheck dexascan.  7. Chronic left-sided low back pain, unspecified whether sciatica  present  Assessment & Plan:  Patient has had it for past year and has DDD. Tried physical therapy and no better. Will refer to Pain Management. Takes advil and tylenol for pain.   Orders:  -     Ambulatory referral to Spine & Pain Management; Future      Depression Screening and Follow-up Plan: Patient was screened for depression during today's encounter. They screened negative with a PHQ-2 score of 0.        History of Present Illness     Patient here for follow-up Hypertension, Hyperlipidemia, Coronary Artery Disease, DM, Hypothyroidism, Osteopenia. Patient doing ok. No chest pain or shortness of breath. No headaches. No abdominal pain. Still has left back pain, Patient has been going to physical therapy and not really helping. Patient had x-rays in April and has DDD. Wants to see Pain Management. Takes advil and tylenol for pain. Sugars have been good. Daughter concerned that patient is depressed but patient denies it and doesn't want medications.       Review of Systems   Constitutional:  Negative for fatigue and unexpected weight change.   Respiratory:  Negative for cough, chest tightness and shortness of breath.    Cardiovascular:  Negative for chest pain and palpitations.   Gastrointestinal:  Negative for abdominal pain, constipation, diarrhea, nausea and vomiting.   Genitourinary:  Negative for difficulty urinating.   Musculoskeletal:  Positive for arthralgias, back pain and gait problem.   Skin:  Negative for rash.   Neurological:  Negative for dizziness and headaches.     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 Cleary. Up to date with her colonoscopy.   • COLONOSCOPY     • CORONARY STENT PLACEMENT      Dec 2015   • EYE SURGERY      cataract surgery   • GALLBLADDER SURGERY     • HYSTERECTOMY  1989   • MAMMO (HISTORICAL)      up to date. see gyn   • MASTECTOMY Left 07/13/2021   • MASTECTOMY W/ SENTINEL NODE BIOPSY Left 07/13/2021    Procedure: BREAST ROSLYN  DIRECTED MASTECTOMY, SENTINEL LYMPH NODE BIOPSY, LYMPHATIC MAPPING WITH BLUE DYE AND RADIOACTIVE DYE (INJECT AT 1500 BY DR ANDREWS IN THE OR);  Surgeon: Anthony Andrews MD;  Location: [facility];  Service: Surgical Oncology   • OOPHORECTOMY Bilateral 1997   • WI CORONARY ARTERY BYP W/VEIN & ARTERY GRAFT 2 VEIN N/A 2/8/2023    Procedure: CORONARY ARTERY BYPASS GRAFT (CABG) X 2 VESSELS GSV-->OM1, LIMA-->LAD; EVH  LEFT LEG w/ RADHA;  Surgeon: Javan Allen MD;  Location: [facility];  Service: Cardiac Surgery   • SPINE SURGERY     • THYROIDECTOMY     • UPPER GASTROINTESTINAL ENDOSCOPY     • US BREAST NEEDLE LOC LEFT Left 05/06/2021   • US GUIDANCE BREAST BIOPSY LEFT EACH ADDITIONAL Left 05/06/2021   • US GUIDED BREAST BIOPSY LEFT COMPLETE Left 03/15/2021   • US GUIDED BREAST BIOPSY LEFT COMPLETE Left 05/06/2021     Family History   Problem Relation Age of Onset   • Diabetes  Mother    • Heart disease Mother    • Dementia Mother    • Esophageal cancer Father 60   • Endometrial cancer Sister 68   • Asthma Sister    • Cancer Sister    • No Known Problems Sister    • No Known Problems Sister    • No Known Problems Sister    • Asthma Daughter    • No Known Problems Maternal Grandmother    • Prostate cancer Maternal Grandfather    • No Known Problems Paternal Grandmother    • Prostate cancer Paternal Grandfather    • Stomach cancer Brother 71   • Multiple myeloma Brother 72   • Cancer Brother    • Asthma Son    • Depression Son    • Breast cancer Maternal Aunt 50   • No Known Problems Paternal Aunt    • No Known Problems Paternal Aunt    • Breast cancer Other 45   • Breast cancer Other 45   • Diabetes Family    • Cancer Family    • Arthritis Family    • Heart disease Family      Social History     Tobacco Use   • Smoking status: Never   • Smokeless tobacco: Never   Vaping Use   • Vaping status: Never Used   Substance and Sexual Activity   • Alcohol use: Never   • Drug use: Never   • Sexual activity: Not Currently     Partners: Male     Birth control/protection: Post-menopausal, None     Current Outpatient Medications on File Prior to Visit   Medication Sig   • ALPRAZolam (XANAX) 0.25 mg tablet Take 1 tablet (0.25 mg total) by mouth 3 (three) times a day as needed for anxiety   • anastrozole (ARIMIDEX) 1 mg tablet Take 1 tablet (1 mg total) by mouth daily   • Aspirin 81 MG CAPS 2 (two) times a day   • atorvastatin (LIPITOR) 40 mg tablet Take 1 tablet (40 mg total) by mouth daily   • clotrimazole-betamethasone (LOTRISONE) 1-0.05 % cream Apply topically 2 (two) times a day   • levothyroxine 88 mcg tablet Take 1 tablet (88 mcg total) by mouth daily   • liraglutide (VICTOZA) injection Inject 1.2 mg under the skin daily at bedtime    • metFORMIN (GLUCOPHAGE) 1000 MG tablet TAKE 1 TABLET(1000 MG) BY MOUTH TWICE DAILY WITH MEALS   • metoprolol tartrate (LOPRESSOR) 25 mg tablet TAKE 1/2 TABLET(12.5  "MG) BY MOUTH EVERY 12 HOURS   • pregabalin (LYRICA) 75 mg capsule Take 1 capsule (75 mg total) by mouth 2 (two) times a day   • cyclobenzaprine (FLEXERIL) 10 mg tablet Take 0.5 tablets (5 mg total) by mouth 2 (two) times a day as needed for muscle spasms for up to 5 days   • Insulin Pen Needle (Sure Comfort Pen Needles) 31G X 5 MM MISC by Does not apply route daily (Patient not taking: Reported on 2/7/2024)   • Lancets (OneTouch Delica Plus Thilqh18D) MISC Use once a day to check blood sugar   • lidocaine (LIDODERM) 5 % Apply 1 patch topically over 12 hours daily for 2 days Remove & Discard patch within 12 hours or as directed by MD   • naproxen (Naprosyn) 500 mg tablet Take 1 tablet (500 mg total) by mouth 2 (two) times a day with meals for 5 days   • OneTouch Ultra test strip Use 1 each daily Use as instructed     Allergies   Allergen Reactions   • Duloxetine Other (See Comments)     Extreme somnolence/lethargy   • Sulfa Antibiotics Rash     Immunization History   Administered Date(s) Administered   • COVID-19 PFIZER VACCINE 0.3 ML IM 04/02/2021, 04/23/2021, 11/23/2021   • H1N1, All Formulations 01/06/2010   • INFLUENZA 10/14/2010, 10/10/2011, 11/20/2012, 12/31/2012, 11/19/2013, 09/18/2015, 10/03/2016, 11/01/2017, 10/09/2019   • Influenza, high dose seasonal 0.7 mL 10/06/2020, 10/22/2021, 10/10/2022   • Pneumococcal Conjugate 13-Valent 03/19/2015   • Pneumococcal Polysaccharide PPV23 04/18/2019   • Tdap 01/06/2024   • influenza, trivalent, adjuvanted 10/09/2019     Objective     /50 (BP Location: Left arm, Patient Position: Sitting, Cuff Size: Adult)   Pulse 69   Temp 98.2 °F (36.8 °C) (Tympanic)   Resp 14   Ht 5' 1\" (1.549 m)   Wt 73.2 kg (161 lb 6.4 oz)   SpO2 98%   BMI 30.50 kg/m²     Physical Exam  Vitals and nursing note reviewed.   Constitutional:       General: She is not in acute distress.     Appearance: Normal appearance. She is well-developed.      Comments: Walks with cane.    Neck:      " Vascular: No carotid bruit.   Cardiovascular:      Rate and Rhythm: Normal rate and regular rhythm.      Heart sounds: No murmur heard.  Pulmonary:      Effort: Pulmonary effort is normal. No respiratory distress.      Breath sounds: Normal breath sounds.   Musculoskeletal:      Cervical back: Normal range of motion and neck supple.      Right lower leg: No edema.      Left lower leg: No edema.   Lymphadenopathy:      Cervical: No cervical adenopathy.   Neurological:      Mental Status: She is alert.   Psychiatric:         Mood and Affect: Mood normal.         Behavior: Behavior normal.         Thought Content: Thought content normal.         Judgment: Judgment normal.       Administrative Statements

## 2024-06-13 NOTE — ASSESSMENT & PLAN NOTE
Patient has had it for past year and has DDD. Tried physical therapy and no better. Will refer to Pain Management. Takes advil and tylenol for pain.

## 2024-06-13 NOTE — ASSESSMENT & PLAN NOTE
Pt had CABG x 2 in February 2023. No recent chest pain or shortness of breath. Continue current meds. Pt saw Cardiology in January 2024 for follow-up.

## 2024-06-13 NOTE — TELEPHONE ENCOUNTER
Pt's daughter Kellen stated she dropped off the POA on 6/11/24.     Please have PCP address the questions that Kellen asked to be answered in today's appt. Please contact Kellen with the responses as soon as possible.       Thank you for your help.

## 2024-06-13 NOTE — TELEPHONE ENCOUNTER
Call daughter. I spoke to patient about all her concerns. I referred her to Pain Management for her back pain. Patient says she was depressed but doing better now that she has people living with her. Doesn't want medications. Not hungry due to back pain but eats 2 meals per day. Says she checks her sugars every morning and A1C good today. Doesn't want medication for memory. Not opposed to going to senior center once in awhile. Would go out more if back pain better.

## 2024-06-13 NOTE — ASSESSMENT & PLAN NOTE
Last dexascan was in March 2022 and had slight decrease in density. Pt advised to take calcium 1200 mg qd and Vit D 1000 U qd. Pt also advised to perform weight-bearing exercise on a regular basis. Recheck dexascan.

## 2024-06-13 NOTE — ASSESSMENT & PLAN NOTE
A1C done today and was 5.7. Continue victoza 1.2 mg qd and decrease metformin to 1000 mg daily. Advised pt to follow a low carb diet and to exercise on a regular basis. Foot exam done in January 2024. Had eye exam in June 2023 by Dr Rapp. Urine albumin/creatinine ratio done in January 2024.    Lab Results   Component Value Date    HGBA1C 5.7 01/22/2024

## 2024-06-16 LAB
APOB+LDLR+PCSK9 GENE MUT ANL BLD/T: NOT DETECTED
BRCA1+BRCA2 DEL+DUP + FULL MUT ANL BLD/T: NOT DETECTED
MLH1+MSH2+MSH6+PMS2 GN DEL+DUP+FUL M: NOT DETECTED

## 2024-06-17 ENCOUNTER — TELEPHONE (OUTPATIENT)
Dept: HEMATOLOGY ONCOLOGY | Facility: MEDICAL CENTER | Age: 79
End: 2024-06-17

## 2024-06-17 NOTE — TELEPHONE ENCOUNTER
Spoke w/ pt. Due to an emergency Dr. Hernandez will not be in the office today. She is rs for Wed 7/31 at 2:20 pm.

## 2024-06-21 ENCOUNTER — TELEPHONE (OUTPATIENT)
Dept: HEMATOLOGY ONCOLOGY | Facility: CLINIC | Age: 79
End: 2024-06-21

## 2024-06-21 NOTE — TELEPHONE ENCOUNTER
Patient Call    Who are you speaking with? Miya Rahman     If it is not the patient, are they listed on an active communication consent form? N/A   What is the reason for this call? Miya is looking for chart notes & a physician order to be signed    Fax to [phone]   Does this require a call back? Yes   If a call back is required, please list best call back number [phone]   If a call back is required, advise that a message will be forwarded to their care team and someone will return their call as soon as possible.   Did you relay this information to the patient? Yes

## 2024-06-25 ENCOUNTER — TELEPHONE (OUTPATIENT)
Age: 79
End: 2024-06-25

## 2024-06-25 DIAGNOSIS — E11.9 TYPE 2 DIABETES MELLITUS WITHOUT COMPLICATION, WITHOUT LONG-TERM CURRENT USE OF INSULIN (HCC): Primary | ICD-10-CM

## 2024-06-25 NOTE — TELEPHONE ENCOUNTER
Pt receives victoza through patient assist program.  There was a delay with this months shipment, they are asking if provider would send 30 days supply to [facility] [facility] [employer].  They are calling pharmacy to provide a coupon code so she can get it free.  She is on victoza 6 mg.

## 2024-07-01 ENCOUNTER — TELEPHONE (OUTPATIENT)
Age: 79
End: 2024-07-01

## 2024-07-01 NOTE — TELEPHONE ENCOUNTER
Spoke to pt and her pt assistance can in so she does not need it from [facility]. She stated they called her and it was on backorder at this time

## 2024-07-01 NOTE — TELEPHONE ENCOUNTER
[facility] called the RX Refill Line. Message is being forwarded to the office.     They is requesting the Victoza script be re written as a generic or an alternate be sent    If written as generic a PA will need to be completed.  Pharmacy provided Help desk number [phone]    Please contact [facility] if further information is needed

## 2024-07-03 ENCOUNTER — CONSULT (OUTPATIENT)
Dept: PAIN MEDICINE | Facility: CLINIC | Age: 79
End: 2024-07-03
Payer: MEDICARE

## 2024-07-03 VITALS — HEIGHT: 61 IN | BODY MASS INDEX: 30.02 KG/M2 | WEIGHT: 159 LBS

## 2024-07-03 DIAGNOSIS — M48.062 SPINAL STENOSIS OF LUMBAR REGION WITH NEUROGENIC CLAUDICATION: ICD-10-CM

## 2024-07-03 DIAGNOSIS — M96.1 POST-LAMINECTOMY SYNDROME: Primary | ICD-10-CM

## 2024-07-03 DIAGNOSIS — M54.50 CHRONIC LEFT-SIDED LOW BACK PAIN, UNSPECIFIED WHETHER SCIATICA PRESENT: ICD-10-CM

## 2024-07-03 DIAGNOSIS — M46.1 SACROILIITIS (HCC): ICD-10-CM

## 2024-07-03 DIAGNOSIS — G89.29 CHRONIC LEFT-SIDED LOW BACK PAIN, UNSPECIFIED WHETHER SCIATICA PRESENT: ICD-10-CM

## 2024-07-03 PROCEDURE — 99204 OFFICE O/P NEW MOD 45 MIN: CPT | Performed by: STUDENT IN AN ORGANIZED HEALTH CARE EDUCATION/TRAINING PROGRAM

## 2024-07-10 ENCOUNTER — HOSPITAL ENCOUNTER (OUTPATIENT)
Dept: RADIOLOGY | Facility: HOSPITAL | Age: 79
Discharge: HOME | End: 2024-07-10
Attending: STUDENT IN AN ORGANIZED HEALTH CARE EDUCATION/TRAINING PROGRAM
Payer: MEDICARE

## 2024-07-10 DIAGNOSIS — M54.50 CHRONIC LEFT-SIDED LOW BACK PAIN, UNSPECIFIED WHETHER SCIATICA PRESENT: ICD-10-CM

## 2024-07-10 DIAGNOSIS — G89.29 CHRONIC LEFT-SIDED LOW BACK PAIN, UNSPECIFIED WHETHER SCIATICA PRESENT: ICD-10-CM

## 2024-07-10 PROCEDURE — 72131 CT LUMBAR SPINE W/O DYE: CPT

## 2024-07-17 ENCOUNTER — TELEPHONE (OUTPATIENT)
Age: 79
End: 2024-07-17

## 2024-07-17 NOTE — TELEPHONE ENCOUNTER
----- Message from Billy Cheatham MD sent at 7/17/2024 11:36 AM EDT -----  Patient with multilevel DDD with transitional anatomy and stenosis at multiple levels, most severe at L2-L3 which is concordant with her symptoms. Still recommend left L2, L3 TFESI if patient amenable.

## 2024-07-17 NOTE — TELEPHONE ENCOUNTER
S/w pt and advised of same.  Pt verbalized understanding and appreciation.    Pt would like to move forward with injection.    Pt advised she would be contacted to schedule.     Pt denies anticoags. Pt is diabetic but does not have CGM

## 2024-07-17 NOTE — TELEPHONE ENCOUNTER
Scheduled patient for TFESI  Patient denies RX blood thinners/ NSAIDS  Nothing to eat or drink 1 hour prior to procedure  Needs to arrange transportation  Proper clothing for procedure  No vaccines 2 weeks prior or after procedure  If ill or place on antibiotics, please call to reschedule

## 2024-07-18 ENCOUNTER — HOSPITAL ENCOUNTER (OUTPATIENT)
Dept: RADIOLOGY | Facility: HOSPITAL | Age: 79
Discharge: HOME | End: 2024-07-18
Payer: MEDICARE

## 2024-07-18 VITALS — BODY MASS INDEX: 29.27 KG/M2 | WEIGHT: 155 LBS | HEIGHT: 61 IN

## 2024-07-18 DIAGNOSIS — Z17.0 MALIGNANT NEOPLASM OF OVERLAPPING SITES OF LEFT BREAST IN FEMALE, ESTROGEN RECEPTOR POSITIVE (HCC): ICD-10-CM

## 2024-07-18 DIAGNOSIS — C50.812 MALIGNANT NEOPLASM OF OVERLAPPING SITES OF LEFT BREAST IN FEMALE, ESTROGEN RECEPTOR POSITIVE (HCC): ICD-10-CM

## 2024-07-18 PROCEDURE — 77065 DX MAMMO INCL CAD UNI: CPT

## 2024-07-18 PROCEDURE — G0279 TOMOSYNTHESIS, MAMMO: HCPCS

## 2024-07-28 DIAGNOSIS — C50.812 MALIGNANT NEOPLASM OF OVERLAPPING SITES OF LEFT BREAST IN FEMALE, ESTROGEN RECEPTOR POSITIVE (HCC): ICD-10-CM

## 2024-07-28 DIAGNOSIS — Z17.0 MALIGNANT NEOPLASM OF OVERLAPPING SITES OF LEFT BREAST IN FEMALE, ESTROGEN RECEPTOR POSITIVE (HCC): ICD-10-CM

## 2024-07-29 RX ORDER — ANASTROZOLE 1 MG/1
TABLET ORAL
Qty: 90 TABLET | Refills: 0 | Status: SHIPPED | OUTPATIENT
Start: 2024-07-29

## 2024-07-30 ENCOUNTER — TELEPHONE (OUTPATIENT)
Dept: HEMATOLOGY ONCOLOGY | Facility: MEDICAL CENTER | Age: 79
End: 2024-07-30

## 2024-07-30 NOTE — TELEPHONE ENCOUNTER
Voicemail message was left that due to Dr. Hernandez being out on medical leave, her appointment of 7/31 at 2:20pm has been cancelled.  We will be calling her back at a later date to reschedule.

## 2024-08-03 DIAGNOSIS — Z95.1 S/P CABG X 2: ICD-10-CM

## 2024-08-03 DIAGNOSIS — E11.9 TYPE 2 DIABETES MELLITUS WITHOUT COMPLICATION, WITHOUT LONG-TERM CURRENT USE OF INSULIN (HCC): ICD-10-CM

## 2024-08-07 ENCOUNTER — OFFICE VISIT (OUTPATIENT)
Dept: SURGICAL ONCOLOGY | Facility: CLINIC | Age: 79
End: 2024-08-07
Payer: MEDICARE

## 2024-08-07 VITALS
DIASTOLIC BLOOD PRESSURE: 82 MMHG | TEMPERATURE: 97.7 F | HEIGHT: 61 IN | HEART RATE: 65 BPM | RESPIRATION RATE: 18 BRPM | BODY MASS INDEX: 30.02 KG/M2 | SYSTOLIC BLOOD PRESSURE: 130 MMHG | WEIGHT: 159 LBS | OXYGEN SATURATION: 98 %

## 2024-08-07 DIAGNOSIS — Z15.89 MONOALLELIC MUTATION OF CHEK2 GENE IN FEMALE PATIENT: ICD-10-CM

## 2024-08-07 DIAGNOSIS — Z15.02 MONOALLELIC MUTATION OF CHEK2 GENE IN FEMALE PATIENT: ICD-10-CM

## 2024-08-07 DIAGNOSIS — C50.812 MALIGNANT NEOPLASM OF OVERLAPPING SITES OF LEFT BREAST IN FEMALE, ESTROGEN RECEPTOR POSITIVE (HCC): Primary | ICD-10-CM

## 2024-08-07 DIAGNOSIS — Z15.09 MONOALLELIC MUTATION OF CHEK2 GENE IN FEMALE PATIENT: ICD-10-CM

## 2024-08-07 DIAGNOSIS — Z15.01 MONOALLELIC MUTATION OF CHEK2 GENE IN FEMALE PATIENT: ICD-10-CM

## 2024-08-07 DIAGNOSIS — Z17.0 MALIGNANT NEOPLASM OF OVERLAPPING SITES OF LEFT BREAST IN FEMALE, ESTROGEN RECEPTOR POSITIVE (HCC): Primary | ICD-10-CM

## 2024-08-07 DIAGNOSIS — Z79.811 USE OF ANASTROZOLE (ARIMIDEX): ICD-10-CM

## 2024-08-07 PROCEDURE — 99214 OFFICE O/P EST MOD 30 MIN: CPT

## 2024-08-07 NOTE — PROGRESS NOTES
Surgical Oncology Follow Up       [facility] [place]  [address]  [place] [address]    Delfina Villa  1945  [id number]  [facility] [place]  [address]  [place] [address]    Chief Complaint   Patient presents with   • office visit       Assessment/Plan:  1. 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, estrogen receptor positive (HCC)  - 6 mo f/u    2. Use of anastrozole (Arimidex)  - continue use per medical oncology    3. Monoallelic mutation of CHEK2 gene in female patient       Discussion/Summary: Patient is a 79-year-old female presenting today for six-month follow-up for left breast cancer diagnosed in March 2021. Pathology revealed invasive mammary carcinoma ER/NY+, HER2-, with extensive hormone positive DCIS. She had genetic testing which revealed a CHEK2 mutation. She had a left breast mastectomy with sentinel node biopsy with Dr. Montejo. She is currently on anastrozole. She had a dx mammo of the right breast on 7/18/24 which was BIRADS 2 category 1 density. There were no concerns on her cbe. I will see the patient back in 6 months or sooner should the need arise. She was instructed to call with any questions or concerns prior to this time. All questions were answered today.        History of Present Illness:     Oncology History   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HCC)   8/27/2013 Initial Diagnosis    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HCC)     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, estrogen receptor positive (HCC)   3/15/2021 Biopsy    Left breast ultrasound-guided biopsy  12 o'clock, 5 cm from nipple  Ductal carcinoma in situ  Grade 2        Concordant. Malignancy appears unifocal; masses cover 2.6 cm. US left axilla negative. Right breast clear.     4/19/2021 Genetic Testing    One pathogenic (low  penetrance) variant identified in CHEK2  Total of 23 genes evaluated  [employer]     5/6/2021 Observation    Imaging was reviewed prior to ROSLYN clip insertion  Additional findings in the left breast on ultrasound; 2 additional biopsies were recommended     5/6/2021 Biopsy    Left breast ultrasound-guided biopsy  A. 1 o'clock, 4 cm from nipple  Ductal carcinoma in situ  Grade 2  ,     B. 11 o'clock, 9 cm from nipple  Invasive carcinoma of no special type (ductal)  Grade 1  ER 90, IA 90, HER2 1+  Lymphovascular invasion not identified    Concordant. Malignancy is multifocal and spans at least 8.5 cm in 2 directions. ROSLYN  clip placed at 12:00, 5cmfn biopsy site.     7/13/2021 Surgery    Left breast ROSLYN  directed mastectomy with sentinel lymph node biopsy  Invasive mammary carcinoma of no special type (ductal)  Grade 1  8 mm  Extensive DCIS (size cannot be determined, throughout all quadrants)  Margins negative  0/1 Lymph node  Anatomic/Prognostic Stage IA     7/26/2021 -  Cancer Staged    Staging form: Breast, AJCC 8th Edition  - Pathologic stage from 7/26/2021: pT1b, pN0, cM0, GX, ER+, IA+, HER2- - Signed by BERNADETTE Reyes on 2/7/2024  Stage prefix: Initial diagnosis  Nuclear grade: G2  Multigene prognostic tests performed: None  Histologic grading system: 3 grade system       8/11/2021 -  Hormone Therapy    Anastrozole 1 mg daily  Dr. Cordon          -Interval History: Patient is a 79-year-old female presenting today for six-month follow-up for left breast cancer diagnosed in March 2021.  She is currently on anastrozole. She had a dx mammo of the right breast on 7/18/ 24 which was BIRADS 2 category 1 density. Patient denies changes on her breast exam. She denies persistent headaches, bone pain, back pain, shortness of breath, or abdominal pain.      Review of Systems:  Review of Systems   Constitutional:  Negative for activity change, appetite change, fatigue and unexpected  weight change.   Respiratory:  Negative for cough and shortness of breath.    Cardiovascular:  Negative for chest pain.   Gastrointestinal:  Negative for abdominal pain, diarrhea, nausea and vomiting.   Endocrine: Negative for heat intolerance.   Musculoskeletal:  Negative for arthralgias, back pain and myalgias.   Skin:  Negative for rash.   Neurological:  Negative for weakness and headaches.   Hematological:  Negative for adenopathy.       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, estrogen receptor positive (HCC)   • Monoallelic mutation of CHEK2 gene in female patient   • Use of anastrozole (Arimidex)   • Neuropathy   • History of PTCA   • H/O heart artery stent   • S/P CABG x 2   • Closed fracture of right proximal humerus   • Personal history of colonic polyps   • Old myocardial infarction   • Low back pain   • History of left mastectomy     Past Medical History:   Diagnosis Date   • Abdominal pain     LLQ on occasion   • Angina pectoris (HCC)    • Arthritis    • Breast cancer (HCC) 07/01/2021   • Cancer (HCC)     breast left   • Colon polyp    • Constipation     at times   • Coronary artery disease    • Diabetes mellitus (HCC)    • Diarrhea     at times   • Disease of thyroid gland    • Hemorrhoids    • Hypercholesterolemia    • Hypertension    • Neuropathy     both legs and feet   • Obesity    • Osteoporosis    • Otitis media    • Ovarian ca (HCC) 1997   • 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HCC)    • Shingles     • 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 Cleary. Up to date with her colonoscopy.   • COLONOSCOPY     • CORONARY STENT PLACEMENT      Dec 2015   • EYE SURGERY      cataract surgery   • GALLBLADDER SURGERY     • HYSTERECTOMY  1989   • MAMMO (HISTORICAL)      up to date. see gyn   • MASTECTOMY Left 07/13/2021   • MASTECTOMY W/ SENTINEL NODE BIOPSY Left 07/13/2021    Procedure: BREAST ROSLYN  DIRECTED MASTECTOMY, SENTINEL LYMPH NODE BIOPSY, LYMPHATIC MAPPING WITH BLUE DYE AND RADIOACTIVE DYE (INJECT AT 1500 BY DR ANDREWS IN THE OR);  Surgeon: Anthony Andrews MD;  Location: [facility];  Service: Surgical Oncology   • OOPHORECTOMY Bilateral 1997   • DC CORONARY ARTERY BYP W/VEIN & ARTERY GRAFT 2 VEIN N/A 2/8/2023    Procedure: CORONARY ARTERY BYPASS GRAFT (CABG) X 2 VESSELS GSV-->OM1, LIMA-->LAD; EVH  LEFT LEG w/ RADHA;  Surgeon: Javan Allen MD;  Location: [facility];  Service: Cardiac Surgery   • SPINE SURGERY     • THYROIDECTOMY     • UPPER GASTROINTESTINAL ENDOSCOPY     • US BREAST NEEDLE LOC LEFT Left 05/06/2021   • US GUIDANCE BREAST BIOPSY LEFT EACH ADDITIONAL Left 05/06/2021   • US GUIDED BREAST BIOPSY LEFT COMPLETE Left 03/15/2021   • US GUIDED BREAST BIOPSY LEFT COMPLETE Left 05/06/2021     Family History   Problem Relation Age of Onset   • Diabetes Mother    • Heart disease Mother    • Dementia Mother    • Esophageal cancer Father 60   • Endometrial cancer Sister 68   • Asthma Sister    •  "Cancer Sister    • No Known Problems Sister    • No Known Problems Sister    • No Known Problems Sister    • Asthma Daughter    • No Known Problems Maternal Grandmother    • Prostate cancer Maternal Grandfather    • No Known Problems Paternal Grandmother    • Prostate cancer Paternal Grandfather    • Stomach cancer Brother 71   • Multiple myeloma Brother 72   • Cancer Brother    • Asthma Son    • Depression Son    • Breast cancer Maternal Aunt 50   • No Known Problems Paternal Aunt    • No Known Problems Paternal Aunt    • Breast cancer Other 45   • Breast cancer Other 45   • Diabetes Family    • Cancer Family    • Arthritis Family    • Heart disease Family      Social History     Socioeconomic History   • Marital status:      Spouse name: Not on file   • Number of children: Not on file   • Years of education: Not on file   • Highest education level: Not on file   Occupational History   • Not on file   Tobacco Use   • Smoking status: Never   • Smokeless tobacco: Never   Vaping Use   • Vaping status: Never Used   Substance and Sexual Activity   • Alcohol use: Never   • Drug use: Never   • Sexual activity: Not Currently     Partners: Male     Birth control/protection: Post-menopausal, None   Other Topics Concern   • Not on file   Social History Narrative    · Tobacco smoking status:   Never smoker      This question's title has changed from \"Smoking status\" to \"Tobacco smoking status\" with the 19.7 update. Please use this field only for documenting tobacco smoking behavior. To accommodate this change, new fields for documenting smokeless tobacco and e-cigarette/vape usage have been added.     · Smoking - how much          None  1 PPW  2 PPW  1/4 PPD  1/2 PPD  1 PPD  1 1/2 PPD  2 PPD  3+ PPD          NOTE     · Smokeless tobacco status          Never used smokeless tobacco  Former smokeless tobacco user  Current snuff user  Currently chews tobacco  Currently uses moist powdered tobacco  ----  Not indicated  Not " tolerated  Patient refused          NOTE     · Tobacco-years of use               NOTE     · E-cigarette/vape status          Never used electronic cigarettes  Former user of electronic cigarettes  Current user of electronic cigarettes          NOTE     · Most recent tobacco use screenin2018      · Alcohol intake:   None         Per laine     Social Determinants of Health     Financial Resource Strain: Low Risk  (2023)    Overall Financial Resource Strain (CARDIA)    • Difficulty of Paying Living Expenses: Not hard at all   Food Insecurity: No Food Insecurity (2023)    Hunger Vital Sign    • Worried About Running Out of Food in the Last Year: Never true    • Ran Out of Food in the Last Year: Never true   Transportation Needs: No Transportation Needs (2023)    PRAPARE - Transportation    • Lack of Transportation (Medical): No    • Lack of Transportation (Non-Medical): No   Physical Activity: Not on file   Stress: Not on file   Social Connections: Not on file   Intimate Partner Violence: Not on file   Housing Stability: Low Risk  (2023)    Housing Stability Vital Sign    • Unable to Pay for Housing in the Last Year: No    • Number of Places Lived in the Last Year: 1    • Unstable Housing in the Last Year: No       Current Outpatient Medications:   •  anastrozole (ARIMIDEX) 1 mg tablet, TAKE ONE TABLET BY MOUTH EVERY DAY (GENERIC FOR ARIMIDEX), Disp: 90 tablet, Rfl: 0  •  Aspirin 81 MG CAPS, 2 (two) times a day, Disp: , Rfl:   •  atorvastatin (LIPITOR) 40 mg tablet, Take 1 tablet (40 mg total) by mouth daily, Disp: 90 tablet, Rfl: 3  •  clotrimazole-betamethasone (LOTRISONE) 1-0.05 % cream, Apply topically 2 (two) times a day, Disp: 45 g, Rfl: 3  •  Insulin Pen Needle (Sure Comfort Pen Needles) 31G X 5 MM MISC, by Does not apply route daily, Disp: 100 each, Rfl: 3  •  Lancets (OneTouch Delica Plus [id number]) MISC, Use once a day to check blood sugar, Disp: 100 each, Rfl: 3  •   levothyroxine 88 mcg tablet, Take 1 tablet (88 mcg total) by mouth daily, Disp: 90 tablet, Rfl: 1  •  liraglutide (VICTOZA) injection, Inject 0.2 mL (1.2 mg total) under the skin daily at bedtime, Disp: 6 mL, Rfl: 0  •  metFORMIN (GLUCOPHAGE) 1000 MG tablet, TAKE 1 TABLET(1000 MG) BY MOUTH TWICE DAILY WITH MEALS, Disp: 180 tablet, Rfl: 1  •  metoprolol tartrate (LOPRESSOR) 25 mg tablet, TAKE 1/2 TABLET(12.5 MG) BY MOUTH EVERY 12 HOURS, Disp: 90 tablet, Rfl: 1  •  naproxen (Naprosyn) 500 mg tablet, Take 1 tablet (500 mg total) by mouth 2 (two) times a day with meals for 5 days, Disp: 10 tablet, Rfl: 0  •  OneTouch Ultra test strip, Use 1 each daily Use as instructed, Disp: 100 each, Rfl: 3  •  pregabalin (LYRICA) 75 mg capsule, Take 1 capsule (75 mg total) by mouth 2 (two) times a day, Disp: 180 capsule, Rfl: 1  •  ALPRAZolam (XANAX) 0.25 mg tablet, Take 1 tablet (0.25 mg total) by mouth 3 (three) times a day as needed for anxiety (Patient not taking: Reported on 7/3/2024), Disp: 20 tablet, Rfl: 0  •  cyclobenzaprine (FLEXERIL) 10 mg tablet, Take 0.5 tablets (5 mg total) by mouth 2 (two) times a day as needed for muscle spasms for up to 5 days, Disp: 10 tablet, Rfl: 0  •  lidocaine (LIDODERM) 5 %, Apply 1 patch topically over 12 hours daily for 2 days Remove & Discard patch within 12 hours or as directed by MD, Disp: 2 patch, Rfl: 0  Allergies   Allergen Reactions   • Duloxetine Other (See Comments)     Extreme somnolence/lethargy   • Sulfa Antibiotics Rash     Vitals:    08/07/24 1448   BP: 130/82   Pulse: 65   Resp: 18   Temp: 97.7 °F (36.5 °C)   SpO2: 98%       Physical Exam  Constitutional:       General: She is not in acute distress.     Appearance: Normal appearance.   Cardiovascular:      Rate and Rhythm: Normal rate and regular rhythm.      Pulses: Normal pulses.      Heart sounds: Normal heart sounds.   Pulmonary:      Effort: Pulmonary effort is normal.      Breath sounds: Normal breath sounds.   Chest:       Chest wall: No mass.   Breasts:     Right: No swelling, bleeding, inverted nipple, mass, nipple discharge, skin change or tenderness.      Left: No swelling, bleeding, mass, skin change or tenderness.       Abdominal:      General: Abdomen is flat.      Palpations: Abdomen is soft.   Lymphadenopathy:      Upper Body:      Right upper body: No supraclavicular, axillary or pectoral adenopathy.      Left upper body: No supraclavicular, axillary or pectoral adenopathy.   Skin:     General: Skin is warm.   Neurological:      General: No focal deficit present.      Mental Status: She is alert and oriented to person, place, and time.   Psychiatric:         Mood and Affect: Mood normal.         Behavior: Behavior normal.           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 Computer Aided Detection (CAD) analyzed all applicable images. COMPARISONS: Prior breast imaging dated: 07/12/2023, 08/15/2022, 03/02/2022, 07/13/2021, 05/06/2021, 05/06/2021, 05/06/2021, 05/06/2021, 03/15/2021, 03/15/2021, 02/24/2021, 02/24/2021, 01/16/2020, 01/16/2020, 07/10/2019, 07/10/2019, 01/02/2019, 01/02/2019, 12/04/2018, 11/09/2017, 11/01/2016, 10/26/2015, 09/30/2014, and 09/26/2013 RELEVANT HISTORY: Family Breast Cancer History: History of breast cancer in Maternal Aunt, Other, Other. Family Medical History: Family medical history includes breast cancer in 3 relatives (maternal aunt, other, other) and endometrial cancer in sister. Personal History: Hormone history includes tamoxifen. Surgical history includes breast biopsy, mastectomy, hysterectomy, and oophorectomy. Medical history includes breast cancer and ovarian cancer. RISK ASSESSMENT: [facility] risk assessment reporting was suppressed due to the patient's history and/or  demographic factors. TISSUE DENSITY: The breasts are almost entirely fatty. INDICATION: Delfina Villa is a 79 y.o. female presenting for annual. FINDINGS: There are no suspicious masses, grouped microcalcifications or areas of unexplained architectural distortion. The skin and nipple areolar complex are unremarkable.  Stable benign type calcification.     Impression:  Stable exam. ASSESSMENT/BI-RADS CATEGORY: Right: 2 - Benign Overall: 2 - Benign RECOMMENDATION:      - Diagnostic mammogram in 1 year for the right breast. Workstation ID: [id number]     CT spine lumbar wo contrast    Result Date: 7/17/2024  Narrative: CT LUMBAR SPINE WITHOUT CONTRAST INDICATION:   M54.50: Low back pain, unspecified G89.29: Other chronic pain. Lumbar post-laminectomy syndrome with continued pain with six weeks of conservative therapy COMPARISON: CT abdomen pelvis without contrast 5/26/2024, 7/27/2022, 6/17/2012. Lumbar spine radiograph 4/9/2024. MRI lumbar spine without contrast 4/20/2017. TECHNIQUE:  Contiguous axial images through the lumbar spine were obtained. Sagittal and coronal reconstructions were performed. ) for this visit:  1151.93 mGy-cm .  This examination, like all CT scans performed in the [facility], was performed utilizing techniques to minimize radiation dose exposure, including the use of iterative reconstruction and automated exposure control. IMAGE QUALITY:  Diagnostic. FINDINGS: ALIGNMENT:  There is a transitional lumbosacral junction - partially sacralized L5 vertebral body with right assimilation joint. Mild dextroscoliosis of lumbar spine. Grade 1 retrolisthesis L2-L3. Grade 1 anterolisthesis L3-L4 and L4-L5. VERTEBRAE: Unchanged L3-L5 posterior spinal fixation hardware with associated interbody disc spacers. Hardware is intact.  No surrounding hardware lucency to suggest infection or loosening. No acute fracture. Unchanged small spiculated sclerotic bone lesions in T11 and right S2 sacrum,  likely bone islands. No lytic lesion. Osteopenia. DEGENERATIVE CHANGES: Multilevel degenerative changes consist of osteophytes, vacuum disc phenomenon, disc height loss, subchondral sclerosis, and facet arthropathy. Lower Thoracic spine: Mild lower thoracic degenerative disc disease with mild annular bulging. Lumbar Spine: L1-L2: Diffuse disc bulge, posterior marginal osteophytes, narrowed bilateral subarticular zones. Hypertrophic facet arthropathy, ligamentum flavum thickening. Moderate canal stenosis. Mild bilateral foraminal narrowing (right worse than left). L2-L3: Diffuse disc bulge, narrowed bilateral subarticular zones. Hypertrophic facet arthropathy, partially calcified ligamentum flavum thickening. Severe canal stenosis. Moderate right and severe left foraminal narrowing. L3-L4: Posterior spinal fixation hardware, interbody disc spacer, decompressive laminectomy changes, and left partial facetectomy. Diffuse disc bulge, minor uncoverage of posterior disc. Right facet joint ankylosis. No significant canal stenosis given limitations of beam-hardening streak artifact from spinal hardware. Mild bilateral foraminal narrowing. L4-L5: Posterior spinal fixation hardware, interbody disc spacer, and decompressive laminectomy change. Diffuse disc bulge, minor uncoverage of posterior disc. Bilateral facet joint ankylosis. No significant canal stenosis given limitations of beam-hardening streak artifact from spinal hardware. Mild-to-moderate right and mild left foraminal narrowing. L5-S1: L5 posterior spinal fixation hardware. Partially sacralized L5 vertebral body with right assimilation joint. Mild diffuse disc bulge, posterior marginal osteophytes. Facet arthropathy. Mild canal stenosis. Mild bilateral foraminal narrowing. PARASPINAL SOFT TISSUES: Moderate-to-severe fatty atrophy of lower lumbosacral paraspinal musculature. 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     Impression: No acute osseous abnormality of postsurgical lumbar spine. L3-L5 posterior spinal fixation hardware. No acute hardware complication. Multilevel degenerative changes of postsurgical lumbar spine with transitional lumbosacral anatomy (partially sacralized L5 vertebral body with right assimilation joint), varying degrees of canal stenosis (severe L2-L3) and foraminal narrowing (severe left L2-L3) within limitations of beam-hardening streak artifact from posterior spinal fixation hardware, as detailed above. 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 Additional chronic/incidental findings as detailed above. The study was marked in EPIC for significant notification. Workstation performed: [id number]       I reviewed the above imaging data.      Advance Care Planning/Advance Directives:  Discussed disease status, cancer treatment plans and/or cancer treatment goals with the patient.

## 2024-08-08 ENCOUNTER — TELEPHONE (OUTPATIENT)
Dept: HEMATOLOGY ONCOLOGY | Facility: MEDICAL CENTER | Age: 79
End: 2024-08-08

## 2024-08-08 NOTE — TELEPHONE ENCOUNTER
Left voicemail indicating that patient was scheduled for 9/06 at 2:20pm at the [facility] with Mary Multani. Provided [facility] callback number [phone].

## 2024-08-09 ENCOUNTER — HOSPITAL ENCOUNTER (OUTPATIENT)
Facility: AMBULARY SURGERY CENTER | Age: 79
Setting detail: OUTPATIENT SURGERY
Discharge: HOME | End: 2024-08-09
Attending: STUDENT IN AN ORGANIZED HEALTH CARE EDUCATION/TRAINING PROGRAM | Admitting: STUDENT IN AN ORGANIZED HEALTH CARE EDUCATION/TRAINING PROGRAM
Payer: MEDICARE

## 2024-08-09 ENCOUNTER — APPOINTMENT (OUTPATIENT)
Dept: RADIOLOGY | Facility: HOSPITAL | Age: 79
End: 2024-08-09
Payer: MEDICARE

## 2024-08-09 VITALS
HEART RATE: 78 BPM | RESPIRATION RATE: 18 BRPM | DIASTOLIC BLOOD PRESSURE: 80 MMHG | SYSTOLIC BLOOD PRESSURE: 129 MMHG | TEMPERATURE: 98.1 F | OXYGEN SATURATION: 99 %

## 2024-08-09 PROBLEM — M54.16 LUMBAR RADICULOPATHY: Status: ACTIVE | Noted: 2024-08-09

## 2024-08-09 LAB — GLUCOSE SERPL-MCNC: 180 MG/DL (ref 65–140)

## 2024-08-09 PROCEDURE — 64484 NJX AA&/STRD TFRM EPI L/S EA: CPT | Performed by: STUDENT IN AN ORGANIZED HEALTH CARE EDUCATION/TRAINING PROGRAM

## 2024-08-09 PROCEDURE — 82948 REAGENT STRIP/BLOOD GLUCOSE: CPT

## 2024-08-09 PROCEDURE — NC001 PR NO CHARGE: Performed by: STUDENT IN AN ORGANIZED HEALTH CARE EDUCATION/TRAINING PROGRAM

## 2024-08-09 PROCEDURE — 64483 NJX AA&/STRD TFRM EPI L/S 1: CPT | Performed by: STUDENT IN AN ORGANIZED HEALTH CARE EDUCATION/TRAINING PROGRAM

## 2024-08-09 RX ORDER — LIDOCAINE HYDROCHLORIDE 10 MG/ML
INJECTION, SOLUTION EPIDURAL; INFILTRATION; INTRACAUDAL; PERINEURAL AS NEEDED
Status: DISCONTINUED | OUTPATIENT
Start: 2024-08-09 | End: 2024-08-09 | Stop reason: HOSPADM

## 2024-08-09 RX ORDER — DEXAMETHASONE SODIUM PHOSPHATE 10 MG/ML
15 INJECTION, SOLUTION INTRAMUSCULAR; INTRAVENOUS ONCE
Status: COMPLETED | OUTPATIENT
Start: 2024-08-09 | End: 2024-08-09

## 2024-08-09 RX ORDER — BUPIVACAINE HYDROCHLORIDE 2.5 MG/ML
INJECTION, SOLUTION EPIDURAL; INFILTRATION; INTRACAUDAL AS NEEDED
Status: DISCONTINUED | OUTPATIENT
Start: 2024-08-09 | End: 2024-08-09 | Stop reason: HOSPADM

## 2024-08-09 NOTE — H&P
History of Present Illness: The patient is a 79 y.o. female who presents with complaints of low back pain.     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 Cleary. Up to date with her colonoscopy.    COLONOSCOPY      CORONARY STENT PLACEMENT      Dec 2015    EYE SURGERY      cataract surgery    GALLBLADDER SURGERY      HYSTERECTOMY  1989    MAMMO (HISTORICAL)      up to date. see gyn    MASTECTOMY Left 07/13/2021    MASTECTOMY W/ SENTINEL NODE BIOPSY Left 07/13/2021    Procedure: BREAST ROSLYN  DIRECTED MASTECTOMY, SENTINEL LYMPH NODE BIOPSY, LYMPHATIC MAPPING WITH BLUE DYE AND RADIOACTIVE DYE (INJECT AT 1500 BY DR MONTEJO IN THE OR);  Surgeon: Anthony Montejo MD;  [facility];  Service: Surgical Oncology    OOPHORECTOMY Bilateral 1997    UT CORONARY ARTERY BYP W/VEIN & ARTERY GRAFT 2 VEIN N/A 2/8/2023    Procedure: CORONARY ARTERY BYPASS GRAFT (CABG) X  2 VESSELS GSV-->OM1, LIMA-->LAD; EVH  LEFT LEG w/ RADHA;  Surgeon: Javan Allen MD;  Location: [facility];  Service: Cardiac Surgery    SPINE SURGERY      THYROIDECTOMY      UPPER GASTROINTESTINAL ENDOSCOPY      US BREAST NEEDLE LOC LEFT Left 05/06/2021    US GUIDANCE BREAST BIOPSY LEFT EACH ADDITIONAL Left 05/06/2021    US GUIDED BREAST BIOPSY LEFT COMPLETE Left 03/15/2021    US GUIDED BREAST BIOPSY LEFT COMPLETE Left 05/06/2021       No current facility-administered medications for this encounter.    Allergies   Allergen Reactions    Duloxetine Other (See Comments)     Extreme somnolence/lethargy    Sulfa Antibiotics Rash       Physical Exam:   Vitals:    08/09/24 0824   BP: (!) 175/91   Pulse: 76   Resp: 18   Temp: 98.1 °F (36.7 °C)   SpO2: 99%     General: Awake, Alert, Oriented x 3, Mood and affect appropriate  Respiratory: Respirations even and unlabored  Cardiovascular: Peripheral pulses intact; no edema  Musculoskeletal Exam: low back pain.     ASA Score: 3    Patient/Chart Verification  Patient ID Verified: Verbal, Armband  ID Band Applied: Yes  Consents Confirmed: Procedural  H&P( within 30 days) Verified: Yes  Interval H&P(within 24 hr) Complete (required for Outpatients and Surgery Admit only): Yes  Beta Blocker given : Yes  Pre-op Lab/Test Results Available: In chart  Pregnancy Lab Collected: N/A comment  Does Patient Have a Prosthetic Device/Implant: Yes    Assessment: Lumbar radiculopathy    Plan: Left L2, L3 TFESI

## 2024-08-09 NOTE — OP NOTE
Pre-procedure Diagnosis: Lumbar radiculopathy  Post-procedure Diagnosis: Lumbar radiculopathy  Procedure Title(s):  left TRANSFORAMINAL EPIDURAL STEROID INJECTION]  Attending Surgeon:   Billy Cheatham MD  Anesthesia:   Local     Indications: The patient is a 79 y.o. year-old female with a diagnosis of lumbar radiculopathy. The patient's history and physical exam were reviewed. The risks, benefits and alternatives to the procedure were discussed, and all questions were answered to the patient's satisfaction. The patient agreed to proceed, and written informed consent was obtained.    Procedure in Detail: The patient was brought into the procedure room and placed in the prone position on the fluoroscopy table. The area of the lumbar spine was prepped with chloraprep solution  then draped in a sterile manner.    The L2 vertebral body was identified with AP fluoroscopy. An oblique view to the  left  was obtained to better visualize the inferior junction of the pedicle and transverse process. The 6 o'clock position of the pedicle was marked and identified. The skin and subcutaneous tissues in the area were anesthetized with 1% lidocaine. A 22-gauge, 5 inch needle was directed toward the targeted point under fluoroscopy until bone was contacted. The needle was then walked inferiorly until the neural foramen was entered. A lateral fluoroscopic view was then used to place the needle tip at the 10 o'clock position of the foramen.     This sequence was repeated at the L3 level.       Negative aspiration was confirmed, and 1 ml Omnipaque 240 was injected at each level. Appropriate neurograms were observed under AP fluoroscopy. Then, after negative aspiration, a solution consisting of [1-mL] 0.25% bupivacaine and [0.5-mL] depomedrol (40 mg/ml) was easily injected at each level. The needles were removed with a 1% lidocaine flush. The patient's back was cleaned and a bandage was placed over the needle insertion  points.    Disposition: The patient tolerated the procedure well, and there were no apparent complications. The patient was taken to the recovery area where written discharge instructions for the procedure were given.     Estimated Blood Loss: None  Specimens Obtained: N/A

## 2024-08-09 NOTE — OP NOTE
Pre-procedure Diagnosis: Lumbar radiculopathy  Post-procedure Diagnosis: Lumbar radiculopathy  Procedure Title(s):  left TRANSFORAMINAL EPIDURAL STEROID INJECTION]  Attending Surgeon:   Billy Cheatham MD  Anesthesia:   Local     Indications: The patient is a 79 y.o. year-old female with a diagnosis of lumbar radiculopathy. The patient's history and physical exam were reviewed. The risks, benefits and alternatives to the procedure were discussed, and all questions were answered to the patient's satisfaction. The patient agreed to proceed, and written informed consent was obtained.    Procedure in Detail: The patient was brought into the procedure room and placed in the prone position on the fluoroscopy table. The area of the lumbar spine was prepped with chloraprep solution  then draped in a sterile manner.    The L2 vertebral body was identified with AP fluoroscopy. An oblique view to the  left  was obtained to better visualize the inferior junction of the pedicle and transverse process. The 6 o'clock position of the pedicle was marked and identified. The skin and subcutaneous tissues in the area were anesthetized with 1% lidocaine. A 22-gauge, 5 inch needle was directed toward the targeted point under fluoroscopy until bone was contacted. The needle was then walked inferiorly until the neural foramen was entered. A lateral fluoroscopic view was then used to place the needle tip at the 10 o'clock position of the foramen.     This sequence was repeated at the L3 level.       Negative aspiration was confirmed, and 1 ml Omnipaque 240 was injected at each level. Appropriate neurograms were observed under AP fluoroscopy. Then, after negative aspiration, a solution consisting of [1-mL] 0.25% bupivacaine and [0.75-mL] dexamethasone (15 mg/ml) was easily injected at each level. The needles were removed with a 1% lidocaine flush. The patient's back was cleaned and a bandage was placed over the needle insertion  points.    Disposition: The patient tolerated the procedure well, and there were no apparent complications. The patient was taken to the recovery area where written discharge instructions for the procedure were given.     Estimated Blood Loss: None  Specimens Obtained: N/A

## 2024-08-23 ENCOUNTER — TELEPHONE (OUTPATIENT)
Dept: PAIN MEDICINE | Facility: MEDICAL CENTER | Age: 79
End: 2024-08-23

## 2024-08-23 NOTE — TELEPHONE ENCOUNTER
Caller: Patient     Doctor:      Reason for call: 25-30% improvement and pain level is at 6     States there is not a lot of difference. It helped at the very beginning but now there is not much difference. She states she is glad she had it done she feels a little different but still difficulty     Call back#: [phone]

## 2024-09-06 ENCOUNTER — OFFICE VISIT (OUTPATIENT)
Dept: CARDIOLOGY CLINIC | Facility: CLINIC | Age: 79
End: 2024-09-06
Payer: MEDICARE

## 2024-09-06 ENCOUNTER — OFFICE VISIT (OUTPATIENT)
Dept: HEMATOLOGY ONCOLOGY | Facility: MEDICAL CENTER | Age: 79
End: 2024-09-06
Payer: MEDICARE

## 2024-09-06 VITALS
OXYGEN SATURATION: 99 % | DIASTOLIC BLOOD PRESSURE: 68 MMHG | RESPIRATION RATE: 17 BRPM | HEIGHT: 61 IN | HEART RATE: 71 BPM | BODY MASS INDEX: 30.4 KG/M2 | SYSTOLIC BLOOD PRESSURE: 116 MMHG | WEIGHT: 161 LBS | TEMPERATURE: 98 F

## 2024-09-06 VITALS
SYSTOLIC BLOOD PRESSURE: 122 MMHG | OXYGEN SATURATION: 99 % | DIASTOLIC BLOOD PRESSURE: 54 MMHG | WEIGHT: 161 LBS | HEART RATE: 63 BPM | BODY MASS INDEX: 30.4 KG/M2 | HEIGHT: 61 IN

## 2024-09-06 DIAGNOSIS — I10 ESSENTIAL HYPERTENSION: ICD-10-CM

## 2024-09-06 DIAGNOSIS — E78.2 MIXED DYSLIPIDEMIA: ICD-10-CM

## 2024-09-06 DIAGNOSIS — Z85.3 PERSONAL HISTORY OF MALIGNANT NEOPLASM OF BREAST: Primary | ICD-10-CM

## 2024-09-06 DIAGNOSIS — M85.851 OSTEOPENIA OF NECKS OF BOTH FEMURS: ICD-10-CM

## 2024-09-06 DIAGNOSIS — M85.852 OSTEOPENIA OF NECKS OF BOTH FEMURS: ICD-10-CM

## 2024-09-06 DIAGNOSIS — I25.10 CORONARY ARTERY DISEASE INVOLVING NATIVE CORONARY ARTERY OF NATIVE HEART WITHOUT ANGINA PECTORIS: Primary | ICD-10-CM

## 2024-09-06 PROCEDURE — 99214 OFFICE O/P EST MOD 30 MIN: CPT | Performed by: INTERNAL MEDICINE

## 2024-09-06 PROCEDURE — 93000 ELECTROCARDIOGRAM COMPLETE: CPT | Performed by: INTERNAL MEDICINE

## 2024-09-06 PROCEDURE — 99214 OFFICE O/P EST MOD 30 MIN: CPT | Performed by: PHYSICIAN ASSISTANT

## 2024-09-06 NOTE — PROGRESS NOTES
Cardiology   Ruby Garcia DO, [facility]  Dawson Mccoy MD, [facility]  Dylan Bartlett MD, [facility]  Anjali Bush MD, [facility]  -------------------------------------------------------------------  [facility]  [address], [address], [address]  [place], [place], [address]  [phone]    Cardiology Follow Up  Delfina Villa  1945  [id number]          Assessment/Plan:    1. Coronary artery disease involving native coronary artery of native heart without angina pectoris    2. Mixed dyslipidemia    3. Essential hypertension      -Continue atorvastatin 40 mg daily.  Lipid panel ordered along with CMP.  -Continue aspirin  -Continue metoprolol 12.5 mg twice daily.  -No recent falls.  She was going for physical therapy and recently had injections for chronic back pain.      Interval History:     Delfina Villa is 79 y.o. female here for followup of CAD/CABG.  At her last visit, she has overall been feeling well except for low back pain.  She has undergone injections without significant relief.  She denies any shortness of breath or chest pain.  She is limited in activity due to back pain but overall is feeling well.  She reports good adherence with medications.  No recent blood work.    Previously, she could not tolerate pravastatin or atorvastatin due to myalgias but was tolerating atorvastatin 80 mg daily which was started post CABG.   she had independently discontinued atorvastatin but was resumed at last visit.  She has been tolerating the medication.  She has been on 2 g of fish oil twice a day.  She had blood work done on January 4, 2024 which showed total cholesterol 139, triglycerides 209, HDL 38 and LDL of 59    She has a history of CAD with SAMMIE to proximal LAD in 2015.  Prior to stent placement, she was feeling episode of left arm pain with exertion.   In December 2022, she developed exertional dyspnea and chest tightness and was seen in the emergency room.  She  subsequently underwent cardiac catheterization which showed left main 70% stenosis and 95% proximal circumflex stenosis.  In addition she had 45% RCA stenosis.  She had an echocardiogram which showed normal ejection fraction with mild valve disease.  On February 8, 2023, she underwent two-vessel CABG with LIMA to LAD and SVG to OM1.  Surgery was uncomplicated and she was discharged home 4 days later.    The following portions of the patient's history were reviewed and updated as appropriate: allergies, current medications, past family history, past medical history, past social history, past surgical history, and problem list.       Current Outpatient Medications:     anastrozole (ARIMIDEX) 1 mg tablet, TAKE ONE TABLET BY MOUTH EVERY DAY (GENERIC FOR ARIMIDEX), Disp: 90 tablet, Rfl: 0    Aspirin 81 MG CAPS, 2 (two) times a day, Disp: , Rfl:     atorvastatin (LIPITOR) 40 mg tablet, Take 1 tablet (40 mg total) by mouth daily, Disp: 90 tablet, Rfl: 3    clotrimazole-betamethasone (LOTRISONE) 1-0.05 % cream, Apply topically 2 (two) times a day, Disp: 45 g, Rfl: 3    Insulin Pen Needle (Sure Comfort Pen Needles) 31G X 5 MM MISC, by Does not apply route daily, Disp: 100 each, Rfl: 3    Lancets (OneTouch Delica Plus [id number]) MISC, Use once a day to check blood sugar, Disp: 100 each, Rfl: 3    levothyroxine 88 mcg tablet, Take 1 tablet (88 mcg total) by mouth daily, Disp: 90 tablet, Rfl: 1    metFORMIN (GLUCOPHAGE) 1000 MG tablet, TAKE 1 TABLET(1000 MG) BY MOUTH TWICE DAILY WITH MEALS, Disp: 180 tablet, Rfl: 1    metoprolol tartrate (LOPRESSOR) 25 mg tablet, TAKE 1/2 TABLET(12.5 MG) BY MOUTH EVERY 12 HOURS, Disp: 90 tablet, Rfl: 1    OneTouch Ultra test strip, Use 1 each daily Use as instructed, Disp: 100 each, Rfl: 3    pregabalin (LYRICA) 75 mg capsule, Take 1 capsule (75 mg total) by mouth 2 (two) times a day, Disp: 180 capsule, Rfl: 1    liraglutide (VICTOZA) injection, Inject 0.2 mL (1.2 mg total) under the skin daily  "at bedtime, Disp: 6 mL, Rfl: 0    naproxen (Naprosyn) 500 mg tablet, Take 1 tablet (500 mg total) by mouth 2 (two) times a day with meals for 5 days, Disp: 10 tablet, Rfl: 0        Review of Systems:  Review of Systems   Respiratory:  Negative for shortness of breath.    Cardiovascular:  Positive for leg swelling. Negative for chest pain and palpitations.   Musculoskeletal:  Positive for arthralgias, back pain and gait problem.   All other systems reviewed and are negative.        Physical Exam:  Vitals:  Vitals:    09/06/24 1526   BP: 122/54   BP Location: Right arm   Patient Position: Sitting   Cuff Size: Standard   Pulse: 63   SpO2: 99%   Weight: 73 kg (161 lb)   Height: 5' 1\" (1.549 m)     Physical Exam   Constitutional: She appears healthy. No distress.   Eyes: Pupils are equal, round, and reactive to light. Conjunctivae are normal.   Neck: No JVD present.   Cardiovascular: Normal rate and regular rhythm. Exam reveals no gallop and no friction rub.   Murmur heard.  Pulmonary/Chest: Effort normal and breath sounds normal. She has no wheezes. She has no rales.   Musculoskeletal:         General: No tenderness, deformity or edema.      Cervical back: Normal range of motion and neck supple.   Neurological: She is alert and oriented to person, place, and time.   Skin: Skin is warm and dry.        Cardiographics:  EKG: NSR with inferior Q waves  Last known EF:  60-65%    This note was completed in part utilizing M-Modal Fluency Direct Software.  Grammatical errors, random word insertions, spelling mistakes, and incomplete sentences can be an occasional consequence of this system secondary to software limitations, ambient noise, and hardware issues.  If you have any questions or concerns about the content, text, or information contained within the body of this dictation, please contact the provider for clarification.      "

## 2024-09-06 NOTE — PROGRESS NOTES
Hematology / Oncology Outpatient Follow Up Note    Delfina Villa 79 y.o. 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  She has history of stage III ovarian cancer, diagnosed , which was treated with oophorectomy followed by adjuvant chemotherapy with carboplatin and paclitaxel, resulting in cure.  She was diagnosed with stage I A left breast cancer, grade 1, ER 90% positive, OK 90% positive, HER2 negative disease in 2021.  She underwent mastectomy and sentinel lymph node biopsy, resulting in PIERRE.  She is currently on adjuvant hormonal therapy with anastrozole with excellent tolerance.  She has no evidence of recurrent disease, based on her symptoms and physical examinations.  I recommended her to continue anastrozole 1 mg once a day.      She is overdue for DEXA scan. Last DEXA scan was completed in 2022. This showed osteopenia. She is taking calcium and Vitamin D daily. She will schedule repeat DEXA scan in the next few weeks.    I will see her again in a year for routine follow-up.    Last CBC/CMP was completed in 2024. I would like for her to repeat lab work anytime in the next few weeks or so.       Subjective:      HPI:    A 79-year-old postmenopausal woman who was found to have abnormality in her left breast based on a screening mammography.  Therefore, she underwent left breast biopsy in May 6, 2021 which showed invasive ductal carcinoma, grade 1, ER 90% positive, OK 90% positive, HER2 negative disease.  She also had lesion, characterized as a 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, % positive, % positive.  Because of the multifocal disease, she underwent mastectomy and sentinel lymph node biopsy by Dr. Montejo in 2021. She had 8 mm of invasive ductal carcinoma, grade 1. There was no evidence of lymphovascular invasion.  1 sentinel lymph  node was negative for metastatic disease.  She did not have reconstruction.         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  She has history of stage III ovarian cancer, diagnosed 1997 which was treated with oophorectomy followed by adjuvant chemotherapy with carboplatin and paclitaxel, resulting in cure.  She was diagnosed with stage I A left breast cancer, grade 1, ER 90% positive, TN 90% positive, HER2 negative disease in July 2021.  She underwent mastectomy and sentinel lymph node biopsy, resulting in PIERRE.      Since August 2021, she has been on adjuvant hormonal therapy with anastrozole.  She presents today for routine follow-up.  She underwent coronary artery bypass grafting in February 2023 from which she recovered very quickly.  She has been tolerating anastrozole well.  She has no hot flashes or musculoskeletal symptoms.  She denies any pain.  Her weight is stable. She denies new medical issues.        Objective:      Primary Diagnosis:       1. Left breast cancer, stage I A ( pT1b, pN0, M0 ) grade 1, ER 90% positive, TN 90% positive, HER2 negative disease.  Diagnosed in July 2021.     2.   History of stage III ovarian cancer, diagnosed in 1997.     3.  CHEK 2 mutation.     Cancer Staging:  Cancer Staging  No matching staging information was found for the patient.        Previous Hematologic/ Oncologic Treatment:        Adjuvant chemotherapy with carboplatin and paclitaxel for ovarian cancer in 1997.     Current Hematologic/ Oncologic Treatment:        adjuvant hormonal therapy with anastrozole since August 2021.     Disease Status:        PIERRE status post mastectomy and sentinel lymph node biopsy.     Test Results:     Pathology:     8 mm of invasive ductal carcinoma, grade 1.  No evidence of lymphovascular invasion.  1 sentinel lymph node was negative for metastatic disease.  ER 90% positive, TN 90% positive, HER2 negative disease.  Stage I A ( pT1b, pN0, M0).      Radiology:  Mammography in July 2024 was benign BI-RADS 2.     Mammography in March 2022 was benign.  BI-RADS 2.     Laboratory:       See below.     Physical Exam:        General Appearance:    Alert, oriented          Eyes:    PERRL   Ears:    Normal external ear canals, both ears   Nose:   Nares normal, septum midline   Throat:   Mucosa moist. Pharynx without injection.    Neck:   Supple         Lungs:     Clear to auscultation bilaterally   Chest Wall:    No tenderness or deformity    Heart:    Regular rate and rhythm         Abdomen:     Soft, non-tender, no organomegaly               Extremities:   Extremities no cyanosis or edema         Skin:   no rash or icterus.    Lymph nodes:   Cervical, supraclavicular, and axillary nodes normal   Neurologic:   CNII-XII intact, normal strength, sensation and reflexes     Throughout             Breast exam:    Status post left mastectomy without reconstruction.  No palpable abnormality in her left chest wall.  Right breast exam is negative.       Review of Systems   All other systems reviewed and are negative.      Labs:   Lab Results   Component Value Date    WBC 6.49 01/04/2024    HGB 13.0 01/04/2024    HCT 37.5 01/04/2024    MCV 90 01/04/2024     01/04/2024     Lab Results   Component Value Date     12/30/2015    K 4.6 01/04/2024     01/04/2024    CO2 28 01/04/2024    ANIONGAP 12.4 12/30/2015    BUN 12 01/04/2024    CREATININE 0.65 01/04/2024    GLUCOSE 128 02/08/2023    GLUF 129 (H) 01/04/2024    CALCIUM 9.6 01/04/2024    CORRECTEDCA 9.2 12/14/2022    AST 22 01/04/2024    ALT 15 01/04/2024    ALKPHOS 47 01/04/2024    PROT 7.1 12/21/2015    BILITOT 0.42 12/21/2015    EGFR 85 01/04/2024         Lab Results   Component Value Date     5.3 09/27/2018         Current Medications: Reviewed  Allergies: Reviewed  PMH/FH/SH:  Reviewed      Vital Sign:    Body surface area is 1.72 meters squared.    Wt Readings from Last 3 Encounters:   09/06/24 73 kg  (161 lb)   08/07/24 72.1 kg (159 lb)   07/18/24 70.3 kg (155 lb)        Temp Readings from Last 3 Encounters:   09/06/24 98 °F (36.7 °C) (Temporal)   08/09/24 98.1 °F (36.7 °C) (Temporal)   08/07/24 97.7 °F (36.5 °C) (Tympanic)        BP Readings from Last 3 Encounters:   09/06/24 116/68   08/09/24 129/80   08/07/24 130/82         Pulse Readings from Last 3 Encounters:   09/06/24 71   08/09/24 78   08/07/24 65

## 2024-09-17 ENCOUNTER — TELEPHONE (OUTPATIENT)
Age: 79
End: 2024-09-17

## 2024-09-19 ENCOUNTER — APPOINTMENT (OUTPATIENT)
Dept: LAB | Facility: CLINIC | Age: 79
End: 2024-09-19
Payer: MEDICARE

## 2024-09-19 DIAGNOSIS — I25.10 CORONARY ARTERY DISEASE INVOLVING NATIVE CORONARY ARTERY OF NATIVE HEART WITHOUT ANGINA PECTORIS: ICD-10-CM

## 2024-09-19 LAB
CHOLEST SERPL-MCNC: 86 MG/DL
ERYTHROCYTE [DISTWIDTH] IN BLOOD BY AUTOMATED COUNT: 12.1 % (ref 11.6–15.1)
HCT VFR BLD AUTO: 34.1 % (ref 36.5–46.1)
HDLC SERPL-MCNC: 36 MG/DL
HGB BLD-MCNC: 11.5 G/DL (ref 12–15.4)
LDLC SERPL CALC-MCNC: 28 MG/DL (ref 0–100)
MCH RBC QN AUTO: 31.1 PG (ref 26.8–34.3)
MCHC RBC AUTO-ENTMCNC: 33.7 G/DL (ref 31.4–37.4)
MCV RBC AUTO: 92 FL (ref 82–98)
NONHDLC SERPL-MCNC: 50 MG/DL
PLATELET # BLD AUTO: 168 THOUSANDS/UL (ref 149–390)
PMV BLD AUTO: 11.5 FL (ref 8.9–12.7)
RBC # BLD AUTO: 3.7 MILLION/UL (ref 3.88–5.12)
TRIGL SERPL-MCNC: 112 MG/DL
WBC # BLD AUTO: 6.39 THOUSAND/UL (ref 4.31–10.16)

## 2024-09-19 PROCEDURE — 80061 LIPID PANEL: CPT

## 2024-09-19 PROCEDURE — 36415 COLL VENOUS BLD VENIPUNCTURE: CPT

## 2024-09-19 PROCEDURE — 85027 COMPLETE CBC AUTOMATED: CPT

## 2024-09-23 ENCOUNTER — TELEPHONE (OUTPATIENT)
Dept: CARDIOLOGY CLINIC | Facility: CLINIC | Age: 79
End: 2024-09-23

## 2024-09-23 NOTE — TELEPHONE ENCOUNTER
----- Message from Ruby Garcia DO sent at 9/20/2024  4:04 PM EDT -----  Can you please let the patient know blood work was normal

## 2024-09-26 ENCOUNTER — OFFICE VISIT (OUTPATIENT)
Dept: URGENT CARE | Facility: CLINIC | Age: 79
End: 2024-09-26
Payer: MEDICARE

## 2024-09-26 VITALS
TEMPERATURE: 99.7 F | RESPIRATION RATE: 14 BRPM | OXYGEN SATURATION: 100 % | WEIGHT: 160 LBS | BODY MASS INDEX: 30.23 KG/M2 | SYSTOLIC BLOOD PRESSURE: 155 MMHG | HEART RATE: 80 BPM | DIASTOLIC BLOOD PRESSURE: 67 MMHG

## 2024-09-26 DIAGNOSIS — U07.1 COVID: Primary | ICD-10-CM

## 2024-09-26 PROCEDURE — 99213 OFFICE O/P EST LOW 20 MIN: CPT | Performed by: PHYSICIAN ASSISTANT

## 2024-09-26 RX ORDER — BENZONATATE 200 MG/1
200 CAPSULE ORAL 3 TIMES DAILY PRN
Qty: 20 CAPSULE | Refills: 0 | Status: SHIPPED | OUTPATIENT
Start: 2024-09-26

## 2024-09-26 RX ORDER — NIRMATRELVIR AND RITONAVIR 300-100 MG
3 KIT ORAL 2 TIMES DAILY
Qty: 30 TABLET | Refills: 0 | Status: SHIPPED | OUTPATIENT
Start: 2024-09-26 | End: 2024-10-01

## 2024-09-26 NOTE — PROGRESS NOTES
[facility]        NAME: Delfina Villa is a 79 y.o. adult  : 1945    MRN: [id number]  DATE: 2024  TIME: 1:30 PM    Assessment and Plan   COVID [U07.1]  1. COVID  nirmatrelvir & ritonavir (Paxlovid, 300/100,) tablet therapy pack    benzonatate (TESSALON) 200 MG capsule          COVID confirmed by rapid COVID testing will prescribe Paxlovid due to her comorbidities and age follow-up in ER if any worsening    The patient and/or parent/legal guardian verbalized understanding of exam findings and   Treatment plan. We engaged in the shared decision-making process and treatment options were   discussed at length with the patient.  All questions, concerns and complaints were answered and   addressed to the patient's' and/or parent/legal guardians's satisfaction.    Patient Instructions     Patient Instructions       Additional recommendations from [facility]:    If you do not already own one, obtain a pulse oximeter and monitor oxygen levels periodically throughout the day.         - Monitor your oxygen while at rest and also while walking         -If your oxygen drops below 90% during either of these times, please seek evaluation in the emergency department  2.  Take 2000 IUs of vitamin D daily total  3.  Make sure to get up and walk as tolerated throughout the day at least several times per day  4.  With lying down lie prone (on your belly) as much as tolerated      Follow up with PCP in 3-5 days.  Proceed to  ER if symptoms worsen.    If tests are performed, our office will contact you with results only if   changes need to made to the care plan discussed with you at the visit.   You can review your full results on [facility].     Chief Complaint     Chief Complaint   Patient presents with    Cold Like Symptoms     Pt presents with cough, body aches, stated Tuesday         History of Present Illness       HPI  Patient presenting with cough body aches began this past Tuesday. Cough  productive with white mucus. Also scratchy throat. No fever or chills. When she coughs she reports chest soreness. Otherwise no chest pain.     Review of Systems   Review of Systems  All other related systems reviewed and are negative except as noted in HPI    Current Medications       Current Outpatient Medications:     anastrozole (ARIMIDEX) 1 mg tablet, TAKE ONE TABLET BY MOUTH EVERY DAY (GENERIC FOR ARIMIDEX), Disp: 90 tablet, Rfl: 0    Aspirin 81 MG CAPS, 2 (two) times a day, Disp: , Rfl:     atorvastatin (LIPITOR) 40 mg tablet, Take 1 tablet (40 mg total) by mouth daily, Disp: 90 tablet, Rfl: 3    benzonatate (TESSALON) 200 MG capsule, Take 1 capsule (200 mg total) by mouth 3 (three) times a day as needed for cough, Disp: 20 capsule, Rfl: 0    clotrimazole-betamethasone (LOTRISONE) 1-0.05 % cream, Apply topically 2 (two) times a day, Disp: 45 g, Rfl: 3    Insulin Pen Needle (Sure Comfort Pen Needles) 31G X 5 MM MISC, by Does not apply route daily, Disp: 100 each, Rfl: 3    Lancets (OneTouch Delica Plus [id number]) MISC, Use once a day to check blood sugar, Disp: 100 each, Rfl: 3    levothyroxine 88 mcg tablet, Take 1 tablet (88 mcg total) by mouth daily, Disp: 90 tablet, Rfl: 1    liraglutide (VICTOZA) injection, Inject 0.2 mL (1.2 mg total) under the skin daily at bedtime, Disp: 6 mL, Rfl: 0    metFORMIN (GLUCOPHAGE) 1000 MG tablet, TAKE 1 TABLET(1000 MG) BY MOUTH TWICE DAILY WITH MEALS, Disp: 180 tablet, Rfl: 1    metoprolol tartrate (LOPRESSOR) 25 mg tablet, TAKE 1/2 TABLET(12.5 MG) BY MOUTH EVERY 12 HOURS, Disp: 90 tablet, Rfl: 1    nirmatrelvir & ritonavir (Paxlovid, 300/100,) tablet therapy pack, Take 3 tablets by mouth 2 (two) times a day for 5 days Take 2 nirmatrelvir tablets + 1 ritonavir tablet together per dose, Disp: 30 tablet, Rfl: 0    OneTouch Ultra test strip, Use 1 each daily Use as instructed, Disp: 100 each, Rfl: 3    pregabalin (LYRICA) 75 mg capsule, Take 1 capsule (75 mg total) by mouth 2  (two) times a day, Disp: 180 capsule, Rfl: 1    naproxen (Naprosyn) 500 mg tablet, Take 1 tablet (500 mg total) by mouth 2 (two) times a day with meals for 5 days, Disp: 10 tablet, Rfl: 0    Current Allergies     Allergies as of 09/26/2024 - Reviewed 09/26/2024   Allergen Reaction Noted    Duloxetine Other (See Comments) 08/27/2013    Sulfa antibiotics Rash 08/27/2013            The following portions of the patient's history were reviewed and updated as appropriate: allergies, current medications, past family history, past medical history, past social history, past surgical history and problem list.     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 Cleary. Up to date with her colonoscopy.    COLONOSCOPY      CORONARY STENT PLACEMENT      Dec 2015    EPIDURAL BLOCK INJECTION Left 8/9/2024    Procedure: LEFT  L2, L3 TRANSFORAMINAL EPIDURAL STEROID INJECTION;   Surgeon: Billy Cheatham MD;  Location: [facility] MAIN OR;  Service: Pain Management     EYE SURGERY      cataract surgery    GALLBLADDER SURGERY      HYSTERECTOMY  1989    MAMMO (HISTORICAL)      up to date. see gyn    MASTECTOMY Left 07/13/2021    MASTECTOMY W/ SENTINEL NODE BIOPSY Left 07/13/2021    Procedure: BREAST ROSLYN  DIRECTED MASTECTOMY, SENTINEL LYMPH NODE BIOPSY, LYMPHATIC MAPPING WITH BLUE DYE AND RADIOACTIVE DYE (INJECT AT 1500 BY DR MONTEJO IN THE OR);  Surgeon: Anthony Montejo MD;  [facility];  Service: Surgical Oncology    OOPHORECTOMY Bilateral 1997    [place] CORONARY ARTERY BYP W/VEIN & ARTERY GRAFT 2 VEIN N/A 2/8/2023    Procedure: CORONARY ARTERY BYPASS GRAFT (CABG) X 2 VESSELS GSV-->OM1, LIMA-->LAD; EVH  LEFT LEG w/ RADHA;  Surgeon: Javan Allen MD;  Location:  MAIN OR;  Service: Cardiac Surgery    SPINE SURGERY      THYROIDECTOMY      UPPER GASTROINTESTINAL ENDOSCOPY      US BREAST NEEDLE LOC LEFT Left 05/06/2021    US GUIDANCE BREAST BIOPSY LEFT EACH ADDITIONAL Left 05/06/2021    US GUIDED BREAST BIOPSY LEFT COMPLETE Left 03/15/2021    US GUIDED BREAST BIOPSY LEFT COMPLETE Left 05/06/2021       Family History   Problem Relation Age of Onset    Diabetes Mother     Heart disease Mother     Dementia Mother     Esophageal cancer Father 60    Endometrial cancer Sister 68    Asthma Sister     Cancer Sister     No Known Problems Sister     No Known Problems Sister     No Known Problems Sister     Asthma Daughter     No Known Problems Maternal Grandmother     Prostate cancer Maternal Grandfather     No Known Problems Paternal Grandmother     Prostate cancer Paternal Grandfather     Stomach cancer Brother 71    Multiple myeloma Brother 72    Cancer Brother     Asthma Son     Depression Son     Breast cancer Maternal Aunt 50    No Known Problems Paternal Aunt     No Known Problems Paternal Aunt     Breast cancer Other 45    Breast cancer Other 45    Diabetes Family     Cancer Family     Arthritis  "Family     Heart disease Family          Medications have been verified.        Objective   /67   Pulse 80   Temp 99.7 °F (37.6 °C)   Resp 14   Wt 72.6 kg (160 lb)   SpO2 100%   BMI 30.23 kg/m²   No LMP recorded. Patient has had a hysterectomy.       Physical Exam     Physical Exam  Constitutional:       General: She is not in acute distress.     Appearance: She is well-developed.   HENT:      Head: Normocephalic and atraumatic.   Eyes:      General: No scleral icterus.     Conjunctiva/sclera: Conjunctivae normal.   Cardiovascular:      Rate and Rhythm: Normal rate and regular rhythm.      Heart sounds: Normal heart sounds. No murmur heard.  Pulmonary:      Effort: Pulmonary effort is normal. No respiratory distress.      Breath sounds: No stridor. No wheezing, rhonchi or rales.   Musculoskeletal:      Cervical back: Normal range of motion and neck supple.   Skin:     General: Skin is warm and dry.   Neurological:      Mental Status: She is alert and oriented to person, place, and time.   Psychiatric:         Behavior: Behavior normal.         Ortho Exam        Procedures  No Procedures performed today        Note: Portions of this record may have been created with voice recognition software. Occasional wrong word or \"sound a like\" substitutions may have occurred due to the inherent limitations of voice recognition software. Please read the chart carefully and recognize, using context, where substitutions have occurred.*      "

## 2024-10-02 ENCOUNTER — TELEPHONE (OUTPATIENT)
Dept: FAMILY MEDICINE CLINIC | Facility: CLINIC | Age: 79
End: 2024-10-02

## 2024-10-14 ENCOUNTER — HOSPITAL ENCOUNTER (OUTPATIENT)
Dept: RADIOLOGY | Facility: HOSPITAL | Age: 79
Discharge: HOME | End: 2024-10-14
Payer: MEDICARE

## 2024-10-14 VITALS — WEIGHT: 160 LBS | HEIGHT: 61 IN | BODY MASS INDEX: 30.21 KG/M2

## 2024-10-14 DIAGNOSIS — M85.851 OSTEOPENIA OF NECKS OF BOTH FEMURS: ICD-10-CM

## 2024-10-14 DIAGNOSIS — M85.852 OSTEOPENIA OF NECKS OF BOTH FEMURS: ICD-10-CM

## 2024-10-14 PROCEDURE — 77080 DXA BONE DENSITY AXIAL: CPT

## 2024-11-01 DIAGNOSIS — G62.9 NEUROPATHY: ICD-10-CM

## 2024-11-01 DIAGNOSIS — Z17.0 MALIGNANT NEOPLASM OF OVERLAPPING SITES OF LEFT BREAST IN FEMALE, ESTROGEN RECEPTOR POSITIVE (HCC): ICD-10-CM

## 2024-11-01 DIAGNOSIS — C50.812 MALIGNANT NEOPLASM OF OVERLAPPING SITES OF LEFT BREAST IN FEMALE, ESTROGEN RECEPTOR POSITIVE (HCC): ICD-10-CM

## 2024-11-01 RX ORDER — ANASTROZOLE 1 MG/1
TABLET ORAL
Qty: 90 TABLET | Refills: 1 | Status: SHIPPED | OUTPATIENT
Start: 2024-11-01

## 2024-11-01 RX ORDER — PREGABALIN 75 MG/1
75 CAPSULE ORAL 2 TIMES DAILY
Qty: 180 CAPSULE | Refills: 1 | Status: SHIPPED | OUTPATIENT
Start: 2024-11-01

## 2024-11-01 NOTE — TELEPHONE ENCOUNTER
Reason for call:   [x] Refill   [] Prior Auth  [] Other:     Office:   [] PCP/Provider -   [x] Specialty/Provider - heme onc/Nitin Jaeger MD     Medication: anastrozole (ARIMIDEX) 1 mg tablet     Dose/Frequency:     TAKE ONE TABLET BY MOUTH EVERY DAY (GENERIC FOR ARIMIDEX)       Quantity: 90    Pharmacy: [id number] - [place] [id number] [phone]     Does the patient have enough for 3 days?   [x] Yes   [] No - Send as HP to POD

## 2024-11-01 NOTE — TELEPHONE ENCOUNTER
Reason for call:   [x] Refill   [] Prior Auth  [] Other:     Office:   [x] PCP/Provider - [facility]   [] Specialty/Provider -     Medication:   ~ pregabalin (LYRICA) 75 mg capsule - Take 1 capsule (75 mg total) by mouth 2 (two) times a day     Pharmacy:   [id number]     Does the patient have enough for 3 days?   [x] Yes   [] No - Send as HP to POD

## 2024-11-02 DIAGNOSIS — R21 RASH: ICD-10-CM

## 2024-11-04 DIAGNOSIS — E03.9 ACQUIRED HYPOTHYROIDISM: ICD-10-CM

## 2024-11-04 RX ORDER — CLOTRIMAZOLE AND BETAMETHASONE DIPROPIONATE 10; .64 MG/G; MG/G
CREAM TOPICAL
Qty: 45 G | Refills: 3 | Status: SHIPPED | OUTPATIENT
Start: 2024-11-04

## 2024-11-05 RX ORDER — LEVOTHYROXINE SODIUM 88 UG/1
88 TABLET ORAL DAILY
Qty: 90 TABLET | Refills: 1 | Status: SHIPPED | OUTPATIENT
Start: 2024-11-05

## 2024-11-13 ENCOUNTER — NURSE TRIAGE (OUTPATIENT)
Age: 79
End: 2024-11-13

## 2024-11-13 NOTE — TELEPHONE ENCOUNTER
Regarding: bleeding, painful hemorrhoids  ----- Message from Leticia TEAGUE sent at 11/13/2024 11:35 AM EST -----  Previous 's patient. Patient calling as she is experiencing hemorrhoids. States that the hemorrhoids are now bleeding and are painful. Patient states that she is very uncomfortable. No currently appointments until after the new year

## 2024-11-13 NOTE — TELEPHONE ENCOUNTER
"Last OV 9/16/24 Abdominal Pain, Hemorrhoids  Colonoscopy 7/21/22    Spoke with pt. Reports hemorrhoids for months \"I've been putting this off\". Hx of internal hemorrhoids. Notes BRBPR with some BM; not mixed in stool. Pt will be reaching out to PCP in the interim. Appt scheduled.     Reason for Disposition  • Affirmative: The patient has BRBPR (bright red blood per rectum) with wiping, not mixed with stool, AND feels a hemorrhoid    Answer Assessment - Initial Assessment Questions  1. When did your symptoms start?   Months ago   2. Is there bright red blood when wiping or streaks in the stool? If yes, how many occurrences have you had in the last 24 hours?  Yes  3. Do you feel this is a mild, moderate, or severe amount of blood?   Mild  4. Have your symptoms improved or weakened?   Same  5. Are you experiencing more diarrhea or constipation?   Denies    Protocols used: GI-Hemorrhoids-ADULT-[place]    "

## 2024-11-14 ENCOUNTER — TELEPHONE (OUTPATIENT)
Age: 79
End: 2024-11-14

## 2024-11-14 DIAGNOSIS — R41.0 CONFUSION: Primary | ICD-10-CM

## 2024-11-14 PROBLEM — Z00.00 MEDICARE ANNUAL WELLNESS VISIT, SUBSEQUENT: Status: RESOLVED | Noted: 2021-02-09 | Resolved: 2024-11-14

## 2024-11-14 NOTE — TELEPHONE ENCOUNTER
The patient's daughter called to report that her mother is a little confused and a little aggressive in her actions, not to the point of hurting or attacking anyone, but the last time something similar happened to her she had a UTI and she wants an order to do a urine test. Please call the patient's daughter Inna when the order is available.     Please Advice

## 2024-11-15 ENCOUNTER — OFFICE VISIT (OUTPATIENT)
Dept: URGENT CARE | Facility: CLINIC | Age: 79
End: 2024-11-15
Payer: MEDICARE

## 2024-11-15 VITALS
DIASTOLIC BLOOD PRESSURE: 48 MMHG | SYSTOLIC BLOOD PRESSURE: 122 MMHG | WEIGHT: 161.8 LBS | BODY MASS INDEX: 30.57 KG/M2 | RESPIRATION RATE: 12 BRPM | TEMPERATURE: 98.1 F | OXYGEN SATURATION: 99 % | HEART RATE: 70 BPM

## 2024-11-15 DIAGNOSIS — R35.0 URINARY FREQUENCY: Primary | ICD-10-CM

## 2024-11-15 LAB
SL AMB  POCT GLUCOSE, UA: NEGATIVE
SL AMB LEUKOCYTE ESTERASE,UA: ABNORMAL
SL AMB POCT BILIRUBIN,UA: NEGATIVE
SL AMB POCT BLOOD,UA: ABNORMAL
SL AMB POCT CLARITY,UA: ABNORMAL
SL AMB POCT COLOR,UA: YELLOW
SL AMB POCT KETONES,UA: NEGATIVE
SL AMB POCT NITRITE,UA: NEGATIVE
SL AMB POCT PH,UA: 7.5
SL AMB POCT SPECIFIC GRAVITY,UA: 1.01
SL AMB POCT URINE PROTEIN: NEGATIVE
SL AMB POCT UROBILINOGEN: 0.2

## 2024-11-15 PROCEDURE — 81002 URINALYSIS NONAUTO W/O SCOPE: CPT | Performed by: PHYSICIAN ASSISTANT

## 2024-11-15 PROCEDURE — 87086 URINE CULTURE/COLONY COUNT: CPT | Performed by: PHYSICIAN ASSISTANT

## 2024-11-15 PROCEDURE — 99203 OFFICE O/P NEW LOW 30 MIN: CPT | Performed by: PHYSICIAN ASSISTANT

## 2024-11-15 PROCEDURE — 87186 SC STD MICRODIL/AGAR DIL: CPT | Performed by: PHYSICIAN ASSISTANT

## 2024-11-15 PROCEDURE — 87077 CULTURE AEROBIC IDENTIFY: CPT | Performed by: PHYSICIAN ASSISTANT

## 2024-11-15 RX ORDER — CEPHALEXIN 500 MG/1
500 CAPSULE ORAL EVERY 12 HOURS SCHEDULED
Qty: 14 CAPSULE | Refills: 0 | Status: SHIPPED | OUTPATIENT
Start: 2024-11-15 | End: 2024-11-22

## 2024-11-15 NOTE — PROGRESS NOTES
[facility]        NAME: Delfina Villa is a 79 y.o. female  : 1945    MRN: [id number]  DATE: November 15, 2024  TIME: 11:45 AM    Assessment and Plan   Urinary frequency [R35.0]  1. Urinary frequency  POCT urine dip    Urine culture    cephalexin (KEFLEX) 500 mg capsule    Urine culture          Reviewed prior culture result from 2023.  Will use keflex.    Patient Instructions       Follow up with PCP in 3-5 days.  Proceed to  ER if symptoms worsen.    If tests are performed, our office will contact you with results only if changes need to made to the care plan discussed with you at the visit. [facility]    Chief Complaint     Chief Complaint   Patient presents with    Urinary Frequency     Reports back pain and urinary frequency for the past few days. States she has a hx of UTI and feels this may be what is happening.          History of Present Illness       Urinary Frequency   This is a new problem. Episode onset: 4 days ago. The problem occurs every urination. The problem has been gradually worsening. The quality of the pain is described as burning. The pain is mild. There has been no fever. Associated symptoms include frequency and urgency. Pertinent negatives include no chills, discharge, flank pain, hesitancy, nausea or vomiting. She has tried nothing for the symptoms. The treatment provided no relief.       Review of Systems   Review of Systems   Constitutional: Negative.  Negative for chills, fatigue and fever.   HENT: Negative.     Eyes: Negative.    Respiratory: Negative.     Cardiovascular: Negative.  Negative for chest pain and palpitations.   Gastrointestinal:  Negative for abdominal pain, constipation, diarrhea, nausea and vomiting.   Endocrine: Negative.    Genitourinary:  Positive for dysuria, frequency and urgency. Negative for flank pain, hesitancy, pelvic pain, vaginal bleeding, vaginal discharge and vaginal pain.   Musculoskeletal:   Negative for back pain, gait problem, neck pain and neck stiffness.   Skin: Negative.  Negative for pallor and rash.   Allergic/Immunologic: Negative.    Neurological: Negative.  Negative for weakness and numbness.   Hematological: Negative.    Psychiatric/Behavioral: Negative.           Current Medications       Current Outpatient Medications:     anastrozole (ARIMIDEX) 1 mg tablet, TAKE ONE TABLET BY MOUTH EVERY DAY., Disp: 90 tablet, Rfl: 1    Aspirin 81 MG CAPS, 2 (two) times a day, Disp: , Rfl:     atorvastatin (LIPITOR) 40 mg tablet, Take 1 tablet (40 mg total) by mouth daily, Disp: 90 tablet, Rfl: 3    cephalexin (KEFLEX) 500 mg capsule, Take 1 capsule (500 mg total) by mouth every 12 (twelve) hours for 7 days, Disp: 14 capsule, Rfl: 0    clotrimazole-betamethasone (LOTRISONE) 1-0.05 % cream, APPLY TOPICALLY TWO TIMES A DAY (GENERIC FOR LOTRISONE), Disp: 45 g, Rfl: 3    levothyroxine 88 mcg tablet, TAKE 1 TABLET BY MOUTH EVERY DAY, Disp: 90 tablet, Rfl: 1    liraglutide (VICTOZA) injection, Inject 0.2 mL (1.2 mg total) under the skin daily at bedtime, Disp: 6 mL, Rfl: 0    metFORMIN (GLUCOPHAGE) 1000 MG tablet, TAKE 1 TABLET(1000 MG) BY MOUTH TWICE DAILY WITH MEALS, Disp: 180 tablet, Rfl: 1    metoprolol tartrate (LOPRESSOR) 25 mg tablet, TAKE 1/2 TABLET(12.5 MG) BY MOUTH EVERY 12 HOURS, Disp: 90 tablet, Rfl: 1    pregabalin (LYRICA) 75 mg capsule, Take 1 capsule (75 mg total) by mouth 2 (two) times a day, Disp: 180 capsule, Rfl: 1    benzonatate (TESSALON) 200 MG capsule, Take 1 capsule (200 mg total) by mouth 3 (three) times a day as needed for cough (Patient not taking: Reported on 11/15/2024), Disp: 20 capsule, Rfl: 0    Insulin Pen Needle (Sure Comfort Pen Needles) 31G X 5 MM MISC, by Does not apply route daily, Disp: 100 each, Rfl: 3    Lancets (OneTouch Delica Plus [id number]) MISC, Use once a day to check blood sugar, Disp: 100 each, Rfl: 3    naproxen (Naprosyn) 500 mg tablet, Take 1 tablet (500 mg  total) by mouth 2 (two) times a day with meals for 5 days (Patient not taking: Reported on 11/15/2024), Disp: 10 tablet, Rfl: 0    OneTouch Ultra test strip, Use 1 each daily Use as instructed, Disp: 100 each, Rfl: 3    Current Allergies     Allergies as of 11/15/2024 - Reviewed 11/15/2024   Allergen Reaction Noted    Duloxetine Other (See Comments) 08/27/2013    Sulfa antibiotics Rash 08/27/2013            The following portions of the patient's history were reviewed and updated as appropriate: allergies, current medications, past family history, past medical history, past social history, past surgical history and problem list.     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 Cleary. Up to date with her colonoscopy.    COLONOSCOPY      CORONARY STENT PLACEMENT      Dec 2015    EPIDURAL BLOCK INJECTION Left 8/9/2024    Procedure: LEFT  L2, L3 TRANSFORAMINAL  EPIDURAL STEROID INJECTION;  Surgeon: Billy Cheatham MD;  Location: [facility] MAIN OR;  Service: Pain Management     EYE SURGERY      cataract surgery    GALLBLADDER SURGERY      HYSTERECTOMY  1989    MAMMO (HISTORICAL)      up to date. see gyn    MASTECTOMY Left 07/13/2021    MASTECTOMY W/ SENTINEL NODE BIOPSY Left 07/13/2021    Procedure: BREAST ROSLYN  DIRECTED MASTECTOMY, SENTINEL LYMPH NODE BIOPSY, LYMPHATIC MAPPING WITH BLUE DYE AND RADIOACTIVE DYE (INJECT AT 1500 BY DR MONTEJO IN THE OR);  Surgeon: Anthony Montejo MD;  [facility];  Service: Surgical Oncology    OOPHORECTOMY Bilateral 1997    [place] CORONARY ARTERY BYP W/VEIN & ARTERY GRAFT 2 VEIN N/A 2/8/2023    Procedure: CORONARY ARTERY BYPASS GRAFT (CABG) X 2 VESSELS GSV-->OM1, LIMA-->LAD; EVH  LEFT LEG w/ RADHA;  Surgeon: Javan Allen MD;  Location:  MAIN OR;  Service: Cardiac Surgery    SPINE SURGERY      THYROIDECTOMY      UPPER GASTROINTESTINAL ENDOSCOPY      US BREAST NEEDLE LOC LEFT Left 05/06/2021    US GUIDANCE BREAST BIOPSY LEFT EACH ADDITIONAL Left 05/06/2021    US GUIDED BREAST BIOPSY LEFT COMPLETE Left 03/15/2021    US GUIDED BREAST BIOPSY LEFT COMPLETE Left 05/06/2021       Family History   Problem Relation Age of Onset    Diabetes Mother     Heart disease Mother     Dementia Mother     Esophageal cancer Father 60    Endometrial cancer Sister 68    Asthma Sister     Cancer Sister     No Known Problems Sister     No Known Problems Sister     No Known Problems Sister     Asthma Daughter     No Known Problems Maternal Grandmother     Prostate cancer Maternal Grandfather     No Known Problems Paternal Grandmother     Prostate cancer Paternal Grandfather     Stomach cancer Brother 71    Multiple myeloma Brother 72    Cancer Brother     Asthma Son     Depression Son     Breast cancer Maternal Aunt 50    No Known Problems Paternal Aunt     No Known Problems Paternal Aunt     Breast cancer Other 45    Breast cancer Other 45    Diabetes Family      Cancer Family     Arthritis Family     Heart disease Family          Medications have been verified.        Objective   BP (!) 122/48 Comment: manual recheck, right arm  Pulse 70   Temp 98.1 °F (36.7 °C) (Tympanic)   Resp 12   Wt 73.4 kg (161 lb 12.8 oz)   SpO2 99%   Breastfeeding No   BMI 30.57 kg/m²        Physical Exam     Physical Exam  Vitals and nursing note reviewed.   Constitutional:       General: She is not in acute distress.     Appearance: Normal appearance. She is well-developed. She is not ill-appearing or diaphoretic.   HENT:      Head: Normocephalic and atraumatic.   Cardiovascular:      Rate and Rhythm: Normal rate and regular rhythm.      Heart sounds: Normal heart sounds.   Pulmonary:      Effort: Pulmonary effort is normal. No respiratory distress.      Breath sounds: Normal breath sounds. No wheezing, rhonchi or rales.   Abdominal:      General: Bowel sounds are normal. There is no distension.      Palpations: Abdomen is soft.      Tenderness: There is no abdominal tenderness. There is no right CVA tenderness, left CVA tenderness or guarding.   Skin:     General: Skin is warm.      Capillary Refill: Capillary refill takes less than 2 seconds.      Coloration: Skin is not pale.      Findings: No rash.   Neurological:      Mental Status: She is alert.

## 2024-11-15 NOTE — PATIENT INSTRUCTIONS
Continue to monitor symptoms.  If new or worsening symptoms develop, go immediately to Er. Drink plenty of fluids.  Follow up with Family Doctor this week.    If tests are performed, our office will contact you with results only if changes need to made to the care plan discussed with you at the visit. You can review your full results on [facility].     Patient Education     Urinary Tract Infection, Adult ED   General Information   You came to the Emergency Department (ED) for a urinary tract infection or UTI. Most UTIs are infections in either your bladder or your kidneys. Bladder infections are more common and may also be called cystitis. Kidney infections are more serious and may also be called pyelonephritis. You need antibiotics to treat a UTI. It is important to take all of your antibiotics even if you start to feel better.  What care is needed at home?   Call your regular doctor to let them know you were in the ED. Make a follow-up appointment if you were told to.  For the first day or so, you may want to take an over-the-counter medicine, like phenazopyridine. This will help to numb your bladder. You will also not have the strong urge to urinate. This medicine causes your urine and tears to look orange. If you have kidney disease, talk to your doctor before taking this medicine.  To lower your chance of getting a UTI in the future, you can:  Drink extra fluids.  If you have sex, urinate right afterwards.  When do I need to get emergency help?   Return to the ED if:   You have very bad pain in your back, shoulder, or belly.  You have a fever of 102.2°F (39°C); shaking chills or sweats even though you are taking antibiotics.  When do I need to call the doctor?   You have a fever up to 100.4°F (38°C).  You notice more blood in your urine.  Your signs get worse or do not improve within 24 hours of starting treatment.  You are not able to urinate for more than 8 hours  Your signs come back after treatment  has stopped.  You have new or worsening symptoms.  Last Reviewed Date   2021-03-12  Consumer Information Use and Disclaimer   This generalized information is a limited summary of diagnosis, treatment, and/or medication information. It is not meant to be comprehensive and should be used as a tool to help the user understand and/or assess potential diagnostic and treatment options. It does NOT include all information about conditions, treatments, medications, side effects, or risks that may apply to a specific patient. It is not intended to be medical advice or a substitute for the medical advice, diagnosis, or treatment of a health care provider based on the health care provider's examination and assessment of a patient’s specific and unique circumstances. Patients must speak with a health care provider for complete information about their health, medical questions, and treatment options, including any risks or benefits regarding use of medications. This information does not endorse any treatments or medications as safe, effective, or approved for treating a specific patient. [employer] and its affiliates disclaim any warranty or liability relating to this information or the use thereof. The use of this information is governed by the Terms of Use, available at https://www.woltersSkyWireuwer.com/en/know/clinical-effectiveness-terms   Copyright   Copyright © 2024 [employer], [employer] and its affiliates and/or licensors. All rights reserved.

## 2024-11-17 LAB — BACTERIA UR CULT: ABNORMAL

## 2024-11-27 ENCOUNTER — OFFICE VISIT (OUTPATIENT)
Dept: URGENT CARE | Facility: CLINIC | Age: 79
End: 2024-11-27
Payer: MEDICARE

## 2024-11-27 VITALS
WEIGHT: 153 LBS | BODY MASS INDEX: 28.91 KG/M2 | HEART RATE: 75 BPM | TEMPERATURE: 98.2 F | RESPIRATION RATE: 12 BRPM | OXYGEN SATURATION: 100 %

## 2024-11-27 DIAGNOSIS — R05.1 ACUTE COUGH: ICD-10-CM

## 2024-11-27 DIAGNOSIS — J06.9 VIRAL UPPER RESPIRATORY TRACT INFECTION: Primary | ICD-10-CM

## 2024-11-27 PROCEDURE — 99213 OFFICE O/P EST LOW 20 MIN: CPT | Performed by: PHYSICIAN ASSISTANT

## 2024-11-27 RX ORDER — BENZONATATE 200 MG/1
200 CAPSULE ORAL 3 TIMES DAILY PRN
Qty: 20 CAPSULE | Refills: 0 | Status: SHIPPED | OUTPATIENT
Start: 2024-11-27

## 2024-11-27 NOTE — PROGRESS NOTES
[facility]        NAME: Delfina Villa is a 79 y.o. female  : 1945    MRN: [id number]  DATE: 2024  TIME: 11:00 AM    Assessment and Plan   Viral upper respiratory tract infection [J06.9]  1. Viral upper respiratory tract infection        2. Acute cough  benzonatate (TESSALON) 200 MG capsule            Patient Instructions   Viral upper respiratory infection  Rx tessalon pearls three times a day sent via EMR  Recommend flonase nasal spray for postnasal drip/cough  Warm salt water gargles  Rest, fluids and supportive care  May benefit from a cool mist humidifier on night stand  Tylenol/ibuprofen as needed for pain/fever    Follow up with PCP in 3-5 days.  Proceed to  ER if symptoms worsen.    If tests have been performed at [facility], our office will contact you with results if changes need to be made to the care plan discussed with you at the visit.  You can review your full results on [facility].    Chief Complaint     Chief Complaint   Patient presents with    Cold Like Symptoms     Pt presents with cough ongoing one week, chest congestion         History of Present Illness       Delfina is a 79-year-old female presents to clinic complaining of cough x 4 days.  She describes as productive with a white sputum and worse when she lays down at night.  She also is complaining of fatigue due to the cough, rhinorrhea, and sore throat.  She denies any fever, chills, myalgias, nasal congestion, ear pain, shortness of breath, nausea, vomiting, diarrhea, loss of taste or smell, recent travel, or any known sick contacts.        Review of Systems   Review of Systems   Constitutional:  Positive for fatigue. Negative for chills and fever.   HENT:  Positive for rhinorrhea and sore throat. Negative for congestion, ear pain, sinus pressure and sinus pain.    Respiratory:  Positive for cough. Negative for shortness of breath.    Gastrointestinal:  Negative for diarrhea, nausea and vomiting.    Musculoskeletal:  Negative for myalgias.   Neurological:  Negative for headaches.         Current Medications       Current Outpatient Medications:     anastrozole (ARIMIDEX) 1 mg tablet, TAKE ONE TABLET BY MOUTH EVERY DAY., Disp: 90 tablet, Rfl: 1    Aspirin 81 MG CAPS, 2 (two) times a day, Disp: , Rfl:     atorvastatin (LIPITOR) 40 mg tablet, Take 1 tablet (40 mg total) by mouth daily, Disp: 90 tablet, Rfl: 3    benzonatate (TESSALON) 200 MG capsule, Take 1 capsule (200 mg total) by mouth 3 (three) times a day as needed for cough, Disp: 20 capsule, Rfl: 0    clotrimazole-betamethasone (LOTRISONE) 1-0.05 % cream, APPLY TOPICALLY TWO TIMES A DAY (GENERIC FOR LOTRISONE), Disp: 45 g, Rfl: 3    Insulin Pen Needle (Sure Comfort Pen Needles) 31G X 5 MM MISC, by Does not apply route daily, Disp: 100 each, Rfl: 3    Lancets (OneTouch Delica Plus Thupqr31I) MISC, Use once a day to check blood sugar, Disp: 100 each, Rfl: 3    levothyroxine 88 mcg tablet, TAKE 1 TABLET BY MOUTH EVERY DAY, Disp: 90 tablet, Rfl: 1    liraglutide (VICTOZA) injection, Inject 0.2 mL (1.2 mg total) under the skin daily at bedtime, Disp: 6 mL, Rfl: 0    metFORMIN (GLUCOPHAGE) 1000 MG tablet, TAKE 1 TABLET(1000 MG) BY MOUTH TWICE DAILY WITH MEALS, Disp: 180 tablet, Rfl: 1    metoprolol tartrate (LOPRESSOR) 25 mg tablet, TAKE 1/2 TABLET(12.5 MG) BY MOUTH EVERY 12 HOURS, Disp: 90 tablet, Rfl: 1    OneTouch Ultra test strip, Use 1 each daily Use as instructed, Disp: 100 each, Rfl: 3    pregabalin (LYRICA) 75 mg capsule, Take 1 capsule (75 mg total) by mouth 2 (two) times a day, Disp: 180 capsule, Rfl: 1    benzonatate (TESSALON) 200 MG capsule, Take 1 capsule (200 mg total) by mouth 3 (three) times a day as needed for cough (Patient not taking: Reported on 11/27/2024), Disp: 20 capsule, Rfl: 0    naproxen (Naprosyn) 500 mg tablet, Take 1 tablet (500 mg total) by mouth 2 (two) times a day with meals for 5 days (Patient not taking: Reported on  11/15/2024), Disp: 10 tablet, Rfl: 0    Current Allergies     Allergies as of 11/27/2024 - Reviewed 11/27/2024   Allergen Reaction Noted    Duloxetine Other (See Comments) 08/27/2013    Sulfa antibiotics Rash 08/27/2013            The following portions of the patient's history were reviewed and updated as appropriate: allergies, current medications, past family history, past medical history, past social history, past surgical history and problem list.     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 Cleary. Up to date with her colonoscopy.    COLONOSCOPY      CORONARY STENT PLACEMENT      Dec 2015    EPIDURAL BLOCK INJECTION Left 8/9/2024    Procedure: LEFT  L2, L3 TRANSFORAMINAL EPIDURAL STEROID INJECTION;  Surgeon: Billy Cheatham MD;  Location: [facility] MAIN OR;  Service: Pain Management     EYE SURGERY      cataract surgery    GALLBLADDER SURGERY       HYSTERECTOMY  1989    MAMMO (HISTORICAL)      up to date. see gyn    MASTECTOMY Left 07/13/2021    MASTECTOMY W/ SENTINEL NODE BIOPSY Left 07/13/2021    Procedure: BREAST ROSLYN  DIRECTED MASTECTOMY, SENTINEL LYMPH NODE BIOPSY, LYMPHATIC MAPPING WITH BLUE DYE AND RADIOACTIVE DYE (INJECT AT 1500 BY DR MONTEJO IN THE OR);  Surgeon: Anthony Montejo MD;  Location: [facility];  Service: Surgical Oncology    OOPHORECTOMY Bilateral 1997    CO CORONARY ARTERY BYP W/VEIN & ARTERY GRAFT 2 VEIN N/A 2/8/2023    Procedure: CORONARY ARTERY BYPASS GRAFT (CABG) X 2 VESSELS GSV-->OM1, LIMA-->LAD; EVH  LEFT LEG w/ RADHA;  Surgeon: Javan Allen MD;  Location: [facility];  Service: Cardiac Surgery    SPINE SURGERY      THYROIDECTOMY      UPPER GASTROINTESTINAL ENDOSCOPY      US BREAST NEEDLE LOC LEFT Left 05/06/2021    US GUIDANCE BREAST BIOPSY LEFT EACH ADDITIONAL Left 05/06/2021    US GUIDED BREAST BIOPSY LEFT COMPLETE Left 03/15/2021    US GUIDED BREAST BIOPSY LEFT COMPLETE Left 05/06/2021       Family History   Problem Relation Age of Onset    Diabetes Mother     Heart disease Mother     Dementia Mother     Esophageal cancer Father 60    Endometrial cancer Sister 68    Asthma Sister     Cancer Sister     No Known Problems Sister     No Known Problems Sister     No Known Problems Sister     Asthma Daughter     No Known Problems Maternal Grandmother     Prostate cancer Maternal Grandfather     No Known Problems Paternal Grandmother     Prostate cancer Paternal Grandfather     Stomach cancer Brother 71    Multiple myeloma Brother 72    Cancer Brother     Asthma Son     Depression Son     Breast cancer Maternal Aunt 50    No Known Problems Paternal Aunt     No Known Problems Paternal Aunt     Breast cancer Other 45    Breast cancer Other 45    Diabetes Family     Cancer Family     Arthritis Family     Heart disease Family          Medications have been verified.        Objective   Pulse 75   Temp 98.2 °F (36.8 °C)   Resp 12   Wt 69.4  kg (153 lb) Comment: as per patient  SpO2 100%   BMI 28.91 kg/m²   No LMP recorded. Patient has had a hysterectomy.       Physical Exam     Physical Exam  Vitals and nursing note reviewed.   Constitutional:       General: She is not in acute distress.     Appearance: Normal appearance. She is not ill-appearing.   HENT:      Right Ear: Tympanic membrane, ear canal and external ear normal.      Left Ear: Tympanic membrane, ear canal and external ear normal.      Nose: Congestion present.      Mouth/Throat:      Mouth: Mucous membranes are moist.      Pharynx: No oropharyngeal exudate or posterior oropharyngeal erythema.   Cardiovascular:      Rate and Rhythm: Normal rate and regular rhythm.      Heart sounds: Normal heart sounds.   Pulmonary:      Effort: Pulmonary effort is normal. No respiratory distress.      Breath sounds: Normal breath sounds. No stridor. No wheezing, rhonchi or rales.   Lymphadenopathy:      Cervical: No cervical adenopathy.   Neurological:      Mental Status: She is alert and oriented to person, place, and time.   Psychiatric:         Mood and Affect: Mood normal.         Behavior: Behavior normal.

## 2024-12-02 DIAGNOSIS — E11.9 TYPE 2 DIABETES MELLITUS WITHOUT COMPLICATION, WITHOUT LONG-TERM CURRENT USE OF INSULIN (HCC): ICD-10-CM

## 2024-12-02 NOTE — TELEPHONE ENCOUNTER
Reason for call:   [x] Refill   [] Prior Auth  [] Other:     Office:   [x] PCP/Provider -   [] Specialty/Provider -     Medication: OneTouch Ultra test strip    Dose/Frequency:     Quantity: 100     Pharmacy: [id number] - [place], [place] - [address]      Does the patient have enough for 3 days?   [x] Yes   [] No - Send as HP to POD

## 2024-12-03 RX ORDER — BLOOD SUGAR DIAGNOSTIC
1 STRIP MISCELLANEOUS DAILY
Qty: 100 EACH | Refills: 3 | Status: SHIPPED | OUTPATIENT
Start: 2024-12-03

## 2024-12-09 ENCOUNTER — OFFICE VISIT (OUTPATIENT)
Dept: FAMILY MEDICINE CLINIC | Facility: CLINIC | Age: 79
End: 2024-12-09
Payer: MEDICARE

## 2024-12-09 VITALS
OXYGEN SATURATION: 98 % | HEIGHT: 61 IN | RESPIRATION RATE: 20 BRPM | SYSTOLIC BLOOD PRESSURE: 110 MMHG | DIASTOLIC BLOOD PRESSURE: 50 MMHG | BODY MASS INDEX: 29.83 KG/M2 | WEIGHT: 158 LBS | HEART RATE: 72 BPM

## 2024-12-09 DIAGNOSIS — I25.10 CORONARY ARTERY DISEASE INVOLVING NATIVE CORONARY ARTERY OF NATIVE HEART WITHOUT ANGINA PECTORIS: ICD-10-CM

## 2024-12-09 DIAGNOSIS — Z23 ENCOUNTER FOR IMMUNIZATION: ICD-10-CM

## 2024-12-09 DIAGNOSIS — I10 ESSENTIAL HYPERTENSION: Primary | ICD-10-CM

## 2024-12-09 DIAGNOSIS — E11.9 TYPE 2 DIABETES MELLITUS WITHOUT COMPLICATION, WITHOUT LONG-TERM CURRENT USE OF INSULIN (HCC): ICD-10-CM

## 2024-12-09 DIAGNOSIS — E03.9 ACQUIRED HYPOTHYROIDISM: ICD-10-CM

## 2024-12-09 DIAGNOSIS — M81.0 AGE-RELATED OSTEOPOROSIS WITHOUT CURRENT PATHOLOGICAL FRACTURE: ICD-10-CM

## 2024-12-09 DIAGNOSIS — Z00.00 MEDICARE ANNUAL WELLNESS VISIT, SUBSEQUENT: ICD-10-CM

## 2024-12-09 DIAGNOSIS — E78.2 MIXED DYSLIPIDEMIA: ICD-10-CM

## 2024-12-09 PROBLEM — Z79.811 USE OF ANASTROZOLE (ARIMIDEX): Status: RESOLVED | Noted: 2022-01-21 | Resolved: 2024-12-09

## 2024-12-09 PROBLEM — Z86.0100 PERSONAL HISTORY OF COLONIC POLYPS: Status: RESOLVED | Noted: 2022-07-21 | Resolved: 2024-12-09

## 2024-12-09 LAB — SL AMB POCT HEMOGLOBIN AIC: 5.8 (ref ?–6.5)

## 2024-12-09 PROCEDURE — G0439 PPPS, SUBSEQ VISIT: HCPCS | Performed by: FAMILY MEDICINE

## 2024-12-09 PROCEDURE — 90662 IIV NO PRSV INCREASED AG IM: CPT | Performed by: FAMILY MEDICINE

## 2024-12-09 PROCEDURE — 99214 OFFICE O/P EST MOD 30 MIN: CPT | Performed by: FAMILY MEDICINE

## 2024-12-09 PROCEDURE — 83036 HEMOGLOBIN GLYCOSYLATED A1C: CPT | Performed by: FAMILY MEDICINE

## 2024-12-09 PROCEDURE — 90471 IMMUNIZATION ADMIN: CPT | Performed by: FAMILY MEDICINE

## 2024-12-09 NOTE — ASSESSMENT & PLAN NOTE
A1C done today and was 5.8. Continue victoza 1.2 mg daily and patient can stop metformin 1000 mg daily. Advised pt to follow a low carb diet and to exercise on a regular basis. Foot exam done in January 2024. Had eye exam in June 2023 by Dr Rapp. Urine albumin/creatinine ratio done in January 2024.    Lab Results   Component Value Date    HGBA1C 5.8 12/09/2024       Orders:    POCT hemoglobin A1c

## 2024-12-09 NOTE — PROGRESS NOTES
Name: Delfina Villa      : 1945      MRN: [id number]  Encounter Provider: Rogerio Mancera MD  Encounter Date: 2024   Encounter department: [facility]    Assessment & Plan  Medicare annual wellness visit, subsequent  Wellness exam done. Last Tdap was in . Had COVID vaccines, Prevnar 13, and Pneumovacc 23. Recommend flu shot and Shingrix series. Labs are up to date. Had colonoscopy in  and no polyps. Had mammogram in 2024 and ok. Last Dexa was in 2024. No recent falls. Mood ok.          Essential hypertension  Blood pressure good. Continue metoprolol 25 mg bid. Pt advised to continue low Na diet and to exercise on a regular basis.          Mixed dyslipidemia  LDL 28 and LFTs normal in 2024. Continue atorvastatin 40 mg daily at bedtime. Pt also takes fish oil 3600 mg qd.  Advised pt to follow a low cholesterol diet and to exercise on a regular basis.         Coronary artery disease involving native coronary artery of native heart without angina pectoris  Pt had CABG x 2 in 2023. No recent chest pain or shortness of breath. Continue current meds. Pt saw Cardiology in 2024 for follow-up.         Type 2 diabetes mellitus without complication, without long-term current use of insulin ([facility])  A1C done today and was 5.8. Continue victoza 1.2 mg daily and patient can stop metformin 1000 mg daily. Advised pt to follow a low carb diet and to exercise on a regular basis. Foot exam done in 2024. Had eye exam in 2023 by Dr Rapp. Urine albumin/creatinine ratio done in 2024.    Lab Results   Component Value Date    HGBA1C 5.8 2024       Orders:    POCT hemoglobin A1c    Acquired hypothyroidism  TSH was 0.971 and Free T4 was 1.11 in 2024. Continue levothyroxine 88 mcg qd.     Orders:    TSH, 3rd generation; Future    T4, free; Future    Age-related osteoporosis without current pathological fracture  Last  dexascan was in October 2024. Pt advised to take calcium 1200 mg qd and Vit D 1000 U qd. Pt also advised to perform weight-bearing exercise on a regular basis.          Encounter for immunization    Orders:    influenza vaccine, high-dose, PF 0.5 mL (Fluzone High Dose)      Depression Screening and Follow-up Plan: Patient was screened for depression during today's encounter. They screened negative with a PHQ-2 score of 0.      Preventive health issues were discussed with patient, and age appropriate screening tests were ordered as noted in patient's After Visit Summary. Personalized health advice and appropriate referrals for health education or preventive services given if needed, as noted in patient's After Visit Summary.    History of Present Illness     Patient here for wellness exam and follow-up Hypertension, Hyperlipidemia, Coronary Artery Disease, DM, Hypothyroidism, Osteoporosis. Patient doing well. No chest pain or shortness of breath. No headaches. No abdominal pain. Blood pressure has been good. Sugars ok. Follows low carbohydrate diet. Saw Cardiology in September 2024 and no change in medications. Last dexascan was in October 2024. Had mammogram in July 2024. Wants flu shot.        Patient Care Team:  Rogerio Mancera MD as PCP - General  MD Anthony Lund MD as Surgeon (Surgical Oncology)  Diana Cormier MD (Obstetrics and Gynecology)  Kimberly Cordon MD as Medical Oncologist (Hematology)  Ruby Garcia DO (Cardiology)  Manas Hernandez MD as Medical Oncologist (Hematology and Oncology)  BERNADETTE Reyes as Nurse Practitioner (Surgical Oncology)  Mary Multani PA-C as Physician Assistant (Hematology and Oncology)    Review of Systems   Constitutional:  Negative for fatigue and unexpected weight change.   Respiratory:  Negative for cough, chest tightness and shortness of breath.    Cardiovascular:  Negative for chest pain and palpitations.   Gastrointestinal:  Negative for abdominal  pain, constipation, diarrhea, nausea and vomiting.   Genitourinary:  Negative for difficulty urinating.   Musculoskeletal:  Positive for arthralgias, back pain and gait problem.   Skin:  Negative for rash.   Neurological:  Negative for dizziness and headaches.     Medical History Reviewed by provider this encounter:       Annual Wellness Visit Questionnaire   Delfina is here for her Initial Wellness visit.     Health Risk Assessment:   Patient rates overall health as very good. Patient feels that their physical health rating is same. Patient is satisfied with their life. Eyesight was rated as same. Hearing was rated as same. Patient feels that their emotional and mental health rating is same. Patients states they are sometimes angry. Patient states they are never, rarely unusually tired/fatigued. Pain experienced in the last 7 days has been none. Patient states that she has experienced no weight loss or gain in last 6 months.     Depression Screening:   PHQ-2 Score: 0      Fall Risk Screening:   In the past year, patient has experienced: history of falling in past year    Number of falls: 1  Injured during fall?: Yes    Feels unsteady when standing or walking?: Yes    Worried about falling?: Yes      Urinary Incontinence Screening:   Patient has not leaked urine accidently in the last six months.     Home Safety:  Patient does not have trouble with stairs inside or outside of their home. Patient has working smoke alarms and has working carbon monoxide detector. Home safety hazards include: none.     Nutrition:   Current diet is Regular.     Medications:   Patient is currently taking over-the-counter supplements. OTC medications include: see medication list. Patient is able to manage medications.     Activities of Daily Living (ADLs)/Instrumental Activities of Daily Living (IADLs):   Walk and transfer into and out of bed and chair?: Yes  Dress and groom yourself?: Yes    Bathe or shower yourself?: Yes    Feed  yourself? Yes  Do your laundry/housekeeping?: Yes  Manage your money, pay your bills and track your expenses?: Yes  Make your own meals?: Yes    Do your own shopping?: Yes    Previous Hospitalizations:   Any hospitalizations or ED visits within the last 12 months?: No      Advance Care Planning:   Living will: Yes    Durable POA for healthcare: Yes    Advanced directive: Yes    Advanced directive counseling given: Yes    Five wishes given: No    Patient declined ACP directive: Yes      Cognitive Screening:   Provider or family/friend/caregiver concerned regarding cognition?: No    PREVENTIVE SCREENINGS      Cardiovascular Screening:    General: Screening Current      Diabetes Screening:     General: Screening Not Indicated and History Diabetes      Colorectal Cancer Screening:     General: Screening Current      Breast Cancer Screening:     General: History Breast Cancer      Cervical Cancer Screening:    General: Screening Not Indicated      Osteoporosis Screening:    General: Screening Not Indicated and History Osteoporosis      Abdominal Aortic Aneurysm (AAA) Screening:        General: Screening Not Indicated      Lung Cancer Screening:     General: Screening Not Indicated    Screening, Brief Intervention, and Referral to Treatment (SBIRT)    Screening  Typical number of drinks in a day: 0  Typical number of drinks in a week: 0  Interpretation: Low risk drinking behavior.    AUDIT-C Screenin) How often did you have a drink containing alcohol in the past year? never  2) How many drinks did you have on a typical day when you were drinking in the past year? 0  3) How often did you have 6 or more drinks on one occasion in the past year? never    AUDIT-C Score: 0  Interpretation: Score 0-2 (female): Negative screen for alcohol misuse    Single Item Drug Screening:  How often have you used an illegal drug (including marijuana) or a prescription medication for non-medical reasons in the past year? never    Single  "Item Drug Screen Score: 0  Interpretation: Negative screen for possible drug use disorder    Brief Intervention  Alcohol & drug use screenings were reviewed. No concerns regarding substance use disorder identified.     Social Drivers of Health     Financial Resource Strain: Low Risk  (9/11/2023)    Overall Financial Resource Strain (CARDIA)     Difficulty of Paying Living Expenses: Not hard at all   Food Insecurity: No Food Insecurity (12/9/2024)    Hunger Vital Sign     Worried About Running Out of Food in the Last Year: Never true     Ran Out of Food in the Last Year: Never true   Transportation Needs: No Transportation Needs (12/9/2024)    PRAPARE - Transportation     Lack of Transportation (Medical): No     Lack of Transportation (Non-Medical): No   Housing Stability: Unknown (12/9/2024)    Housing Stability Vital Sign     Unable to Pay for Housing in the Last Year: No     Homeless in the Last Year: No   Utilities: Not At Risk (12/9/2024)    [facility] Utilities     Threatened with loss of utilities: No     No results found.    Objective   /50 (BP Location: Left arm, Patient Position: Sitting, Cuff Size: Large)   Pulse 72   Resp 20   Ht 5' 1\" (1.549 m)   Wt 71.7 kg (158 lb)   SpO2 98%   BMI 29.85 kg/m²     Physical Exam  Vitals and nursing note reviewed.   Constitutional:       General: She is not in acute distress.     Appearance: Normal appearance. She is well-developed.      Comments: Walks with cane.    Neck:      Vascular: No carotid bruit.   Cardiovascular:      Rate and Rhythm: Normal rate and regular rhythm.      Heart sounds: No murmur heard.  Pulmonary:      Effort: Pulmonary effort is normal. No respiratory distress.      Breath sounds: Normal breath sounds.   Musculoskeletal:      Cervical back: Normal range of motion and neck supple.      Right lower leg: No edema.      Left lower leg: No edema.   Lymphadenopathy:      Cervical: No cervical adenopathy.   Neurological:      Mental Status: She is " alert.   Psychiatric:         Mood and Affect: Mood normal.         Behavior: Behavior normal.         Thought Content: Thought content normal.         Judgment: Judgment normal.

## 2024-12-09 NOTE — ASSESSMENT & PLAN NOTE
LDL 28 and LFTs normal in September 2024. Continue atorvastatin 40 mg daily at bedtime. Pt also takes fish oil 3600 mg qd.  Advised pt to follow a low cholesterol diet and to exercise on a regular basis.

## 2024-12-09 NOTE — ASSESSMENT & PLAN NOTE
TSH was 0.971 and Free T4 was 1.11 in January 2024. Continue levothyroxine 88 mcg qd.     Orders:    TSH, 3rd generation; Future    T4, free; Future

## 2024-12-09 NOTE — ASSESSMENT & PLAN NOTE
Pt had CABG x 2 in February 2023. No recent chest pain or shortness of breath. Continue current meds. Pt saw Cardiology in September 2024 for follow-up.

## 2024-12-09 NOTE — PATIENT INSTRUCTIONS
Medicare Preventive Visit Patient Instructions  Thank you for completing your Welcome to Medicare Visit or Medicare Annual Wellness Visit today. Your next wellness visit will be due in one year (12/10/2025).  The screening/preventive services that you may require over the next 5-10 years are detailed below. Some tests may not apply to you based off risk factors and/or age. Screening tests ordered at today's visit but not completed yet may show as past due. Also, please note that scanned in results may not display below.  Preventive Screenings:  Service Recommendations Previous Testing/Comments   Colorectal Cancer Screening  * Colonoscopy    * Fecal Occult Blood Test (FOBT)/Fecal Immunochemical Test (FIT)  * Fecal DNA/Cologuard Test  * Flexible Sigmoidoscopy Age: 45-75 years old   Colonoscopy: every 10 years (may be performed more frequently if at higher risk)  OR  FOBT/FIT: every 1 year  OR  Cologuard: every 3 years  OR  Sigmoidoscopy: every 5 years  Screening may be recommended earlier than age 45 if at higher risk for colorectal cancer. Also, an individualized decision between you and your healthcare provider will decide whether screening between the ages of 76-85 would be appropriate. Colonoscopy: 07/21/2022  FOBT/FIT: Not on file  Cologuard: Not on file  Sigmoidoscopy: Not on file    Screening Current     Breast Cancer Screening Age: 40+ years old  Frequency: every 1-2 years  Not required if history of left and right mastectomy Mammogram: 07/18/2024    History Breast Cancer   Cervical Cancer Screening Between the ages of 21-29, pap smear recommended once every 3 years.   Between the ages of 30-65, can perform pap smear with HPV co-testing every 5 years.   Recommendations may differ for women with a history of total hysterectomy, cervical cancer, or abnormal pap smears in past. Pap Smear: 11/09/2022    Screening Not Indicated   Hepatitis C Screening Once for adults born between 1945 and 1965  More frequently in  patients at high risk for Hepatitis C Hep C Antibody: Not on file        Diabetes Screening 1-2 times per year if you're at risk for diabetes or have pre-diabetes Fasting glucose: 129 mg/dL (1/4/2024)  A1C: 5.7 (6/13/2024)  Screening Not Indicated  History Diabetes   Cholesterol Screening Once every 5 years if you don't have a lipid disorder. May order more often based on risk factors. Lipid panel: 09/19/2024    Screening Current     Other Preventive Screenings Covered by Medicare:  Abdominal Aortic Aneurysm (AAA) Screening: covered once if your at risk. You're considered to be at risk if you have a family history of AAA.  Lung Cancer Screening: covers low dose CT scan once per year if you meet all of the following conditions: (1) Age 55-77; (2) No signs or symptoms of lung cancer; (3) Current smoker or have quit smoking within the last 15 years; (4) You have a tobacco smoking history of at least 20 pack years (packs per day multiplied by number of years you smoked); (5) You get a written order from a healthcare provider.  Glaucoma Screening: covered annually if you're considered high risk: (1) You have diabetes OR (2) Family history of glaucoma OR (3)  aged 50 and older OR (4)  American aged 65 and older  Osteoporosis Screening: covered every 2 years if you meet one of the following conditions: (1) You're estrogen deficient and at risk for osteoporosis based off medical history and other findings; (2) Have a vertebral abnormality; (3) On glucocorticoid therapy for more than 3 months; (4) Have primary hyperparathyroidism; (5) On osteoporosis medications and need to assess response to drug therapy.   Last bone density test (DXA Scan): 10/14/2024.  HIV Screening: covered annually if you're between the age of 15-65. Also covered annually if you are younger than 15 and older than 65 with risk factors for HIV infection. For pregnant patients, it is covered up to 3 times per  pregnancy.    Immunizations:  Immunization Recommendations   Influenza Vaccine Annual influenza vaccination during flu season is recommended for all persons aged >= 6 months who do not have contraindications   Pneumococcal Vaccine   * Pneumococcal conjugate vaccine = PCV13 (Prevnar 13), PCV15 (Vaxneuvance), PCV20 (Prevnar 20)  * Pneumococcal polysaccharide vaccine = PPSV23 (Pneumovax) Adults 19-65 yo with certain risk factors or if 65+ yo  If never received any pneumonia vaccine: recommend Prevnar 20 (PCV20)  Give PCV20 if previously received 1 dose of PCV13 or PPSV23   Hepatitis B Vaccine 3 dose series if at intermediate or high risk (ex: diabetes, end stage renal disease, liver disease)   Respiratory syncytial virus (RSV) Vaccine - COVERED BY MEDICARE PART D  * RSVPreF3 (Arexvy) CDC recommends that adults 60 years of age and older may receive a single dose of RSV vaccine using shared clinical decision-making (SCDM)   Tetanus (Td) Vaccine - COST NOT COVERED BY MEDICARE PART B Following completion of primary series, a booster dose should be given every 10 years to maintain immunity against tetanus. Td may also be given as tetanus wound prophylaxis.   Tdap Vaccine - COST NOT COVERED BY MEDICARE PART B Recommended at least once for all adults. For pregnant patients, recommended with each pregnancy.   Shingles Vaccine (Shingrix) - COST NOT COVERED BY MEDICARE PART B  2 shot series recommended in those 19 years and older who have or will have weakened immune systems or those 50 years and older     Health Maintenance Due:      Topic Date Due   • Hepatitis C Screening  Never done   • Breast Cancer Screening: Mammogram  07/18/2025   • Colorectal Cancer Screening  07/20/2025   • Cervical Cancer Screening  Discontinued     Immunizations Due:      Topic Date Due   • Influenza Vaccine (1) 09/01/2024   • COVID-19 Vaccine (4 - 2024-25 season) 09/01/2024     Advance Directives   What are advance directives?  Advance directives  are legal documents that state your wishes and plans for medical care. These plans are made ahead of time in case you lose your ability to make decisions for yourself. Advance directives can apply to any medical decision, such as the treatments you want, and if you want to donate organs.   What are the types of advance directives?  There are many types of advance directives, and each state has rules about how to use them. You may choose a combination of any of the following:  Living will:  This is a written record of the treatment you want. You can also choose which treatments you do not want, which to limit, and which to stop at a certain time. This includes surgery, medicine, IV fluid, and tube feedings.   Durable power of  for healthcare (DPAHC):  This is a written record that states who you want to make healthcare choices for you when you are unable to make them for yourself. This person, called a proxy, is usually a family member or a friend. You may choose more than 1 proxy.  Do not resuscitate (DNR) order:  A DNR order is used in case your heart stops beating or you stop breathing. It is a request not to have certain forms of treatment, such as CPR. A DNR order may be included in other types of advance directives.  Medical directive:  This covers the care that you want if you are in a coma, near death, or unable to make decisions for yourself. You can list the treatments you want for each condition. Treatment may include pain medicine, surgery, blood transfusions, dialysis, IV or tube feedings, and a ventilator (breathing machine).  Values history:  This document has questions about your views, beliefs, and how you feel and think about life. This information can help others choose the care that you would choose.  Why are advance directives important?  An advance directive helps you control your care. Although spoken wishes may be used, it is better to have your wishes written down. Spoken wishes can  be misunderstood, or not followed. Treatments may be given even if you do not want them. An advance directive may make it easier for your family to make difficult choices about your care.   Fall Prevention    Fall prevention  includes ways to make your home and other areas safer. It also includes ways you can move more carefully to prevent a fall. Health conditions that cause changes in your blood pressure, vision, or muscle strength and coordination may increase your risk for falls. Medicines may also increase your risk for falls if they make you dizzy, weak, or sleepy.   Fall prevention tips:   Stand or sit up slowly.    Use assistive devices as directed.    Wear shoes that fit well and have soles that .    Wear a personal alarm.    Stay active.    Manage your medical conditions.    Home Safety Tips:  Add items to prevent falls in the bathroom.    Keep paths clear.    Install bright lights in your home.    Keep items you use often on shelves within reach.    Paint or place reflective tape on the edges of your stairs.    Weight Management   Why it is important to manage your weight:  Being overweight increases your risk of health conditions such as heart disease, high blood pressure, type 2 diabetes, and certain types of cancer. It can also increase your risk for osteoarthritis, sleep apnea, and other respiratory problems. Aim for a slow, steady weight loss. Even a small amount of weight loss can lower your risk of health problems.  How to lose weight safely:  A safe and healthy way to lose weight is to eat fewer calories and get regular exercise. You can lose up about 1 pound a week by decreasing the number of calories you eat by 500 calories each day.   Healthy meal plan for weight management:  A healthy meal plan includes a variety of foods, contains fewer calories, and helps you stay healthy. A healthy meal plan includes the following:  Eat whole-grain foods more often.  A healthy meal plan should contain  fiber. Fiber is the part of grains, fruits, and vegetables that is not broken down by your body. Whole-grain foods are healthy and provide extra fiber in your diet. Some examples of whole-grain foods are whole-wheat breads and pastas, oatmeal, brown rice, and bulgur.  Eat a variety of vegetables every day.  Include dark, leafy greens such as spinach, kale, shannon greens, and mustard greens. Eat yellow and orange vegetables such as carrots, sweet potatoes, and winter squash.   Eat a variety of fruits every day.  Choose fresh or canned fruit (canned in its own juice or light syrup) instead of juice. Fruit juice has very little or no fiber.  Eat low-fat dairy foods.  Drink fat-free (skim) milk or 1% milk. Eat fat-free yogurt and low-fat cottage cheese. Try low-fat cheeses such as mozzarella and other reduced-fat cheeses.  Choose meat and other protein foods that are low in fat.  Choose beans or other legumes such as split peas or lentils. Choose fish, skinless poultry (chicken or turkey), or lean cuts of red meat (beef or pork). Before you cook meat or poultry, cut off any visible fat.   Use less fat and oil.  Try baking foods instead of frying them. Add less fat, such as margarine, sour cream, regular salad dressing and mayonnaise to foods. Eat fewer high-fat foods. Some examples of high-fat foods include french fries, doughnuts, ice cream, and cakes.  Eat fewer sweets.  Limit foods and drinks that are high in sugar. This includes candy, cookies, regular soda, and sweetened drinks.  Exercise:  Exercise at least 30 minutes per day on most days of the week. Some examples of exercise include walking, biking, dancing, and swimming. You can also fit in more physical activity by taking the stairs instead of the elevator or parking farther away from stores. Ask your healthcare provider about the best exercise plan for you.      © Copyright [employer] 2018 Information is for End User's use only and may not be sold,  redistributed or otherwise used for commercial purposes. All illustrations and images included in CareNotes® are the copyrighted property of [email or web address] or [employer]

## 2024-12-09 NOTE — ASSESSMENT & PLAN NOTE
Wellness exam done. Last Tdap was in 2024. Had COVID vaccines, Prevnar 13, and Pneumovacc 23. Recommend flu shot and Shingrix series. Labs are up to date. Had colonoscopy in 2015 and no polyps. Had mammogram in July 2024 and ok. Last Dexa was in October 2024. No recent falls. Mood ok.

## 2024-12-09 NOTE — ASSESSMENT & PLAN NOTE
Last dexascan was in October 2024. Pt advised to take calcium 1200 mg qd and Vit D 1000 U qd. Pt also advised to perform weight-bearing exercise on a regular basis.

## 2024-12-16 ENCOUNTER — APPOINTMENT (EMERGENCY)
Dept: RADIOLOGY | Facility: HOSPITAL | Age: 79
End: 2024-12-16
Payer: MEDICARE

## 2024-12-16 ENCOUNTER — HOSPITAL ENCOUNTER (OUTPATIENT)
Facility: HOSPITAL | Age: 79
Setting detail: OBSERVATION
Discharge: HOME | End: 2024-12-17
Attending: STUDENT IN AN ORGANIZED HEALTH CARE EDUCATION/TRAINING PROGRAM | Admitting: INTERNAL MEDICINE
Payer: MEDICARE

## 2024-12-16 DIAGNOSIS — R07.9 CHEST PAIN: Primary | ICD-10-CM

## 2024-12-16 DIAGNOSIS — I10 ESSENTIAL HYPERTENSION: ICD-10-CM

## 2024-12-16 DIAGNOSIS — K64.4 HEMORRHOIDS, EXTERNAL: ICD-10-CM

## 2024-12-16 PROBLEM — D64.9 ANEMIA: Status: ACTIVE | Noted: 2024-12-16

## 2024-12-16 LAB
2HR DELTA HS TROPONIN: 2 NG/L
4HR DELTA HS TROPONIN: 0 NG/L
ANION GAP SERPL CALCULATED.3IONS-SCNC: 7 MMOL/L (ref 4–13)
BASOPHILS # BLD AUTO: 0.04 THOUSANDS/ÂΜL (ref 0–0.1)
BASOPHILS NFR BLD AUTO: 1 % (ref 0–1)
BUN SERPL-MCNC: 13 MG/DL (ref 5–25)
CALCIUM SERPL-MCNC: 8.8 MG/DL (ref 8.4–10.2)
CARDIAC TROPONIN I PNL SERPL HS: 16 NG/L (ref ?–50)
CARDIAC TROPONIN I PNL SERPL HS: 16 NG/L (ref ?–50)
CARDIAC TROPONIN I PNL SERPL HS: 18 NG/L (ref ?–50)
CHLORIDE SERPL-SCNC: 107 MMOL/L (ref 96–108)
CO2 SERPL-SCNC: 23 MMOL/L (ref 21–32)
CREAT SERPL-MCNC: 0.58 MG/DL (ref 0.6–1.3)
EOSINOPHIL # BLD AUTO: 0.04 THOUSAND/ÂΜL (ref 0–0.61)
EOSINOPHIL NFR BLD AUTO: 1 % (ref 0–6)
ERYTHROCYTE [DISTWIDTH] IN BLOOD BY AUTOMATED COUNT: 11.8 % (ref 11.6–15.1)
GFR SERPL CREATININE-BSD FRML MDRD: 88 ML/MIN/1.73SQ M
GLUCOSE SERPL-MCNC: 117 MG/DL (ref 65–140)
GLUCOSE SERPL-MCNC: 125 MG/DL (ref 65–140)
HCT VFR BLD AUTO: 32.5 % (ref 34.8–46.1)
HGB BLD-MCNC: 11.3 G/DL (ref 11.5–15.4)
IMM GRANULOCYTES # BLD AUTO: 0.02 THOUSAND/UL (ref 0–0.2)
IMM GRANULOCYTES NFR BLD AUTO: 0 % (ref 0–2)
LYMPHOCYTES # BLD AUTO: 1.85 THOUSANDS/ÂΜL (ref 0.6–4.47)
LYMPHOCYTES NFR BLD AUTO: 24 % (ref 14–44)
MCH RBC QN AUTO: 31.6 PG (ref 26.8–34.3)
MCHC RBC AUTO-ENTMCNC: 34.8 G/DL (ref 31.4–37.4)
MCV RBC AUTO: 91 FL (ref 82–98)
MONOCYTES # BLD AUTO: 0.75 THOUSAND/ÂΜL (ref 0.17–1.22)
MONOCYTES NFR BLD AUTO: 10 % (ref 4–12)
NEUTROPHILS # BLD AUTO: 5.11 THOUSANDS/ÂΜL (ref 1.85–7.62)
NEUTS SEG NFR BLD AUTO: 64 % (ref 43–75)
NRBC BLD AUTO-RTO: 0 /100 WBCS
PLATELET # BLD AUTO: 151 THOUSANDS/UL (ref 149–390)
PMV BLD AUTO: 10.6 FL (ref 8.9–12.7)
POTASSIUM SERPL-SCNC: 4.4 MMOL/L (ref 3.5–5.3)
RBC # BLD AUTO: 3.58 MILLION/UL (ref 3.81–5.12)
SODIUM SERPL-SCNC: 137 MMOL/L (ref 135–147)
WBC # BLD AUTO: 7.81 THOUSAND/UL (ref 4.31–10.16)

## 2024-12-16 PROCEDURE — 80048 BASIC METABOLIC PNL TOTAL CA: CPT | Performed by: STUDENT IN AN ORGANIZED HEALTH CARE EDUCATION/TRAINING PROGRAM

## 2024-12-16 PROCEDURE — 71045 X-RAY EXAM CHEST 1 VIEW: CPT

## 2024-12-16 PROCEDURE — 84484 ASSAY OF TROPONIN QUANT: CPT | Performed by: STUDENT IN AN ORGANIZED HEALTH CARE EDUCATION/TRAINING PROGRAM

## 2024-12-16 PROCEDURE — 99285 EMERGENCY DEPT VISIT HI MDM: CPT | Performed by: STUDENT IN AN ORGANIZED HEALTH CARE EDUCATION/TRAINING PROGRAM

## 2024-12-16 PROCEDURE — 96374 THER/PROPH/DIAG INJ IV PUSH: CPT

## 2024-12-16 PROCEDURE — 99222 1ST HOSP IP/OBS MODERATE 55: CPT

## 2024-12-16 PROCEDURE — 82948 REAGENT STRIP/BLOOD GLUCOSE: CPT

## 2024-12-16 PROCEDURE — 99285 EMERGENCY DEPT VISIT HI MDM: CPT

## 2024-12-16 PROCEDURE — 36415 COLL VENOUS BLD VENIPUNCTURE: CPT | Performed by: STUDENT IN AN ORGANIZED HEALTH CARE EDUCATION/TRAINING PROGRAM

## 2024-12-16 PROCEDURE — 84484 ASSAY OF TROPONIN QUANT: CPT | Performed by: PHYSICIAN ASSISTANT

## 2024-12-16 PROCEDURE — 85025 COMPLETE CBC W/AUTO DIFF WBC: CPT | Performed by: STUDENT IN AN ORGANIZED HEALTH CARE EDUCATION/TRAINING PROGRAM

## 2024-12-16 PROCEDURE — 93005 ELECTROCARDIOGRAM TRACING: CPT

## 2024-12-16 RX ORDER — ANASTROZOLE 1 MG/1
1 TABLET ORAL DAILY
Status: DISCONTINUED | OUTPATIENT
Start: 2024-12-16 | End: 2024-12-17 | Stop reason: HOSPADM

## 2024-12-16 RX ORDER — PREGABALIN 75 MG/1
75 CAPSULE ORAL 2 TIMES DAILY
Status: DISCONTINUED | OUTPATIENT
Start: 2024-12-16 | End: 2024-12-17 | Stop reason: HOSPADM

## 2024-12-16 RX ORDER — HYDROCORTISONE 25 MG/G
CREAM TOPICAL 4 TIMES DAILY
Status: DISCONTINUED | OUTPATIENT
Start: 2024-12-16 | End: 2024-12-17 | Stop reason: HOSPADM

## 2024-12-16 RX ORDER — ENOXAPARIN SODIUM 100 MG/ML
40 INJECTION SUBCUTANEOUS DAILY
Status: DISCONTINUED | OUTPATIENT
Start: 2024-12-17 | End: 2024-12-17 | Stop reason: HOSPADM

## 2024-12-16 RX ORDER — HYDRALAZINE HYDROCHLORIDE 20 MG/ML
10 INJECTION INTRAMUSCULAR; INTRAVENOUS EVERY 6 HOURS PRN
Status: DISCONTINUED | OUTPATIENT
Start: 2024-12-16 | End: 2024-12-17 | Stop reason: HOSPADM

## 2024-12-16 RX ORDER — LEVOTHYROXINE SODIUM 88 UG/1
88 TABLET ORAL
Status: DISCONTINUED | OUTPATIENT
Start: 2024-12-17 | End: 2024-12-17 | Stop reason: HOSPADM

## 2024-12-16 RX ORDER — ASPIRIN 325 MG
325 TABLET ORAL ONCE
Status: COMPLETED | OUTPATIENT
Start: 2024-12-16 | End: 2024-12-16

## 2024-12-16 RX ORDER — NITROGLYCERIN 0.4 MG/1
0.4 TABLET SUBLINGUAL
Status: DISCONTINUED | OUTPATIENT
Start: 2024-12-16 | End: 2024-12-17 | Stop reason: HOSPADM

## 2024-12-16 RX ORDER — SODIUM CHLORIDE 9 MG/ML
3 INJECTION INTRAVENOUS
Status: DISCONTINUED | OUTPATIENT
Start: 2024-12-16 | End: 2024-12-17 | Stop reason: HOSPADM

## 2024-12-16 RX ORDER — ONDANSETRON 2 MG/ML
4 INJECTION INTRAMUSCULAR; INTRAVENOUS EVERY 6 HOURS PRN
Status: DISCONTINUED | OUTPATIENT
Start: 2024-12-16 | End: 2024-12-17 | Stop reason: HOSPADM

## 2024-12-16 RX ORDER — ACETAMINOPHEN 325 MG/1
650 TABLET ORAL EVERY 6 HOURS PRN
Status: DISCONTINUED | OUTPATIENT
Start: 2024-12-16 | End: 2024-12-17 | Stop reason: HOSPADM

## 2024-12-16 RX ORDER — CALCIUM CARBONATE 500 MG/1
1000 TABLET, CHEWABLE ORAL DAILY PRN
Status: DISCONTINUED | OUTPATIENT
Start: 2024-12-16 | End: 2024-12-17 | Stop reason: HOSPADM

## 2024-12-16 RX ORDER — ACETAMINOPHEN 10 MG/ML
1000 INJECTION, SOLUTION INTRAVENOUS ONCE
Status: COMPLETED | OUTPATIENT
Start: 2024-12-16 | End: 2024-12-16

## 2024-12-16 RX ORDER — ATORVASTATIN CALCIUM 40 MG/1
40 TABLET, FILM COATED ORAL
Status: DISCONTINUED | OUTPATIENT
Start: 2024-12-16 | End: 2024-12-17 | Stop reason: HOSPADM

## 2024-12-16 RX ORDER — LIRAGLUTIDE 6 MG/ML
1.2 INJECTION SUBCUTANEOUS
Status: DISCONTINUED | OUTPATIENT
Start: 2024-12-16 | End: 2024-12-17 | Stop reason: HOSPADM

## 2024-12-16 RX ORDER — ASPIRIN 81 MG/1
81 TABLET, CHEWABLE ORAL DAILY
Status: DISCONTINUED | OUTPATIENT
Start: 2024-12-17 | End: 2024-12-17 | Stop reason: HOSPADM

## 2024-12-16 RX ADMIN — Medication 12.5 MG: at 21:03

## 2024-12-16 RX ADMIN — ASPIRIN 325 MG: 325 TABLET ORAL at 21:03

## 2024-12-16 RX ADMIN — ATORVASTATIN CALCIUM 40 MG: 40 TABLET, FILM COATED ORAL at 21:03

## 2024-12-16 RX ADMIN — ACETAMINOPHEN 1000 MG: 1000 INJECTION, SOLUTION INTRAVENOUS at 14:18

## 2024-12-16 RX ADMIN — ANASTROZOLE 1 MG: 1 TABLET, COATED ORAL at 21:04

## 2024-12-16 RX ADMIN — PREGABALIN 75 MG: 75 CAPSULE ORAL at 21:03

## 2024-12-16 NOTE — ASSESSMENT & PLAN NOTE
Initially elevated upon presentation to ED,   Spontaneous improvement of symptoms; patient notes blood pressure cuff squeezing tightly  C/W PTA metoprolol 12.5 mg every 12 hours  Add PRNhydralazine

## 2024-12-16 NOTE — ASSESSMENT & PLAN NOTE
H/o external hemorrhoids; has outpatient follow-up with GI  Requesting topical steroid cream  Did discuss adding bowel regiment but patient is currently declining

## 2024-12-16 NOTE — ASSESSMENT & PLAN NOTE
"Lab Results   Component Value Date    HGBA1C 5.8 12/09/2024     No results for input(s): \"POCGLU\" in the last 72 hours.    Blood Sugar Average: Last 72 hrs:    C/W PTA Victoza  Initiate SSI AC/nightly  Hypoglycemic protocol  Diabetic diet  "

## 2024-12-16 NOTE — H&P
"H&P - Hospitalist   Name: Delfina Vlila 79 y.o. female I MRN: [id number]  Unit/Bed#: ED 08 I Date of Admission: 12/16/2024   Date of Service: 12/16/2024 I Hospital Day: 0     Assessment & Plan  Chest pain  Pt w/ PMH of CAD s/p SAMMIE & CABG who presents to ED w/ c/o CP beginning yesterday evening described as a \"weight\" on the left side of her chest; at worst pain described as a 5/10 w/o radiation; pain currently 3/10.  No associated diaphoresis, SOB, N/V.   Trop: 16->18, trend   ECG: NSR w/o acute ischemic changes   CXR benign   Heart score 7  Known to Dr. Garcia, consult cardiology   FLP well-controlled in 09/2024; hemoglobin A1c 5.8 in 12/2024  Pain is reproducible upon palpation  Monitor on telemetry; ECG w/ CP; PRN morphine/nitro  C/W PTA beta-blocker, statin, aspirin  Provide aspirin bolus now  F/u updated ECHO   RADHA in 02/2023 w/ EF 55-60%, LVH, trace AR, mild MR, mild TR,  Coronary artery disease involving native coronary artery of native heart without angina pectoris  Known to Dr. Garcia  H/O CAD  S/p SAMMIE to proximal LAD in 2015  S/p CABG x 2 in 02/2023 with LIMA to LAD and SVG to OM1   C/w PTA beta-blocker, statin, aspirin  Appreciate cardiology's input   Type 2 diabetes mellitus without complication, without long-term current use of insulin (HCC)  Lab Results   Component Value Date    HGBA1C 5.8 12/09/2024     No results for input(s): \"POCGLU\" in the last 72 hours.    Blood Sugar Average: Last 72 hrs:    C/W PTA Victoza  Initiate SSI AC/nightly  Hypoglycemic protocol  Diabetic diet  Mixed dyslipidemia  C/w statin   FLP well-controlled in 09/2024  Hypothyroidism  C/w PTA levothyroxine  TSH therapeutic in 01/2024; outpatient follow-up  Essential hypertension  Initially elevated upon presentation to ED,   Spontaneous improvement of symptoms; patient notes blood pressure cuff squeezing tightly  C/W PTA metoprolol 12.5 mg every 12 hours  Add PRNhydralazine  Anemia  Mild, at baseline  No acute source of " "bleeding    Recent Labs     12/16/24  1416   HGB 11.3*     Hemorrhoids, external  H/o external hemorrhoids; has outpatient follow-up with GI  Requesting topical steroid cream  Did discuss adding bowel regiment but patient is currently declining      VTE Pharmacologic Prophylaxis:   Moderate Risk (Score 3-4) - Pharmacological DVT Prophylaxis Ordered: enoxaparin (Lovenox).  Code Status: DNR/DNI  Discussion with family: Updated  (DIL) at bedside.    Anticipated Length of Stay: Patient will be admitted on an observation basis with an anticipated length of stay of less than 2 midnights secondary to CP.    History of Present Illness   Chief Complaint: CP    Delfina Villa is a 79 y.o. female with a PMH of CAD s/p SAMMIE and CABG x 2, hypothyroidism, HTN who presents with chest pain beginning evening of 12/15.  Patient notes left-sided substernal chest pain described as a \"weight\" on her chest.  Pain at worst described as a 5/10; current pain 3/10.  Patient denies any associated SOB, HAMM, sweating, diaphoresis, nausea, vomiting, abdominal pain, dizziness, lightheadedness.  Patient is well-known to the Dr. Garcia.  She has been compliant with all of her outpatient medications.  Patient does state that pressing on her left chest wall elicits chest pain which is identical to what brought her to the ER.  No recent fevers, chills, sick contacts, respiratory complaints.    Review of Systems   Constitutional:  Negative for activity change, appetite change, chills, fatigue and fever.   Respiratory:  Negative for cough and chest tightness.    Cardiovascular:  Positive for chest pain. Negative for palpitations and leg swelling.   Gastrointestinal:  Negative for abdominal distention, abdominal pain, constipation, diarrhea, rectal pain and vomiting.   Genitourinary:  Negative for difficulty urinating and dyspareunia.   Neurological:  Negative for facial asymmetry and headaches.       Historical Information   Past Medical " History:   Diagnosis Date    Abdominal pain     LLQ on occasion    Angina pectoris (HCC)     Arthritis     Breast cancer (HCC) 07/01/2021    Cancer (HCC)     breast left    Colon polyp     Constipation     at times    Coronary artery disease     Diabetes mellitus (HCC)     Diarrhea     at times    Disease of thyroid gland     Hemorrhoids     Hypercholesterolemia     Hypertension     Neuropathy     both legs and feet    Obesity     Osteoporosis     Otitis media     Ovarian ca (HCC) 1997    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HCC)     Shingles     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 Cleary. Up to date with her colonoscopy.    COLONOSCOPY      CORONARY STENT PLACEMENT      Dec 2015    EPIDURAL BLOCK INJECTION Left 8/9/2024    Procedure: LEFT  L2, L3 TRANSFORAMINAL EPIDURAL STEROID INJECTION;  Surgeon: Billy Cheatham MD;  Location: [facility] MAIN OR;  Service: Pain Management     EYE SURGERY      cataract surgery    GALLBLADDER SURGERY      HYSTERECTOMY  1989    MAMMO (HISTORICAL)      up to date. see gyn    MASTECTOMY Left 07/13/2021    MASTECTOMY W/ SENTINEL NODE BIOPSY Left 07/13/2021    Procedure: BREAST ROSLYN  DIRECTED MASTECTOMY, SENTINEL LYMPH NODE BIOPSY, LYMPHATIC MAPPING WITH BLUE DYE AND RADIOACTIVE DYE (INJECT AT 1500 BY DR ANDREWS IN THE OR);  Surgeon: Anthony Andrews MD;  Location: [facility];  Service: Surgical Oncology    OOPHORECTOMY Bilateral 1997    OR CORONARY ARTERY BYP W/VEIN & ARTERY GRAFT 2  VEIN N/A 2/8/2023    Procedure: CORONARY ARTERY BYPASS GRAFT (CABG) X 2 VESSELS GSV-->OM1, LIMA-->LAD; EVH  LEFT LEG w/ RADHA;  Surgeon: Javan Allen MD;  Location: [facility];  Service: Cardiac Surgery    SPINE SURGERY      THYROIDECTOMY      UPPER GASTROINTESTINAL ENDOSCOPY      US BREAST NEEDLE LOC LEFT Left 05/06/2021    US GUIDANCE BREAST BIOPSY LEFT EACH ADDITIONAL Left 05/06/2021    US GUIDED BREAST BIOPSY LEFT COMPLETE Left 03/15/2021    US GUIDED BREAST BIOPSY LEFT COMPLETE Left 05/06/2021     Social History     Tobacco Use    Smoking status: Never     Passive exposure: Past    Smokeless tobacco: Never   Vaping Use    Vaping status: Never Used   Substance and Sexual Activity    Alcohol use: Never    Drug use: Never    Sexual activity: Not Currently     Partners: Male     Birth control/protection: Post-menopausal, None     E-Cigarette/Vaping    E-Cigarette Use Never User      E-Cigarette/Vaping Substances    Nicotine No     THC No     CBD No     Flavoring No     Other No     Unknown No      Family history non-contributory  Social History:  Marital Status:    Occupation: Retired  Patient Pre-hospital Living Situation: Home  Patient Pre-hospital Level of Mobility: walks  Patient Pre-hospital Diet Restrictions: None - DM diet    Meds/Allergies   I have reviewed home medications with patient personally.  Prior to Admission medications    Medication Sig Start Date End Date Taking? Authorizing Provider   anastrozole (ARIMIDEX) 1 mg tablet TAKE ONE TABLET BY MOUTH EVERY DAY. 11/1/24  Yes Mary Multani PA-C   Aspirin 81 MG CAPS 2 (two) times a day   Yes Historical Provider, MD   atorvastatin (LIPITOR) 40 mg tablet Take 1 tablet (40 mg total) by mouth daily 1/10/24  Yes Ruby Garcia DO   levothyroxine 88 mcg tablet TAKE 1 TABLET BY MOUTH EVERY DAY 11/5/24  Yes Rogerio Mancera MD   liraglutide (VICTOZA) injection Inject 0.2 mL (1.2 mg total) under the skin daily at bedtime 6/25/24 12/16/24 Yes Rogerio Mancera MD    metoprolol tartrate (LOPRESSOR) 25 mg tablet TAKE 1/2 TABLET(12.5 MG) BY MOUTH EVERY 12 HOURS 8/3/24  Yes Rogerio Mancera MD   pregabalin (LYRICA) 75 mg capsule Take 1 capsule (75 mg total) by mouth 2 (two) times a day 11/1/24  Yes Rogerio Mancera MD   clotrimazole-betamethasone (LOTRISONE) 1-0.05 % cream APPLY TOPICALLY TWO TIMES A DAY (GENERIC FOR LOTRISONE) 11/4/24   Rogerio Mancera MD   Insulin Pen Needle (Sure Comfort Pen Needles) 31G X 5 MM MISC by Does not apply route daily 9/24/20   Rogerio Mancera MD   Lancets (OneTouch Delica Plus [id number]) MISC Use once a day to check blood sugar 11/2/23   Rogerio Mancera MD   OneTouch Ultra test strip Use 1 each daily Use as instructed 12/3/24   Rogerio Mancera MD     Allergies   Allergen Reactions    Duloxetine Other (See Comments)     Extreme somnolence/lethargy    Sulfa Antibiotics Rash       Objective :  Temp:  [98.6 °F (37 °C)-98.7 °F (37.1 °C)] 98.7 °F (37.1 °C)  HR:  [57-72] 66  BP: (147-197)/(69-91) 160/70  Resp:  [14-20] 18  SpO2:  [98 %-100 %] 98 %  O2 Device: None (Room air)    Physical Exam  Constitutional:       General: She is not in acute distress.     Appearance: She is normal weight. She is not toxic-appearing.   HENT:      Head: Normocephalic.      Right Ear: External ear normal.      Left Ear: External ear normal.      Nose: Nose normal.      Mouth/Throat:      Mouth: Mucous membranes are moist.      Pharynx: Oropharynx is clear.   Eyes:      Extraocular Movements: Extraocular movements intact.   Cardiovascular:      Rate and Rhythm: Normal rate and regular rhythm.      Pulses: Normal pulses.      Heart sounds: Normal heart sounds.   Pulmonary:      Effort: Pulmonary effort is normal. No respiratory distress.      Breath sounds: Normal breath sounds. No wheezing.   Chest:      Chest wall: Tenderness (Left anterior chest which reproduces chest pain) present.   Abdominal:      General: Abdomen is flat. Bowel sounds are normal. There is no distension.      Palpations:  Abdomen is soft.      Tenderness: There is no abdominal tenderness. There is no guarding.   Musculoskeletal:         General: No swelling (trace LE) or deformity. Normal range of motion.      Cervical back: Normal range of motion and neck supple.   Skin:     General: Skin is warm and dry.   Neurological:      General: No focal deficit present.      Mental Status: She is alert. Mental status is at baseline.   Psychiatric:         Mood and Affect: Mood normal.         Behavior: Behavior normal.          Lines/Drains:            Lab Results: I have reviewed the following results:  Results from last 7 days   Lab Units 12/16/24  1416   WBC Thousand/uL 7.81   HEMOGLOBIN g/dL 11.3*   HEMATOCRIT % 32.5*   PLATELETS Thousands/uL 151   SEGS PCT % 64   LYMPHO PCT % 24   MONO PCT % 10   EOS PCT % 1     Results from last 7 days   Lab Units 12/16/24  1416   SODIUM mmol/L 137   POTASSIUM mmol/L 4.4   CHLORIDE mmol/L 107   CO2 mmol/L 23   BUN mg/dL 13   CREATININE mg/dL 0.58*   ANION GAP mmol/L 7   CALCIUM mg/dL 8.8   GLUCOSE RANDOM mg/dL 125             Lab Results   Component Value Date    HGBA1C 5.8 12/09/2024    HGBA1C 5.7 06/13/2024    HGBA1C 5.7 01/22/2024           Imaging Results Review: I reviewed radiology reports from this admission including: chest xray.  Other Study Results Review: EKG was reviewed.     Administrative Statements   I have spent a total time of 65 minutes in caring for this patient on the day of the visit/encounter including Diagnostic results, Prognosis, Risk factor reductions, Impressions, Counseling / Coordination of care, Documenting in the medical record, Reviewing / ordering tests, medicine, procedures  , Obtaining or reviewing history  , and Communicating with other healthcare professionals .    ** Please Note: This note has been constructed using a voice recognition system. **

## 2024-12-16 NOTE — ED NOTES
Awaiting cardiologist advice at this time  pt is admitted or going home per DR Abbott, pt and family are aware.      Nicole Mccartney RN  12/16/24 [id number]

## 2024-12-16 NOTE — Clinical Note
Case was discussed with  and the patient's admission status was agreed to be Admission Status: observation status to the service of Dr. Anderson

## 2024-12-16 NOTE — ED PROVIDER NOTES
Time reflects when diagnosis was documented in both MDM as applicable and the Disposition within this note       Time User Action Codes Description Comment    12/16/2024  5:33 PM Nick Abbott Add [R07.9] Chest pain           ED Disposition       ED Disposition   Admit    Condition   Stable    Date/Time   Tue Dec 17, 2024  7:02 AM    Comment   Case was discussed with MEENAKSHI and the patient's admission status was agreed to be Admission Status: observation status to the service of Dr. Mendoza .               Assessment & Plan       Medical Decision Making  Patient is a 79 y.o. female who presents to the ED for chest pain.  Patient is nontoxic, well-appearing.     Differential includes but is not limited to: GERD/reflux, angina.  Low suspicion for ACS but patient does have several risk factors for such.  Doubt pneumonia.    Plan: Labs, chest x-ray, EKG, pain control, reassess                   Amount and/or Complexity of Data Reviewed  Labs: ordered.  Radiology: ordered.    Risk  Prescription drug management.  Decision regarding hospitalization.        ED Course as of 12/17/24 0702   Mon Dec 16, 2024   1735 Pt signed out to Dr Briceno. Pending admission after speaking with cards       Medications   sodium chloride (PF) 0.9 % injection 3 mL (has no administration in time range)   anastrozole (ARIMIDEX) tablet 1 mg (has no administration in time range)   aspirin chewable tablet 81 mg (has no administration in time range)   atorvastatin (LIPITOR) tablet 40 mg (has no administration in time range)   levothyroxine tablet 88 mcg (has no administration in time range)   liraglutide (VICTOZA) injection 1.2 mg (has no administration in time range)   metoprolol tartrate (LOPRESSOR) partial tablet 12.5 mg (has no administration in time range)   pregabalin (LYRICA) capsule 75 mg (has no administration in time range)   acetaminophen (TYLENOL) tablet 650 mg (has no administration in time range)   ondansetron (ZOFRAN) injection 4 mg  "(has no administration in time range)   calcium carbonate (TUMS) chewable tablet 1,000 mg (has no administration in time range)   enoxaparin (LOVENOX) subcutaneous injection 40 mg (has no administration in time range)   nitroglycerin (NITROSTAT) SL tablet 0.4 mg (has no administration in time range)   morphine injection 2 mg (has no administration in time range)   hydrALAZINE (APRESOLINE) injection 10 mg (has no administration in time range)   hydrocortisone (ANUSOL-HC) 2.5 % rectal cream ( Topical Not Given 12/16/24 1830)   acetaminophen (Ofirmev) injection 1,000 mg (0 mg Intravenous Stopped 12/16/24 1433)   aspirin tablet 325 mg (has no administration in time range)       ED Risk Strat Scores   HEART Risk Score      Flowsheet Row Most Recent Value   Heart Score Risk Calculator    History 2 Filed at: 12/16/2024 1647   ECG 0 Filed at: 12/16/2024 1647   Age 2 Filed at: 12/16/2024 1647   Risk Factors 2 Filed at: 12/16/2024 1647   Troponin 1 Filed at: 12/16/2024 1647   HEART Score 7 Filed at: 12/16/2024 1647          HEART Risk Score      Flowsheet Row Most Recent Value   Heart Score Risk Calculator    History 2 Filed at: 12/16/2024 1647   ECG 0 Filed at: 12/16/2024 1647   Age 2 Filed at: 12/16/2024 1647   Risk Factors 2 Filed at: 12/16/2024 1647   Troponin 1 Filed at: 12/16/2024 1647   HEART Score 7 Filed at: 12/16/2024 1647                                                History of Present Illness       Chief Complaint   Patient presents with    Chest Pain     Pt reports left sided chest pain and \"pressure\" beginning last night, then subsiding overnight, and beginning again early morning. Pt describes as very uncomfortable, with bouts where she felt like it was \"hard to breathe at times\"       Past Medical History:   Diagnosis Date    Abdominal pain     LLQ on occasion    Angina pectoris (HCC)     Arthritis     Breast cancer (HCC) 07/01/2021    Cancer (HCC)     breast left    Colon polyp     Constipation     at times "    Coronary artery disease     Diabetes mellitus (HCC)     Diarrhea     at times    Disease of thyroid gland     Hemorrhoids     Hypercholesterolemia     Hypertension     Neuropathy     both legs and feet    Obesity     Osteoporosis     Otitis media     Ovarian ca (HCC) 1997    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HCC)     Shingles     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 Cleary. Up to date with her colonoscopy.    COLONOSCOPY      CORONARY STENT PLACEMENT      Dec 2015    EPIDURAL BLOCK INJECTION Left 8/9/2024    Procedure: LEFT  L2, L3 TRANSFORAMINAL EPIDURAL STEROID INJECTION;  Surgeon: Billy Cheatham MD;  Location: [facility] MA[facility] OR;  Service: Pain Management     EYE SURGERY      cataract surgery    GALLBLADDER SURGERY      HYSTERECTOMY  1989    MAMMO (HISTORICAL)      up to date. see gyn    MASTECTOMY Left 07/13/2021    MASTECTOMY W/ SENTINEL NODE BIOPSY Left 07/13/2021    Procedure: BREAST ROSLYN  DIRECTED MASTECTOMY, SENTINEL LYMPH NODE BIOPSY, LYMPHATIC MAPPING WITH BLUE DYE AND RADIOACTIVE DYE (INJECT AT 1500 BY DR ANDREWS IN THE OR);  Surgeon: Anthony Andrews MD;  Location: [facility];  Service: Surgical Oncology    OOPHORECTOMY Bilateral 1997    NE CORONARY ARTERY BYP W/VEIN & ARTERY GRAFT 2 VEIN N/A 2/8/2023    Procedure: CORONARY ARTERY BYPASS GRAFT (CABG) X 2 VESSELS GSV-->OM1, LIMA-->LAD; EVH  LEFT LEG w/ RADHA;  Surgeon: Javan Allen MD;  Location: [facility];  Service: Cardiac Surgery    SPINE  SURGERY      THYROIDECTOMY      UPPER GASTROINTESTINAL ENDOSCOPY      US BREAST NEEDLE LOC LEFT Left 05/06/2021    US GUIDANCE BREAST BIOPSY LEFT EACH ADDITIONAL Left 05/06/2021    US GUIDED BREAST BIOPSY LEFT COMPLETE Left 03/15/2021    US GUIDED BREAST BIOPSY LEFT COMPLETE Left 05/06/2021      Family History   Problem Relation Age of Onset    Diabetes Mother     Heart disease Mother     Dementia Mother     Esophageal cancer Father 60    Endometrial cancer Sister 68    Asthma Sister     Cancer Sister     No Known Problems Sister     No Known Problems Sister     No Known Problems Sister     Asthma Daughter     No Known Problems Maternal Grandmother     Prostate cancer Maternal Grandfather     No Known Problems Paternal Grandmother     Prostate cancer Paternal Grandfather     Stomach cancer Brother 71    Multiple myeloma Brother 72    Cancer Brother     Asthma Son     Depression Son     Breast cancer Maternal Aunt 50    No Known Problems Paternal Aunt     No Known Problems Paternal Aunt     Breast cancer Other 45    Breast cancer Other 45    Diabetes Family     Cancer Family     Arthritis Family     Heart disease Family       Social History     Tobacco Use    Smoking status: Never     Passive exposure: Past    Smokeless tobacco: Never   Vaping Use    Vaping status: Never Used   Substance Use Topics    Alcohol use: Never    Drug use: Never      E-Cigarette/Vaping    E-Cigarette Use Never User       E-Cigarette/Vaping Substances    Nicotine No     THC No     CBD No     Flavoring No     Other No     Unknown No       I have reviewed and agree with the history as documented.     Patient is a 79-year-old female, past medical history including CAD, status post CABG in 2023, diabetes, who presents to the emergency department for chest pain.  Started last night.  Was able to fall asleep and when she woke up pain was there again.  Mainly left-sided, pressure.  Does not radiate.  No modifying factors.  No associated symptoms.   Denies any prior history of pain like this in the past.  No diaphoresis.  No fevers or chills.  Was last seen by cardiology in September and visit was unremarkable.  No other complaints or concerns.      Chest Pain      Review of Systems   Cardiovascular:  Positive for chest pain.   All other systems reviewed and are negative.          Objective       ED Triage Vitals   Temperature Pulse Blood Pressure Respirations SpO2 Patient Position - Orthostatic VS   12/16/24 1352 12/16/24 1352 12/16/24 1400 12/16/24 1352 12/16/24 1352 12/16/24 1615   98.6 °F (37 °C) 72 (!) 197/91 20 100 % Lying      Temp Source Heart Rate Source BP Location FiO2 (%) Pain Score    12/16/24 1352 12/16/24 1352 12/16/24 1615 -- 12/16/24 1352    Oral Monitor Right arm  7      Vitals      Date and Time Temp Pulse SpO2 Resp BP Pain Score FACES Pain Rating User   12/17/24 0204 97.5 °F (36.4 °C) 57 99 % 18 140/56 -- -- DII   12/16/24 2211 98.1 °F (36.7 °C) 62 99 % 18 130/47 -- -- DII   12/16/24 2100 -- -- -- -- -- No Pain -- DA   12/16/24 1924 97.9 °F (36.6 °C) 64 99 % 18 136/74 -- -- DII   12/16/24 1900 -- 60 99 % 22 144/66 -- -- AM   12/16/24 1745 98.7 °F (37.1 °C) 66 98 % 18 160/70 3 -- SF   12/16/24 1700 -- 62 99 % 14 156/72 -- -- SF   12/16/24 1630 -- 57 99 % 17 147/69 -- -- SF   12/16/24 1615 -- 61 100 % 20 174/74 -- -- KR   12/16/24 1415 -- 66 100 % 19 160/72 -- -- SF   12/16/24 1400 -- 63 100 % 19 197/91 -- -- SF   12/16/24 1352 98.6 °F (37 °C) 72 100 % 20 -- 7 -- RL            Physical Exam  Vitals and nursing note reviewed.   Constitutional:       General: She is not in acute distress.     Appearance: She is well-developed. She is not ill-appearing, toxic-appearing or diaphoretic.   HENT:      Head: Normocephalic and atraumatic.      Right Ear: External ear normal.      Left Ear: External ear normal.      Nose: Nose normal.   Eyes:      General: Lids are normal. No scleral icterus.  Cardiovascular:      Rate and Rhythm: Normal rate and  regular rhythm.      Heart sounds: Normal heart sounds. No murmur heard.     No friction rub. No gallop.   Pulmonary:      Effort: Pulmonary effort is normal. No respiratory distress.      Breath sounds: Normal breath sounds. No wheezing or rales.   Abdominal:      Palpations: Abdomen is soft.      Tenderness: There is no abdominal tenderness. There is no guarding or rebound.   Musculoskeletal:         General: No deformity. Normal range of motion.      Cervical back: Normal range of motion and neck supple.   Skin:     General: Skin is warm and dry.   Neurological:      General: No focal deficit present.      Mental Status: She is alert.   Psychiatric:         Mood and Affect: Mood normal.         Behavior: Behavior normal.         Results Reviewed       Procedure Component Value Units Date/Time    HS Troponin I 4hr [[id number]]  (Normal) Collected: 12/16/24 1904    Lab Status: Final result Specimen: Blood from Arm, Right Updated: 12/16/24 1952     hs TnI 4hr 16 ng/L      Delta 4hr hsTnI 0 ng/L     Platelet count [[id number]]     Lab Status: No result Specimen: Blood     HS Troponin I 2hr [[id number]]  (Normal) Collected: 12/16/24 1616    Lab Status: Final result Specimen: Blood from Arm, Right Updated: 12/16/24 1645     hs TnI 2hr 18 ng/L      Delta 2hr hsTnI 2 ng/L     HS Troponin 0hr (reflex protocol) [[id number]]  (Normal) Collected: 12/16/24 1416    Lab Status: Final result Specimen: Blood from Arm, Right Updated: 12/16/24 1449     hs TnI 0hr 16 ng/L     Basic metabolic panel [[id number]]  (Abnormal) Collected: 12/16/24 1416    Lab Status: Final result Specimen: Blood from Arm, Right Updated: 12/16/24 1445     Sodium 137 mmol/L      Potassium 4.4 mmol/L      Chloride 107 mmol/L      CO2 23 mmol/L      ANION GAP 7 mmol/L      BUN 13 mg/dL      Creatinine 0.58 mg/dL      Glucose 125 mg/dL      Calcium 8.8 mg/dL      eGFR 88 ml/min/1.73sq m     Narrative:      National Kid[employer] guidelines for Chronic  Kidney Disease (CKD):     Stage 1 with normal or high GFR (GFR > 90 mL/min/1.73 square meters)    Stage 2 Mild CKD (GFR = 60-89 mL/min/1.73 square meters)    Stage 3A Moderate CKD (GFR = 45-59 mL/min/1.73 square meters)    Stage 3B Moderate CKD (GFR = 30-44 mL/min/1.73 square meters)    Stage 4 Severe CKD (GFR = 15-29 mL/min/1.73 square meters)    Stage 5 End Stage CKD (GFR <15 mL/min/1.73 square meters)  Note: GFR calculation is accurate only with a steady state creatinine    CBC and differential [[id number]]  (Abnormal) Collected: 12/16/24 1416    Lab Status: Final result Specimen: Blood from Arm, Right Updated: 12/16/24 1429     WBC 7.81 Thousand/uL      RBC 3.58 Million/uL      Hemoglobin 11.3 g/dL      Hematocrit 32.5 %      MCV 91 fL      MCH 31.6 pg      MCHC 34.8 g/dL      RDW 11.8 %      MPV 10.6 fL      Platelets 151 Thousands/uL      nRBC 0 /100 WBCs      Segmented % 64 %      Immature Grans % 0 %      Lymphocytes % 24 %      Monocytes % 10 %      Eosinophils Relative 1 %      Basophils Relative 1 %      Absolute Neutrophils 5.11 Thousands/µL      Absolute Immature Grans 0.02 Thousand/uL      Absolute Lymphocytes 1.85 Thousands/µL      Absolute Monocytes 0.75 Thousand/µL      Eosinophils Absolute 0.04 Thousand/µL      Basophils Absolute 0.04 Thousands/µL             X-ray chest 1 view portable   Final Interpretation by Stephanie Joshi MD (12/16 3974)      No acute cardiopulmonary disease.            Workstation performed: [id number]             ECG 12 Lead Documentation Only    Date/Time: 12/16/2024 5:11 PM    Performed by: Nick Abbott DO  Authorized by: Nick Abbott DO    ECG reviewed by me, the ED Provider: yes    Patient location:  ED  Interpretation:     Interpretation: abnormal    Rate:     ECG rate:  68    ECG rate assessment: normal    Rhythm:     Rhythm: sinus rhythm    Ectopy:     Ectopy: none    QRS:     QRS axis:  Normal  Conduction:     Conduction: normal    ST segments:     ST  segments:  Normal  T waves:     T waves: non-specific    Q waves:     Q waves:  V1      ED Medication and Procedure Management   Prior to Admission Medications   Prescriptions Last Dose Informant Patient Reported? Taking?   Aspirin 81 MG CAPS 12/15/2024 at 12:00 PM  Yes Yes   Si (two) times a day   Insulin Pen Needle (Sure Comfort Pen Needles) 31G X 5 MM MISC Unknown Self No No   Sig: by Does not apply route daily   Lancets (OneTouch Delica Plus [id number]) MISC Unknown  No No   Sig: Use once a day to check blood sugar   OneTouch Ultra test strip Unknown  No No   Sig: Use 1 each daily Use as instructed   anastrozole (ARIMIDEX) 1 mg tablet 12/15/2024 at 12:00 PM  No Yes   Sig: TAKE ONE TABLET BY MOUTH EVERY DAY.   atorvastatin (LIPITOR) 40 mg tablet 12/15/2024 at 12:00 PM  No Yes   Sig: Take 1 tablet (40 mg total) by mouth daily   clotrimazole-betamethasone (LOTRISONE) 1-0.05 % cream Unknown Self No No   Sig: APPLY TOPICALLY TWO TIMES A DAY (GENERIC FOR LOTRISONE)   levothyroxine 88 mcg tablet 2024 at  8:00 AM  No Yes   Sig: TAKE 1 TABLET BY MOUTH EVERY DAY   liraglutide (VICTOZA) injection 12/15/2024 at  9:00 PM  No Yes   Sig: Inject 0.2 mL (1.2 mg total) under the skin daily at bedtime   metoprolol tartrate (LOPRESSOR) 25 mg tablet 2024 at  8:00 AM  No Yes   Sig: TAKE 1/2 TABLET(12.5 MG) BY MOUTH EVERY 12 HOURS   pregabalin (LYRICA) 75 mg capsule 2024 at  8:00 AM  No Yes   Sig: Take 1 capsule (75 mg total) by mouth 2 (two) times a day      Facility-Administered Medications: None     Current Discharge Medication List        CONTINUE these medications which have NOT CHANGED    Details   anastrozole (ARIMIDEX) 1 mg tablet TAKE ONE TABLET BY MOUTH EVERY DAY.  Qty: 90 tablet, Refills: 1    Associated Diagnoses: 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, estrogen receptor positive (HCC)      Aspirin 81 MG CAPS 2 (two) times a day      atorvastatin (LIPITOR) 40 mg tablet Take 1  tablet (40 mg total) by mouth daily  Qty: 90 tablet, Refills: 3    Associated Diagnoses: Coronary artery disease involving native coronary artery of native heart without angina pectoris      levothyroxine 88 mcg tablet TAKE 1 TABLET BY MOUTH EVERY DAY  Qty: 90 tablet, Refills: 1    Associated Diagnoses: Acquired hypothyroidism      liraglutide (VICTOZA) injection Inject 0.2 mL (1.2 mg total) under the skin daily at bedtime  Qty: 6 mL, Refills: 0    Associated Diagnoses: Type 2 diabetes mellitus without complication, without long-term current use of insulin ([facility])      metoprolol tartrate (LOPRESSOR) 25 mg tablet TAKE 1/2 TABLET(12.5 MG) BY MOUTH EVERY 12 HOURS  Qty: 90 tablet, Refills: 1    Associated Diagnoses: S/P CABG x 2; Type 2 diabetes mellitus without complication, without long-term current use of insulin ([facility])      pregabalin (LYRICA) 75 mg capsule Take 1 capsule (75 mg total) by mouth 2 (two) times a day  Qty: 180 capsule, Refills: 1    Associated Diagnoses: Neuropathy      clotrimazole-betamethasone (LOTRISONE) 1-0.05 % cream APPLY TOPICALLY TWO TIMES A DAY (GENERIC FOR LOTRISONE)  Qty: 45 g, Refills: 3    Associated Diagnoses: Rash      Insulin Pen Needle (Sure Comfort Pen Needles) 31G X 5 MM MISC by Does not apply route daily  Qty: 100 each, Refills: 3    Associated Diagnoses: Type 2 diabetes mellitus without complication, without long-term current use of insulin ([facility])      Lancets (OneTouch Delica Plus [id number]) MISC Use once a day to check blood sugar  Qty: 100 each, Refills: 3    Associated Diagnoses: Type 2 diabetes mellitus without complication, without long-term current use of insulin (HCC)      OneTouch Ultra test strip Use 1 each daily Use as instructed  Qty: 100 each, Refills: 3    Associated Diagnoses: Type 2 diabetes mellitus without complication, without long-term current use of insulin ([facility])           No discharge procedures on file.  ED SEPSIS DOCUMENTATION   Time reflects when diagnosis  was documented in both MDM as applicable and the Disposition within this note       Time User Action Codes Description Comment    12/16/2024  5:33 PM Nick Abbott Add [R07.9] Chest pain                  Nick Abbott,   12/17/24 0702

## 2024-12-16 NOTE — ASSESSMENT & PLAN NOTE
Known to Dr. Garcia  H/O CAD  S/p SAMMIE to proximal LAD in 2015  S/p CABG x 2 in 02/2023 with LIMA to LAD and SVG to OM1   C/w PTA beta-blocker, statin, aspirin  Appreciate cardiology's input

## 2024-12-16 NOTE — ASSESSMENT & PLAN NOTE
"Pt w/ PMH of CAD s/p SAMMIE & CABG who presents to ED w/ c/o CP beginning yesterday evening described as a \"weight\" on the left side of her chest; at worst pain described as a 5/10 w/o radiation; pain currently 3/10.  No associated diaphoresis, SOB, N/V.   Trop: 16->18, trend   ECG: NSR w/o acute ischemic changes   CXR benign   Heart score 7  Known to Dr. Garcia, consult cardiology   FLP well-controlled in 09/2024; hemoglobin A1c 5.8 in 12/2024  Pain is reproducible upon palpation  Monitor on telemetry; ECG w/ CP; PRN morphine/nitro  C/W PTA beta-blocker, statin, aspirin  Provide aspirin bolus now  F/u updated ECHO   RADHA in 02/2023 w/ EF 55-60%, LVH, trace AR, mild MR, mild TR,  "

## 2024-12-17 ENCOUNTER — APPOINTMENT (OUTPATIENT)
Dept: RADIOLOGY | Facility: HOSPITAL | Age: 79
End: 2024-12-17
Payer: MEDICARE

## 2024-12-17 ENCOUNTER — APPOINTMENT (OUTPATIENT)
Dept: NON INVASIVE DIAGNOSTICS | Facility: HOSPITAL | Age: 79
End: 2024-12-17
Payer: MEDICARE

## 2024-12-17 VITALS
OXYGEN SATURATION: 98 % | WEIGHT: 155 LBS | TEMPERATURE: 98.2 F | RESPIRATION RATE: 16 BRPM | HEIGHT: 61 IN | DIASTOLIC BLOOD PRESSURE: 60 MMHG | BODY MASS INDEX: 29.27 KG/M2 | HEART RATE: 60 BPM | SYSTOLIC BLOOD PRESSURE: 134 MMHG

## 2024-12-17 LAB
ANION GAP SERPL CALCULATED.3IONS-SCNC: 5 MMOL/L (ref 4–13)
AORTIC ROOT: 3.3 CM
APICAL FOUR CHAMBER EJECTION FRACTION: 70 %
ATRIAL RATE: 68 BPM
AV LVOT PEAK GRADIENT: 2 MMHG
AV PEAK GRADIENT: 5 MMHG
BSA FOR ECHO PROCEDURE: 1.7 M2
BUN SERPL-MCNC: 11 MG/DL (ref 5–25)
CALCIUM SERPL-MCNC: 8.6 MG/DL (ref 8.4–10.2)
CHEST PAIN STATEMENT: NORMAL
CHLORIDE SERPL-SCNC: 105 MMOL/L (ref 96–108)
CO2 SERPL-SCNC: 26 MMOL/L (ref 21–32)
CREAT SERPL-MCNC: 0.58 MG/DL (ref 0.6–1.3)
DOP CALC LVOT AREA: 2.54 CM2
DOP CALC LVOT DIAMETER: 1.8 CM
E WAVE DECELERATION TIME: 208 MS
E/A RATIO: 1.21
ERYTHROCYTE [DISTWIDTH] IN BLOOD BY AUTOMATED COUNT: 11.9 % (ref 11.6–15.1)
FRACTIONAL SHORTENING: 28 (ref 28–44)
GFR SERPL CREATININE-BSD FRML MDRD: 88 ML/MIN/1.73SQ M
GLUCOSE SERPL-MCNC: 112 MG/DL (ref 65–140)
GLUCOSE SERPL-MCNC: 112 MG/DL (ref 65–140)
GLUCOSE SERPL-MCNC: 173 MG/DL (ref 65–140)
HCT VFR BLD AUTO: 33.3 % (ref 34.8–46.1)
HGB BLD-MCNC: 11.5 G/DL (ref 11.5–15.4)
INTERVENTRICULAR SEPTUM IN DIASTOLE (PARASTERNAL SHORT AXIS VIEW): 1.1 CM
INTERVENTRICULAR SEPTUM: 1.1 CM (ref 0.6–1.1)
LAAS-AP2: 29.1 CM2
LAAS-AP4: 26.2 CM2
LEFT ATRIUM SIZE: 4.2 CM
LEFT ATRIUM VOLUME (MOD BIPLANE): 102 ML
LEFT ATRIUM VOLUME INDEX (MOD BIPLANE): 60 ML/M2
LEFT INTERNAL DIMENSION IN SYSTOLE: 3.1 CM (ref 2.1–4)
LEFT VENTRICULAR INTERNAL DIMENSION IN DIASTOLE: 4.3 CM (ref 3.5–6)
LEFT VENTRICULAR POSTERIOR WALL IN END DIASTOLE: 1.1 CM
LEFT VENTRICULAR STROKE VOLUME: 47 ML
LVSV (TEICH): 47 ML
MAX DIASTOLIC BP: 82 MMHG
MAX HR PERCENT: 77 %
MAX HR: 110 BPM
MAX PREDICTED HEART RATE: 142 BPM
MCH RBC QN AUTO: 31.5 PG (ref 26.8–34.3)
MCHC RBC AUTO-ENTMCNC: 34.5 G/DL (ref 31.4–37.4)
MCV RBC AUTO: 91 FL (ref 82–98)
MITRAL REGURGITATION PEAK VELOCITY: 5.85 M/S
MITRAL VALVE MEAN INFLOW VELOCITY: 4.54 M/S
MITRAL VALVE REGURGITANT PEAK GRADIENT: 137 MMHG
MV E'TISSUE VEL-SEP: 5 CM/S
MV PEAK A VEL: 0.95 M/S
MV PEAK E VEL: 115 CM/S
MV STENOSIS PRESSURE HALF TIME: 60 MS
MV VALVE AREA P 1/2 METHOD: 3.67
NUC STRESS EJECTION FRACTION: 66 %
P AXIS: 55 DEGREES
PLATELET # BLD AUTO: 157 THOUSANDS/UL (ref 149–390)
PMV BLD AUTO: 10.7 FL (ref 8.9–12.7)
POTASSIUM SERPL-SCNC: 3.9 MMOL/L (ref 3.5–5.3)
PR INTERVAL: 158 MS
PROTOCOL NAME: NORMAL
QRS AXIS: 6 DEGREES
QRSD INTERVAL: 80 MS
QT INTERVAL: 398 MS
QTC INTERVAL: 423 MS
RA PRESSURE ESTIMATED: 8 MMHG
RATE PRESSURE PRODUCT: NORMAL
RBC # BLD AUTO: 3.65 MILLION/UL (ref 3.81–5.12)
REASON FOR TERMINATION: NORMAL
RIGHT ATRIUM AREA SYSTOLE A4C: 19.7 CM2
RIGHT VENTRICLE ID DIMENSION: 4.2 CM
RV PSP: 41 MMHG
SL CV DOP CALC MV VTI RETROGRADE: 214.1 CM
SL CV LEFT ATRIUM LENGTH A2C: 5.9 CM
SL CV LV EF: 60
SL CV MV MEAN GRADIENT RETROGRADE: 89 MMHG
SL CV PED ECHO LEFT VENTRICLE DIASTOLIC VOLUME (MOD BIPLANE) 2D: 84 ML
SL CV PED ECHO LEFT VENTRICLE SYSTOLIC VOLUME (MOD BIPLANE) 2D: 37 ML
SL CV REST NUCLEAR ISOTOPE DOSE: 10.74 MCI
SL CV STRESS NUCLEAR ISOTOPE DOSE: 33 MCI
SL CV STRESS RECOVERY BP: NORMAL MMHG
SL CV STRESS RECOVERY HR: 72 BPM
SL CV STRESS RECOVERY O2 SAT: 98 %
SODIUM SERPL-SCNC: 136 MMOL/L (ref 135–147)
STRESS ANGINA INDEX: 0
STRESS BASELINE BP: NORMAL MMHG
STRESS BASELINE HR: 66 BPM
STRESS O2 SAT REST: 98 %
STRESS PEAK HR: 75 BPM
STRESS POST ESTIMATED WORKLOAD: 1.7 METS
STRESS POST EXERCISE DUR MIN: 3 MIN
STRESS POST EXERCISE DUR MIN: 3 MIN
STRESS POST EXERCISE DUR SEC: 0 SEC
STRESS POST O2 SAT PEAK: 99 %
STRESS POST PEAK BP: 186 MMHG
STRESS POST PEAK HR: 110 BPM
STRESS POST PEAK SYSTOLIC BP: 186 MMHG
T WAVE AXIS: 83 DEGREES
TARGET HR FORMULA: NORMAL
TEST INDICATION: NORMAL
TR MAX PG: 33 MMHG
TR PEAK VELOCITY: 2.9 M/S
TRICUSPID ANNULAR PLANE SYSTOLIC EXCURSION: 1.6 CM
TRICUSPID VALVE PEAK REGURGITATION VELOCITY: 2.86 M/S
VENTRICULAR RATE: 68 BPM
WBC # BLD AUTO: 8.06 THOUSAND/UL (ref 4.31–10.16)

## 2024-12-17 PROCEDURE — 99214 OFFICE O/P EST MOD 30 MIN: CPT | Performed by: INTERNAL MEDICINE

## 2024-12-17 PROCEDURE — 78452 HT MUSCLE IMAGE SPECT MULT: CPT

## 2024-12-17 PROCEDURE — 93306 TTE W/DOPPLER COMPLETE: CPT

## 2024-12-17 PROCEDURE — 93306 TTE W/DOPPLER COMPLETE: CPT | Performed by: INTERNAL MEDICINE

## 2024-12-17 PROCEDURE — 93017 CV STRESS TEST TRACING ONLY: CPT

## 2024-12-17 PROCEDURE — 85027 COMPLETE CBC AUTOMATED: CPT | Performed by: PHYSICIAN ASSISTANT

## 2024-12-17 PROCEDURE — 82948 REAGENT STRIP/BLOOD GLUCOSE: CPT

## 2024-12-17 PROCEDURE — 78452 HT MUSCLE IMAGE SPECT MULT: CPT | Performed by: INTERNAL MEDICINE

## 2024-12-17 PROCEDURE — 99239 HOSP IP/OBS DSCHRG MGMT >30: CPT | Performed by: PHYSICIAN ASSISTANT

## 2024-12-17 PROCEDURE — 93016 CV STRESS TEST SUPVJ ONLY: CPT | Performed by: INTERNAL MEDICINE

## 2024-12-17 PROCEDURE — 80048 BASIC METABOLIC PNL TOTAL CA: CPT | Performed by: PHYSICIAN ASSISTANT

## 2024-12-17 PROCEDURE — A9502 TC99M TETROFOSMIN: HCPCS

## 2024-12-17 PROCEDURE — 93010 ELECTROCARDIOGRAM REPORT: CPT | Performed by: INTERNAL MEDICINE

## 2024-12-17 PROCEDURE — 93018 CV STRESS TEST I&R ONLY: CPT | Performed by: INTERNAL MEDICINE

## 2024-12-17 RX ORDER — LOSARTAN POTASSIUM 25 MG/1
25 TABLET ORAL DAILY
Status: DISCONTINUED | OUTPATIENT
Start: 2024-12-17 | End: 2024-12-17 | Stop reason: HOSPADM

## 2024-12-17 RX ORDER — REGADENOSON 0.08 MG/ML
0.4 INJECTION, SOLUTION INTRAVENOUS ONCE
Status: COMPLETED | OUTPATIENT
Start: 2024-12-17 | End: 2024-12-17

## 2024-12-17 RX ORDER — HYDROCORTISONE 25 MG/G
CREAM TOPICAL 4 TIMES DAILY
Qty: 60 G | Refills: 0 | Status: SHIPPED | OUTPATIENT
Start: 2024-12-17 | End: 2024-12-22

## 2024-12-17 RX ORDER — LOSARTAN POTASSIUM 25 MG/1
25 TABLET ORAL DAILY
Qty: 30 TABLET | Refills: 0 | Status: SHIPPED | OUTPATIENT
Start: 2024-12-18

## 2024-12-17 RX ADMIN — Medication 12.5 MG: at 12:04

## 2024-12-17 RX ADMIN — ASPIRIN 81 MG CHEWABLE TABLET 81 MG: 81 TABLET CHEWABLE at 10:03

## 2024-12-17 RX ADMIN — PREGABALIN 75 MG: 75 CAPSULE ORAL at 10:03

## 2024-12-17 RX ADMIN — ANASTROZOLE 1 MG: 1 TABLET, COATED ORAL at 12:04

## 2024-12-17 RX ADMIN — LOSARTAN POTASSIUM 25 MG: 25 TABLET, FILM COATED ORAL at 12:04

## 2024-12-17 RX ADMIN — REGADENOSON 0.4 MG: 0.08 INJECTION, SOLUTION INTRAVENOUS at 10:46

## 2024-12-17 RX ADMIN — LEVOTHYROXINE SODIUM 88 MCG: 88 TABLET ORAL at 05:34

## 2024-12-17 RX ADMIN — ENOXAPARIN SODIUM 40 MG: 40 INJECTION SUBCUTANEOUS at 10:03

## 2024-12-17 RX ADMIN — ATORVASTATIN CALCIUM 40 MG: 40 TABLET, FILM COATED ORAL at 16:30

## 2024-12-17 NOTE — PLAN OF CARE
Problem: CARDIOVASCULAR - ADULT  Goal: Maintains optimal cardiac output and hemodynamic stability  Description: INTERVENTIONS:  - Monitor I/O, vital signs and rhythm  - Monitor for S/S and trends of decreased cardiac output  - Administer and titrate ordered vasoactive medications to optimize hemodynamic stability  - Assess quality of pulses, skin color and temperature  - Assess for signs of decreased coronary artery perfusion  - Instruct patient to report change in severity of symptoms  Outcome: Progressing  Goal: Absence of cardiac dysrhythmias or at baseline rhythm  Description: INTERVENTIONS:  - Continuous cardiac monitoring, vital signs, obtain 12 lead EKG if ordered  - Administer antiarrhythmic and heart rate control medications as ordered  - Monitor electrolytes and administer replacement therapy as ordered  Outcome: Progressing

## 2024-12-17 NOTE — ASSESSMENT & PLAN NOTE
blood pressures noted to be elevated,  continue Lopressor 12.5 mg Q 12 hours  will add Cozaar 25 mg once a day and continue to monitor  12/17/2024 TTE: pending

## 2024-12-17 NOTE — ASSESSMENT & PLAN NOTE
high-sensitivity troponins:  16 (0hr), 18 (2hr) and 16 (4hr)  12 lead EKG unremarkable  noncardiac chest pain question musculoskeletal  12/17/2024 TTE pending  12/17/2024 exercise nuclear stress test pending

## 2024-12-17 NOTE — ASSESSMENT & PLAN NOTE
Lab Results   Component Value Date    HGBA1C 5.8 12/09/2024     Recent Labs     12/16/24 2016 12/17/24  0705   POCGLU 117 112     Blood Sugar Average: Last 72 hrs:  (P) 114.5    C/W PTA Victoza  Diabetic diet

## 2024-12-17 NOTE — DISCHARGE SUMMARY
"Discharge Summary - Hospitalist   Name: Delfina Villa 79 y.o. female I MRN: [id number]  Unit/Bed#: [address] [place] [address] Date of Admission: 12/16/2024   Date of Service: 12/17/2024 I Hospital Day: 0     Assessment & Plan  Chest pain  Pt w/ PMH of CAD s/p SAMMIE & CABG who presents to ED w/ c/o CP beginning 12/15 in the evening described as a \"weight\" on the left side of her chest; at worst pain was described as a 5/10 w/o radiation; pain currently 1/10.  No associated diaphoresis, SOB, N/V.   Trop: 16->18->16  ECG: NSR w/o acute ischemic changes   CXR benign   Heart score 7  Nuclear stress test without ischemia  RADHA in 02/2023 w/ EF 55-60%, LVH, trace AR, mild MR, mild TR  Repeat echo reviewed by cardiology this admission noted to be benign; pending formal report in epic  Known to Dr. Garcia; cardiology consulted for hospital course  FLP well-controlled in 09/2024; hemoglobin A1c 5.8 in 12/2024  No abnormalities on telemetry  Pain was reproducible upon palpation  C/W PTA beta-blocker, statin, aspirin  Cozaar added at discharge  Chest pain deemed to be noncardiac; cleared for d/c by cardiology   Did review w/ pt to make a f/u appt to make sure her chest pain is not related to any sequelae of piror mastectomy  Coronary artery disease involving native coronary artery of native heart without angina pectoris  Known to Dr. Garcia  H/O CAD  S/p SAMMIE to proximal LAD in 2015  S/p CABG x 2 in 02/2023 with LIMA to LAD and SVG to OM1   C/w PTA beta-blocker, statin, aspirin  Appreciate cardiology's input   Type 2 diabetes mellitus without complication, without long-term current use of insulin (HCC)  Lab Results   Component Value Date    HGBA1C 5.8 12/09/2024     Recent Labs     12/16/24 2016 12/17/24  0705   POCGLU 117 112     Blood Sugar Average: Last 72 hrs:  (P) 114.5    C/W PTA Victoza  Diabetic diet  Mixed dyslipidemia  C/w statin   FLP well-controlled in 09/2024  Hypothyroidism  C/w PTA levothyroxine  TSH therapeutic in 01/2024; " "outpatient follow-up  Essential hypertension  Initially elevated upon presentation to ED,   Spontaneous improvement of symptoms; patient notes blood pressure cuff squeezing tightly  C/W PTA metoprolol 12.5 mg every 12 hours  BP well-controlled at discharge 130-140  Cozaar added at discharge  Anemia  Mild, at baseline  No acute source of bleeding    Recent Labs     12/16/24  1416 12/17/24  1231   HGB 11.3* 11.5     Hemorrhoids, external  H/o external hemorrhoids; has outpatient follow-up with GI  C/w topical steroid cream  Did discuss adding bowel regiment but patient declined     Medical Problems       Resolved Problems  Date Reviewed: 12/9/2024   None       Discharging Physician / Practitioner: Deysi Zayas PA-C  PCP: Rogerio Mancera MD  Admission Date:   Admission Orders (From admission, onward)       Ordered        12/16/24 1751  Place in Observation  Once                          Discharge Date: 12/17/24    Consultations During Hospital Stay:  Cardiology    Procedures Performed:   Nuclear stress test  Echocardiogram    Significant Findings / Test Results:   See assessment see above    Incidental Findings:   None    Test Results Pending at Discharge (will require follow up):   None     Outpatient Tests Requested:  Lab work within 1 week of discharge to be completed by PCP    Complications: None    Reason for Admission: Chest pain    Hospital Course:   Delfina Villa is a 79 y.o. female patient who originally presented to the hospital on 12/16/2024 due to chest pain. Pt presented to ED w/ c/o CP beginning 12/15 in the evening described as a \"weight\" on the left side of her chest; at worst pain was described as a 5/10 w/o radiation; pain currently 1/10.  No associated diaphoresis, SOB, N/V.  Patient was evaluated by cardiology with history of significant coronary artery disease.  Echocardiogram reviewed by attending on machine; echo benign.  Formal report pending epic due to delay of imaging in epic.  Nuclear " "stress test without any ischemic changes.  Patient is cleared for discharge by cardiac standpoint.    Pain was reproducible with palpation.  Patient did have a left-sided mastectomy 3 years ago.  Patient is aware she needs to follow-up with her cancer team.    Please see above list of diagnoses and related plan for additional information.     Condition at Discharge: fair    Discharge Day Visit / Exam:   Subjective: Patient currently rates chest pain as a 1 out of 10.  She is eager to be discharged home.  She is without shortness of breath, diaphoresis, nausea, vomiting, abdominal pain.  No dyspnea on exertion noted.  Vitals: Blood Pressure: 134/60 (12/17/24 1425)  Pulse: 60 (12/17/24 1425)  Temperature: 98.2 °F (36.8 °C) (12/17/24 1425)  Temp Source: Oral (12/17/24 1007)  Respirations: 16 (12/17/24 1425)  Height: 5' 1\" (154.9 cm) (12/16/24 2010)  Weight - Scale: 70.3 kg (155 lb) (12/17/24 0710)  SpO2: 98 % (12/17/24 1425)  Physical Exam   Constitutional:       General: She is not in acute distress.     Appearance: She is normal weight. She is not toxic-appearing.   HENT:      Head: Normocephalic.      Right Ear: External ear normal.      Left Ear: External ear normal.      Nose: Nose normal.      Mouth/Throat:      Mouth: Mucous membranes are moist.      Pharynx: Oropharynx is clear.   Eyes:      Extraocular Movements: Extraocular movements intact.      Conjunctiva/sclera: Conjunctivae normal.   Cardiovascular:      Rate and Rhythm: Normal rate and regular rhythm.      Pulses: Normal pulses.      Heart sounds: Normal heart sounds.   Pulmonary:      Effort: Pulmonary effort is normal.      Breath sounds: Normal breath sounds.   Chest:      Chest wall: No tenderness.   Abdominal:      General: Abdomen is flat. Bowel sounds are normal. There is no distension.      Palpations: Abdomen is soft.      Tenderness: There is no abdominal tenderness. There is no guarding.   Musculoskeletal:         General: Swelling (Trace LE " edema) present.      Cervical back: Normal range of motion.   Skin:     General: Skin is warm and dry.      Coloration: Skin is not jaundiced.      Findings: No bruising.      Comments: Well healed scars on L anterior chest wall for mastectomy      Neurological:      General: No focal deficit present.      Mental Status: She is alert. Mental status is at baseline.   Psychiatric:         Mood and Affect: Mood normal.         Behavior: Behavior normal.      Discussion with Family: Updated  (daughter in law) at bedside.    Discharge instructions/Information to patient and family:   See after visit summary for information provided to patient and family.      Provisions for Follow-Up Care:  See after visit summary for information related to follow-up care and any pertinent home health orders.      Mobility at time of Discharge:   Basic Mobility Inpatient Raw Score: 21  JH-HLM Goal: 6: Walk 10 steps or more  JH-HLM Achieved: 6: Walk 10 steps or more  HLM Goal achieved. Continue to encourage appropriate mobility.     Disposition:   Home    Planned Readmission: NONE    Discharge Medications:  See after visit summary for reconciled discharge medications provided to patient and/or family.      Administrative Statements   Discharge Statement:  I have spent a total time of 60 minutes in caring for this patient on the day of the visit/encounter. >30 minutes of time was spent on: Diagnostic results, Prognosis, Instructions for management, Importance of tx compliance, Impressions, Counseling / Coordination of care, Documenting in the medical record, Reviewing / ordering tests, medicine, procedures  , and Communicating with other healthcare professionals .    **Please Note: This note may have been constructed using a voice recognition system**

## 2024-12-17 NOTE — ASSESSMENT & PLAN NOTE
high-sensitivity troponins:  16 (0hr), 18 (2hr) and 16 (4hr)  12 lead EKG unchanged from previous  history of drug-eluting stent to LAD in 2015 12/7/2022 LHC: 20% Ostial to proximal LAD, 70% mid to distal left main, 95% proximal circumflex lesion, 40% Ostial RCA and 45% mid RCA  2/8/2023 CABG x2: Lima to LAD and SVG to obtuse marginal one  continue Lipitor 40 mg daily  continue aspirin 81 mg once a day  continue Lopressor 12.5 mg Q 12 hours

## 2024-12-17 NOTE — DISCHARGE INSTR - AVS FIRST PAGE
Please follow-up an appoint with your PCP within 1 week of discharge.  We have benefit repeat lab work within 1 week of discharge.    Please call your breast cancer team for follow-up appointment to make sure that your chest pain is not related to any sequelae of mastectomy.

## 2024-12-17 NOTE — ASSESSMENT & PLAN NOTE
H/o external hemorrhoids; has outpatient follow-up with GI  C/w topical steroid cream  Did discuss adding bowel regiment but patient declined

## 2024-12-17 NOTE — ASSESSMENT & PLAN NOTE
Lab Results   Component Value Date    HGBA1C 5.8 12/09/2024       Recent Labs     12/16/24 2016 12/17/24  0705   POCGLU 117 112       Blood Sugar Average: Last 72 hrs:  (P) 114.5  managed per primary team

## 2024-12-17 NOTE — ASSESSMENT & PLAN NOTE
"Pt w/ PMH of CAD s/p SAMMIE & CABG who presents to ED w/ c/o CP beginning 12/15 in the evening described as a \"weight\" on the left side of her chest; at worst pain was described as a 5/10 w/o radiation; pain currently 1/10.  No associated diaphoresis, SOB, N/V.   Trop: 16->18->16  ECG: NSR w/o acute ischemic changes   CXR benign   Heart score 7  Nuclear stress test without ischemia  RADHA in 02/2023 w/ EF 55-60%, LVH, trace AR, mild MR, mild TR  Repeat echo reviewed by cardiology this admission noted to be benign; pending formal report in epic  Known to Dr. Garcia; cardiology consulted for hospital course  FLP well-controlled in 09/2024; hemoglobin A1c 5.8 in 12/2024  No abnormalities on telemetry  Pain was reproducible upon palpation  C/W PTA beta-blocker, statin, aspirin  Cozaar added at discharge  Chest pain deemed to be noncardiac; cleared for d/c by cardiology   Did review w/ pt to make a f/u appt to make sure her chest pain is not related to any sequelae of piror mastectomy  "

## 2024-12-17 NOTE — ASSESSMENT & PLAN NOTE
Initially elevated upon presentation to ED,   Spontaneous improvement of symptoms; patient notes blood pressure cuff squeezing tightly  C/W PTA metoprolol 12.5 mg every 12 hours  BP well-controlled at discharge 130-140  Cozaar added at discharge

## 2024-12-17 NOTE — NURSING NOTE
Patient left unit [id number] accompanied by daughter  and son in law. No signs of distress noted. PIV removed all belongings returned, discharge instructions given and patient verbalized understanding.

## 2024-12-17 NOTE — CONSULTS
Consultation - Cardiology   Name: Delfina Villa 79 y.o. female I MRN: [id number]  Unit/Bed#: [address] [place] [address] Date of Admission: 12/16/2024   Date of Service: 12/17/2024 I Hospital Day: 0   Inpatient consult to Cardiology  Consult performed by: BERNADETTE Draper  Consult ordered by: Deysi Zayas PA-C        Physician Requesting Evaluation: Anderson John DO   Reason for Evaluation / Principal Problem: chest pressure and patient with known history of coronary artery disease and previous interventions    Assessment & Plan  Chest pain  high-sensitivity troponins:  16 (0hr), 18 (2hr) and 16 (4hr)  12 lead EKG unremarkable  noncardiac chest pain question musculoskeletal  12/17/2024 TTE pending  12/17/2024 exercise nuclear stress test pending  Coronary artery disease involving native coronary artery of native heart without angina pectoris  high-sensitivity troponins:  16 (0hr), 18 (2hr) and 16 (4hr)  12 lead EKG unchanged from previous  history of drug-eluting stent to LAD in 2015 12/7/2022 LHC: 20% Ostial to proximal LAD, 70% mid to distal left main, 95% proximal circumflex lesion, 40% Ostial RCA and 45% mid RCA  2/8/2023 CABG x2: Lima to LAD and SVG to obtuse marginal one  continue Lipitor 40 mg daily  continue aspirin 81 mg once a day  continue Lopressor 12.5 mg Q 12 hours  Essential hypertension  blood pressures noted to be elevated,  continue Lopressor 12.5 mg Q 12 hours  will add Cozaar 25 mg once a day and continue to monitor  12/17/2024 TTE: pending  Mixed dyslipidemia  continue Lipitor 40 mg daily  Type 2 diabetes mellitus without complication, without long-term current use of insulin (HCC)  Lab Results   Component Value Date    HGBA1C 5.8 12/09/2024       Recent Labs     12/16/24 2016 12/17/24  0705   POCGLU 117 112       Blood Sugar Average: Last 72 hrs:  (P) 114.5  managed per primary team  Hypothyroidism  continue levothyroxine 88 µg daily  Anemia  hemoglobin 11.3  monitor      History of Present Illness    Delfina Villa is a 79 y.o. female who presented to the emergency room yesterday with complaints of intermittent mid sternal chest heaviness unlike any discomfort she is ever had before. She notes it started on 12/15/2024 when she was laying in bed trying to sleep. She notes it took her a few hours to fall asleep but when she woke up Monday morning she was feeling fine. Later during the day on Monday she was supposed to go shopping with her daughter and she called her to tell her once again she was not feeling well that she had some pressure. Daughter brought her to the emergency room for evaluation. Patient does have a history of coronary artery disease with coronary artery stenting to the LAD in 2015 and CABGx2 with Diaz to LAD and SVG to OM1 in 2023. She follows with Dr. Garcia in the outpatient setting    Labs in the emergency room, high-sensitivity troponins were 16, 18 and 16, 12 lead EKG was unremarkable. She was mildly anemic with a hemoglobin of 11.3 but otherwise labs were unremarkable. Since admission she is had no further complaints of chest discomfort or heaviness.    Review of Systems   Constitutional: Negative.  Negative for activity change and fatigue.   HENT: Negative.  Negative for congestion, ear pain, rhinorrhea and sore throat.    Eyes: Negative.  Negative for photophobia and visual disturbance.   Respiratory:  Positive for chest tightness.    Cardiovascular:  Negative for chest pain and palpitations.        Chest pressure   Gastrointestinal: Negative.  Negative for abdominal pain, constipation, diarrhea, nausea and vomiting.   Endocrine: Negative.  Negative for polydipsia, polyphagia and polyuria.   Genitourinary: Negative.  Negative for difficulty urinating.   Musculoskeletal: Negative.    Skin: Negative.    Neurological: Negative.  Negative for dizziness, speech difficulty, weakness and light-headedness.   Psychiatric/Behavioral: Negative.       I have reviewed the patient's PMH, PSH, Social  History, Family History, Meds, and Allergies  Historical Information   Past Medical History:   Diagnosis Date    Abdominal pain     LLQ on occasion    Angina pectoris (HCC)     Arthritis     Breast cancer (HCC) 07/01/2021    Cancer (HCC)     breast left    Colon polyp     Constipation     at times    Coronary artery disease     Diabetes mellitus (HCC)     Diarrhea     at times    Disease of thyroid gland     Hemorrhoids     Hypercholesterolemia     Hypertension     Neuropathy     both legs and feet    Obesity     Osteoporosis     Otitis media     Ovarian ca (HCC) 1997    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HCC)     Shingles     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 Cleary. Up to date with her colonoscopy.    COLONOSCOPY      CORONARY STENT PLACEMENT      Dec 2015    EPIDURAL BLOCK INJECTION Left 8/9/2024    Procedure: LEFT  L2, L3 TRANSFORAMINAL EPIDURAL STEROID INJECTION;  Surgeon: Billy Cheatham MD;  Location: [facility] MAIN OR;  Service: Pain Management     EYE SURGERY      cataract surgery    GALLBLADDER SURGERY      HYSTERECTOMY  1989    MAMMO (HISTORICAL)      up to date. see gyn    MASTECTOMY Left 07/13/2021    MASTECTOMY W/ SENTINEL NODE BIOPSY Left 07/13/2021    Procedure: BREAST ROSLYN  DIRECTED MASTECTOMY, SENTINEL LYMPH NODE BIOPSY, LYMPHATIC MAPPING WITH BLUE DYE AND RADIOACTIVE DYE (INJECT AT 1500 BY DR ANDREWS IN THE OR);  Surgeon: Anthony Andrews MD;  Location: [facility];  Service: Surgical  Oncology    OOPHORECTOMY Bilateral 1997    HI CORONARY ARTERY BYP W/VEIN & ARTERY GRAFT 2 VEIN N/A 2/8/2023    Procedure: CORONARY ARTERY BYPASS GRAFT (CABG) X 2 VESSELS GSV-->OM1, LIMA-->LAD; EVH  LEFT LEG w/ RADHA;  Surgeon: Javan Allen MD;  Location: [facility];  Service: Cardiac Surgery    SPINE SURGERY      THYROIDECTOMY      UPPER GASTROINTESTINAL ENDOSCOPY      US BREAST NEEDLE LOC LEFT Left 05/06/2021    US GUIDANCE BREAST BIOPSY LEFT EACH ADDITIONAL Left 05/06/2021    US GUIDED BREAST BIOPSY LEFT COMPLETE Left 03/15/2021    US GUIDED BREAST BIOPSY LEFT COMPLETE Left 05/06/2021     Social History     Tobacco Use    Smoking status: Never     Passive exposure: Past    Smokeless tobacco: Never   Vaping Use    Vaping status: Never Used   Substance and Sexual Activity    Alcohol use: Never    Drug use: Never    Sexual activity: Not Currently     Partners: Male     Birth control/protection: Post-menopausal, None     E-Cigarette/Vaping    E-Cigarette Use Never User      E-Cigarette/Vaping Substances    Nicotine No     THC No     CBD No     Flavoring No     Other No     Unknown No      Family History   Problem Relation Age of Onset    Diabetes Mother     Heart disease Mother     Dementia Mother     Esophageal cancer Father 60    Endometrial cancer Sister 68    Asthma Sister     Cancer Sister     No Known Problems Sister     No Known Problems Sister     No Known Problems Sister     Asthma Daughter     No Known Problems Maternal Grandmother     Prostate cancer Maternal Grandfather     No Known Problems Paternal Grandmother     Prostate cancer Paternal Grandfather     Stomach cancer Brother 71    Multiple myeloma Brother 72    Cancer Brother     Asthma Son     Depression Son     Breast cancer Maternal Aunt 50    No Known Problems Paternal Aunt     No Known Problems Paternal Aunt     Breast cancer Other 45    Breast cancer Other 45    Diabetes Family     Cancer Family     Arthritis Family     Heart disease Family       Social History     Tobacco Use    Smoking status: Never     Passive exposure: Past    Smokeless tobacco: Never   Vaping Use    Vaping status: Never Used   Substance and Sexual Activity    Alcohol use: Never    Drug use: Never    Sexual activity: Not Currently     Partners: Male     Birth control/protection: Post-menopausal, None       Current Facility-Administered Medications:     acetaminophen (TYLENOL) tablet 650 mg, Q6H PRN    anastrozole (ARIMIDEX) tablet 1 mg, Daily    aspirin chewable tablet 81 mg, Daily    atorvastatin (LIPITOR) tablet 40 mg, Daily With Dinner    calcium carbonate (TUMS) chewable tablet 1,000 mg, Daily PRN    enoxaparin (LOVENOX) subcutaneous injection 40 mg, Daily    hydrALAZINE (APRESOLINE) injection 10 mg, Q6H PRN    hydrocortisone (ANUSOL-HC) 2.5 % rectal cream, 4x daily    levothyroxine tablet 88 mcg, Early Morning    liraglutide (VICTOZA) injection 1.2 mg, HS    losartan (COZAAR) tablet 25 mg, Daily    metoprolol tartrate (LOPRESSOR) partial tablet 12.5 mg, Q12H VICTOR MANUEL    morphine injection 2 mg, Q4H PRN    nitroglycerin (NITROSTAT) SL tablet 0.4 mg, Q5 Min PRN    ondansetron (ZOFRAN) injection 4 mg, Q6H PRN    pregabalin (LYRICA) capsule 75 mg, BID    Insert peripheral IV, Once **AND** sodium chloride (PF) 0.9 % injection 3 mL, Q1H PRN  Prior to Admission Medications   Prescriptions Last Dose Informant Patient Reported? Taking?   Aspirin 81 MG CAPS 12/15/2024 at 12:00 PM  Yes Yes   Si (two) times a day   Insulin Pen Needle (Sure Comfort Pen Needles) 31G X 5 MM MISC Unknown Self No No   Sig: by Does not apply route daily   Lancets (OneTouch Delica Plus [id number]) MISC Unknown  No No   Sig: Use once a day to check blood sugar   OneTouch Ultra test strip Unknown  No No   Sig: Use 1 each daily Use as instructed   anastrozole (ARIMIDEX) 1 mg tablet 12/15/2024 at 12:00 PM  No Yes   Sig: TAKE ONE TABLET BY MOUTH EVERY DAY.   atorvastatin (LIPITOR) 40 mg tablet 12/15/2024 at 12:00 PM  No  Yes   Sig: Take 1 tablet (40 mg total) by mouth daily   clotrimazole-betamethasone (LOTRISONE) 1-0.05 % cream Unknown Self No No   Sig: APPLY TOPICALLY TWO TIMES A DAY (GENERIC FOR LOTRISONE)   levothyroxine 88 mcg tablet 12/16/2024 at  8:00 AM  No Yes   Sig: TAKE 1 TABLET BY MOUTH EVERY DAY   liraglutide (VICTOZA) injection 12/15/2024 at  9:00 PM  No Yes   Sig: Inject 0.2 mL (1.2 mg total) under the skin daily at bedtime   metoprolol tartrate (LOPRESSOR) 25 mg tablet 12/16/2024 at  8:00 AM  No Yes   Sig: TAKE 1/2 TABLET(12.5 MG) BY MOUTH EVERY 12 HOURS   pregabalin (LYRICA) 75 mg capsule 12/16/2024 at  8:00 AM  No Yes   Sig: Take 1 capsule (75 mg total) by mouth 2 (two) times a day      Facility-Administered Medications: None     Duloxetine and Sulfa antibiotics    Objective :  Temp:  [97.5 °F (36.4 °C)-98.7 °F (37.1 °C)] 97.5 °F (36.4 °C)  HR:  [57-72] 57  BP: (130-197)/(47-91) 140/56  Resp:  [14-22] 18  SpO2:  [98 %-100 %] 99 %  O2 Device: None (Room air)  Orthostatic Blood Pressures      Flowsheet Row Most Recent Value   Blood Pressure 140/56 filed at 12/17/2024 0710   Patient Position - Orthostatic VS Lying filed at 12/16/2024 1900          First Weight: Weight - Scale: 71 kg (156 lb 8.4 oz) (12/16/24 1410)  Vitals:    12/16/24 2010 12/17/24 0710   Weight: 70.5 kg (155 lb 6.8 oz) 70.3 kg (155 lb)       Physical Exam  Vitals and nursing note reviewed.   Constitutional:       General: She is not in acute distress.     Appearance: She is well-developed. She is obese.   Neck:      Vascular: No JVD.   Cardiovascular:      Rate and Rhythm: Normal rate and regular rhythm.      Heart sounds: Normal heart sounds. No murmur heard.  Pulmonary:      Effort: Pulmonary effort is normal. No accessory muscle usage or respiratory distress.   Abdominal:      General: Bowel sounds are normal.      Palpations: Abdomen is soft.   Musculoskeletal:      Cervical back: Neck supple.      Right lower leg: No edema.      Left lower leg:  No edema.   Skin:     Findings: No rash.      Nails: There is no clubbing.   Neurological:      General: No focal deficit present.      Mental Status: She is alert and oriented to person, place, and time.           Lab Results: I have reviewed the following results:  Results from last 7 days   Lab Units 12/16/24  1416   WBC Thousand/uL 7.81   HEMOGLOBIN g/dL 11.3*   HEMATOCRIT % 32.5*   PLATELETS Thousands/uL 151     Results from last 7 days   Lab Units 12/16/24  1416   POTASSIUM mmol/L 4.4   CHLORIDE mmol/L 107   CO2 mmol/L 23   BUN mg/dL 13   CREATININE mg/dL 0.58*   CALCIUM mg/dL 8.8         Lab Results   Component Value Date    HGBA1C 5.8 12/09/2024     Lab Results   Component Value Date    CKTOTAL 101 12/07/2018    TROPONINI 0.03 12/23/2015     12 lead EKG: sinus rhythm, unchanged from previous, no acute changes    VTE Prophylaxis: VTE covered by:  enoxaparin, Subcutaneous    and Sequential compression device (Venodyne)     Diana FLEMING  Cardiology

## 2024-12-17 NOTE — ASSESSMENT & PLAN NOTE
Mild, at baseline  No acute source of bleeding    Recent Labs     12/16/24  1416 12/17/24  1231   HGB 11.3* 11.5

## 2025-01-08 PROBLEM — Z00.00 MEDICARE ANNUAL WELLNESS VISIT, SUBSEQUENT: Status: RESOLVED | Noted: 2021-02-09 | Resolved: 2025-01-08

## 2025-02-03 ENCOUNTER — TELEPHONE (OUTPATIENT)
Dept: CARDIOLOGY CLINIC | Facility: CLINIC | Age: 80
End: 2025-02-03

## 2025-02-03 DIAGNOSIS — E03.9 ACQUIRED HYPOTHYROIDISM: ICD-10-CM

## 2025-02-03 DIAGNOSIS — E11.9 TYPE 2 DIABETES MELLITUS WITHOUT COMPLICATION, WITHOUT LONG-TERM CURRENT USE OF INSULIN (HCC): ICD-10-CM

## 2025-02-03 DIAGNOSIS — Z95.1 S/P CABG X 2: ICD-10-CM

## 2025-02-03 DIAGNOSIS — I25.10 CORONARY ARTERY DISEASE INVOLVING NATIVE CORONARY ARTERY OF NATIVE HEART WITHOUT ANGINA PECTORIS: ICD-10-CM

## 2025-02-03 NOTE — TELEPHONE ENCOUNTER
Patient called the RX Refill Line. Message is being forwarded to the office.     Patient is requesting a call back regarding her insurance change. She would like to make sure that the office accepts her new plan. She stated it is [employer] plan.  Please review and verify with the patient.    Please contact patient at

## 2025-02-03 NOTE — TELEPHONE ENCOUNTER
Reason for call:   [x] Refill   [] Prior Auth  [x] Other: NOT DUPLICATES, change in pharmacies      Office:   [x] PCP/Provider - Rogerio Mancera  [] Specialty/Provider -     Medication:     levothyroxine 88 mcg tablet TAKE 1 TABLET BY MOUTH EVERY DAY   90    metoprolol tartrate (LOPRESSOR) 25 mg tablet TAKE 1/2 TABLET(12.5 MG) BY MOUTH EVERY 12 HOURS   90    Pharmacy: [facility] [id number] [address] [phone]       Does the patient have enough for 3 days?   [x] Yes   [] No - Send as HP to POD

## 2025-02-03 NOTE — TELEPHONE ENCOUNTER
Reason for call:   [x] Refill   [] Prior Auth  [] Other:     Office:   [] PCP/Provider -   [x] Specialty/Provider -   Ordering Department: [facility]  Authorized By: Ruby Garcia DO    Medication:  atorvastatin (LIPITOR) 40 mg tablet    Dose/Frequency: Take 1 tablet (40 mg total) by mouth daily,     Quantity: 90    Pharmacy: [facility] [id number] [address] [phone]    Does the patient have enough for 3 days?   [x] Yes   [] No - Send as HP to POD

## 2025-02-04 RX ORDER — METOPROLOL TARTRATE 25 MG/1
12.5 TABLET, FILM COATED ORAL EVERY 12 HOURS SCHEDULED
Qty: 90 TABLET | Refills: 1 | Status: SHIPPED | OUTPATIENT
Start: 2025-02-04

## 2025-02-04 RX ORDER — ATORVASTATIN CALCIUM 40 MG/1
40 TABLET, FILM COATED ORAL DAILY
Qty: 90 TABLET | Refills: 1 | Status: SHIPPED | OUTPATIENT
Start: 2025-02-04

## 2025-02-04 RX ORDER — LEVOTHYROXINE SODIUM 88 UG/1
88 TABLET ORAL DAILY
Qty: 30 TABLET | Refills: 0 | Status: SHIPPED | OUTPATIENT
Start: 2025-02-04

## 2025-02-05 DIAGNOSIS — E11.9 TYPE 2 DIABETES MELLITUS WITHOUT COMPLICATION, WITHOUT LONG-TERM CURRENT USE OF INSULIN (HCC): ICD-10-CM

## 2025-02-05 DIAGNOSIS — I10 ESSENTIAL HYPERTENSION: ICD-10-CM

## 2025-02-05 RX ORDER — LOSARTAN POTASSIUM 25 MG/1
25 TABLET ORAL DAILY
Qty: 30 TABLET | Refills: 0 | Status: SHIPPED | OUTPATIENT
Start: 2025-02-05

## 2025-02-05 NOTE — TELEPHONE ENCOUNTER
Reason for call:   [x] Refill   [] Prior Auth  [] Other:     Office:   [x] PCP/Provider -   [] Specialty/Provider -     Medication: OneTouch Ultra test strip     Dose/Frequency:  Use 1 each daily     Quantity: 100 each    Pharmacy: [id number] - [place], [place]     Does the patient have enough for 3 days?   [x] Yes   [] No - Send as HP to POD

## 2025-02-05 NOTE — TELEPHONE ENCOUNTER
I called patient to make aware that it looks like was ordered at discharge.   She does not take her bp at home and had no idea this was ordered.   Does she need to take it?

## 2025-02-05 NOTE — TELEPHONE ENCOUNTER
Patient called in stating that she was told by [facility] pharmacy if she wanted to refill Losartan 25 mg.   As per Patient she was not aware that she needs to take it.   According to Patient she admitted on 12/16 for chest pain and was not told that she would need to on losartan.  Patient would like to know if she should start taking it or wait until she is seen. Please review and reach out to Patient for further assistance.

## 2025-02-06 RX ORDER — BLOOD SUGAR DIAGNOSTIC
1 STRIP MISCELLANEOUS DAILY
Qty: 100 EACH | Refills: 1 | Status: SHIPPED | OUTPATIENT
Start: 2025-02-06

## 2025-02-27 ENCOUNTER — TELEPHONE (OUTPATIENT)
Age: 80
End: 2025-02-27

## 2025-02-27 NOTE — TELEPHONE ENCOUNTER
Received call from [facility] [employer] post recent contact with patient to inform  that Victoza is no longer available and they have suggested patient discuss with provider options for Ozempic, Januvia, Jardiance.    RN review of patient EMR shows patient has not been on medication since last year.Patient is no longer on any diabetic medications.  Next OV scheduled for 6/12

## 2025-03-04 DIAGNOSIS — I10 ESSENTIAL HYPERTENSION: ICD-10-CM

## 2025-03-04 RX ORDER — LOSARTAN POTASSIUM 25 MG/1
25 TABLET ORAL DAILY
Qty: 90 TABLET | Refills: 3 | Status: SHIPPED | OUTPATIENT
Start: 2025-03-04

## 2025-03-04 NOTE — TELEPHONE ENCOUNTER
Pt called to make Dr Mancera aware she received a letter stating Victoza is being discontinued.   She had been getting it through a patient assistance program and has enough remaining to last until the end of the month.  Pt is wondering how she should proceed.  Should she go back on the Metformin?  She does not want to take Ozempic, Januvia or Jardiance because, although they would be covered by her insurance, they would still be very expensive.  Please advise.

## 2025-03-12 ENCOUNTER — OFFICE VISIT (OUTPATIENT)
Dept: SURGICAL ONCOLOGY | Facility: CLINIC | Age: 80
End: 2025-03-12
Payer: COMMERCIAL

## 2025-03-12 VITALS
TEMPERATURE: 97.7 F | HEIGHT: 61 IN | SYSTOLIC BLOOD PRESSURE: 140 MMHG | OXYGEN SATURATION: 98 % | RESPIRATION RATE: 15 BRPM | DIASTOLIC BLOOD PRESSURE: 60 MMHG | HEART RATE: 70 BPM | BODY MASS INDEX: 29.29 KG/M2

## 2025-03-12 DIAGNOSIS — Z17.0 MALIGNANT NEOPLASM OF OVERLAPPING SITES OF LEFT BREAST IN FEMALE, ESTROGEN RECEPTOR POSITIVE (HCC): ICD-10-CM

## 2025-03-12 DIAGNOSIS — Z79.811 USE OF ANASTROZOLE (ARIMIDEX): ICD-10-CM

## 2025-03-12 DIAGNOSIS — Z15.89 MONOALLELIC MUTATION OF CHEK2 GENE IN FEMALE PATIENT: ICD-10-CM

## 2025-03-12 DIAGNOSIS — Z15.02 MONOALLELIC MUTATION OF CHEK2 GENE IN FEMALE PATIENT: ICD-10-CM

## 2025-03-12 DIAGNOSIS — Z12.31 VISIT FOR SCREENING MAMMOGRAM: ICD-10-CM

## 2025-03-12 DIAGNOSIS — Z15.09 MONOALLELIC MUTATION OF CHEK2 GENE IN FEMALE PATIENT: ICD-10-CM

## 2025-03-12 DIAGNOSIS — R59.0 LYMPHADENOPATHY, AXILLARY: ICD-10-CM

## 2025-03-12 DIAGNOSIS — Z90.12 HISTORY OF LEFT MASTECTOMY: ICD-10-CM

## 2025-03-12 DIAGNOSIS — Z85.3 PERSONAL HISTORY OF MALIGNANT NEOPLASM OF BREAST: ICD-10-CM

## 2025-03-12 DIAGNOSIS — Z15.01 MONOALLELIC MUTATION OF CHEK2 GENE IN FEMALE PATIENT: ICD-10-CM

## 2025-03-12 DIAGNOSIS — Z08 ENCOUNTER FOR FOLLOW-UP EXAMINATION AFTER COMPLETED TREATMENT FOR MALIGNANT NEOPLASM: Primary | ICD-10-CM

## 2025-03-12 DIAGNOSIS — C50.812 MALIGNANT NEOPLASM OF OVERLAPPING SITES OF LEFT BREAST IN FEMALE, ESTROGEN RECEPTOR POSITIVE (HCC): ICD-10-CM

## 2025-03-12 PROCEDURE — 99214 OFFICE O/P EST MOD 30 MIN: CPT

## 2025-03-12 NOTE — PROGRESS NOTES
Name: Delfina Villa      : 1945      MRN: [id number]  Encounter Provider: BERNADETTE Reyes  Encounter Date: 3/12/2025   Encounter department: [facility]  :  Assessment & Plan  Encounter for follow-up examination after completed treatment for malignant neoplasm  - hem onc 25  - 1 year f/u    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, estrogen receptor positive (HCC)  Patient is a 79-year-old female presenting today for a follow-up for left breast cancer diagnosed in 2021. Pathology revealed invasive mammary carcinoma ER/[place]+, HER2-, with extensive DCIS (hormone +). She had genetic testing which revealed a CHEK2 mutation. She had a left breast mastectomy with sentinel node biopsy with Dr. Montejo. She is currently on anastrozole. I will get her scheduled for screening mammo in July. On clinical exam, I was able to appreciate fullness/possible left sided axillary lymphadenopathy. There were no concerns on her breasts. Given history of left sided breast cancer, I am going to order diagnostic u/s for further evaluation. She denies persistent headaches, bone pain, back pain, shortness of breath, or abdominal pain. She states she lost 60 lbs over the last 2 years. I will see the patient back in 1 year or sooner should the need arise. She was instructed to call with any questions or concerns prior to this time. All questions were answered today.     Personal history of malignant neoplasm of breast    Monoallelic mutation of CHEK2 gene in female patient  - due to updated NCCN guidelines, there is no longer an increased risk for colon cancer secondary to the CHEK 2 genetic mutation  Use of anastrozole (Arimidex)  - continue use per medical oncology    History of left mastectomy    Visit for screening mammogram  Orders:  •  Mammo screening right w 3d and cad; Future    Lymphadenopathy, axillary  Orders:  •  US Breast Axilla Left;  Future        Oncology History   Cancer Staging   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, estrogen receptor positive (HCC)  Staging form: Breast, AJCC 8th Edition  - Pathologic stage from 7/26/2021: pT1b, pN0, cM0, GX, ER+, WI+, HER2- - Signed by BERNADETTE Reyes on 2/7/2024  Stage prefix: Initial diagnosis  Nuclear grade: G2  Multigene prognostic tests performed: None  Histologic grading system: 3 grade system  Oncology History   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HCC)   8/27/2013 Initial Diagnosis    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HCC)     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, estrogen receptor positive (HCC)   3/15/2021 Biopsy    Left breast ultrasound-guided biopsy  12 o'clock, 5 cm from nipple  Ductal carcinoma in situ  Grade 2        Concordant. Malignancy appears unifocal; masses cover 2.6 cm. US left axilla negative. Right breast clear.     4/19/2021 Genetic Testing    One pathogenic (low penetrance) variant identified in CHEK2  Total of 23 genes evaluated  [employer]     5/6/2021 Observation    Imaging was reviewed prior to ROSLYN clip insertion  Additional findings in the left breast on ultrasound; 2 additional biopsies were recommended     5/6/2021 Biopsy    Left breast ultrasound-guided biopsy  A. 1 o'clock, 4 cm from nipple  Ductal carcinoma in situ  Grade 2  ,     B. 11 o'clock, 9 cm from nipple  Invasive carcinoma of no special type (ductal)  Grade 1  ER 90, WI 90, HER2 1+  Lymphovascular invasion not identified    Concordant. Malignancy is multifocal and spans at least 8.5 cm in 2 directions. ROSLYN  clip placed at 12:00, 5cmfn biopsy site.     7/13/2021 Surgery    Left breast ROSLYN  directed mastectomy with sentinel lymph node biopsy  Invasive mammary carcinoma of no special type (ductal)  Grade 1  8 mm  Extensive DCIS (size cannot be determined, throughout all quadrants)  Margins negative  0/1 Lymph  "node  Anatomic/Prognostic Stage IA     7/26/2021 -  Cancer Staged    Staging form: Breast, AJCC 8th Edition  - Pathologic stage from 7/26/2021: pT1b, pN0, cM0, GX, ER+, PA+, HER2- - Signed by BERNADETTE Reyes on 2/7/2024  Stage prefix: Initial diagnosis  Nuclear grade: G2  Multigene prognostic tests performed: None  Histologic grading system: 3 grade system       8/11/2021 -  Hormone Therapy    Anastrozole 1 mg daily  Dr. Cordon        Review of Systems   Constitutional:  Positive for unexpected weight change (60 lb weight loss over 2 years). Negative for activity change, appetite change and fatigue.   Respiratory:  Negative for cough and shortness of breath.    Cardiovascular:  Negative for chest pain.   Gastrointestinal:  Negative for abdominal pain, diarrhea, nausea and vomiting.   Endocrine: Negative for heat intolerance.   Musculoskeletal:  Positive for gait problem (walker/wheelchair). Negative for arthralgias, back pain and myalgias.   Skin:  Negative for rash.   Neurological:  Negative for weakness and headaches.   Hematological:  Negative for adenopathy.    A complete review of systems is negative other than that noted above in the HPI.     Objective   /60 (BP Location: Right arm, Patient Position: Sitting, Cuff Size: Standard)   Pulse 70   Temp 97.7 °F (36.5 °C) (Temporal)   Resp 15   Ht 5' 1\" (1.549 m)   SpO2 98%   BMI 29.29 kg/m²     Pain Screening:  Pain Score: 0-No pain  ECOG    Physical Exam  Constitutional:       General: She is not in acute distress.     Appearance: Normal appearance.   Cardiovascular:      Rate and Rhythm: Normal rate and regular rhythm.      Pulses: Normal pulses.      Heart sounds: Normal heart sounds.   Pulmonary:      Effort: Pulmonary effort is normal.      Breath sounds: Normal breath sounds.   Chest:      Chest wall: No mass.   Breasts:     Right: No swelling, bleeding, inverted nipple, mass, nipple discharge, skin change or tenderness.      Left: " No swelling, bleeding, mass, skin change or tenderness.          Comments: Left breast mastectomy, axillary lymphadenopathy appreciated on clinical exam.  Abdominal:      General: Abdomen is flat.      Palpations: Abdomen is soft.   Lymphadenopathy:      Upper Body:      Right upper body: No supraclavicular, axillary or pectoral adenopathy.      Left upper body: Axillary adenopathy present. No supraclavicular or pectoral adenopathy.   Skin:     General: Skin is warm.   Neurological:      General: No focal deficit present.      Mental Status: She is alert and oriented to person, place, and time.   Psychiatric:         Mood and Affect: Mood normal.         Behavior: Behavior normal.        No visits with results within 1 Month(s) from this visit.   Latest known visit with results is:   Admission on 12/16/2024, Discharged on 12/17/2024   Component Date Value Ref Range Status   • Ventricular Rate 12/16/2024 68  BPM Final   • Atrial Rate 12/16/2024 68  BPM Final   • CO Interval 12/16/2024 158  ms Final   • QRSD Interval 12/16/2024 80  ms Final   • QT Interval 12/16/2024 398  ms Final   • QTC Interval 12/16/2024 423  ms Final   • P Axis 12/16/2024 55  degrees Final   • QRS Axis 12/16/2024 6  degrees Final   • T Wave Axis 12/16/2024 83  degrees Final   • WBC 12/16/2024 7.81  4.31 - 10.16 Thousand/uL Final   • RBC 12/16/2024 3.58 (L)  3.81 - 5.12 Million/uL Final   • Hemoglobin 12/16/2024 11.3 (L)  11.5 - 15.4 g/dL Final   • Hematocrit 12/16/2024 32.5 (L)  34.8 - 46.1 % Final   • MCV 12/16/2024 91  82 - 98 fL Final   • MCH 12/16/2024 31.6  26.8 - 34.3 pg Final   • MCHC 12/16/2024 34.8  31.4 - 37.4 g/dL Final   • RDW 12/16/2024 11.8  11.6 - 15.1 % Final   • MPV 12/16/2024 10.6  8.9 - 12.7 fL Final   • Platelets 12/16/2024 151  149 - 390 Thousands/uL Final   • nRBC 12/16/2024 0  /100 WBCs Final   • Segmented % 12/16/2024 64  43 - 75 % Final   • Immature Grans % 12/16/2024 0  0 - 2 % Final   • Lymphocytes % 12/16/2024 24  14 -  "44 % Final   • Monocytes % 12/16/2024 10  4 - 12 % Final   • Eosinophils Relative 12/16/2024 1  0 - 6 % Final   • Basophils Relative 12/16/2024 1  0 - 1 % Final   • Absolute Neutrophils 12/16/2024 5.11  1.85 - 7.62 Thousands/µL Final   • Absolute Immature Grans 12/16/2024 0.02  0.00 - 0.20 Thousand/uL Final   • Absolute Lymphocytes 12/16/2024 1.85  0.60 - 4.47 Thousands/µL Final   • Absolute Monocytes 12/16/2024 0.75  0.17 - 1.22 Thousand/µL Final   • Eosinophils Absolute 12/16/2024 0.04  0.00 - 0.61 Thousand/µL Final   • Basophils Absolute 12/16/2024 0.04  0.00 - 0.10 Thousands/µL Final   • Sodium 12/16/2024 137  135 - 147 mmol/L Final   • Potassium 12/16/2024 4.4  3.5 - 5.3 mmol/L Final   • Chloride 12/16/2024 107  96 - 108 mmol/L Final   • CO2 12/16/2024 23  21 - 32 mmol/L Final   • ANION GAP 12/16/2024 7  4 - 13 mmol/L Final   • BUN 12/16/2024 13  5 - 25 mg/dL Final   • Creatinine 12/16/2024 0.58 (L)  0.60 - 1.30 mg/dL Final    Standardized to IDMS reference method   • Glucose 12/16/2024 125  65 - 140 mg/dL Final    If the patient is fasting, the ADA then defines impaired fasting glucose as > 100 mg/dL and diabetes as > or equal to 123 mg/dL.   • Calcium 12/16/2024 8.8  8.4 - 10.2 mg/dL Final   • eGFR 12/16/2024 88  ml/min/1.73sq m Final   • hs TnI 0hr 12/16/2024 16  \"Refer to ACS Flowchart\"- see link ng/L Final    Comment:                                              Initial (time 0) result  If >=50 ng/L, Myocardial injury suggested ;  Type of myocardial injury and treatment strategy  to be determined.  If 5-49 ng/L, a delta result at 2 hours and or 4 hours will be needed to further evaluate.  If <4 ng/L, and chest pain has been >3 hours since onset, patient may qualify for discharge based on the HEART score in the ED.  If <5 ng/L and <3hours since onset of chest pain, a delta result at 2 hours will be needed to further evaluate.    HS Troponin 99th Percentile URL of a Health Population=12 ng/L with a 95% " "Confidence Interval of 8-18 ng/L.    Second Troponin (time 2 hours)  If calculated delta >= 20 ng/L,  Myocardial injury suggested ; Type of myocardial injury and treatment strategy to be determined.  If 5-49 ng/L and the calculated delta is 5-19 ng/L, consult medical service for evaluation.  Continue evaluation for ischemia on ecg and other possible etiology and repeat hs troponin at 4 hours.  If delta                            is <5 ng/L at 2 hours, consider discharge based on risk stratification via the HEART score (if in ED), or BRENT risk score in IP/Observation.    HS Troponin 99th Percentile URL of a Health Population=12 ng/L with a 95% Confidence Interval of 8-18 ng/L.   • hs TnI 2hr 12/16/2024 18  \"Refer to ACS Flowchart\"- see link ng/L Final    Comment:                                              Initial (time 0) result  If >=50 ng/L, Myocardial injury suggested ;  Type of myocardial injury and treatment strategy  to be determined.  If 5-49 ng/L, a delta result at 2 hours and or 4 hours will be needed to further evaluate.  If <4 ng/L, and chest pain has been >3 hours since onset, patient may qualify for discharge based on the HEART score in the ED.  If <5 ng/L and <3hours since onset of chest pain, a delta result at 2 hours will be needed to further evaluate.    HS Troponin 99th Percentile URL of a Health Population=12 ng/L with a 95% Confidence Interval of 8-18 ng/L.    Second Troponin (time 2 hours)  If calculated delta >= 20 ng/L,  Myocardial injury suggested ; Type of myocardial injury and treatment strategy to be determined.  If 5-49 ng/L and the calculated delta is 5-19 ng/L, consult medical service for evaluation.  Continue evaluation for ischemia on ecg and other possible etiology and repeat hs troponin at 4 hours.  If delta                            is <5 ng/L at 2 hours, consider discharge based on risk stratification via the HEART score (if in ED), or BRENT risk score in IP/Observation.    HS " "Troponin 99th Percentile URL of a Health Population=12 ng/L with a 95% Confidence Interval of 8-18 ng/L.   • Delta 2hr hsTnI 12/16/2024 2  <20 ng/L Final   • hs TnI 4hr 12/16/2024 16  \"Refer to ACS Flowchart\"- see link ng/L Final    Comment:                                              Initial (time 0) result  If >=50 ng/L, Myocardial injury suggested ;  Type of myocardial injury and treatment strategy  to be determined.  If 5-49 ng/L, a delta result at 2 hours and or 4 hours will be needed to further evaluate.  If <4 ng/L, and chest pain has been >3 hours since onset, patient may qualify for discharge based on the HEART score in the ED.  If <5 ng/L and <3hours since onset of chest pain, a delta result at 2 hours will be needed to further evaluate.    HS Troponin 99th Percentile URL of a Health Population=12 ng/L with a 95% Confidence Interval of 8-18 ng/L.    Second Troponin (time 2 hours)  If calculated delta >= 20 ng/L,  Myocardial injury suggested ; Type of myocardial injury and treatment strategy to be determined.  If 5-49 ng/L and the calculated delta is 5-19 ng/L, consult medical service for evaluation.  Continue evaluation for ischemia on ecg and other possible etiology and repeat hs troponin at 4 hours.  If delta                            is <5 ng/L at 2 hours, consider discharge based on risk stratification via the HEART score (if in ED), or BRENT risk score in IP/Observation.    HS Troponin 99th Percentile URL of a Health Population=12 ng/L with a 95% Confidence Interval of 8-18 ng/L.   • Delta 4hr hsTnI 12/16/2024 0  <20 ng/L Final   • BSA 12/17/2024 1.7  m2 Final   • A4C EF 12/17/2024 70  % Final   • LVIDd 12/17/2024 4.30  cm Final   • LVIDS 12/17/2024 3.10  cm Final   • IVSd 12/17/2024 1.10  cm Final   • LVPWd 12/17/2024 1.10  cm Final   • LVOT diameter 12/17/2024 1.8  cm Final   • FS 12/17/2024 28  28 - 44 Final   • MV E' Tissue Velocity Septal 12/17/2024 5  cm/s Final   • LA Volume Index (BP) " 12/17/2024 60.0  mL/m2 Final   • E/A ratio 12/17/2024 1.21   Final   • E wave deceleration time 12/17/2024 208  ms Final   • MV Peak E Zac 12/17/2024 115  cm/s Final   • MV Peak A Zac 12/17/2024 0.95  m/s Final   • AV LVOT peak gradient 12/17/2024 2  mmHg Final   • RVID d 12/17/2024 4.2  cm Final   • Tricuspid annular plane systolic e* 12/17/2024 1.60  cm Final   • LA size 12/17/2024 4.2  cm Final   • LA length (A2C) 12/17/2024 5.90  cm Final   • LA volume (BP) 12/17/2024 102  mL Final   • RAA A4C 12/17/2024 19.7  cm2 Final   • AV peak gradient 12/17/2024 5  mmHg Final   • MV VTI RETROGRADE 12/17/2024 214.1  cm Final   • MV stenosis pressure 1/2 time 12/17/2024 60  ms Final   • MV valve area p 1/2 method 12/17/2024 3.67   Final   • MV mean gradient retrograde 12/17/2024 89  mmHg Final   • MR PG 12/17/2024 137  mmHg Final   • TR Peak Zac 12/17/2024 2.9  m/s Final   • Triscuspid Valve Regurgitation Pea* 12/17/2024 33.0  mmHg Final   • Ao root 12/17/2024 3.30  cm Final   • Mitral regurgitation peak velocity 12/17/2024 5.85  m/s Final   • Mitral valve mean inflow velocity 12/17/2024 4.54  m/s Final   • Tricuspid valve peak regurgitation* 12/17/2024 2.86  m/s Final   • Left ventricular stroke volume (2D) 12/17/2024 47.00  mL Final   • IVS 12/17/2024 1.1  cm Final   • LEFT VENTRICLE SYSTOLIC VOLUME (MO* 12/17/2024 37  mL Final   • LV DIASTOLIC VOLUME (MOD BIPLANE) * 12/17/2024 84  mL Final   • Left Atrium Area-systolic Four Noelle* 12/17/2024 26.2  cm2 Final   • Left Atrium Area-systolic Apical T* 12/17/2024 29.1  cm2 Final   • LVSV, 2D 12/17/2024 47  mL Final   • LVOT area 12/17/2024 2.54  cm2 Final   • LV EF 12/17/2024 60   Final   • Est. RA pres 12/17/2024 8.0  mmHg Final   • Right Ventricular Peak Systolic Pr* 12/17/2024 41.00  mmHg Final   • POC Glucose 12/16/2024 117  65 - 140 mg/dl Final   • POC Glucose 12/17/2024 112  65 - 140 mg/dl Final   • WBC 12/17/2024 8.06  4.31 - 10.16 Thousand/uL Final   • RBC 12/17/2024 3.65  (L)  3.81 - 5.12 Million/uL Final   • Hemoglobin 12/17/2024 11.5  11.5 - 15.4 g/dL Final   • Hematocrit 12/17/2024 33.3 (L)  34.8 - 46.1 % Final   • MCV 12/17/2024 91  82 - 98 fL Final   • MCH 12/17/2024 31.5  26.8 - 34.3 pg Final   • MCHC 12/17/2024 34.5  31.4 - 37.4 g/dL Final   • RDW 12/17/2024 11.9  11.6 - 15.1 % Final   • Platelets 12/17/2024 157  149 - 390 Thousands/uL Final   • MPV 12/17/2024 10.7  8.9 - 12.7 fL Final   • Sodium 12/17/2024 136  135 - 147 mmol/L Final   • Potassium 12/17/2024 3.9  3.5 - 5.3 mmol/L Final   • Chloride 12/17/2024 105  96 - 108 mmol/L Final   • CO2 12/17/2024 26  21 - 32 mmol/L Final   • ANION GAP 12/17/2024 5  4 - 13 mmol/L Final   • BUN 12/17/2024 11  5 - 25 mg/dL Final   • Creatinine 12/17/2024 0.58 (L)  0.60 - 1.30 mg/dL Final    Standardized to IDMS reference method   • Glucose 12/17/2024 173 (H)  65 - 140 mg/dL Final    If the patient is fasting, the ADA then defines impaired fasting glucose as > 100 mg/dL and diabetes as > or equal to 123 mg/dL.   • Calcium 12/17/2024 8.6  8.4 - 10.2 mg/dL Final   • eGFR 12/17/2024 88  ml/min/1.73sq m Final   • Rest Nuclear Isotope Dose 12/17/2024 10.74  mCi Final   • Stress Nuclear Isotope Dose 12/17/2024 33.00  mCi Final   • Baseline HR 12/17/2024 66  bpm Final   • Baseline BP 12/17/2024 170/72  mmHg Final   • O2 sat rest 12/17/2024 98  % Final   • Stress peak HR 12/17/2024 75  bpm Final   • Post peak BP 12/17/2024 186  mmHg Final   • O2 sat peak 12/17/2024 99  % Final   • Recovery HR 12/17/2024 72  bpm Final   • Recovery BP 12/17/2024 162/72  mmHg Final   • O2 sat recovery 12/17/2024 98  % Final   • Max HR 12/17/2024 110  bpm Final   • Max HR Percent 12/17/2024 77  % Final   • Exercise duration (min) 12/17/2024 3  min Final   • Estimated workload 12/17/2024 1.7  METS Final   • Rate Pressure Product 12/17/2024 13,950.0   Final   • Angina Index 12/17/2024 0   Final   • EF (%) 12/17/2024 66  % Final   • Protocol Name 12/17/2024 PARKER WALK    Final   • Exercise duration (min) 12/17/2024 3  min Final   • Exercise duration (sec) 12/17/2024 0  sec Final   • Post Peak Systolic BP 12/17/2024 186  mmHg Final   • Max Diastolic Bp 12/17/2024 82  mmHg Final   • Peak HR 12/17/2024 110  BPM Final   • Max Predicted Heart Rate 12/17/2024 142  BPM Final   • Reason for Termination 12/17/2024 Protocol Complete   Final   • Test Indication 12/17/2024 Chest Discomfort   Final   • Target Hr Formular 12/17/2024 (220 - Age)*100%   Final   • Chest Pain Statement 12/17/2024 none   Final   • POC Glucose 12/17/2024 112  65 - 140 mg/dl Final

## 2025-03-12 NOTE — ASSESSMENT & PLAN NOTE
Patient is a 79-year-old female presenting today for a follow-up for left breast cancer diagnosed in March 2021. Pathology revealed invasive mammary carcinoma ER/[place]+, HER2-, with extensive DCIS (hormone +). She had genetic testing which revealed a CHEK2 mutation. She had a left breast mastectomy with sentinel node biopsy with Dr. Montejo. She is currently on anastrozole. I will get her scheduled for screening mammo in July. On clinical exam, I was able to appreciate fullness/possible left sided axillary lymphadenopathy. There were no concerns on her breasts. Given history of left sided breast cancer, I am going to order diagnostic u/s for further evaluation. She denies persistent headaches, bone pain, back pain, shortness of breath, or abdominal pain. She states she lost 60 lbs over the last 2 years. I will see the patient back in 1 year or sooner should the need arise. She was instructed to call with any questions or concerns prior to this time. All questions were answered today.

## 2025-03-12 NOTE — ASSESSMENT & PLAN NOTE
- due to updated NCCN guidelines, there is no longer an increased risk for colon cancer secondary to the CHEK 2 genetic mutation

## 2025-03-16 NOTE — ASSESSMENT & PLAN NOTE
No recent chest pain or sob  Continue current meds  Saw Cardiology in March 2022  Patient sent from [facility] for allergic Reaction due to diaper cream, patient wAS SEEN 2 X YESTERDAY AT 2 [facility]FFER[facility] HOSPITAL YESTERDAY/no rashes noted at this time /safety maintained /side raisl up /

## 2025-03-17 ENCOUNTER — APPOINTMENT (OUTPATIENT)
Dept: LAB | Facility: CLINIC | Age: 80
End: 2025-03-17
Payer: COMMERCIAL

## 2025-03-17 DIAGNOSIS — E11.9 TYPE 2 DIABETES MELLITUS WITHOUT COMPLICATION, WITHOUT LONG-TERM CURRENT USE OF INSULIN (HCC): ICD-10-CM

## 2025-03-17 DIAGNOSIS — Z85.3 PERSONAL HISTORY OF MALIGNANT NEOPLASM OF BREAST: ICD-10-CM

## 2025-03-17 DIAGNOSIS — R41.0 CONFUSION: ICD-10-CM

## 2025-03-17 DIAGNOSIS — E03.9 ACQUIRED HYPOTHYROIDISM: ICD-10-CM

## 2025-03-17 LAB
ALBUMIN SERPL BCG-MCNC: 3.9 G/DL (ref 3.5–5)
ALP SERPL-CCNC: 48 U/L (ref 34–104)
ALT SERPL W P-5'-P-CCNC: 18 U/L (ref 7–52)
ANION GAP SERPL CALCULATED.3IONS-SCNC: 6 MMOL/L (ref 4–13)
AST SERPL W P-5'-P-CCNC: 18 U/L (ref 13–39)
BASOPHILS # BLD AUTO: 0.04 THOUSANDS/ÂΜL (ref 0–0.1)
BASOPHILS NFR BLD AUTO: 1 % (ref 0–1)
BILIRUB SERPL-MCNC: 0.76 MG/DL (ref 0.2–1)
BUN SERPL-MCNC: 15 MG/DL (ref 5–25)
CALCIUM SERPL-MCNC: 8.9 MG/DL (ref 8.4–10.2)
CHLORIDE SERPL-SCNC: 105 MMOL/L (ref 96–108)
CO2 SERPL-SCNC: 28 MMOL/L (ref 21–32)
CREAT SERPL-MCNC: 0.62 MG/DL (ref 0.6–1.3)
EOSINOPHIL # BLD AUTO: 0.04 THOUSAND/ÂΜL (ref 0–0.61)
EOSINOPHIL NFR BLD AUTO: 1 % (ref 0–6)
ERYTHROCYTE [DISTWIDTH] IN BLOOD BY AUTOMATED COUNT: 11.9 % (ref 11.6–15.1)
GFR SERPL CREATININE-BSD FRML MDRD: 86 ML/MIN/1.73SQ M
GLUCOSE SERPL-MCNC: 152 MG/DL (ref 65–140)
HCT VFR BLD AUTO: 35.3 % (ref 34.8–46.1)
HGB BLD-MCNC: 11.9 G/DL (ref 11.5–15.4)
IMM GRANULOCYTES # BLD AUTO: 0.01 THOUSAND/UL (ref 0–0.2)
IMM GRANULOCYTES NFR BLD AUTO: 0 % (ref 0–2)
LYMPHOCYTES # BLD AUTO: 1.19 THOUSANDS/ÂΜL (ref 0.6–4.47)
LYMPHOCYTES NFR BLD AUTO: 21 % (ref 14–44)
MCH RBC QN AUTO: 31.5 PG (ref 26.8–34.3)
MCHC RBC AUTO-ENTMCNC: 33.7 G/DL (ref 31.4–37.4)
MCV RBC AUTO: 93 FL (ref 82–98)
MONOCYTES # BLD AUTO: 0.44 THOUSAND/ÂΜL (ref 0.17–1.22)
MONOCYTES NFR BLD AUTO: 8 % (ref 4–12)
NEUTROPHILS # BLD AUTO: 3.88 THOUSANDS/ÂΜL (ref 1.85–7.62)
NEUTS SEG NFR BLD AUTO: 69 % (ref 43–75)
NRBC BLD AUTO-RTO: 0 /100 WBCS
PLATELET # BLD AUTO: 169 THOUSANDS/UL (ref 149–390)
PMV BLD AUTO: 11.3 FL (ref 8.9–12.7)
POTASSIUM SERPL-SCNC: 4.2 MMOL/L (ref 3.5–5.3)
PROT SERPL-MCNC: 6.1 G/DL (ref 6.4–8.4)
RBC # BLD AUTO: 3.78 MILLION/UL (ref 3.81–5.12)
SODIUM SERPL-SCNC: 139 MMOL/L (ref 135–147)
WBC # BLD AUTO: 5.6 THOUSAND/UL (ref 4.31–10.16)

## 2025-03-17 PROCEDURE — 84443 ASSAY THYROID STIM HORMONE: CPT

## 2025-03-17 PROCEDURE — 85025 COMPLETE CBC W/AUTO DIFF WBC: CPT

## 2025-03-17 PROCEDURE — 84439 ASSAY OF FREE THYROXINE: CPT

## 2025-03-17 PROCEDURE — 36415 COLL VENOUS BLD VENIPUNCTURE: CPT

## 2025-03-17 PROCEDURE — 80053 COMPREHEN METABOLIC PANEL: CPT

## 2025-03-17 RX ORDER — LANCETS 33 GAUGE
EACH MISCELLANEOUS
Qty: 100 EACH | Refills: 1 | Status: SHIPPED | OUTPATIENT
Start: 2025-03-17

## 2025-03-17 RX ORDER — LEVOTHYROXINE SODIUM 88 UG/1
88 TABLET ORAL DAILY
Qty: 30 TABLET | Refills: 0 | Status: SHIPPED | OUTPATIENT
Start: 2025-03-17

## 2025-03-17 NOTE — TELEPHONE ENCOUNTER
Patient states she will go for blood work either today or tomorrow.      Reason for call:   [x] Refill   [] Prior Auth  [] Other:     Office:   [x] PCP/Provider -   [] Specialty/Provider -     Medication:     Levothyroxine 88 mcg tablet taken by mouth once daily #90 tabs     OneTouch Delica Plus Lancets 33G. Use once a day to check blood sugar #100 each     Pharmacy: [address] [phone]     Local Pharmacy   Does the patient have enough for 3 days?   [] Yes   [x] No - Send as HP to POD    Mail Away Pharmacy   Does the patient have enough for 10 days?   [] Yes   [] No - Send as HP to POD

## 2025-03-18 ENCOUNTER — HOSPITAL ENCOUNTER (EMERGENCY)
Facility: HOSPITAL | Age: 80
Discharge: HOME | End: 2025-03-18
Attending: EMERGENCY MEDICINE | Admitting: EMERGENCY MEDICINE
Payer: COMMERCIAL

## 2025-03-18 ENCOUNTER — RESULTS FOLLOW-UP (OUTPATIENT)
Dept: CARDIOLOGY CLINIC | Facility: CLINIC | Age: 80
End: 2025-03-18

## 2025-03-18 VITALS
OXYGEN SATURATION: 100 % | SYSTOLIC BLOOD PRESSURE: 145 MMHG | TEMPERATURE: 98 F | DIASTOLIC BLOOD PRESSURE: 61 MMHG | RESPIRATION RATE: 20 BRPM | HEART RATE: 67 BPM

## 2025-03-18 DIAGNOSIS — R10.9 RIGHT FLANK PAIN: Primary | ICD-10-CM

## 2025-03-18 DIAGNOSIS — N39.0 UTI (URINARY TRACT INFECTION): ICD-10-CM

## 2025-03-18 LAB
AMORPH URATE CRY URNS QL MICRO: ABNORMAL
BACTERIA UR QL AUTO: ABNORMAL /HPF
BILIRUB UR QL STRIP: NEGATIVE
CLARITY UR: ABNORMAL
COLOR UR: YELLOW
GLUCOSE UR STRIP-MCNC: NEGATIVE MG/DL
HGB UR QL STRIP.AUTO: NEGATIVE
KETONES UR STRIP-MCNC: NEGATIVE MG/DL
LEUKOCYTE ESTERASE UR QL STRIP: ABNORMAL
NITRITE UR QL STRIP: NEGATIVE
NON-SQ EPI CELLS URNS QL MICRO: ABNORMAL /HPF
PH UR STRIP.AUTO: 8 [PH]
PROT UR STRIP-MCNC: NEGATIVE MG/DL
RBC #/AREA URNS AUTO: ABNORMAL /HPF
SP GR UR STRIP.AUTO: 1.02 (ref 1–1.03)
T4 FREE SERPL-MCNC: 0.96 NG/DL (ref 0.61–1.12)
TSH SERPL DL<=0.05 MIU/L-ACNC: 0.3 UIU/ML (ref 0.45–4.5)
UROBILINOGEN UR STRIP-ACNC: <2 MG/DL
WBC #/AREA URNS AUTO: ABNORMAL /HPF

## 2025-03-18 PROCEDURE — 99284 EMERGENCY DEPT VISIT MOD MDM: CPT

## 2025-03-18 PROCEDURE — 87077 CULTURE AEROBIC IDENTIFY: CPT | Performed by: EMERGENCY MEDICINE

## 2025-03-18 PROCEDURE — 99285 EMERGENCY DEPT VISIT HI MDM: CPT | Performed by: EMERGENCY MEDICINE

## 2025-03-18 PROCEDURE — 87186 SC STD MICRODIL/AGAR DIL: CPT | Performed by: EMERGENCY MEDICINE

## 2025-03-18 PROCEDURE — 81001 URINALYSIS AUTO W/SCOPE: CPT | Performed by: EMERGENCY MEDICINE

## 2025-03-18 PROCEDURE — 87086 URINE CULTURE/COLONY COUNT: CPT | Performed by: EMERGENCY MEDICINE

## 2025-03-18 RX ORDER — LIDOCAINE 50 MG/G
1 PATCH TOPICAL DAILY
Qty: 15 PATCH | Refills: 0 | Status: SHIPPED | OUTPATIENT
Start: 2025-03-18

## 2025-03-18 RX ORDER — CEPHALEXIN 500 MG/1
500 CAPSULE ORAL ONCE
Status: COMPLETED | OUTPATIENT
Start: 2025-03-18 | End: 2025-03-18

## 2025-03-18 RX ORDER — ACETAMINOPHEN 500 MG
1000 TABLET ORAL EVERY 8 HOURS PRN
Qty: 44 TABLET | Refills: 0 | Status: SHIPPED | OUTPATIENT
Start: 2025-03-18

## 2025-03-18 RX ORDER — DICYCLOMINE HYDROCHLORIDE 10 MG/1
20 CAPSULE ORAL ONCE
Status: COMPLETED | OUTPATIENT
Start: 2025-03-18 | End: 2025-03-18

## 2025-03-18 RX ORDER — CEPHALEXIN 500 MG/1
500 CAPSULE ORAL EVERY 6 HOURS SCHEDULED
Qty: 40 CAPSULE | Refills: 0 | Status: SHIPPED | OUTPATIENT
Start: 2025-03-18 | End: 2025-03-28

## 2025-03-18 RX ORDER — ACETAMINOPHEN 325 MG/1
975 TABLET ORAL ONCE
Status: COMPLETED | OUTPATIENT
Start: 2025-03-18 | End: 2025-03-18

## 2025-03-18 RX ORDER — ONDANSETRON 4 MG/1
4 TABLET, ORALLY DISINTEGRATING ORAL EVERY 8 HOURS PRN
Qty: 20 TABLET | Refills: 0 | Status: SHIPPED | OUTPATIENT
Start: 2025-03-18

## 2025-03-18 RX ADMIN — CEPHALEXIN 500 MG: 500 CAPSULE ORAL at 04:00

## 2025-03-18 RX ADMIN — ACETAMINOPHEN 975 MG: 325 TABLET ORAL at 02:48

## 2025-03-18 RX ADMIN — DICYCLOMINE HYDROCHLORIDE 20 MG: 10 CAPSULE ORAL at 02:49

## 2025-03-18 NOTE — ED PROVIDER NOTES
Final Diagnosis:  1. Right flank pain    2. UTI (urinary tract infection)        Chief Complaint   Patient presents with    Abdominal Pain     Pt. Reports right sided abd pain. That she thought is maybe gas pain. Pt. Has no gallbladder or appendix. Went for blood work this AM @  lab.        HPI  Pt pres w some right side into suprapubic discomfort.    Does get UTI but usually symptom is just confusion.    Had blood work that was largely unremarkable yest.    Has had some flatulence thought maybe gas pains. \    S/p cholecystectomy and appendectomy years ago.     No fevers n/v stool changes.   EMS additionally reports:     - Previous charting underwent limited review with attention to last ED visits, labs, ekgs, and prior imaging.  Chart review reveals :     Appointment on 03/17/2025   Component Date Value Ref Range Status    WBC 03/17/2025 5.60  4.31 - 10.16 Thousand/uL Final    RBC 03/17/2025 3.78 (L)  3.81 - 5.12 Million/uL Final    Hemoglobin 03/17/2025 11.9  11.5 - 15.4 g/dL Final    Hematocrit 03/17/2025 35.3  34.8 - 46.1 % Final    MCV 03/17/2025 93  82 - 98 fL Final    MCH 03/17/2025 31.5  26.8 - 34.3 pg Final    MCHC 03/17/2025 33.7  31.4 - 37.4 g/dL Final    RDW 03/17/2025 11.9  11.6 - 15.1 % Final    MPV 03/17/2025 11.3  8.9 - 12.7 fL Final    Platelets 03/17/2025 169  149 - 390 Thousands/uL Final    nRBC 03/17/2025 0  /100 WBCs Final    Segmented % 03/17/2025 69  43 - 75 % Final    Immature Grans % 03/17/2025 0  0 - 2 % Final    Lymphocytes % 03/17/2025 21  14 - 44 % Final    Monocytes % 03/17/2025 8  4 - 12 % Final    Eosinophils Relative 03/17/2025 1  0 - 6 % Final    Basophils Relative 03/17/2025 1  0 - 1 % Final    Absolute Neutrophils 03/17/2025 3.88  1.85 - 7.62 Thousands/µL Final    Absolute Immature Grans 03/17/2025 0.01  0.00 - 0.20 Thousand/uL Final    Absolute Lymphocytes 03/17/2025 1.19  0.60 - 4.47 Thousands/µL Final    Absolute Monocytes 03/17/2025 0.44  0.17 - 1.22 Thousand/µL Final     Eosinophils Absolute 03/17/2025 0.04  0.00 - 0.61 Thousand/µL Final    Basophils Absolute 03/17/2025 0.04  0.00 - 0.10 Thousands/µL Final    Sodium 03/17/2025 139  135 - 147 mmol/L Final    Potassium 03/17/2025 4.2  3.5 - 5.3 mmol/L Final    Chloride 03/17/2025 105  96 - 108 mmol/L Final    CO2 03/17/2025 28  21 - 32 mmol/L Final    ANION GAP 03/17/2025 6  4 - 13 mmol/L Final    BUN 03/17/2025 15  5 - 25 mg/dL Final    Creatinine 03/17/2025 0.62  0.60 - 1.30 mg/dL Final    Standardized to IDMS reference method    Glucose 03/17/2025 152 (H)  65 - 140 mg/dL Final    If the patient is fasting, the ADA then defines impaired fasting glucose as > 100 mg/dL and diabetes as > or equal to 123 mg/dL.    Calcium 03/17/2025 8.9  8.4 - 10.2 mg/dL Final    AST 03/17/2025 18  13 - 39 U/L Final    ALT 03/17/2025 18  7 - 52 U/L Final    Specimen collection should occur prior to Sulfasalazine administration due to the potential for falsely depressed results.     Alkaline Phosphatase 03/17/2025 48  34 - 104 U/L Final    Total Protein 03/17/2025 6.1 (L)  6.4 - 8.4 g/dL Final    Albumin 03/17/2025 3.9  3.5 - 5.0 g/dL Final    Total Bilirubin 03/17/2025 0.76  0.20 - 1.00 mg/dL Final    Use of this assay is not recommended for patients undergoing treatment with eltrombopag due to the potential for falsely elevated results.  N-acetyl-p-benzoquinone imine (metabolite of Acetaminophen) will generate erroneously low results in samples for patients that have taken an overdose of Acetaminophen.    eGFR 03/17/2025 86  ml/min/1.73sq m Final    TSH 3RD GENERATON 03/17/2025 0.299 (L)  0.450 - 4.500 uIU/mL Final    The recommended reference ranges for TSH during pregnancy are as follows:   First trimester 0.100 to 2.500 uIU/mL   Second trimester  0.200 to 3.000 uIU/mL   Third trimester 0.300 to 3.000 uIU/m    Note: Normal ranges may not apply to patients who are transgender, non-binary, or whose legal sex, sex at birth, and gender identity  differ.  Adult TSH (3rd generation) reference range follows the recommended guidelines of the American Thy[employer] Association, January, 2020.    Free T4 03/17/2025 0.96  0.61 - 1.12 ng/dL Final    Specimens with biotin concentrations > 10 ng/mL can lead to significant (> 10%) positive bias in result.       - No language barrier.   - History obtained from patient    - Discuss patient's care, with patient permission or by chart review, with      PMH:   has a past medical history of Abdominal pain, Angina pectoris (HCC), Arthritis, Breast cancer (HCC) (07/01/2021), Cancer (HCC), Colon polyp, Constipation, Coronary artery disease, Diabetes mellitus (HCC), Diarrhea, Disease of thyroid gland, Hemorrhoids, Hypercholesterolemia, Hypertension, Neuropathy, Obesity, Osteoporosis, Otitis media, Ovarian ca (HCC) (1997), 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HCC), Shingles, 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), Thyroid cancer (HCC), and Urinary tract infection.    PSH:   has a past surgical history that includes Coronary stent placement; Carpal tunnel release; Cholecystectomy; Back surgery; Hysterectomy (1989); Oophorectomy (Bilateral, 1997); Mammo (historical); Eye surgery; Colonoscopy; US guided breast biopsy left complete (Left, 03/15/2021); Appendectomy; Gallbladder surgery; US breast needle loc left (Left, 05/06/2021); US guided breast biopsy left complete (Left, 05/06/2021); US guidance breast biopsy left each additional (Left, 05/06/2021); Spine surgery; Thyroidectomy; Brain surgery (1993); Mastectomy w/ sentinel node biopsy (Left, 07/13/2021); Mastectomy (Left, 07/13/2021); Breast biopsy; Cervical fusion; Band hemorrhoidectomy; Angioplasty; Colonoscopy; Upper gastrointestinal endoscopy; Cardiac catheterization (N/A, 12/7/2022); pr coronary artery byp w/vein & artery graft 2 vein (N/A, 2/8/2023); and Epidural block injection (Left, 8/9/2024).     Social History:  Tobacco Use: Low Risk  (3/18/2025)    Patient History     Smoking  Tobacco Use: Never     Smokeless Tobacco Use: Never     Passive Exposure: Past     Alcohol Use: Not At Risk (12/9/2024)    AUDIT-C     Frequency of Alcohol Consumption: Never     Average Number of Drinks: Patient does not drink     Frequency of Binge Drinking: Never     No illicit use   Here w/ daughter POA    ROS:  Pertinent positives/negatives: .     Some ROS may be present in the HPI and would take precedent over these standard questions asked below.   Review of Systems   Genitourinary:  Positive for flank pain.        CONSTITUTIONAL:  No lethargy. No unexpected weight loss. No change in behavior.  EYES:  No pain, redness, or discharge. No loss of vision. No orbital trauma or pain.   ENT:  No tinnitus or decreased hearing. No epistaxis/purulent rhinorrhea. No voice change, airway closing, trismus.   CARDIOVASCULAR:  No chest pain. No skin mottling or pallor. No change in exertional capacity  RESPIRATORY:  No hemoptysis. No paroxysmal nocturnal dyspnea. No stridor. No apnea or bluing.   GASTROINTESTINAL:  No vomiting, diarrhea. No distension. No melena. No hematochezia.   GENITOURINARY:  No nocturia. No hematuria or foul smelling or cloudy urine. No discharge. No sores/adenopathy.   MUSCULOSKELETAL:  No contracture.  No new deformity.   INTEGUMENTARY:  No swelling. No unexpected contusions. No abrasions. No lymphangitis.  NEUROLOGIC:  No meningismus. No new numbness of the extremities. No new focal weakness. No postural instability  PSYCHIATRIC:  No SI HI AVH  HEMATOLOGICAL:  No bleeding. No petechiae. No bruising.  ALLERGIES:  No urticaria. No sudden abd cramping. No stridor.    PE:     Physical exam highlights:   Physical Exam       Vitals:    03/18/25 0207 03/18/25 0230 03/18/25 0300 03/18/25 0400   BP: 143/100 162/70 146/62 145/61   BP Location: Right arm  Right arm    Pulse: 60 62 63 67   Resp: 18 16 18 20   Temp: 98 °F (36.7 °C)      TempSrc: Oral      SpO2: 100% 100% 100% 100%     Vitals reviewed by me.    Nursing note reviewed  Chaperone present for all sensitive exam.  Const: No acute distress. Alert. Nontoxic. Not diaphoretic.    HEENT: External ears normal. No protrusion drainage swelling. Nose normal. No drainage/traumatic deformity. MM. Mouth with baseline/symmetric movement. No trismus.   Eyes: No squinting. No icterus. No tearing/swelling/drainage. Tracks through the room with normal EOM.   Neck: ROM normal. No rigidity. No meningismus.  Cards: Rate as per vitals Compared to monitor sinus unless documented. Regular Well perfused.  Pulm: Effort and excursion normal. No distress. No audible wheezing/no stridor. Normal resp rate without retraction or change in work of breathing.  Abd: No distension beyond baseline. No fluctuant wave. Patient without peritoneal pain with shifting/bumping the bed. Soft. No formal CVA tenderness.   MSK: ROM normal baseline. No deformity. No contractures from baseline.   Skin: No new rashes visible. Well perfused. No wounds visualized on exposed skin  Neuro: Nonfocal. Baseline. CN grossly intact. Moving all four with coordination.   Psych: Normal behavior and affect.        A:  - Nursing note reviewed.    Ddx and MDM  Considered diagnoses    Offer repeat labs but unlikely to reveal acute issue    I discuss my usual practice of CT to r/o diverticulitis.     However, they would like urine check first, and it reveals a urinary tract infection that can account for symptoms.    We discuss and opt for treatment with prolonged abx as if ascending UTI and f/u culture, return prec.         Dispo decision       My conversation with consultant reveals:        Decision rules:                    ED Course as of 03/18/25 0544   Tue Mar 18, 2025   0210 Procedure Note: EKG  Date/Time: 03/18/25 2:11 AM   Interpreted by: Armaan Rashid  Indications / Diagnosis: abd pain   ECG reviewed by me, the ED Provider: yes   The EKG demonstrates:  Rhythm: sinus    Intervals: normal intervals  Axis: normal  axis  QRS/Blocks: normal QRS  ST Changes: No acute ST Changes, no STD/AISHA.  T wave changes: none             My read of the XR/CT scan reveals:    No orders to display       Orders Placed This Encounter   Procedures    Urine culture    UA (URINE) with reflex to Scope    Urine Microscopic    Nursing Communication Get the urine     Labs Reviewed   URINALYSIS WITH REFLEX TO SCOPE - Abnormal       Result Value Ref Range Status    Color, UA Yellow   Final    Clarity, UA Cloudy   Final    Specific Gravity, UA 1.020  1.005 - 1.030 Final    pH, UA 8.0  4.5, 5.0, 5.5, 6.0, 6.5, 7.0, 7.5, 8.0 Final    Leukocytes, UA Moderate (*) Negative Final    Nitrite, UA Negative  Negative Final    Protein, UA Negative  Negative mg/dl Final    Glucose, UA Negative  Negative mg/dl Final    Ketones, UA Negative  Negative mg/dl Final    Urobilinogen, UA <2.0  <2.0 mg/dl mg/dl Final    Bilirubin, UA Negative  Negative Final    Occult Blood, UA Negative  Negative Final   URINE MICROSCOPIC - Abnormal    RBC, UA None Seen  None Seen, 0-1, 1-2, 2-4, 0-5 /hpf Final    WBC, UA 4-10 (*) None Seen, 0-1, 1-2, 0-5, 2-4 /hpf Final    Epithelial Cells None Seen  None Seen, Occasional /hpf Final    Bacteria, UA Moderate (*) None Seen, Occasional /hpf Final    Amorphous Crystals, UA Moderate   Final   URINE CULTURE       *Each of these labs was reviewed. Particular standout labs will be noted in the ED Course above     Final Diagnosis:  1. Right flank pain    2. UTI (urinary tract infection)          P:  - hospital tx includes   Medications   acetaminophen (TYLENOL) tablet 975 mg (975 mg Oral Given 3/18/25 0248)   dicyclomine (BENTYL) capsule 20 mg (20 mg Oral Given 3/18/25 0249)   cephalexin (KEFLEX) capsule 500 mg (500 mg Oral Given 3/18/25 0400)         - disposition  Time reflects when diagnosis was documented in both MDM as applicable and the Disposition within this note       Time User Action Codes Description Comment    3/18/2025  3:51 AM Rachid  Armaan Menjivar [R10.9] Right flank pain     3/18/2025  3:51 AM Armaan Rashid Add [N39.0] UTI (urinary tract infection)           ED Disposition       ED Disposition   Discharge    Condition   Stable    Date/Time   Tue Mar 18, 2025  3:51 AM    Comment   Delfina Villa discharge to home/self care.                   Follow-up Information       Follow up With Specialties Details Why Contact Info    Rogerio Mancera MD Family Medicine   [facility] [address]  [phone]              - patient will call their PCP to let them know they were in the emergency department. We discuss return precautions and patient is agreeable with plan and aformentioned disposition.       - additional treatment intended, if consistent with primary provider:  - patient to follow with :      Discharge Medication List as of 3/18/2025  3:52 AM        START taking these medications    Details   acetaminophen (TYLENOL) 500 mg tablet Take 2 tablets (1,000 mg total) by mouth every 8 (eight) hours as needed for mild pain, Starting Tue 3/18/2025, Normal      cephalexin (KEFLEX) 500 mg capsule Take 1 capsule (500 mg total) by mouth every 6 (six) hours for 10 days, Starting Tue 3/18/2025, Until Fri 3/28/2025, Normal      lidocaine (Lidoderm) 5 % Apply 1 patch topically over 12 hours daily Remove & Discard patch within 12 hours or as directed by MD, Starting Tue 3/18/2025, Normal      ondansetron (ZOFRAN-ODT) 4 mg disintegrating tablet Take 1 tablet (4 mg total) by mouth every 8 (eight) hours as needed for nausea or vomiting, Starting Tue 3/18/2025, Normal           CONTINUE these medications which have NOT CHANGED    Details   anastrozole (ARIMIDEX) 1 mg tablet TAKE ONE TABLET BY MOUTH EVERY DAY., Normal      Aspirin 81 MG CAPS 2 (two) times a day, Historical Med      atorvastatin (LIPITOR) 40 mg tablet Take 1 tablet (40 mg total) by mouth daily, Starting Tue 2/4/2025, Normal      hydrocortisone (ANUSOL-HC) 2.5 % rectal cream  Apply topically 4 times a day for 20 doses, Starting 2024, Until Sun 2024, Normal      Insulin Pen Needle (Sure Comfort Pen Needles) 31G X 5 MM MISC by Does not apply route daily, Starting u 2020, Normal      Lancets (OneTouch Delica Plus [id number]) MISC Use once a day to check blood sugar, Normal      levothyroxine 88 mcg tablet Take 1 tablet (88 mcg total) by mouth daily, Starting Mon 3/17/2025, Normal      liraglutide (VICTOZA) injection Inject 0.2 mL (1.2 mg total) under the skin daily at bedtime, Starting 2024, Until 2024, Normal      losartan (COZAAR) 25 mg tablet Take 1 tablet (25 mg total) by mouth daily, Starting Tue 3/4/2025, Normal      metFORMIN (GLUCOPHAGE) 500 mg tablet Take 500 mg by mouth 2 (two) times a day with meals, Historical Med      metoprolol tartrate (LOPRESSOR) 25 mg tablet Take 0.5 tablets (12.5 mg total) by mouth every 12 (twelve) hours, Starting 2025, Normal      OneTouch Ultra test strip Use 1 each daily Use as instructed, Starting 2025, Normal      pregabalin (LYRICA) 75 mg capsule Take 1 capsule (75 mg total) by mouth 2 (two) times a day, Starting 2024, Normal           No discharge procedures on file.  Prior to Admission Medications   Prescriptions Last Dose Informant Patient Reported? Taking?   Aspirin 81 MG CAPS  Self Yes No   Si (two) times a day   Insulin Pen Needle (Sure Comfort Pen Needles) 31G X 5 MM MISC  Self No No   Sig: by Does not apply route daily   Patient not taking: Reported on 3/12/2025   Lancets (OneTouch Delica Plus [id number]) MISC   No No   Sig: Use once a day to check blood sugar   OneTouch Ultra test strip  Self No No   Sig: Use 1 each daily Use as instructed   anastrozole (ARIMIDEX) 1 mg tablet  Self No No   Sig: TAKE ONE TABLET BY MOUTH EVERY DAY.   atorvastatin (LIPITOR) 40 mg tablet  Self No No   Sig: Take 1 tablet (40 mg total) by mouth daily   hydrocortisone (ANUSOL-HC) 2.5 % rectal  "cream   No No   Sig: Apply topically 4 times a day for 20 doses   Patient not taking: Reported on 3/12/2025   levothyroxine 88 mcg tablet   No No   Sig: Take 1 tablet (88 mcg total) by mouth daily   liraglutide (VICTOZA) injection   No No   Sig: Inject 0.2 mL (1.2 mg total) under the skin daily at bedtime   Patient not taking: Reported on 3/12/2025   losartan (COZAAR) 25 mg tablet  Self No No   Sig: Take 1 tablet (25 mg total) by mouth daily   metFORMIN (GLUCOPHAGE) 500 mg tablet  Self Yes No   Sig: Take 500 mg by mouth 2 (two) times a day with meals   metoprolol tartrate (LOPRESSOR) 25 mg tablet  Self No No   Sig: Take 0.5 tablets (12.5 mg total) by mouth every 12 (twelve) hours   pregabalin (LYRICA) 75 mg capsule  Self No No   Sig: Take 1 capsule (75 mg total) by mouth 2 (two) times a day      Facility-Administered Medications: None       Portions of the record may have been created with voice recognition software. Occasional wrong word or \"sound a like\" substitutions may have occurred due to the inherent limitations of voice recognition software. Read the chart carefully and recognize, using context, where substitutions have occurred.    Electronically signed by:  MD Armaan Barber MD  03/18/25 0547    "

## 2025-03-19 ENCOUNTER — TELEPHONE (OUTPATIENT)
Dept: RADIOLOGY | Facility: HOSPITAL | Age: 80
End: 2025-03-19

## 2025-03-19 ENCOUNTER — RESULTS FOLLOW-UP (OUTPATIENT)
Dept: EMERGENCY DEPT | Facility: HOSPITAL | Age: 80
End: 2025-03-19

## 2025-03-20 LAB — BACTERIA UR CULT: ABNORMAL

## 2025-03-26 NOTE — TELEPHONE ENCOUNTER
Patient returned the call today. I gave her Diana Cordero's message regarding blood work results . She verbalized understanding and had no questions.

## 2025-04-04 ENCOUNTER — HOSPITAL ENCOUNTER (OUTPATIENT)
Dept: RADIOLOGY | Facility: HOSPITAL | Age: 80
Discharge: HOME | End: 2025-04-04
Payer: COMMERCIAL

## 2025-04-04 DIAGNOSIS — R59.0 LYMPHADENOPATHY, AXILLARY: ICD-10-CM

## 2025-04-04 DIAGNOSIS — Z90.12 HISTORY OF LEFT MASTECTOMY: ICD-10-CM

## 2025-04-04 DIAGNOSIS — C50.812 MALIGNANT NEOPLASM OF OVERLAPPING SITES OF LEFT BREAST IN FEMALE, ESTROGEN RECEPTOR POSITIVE (HCC): ICD-10-CM

## 2025-04-04 DIAGNOSIS — Z17.0 MALIGNANT NEOPLASM OF OVERLAPPING SITES OF LEFT BREAST IN FEMALE, ESTROGEN RECEPTOR POSITIVE (HCC): ICD-10-CM

## 2025-04-04 PROCEDURE — 76642 ULTRASOUND BREAST LIMITED: CPT

## 2025-04-14 DIAGNOSIS — E03.9 ACQUIRED HYPOTHYROIDISM: ICD-10-CM

## 2025-04-14 RX ORDER — LEVOTHYROXINE SODIUM 88 UG/1
88 TABLET ORAL DAILY
Qty: 90 TABLET | Refills: 1 | Status: SHIPPED | OUTPATIENT
Start: 2025-04-14

## 2025-04-14 NOTE — TELEPHONE ENCOUNTER
Reason for call:   [x] Refill   [] Prior Auth  [] Other:     Office:   [x] PCP/Provider - Calderon  [] Specialty/Provider -     Medication: Levothyroxine 88mcg    Dose/Frequency: 1 tab daily     Quantity: 90    Pharmacy: [facility] [address] [place] [address] [address] [phone]     Local Pharmacy   Does the patient have enough for 3 days?   [] Yes   [x] No - Send as HP to POD

## 2025-04-24 ENCOUNTER — NURSE TRIAGE (OUTPATIENT)
Age: 80
End: 2025-04-24

## 2025-04-24 NOTE — TELEPHONE ENCOUNTER
"FOLLOW UP: Appt in the office tomorrow.     REASON FOR CONVERSATION: Edema    SYMPTOMS: B/l pedal edema R>L.     OTHER: Edema x a few weeks.  No SOB or chest pain.  No redness or fever.  Pt is fatigued feeling.  Appt offered for today at 1600, pt cannot come in.  Appt made for tomorrow.     DISPOSITION: See Within 3 Days in Office    Reason for Disposition   MILD swelling of both ankles (i.e., pedal edema) AND new-onset or getting worse    Answer Assessment - Initial Assessment Questions  1. ONSET: \"When did the swelling start?\" (e.g., minutes, hours, days)      Over the last few days   2. LOCATION: \"What part of the leg is swollen?\"  \"Are both legs swollen or just one leg?\"      B/l LE R>L  3. SEVERITY: \"How bad is the swelling?\" (e.g., localized; mild, moderate, severe)      Moderate   4. REDNESS: \"Does the swelling look red or infected?\"      no  5. PAIN: \"Is the swelling painful to touch?\" If Yes, ask: \"How painful is it?\"   (Scale 1-10; mild, moderate or severe)      no  6. FEVER: \"Do you have a fever?\" If Yes, ask: \"What is it, how was it measured, and when did it start?\"       no  7. CAUSE: \"What do you think is causing the leg swelling?\"      I'm not sure, I went off my Victoza   8. MEDICAL HISTORY: \"Do you have a history of blood clots (e.g., DVT), cancer, heart failure, kidney disease, or liver failure?\"      DM, 120s for BS   9. RECURRENT SYMPTOM: \"Have you had leg swelling before?\" If Yes, ask: \"When was the last time?\" \"What happened that time?\"      no  10. OTHER SYMPTOMS: \"Do you have any other symptoms?\" (e.g., chest pain, difficulty breathing)        no  11. PREGNANCY: \"Is there any chance you are pregnant?\" \"When was your last menstrual period?\"        no    Protocols used: Leg Swelling and Edema-Adult-[place]    "

## 2025-04-25 ENCOUNTER — OFFICE VISIT (OUTPATIENT)
Dept: FAMILY MEDICINE CLINIC | Facility: CLINIC | Age: 80
End: 2025-04-25
Payer: COMMERCIAL

## 2025-04-25 VITALS
OXYGEN SATURATION: 98 % | HEIGHT: 61 IN | RESPIRATION RATE: 16 BRPM | SYSTOLIC BLOOD PRESSURE: 112 MMHG | HEART RATE: 56 BPM | WEIGHT: 165 LBS | DIASTOLIC BLOOD PRESSURE: 64 MMHG | BODY MASS INDEX: 31.15 KG/M2

## 2025-04-25 DIAGNOSIS — R60.0 BILATERAL LEG EDEMA: Primary | ICD-10-CM

## 2025-04-25 DIAGNOSIS — I25.10 CORONARY ARTERY DISEASE INVOLVING NATIVE CORONARY ARTERY OF NATIVE HEART WITHOUT ANGINA PECTORIS: ICD-10-CM

## 2025-04-25 DIAGNOSIS — E11.9 TYPE 2 DIABETES MELLITUS WITHOUT COMPLICATION, WITHOUT LONG-TERM CURRENT USE OF INSULIN (HCC): ICD-10-CM

## 2025-04-25 DIAGNOSIS — I10 ESSENTIAL HYPERTENSION: ICD-10-CM

## 2025-04-25 PROCEDURE — 99214 OFFICE O/P EST MOD 30 MIN: CPT | Performed by: FAMILY MEDICINE

## 2025-04-25 PROCEDURE — G2211 COMPLEX E/M VISIT ADD ON: HCPCS | Performed by: FAMILY MEDICINE

## 2025-04-25 NOTE — ASSESSMENT & PLAN NOTE
Pt had CABG x 2 in February 2023. No recent chest pain or shortness of breath. Continue current meds. Pt sees Cardiology on 5/5/25 for follow-up.

## 2025-04-25 NOTE — ASSESSMENT & PLAN NOTE
A1C done today and was 5.8. Continue metformin 500 mg twice a day. Advised pt to follow a low carb diet and to exercise on a regular basis.     Lab Results   Component Value Date    HGBA1C 5.8 12/09/2024

## 2025-04-25 NOTE — ASSESSMENT & PLAN NOTE
Patient has had it for past few weeks and started when stopped Victoza. Will check labs. Could be from pregabalin. Patient advised to follow low Na diet and to elevate legs as needed. Patient can also wear compression stockings. Patient sees Cardiology on 5/5/25 for follow-up.   Orders:  •  Comprehensive metabolic panel; Future  •  TSH, 3rd generation with Free T4 reflex; Future  •  B-Type Natriuretic Peptide(BNP); Future

## 2025-04-25 NOTE — PROGRESS NOTES
Name: Delfina Villa      : 1945      MRN: [id number]  Encounter Provider: Rogerio Mancera MD  Encounter Date: 2025   Encounter department: [facility]  :  Assessment & Plan  Bilateral leg edema  Patient has had it for past few weeks and started when stopped Victoza. Will check labs. Could be from pregabalin. Patient advised to follow low Na diet and to elevate legs as needed. Patient can also wear compression stockings. Patient sees Cardiology on 25 for follow-up.   Orders:  •  Comprehensive metabolic panel; Future  •  TSH, 3rd generation with Free T4 reflex; Future  •  B-Type Natriuretic Peptide(BNP); Future    Essential hypertension  Blood pressure remains good. Continue metoprolol 25 mg bid. Pt advised to continue low Na diet and to exercise on a regular basis.        Coronary artery disease involving native coronary artery of native heart without angina pectoris  Pt had CABG x 2 in 2023. No recent chest pain or shortness of breath. Continue current meds. Pt sees Cardiology on 25 for follow-up.       Type 2 diabetes mellitus without complication, without long-term current use of insulin ([facility])  A1C done today and was 5.8. Continue metformin 500 mg twice a day. Advised pt to follow a low carb diet and to exercise on a regular basis.     Lab Results   Component Value Date    HGBA1C 5.8 2024                Depression Screening and Follow-up Plan: Patient was screened for depression during today's encounter. They screened negative with a PHQ-2 score of 2.        History of Present Illness   Patient here for leg swelling for past 2 weeks. Started after stopped Victoza. Patient has gained 7 lbs since 2025. No chest pain or shortness of breath. No headaches. No abdominal pain. Feels tired. No palpitations.     Fatigue  Associated symptoms include fatigue. Pertinent negatives include no abdominal pain, arthralgias, chest pain, coughing, headaches,  "nausea, rash or vomiting.     Review of Systems   Constitutional:  Positive for fatigue. Negative for unexpected weight change.   Respiratory:  Negative for cough, chest tightness and shortness of breath.    Cardiovascular:  Positive for leg swelling. Negative for chest pain and palpitations.   Gastrointestinal:  Negative for abdominal pain, constipation, diarrhea, nausea and vomiting.   Genitourinary:  Negative for difficulty urinating.   Musculoskeletal:  Negative for arthralgias.   Skin:  Negative for rash.   Neurological:  Negative for dizziness and headaches.       Objective   /64 (BP Location: Left arm, Patient Position: Sitting, Cuff Size: Large)   Pulse 56   Resp 16   Ht 5' 1\" (1.549 m)   Wt 74.8 kg (165 lb)   SpO2 98%   BMI 31.18 kg/m²      Physical Exam  Vitals and nursing note reviewed.   Constitutional:       General: She is not in acute distress.     Appearance: Normal appearance. She is well-developed.      Comments: Walks with cane.    Neck:      Vascular: No carotid bruit.   Cardiovascular:      Rate and Rhythm: Normal rate and regular rhythm.      Heart sounds: No murmur heard.  Pulmonary:      Effort: Pulmonary effort is normal. No respiratory distress.      Breath sounds: Normal breath sounds.   Musculoskeletal:      Cervical back: Normal range of motion and neck supple.      Right lower leg: Edema present.      Left lower leg: Edema present.   Lymphadenopathy:      Cervical: No cervical adenopathy.   Neurological:      Mental Status: She is alert.   Psychiatric:         Mood and Affect: Mood normal.         Behavior: Behavior normal.         Thought Content: Thought content normal.         Judgment: Judgment normal.         "

## 2025-04-28 ENCOUNTER — RESULTS FOLLOW-UP (OUTPATIENT)
Dept: FAMILY MEDICINE CLINIC | Facility: CLINIC | Age: 80
End: 2025-04-28

## 2025-04-28 ENCOUNTER — APPOINTMENT (OUTPATIENT)
Dept: LAB | Facility: CLINIC | Age: 80
End: 2025-04-28
Attending: FAMILY MEDICINE
Payer: COMMERCIAL

## 2025-04-28 DIAGNOSIS — N39.0 URINARY TRACT INFECTION WITHOUT HEMATURIA, SITE UNSPECIFIED: Primary | ICD-10-CM

## 2025-04-28 DIAGNOSIS — R60.0 BILATERAL LEG EDEMA: ICD-10-CM

## 2025-04-28 LAB
BACTERIA UR QL AUTO: ABNORMAL /HPF
BILIRUB UR QL STRIP: NEGATIVE
BNP SERPL-MCNC: 433 PG/ML (ref 0–100)
CLARITY UR: ABNORMAL
COLOR UR: ABNORMAL
GLUCOSE UR STRIP-MCNC: NEGATIVE MG/DL
HGB UR QL STRIP.AUTO: NEGATIVE
KETONES UR STRIP-MCNC: NEGATIVE MG/DL
LEUKOCYTE ESTERASE UR QL STRIP: ABNORMAL
NITRITE UR QL STRIP: POSITIVE
NON-SQ EPI CELLS URNS QL MICRO: ABNORMAL /HPF
PH UR STRIP.AUTO: 7 [PH]
PROT UR STRIP-MCNC: NEGATIVE MG/DL
RBC #/AREA URNS AUTO: ABNORMAL /HPF
SP GR UR STRIP.AUTO: 1.01 (ref 1–1.03)
UROBILINOGEN UR STRIP-ACNC: <2 MG/DL
WBC #/AREA URNS AUTO: ABNORMAL /HPF

## 2025-04-28 PROCEDURE — 36415 COLL VENOUS BLD VENIPUNCTURE: CPT

## 2025-04-28 PROCEDURE — 84443 ASSAY THYROID STIM HORMONE: CPT

## 2025-04-28 PROCEDURE — 87077 CULTURE AEROBIC IDENTIFY: CPT

## 2025-04-28 PROCEDURE — 80053 COMPREHEN METABOLIC PANEL: CPT

## 2025-04-28 PROCEDURE — 87186 SC STD MICRODIL/AGAR DIL: CPT

## 2025-04-28 PROCEDURE — 83880 ASSAY OF NATRIURETIC PEPTIDE: CPT

## 2025-04-28 PROCEDURE — 81001 URINALYSIS AUTO W/SCOPE: CPT

## 2025-04-28 PROCEDURE — 87086 URINE CULTURE/COLONY COUNT: CPT

## 2025-04-29 DIAGNOSIS — N39.0 ACUTE UTI: Primary | ICD-10-CM

## 2025-04-29 LAB
ALBUMIN SERPL BCG-MCNC: 3.9 G/DL (ref 3.5–5)
ALP SERPL-CCNC: 46 U/L (ref 34–104)
ALT SERPL W P-5'-P-CCNC: 16 U/L (ref 7–52)
ANION GAP SERPL CALCULATED.3IONS-SCNC: 7 MMOL/L (ref 4–13)
AST SERPL W P-5'-P-CCNC: 17 U/L (ref 13–39)
BILIRUB SERPL-MCNC: 0.86 MG/DL (ref 0.2–1)
BUN SERPL-MCNC: 14 MG/DL (ref 5–25)
CALCIUM SERPL-MCNC: 9.3 MG/DL (ref 8.4–10.2)
CHLORIDE SERPL-SCNC: 103 MMOL/L (ref 96–108)
CO2 SERPL-SCNC: 27 MMOL/L (ref 21–32)
CREAT SERPL-MCNC: 0.66 MG/DL (ref 0.6–1.3)
GFR SERPL CREATININE-BSD FRML MDRD: 84 ML/MIN/1.73SQ M
GLUCOSE SERPL-MCNC: 154 MG/DL (ref 65–140)
POTASSIUM SERPL-SCNC: 4.5 MMOL/L (ref 3.5–5.3)
PROT SERPL-MCNC: 6 G/DL (ref 6.4–8.4)
SODIUM SERPL-SCNC: 137 MMOL/L (ref 135–147)
TSH SERPL DL<=0.05 MIU/L-ACNC: 0.46 UIU/ML (ref 0.45–4.5)

## 2025-04-29 RX ORDER — NITROFURANTOIN 25; 75 MG/1; MG/1
100 CAPSULE ORAL 2 TIMES DAILY
Qty: 14 CAPSULE | Refills: 0 | Status: SHIPPED | OUTPATIENT
Start: 2025-04-29 | End: 2025-05-06

## 2025-05-01 LAB — BACTERIA UR CULT: ABNORMAL

## 2025-05-03 RX ORDER — NITROFURANTOIN 25; 75 MG/1; MG/1
100 CAPSULE ORAL 2 TIMES DAILY
Qty: 14 CAPSULE | Refills: 0 | Status: SHIPPED | OUTPATIENT
Start: 2025-05-03 | End: 2025-05-10

## 2025-05-05 ENCOUNTER — OFFICE VISIT (OUTPATIENT)
Dept: CARDIOLOGY CLINIC | Facility: CLINIC | Age: 80
End: 2025-05-05
Payer: COMMERCIAL

## 2025-05-05 VITALS
BODY MASS INDEX: 31.15 KG/M2 | SYSTOLIC BLOOD PRESSURE: 138 MMHG | HEIGHT: 61 IN | DIASTOLIC BLOOD PRESSURE: 70 MMHG | WEIGHT: 165 LBS | HEART RATE: 58 BPM | OXYGEN SATURATION: 99 %

## 2025-05-05 DIAGNOSIS — E78.2 MIXED DYSLIPIDEMIA: ICD-10-CM

## 2025-05-05 DIAGNOSIS — E66.01 CLASS 2 SEVERE OBESITY WITH SERIOUS COMORBIDITY AND BODY MASS INDEX (BMI) OF 35.0 TO 35.9 IN ADULT, UNSPECIFIED OBESITY TYPE (HCC): ICD-10-CM

## 2025-05-05 DIAGNOSIS — I25.10 CORONARY ARTERY DISEASE INVOLVING NATIVE CORONARY ARTERY OF NATIVE HEART WITHOUT ANGINA PECTORIS: ICD-10-CM

## 2025-05-05 DIAGNOSIS — E66.812 CLASS 2 SEVERE OBESITY WITH SERIOUS COMORBIDITY AND BODY MASS INDEX (BMI) OF 35.0 TO 35.9 IN ADULT, UNSPECIFIED OBESITY TYPE (HCC): ICD-10-CM

## 2025-05-05 DIAGNOSIS — R60.0 BILATERAL LEG EDEMA: Primary | ICD-10-CM

## 2025-05-05 DIAGNOSIS — I10 ESSENTIAL HYPERTENSION: ICD-10-CM

## 2025-05-05 DIAGNOSIS — Z95.1 S/P CABG X 2: ICD-10-CM

## 2025-05-05 PROCEDURE — 99214 OFFICE O/P EST MOD 30 MIN: CPT | Performed by: INTERNAL MEDICINE

## 2025-05-05 RX ORDER — FUROSEMIDE 20 MG/1
20 TABLET ORAL DAILY
Qty: 30 TABLET | Refills: 1 | Status: SHIPPED | OUTPATIENT
Start: 2025-05-05

## 2025-05-05 NOTE — PROGRESS NOTES
Cardiology   Ruby Garcia DO, [facility]  Dawson Mccoy MD, [facility]  Dylan Bartlett MD, [facility]  Anjali Bush MD, [facility]  -------------------------------------------------------------------  [facility]  [address], [address], [address]  [place], [place], [address]  [phone]    Cardiology Follow Up  Delfina Villa  1945  [id number]          Assessment/Plan:    1. Bilateral leg edema    2. Coronary artery disease involving native coronary artery of native heart without angina pectoris    3. Essential hypertension    4. Mixed dyslipidemia    5. Class 2 severe obesity with serious comorbidity and body mass index (BMI) of 35.0 to 35.9 in adult, unspecified obesity type (HCC)    6. S/P CABG x 2      - Patient with bilateral lower extremity edema.  Echocardiogram in December 2024 showed normal ejection fraction with diastolic dysfunction and valve disease.  - Will give trial of furosemide 20 mg daily.  May stop once edema resolves.  - Minimize NSAID use  - Continue atorvastatin 40 mg daily.     -Continue aspirin  -Continue metoprolol 12.5 mg twice daily.       Interval History:     Delfina Villa is 80 y.o. female here for followup of CAD/CABG.  For the past month, she has had recurring urinary tract infections.  She is also noticed lower extremity edema which she did not have in the past.  Edema is bilaterally with increased on the right side.  She denies any chest pain or shortness of breath.  Has noticed a small weight gain.  She uses NSAIDs nightly for the past several years.  Denies any other inciting event.  Denies any orthopnea or paroxysmal nocturnal dyspnea.  Reports good adherence with medications.  Blood work was normal including TSH.      She has a history of CAD with SAMMIE to proximal LAD in 2015.  Prior to stent placement, she was feeling episode of left arm pain with exertion.   In December 2022, she developed exertional dyspnea and chest tightness and was  seen in the emergency room.  She subsequently underwent cardiac catheterization which showed left main 70% stenosis and 95% proximal circumflex stenosis.  In addition she had 45% RCA stenosis.  She had an echocardiogram which showed normal ejection fraction with mild valve disease.  On February 8, 2023, she underwent two-vessel CABG with LIMA to LAD and SVG to OM1.  Surgery was uncomplicated and she was discharged home 4 days later.    The following portions of the patient's history were reviewed and updated as appropriate: allergies, current medications, past family history, past medical history, past social history, past surgical history, and problem list.       Current Outpatient Medications:     acetaminophen (TYLENOL) 500 mg tablet, Take 2 tablets (1,000 mg total) by mouth every 8 (eight) hours as needed for mild pain, Disp: 44 tablet, Rfl: 0    anastrozole (ARIMIDEX) 1 mg tablet, TAKE ONE TABLET BY MOUTH EVERY DAY., Disp: 90 tablet, Rfl: 1    Aspirin 81 MG CAPS, 2 (two) times a day, Disp: , Rfl:     atorvastatin (LIPITOR) 40 mg tablet, Take 1 tablet (40 mg total) by mouth daily, Disp: 90 tablet, Rfl: 1    furosemide (LASIX) 20 mg tablet, Take 1 tablet (20 mg total) by mouth daily, Disp: 30 tablet, Rfl: 1    Lancets (OneTouch Delica Plus [id number]) MISC, Use once a day to check blood sugar, Disp: 100 each, Rfl: 1    levothyroxine 88 mcg tablet, Take 1 tablet (88 mcg total) by mouth daily, Disp: 90 tablet, Rfl: 1    losartan (COZAAR) 25 mg tablet, Take 1 tablet (25 mg total) by mouth daily, Disp: 90 tablet, Rfl: 3    metFORMIN (GLUCOPHAGE) 500 mg tablet, Take 500 mg by mouth 2 (two) times a day with meals, Disp: , Rfl:     metoprolol tartrate (LOPRESSOR) 25 mg tablet, Take 0.5 tablets (12.5 mg total) by mouth every 12 (twelve) hours, Disp: 90 tablet, Rfl: 1    nitrofurantoin (MACROBID) 100 mg capsule, Take 1 capsule (100 mg total) by mouth 2 (two) times a day for 7 days, Disp: 14 capsule, Rfl: 0     "nitrofurantoin (MACROBID) 100 mg capsule, Take 1 capsule (100 mg total) by mouth 2 (two) times a day for 7 days, Disp: 14 capsule, Rfl: 0    OneTouch Ultra test strip, Use 1 each daily Use as instructed, Disp: 100 each, Rfl: 1    pregabalin (LYRICA) 75 mg capsule, Take 1 capsule (75 mg total) by mouth 2 (two) times a day, Disp: 180 capsule, Rfl: 1        Review of Systems:  Review of Systems   Respiratory:  Negative for shortness of breath.    Cardiovascular:  Positive for leg swelling. Negative for chest pain and palpitations.   Musculoskeletal:  Positive for arthralgias and back pain.   All other systems reviewed and are negative.        Physical Exam:  Vitals:  Vitals:    05/05/25 1407   BP: 138/70   BP Location: Right arm   Patient Position: Sitting   Cuff Size: Large   Pulse: 58   SpO2: 99%   Weight: 74.8 kg (165 lb)   Height: 5' 1\" (1.549 m)     Physical Exam   Constitutional: She appears healthy. No distress.   Eyes: Pupils are equal, round, and reactive to light. Conjunctivae are normal.   Neck: No JVD present.   Cardiovascular: Normal rate and regular rhythm. Exam reveals no gallop and no friction rub.   Murmur heard.  Pulmonary/Chest: Effort normal and breath sounds normal. She has no wheezes. She has no rales.   Musculoskeletal:         General: Edema present. No tenderness or deformity.      Cervical back: Normal range of motion and neck supple.   Neurological: She is alert and oriented to person, place, and time.   Skin: Skin is warm and dry.        Cardiographics:  EKG: NSR with inferior Q waves  Last known EF:  60-65%    This note was completed in part utilizing M-Modal Fluency Direct Software.  Grammatical errors, random word insertions, spelling mistakes, and incomplete sentences can be an occasional consequence of this system secondary to software limitations, ambient noise, and hardware issues.  If you have any questions or concerns about the content, text, or information contained within the body " of this dictation, please contact the provider for clarification.

## 2025-05-06 DIAGNOSIS — G62.9 NEUROPATHY: ICD-10-CM

## 2025-05-06 DIAGNOSIS — Z17.0 MALIGNANT NEOPLASM OF OVERLAPPING SITES OF LEFT BREAST IN FEMALE, ESTROGEN RECEPTOR POSITIVE (HCC): ICD-10-CM

## 2025-05-06 DIAGNOSIS — C50.812 MALIGNANT NEOPLASM OF OVERLAPPING SITES OF LEFT BREAST IN FEMALE, ESTROGEN RECEPTOR POSITIVE (HCC): ICD-10-CM

## 2025-05-06 RX ORDER — PREGABALIN 75 MG/1
75 CAPSULE ORAL 2 TIMES DAILY
Qty: 180 CAPSULE | Refills: 1 | Status: SHIPPED | OUTPATIENT
Start: 2025-05-06

## 2025-05-06 RX ORDER — ANASTROZOLE 1 MG/1
TABLET ORAL
Qty: 90 TABLET | Refills: 0 | Status: SHIPPED | OUTPATIENT
Start: 2025-05-06

## 2025-05-06 NOTE — TELEPHONE ENCOUNTER
Reason for call:   [x] Refill   [] Prior Auth  [] Other:     Office:   [x] PCP/Provider -   [] Specialty/Provider -     Medication: pregabalin (LYRICA) 75 mg capsule     Dose/Frequency: Take 1 capsule (75 mg total) by mouth 2 (two) times a day     Quantity: 180    Pharmacy: [place], [place]    Local Pharmacy   Does the patient have enough for 3 days?   [x] Yes   [] No - Send as HP to POD

## 2025-05-06 NOTE — TELEPHONE ENCOUNTER
Reason for call:   [x] Refill   [] Prior Auth  [] Other:     Office:   [] PCP/Provider -   [x] Specialty/Provider - Hem Onc     Medication: Anastrazole     Dose/Frequency: 1 mg tablet taken by mouth once daily     Quantity: 90    Pharmacy: [facility] [address] [place] [address] [address] [phone]     Local Pharmacy   Does the patient have enough for 3 days?   [x] Yes   [] No - Send as HP to POD    Mail Away Pharmacy   Does the patient have enough for 10 days?   [] Yes   [] No - Send as HP to POD

## 2025-05-28 ENCOUNTER — TELEPHONE (OUTPATIENT)
Age: 80
End: 2025-05-28

## 2025-05-28 NOTE — TELEPHONE ENCOUNTER
Attempted to contact patient to reschedule appointment with Dr. Hernandez on 9/8 - pt picked up and then hung up. Will send my chart message and try again.

## 2025-06-02 ENCOUNTER — OFFICE VISIT (OUTPATIENT)
Dept: URGENT CARE | Facility: CLINIC | Age: 80
End: 2025-06-02
Payer: COMMERCIAL

## 2025-06-02 VITALS
HEIGHT: 61 IN | SYSTOLIC BLOOD PRESSURE: 130 MMHG | DIASTOLIC BLOOD PRESSURE: 74 MMHG | RESPIRATION RATE: 18 BRPM | OXYGEN SATURATION: 97 % | WEIGHT: 165 LBS | BODY MASS INDEX: 31.15 KG/M2 | TEMPERATURE: 97.8 F | HEART RATE: 86 BPM

## 2025-06-02 DIAGNOSIS — N39.0 RECURRENT UTI: Primary | ICD-10-CM

## 2025-06-02 DIAGNOSIS — R39.9 UTI SYMPTOMS: ICD-10-CM

## 2025-06-02 LAB
SL AMB  POCT GLUCOSE, UA: ABNORMAL
SL AMB LEUKOCYTE ESTERASE,UA: ABNORMAL
SL AMB POCT BILIRUBIN,UA: ABNORMAL
SL AMB POCT BLOOD,UA: ABNORMAL
SL AMB POCT CLARITY,UA: ABNORMAL
SL AMB POCT COLOR,UA: YELLOW
SL AMB POCT KETONES,UA: ABNORMAL
SL AMB POCT NITRITE,UA: ABNORMAL
SL AMB POCT PH,UA: 8
SL AMB POCT SPECIFIC GRAVITY,UA: 1.01
SL AMB POCT URINE PROTEIN: ABNORMAL
SL AMB POCT UROBILINOGEN: 0.2

## 2025-06-02 PROCEDURE — 87077 CULTURE AEROBIC IDENTIFY: CPT | Performed by: PHYSICIAN ASSISTANT

## 2025-06-02 PROCEDURE — 87186 SC STD MICRODIL/AGAR DIL: CPT | Performed by: PHYSICIAN ASSISTANT

## 2025-06-02 PROCEDURE — 87086 URINE CULTURE/COLONY COUNT: CPT | Performed by: PHYSICIAN ASSISTANT

## 2025-06-02 PROCEDURE — 81002 URINALYSIS NONAUTO W/O SCOPE: CPT

## 2025-06-02 PROCEDURE — 99213 OFFICE O/P EST LOW 20 MIN: CPT

## 2025-06-02 RX ORDER — NITROFURANTOIN 25; 75 MG/1; MG/1
100 CAPSULE ORAL 2 TIMES DAILY
Qty: 10 CAPSULE | Refills: 0 | Status: SHIPPED | OUTPATIENT
Start: 2025-06-02 | End: 2025-06-07

## 2025-06-02 NOTE — PATIENT INSTRUCTIONS
Please take Macrobid 2x daily for 5 days.   Ensure adequate fluid intake.   1.  Drink plenty fluids.    2.  Take probiotics [i.e. Yogurt, Acidophilus, Florastor (liquid)] daily.    3.  Over-the-counter medications as needed for symptomatic care.    4.   Advance activities as tolerated.    5.   Follow-up with your primary care physician in 3-4 days.    6.  Go to emergency room if symptoms are worsening.    7.  Use a humidifier at bedtime

## 2025-06-02 NOTE — PROGRESS NOTES
"Name: Delfina Villa      : 1945      MRN: [id number]  Encounter Provider: BERNADETTE Bowen  Encounter Date: 2025   Encounter department: [facility]  :  Assessment & Plan  Recurrent UTI  Please take Macrobid 2x daily for 5 days.   Ensure adequate fluid intake.   If tests have been performed at [facility], our office will contact you with results if changes need to be made to the care plan discussed with you at the visit.  You can review your full results on [facility].    Orders:    nitrofurantoin (MACROBID) 100 mg capsule; Take 1 capsule (100 mg total) by mouth 2 (two) times a day for 5 days    UTI symptoms  UA positive for leukocytes, otherwise unremarkable.   Pending culture results.     Orders:    POCT urine dip    Urine culture; Future        History of Present Illness   HPI  Delfina Villa is a 80 y.o. female who presents for evaluation of UTI symptoms.  Patient states this morning she woke up with lower abdominal pain, flank pain, burning with urination, frequency and urgency.  She has been ill with UTIs in the past with similar symptoms.  She notes her most recent UTI was at the end of 2025.      Review of Systems   Constitutional:  Negative for chills and fever.   Gastrointestinal:  Positive for abdominal pain. Negative for nausea and vomiting.   Genitourinary:  Positive for dysuria, flank pain, frequency and urgency. Negative for hematuria.          Objective   /74   Pulse 86   Temp 97.8 °F (36.6 °C)   Resp 18   Ht 5' 1\" (1.549 m)   Wt 74.8 kg (165 lb)   SpO2 97%   BMI 31.18 kg/m²      Physical Exam  Vitals and nursing note reviewed.   Constitutional:       General: She is not in acute distress.     Appearance: She is well-developed.   HENT:      Head: Normocephalic and atraumatic.     Eyes:      Conjunctiva/sclera: Conjunctivae normal.       Cardiovascular:      Rate and Rhythm: Normal rate and regular rhythm.      Heart sounds: No murmur " heard.  Pulmonary:      Effort: Pulmonary effort is normal. No respiratory distress.      Breath sounds: Normal breath sounds.   Abdominal:      Palpations: Abdomen is soft.      Tenderness: There is abdominal tenderness (suprapubic). There is no right CVA tenderness or left CVA tenderness.     Musculoskeletal:         General: No swelling.      Cervical back: Neck supple.     Skin:     General: Skin is warm and dry.     Neurological:      Mental Status: She is alert.     Psychiatric:         Mood and Affect: Mood normal.

## 2025-06-04 ENCOUNTER — RESULTS FOLLOW-UP (OUTPATIENT)
Dept: URGENT CARE | Facility: CLINIC | Age: 80
End: 2025-06-04

## 2025-06-04 LAB — BACTERIA UR CULT: ABNORMAL

## 2025-06-05 ENCOUNTER — TELEPHONE (OUTPATIENT)
Age: 80
End: 2025-06-05

## 2025-06-05 NOTE — TELEPHONE ENCOUNTER
New Patient      Insurance   Current Insurance?[facility]    Insurance E-verified? Yes    History   Reason for appointment/active symptoms?  Chronic UTI     Has the patient had any previous Urologist(s)? No     Was the patient seen in the ED? Yes [facility] 3/25     Labs[facility]Imaging(Including Out Of Network)? [facility]     Records Requested? Yes  Records Visible in EPIC? Yes     Personal history of cancer? ovarian, breast, thyroid, skin all 4+ years ago     Appointment   Office location preference:  [facility]  ?   Appointment Details   Date:  7/17  Time:  1:40  Location:  [facility]  Provider:  Deven  Does the appointment need further review? No

## 2025-06-16 ENCOUNTER — TELEPHONE (OUTPATIENT)
Dept: FAMILY MEDICINE CLINIC | Facility: CLINIC | Age: 80
End: 2025-06-16

## 2025-06-16 ENCOUNTER — RA CDI HCC (OUTPATIENT)
Dept: OTHER | Facility: HOSPITAL | Age: 80
End: 2025-06-16

## 2025-06-16 DIAGNOSIS — E11.9 TYPE 2 DIABETES MELLITUS WITHOUT COMPLICATION, WITHOUT LONG-TERM CURRENT USE OF INSULIN (HCC): Primary | ICD-10-CM

## 2025-06-16 NOTE — TELEPHONE ENCOUNTER
Patient called the RX Refill Line. Message is being forwarded to the office.     Patient is requesting new prescription sent for Metformin 1000 mg takes twice a day. Patient stated DR Mancera restarted metformin 1000 mg again on chart metformin 500 mg is active patient stated never took 500 mg  [facility] [address] [place] [address] [address] [phone]     Please contact patient at [phone]    Only has enough medication for tonight 06/16/25

## 2025-06-16 NOTE — PROGRESS NOTES
HCC coding opportunities     E11.3293, E11.40     Chart Reviewed number of suggestions sent to Provider: 2     Patients Insurance     Medicare Insurance: [employer]

## 2025-06-19 ENCOUNTER — OFFICE VISIT (OUTPATIENT)
Dept: FAMILY MEDICINE CLINIC | Facility: CLINIC | Age: 80
End: 2025-06-19
Payer: COMMERCIAL

## 2025-06-19 VITALS
BODY MASS INDEX: 31.15 KG/M2 | SYSTOLIC BLOOD PRESSURE: 114 MMHG | HEIGHT: 61 IN | RESPIRATION RATE: 16 BRPM | WEIGHT: 165 LBS | OXYGEN SATURATION: 97 % | HEART RATE: 74 BPM | DIASTOLIC BLOOD PRESSURE: 60 MMHG

## 2025-06-19 DIAGNOSIS — M81.0 AGE-RELATED OSTEOPOROSIS WITHOUT CURRENT PATHOLOGICAL FRACTURE: ICD-10-CM

## 2025-06-19 DIAGNOSIS — E03.9 ACQUIRED HYPOTHYROIDISM: ICD-10-CM

## 2025-06-19 DIAGNOSIS — I25.10 CORONARY ARTERY DISEASE INVOLVING NATIVE CORONARY ARTERY OF NATIVE HEART WITHOUT ANGINA PECTORIS: ICD-10-CM

## 2025-06-19 DIAGNOSIS — I10 ESSENTIAL HYPERTENSION: Primary | ICD-10-CM

## 2025-06-19 DIAGNOSIS — N39.0 FREQUENT UTI: ICD-10-CM

## 2025-06-19 DIAGNOSIS — E11.40 TYPE 2 DIABETES MELLITUS WITH DIABETIC NEUROPATHY, WITHOUT LONG-TERM CURRENT USE OF INSULIN (HCC): ICD-10-CM

## 2025-06-19 DIAGNOSIS — E78.2 MIXED DYSLIPIDEMIA: ICD-10-CM

## 2025-06-19 DIAGNOSIS — E11.9 TYPE 2 DIABETES MELLITUS WITHOUT COMPLICATION, WITHOUT LONG-TERM CURRENT USE OF INSULIN (HCC): ICD-10-CM

## 2025-06-19 DIAGNOSIS — K63.5 POLYP OF COLON, UNSPECIFIED PART OF COLON, UNSPECIFIED TYPE: ICD-10-CM

## 2025-06-19 PROBLEM — D64.9 ANEMIA: Status: RESOLVED | Noted: 2024-12-16 | Resolved: 2025-06-19

## 2025-06-19 PROBLEM — R07.9 CHEST PAIN: Status: RESOLVED | Noted: 2024-12-16 | Resolved: 2025-06-19

## 2025-06-19 PROBLEM — Z01.810 PREOP CARDIOVASCULAR EXAM: Status: RESOLVED | Noted: 2018-02-22 | Resolved: 2025-06-19

## 2025-06-19 PROBLEM — I21.4 NON-ST ELEVATION MYOCARDIAL INFARCTION (NSTEMI) (HCC): Status: RESOLVED | Noted: 2022-07-21 | Resolved: 2025-06-19

## 2025-06-19 PROBLEM — Z79.811 USE OF ANASTROZOLE (ARIMIDEX): Status: RESOLVED | Noted: 2022-01-21 | Resolved: 2025-06-19

## 2025-06-19 LAB
CREAT UR-MCNC: 37.3 MG/DL
MICROALBUMIN UR-MCNC: 25.6 MG/L
MICROALBUMIN/CREAT 24H UR: 69 MG/G CREATININE (ref 0–30)
SL AMB POCT HEMOGLOBIN AIC: 5.8 (ref ?–6.5)

## 2025-06-19 PROCEDURE — 82043 UR ALBUMIN QUANTITATIVE: CPT | Performed by: FAMILY MEDICINE

## 2025-06-19 PROCEDURE — 87077 CULTURE AEROBIC IDENTIFY: CPT | Performed by: FAMILY MEDICINE

## 2025-06-19 PROCEDURE — 99214 OFFICE O/P EST MOD 30 MIN: CPT | Performed by: FAMILY MEDICINE

## 2025-06-19 PROCEDURE — 82570 ASSAY OF URINE CREATININE: CPT | Performed by: FAMILY MEDICINE

## 2025-06-19 PROCEDURE — 87186 SC STD MICRODIL/AGAR DIL: CPT | Performed by: FAMILY MEDICINE

## 2025-06-19 PROCEDURE — 83036 HEMOGLOBIN GLYCOSYLATED A1C: CPT | Performed by: FAMILY MEDICINE

## 2025-06-19 PROCEDURE — 87086 URINE CULTURE/COLONY COUNT: CPT | Performed by: FAMILY MEDICINE

## 2025-06-19 PROCEDURE — G2211 COMPLEX E/M VISIT ADD ON: HCPCS | Performed by: FAMILY MEDICINE

## 2025-06-19 NOTE — PROGRESS NOTES
Name: Delfina Villa      : 1945      MRN: [id number]  Encounter Provider: Rogerio Mancera MD  Encounter Date: 2025   Encounter department: [facility]  :  Assessment & Plan  Essential hypertension  Blood pressure good. Continue metoprolol 25 mg bid. Pt advised to continue low Na diet and to exercise on a regular basis.   Orders:  •  Lipid panel; Future    Mixed dyslipidemia  Recheck lipids. Continue atorvastatin 40 mg daily at bedtime. Pt also takes fish oil 3600 mg qd.  Advised pt to follow a low cholesterol diet and to exercise on a regular basis.  Orders:  •  Lipid panel; Future    Coronary artery disease involving native coronary artery of native heart without angina pectoris  Pt had CABG x 2 in 2023. No recent chest pain or shortness of breath. Continue current meds. Pt saw Cardiology in May 2025 for follow-up.  Orders:  •  Lipid panel; Future    Type 2 diabetes mellitus without complication, without long-term current use of insulin (HCC)  A1C done today and was 5.8. Continue metformin 500 mg twice a day. Advised pt to follow a low carb diet and to exercise on a regular basis. Foot exam done today. Had eye exam by dr Rapp in 2025.     Lab Results   Component Value Date    HGBA1C 5.8 2025     Orders:  •  Albumin / creatinine urine ratio; Future  •  POCT hemoglobin A1c  •  Lipid panel; Future    Acquired hypothyroidism  TSH 0.463 in 2025. Continue levothyroxine 88 mcg qd.            Age-related osteoporosis without current pathological fracture  Last dexascan was in 2024. Pt advised to take calcium 1200 mg qd and Vit D 1000 U qd. Pt also advised to perform weight-bearing exercise on a regular basis.        Polyp of colon, unspecified part of colon, unspecified type  Last colonoscopy was in 2022 by Dr Cleary and patient had 1 large polyp. Patient due for follow-up colonoscopy and going to see Gastroenterology in 2025.  "  Frequent UTI  Will recheck urine culture.   Orders:  •  Urine culture; Future    Type 2 diabetes mellitus with diabetic neuropathy, without long-term current use of insulin ([facility])    Lab Results   Component Value Date    HGBA1C 5.8 06/19/2025                   History of Present Illness   Patient here for follow-up Hypertension, Hyperlipidemia, Coronary Artery Disease, DM, Hypothyroidism, Osteoporosis, Colon Polyps. Patient doing ok. No chest pain or shortness of breath. No headaches. No abdominal pain. Still has leg swelling but not as bad. Was put on lasix as needed by Cardiology. Blood pressure has been ok. Patient has been getting frequent UTIs. Still has symptoms. Sees Urology next month. Had eye exam by Dr Rapp a few weeks ago.       Review of Systems   Constitutional:  Negative for fatigue and unexpected weight change.   Respiratory:  Negative for cough, chest tightness and shortness of breath.    Cardiovascular:  Negative for chest pain and palpitations.   Gastrointestinal:  Negative for abdominal pain, constipation, diarrhea, nausea and vomiting.   Genitourinary:  Negative for difficulty urinating.   Musculoskeletal:  Positive for arthralgias, back pain and gait problem.   Skin:  Negative for rash.   Neurological:  Negative for dizziness and headaches.       Objective   /60 (BP Location: Left arm, Patient Position: Sitting, Cuff Size: Large)   Pulse 74   Resp 16   Ht 5' 1\" (1.549 m)   Wt 74.8 kg (165 lb)   SpO2 97%   BMI 31.18 kg/m²      Physical Exam  Vitals and nursing note reviewed.   Constitutional:       General: She is not in acute distress.     Appearance: Normal appearance. She is well-developed.      Comments: Walks with cane.    Neck:      Vascular: No carotid bruit.     Cardiovascular:      Rate and Rhythm: Normal rate and regular rhythm.      Pulses: no weak pulses.           Dorsalis pedis pulses are 2+ on the right side and 2+ on the left side.        Posterior tibial pulses " are 2+ on the right side and 2+ on the left side.      Heart sounds: No murmur heard.  Pulmonary:      Effort: Pulmonary effort is normal. No respiratory distress.      Breath sounds: Normal breath sounds.   Abdominal:      General: Abdomen is flat.      Palpations: Abdomen is soft.      Tenderness: There is no abdominal tenderness.     Musculoskeletal:      Cervical back: Normal range of motion and neck supple.      Right lower leg: Edema present.      Left lower leg: Edema present.   Feet:      Right foot:      Skin integrity: No ulcer, skin breakdown, erythema, warmth, callus or dry skin.      Left foot:      Skin integrity: No ulcer, skin breakdown, erythema, warmth, callus or dry skin.   Lymphadenopathy:      Cervical: No cervical adenopathy.     Neurological:      Mental Status: She is alert.     Psychiatric:         Mood and Affect: Mood normal.         Behavior: Behavior normal.         Thought Content: Thought content normal.         Judgment: Judgment normal.     Patient's shoes and socks removed.    Right Foot/Ankle   Right Foot Inspection  Skin Exam: skin normal and skin intact. No dry skin, no warmth, no callus, no erythema, no maceration, no abnormal color, no pre-ulcer, no ulcer and no callus.     Toe Exam: ROM and strength within normal limits. No swelling, no tenderness, erythema and  no right toe deformity    Sensory   Proprioception: diminished  Monofilament testing: diminished    Vascular  Capillary refills: < 3 seconds  The right DP pulse is 2+. The right PT pulse is 2+.     Left Foot/Ankle  Left Foot Inspection  Skin Exam: skin normal and skin intact. No dry skin, no warmth, no erythema, no maceration, normal color, no pre-ulcer, no ulcer and no callus.     Toe Exam: ROM and strength within normal limits. No swelling, no tenderness, no erythema and no left toe deformity.     Sensory   Proprioception: diminished  Monofilament testing: diminished    Vascular  Capillary refills: < 3 seconds  The  left DP pulse is 2+. The left PT pulse is 2+.     Assign Risk Category  No deformity present  Loss of protective sensation  No weak pulses  Risk: 1

## 2025-06-19 NOTE — ASSESSMENT & PLAN NOTE
Last colonoscopy was in July 2022 by Dr Cleary and patient had 1 large polyp. Patient due for follow-up colonoscopy and going to see Gastroenterology in August 2025.

## 2025-06-19 NOTE — ASSESSMENT & PLAN NOTE
A1C done today and was 5.8. Continue metformin 500 mg twice a day. Advised pt to follow a low carb diet and to exercise on a regular basis. Foot exam done today. Had eye exam by dr Rapp in June 2025.     Lab Results   Component Value Date    HGBA1C 5.8 06/19/2025     Orders:  •  Albumin / creatinine urine ratio; Future  •  POCT hemoglobin A1c  •  Lipid panel; Future

## 2025-06-19 NOTE — ASSESSMENT & PLAN NOTE
Blood pressure good. Continue metoprolol 25 mg bid. Pt advised to continue low Na diet and to exercise on a regular basis.   Orders:  •  Lipid panel; Future

## 2025-06-19 NOTE — ASSESSMENT & PLAN NOTE
Recheck lipids. Continue atorvastatin 40 mg daily at bedtime. Pt also takes fish oil 3600 mg qd.  Advised pt to follow a low cholesterol diet and to exercise on a regular basis.  Orders:  •  Lipid panel; Future

## 2025-06-19 NOTE — ASSESSMENT & PLAN NOTE
Pt had CABG x 2 in February 2023. No recent chest pain or shortness of breath. Continue current meds. Pt saw Cardiology in May 2025 for follow-up.  Orders:  •  Lipid panel; Future

## 2025-06-20 ENCOUNTER — TELEPHONE (OUTPATIENT)
Dept: ADMINISTRATIVE | Facility: OTHER | Age: 80
End: 2025-06-20

## 2025-06-20 ENCOUNTER — RESULTS FOLLOW-UP (OUTPATIENT)
Dept: FAMILY MEDICINE CLINIC | Facility: CLINIC | Age: 80
End: 2025-06-20

## 2025-06-20 DIAGNOSIS — N39.0 ACUTE UTI: Primary | ICD-10-CM

## 2025-06-20 NOTE — LETTER
Diabetic Eye Exam Form    Date Requested: 25  Patient: Delfina Villa  Patient : 1945   Referring Provider: Rogerio Mancera MD      DIABETIC Eye Exam Date _______________________________      Type of Exam MUST be documented for Diabetic Eye Exams. Please CHECK ONE.     Retinal Exam       Dilated Retinal Exam       OCT       Optomap-Iris Exam      Fundus Photography       Left Eye - Please check Retinopathy or No Retinopathy        Exam did show retinopathy    Exam did not show retinopathy       Right Eye - Please check Retinopathy or No Retinopathy       Exam did show retinopathy    Exam did not show retinopathy       Comments __________________________________________________________    Practice Providing Exam ______________________________________________    Exam Performed By (print name) _______________________________________      Provider Signature ___________________________________________________      These reports are needed for  compliance.    Please fax this completed form and a copy of the Diabetic Eye Exam report   to the [facility] Department as soon as possible via   Fax [phone] attention Ayala: Phone [phone].   Our office located at [address] [place] [address]     We thank you for your assistance in treating our mutual patient.

## 2025-06-20 NOTE — TELEPHONE ENCOUNTER
Upon review of the In Basket request and the patient's chart, initial outreach has been made via fax to facility. Please see Contacts section for details.     Thank you  Ayala Campos MA

## 2025-06-20 NOTE — TELEPHONE ENCOUNTER
----- Message from Anabela HA sent at 6/19/2025  1:07 PM EDT -----  Regarding: care gap request  06/19/25 1:07 PM    Hello, our patient attached above has had Diabetic Eye Exam completed/performed. Please assist in updating the patient chart by making an External outreach to Dr Rapp facility located in [place] [phone]. The date of service is may/June 2025.    Thank you,  Anabela Nair  [facility]

## 2025-06-20 NOTE — LETTER
2nd Request      Diabetic Eye Exam Form    Date Requested: 25  Patient: Delfina Villa  Patient : 1945   Referring Provider: Rogerio Mancera MD      DIABETIC Eye Exam Date _______________________________      Type of Exam MUST be documented for Diabetic Eye Exams. Please CHECK ONE.     Retinal Exam       Dilated Retinal Exam       OCT       Optomap-Iris Exam      Fundus Photography       Left Eye - Please check Retinopathy or No Retinopathy        Exam did show retinopathy    Exam did not show retinopathy       Right Eye - Please check Retinopathy or No Retinopathy       Exam did show retinopathy    Exam did not show retinopathy       Comments __________________________________________________________    Practice Providing Exam ______________________________________________    Exam Performed By (print name) _______________________________________      Provider Signature ___________________________________________________      These reports are needed for  compliance.    Please fax this completed form and a copy of the Diabetic Eye Exam report   to the [facility] Based Department as soon as possible via   Fax [phone] attention Ayala: Phone [phone].   Our office located at [address] [place] [address]     We thank you for your assistance in treating our mutual patient.

## 2025-06-21 ENCOUNTER — HOSPITAL ENCOUNTER (EMERGENCY)
Facility: HOSPITAL | Age: 80
Discharge: HOME | End: 2025-06-21
Payer: COMMERCIAL

## 2025-06-21 VITALS
BODY MASS INDEX: 30.8 KG/M2 | RESPIRATION RATE: 18 BRPM | DIASTOLIC BLOOD PRESSURE: 80 MMHG | OXYGEN SATURATION: 98 % | HEART RATE: 56 BPM | SYSTOLIC BLOOD PRESSURE: 131 MMHG | TEMPERATURE: 99.2 F | WEIGHT: 163 LBS

## 2025-06-21 DIAGNOSIS — N30.90 CYSTITIS: ICD-10-CM

## 2025-06-21 DIAGNOSIS — R10.9 ABDOMINAL PAIN: ICD-10-CM

## 2025-06-21 DIAGNOSIS — N39.0 UTI (URINARY TRACT INFECTION): Primary | ICD-10-CM

## 2025-06-21 LAB
ANION GAP SERPL CALCULATED.3IONS-SCNC: 6 MMOL/L (ref 4–13)
BACTERIA UR CULT: ABNORMAL
BACTERIA UR QL AUTO: ABNORMAL /HPF
BASOPHILS # BLD AUTO: 0.03 THOUSANDS/ÂΜL (ref 0–0.1)
BASOPHILS NFR BLD AUTO: 0 % (ref 0–1)
BILIRUB UR QL STRIP: NEGATIVE
BUN SERPL-MCNC: 13 MG/DL (ref 5–25)
CALCIUM SERPL-MCNC: 9.3 MG/DL (ref 8.4–10.2)
CHLORIDE SERPL-SCNC: 102 MMOL/L (ref 96–108)
CLARITY UR: CLEAR
CO2 SERPL-SCNC: 25 MMOL/L (ref 21–32)
COLOR UR: YELLOW
CREAT SERPL-MCNC: 0.55 MG/DL (ref 0.6–1.3)
EOSINOPHIL # BLD AUTO: 0.05 THOUSAND/ÂΜL (ref 0–0.61)
EOSINOPHIL NFR BLD AUTO: 1 % (ref 0–6)
ERYTHROCYTE [DISTWIDTH] IN BLOOD BY AUTOMATED COUNT: 11.9 % (ref 11.6–15.1)
GFR SERPL CREATININE-BSD FRML MDRD: 89 ML/MIN/1.73SQ M
GLUCOSE SERPL-MCNC: 113 MG/DL (ref 65–140)
GLUCOSE UR STRIP-MCNC: NEGATIVE MG/DL
HCT VFR BLD AUTO: 34.1 % (ref 34.8–46.1)
HGB BLD-MCNC: 11.5 G/DL (ref 11.5–15.4)
HGB UR QL STRIP.AUTO: NEGATIVE
IMM GRANULOCYTES # BLD AUTO: 0.02 THOUSAND/UL (ref 0–0.2)
IMM GRANULOCYTES NFR BLD AUTO: 0 % (ref 0–2)
KETONES UR STRIP-MCNC: NEGATIVE MG/DL
LEUKOCYTE ESTERASE UR QL STRIP: ABNORMAL
LYMPHOCYTES # BLD AUTO: 1.7 THOUSANDS/ÂΜL (ref 0.6–4.47)
LYMPHOCYTES NFR BLD AUTO: 24 % (ref 14–44)
MCH RBC QN AUTO: 31.7 PG (ref 26.8–34.3)
MCHC RBC AUTO-ENTMCNC: 33.7 G/DL (ref 31.4–37.4)
MCV RBC AUTO: 94 FL (ref 82–98)
MONOCYTES # BLD AUTO: 0.71 THOUSAND/ÂΜL (ref 0.17–1.22)
MONOCYTES NFR BLD AUTO: 10 % (ref 4–12)
NEUTROPHILS # BLD AUTO: 4.5 THOUSANDS/ÂΜL (ref 1.85–7.62)
NEUTS SEG NFR BLD AUTO: 65 % (ref 43–75)
NITRITE UR QL STRIP: NEGATIVE
NON-SQ EPI CELLS URNS QL MICRO: ABNORMAL /HPF
NRBC BLD AUTO-RTO: 0 /100 WBCS
PH UR STRIP.AUTO: 8 [PH]
PLATELET # BLD AUTO: 153 THOUSANDS/UL (ref 149–390)
PMV BLD AUTO: 10.5 FL (ref 8.9–12.7)
POTASSIUM SERPL-SCNC: 4.4 MMOL/L (ref 3.5–5.3)
PROT UR STRIP-MCNC: NEGATIVE MG/DL
RBC # BLD AUTO: 3.63 MILLION/UL (ref 3.81–5.12)
RBC #/AREA URNS AUTO: ABNORMAL /HPF
SODIUM SERPL-SCNC: 133 MMOL/L (ref 135–147)
SP GR UR STRIP.AUTO: 1.02 (ref 1–1.03)
UROBILINOGEN UR STRIP-ACNC: <2 MG/DL
WBC # BLD AUTO: 7.01 THOUSAND/UL (ref 4.31–10.16)
WBC #/AREA URNS AUTO: ABNORMAL /HPF

## 2025-06-21 PROCEDURE — 99284 EMERGENCY DEPT VISIT MOD MDM: CPT

## 2025-06-21 PROCEDURE — 85025 COMPLETE CBC W/AUTO DIFF WBC: CPT

## 2025-06-21 PROCEDURE — 87077 CULTURE AEROBIC IDENTIFY: CPT

## 2025-06-21 PROCEDURE — 99283 EMERGENCY DEPT VISIT LOW MDM: CPT

## 2025-06-21 PROCEDURE — 87186 SC STD MICRODIL/AGAR DIL: CPT

## 2025-06-21 PROCEDURE — 80048 BASIC METABOLIC PNL TOTAL CA: CPT

## 2025-06-21 PROCEDURE — 81001 URINALYSIS AUTO W/SCOPE: CPT

## 2025-06-21 PROCEDURE — 96365 THER/PROPH/DIAG IV INF INIT: CPT

## 2025-06-21 PROCEDURE — 87086 URINE CULTURE/COLONY COUNT: CPT

## 2025-06-21 PROCEDURE — 36415 COLL VENOUS BLD VENIPUNCTURE: CPT

## 2025-06-21 RX ORDER — CEFTRIAXONE 2 G/50ML
2000 INJECTION, SOLUTION INTRAVENOUS ONCE
Status: COMPLETED | OUTPATIENT
Start: 2025-06-21 | End: 2025-06-21

## 2025-06-21 RX ORDER — CEPHALEXIN 500 MG/1
500 CAPSULE ORAL 3 TIMES DAILY
Qty: 21 CAPSULE | Refills: 0 | Status: SHIPPED | OUTPATIENT
Start: 2025-06-21 | End: 2025-06-28

## 2025-06-21 RX ORDER — CEPHALEXIN 500 MG/1
500 CAPSULE ORAL EVERY 8 HOURS SCHEDULED
Qty: 21 CAPSULE | Refills: 0 | Status: SHIPPED | OUTPATIENT
Start: 2025-06-21 | End: 2025-06-28

## 2025-06-21 RX ADMIN — CEFTRIAXONE 2000 MG: 2 INJECTION, SOLUTION INTRAVENOUS at 14:32

## 2025-06-22 NOTE — ED PROVIDER NOTES
Time reflects when diagnosis was documented in both MDM as applicable and the Disposition within this note       Time User Action Codes Description Comment    6/21/2025  3:06 PM Yokubaitis, Kyrie Add [N39.0] UTI (urinary tract infection)     6/21/2025  3:07 PM Yokubaitis, Kyrie Add [N30.90] Cystitis     6/21/2025  3:07 PM Yokubaitis, Kyrie Add [R10.9] Abdominal pain           ED Disposition       ED Disposition   Discharge    Condition   Stable    Date/Time   Sat Jun 21, 2025  3:06 PM    Comment   Delfina Villa discharge to home/self care.                   Assessment & Plan       Medical Decision Making  80-year-old female present emergency department due to symptoms concerning for UTI.  No CVA tenderness to suggest pyelonephritis especially given reassuring vital signs and examination otherwise.  UA obtained for culture in case patient is resistant to antibiotics prescribed.  Will treat with course of Keflex and recommend outpatient follow-up and return precautions for progression of infection.    Amount and/or Complexity of Data Reviewed  Labs: ordered.    Risk  Prescription drug management.             Medications   cefTRIAXone (ROCEPHIN) IVPB (premix in dextrose) 2,000 mg 50 mL (0 mg Intravenous Stopped 6/21/25 1502)       ED Risk Strat Scores                    No data recorded        SBIRT 20yo+      Flowsheet Row Most Recent Value   Initial Alcohol Screen: US AUDIT-C     1. How often do you have a drink containing alcohol? 0 Filed at: 06/21/2025 1348   2. How many drinks containing alcohol do you have on a typical day you are drinking?  0 Filed at: 06/21/2025 1348   3a. Male UNDER 65: How often do you have five or more drinks on one occasion? 0 Filed at: 06/21/2025 1348   3b. FEMALE Any Age, or MALE 65+: How often do you have 4 or more drinks on one occassion? 0 Filed at: 06/21/2025 1348   Audit-C Score 0 Filed at: 06/21/2025 1348   PAUL: How many times in the past year have you...    Used an illegal drug or  used a prescription medication for non-medical reasons? Never Filed at: 06/21/2025 1348                            History of Present Illness       Chief Complaint   Patient presents with    Possible UTI     Pt reports she has a confirmed UTI, and needs to be prescribed medication. Symptoms are severe back pain, urinary frequency, abdominal pain       Past Medical History[1]   Past Surgical History[2]   Family History[3]   Social History[4]   E-Cigarette/Vaping    E-Cigarette Use Never User       E-Cigarette/Vaping Substances    Nicotine No     THC No     CBD No     Flavoring No     Other No     Unknown No       I have reviewed and agree with the history as documented.     80-year-old female presenting to the emergency department due to dysuria and low back pain.  Patient notes that she was seen by her outpatient provider who obtained a urinalysis that showed an infection.  Notes that this feels similar to prior infections.  Had recently been on Macrobid without improvement.  Notes that the prescription sent by her primary care doctor did not get to her pharmacy show she presents emergency department for treatment as her PCP office is closed today.        Review of Systems   All other systems reviewed and are negative.          Objective       ED Triage Vitals [06/21/25 1348]   Temperature Pulse Blood Pressure Respirations SpO2 Patient Position - Orthostatic VS   99.2 °F (37.3 °C) 66 (!) 184/74 18 98 % Sitting      Temp Source Heart Rate Source BP Location FiO2 (%) Pain Score    Tympanic Monitor Left arm -- 7      Vitals      Date and Time Temp Pulse SpO2 Resp BP Pain Score FACES Pain Rating User   06/21/25 1529 -- 56 98 % 18 131/80 -- -- NM   06/21/25 1348 99.2 °F (37.3 °C) 66 98 % 18 184/74 7 -- NM            Physical Exam  Constitutional:       General: She is not in acute distress.     Appearance: Normal appearance. She is not ill-appearing or toxic-appearing.   HENT:      Head: Normocephalic and atraumatic.       Mouth/Throat:      Mouth: Mucous membranes are moist.     Eyes:      Extraocular Movements: Extraocular movements intact.     Pulmonary:      Effort: Pulmonary effort is normal.   Abdominal:      Palpations: Abdomen is soft.      Tenderness: There is abdominal tenderness (Suprapubic). There is no right CVA tenderness or left CVA tenderness.     Skin:     General: Skin is warm.     Neurological:      Mental Status: She is alert.     Psychiatric:         Mood and Affect: Mood normal.         Results Reviewed       Procedure Component Value Units Date/Time    Basic metabolic panel [[id number]]  (Abnormal) Collected: 06/21/25 1432    Lab Status: Final result Specimen: Blood from Arm, Right Updated: 06/21/25 1452     Sodium 133 mmol/L      Potassium 4.4 mmol/L      Chloride 102 mmol/L      CO2 25 mmol/L      ANION GAP 6 mmol/L      BUN 13 mg/dL      Creatinine 0.55 mg/dL      Glucose 113 mg/dL      Calcium 9.3 mg/dL      eGFR 89 ml/min/1.73sq m     Narrative:      National Kidney Disease Foundation guidelines for Chronic Kidney Disease (CKD):     Stage 1 with normal or high GFR (GFR > 90 mL/min/1.73 square meters)    Stage 2 Mild CKD (GFR = 60-89 mL/min/1.73 square meters)    Stage 3A Moderate CKD (GFR = 45-59 mL/min/1.73 square meters)    Stage 3B Moderate CKD (GFR = 30-44 mL/min/1.73 square meters)    Stage 4 Severe CKD (GFR = 15-29 mL/min/1.73 square meters)    Stage 5 End Stage CKD (GFR <15 mL/min/1.73 square meters)  Note: GFR calculation is accurate only with a steady state creatinine    CBC and differential [[id number]]  (Abnormal) Collected: 06/21/25 1432    Lab Status: Final result Specimen: Blood from Arm, Right Updated: 06/21/25 1441     WBC 7.01 Thousand/uL      RBC 3.63 Million/uL      Hemoglobin 11.5 g/dL      Hematocrit 34.1 %      MCV 94 fL      MCH 31.7 pg      MCHC 33.7 g/dL      RDW 11.9 %      MPV 10.5 fL      Platelets 153 Thousands/uL      nRBC 0 /100 WBCs      Segmented % 65 %      Immature Grans %  0 %      Lymphocytes % 24 %      Monocytes % 10 %      Eosinophils Relative 1 %      Basophils Relative 0 %      Absolute Neutrophils 4.50 Thousands/µL      Absolute Immature Grans 0.02 Thousand/uL      Absolute Lymphocytes 1.70 Thousands/µL      Absolute Monocytes 0.71 Thousand/µL      Eosinophils Absolute 0.05 Thousand/µL      Basophils Absolute 0.03 Thousands/µL     Urine Microscopic [[id number]]  (Abnormal) Collected: 06/21/25 1400    Lab Status: Final result Specimen: Urine, Clean Catch Updated: 06/21/25 1416     RBC, UA None Seen /hpf      WBC, UA 10-20 /hpf      Epithelial Cells Occasional /hpf      Bacteria, UA Moderate /hpf     Urine culture [[id number]] Collected: 06/21/25 1400    Lab Status: In process Specimen: Urine, Clean Catch Updated: 06/21/25 1415    UA w Reflex to Microscopic w Reflex to Culture [[id number]]  (Abnormal) Collected: 06/21/25 1400    Lab Status: Final result Specimen: Urine, Clean Catch Updated: 06/21/25 1408     Color, UA Yellow     Clarity, UA Clear     Specific Gravity, UA 1.020     pH, UA 8.0     Leukocytes, UA Large     Nitrite, UA Negative     Protein, UA Negative mg/dl      Glucose, UA Negative mg/dl      Ketones, UA Negative mg/dl      Urobilinogen, UA <2.0 mg/dl      Bilirubin, UA Negative     Occult Blood, UA Negative            No orders to display       Procedures    ED Medication and Procedure Management   Prior to Admission Medications   Prescriptions Last Dose Informant Patient Reported? Taking?   Aspirin 81 MG CAPS 6/20/2025 at 12:00 PM Self Yes Yes   Sig: in the morning and in the evening.   Lancets (OneTouch Delica Plus [id number]) MISC 6/21/2025 at  9:00 AM  No Yes   Sig: Use once a day to check blood sugar   OneTouch Ultra test strip 6/21/2025 at  9:00 AM Self No Yes   Sig: Use 1 each daily Use as instructed   acetaminophen (TYLENOL) 500 mg tablet 6/20/2025 at 10:00 PM  No Yes   Sig: Take 2 tablets (1,000 mg total) by mouth every 8 (eight) hours as needed for mild pain    anastrozole (ARIMIDEX) 1 mg tablet 6/20/2025 at 12:00 PM  No Yes   Sig: TAKE ONE TABLET BY MOUTH EVERY DAY.   atorvastatin (LIPITOR) 40 mg tablet 6/20/2025 at 12:00 PM Self No Yes   Sig: Take 1 tablet (40 mg total) by mouth daily   furosemide (LASIX) 20 mg tablet Past Week  No Yes   Sig: Take 1 tablet (20 mg total) by mouth daily   levothyroxine 88 mcg tablet 6/21/2025 at  7:00 AM  No Yes   Sig: Take 1 tablet (88 mcg total) by mouth daily   losartan (COZAAR) 25 mg tablet 6/20/2025 at  5:00 PM Self No Yes   Sig: Take 1 tablet (25 mg total) by mouth daily   metFORMIN (GLUCOPHAGE) 1000 MG tablet 6/20/2025 at  5:00 PM  No Yes   Sig: Take 1 tablet (1,000 mg total) by mouth in the morning and 1 tablet (1,000 mg total) in the evening. Take with meals.   metoprolol tartrate (LOPRESSOR) 25 mg tablet 6/20/2025 at 10:00 PM Self No Yes   Sig: Take 0.5 tablets (12.5 mg total) by mouth every 12 (twelve) hours   pregabalin (LYRICA) 75 mg capsule 6/21/2025 at  9:30 AM  No Yes   Sig: Take 1 capsule (75 mg total) by mouth 2 (two) times a day      Facility-Administered Medications: None     Discharge Medication List as of 6/21/2025  3:07 PM        START taking these medications    Details   cephalexin (KEFLEX) 500 mg capsule Take 1 capsule (500 mg total) by mouth every 8 (eight) hours for 7 days, Starting Sat 6/21/2025, Until Sat 6/28/2025, Normal           CONTINUE these medications which have NOT CHANGED    Details   acetaminophen (TYLENOL) 500 mg tablet Take 2 tablets (1,000 mg total) by mouth every 8 (eight) hours as needed for mild pain, Starting Tue 3/18/2025, Normal      anastrozole (ARIMIDEX) 1 mg tablet TAKE ONE TABLET BY MOUTH EVERY DAY., Normal      Aspirin 81 MG CAPS in the morning and in the evening., Historical Med      atorvastatin (LIPITOR) 40 mg tablet Take 1 tablet (40 mg total) by mouth daily, Starting Tue 2/4/2025, Normal      furosemide (LASIX) 20 mg tablet Take 1 tablet (20 mg total) by mouth daily, Starting  Mon 5/5/2025, Normal      Lancets (OneTouch Delica Plus [id number]) MISC Use once a day to check blood sugar, Normal      levothyroxine 88 mcg tablet Take 1 tablet (88 mcg total) by mouth daily, Starting Mon 4/14/2025, Normal      losartan (COZAAR) 25 mg tablet Take 1 tablet (25 mg total) by mouth daily, Starting Tue 3/4/2025, Normal      metFORMIN (GLUCOPHAGE) 1000 MG tablet Take 1 tablet (1,000 mg total) by mouth in the morning and 1 tablet (1,000 mg total) in the evening. Take with meals., Starting Mon 6/16/2025, Until Sun 9/14/2025, Normal      metoprolol tartrate (LOPRESSOR) 25 mg tablet Take 0.5 tablets (12.5 mg total) by mouth every 12 (twelve) hours, Starting Tue 2/4/2025, Normal      OneTouch Ultra test strip Use 1 each daily Use as instructed, Starting u 2/6/2025, Normal      pregabalin (LYRICA) 75 mg capsule Take 1 capsule (75 mg total) by mouth 2 (two) times a day, Starting Tue 5/6/2025, Normal           No discharge procedures on file.  ED SEPSIS DOCUMENTATION   Time reflects when diagnosis was documented in both MDM as applicable and the Disposition within this note       Time User Action Codes Description Comment    6/21/2025  3:06 PM DevorahitisKyrie Add [N39.0] UTI (urinary tract infection)     6/21/2025  3:07 PM YokubaitisArnaudy Add [N30.90] Cystitis     6/21/2025  3:07 PM YokubaitisKyrie Add [R10.9] Abdominal pain                    [1]   Past Medical History:  Diagnosis Date    Abdominal pain     LLQ on occasion    Angina pectoris (HCC)     Arthritis     Breast cancer (HCC) 07/01/2021    Cancer (HCC)     breast left    Colon polyp     Constipation     at times    Coronary artery disease     Diabetes mellitus (HCC)     Diarrhea     at times    Disease of thyroid gland     Hemorrhoids     Hypercholesterolemia     Hypertension     Neuropathy     both legs and feet    Obesity     Osteoporosis     Otitis media     Ovarian ca (HCC) 1997    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HCC)     Shingles      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 Cleary. Up to date with her colonoscopy.    COLONOSCOPY      CORONARY STENT PLACEMENT      Dec 2015    EPIDURAL BLOCK INJECTION Left 08/09/2024    Procedure: LEFT  L2, L3 TRANSFORAMINAL EPIDURAL STEROID INJECTION;  Surgeon: Billy Cheatham MD;  Location: [facility] MAIN OR;  Service: Pain Management     EYE SURGERY      cataract surgery    GALLBLADDER SURGERY      HYSTERECTOMY  1989    MAMMO (HISTORICAL)      up to date. see gyn    MASTECTOMY Left 07/13/2021    MASTECTOMY W/ SENTINEL NODE BIOPSY Left 07/13/2021    Procedure: BREAST ROSLYN  DIRECTED MASTECTOMY, SENTINEL LYMPH NODE BIOPSY, LYMPHATIC MAPPING WITH BLUE DYE AND RADIOACTIVE DYE (INJECT AT 1500 BY DR ANDREWS IN THE OR);  Surgeon: Anthony Andrews MD;  Location: [facility];  Service: Surgical Oncology    OOPHORECTOMY Bilateral 1997    HI CORONARY ARTERY BYP W/VEIN & ARTERY GRAFT 2 VEIN N/A 02/08/2023    Procedure: CORONARY ARTERY BYPASS GRAFT (CABG) X 2 VESSELS GSV-->OM1, LIMA-->LAD; EVH  LEFT LEG w/ RADHA;  Surgeon: Javan Allen MD;  Location:  MAIN OR;  Service: Cardiac Surgery    SPINE SURGERY      THYROIDECTOMY      UPPER GASTROINTESTINAL ENDOSCOPY      US BREAST NEEDLE LOC LEFT Left 05/06/2021    US GUIDANCE BREAST BIOPSY LEFT EACH ADDITIONAL Left 05/06/2021    US GUIDED BREAST BIOPSY LEFT COMPLETE Left 03/15/2021    US GUIDED BREAST BIOPSY LEFT COMPLETE Left 05/06/2021   [3]   Family History  Problem  Relation Name Age of Onset    Diabetes Mother Tamiko Cheney     Heart disease Mother Tamiko Cheney     Dementia Mother Tamiko Cheney     Esophageal cancer Father Finesse Cheney 60    Endometrial cancer Sister Hailey Hernandes 68    Asthma Sister Hailey Hernandes     Cancer Sister Hailey Hernandes     No Known Problems Sister      No Known Problems Sister      No Known Problems Sister      Asthma Daughter Inna Beckman     No Known Problems Maternal Grandmother      Prostate cancer Maternal Grandfather      No Known Problems Paternal Grandmother      Prostate cancer Paternal Grandfather      Stomach cancer Brother Edouard Macedoy 71    Multiple myeloma Brother  72    Cancer Brother Srini Macedoy     Asthma Son Edouard VALDES Daisy II     Depression Son Edouard SHAHID Daisy II     Breast cancer Maternal Aunt  50    No Known Problems Paternal Aunt      No Known Problems Paternal Aunt      Breast cancer Other maternal neice 45    Breast cancer Other paternal neice 45    Diabetes Family      Cancer Family      Arthritis Family      Heart disease Family     [4]   Social History  Tobacco Use    Smoking status: Never     Passive exposure: Past    Smokeless tobacco: Never   Vaping Use    Vaping status: Never Used   Substance Use Topics    Alcohol use: Never    Drug use: Never        Kyrie Rodriguez MD  06/22/25 0655

## 2025-06-24 LAB — BACTERIA UR CULT: ABNORMAL

## 2025-07-01 NOTE — TELEPHONE ENCOUNTER
As a follow-up, a second attempt has been made for outreach via fax to facility. Please see Contacts section for details.    Thank you  Ayala Campos MA

## 2025-07-02 ENCOUNTER — TELEPHONE (OUTPATIENT)
Age: 80
End: 2025-07-02

## 2025-07-02 NOTE — TELEPHONE ENCOUNTER
Call from patient requesting Dr. Mancera send a script for 6 mastectomy bras to the [facility] in [facility].    She advised he has done this for her before and she is due for new ones.     If you have any questions please call patient. Thank you.

## 2025-07-03 DIAGNOSIS — Z85.3 HISTORY OF BREAST CANCER IN FEMALE: Primary | ICD-10-CM

## 2025-07-03 NOTE — TELEPHONE ENCOUNTER
Left message for pt to call office back. I have questions about mastectomy bra before I send order

## 2025-07-07 DIAGNOSIS — Z85.3 HISTORY OF BREAST CANCER IN FEMALE: Primary | ICD-10-CM

## 2025-07-09 DIAGNOSIS — R60.0 BILATERAL LEG EDEMA: ICD-10-CM

## 2025-07-09 NOTE — TELEPHONE ENCOUNTER
As a final attempt, a third outreach has been made via telephone call to facility. Please see Contacts section for details. This encounter will be closed and completed by end of day. Should we receive the requested information because of previous outreach attempts, the requested patient's chart will be updated appropriately.     Thank you  Ayala Campos MA

## 2025-07-10 RX ORDER — FUROSEMIDE 20 MG/1
20 TABLET ORAL DAILY
Qty: 30 TABLET | Refills: 0 | Status: SHIPPED | OUTPATIENT
Start: 2025-07-10

## 2025-07-10 NOTE — TELEPHONE ENCOUNTER
Miya, from [facility], called and stated the office notes they received do not mention the patient's breast cancer diagnosis or mastectomy.  She is requesting office notes documenting either of these.  She also stated the patient may need a new prosthesis as she has lost weight.  Miya is requesting a script for a breast prosthesis, qty:1 with the diagnosis code.  Please fax notes and script to [phone].  Thank you!

## 2025-07-11 ENCOUNTER — TELEPHONE (OUTPATIENT)
Age: 80
End: 2025-07-11

## 2025-07-11 DIAGNOSIS — E11.9 TYPE 2 DIABETES MELLITUS WITHOUT COMPLICATION, WITHOUT LONG-TERM CURRENT USE OF INSULIN (HCC): Primary | ICD-10-CM

## 2025-07-11 RX ORDER — BLOOD SUGAR DIAGNOSTIC
STRIP MISCELLANEOUS
Qty: 100 STRIP | Refills: 3 | Status: SHIPPED | OUTPATIENT
Start: 2025-07-11 | End: 2025-07-17

## 2025-07-11 RX ORDER — BLOOD-GLUCOSE METER
EACH MISCELLANEOUS DAILY
Qty: 1 KIT | Refills: 0 | Status: CANCELLED | OUTPATIENT
Start: 2025-07-11

## 2025-07-11 RX ORDER — BLOOD SUGAR DIAGNOSTIC
STRIP MISCELLANEOUS
Qty: 100 STRIP | Refills: 3 | Status: CANCELLED | OUTPATIENT
Start: 2025-07-11

## 2025-07-11 NOTE — TELEPHONE ENCOUNTER
Pt called in stating she received a letter in the mail from her insurance company stating that the current glucose meter she is currently using is no longer covered under her insurance plan as well as the test strips. Pt states the meters and test strips listed below are covered and is requesting if her PCP can order one of the following meters for her along the test strips listed below. Pt states the letter states this needs to be done before August 3rd, 2025.    Please advise.      Accu-chek guide me meter    Accu-chek guide meter    Accu-chek guide test strips    Contour plus Blue meter    Contour next gen meter    Contour next one meter    Contour next EZ meter    Contour plus test strips    Contour next test strips

## 2025-07-16 NOTE — TELEPHONE ENCOUNTER
[facility] pharmacy called in stating they need a new script sent over for glucometer including instructions.    Please advise, thank you

## 2025-07-16 NOTE — PROGRESS NOTES
Name: Delfina Villa      : 1945      MRN: [id number]  Encounter Provider: Deven Healy PA-C  Encounter Date: 2025   Encounter department: [facility]  :  Assessment & Plan  Recurrent UTI  Recurrent E. coli UTI  Diabetes with neuropathy  Breast cancer 2021 tx with mastectomy. No chemo or XRT.  On Arimidex   Ovarian cancer  tx with chemo and hysterectomy (no XRT)   2024 CT abdomen/pelvis without contrast indicating no stones or cause for infections  -f/u UA/PVR with me to check on urine and make sure empty  -f/u cystoscopy/pelvic exam  to evaluate for cause of recurrent UTIs and straining to void   -Take D-mannose 1gm (1000mg) in a 8-12 oz glass of water in the AM and PM.  Ok to increase to 4x per day if having symptoms of a UTI.  -Take probiotics daily with food.   Change the brand of the probiotic every 4 months.   -hold on estrace at current time due to her breast cancer hx on Arimidex  -f/u with gyn for possible vaginal atrophy                 History of Present Illness   Delfina Villa is a 80 y.o. female who presents for evaluation of her recurrent E. coli urinary tract infections despite antibiotics. For most of adult life would get a UTI yearly that would manifest with frequency/urgency and slight pain that would clear with antibiotics.  Starting 2025 been having recurrent infections that would reoccur shortly after stopping antibiotics.  Notes no new medication or procedure.  Notes no prolapse sensation, occ straining to void, no constipation,          Radiology  2024 CT abdomen/pelvis without contrast  KIDNEYS/URETERS: Simple renal cyst(s). Otherwise unremarkable kidneys. No hydronephrosis   URINARY BLADDER: Unremarkable.     Labs   11/15/2024 urine culture E. Coli Keflex 500 mg twice daily x 7 days  2025 urine culture E. Coli Keflex 500 mg 4 times daily x 10 days  2025 urine culture E. Coli Macrobid 100 mg p.o. twice  "daily x 7 days  06/02/2025 urine culture E. Coli Macrobid 100 mg p.o. twice daily x 5 days  06/19/2025 urine culture E. Coli  06/21/2025 urine culture E. Coli ceftriaxone IM 2 g and Keflex 500 mg 3 times daily x 7 days    Past Medical History  Coronary disease  Hypertension  MI history  Diabetes  Breast cancer July 2021 tx with mastectomy. No chemo or XRT.  On Arimidex   Ovarian cancer 1997 tx with chemo and hysterectomy (no XRT)   Lumbar fusion  CABG x 2  Coronary artery stent  Neuropathy  Thyroid cancer    Past  History  Recurrent E coli UTI     Past  surgical history       Prior Visits             Objective   BP (!) 190/70 (BP Location: Left arm, Patient Position: Sitting, Cuff Size: Adult)   Pulse 71   Ht 5' 1\" (1.549 m)   Wt 73.9 kg (163 lb)   SpO2 98%   BMI 30.80 kg/m²     Physical Exam  HENT:      Head: Normocephalic.   Pulmonary:      Effort: Pulmonary effort is normal.   Abdominal:      Tenderness: There is no right CVA tenderness or left CVA tenderness.     Skin:     General: Skin is warm.     Psychiatric:         Mood and Affect: Mood normal.           Results   No results found for: \"PSA\"  Lab Results   Component Value Date    GLUCOSE 128 02/08/2023    CALCIUM 9.3 06/21/2025     12/30/2015    K 4.4 06/21/2025    CO2 25 06/21/2025     06/21/2025    BUN 13 06/21/2025    CREATININE 0.55 (L) 06/21/2025     Lab Results   Component Value Date    WBC 7.01 06/21/2025    HGB 11.5 06/21/2025    HCT 34.1 (L) 06/21/2025    MCV 94 06/21/2025     06/21/2025       Office Urine Dip  No results found for this or any previous visit (from the past hour).        "

## 2025-07-17 ENCOUNTER — OFFICE VISIT (OUTPATIENT)
Dept: UROLOGY | Facility: CLINIC | Age: 80
End: 2025-07-17
Payer: COMMERCIAL

## 2025-07-17 VITALS
HEIGHT: 61 IN | WEIGHT: 163 LBS | SYSTOLIC BLOOD PRESSURE: 190 MMHG | BODY MASS INDEX: 30.78 KG/M2 | DIASTOLIC BLOOD PRESSURE: 70 MMHG | HEART RATE: 71 BPM | OXYGEN SATURATION: 98 %

## 2025-07-17 DIAGNOSIS — E11.9 TYPE 2 DIABETES MELLITUS WITHOUT COMPLICATION, WITHOUT LONG-TERM CURRENT USE OF INSULIN (HCC): Primary | ICD-10-CM

## 2025-07-17 DIAGNOSIS — N39.0 RECURRENT UTI: Primary | ICD-10-CM

## 2025-07-17 PROCEDURE — 99203 OFFICE O/P NEW LOW 30 MIN: CPT | Performed by: PHYSICIAN ASSISTANT

## 2025-07-17 RX ORDER — BLOOD SUGAR DIAGNOSTIC
STRIP MISCELLANEOUS
Qty: 100 STRIP | Refills: 3 | Status: SHIPPED | OUTPATIENT
Start: 2025-07-17 | End: 2025-07-31 | Stop reason: SDUPTHER

## 2025-07-17 NOTE — PATIENT INSTRUCTIONS
Take D-mannose 1gm (1000mg) in a 8-12 oz glass of water in the AM and PM.  Ok to increase to 4x per day if having symptoms of a UTI.  Take probiotics daily with food.   Change the brand of the probiotic every 4 months. \

## 2025-07-19 PROBLEM — N39.0 FREQUENT UTI: Status: RESOLVED | Noted: 2025-06-19 | Resolved: 2025-07-19

## 2025-07-21 ENCOUNTER — HOSPITAL ENCOUNTER (OUTPATIENT)
Dept: RADIOLOGY | Facility: HOSPITAL | Age: 80
Discharge: HOME | End: 2025-07-21
Payer: COMMERCIAL

## 2025-07-21 VITALS — BODY MASS INDEX: 29.64 KG/M2 | HEIGHT: 61 IN | WEIGHT: 157 LBS

## 2025-07-21 DIAGNOSIS — Z12.31 VISIT FOR SCREENING MAMMOGRAM: ICD-10-CM

## 2025-07-21 PROCEDURE — 77067 SCR MAMMO BI INCL CAD: CPT

## 2025-07-21 PROCEDURE — 77063 BREAST TOMOSYNTHESIS BI: CPT

## 2025-07-21 NOTE — TELEPHONE ENCOUNTER
The patient called to report that she spoke with the pharmacy and they do not have the new meter on file and also the pharmacy needs to speak with the provider in regard how many time she needs to check her sugar levels

## 2025-07-22 NOTE — TELEPHONE ENCOUNTER
Spoke to [facility] and they can not take a fax for a glucometer. Needs to be done electronically or hard copy given to pt and taken to the pharmacy

## 2025-07-23 ENCOUNTER — TELEPHONE (OUTPATIENT)
Age: 80
End: 2025-07-23

## 2025-07-23 NOTE — TELEPHONE ENCOUNTER
Received call from pharmacy requesting new order for Accu-Chek glucometer post receipt of order for Accu-Check test strips. Please send order to [facility] pharmacy.

## 2025-07-23 NOTE — TELEPHONE ENCOUNTER
I spoke to [facility] and they stated we will need to do glucometer electronically or hard copy. We can not do glucometer electronically so I spoke to the pt and mailed the order for glucometer. When she receives it she will take it to the [facility]

## 2025-07-25 NOTE — TELEPHONE ENCOUNTER
Deysi, from [facility], called to follow up on the request for a new written or electronic script for the Accuchek glucometer as Medicare will not pay for a faxed script.  Please advise.  Thank you!

## 2025-07-28 ENCOUNTER — OFFICE VISIT (OUTPATIENT)
Age: 80
End: 2025-07-28
Payer: COMMERCIAL

## 2025-07-28 VITALS
RESPIRATION RATE: 16 BRPM | OXYGEN SATURATION: 99 % | DIASTOLIC BLOOD PRESSURE: 58 MMHG | HEART RATE: 69 BPM | BODY MASS INDEX: 30.21 KG/M2 | WEIGHT: 160 LBS | HEIGHT: 61 IN | SYSTOLIC BLOOD PRESSURE: 128 MMHG | TEMPERATURE: 98 F

## 2025-07-28 DIAGNOSIS — E11.9 TYPE 2 DIABETES MELLITUS WITHOUT COMPLICATION, WITHOUT LONG-TERM CURRENT USE OF INSULIN (HCC): Primary | ICD-10-CM

## 2025-07-28 DIAGNOSIS — Z95.1 S/P CABG X 2: ICD-10-CM

## 2025-07-28 DIAGNOSIS — I10 ESSENTIAL HYPERTENSION: ICD-10-CM

## 2025-07-28 PROBLEM — E11.40 TYPE 2 DIABETES MELLITUS WITH DIABETIC NEUROPATHY, WITHOUT LONG-TERM CURRENT USE OF INSULIN (HCC): Status: RESOLVED | Noted: 2025-06-19 | Resolved: 2025-07-28

## 2025-07-28 PROCEDURE — 99213 OFFICE O/P EST LOW 20 MIN: CPT | Performed by: INTERNAL MEDICINE

## 2025-07-28 PROCEDURE — G2211 COMPLEX E/M VISIT ADD ON: HCPCS | Performed by: INTERNAL MEDICINE

## 2025-07-31 ENCOUNTER — TELEPHONE (OUTPATIENT)
Age: 80
End: 2025-07-31

## 2025-07-31 DIAGNOSIS — E11.9 TYPE 2 DIABETES MELLITUS WITHOUT COMPLICATION, WITHOUT LONG-TERM CURRENT USE OF INSULIN (HCC): ICD-10-CM

## 2025-07-31 RX ORDER — BLOOD SUGAR DIAGNOSTIC
STRIP MISCELLANEOUS
Qty: 100 STRIP | Refills: 3 | Status: SHIPPED | OUTPATIENT
Start: 2025-07-31

## 2025-07-31 NOTE — TELEPHONE ENCOUNTER
Pt called back , she is still waiting on the prescription  She said as of this morning she had not received the hard copy. She is asking for this to be sent electronically to [facility] in [place].    Pt said this has to be done before 8/3

## 2025-08-01 NOTE — TELEPHONE ENCOUNTER
Parris, from [facility], called to request the order for the Accuchek glucometer to be sent to the pharmacy.  She stated they received the order for the test strips but still need the order for the glucometer.  Please advise if the order can be sent.  Thank you!

## 2025-08-01 NOTE — TELEPHONE ENCOUNTER
Pt called in stating she is still having issues with the pharmacy and insurance company on getting a new meter. Pt states today is the last day as per her insurance that she is able to get one. Pt is requesting a call back from clinical, preferably from Krystal in the office to assist her, if possible. Warm transfer to office clinical unsuccessful.    Please advise.

## 2025-08-01 NOTE — TELEPHONE ENCOUNTER
Pt is stopping to get hard copy to take to [facility]. The pharmacist stated to me that because she has medicare they need the hard copy with a signature from the doctor

## 2025-08-13 ENCOUNTER — OFFICE VISIT (OUTPATIENT)
Dept: UROLOGY | Facility: CLINIC | Age: 80
End: 2025-08-13

## 2025-08-16 DIAGNOSIS — I25.10 CORONARY ARTERY DISEASE INVOLVING NATIVE CORONARY ARTERY OF NATIVE HEART WITHOUT ANGINA PECTORIS: ICD-10-CM

## 2025-08-18 ENCOUNTER — TELEPHONE (OUTPATIENT)
Age: 80
End: 2025-08-18

## 2025-08-18 DIAGNOSIS — E11.9 TYPE 2 DIABETES MELLITUS WITHOUT COMPLICATION, WITHOUT LONG-TERM CURRENT USE OF INSULIN (HCC): ICD-10-CM

## 2025-08-18 DIAGNOSIS — C50.812 MALIGNANT NEOPLASM OF OVERLAPPING SITES OF LEFT BREAST IN FEMALE, ESTROGEN RECEPTOR POSITIVE (HCC): ICD-10-CM

## 2025-08-18 DIAGNOSIS — Z17.0 MALIGNANT NEOPLASM OF OVERLAPPING SITES OF LEFT BREAST IN FEMALE, ESTROGEN RECEPTOR POSITIVE (HCC): ICD-10-CM

## 2025-08-18 DIAGNOSIS — Z95.1 S/P CABG X 2: ICD-10-CM

## 2025-08-18 RX ORDER — ANASTROZOLE 1 MG/1
TABLET ORAL
Qty: 90 TABLET | Refills: 0 | Status: SHIPPED | OUTPATIENT
Start: 2025-08-18

## 2025-08-19 RX ORDER — ATORVASTATIN CALCIUM 40 MG/1
40 TABLET, FILM COATED ORAL DAILY
Qty: 90 TABLET | Refills: 1 | Status: SHIPPED | OUTPATIENT
Start: 2025-08-19

## 2025-08-20 ENCOUNTER — TELEPHONE (OUTPATIENT)
Dept: GASTROENTEROLOGY | Facility: CLINIC | Age: 80
End: 2025-08-20

## 2025-08-20 ENCOUNTER — OFFICE VISIT (OUTPATIENT)
Dept: GASTROENTEROLOGY | Facility: CLINIC | Age: 80
End: 2025-08-20
Payer: COMMERCIAL

## 2025-08-20 VITALS
WEIGHT: 155 LBS | HEIGHT: 61 IN | SYSTOLIC BLOOD PRESSURE: 151 MMHG | BODY MASS INDEX: 29.27 KG/M2 | DIASTOLIC BLOOD PRESSURE: 80 MMHG

## 2025-08-20 DIAGNOSIS — K64.9 HEMORRHOIDS, UNSPECIFIED HEMORRHOID TYPE: ICD-10-CM

## 2025-08-20 DIAGNOSIS — K59.00 CONSTIPATION, UNSPECIFIED CONSTIPATION TYPE: Primary | ICD-10-CM

## 2025-08-20 DIAGNOSIS — Z86.0100 PERSONAL HISTORY OF COLON POLYPS, UNSPECIFIED: ICD-10-CM

## 2025-08-20 PROCEDURE — 99203 OFFICE O/P NEW LOW 30 MIN: CPT | Performed by: INTERNAL MEDICINE

## 2025-08-20 RX ORDER — POLYETHYLENE GLYCOL 3350, SODIUM SULFATE ANHYDROUS, SODIUM BICARBONATE, SODIUM CHLORIDE, POTASSIUM CHLORIDE 236; 22.74; 6.74; 5.86; 2.97 G/4L; G/4L; G/4L; G/4L; G/4L
4 POWDER, FOR SOLUTION ORAL ONCE
Qty: 4000 ML | Refills: 0 | Status: SHIPPED | OUTPATIENT
Start: 2025-08-20 | End: 2025-08-20

## 2025-08-20 RX ORDER — METOPROLOL TARTRATE 25 MG/1
12.5 TABLET, FILM COATED ORAL EVERY 12 HOURS SCHEDULED
Qty: 90 TABLET | Refills: 1 | Status: SHIPPED | OUTPATIENT
Start: 2025-08-20

## 2025-08-20 RX ORDER — SODIUM CHLORIDE, SODIUM LACTATE, POTASSIUM CHLORIDE, CALCIUM CHLORIDE 600; 310; 30; 20 MG/100ML; MG/100ML; MG/100ML; MG/100ML
125 INJECTION, SOLUTION INTRAVENOUS CONTINUOUS
OUTPATIENT
Start: 2025-08-20

## (undated) DEVICE — UMBILICAL TAPE: Brand: DEROYAL

## (undated) DEVICE — BLANKET HYPOTHERMIA ADULT GAYMAR

## (undated) DEVICE — JP CHANNEL DRAIN 19FR, FULL FLUTES: Brand: JACKSON-PRATT

## (undated) DEVICE — WIPES BABY PAMPERS SENSITIVE 36/PK

## (undated) DEVICE — PLUMEPEN PRO 10FT

## (undated) DEVICE — PENCIL ELECTROSURG E-Z CLEAN -0035H

## (undated) DEVICE — PUMP TUBING FUSION PACK

## (undated) DEVICE — 3000CC GUARDIAN II: Brand: GUARDIAN

## (undated) DEVICE — ELECTRODE BLADE E-Z CLEAN 2.75IN 7CM -0012A

## (undated) DEVICE — BETHLEHEM UNIVERSAL BREAST PK: Brand: CARDINAL HEALTH

## (undated) DEVICE — SYRINGE 3ML LL

## (undated) DEVICE — PACK CABG PBDS

## (undated) DEVICE — INTRO GLIDESHEATH 5FR 10CM W/.021 WIRE SPRING/STRAIGHT

## (undated) DEVICE — EVERGRIP INSERT SET 61MM: Brand: FOGARTY EVERGRIP

## (undated) DEVICE — SUT MONOCRYL 4-0 PS-2 18 IN Y496G

## (undated) DEVICE — SYRINGE 5ML LL

## (undated) DEVICE — INTENDED FOR TISSUE SEPARATION, AND OTHER PROCEDURES THAT REQUIRE A SHARP SURGICAL BLADE TO PUNCTURE OR CUT.: Brand: BARD-PARKER ® CARBON RIB-BACK BLADES

## (undated) DEVICE — PACK CUSTOM C V DRAPE SCV11CDSLA

## (undated) DEVICE — FILTER SMOKE EVAC VIROSAFE

## (undated) DEVICE — SYRINGE 10ML LL

## (undated) DEVICE — GAUZE SPONGES,16 PLY: Brand: CURITY

## (undated) DEVICE — RADIOLOGY STERILE LABELS: Brand: CENTURION

## (undated) DEVICE — SUT PROLENE 7-0 BV175-8/BV175-8 24 IN EPM8747

## (undated) DEVICE — RECIP.STERNUM SAW BLADE 34/7.5/0.7MM: Brand: AESCULAP

## (undated) DEVICE — SUT PDS PLUS 1 CTB 36 IN PDPB359T

## (undated) DEVICE — SUT SILK 0 CT-1 30 IN 424H

## (undated) DEVICE — GLOVE SRG BIOGEL ECLIPSE 7.5

## (undated) DEVICE — 32 FR STRAIGHT – SOFT PVC CATHETER: Brand: PVC THORACIC CATHETERS

## (undated) DEVICE — AORTIC PUNCH 5.2 MM DISP

## (undated) DEVICE — GUIDEWIRE WHOLEY .035 145 CM FLOP STR TIP

## (undated) DEVICE — DRAPE EQUIPMENT RF WAND

## (undated) DEVICE — SUT PROLENE 6-0 C-1/C-1 30 IN 8307H

## (undated) DEVICE — TUBING INSUFFLATION SET ISO CONNECTOR

## (undated) DEVICE — RED RUBBER URETHRAL CATHETER: Brand: DOVER

## (undated) DEVICE — 3M™ STERI-STRIP™ REINFORCED ADHESIVE SKIN CLOSURES, R1547, 1/2 IN X 4 IN (12 MM X 100 MM), 6 STRIPS/ENVELOPE: Brand: 3M™ STERI-STRIP™

## (undated) DEVICE — NEEDLE SPINAL 22G X 3.5 IN PLST HUB

## (undated) DEVICE — SUT PROLENE 4-0 RB-1/RB-1 36 IN 8557H

## (undated) DEVICE — BLADE BEAVER MINI SZ 69

## (undated) DEVICE — DRESSING ALLEVYN LIFE HEEL 25 X 25.2CM

## (undated) DEVICE — CATH DIAG 5FR IMPULSE 100CM FR4 MOD

## (undated) DEVICE — CHEST/BREAST DRAPE: Brand: CONVERTORS

## (undated) DEVICE — TOWEL SET X-RAY

## (undated) DEVICE — SUCTION CATH 18 FR

## (undated) DEVICE — ELECTRODE BLADE MOD  E-Z CLEAN 6.5IN -0014M

## (undated) DEVICE — INTENDED FOR TISSUE SEPARATION, AND OTHER PROCEDURES THAT REQUIRE A SHARP SURGICAL BLADE TO PUNCTURE OR CUT.: Brand: BARD-PARKER SAFETY BLADES SIZE 15, STERILE

## (undated) DEVICE — 32 FR RIGHT ANGLE – SOFT PVC CATHETER: Brand: PVC THORACIC CATHETERS

## (undated) DEVICE — BONE WAX WHITE: Brand: BONE WAX WHITE

## (undated) DEVICE — VIAL DECANTER

## (undated) DEVICE — ACE WRAP 6 IN UNSTERILE

## (undated) DEVICE — TRAY FOLEY 16FR SURESTEP TEMP SENS URIMETER STAT LOK

## (undated) DEVICE — SUT MONOCRYL PLUS 3-0 PS-2 27 IN MCP427H

## (undated) DEVICE — OASIS DRAIN, SINGLE, INLINE & ATS COMPATIBLE: Brand: OASIS

## (undated) DEVICE — GLOVE SRG BIOGEL 7

## (undated) DEVICE — SUT VICRYL 2-0 SH 27 IN UNDYED J417H

## (undated) DEVICE — SUT SILK 3-0 SH CR/8 18 IN C013D

## (undated) DEVICE — SUT ETHIBOND 2-0 SH-1/SH-1 30 IN X763H

## (undated) DEVICE — 40601 PROLONGED POSITIONING SYSTEM: Brand: 40601 PROLONGED POSITIONING SYSTEM

## (undated) DEVICE — GLOVE INDICATOR PI UNDERGLOVE SZ 8 BLUE

## (undated) DEVICE — SUT SILK 2 60 IN SA8H

## (undated) DEVICE — SUT VICRYL 2-0 REEL 54 IN J286G

## (undated) DEVICE — LIGHT HANDLE COVER SLEEVE DISP BLUE STELLAR

## (undated) DEVICE — SKIN MARKER DUAL TIP WITH RULER CAP, FLEXIBLE RULER AND LABELS: Brand: DEVON

## (undated) DEVICE — ALCON OPHTHALMIC KNIFE 15 °: Brand: ALCON

## (undated) DEVICE — PLASTIC ADHESIVE BANDAGE: Brand: CURITY

## (undated) DEVICE — HEAVY DRAINAGE PACK: Brand: CURITY

## (undated) DEVICE — SILVER-COATED ANTIBACTERIAL BARRIER DRESSING: Brand: ACTICOAT SURGIC 10X12CM 5PK US

## (undated) DEVICE — THERMOFLECT BLANKET, L, 25EA                               TS THERMOFLECT BLANKET, 48" X 84", SILVER, 5/BG, 5 BG/CS NW: Brand: THERMOFLECT

## (undated) DEVICE — RADIFOCUS OPTITORQUE ANGIOGRAPHIC CATHETER: Brand: OPTITORQUE

## (undated) DEVICE — LIGACLIP MCA MULTIPLE CLIP APPLIERS, 20 SMALL CLIPS: Brand: LIGACLIP

## (undated) DEVICE — SUT SILK 2-0 SH 30 IN K833H

## (undated) DEVICE — CHLORAPREP HI-LITE 26ML ORANGE

## (undated) DEVICE — GUIDEWIRE .035 260CM 3MM J TIP

## (undated) DEVICE — STERNAL WIRE

## (undated) DEVICE — GLOVE SRG BIOGEL 7.5

## (undated) DEVICE — HEMOCLIP CARTRIDGE LRG

## (undated) DEVICE — ANTIBACTERIAL UNDYED BRAIDED (POLYGLACTIN 910), SYNTHETIC ABSORBABLE SUTURE: Brand: COATED VICRYL

## (undated) DEVICE — TR BAND RADIAL ARTERY COMPRESSION DEVICE: Brand: TR BAND

## (undated) DEVICE — SMOKE EVACUATION TUBING WITH 8 IN INTEGRAL WAND AND SPONGE GUARD: Brand: BUFFALO FILTER

## (undated) DEVICE — ADHESIVE SKIN HIGH VISCOSITY EXOFIN 1ML

## (undated) DEVICE — TRAY PAIN SUPPORT

## (undated) DEVICE — 3M™ TEGADERM™ TRANSPARENT FILM DRESSING FRAME STYLE, 1626W, 4 IN X 4-3/4 IN (10 CM X 12 CM), 50/CT 4CT/CASE: Brand: 3M™ TEGADERM™

## (undated) DEVICE — NEEDLE 25G X 1 1/2

## (undated) DEVICE — SILVER-COATED ANTIBACTERIAL BARRIER DRESSING: Brand: ACTICOAT SURGIC 10X25CM 5PK US

## (undated) DEVICE — VASOVIEW HEMOPRO 2: Brand: VASOVIEW HEMOPRO 2